# Patient Record
Sex: FEMALE | Race: WHITE | NOT HISPANIC OR LATINO
[De-identification: names, ages, dates, MRNs, and addresses within clinical notes are randomized per-mention and may not be internally consistent; named-entity substitution may affect disease eponyms.]

---

## 2014-05-21 RX ORDER — LITHIUM CARBONATE 300 MG/1
1 TABLET, EXTENDED RELEASE ORAL
Qty: 0 | Refills: 0 | COMMUNITY
Start: 2014-05-21

## 2017-01-18 ENCOUNTER — APPOINTMENT (OUTPATIENT)
Dept: PULMONOLOGY | Facility: CLINIC | Age: 67
End: 2017-01-18

## 2017-01-18 VITALS
HEART RATE: 118 BPM | OXYGEN SATURATION: 97 % | SYSTOLIC BLOOD PRESSURE: 110 MMHG | WEIGHT: 98 LBS | HEIGHT: 56 IN | RESPIRATION RATE: 17 BRPM | BODY MASS INDEX: 22.04 KG/M2 | DIASTOLIC BLOOD PRESSURE: 70 MMHG

## 2017-01-18 DIAGNOSIS — B00.2 HERPESVIRAL GINGIVOSTOMATITIS AND PHARYNGOTONSILLITIS: ICD-10-CM

## 2017-01-19 ENCOUNTER — RX RENEWAL (OUTPATIENT)
Age: 67
End: 2017-01-19

## 2017-01-19 ENCOUNTER — MEDICATION RENEWAL (OUTPATIENT)
Age: 67
End: 2017-01-19

## 2017-01-19 RX ORDER — ALBUTEROL SULFATE 90 UG/1
108 (90 BASE) AEROSOL, METERED RESPIRATORY (INHALATION) EVERY 6 HOURS
Qty: 1 | Refills: 3 | Status: DISCONTINUED | COMMUNITY
Start: 2017-01-19 | End: 2017-01-19

## 2017-01-26 ENCOUNTER — APPOINTMENT (OUTPATIENT)
Dept: CARDIOLOGY | Facility: CLINIC | Age: 67
End: 2017-01-26

## 2017-01-26 ENCOUNTER — NON-APPOINTMENT (OUTPATIENT)
Age: 67
End: 2017-01-26

## 2017-01-26 VITALS
HEIGHT: 56 IN | HEART RATE: 116 BPM | WEIGHT: 293 LBS | BODY MASS INDEX: 65.91 KG/M2 | OXYGEN SATURATION: 96 % | DIASTOLIC BLOOD PRESSURE: 84 MMHG | SYSTOLIC BLOOD PRESSURE: 147 MMHG

## 2017-02-05 ENCOUNTER — MOBILE ON CALL (OUTPATIENT)
Age: 67
End: 2017-02-05

## 2017-02-07 ENCOUNTER — INPATIENT (INPATIENT)
Facility: HOSPITAL | Age: 67
LOS: 2 days | Discharge: HOME | End: 2017-02-10
Attending: INTERNAL MEDICINE | Admitting: INTERNAL MEDICINE
Payer: MEDICARE

## 2017-02-07 VITALS
RESPIRATION RATE: 26 BRPM | DIASTOLIC BLOOD PRESSURE: 100 MMHG | TEMPERATURE: 98 F | HEART RATE: 112 BPM | SYSTOLIC BLOOD PRESSURE: 129 MMHG | OXYGEN SATURATION: 96 %

## 2017-02-07 DIAGNOSIS — N81.6 RECTOCELE: Chronic | ICD-10-CM

## 2017-02-07 DIAGNOSIS — R06.02 SHORTNESS OF BREATH: ICD-10-CM

## 2017-02-07 DIAGNOSIS — J98.6 DISORDERS OF DIAPHRAGM: Chronic | ICD-10-CM

## 2017-02-07 LAB
BUN SERPL-MCNC: 12 MG/DL — SIGNIFICANT CHANGE UP (ref 7–23)
CALCIUM SERPL-MCNC: 10.7 MG/DL — HIGH (ref 8.4–10.5)
CHLORIDE SERPL-SCNC: 101 MMOL/L — SIGNIFICANT CHANGE UP (ref 98–107)
CO2 SERPL-SCNC: 28 MMOL/L — SIGNIFICANT CHANGE UP (ref 22–31)
CREAT SERPL-MCNC: 0.73 MG/DL — SIGNIFICANT CHANGE UP (ref 0.5–1.3)
GLUCOSE SERPL-MCNC: 87 MG/DL — SIGNIFICANT CHANGE UP (ref 70–99)
HBA1C BLD-MCNC: 4.8 % — SIGNIFICANT CHANGE UP (ref 4–5.6)
HCT VFR BLD CALC: 50.7 % — HIGH (ref 34.5–45)
HGB BLD-MCNC: 16.8 G/DL — HIGH (ref 11.5–15.5)
MCHC RBC-ENTMCNC: 31.3 PG — SIGNIFICANT CHANGE UP (ref 27–34)
MCHC RBC-ENTMCNC: 33.1 % — SIGNIFICANT CHANGE UP (ref 32–36)
MCV RBC AUTO: 94.6 FL — SIGNIFICANT CHANGE UP (ref 80–100)
PLATELET # BLD AUTO: 373 K/UL — SIGNIFICANT CHANGE UP (ref 150–400)
PMV BLD: 10.1 FL — SIGNIFICANT CHANGE UP (ref 7–13)
POTASSIUM SERPL-MCNC: 4.8 MMOL/L — SIGNIFICANT CHANGE UP (ref 3.5–5.3)
POTASSIUM SERPL-SCNC: 4.8 MMOL/L — SIGNIFICANT CHANGE UP (ref 3.5–5.3)
RBC # BLD: 5.36 M/UL — HIGH (ref 3.8–5.2)
RBC # FLD: 12.8 % — SIGNIFICANT CHANGE UP (ref 10.3–14.5)
SODIUM SERPL-SCNC: 146 MMOL/L — HIGH (ref 135–145)
WBC # BLD: 8.06 K/UL — SIGNIFICANT CHANGE UP (ref 3.8–10.5)
WBC # FLD AUTO: 8.06 K/UL — SIGNIFICANT CHANGE UP (ref 3.8–10.5)

## 2017-02-07 PROCEDURE — 93010 ELECTROCARDIOGRAM REPORT: CPT

## 2017-02-07 PROCEDURE — 99223 1ST HOSP IP/OBS HIGH 75: CPT

## 2017-02-07 PROCEDURE — 93306 TTE W/DOPPLER COMPLETE: CPT | Mod: 26

## 2017-02-07 RX ORDER — LEVOTHYROXINE SODIUM 125 MCG
125 TABLET ORAL DAILY
Qty: 0 | Refills: 0 | Status: DISCONTINUED | OUTPATIENT
Start: 2017-02-07 | End: 2017-02-10

## 2017-02-07 RX ORDER — SODIUM CHLORIDE 9 MG/ML
3 INJECTION INTRAMUSCULAR; INTRAVENOUS; SUBCUTANEOUS EVERY 8 HOURS
Qty: 0 | Refills: 0 | Status: DISCONTINUED | OUTPATIENT
Start: 2017-02-07 | End: 2017-02-10

## 2017-02-07 RX ORDER — CLONAZEPAM 1 MG
0.5 TABLET ORAL
Qty: 0 | Refills: 0 | Status: DISCONTINUED | OUTPATIENT
Start: 2017-02-07 | End: 2017-02-10

## 2017-02-07 RX ORDER — BUDESONIDE AND FORMOTEROL FUMARATE DIHYDRATE 160; 4.5 UG/1; UG/1
2 AEROSOL RESPIRATORY (INHALATION)
Qty: 0 | Refills: 0 | Status: DISCONTINUED | OUTPATIENT
Start: 2017-02-07 | End: 2017-02-10

## 2017-02-07 RX ORDER — CLONAZEPAM 1 MG
1 TABLET ORAL AT BEDTIME
Qty: 0 | Refills: 0 | Status: DISCONTINUED | OUTPATIENT
Start: 2017-02-07 | End: 2017-02-10

## 2017-02-07 RX ORDER — ZOLPIDEM TARTRATE 10 MG/1
5 TABLET ORAL AT BEDTIME
Qty: 0 | Refills: 0 | Status: DISCONTINUED | OUTPATIENT
Start: 2017-02-07 | End: 2017-02-10

## 2017-02-07 RX ORDER — LITHIUM CARBONATE 300 MG/1
150 TABLET, EXTENDED RELEASE ORAL THREE TIMES A DAY
Qty: 0 | Refills: 0 | Status: DISCONTINUED | OUTPATIENT
Start: 2017-02-07 | End: 2017-02-10

## 2017-02-07 RX ORDER — ALBUTEROL 90 UG/1
2 AEROSOL, METERED ORAL EVERY 6 HOURS
Qty: 0 | Refills: 0 | Status: DISCONTINUED | OUTPATIENT
Start: 2017-02-07 | End: 2017-02-10

## 2017-02-07 RX ORDER — IPRATROPIUM/ALBUTEROL SULFATE 18-103MCG
3 AEROSOL WITH ADAPTER (GRAM) INHALATION EVERY 6 HOURS
Qty: 0 | Refills: 0 | Status: DISCONTINUED | OUTPATIENT
Start: 2017-02-07 | End: 2017-02-10

## 2017-02-07 RX ORDER — VENLAFAXINE HCL 75 MG
150 CAPSULE, EXT RELEASE 24 HR ORAL DAILY
Qty: 0 | Refills: 0 | Status: DISCONTINUED | OUTPATIENT
Start: 2017-02-07 | End: 2017-02-10

## 2017-02-07 RX ORDER — LORATADINE 10 MG/1
10 TABLET ORAL DAILY
Qty: 0 | Refills: 0 | Status: DISCONTINUED | OUTPATIENT
Start: 2017-02-07 | End: 2017-02-10

## 2017-02-07 RX ADMIN — ZOLPIDEM TARTRATE 5 MILLIGRAM(S): 10 TABLET ORAL at 21:51

## 2017-02-07 RX ADMIN — Medication 3 MILLILITER(S): at 22:31

## 2017-02-07 RX ADMIN — Medication 3 MILLILITER(S): at 15:04

## 2017-02-07 RX ADMIN — BUDESONIDE AND FORMOTEROL FUMARATE DIHYDRATE 2 PUFF(S): 160; 4.5 AEROSOL RESPIRATORY (INHALATION) at 21:52

## 2017-02-07 RX ADMIN — LORATADINE 10 MILLIGRAM(S): 10 TABLET ORAL at 21:51

## 2017-02-07 RX ADMIN — Medication 1 MILLIGRAM(S): at 21:51

## 2017-02-07 RX ADMIN — LITHIUM CARBONATE 150 MILLIGRAM(S): 300 TABLET, EXTENDED RELEASE ORAL at 21:52

## 2017-02-07 RX ADMIN — SODIUM CHLORIDE 3 MILLILITER(S): 9 INJECTION INTRAMUSCULAR; INTRAVENOUS; SUBCUTANEOUS at 21:40

## 2017-02-07 RX ADMIN — LITHIUM CARBONATE 150 MILLIGRAM(S): 300 TABLET, EXTENDED RELEASE ORAL at 16:18

## 2017-02-07 RX ADMIN — SODIUM CHLORIDE 3 MILLILITER(S): 9 INJECTION INTRAMUSCULAR; INTRAVENOUS; SUBCUTANEOUS at 15:55

## 2017-02-07 NOTE — H&P CARDIOLOGY - HISTORY OF PRESENT ILLNESS
66 y.o. female presents today for elective cardiac catheterization. The patient c/o SOB with exertion (walking 1/2 block) getting progressively worse within last 2 months,  resolve with rest. Admits to chest pain with exertion and rest. Admits to occasional dizziness. Denies b/l lower extremities edema. The patient was evaluated by her cardiologist, was recommended to have cardiac cath.

## 2017-02-07 NOTE — H&P CARDIOLOGY - REVIEW OF SYSTEMS
NO  palpitations, diaphoresis,  syncope, increased lower extremity edema, fever chills, malaise, myalgias, anorexia, weight changes ( loss or gain), night sweats, generalized fatigue abdominal pain, N/V/C/D BRBPR, melena, urinary symptoms, cough, and wheezing.

## 2017-02-08 ENCOUNTER — TRANSCRIPTION ENCOUNTER (OUTPATIENT)
Age: 67
End: 2017-02-08

## 2017-02-08 LAB
ALBUMIN SERPL ELPH-MCNC: 3.7 G/DL — SIGNIFICANT CHANGE UP (ref 3.3–5)
ALP SERPL-CCNC: 69 U/L — SIGNIFICANT CHANGE UP (ref 40–120)
ALT FLD-CCNC: 11 U/L — SIGNIFICANT CHANGE UP (ref 4–33)
AST SERPL-CCNC: 18 U/L — SIGNIFICANT CHANGE UP (ref 4–32)
BASOPHILS # BLD AUTO: 0.03 K/UL — SIGNIFICANT CHANGE UP (ref 0–0.2)
BASOPHILS NFR BLD AUTO: 0.5 % — SIGNIFICANT CHANGE UP (ref 0–2)
BILIRUB SERPL-MCNC: 0.5 MG/DL — SIGNIFICANT CHANGE UP (ref 0.2–1.2)
BUN SERPL-MCNC: 10 MG/DL — SIGNIFICANT CHANGE UP (ref 7–23)
CALCIUM SERPL-MCNC: 9.4 MG/DL — SIGNIFICANT CHANGE UP (ref 8.4–10.5)
CHLORIDE SERPL-SCNC: 104 MMOL/L — SIGNIFICANT CHANGE UP (ref 98–107)
CO2 SERPL-SCNC: 26 MMOL/L — SIGNIFICANT CHANGE UP (ref 22–31)
CREAT SERPL-MCNC: 0.53 MG/DL — SIGNIFICANT CHANGE UP (ref 0.5–1.3)
EOSINOPHIL # BLD AUTO: 0.26 K/UL — SIGNIFICANT CHANGE UP (ref 0–0.5)
EOSINOPHIL NFR BLD AUTO: 4.6 % — SIGNIFICANT CHANGE UP (ref 0–6)
GLUCOSE SERPL-MCNC: 94 MG/DL — SIGNIFICANT CHANGE UP (ref 70–99)
HCT VFR BLD CALC: 42.5 % — SIGNIFICANT CHANGE UP (ref 34.5–45)
HGB BLD-MCNC: 14.1 G/DL — SIGNIFICANT CHANGE UP (ref 11.5–15.5)
IMM GRANULOCYTES NFR BLD AUTO: 0.4 % — SIGNIFICANT CHANGE UP (ref 0–1.5)
LYMPHOCYTES # BLD AUTO: 1.01 K/UL — SIGNIFICANT CHANGE UP (ref 1–3.3)
LYMPHOCYTES # BLD AUTO: 17.9 % — SIGNIFICANT CHANGE UP (ref 13–44)
MAGNESIUM SERPL-MCNC: 2 MG/DL — SIGNIFICANT CHANGE UP (ref 1.6–2.6)
MCHC RBC-ENTMCNC: 31.1 PG — SIGNIFICANT CHANGE UP (ref 27–34)
MCHC RBC-ENTMCNC: 33.2 % — SIGNIFICANT CHANGE UP (ref 32–36)
MCV RBC AUTO: 93.8 FL — SIGNIFICANT CHANGE UP (ref 80–100)
MONOCYTES # BLD AUTO: 0.64 K/UL — SIGNIFICANT CHANGE UP (ref 0–0.9)
MONOCYTES NFR BLD AUTO: 11.4 % — SIGNIFICANT CHANGE UP (ref 2–14)
NEUTROPHILS # BLD AUTO: 3.67 K/UL — SIGNIFICANT CHANGE UP (ref 1.8–7.4)
NEUTROPHILS NFR BLD AUTO: 65.2 % — SIGNIFICANT CHANGE UP (ref 43–77)
PHOSPHATE SERPL-MCNC: 3.3 MG/DL — SIGNIFICANT CHANGE UP (ref 2.5–4.5)
PLATELET # BLD AUTO: 290 K/UL — SIGNIFICANT CHANGE UP (ref 150–400)
PMV BLD: 9.9 FL — SIGNIFICANT CHANGE UP (ref 7–13)
POTASSIUM SERPL-MCNC: 4 MMOL/L — SIGNIFICANT CHANGE UP (ref 3.5–5.3)
POTASSIUM SERPL-SCNC: 4 MMOL/L — SIGNIFICANT CHANGE UP (ref 3.5–5.3)
PROT SERPL-MCNC: 5.9 G/DL — LOW (ref 6–8.3)
RBC # BLD: 4.53 M/UL — SIGNIFICANT CHANGE UP (ref 3.8–5.2)
RBC # FLD: 12.7 % — SIGNIFICANT CHANGE UP (ref 10.3–14.5)
SODIUM SERPL-SCNC: 142 MMOL/L — SIGNIFICANT CHANGE UP (ref 135–145)
WBC # BLD: 5.63 K/UL — SIGNIFICANT CHANGE UP (ref 3.8–10.5)
WBC # FLD AUTO: 5.63 K/UL — SIGNIFICANT CHANGE UP (ref 3.8–10.5)

## 2017-02-08 PROCEDURE — 99233 SBSQ HOSP IP/OBS HIGH 50: CPT

## 2017-02-08 PROCEDURE — 93010 ELECTROCARDIOGRAM REPORT: CPT

## 2017-02-08 RX ORDER — CLOPIDOGREL BISULFATE 75 MG/1
75 TABLET, FILM COATED ORAL DAILY
Qty: 0 | Refills: 0 | Status: DISCONTINUED | OUTPATIENT
Start: 2017-02-09 | End: 2017-02-10

## 2017-02-08 RX ORDER — ONDANSETRON 8 MG/1
2 TABLET, FILM COATED ORAL ONCE
Qty: 0 | Refills: 0 | Status: COMPLETED | OUTPATIENT
Start: 2017-02-08 | End: 2017-02-08

## 2017-02-08 RX ORDER — ACETAMINOPHEN 500 MG
650 TABLET ORAL ONCE
Qty: 0 | Refills: 0 | Status: COMPLETED | OUTPATIENT
Start: 2017-02-08 | End: 2017-02-08

## 2017-02-08 RX ORDER — FENTANYL CITRATE 50 UG/ML
25 INJECTION INTRAVENOUS ONCE
Qty: 0 | Refills: 0 | Status: DISCONTINUED | OUTPATIENT
Start: 2017-02-08 | End: 2017-02-08

## 2017-02-08 RX ORDER — ASPIRIN/CALCIUM CARB/MAGNESIUM 324 MG
81 TABLET ORAL DAILY
Qty: 0 | Refills: 0 | Status: DISCONTINUED | OUTPATIENT
Start: 2017-02-09 | End: 2017-02-10

## 2017-02-08 RX ORDER — SODIUM CHLORIDE 9 MG/ML
250 INJECTION INTRAMUSCULAR; INTRAVENOUS; SUBCUTANEOUS ONCE
Qty: 0 | Refills: 0 | Status: COMPLETED | OUTPATIENT
Start: 2017-02-08 | End: 2017-02-08

## 2017-02-08 RX ORDER — ATORVASTATIN CALCIUM 80 MG/1
80 TABLET, FILM COATED ORAL AT BEDTIME
Qty: 0 | Refills: 0 | Status: DISCONTINUED | OUTPATIENT
Start: 2017-02-08 | End: 2017-02-10

## 2017-02-08 RX ADMIN — LITHIUM CARBONATE 150 MILLIGRAM(S): 300 TABLET, EXTENDED RELEASE ORAL at 22:45

## 2017-02-08 RX ADMIN — Medication 650 MILLIGRAM(S): at 16:33

## 2017-02-08 RX ADMIN — LITHIUM CARBONATE 150 MILLIGRAM(S): 300 TABLET, EXTENDED RELEASE ORAL at 06:29

## 2017-02-08 RX ADMIN — SODIUM CHLORIDE 3 MILLILITER(S): 9 INJECTION INTRAMUSCULAR; INTRAVENOUS; SUBCUTANEOUS at 06:24

## 2017-02-08 RX ADMIN — ONDANSETRON 2 MILLIGRAM(S): 8 TABLET, FILM COATED ORAL at 15:38

## 2017-02-08 RX ADMIN — LORATADINE 10 MILLIGRAM(S): 10 TABLET ORAL at 22:46

## 2017-02-08 RX ADMIN — SODIUM CHLORIDE 1000 MILLILITER(S): 9 INJECTION INTRAMUSCULAR; INTRAVENOUS; SUBCUTANEOUS at 21:42

## 2017-02-08 RX ADMIN — ATORVASTATIN CALCIUM 80 MILLIGRAM(S): 80 TABLET, FILM COATED ORAL at 22:45

## 2017-02-08 RX ADMIN — BUDESONIDE AND FORMOTEROL FUMARATE DIHYDRATE 2 PUFF(S): 160; 4.5 AEROSOL RESPIRATORY (INHALATION) at 09:21

## 2017-02-08 RX ADMIN — Medication 0.5 MILLIGRAM(S): at 06:29

## 2017-02-08 RX ADMIN — Medication 125 MICROGRAM(S): at 06:29

## 2017-02-08 RX ADMIN — FENTANYL CITRATE 25 MICROGRAM(S): 50 INJECTION INTRAVENOUS at 14:31

## 2017-02-08 RX ADMIN — ZOLPIDEM TARTRATE 5 MILLIGRAM(S): 10 TABLET ORAL at 22:46

## 2017-02-08 RX ADMIN — SODIUM CHLORIDE 3 MILLILITER(S): 9 INJECTION INTRAMUSCULAR; INTRAVENOUS; SUBCUTANEOUS at 21:33

## 2017-02-08 RX ADMIN — FENTANYL CITRATE 25 MICROGRAM(S): 50 INJECTION INTRAVENOUS at 14:14

## 2017-02-08 RX ADMIN — Medication 1 MILLIGRAM(S): at 22:45

## 2017-02-09 LAB
BUN SERPL-MCNC: 9 MG/DL — SIGNIFICANT CHANGE UP (ref 7–23)
CALCIUM SERPL-MCNC: 9.5 MG/DL — SIGNIFICANT CHANGE UP (ref 8.4–10.5)
CHLORIDE SERPL-SCNC: 108 MMOL/L — HIGH (ref 98–107)
CO2 SERPL-SCNC: 26 MMOL/L — SIGNIFICANT CHANGE UP (ref 22–31)
CREAT SERPL-MCNC: 0.56 MG/DL — SIGNIFICANT CHANGE UP (ref 0.5–1.3)
GLUCOSE SERPL-MCNC: 94 MG/DL — SIGNIFICANT CHANGE UP (ref 70–99)
MAGNESIUM SERPL-MCNC: 2.1 MG/DL — SIGNIFICANT CHANGE UP (ref 1.6–2.6)
POTASSIUM SERPL-MCNC: 4.1 MMOL/L — SIGNIFICANT CHANGE UP (ref 3.5–5.3)
POTASSIUM SERPL-SCNC: 4.1 MMOL/L — SIGNIFICANT CHANGE UP (ref 3.5–5.3)
SODIUM SERPL-SCNC: 148 MMOL/L — HIGH (ref 135–145)

## 2017-02-09 PROCEDURE — 99232 SBSQ HOSP IP/OBS MODERATE 35: CPT

## 2017-02-09 RX ORDER — BUDESONIDE AND FORMOTEROL FUMARATE DIHYDRATE 160; 4.5 UG/1; UG/1
2 AEROSOL RESPIRATORY (INHALATION)
Qty: 0 | Refills: 0 | COMMUNITY
Start: 2017-02-09

## 2017-02-09 RX ORDER — SODIUM CHLORIDE 9 MG/ML
1000 INJECTION INTRAMUSCULAR; INTRAVENOUS; SUBCUTANEOUS
Qty: 0 | Refills: 0 | Status: DISCONTINUED | OUTPATIENT
Start: 2017-02-09 | End: 2017-02-10

## 2017-02-09 RX ORDER — CLOPIDOGREL BISULFATE 75 MG/1
1 TABLET, FILM COATED ORAL
Qty: 0 | Refills: 0 | COMMUNITY
Start: 2017-02-09

## 2017-02-09 RX ORDER — ASPIRIN/CALCIUM CARB/MAGNESIUM 324 MG
1 TABLET ORAL
Qty: 0 | Refills: 0 | COMMUNITY
Start: 2017-02-09

## 2017-02-09 RX ORDER — POLYETHYLENE GLYCOL 3350 17 G/17G
17 POWDER, FOR SOLUTION ORAL ONCE
Qty: 0 | Refills: 0 | Status: COMPLETED | OUTPATIENT
Start: 2017-02-09 | End: 2017-02-09

## 2017-02-09 RX ORDER — LEVOTHYROXINE SODIUM 125 MCG
1 TABLET ORAL
Qty: 0 | Refills: 0 | COMMUNITY
Start: 2017-02-09

## 2017-02-09 RX ORDER — VENLAFAXINE HCL 75 MG
1 CAPSULE, EXT RELEASE 24 HR ORAL
Qty: 0 | Refills: 0 | COMMUNITY
Start: 2017-02-09

## 2017-02-09 RX ORDER — ATORVASTATIN CALCIUM 80 MG/1
1 TABLET, FILM COATED ORAL
Qty: 0 | Refills: 0 | COMMUNITY
Start: 2017-02-09

## 2017-02-09 RX ORDER — ALBUTEROL 90 UG/1
2 AEROSOL, METERED ORAL
Qty: 0 | Refills: 0 | COMMUNITY

## 2017-02-09 RX ORDER — DOCUSATE SODIUM 100 MG
100 CAPSULE ORAL THREE TIMES A DAY
Qty: 0 | Refills: 0 | Status: DISCONTINUED | OUTPATIENT
Start: 2017-02-09 | End: 2017-02-10

## 2017-02-09 RX ORDER — CLONAZEPAM 1 MG
1 TABLET ORAL
Qty: 0 | Refills: 0 | COMMUNITY
Start: 2017-02-09

## 2017-02-09 RX ADMIN — ZOLPIDEM TARTRATE 5 MILLIGRAM(S): 10 TABLET ORAL at 21:42

## 2017-02-09 RX ADMIN — LITHIUM CARBONATE 150 MILLIGRAM(S): 300 TABLET, EXTENDED RELEASE ORAL at 13:13

## 2017-02-09 RX ADMIN — LITHIUM CARBONATE 150 MILLIGRAM(S): 300 TABLET, EXTENDED RELEASE ORAL at 21:43

## 2017-02-09 RX ADMIN — SODIUM CHLORIDE 3 MILLILITER(S): 9 INJECTION INTRAMUSCULAR; INTRAVENOUS; SUBCUTANEOUS at 21:46

## 2017-02-09 RX ADMIN — LORATADINE 10 MILLIGRAM(S): 10 TABLET ORAL at 12:37

## 2017-02-09 RX ADMIN — POLYETHYLENE GLYCOL 3350 17 GRAM(S): 17 POWDER, FOR SOLUTION ORAL at 17:13

## 2017-02-09 RX ADMIN — CLOPIDOGREL BISULFATE 75 MILLIGRAM(S): 75 TABLET, FILM COATED ORAL at 12:37

## 2017-02-09 RX ADMIN — BUDESONIDE AND FORMOTEROL FUMARATE DIHYDRATE 2 PUFF(S): 160; 4.5 AEROSOL RESPIRATORY (INHALATION) at 08:53

## 2017-02-09 RX ADMIN — Medication 3 MILLILITER(S): at 16:14

## 2017-02-09 RX ADMIN — Medication 3 MILLILITER(S): at 21:21

## 2017-02-09 RX ADMIN — ATORVASTATIN CALCIUM 80 MILLIGRAM(S): 80 TABLET, FILM COATED ORAL at 21:42

## 2017-02-09 RX ADMIN — Medication 100 MILLIGRAM(S): at 21:45

## 2017-02-09 RX ADMIN — BUDESONIDE AND FORMOTEROL FUMARATE DIHYDRATE 2 PUFF(S): 160; 4.5 AEROSOL RESPIRATORY (INHALATION) at 21:42

## 2017-02-09 RX ADMIN — LITHIUM CARBONATE 150 MILLIGRAM(S): 300 TABLET, EXTENDED RELEASE ORAL at 06:35

## 2017-02-09 RX ADMIN — Medication 81 MILLIGRAM(S): at 12:37

## 2017-02-09 RX ADMIN — Medication 3 MILLILITER(S): at 10:51

## 2017-02-09 RX ADMIN — Medication 125 MICROGRAM(S): at 06:35

## 2017-02-09 RX ADMIN — SODIUM CHLORIDE 3 MILLILITER(S): 9 INJECTION INTRAMUSCULAR; INTRAVENOUS; SUBCUTANEOUS at 06:16

## 2017-02-09 RX ADMIN — Medication 150 MILLIGRAM(S): at 12:37

## 2017-02-09 RX ADMIN — SODIUM CHLORIDE 3 MILLILITER(S): 9 INJECTION INTRAMUSCULAR; INTRAVENOUS; SUBCUTANEOUS at 13:13

## 2017-02-09 RX ADMIN — Medication 0.5 MILLIGRAM(S): at 06:35

## 2017-02-09 RX ADMIN — Medication 1 MILLIGRAM(S): at 21:43

## 2017-02-09 RX ADMIN — SODIUM CHLORIDE 75 MILLILITER(S): 9 INJECTION INTRAMUSCULAR; INTRAVENOUS; SUBCUTANEOUS at 13:51

## 2017-02-09 NOTE — DISCHARGE NOTE ADULT - PLAN OF CARE
stent occluded arteries You got two stents to your coronary artery. You were started on Aspirin, Plavix, and Lipitor.  It is important that you take your medications as prescribed, and follow up with your cardiologist within two weeks.   No driving x 24 hours. No strenuous activity for three weeks. Monitor site of procedure and notify your doctor for any  bleeding / swelling/redness/ discharge/pain. You may shower but no baths or swimming x one week. No heavy lifting x 1 week.

## 2017-02-09 NOTE — DISCHARGE NOTE ADULT - CARE PROVIDER_API CALL
Williams Vigil (MD; PhD), Cardiology; Internal Medicine; Vascular Medicine  [id number] [address] [place] [id number]  Phone: [phone]  Fax: [phone]    MD Antonio, [facility]  Your PCP  Phone: (   )    -  Fax: (   )    -

## 2017-02-09 NOTE — DISCHARGE NOTE ADULT - PATIENT PORTAL LINK FT
“You can access the FollowHealth Patient Portal, offered by [facility], by registering with the following website: [email or web address]”

## 2017-02-09 NOTE — DISCHARGE NOTE ADULT - OTHER SIGNIFICANT FINDINGS
(Admit Diagnosis) Shortness of breath  (PMH) CAD (coronary artery disease)  (PMH) Stented coronary artery  (PMH) Uncomplicated asthma, unspecified asthma severity  (PMH) Depression, unspecified depression type  (PMH) Hypothyroidism, unspecified type  (PMH) Psoriasis  (PMH) Diverticulitis  (PMH) ALIS (obstructive sleep apnea)  (PMH) Insomnia  (PMH) Gastroesophageal reflux disease, esophagitis presence not specified  (PMH) Paralyzed hemidiaphragm  (PMH) Chronic nonintractable headache, unspecified headache type  (Principal DC/DX) CAD (coronary artery disease)  (PSH) Rectocele  (PSH) Paralyzed hemidiaphragm

## 2017-02-09 NOTE — DISCHARGE NOTE ADULT - HOSPITAL COURSE
66 y.o. female presents today for elective cardiac catheterization. The patient c/o SOB with exertion (walking 1/2 block) getting progressively worse within last 2 months,  resolve with rest. Admits to chest pain with exertion and rest. Admits to occasional dizziness. Denies b/l lower extremities edema. The patient was evaluated by her cardiologist, was recommended to have cardiac cath.    Hospital course:  Pt went for cardiac cath which revealed  LVEDP 4, p/mLAD 90, patent prior stent. An echo revealed  Mild to moderate segmental left ventricular systolicdysfunction. The  septum and apex are hypokinetic. Pt then went back to get a PCI to proximal and mid LAD. 66 y.o. female presents today for elective cardiac catheterization. The patient c/o SOB with exertion (walking 1/2 block) getting progressively worse within last 2 months,  resolve with rest. Admits to chest pain with exertion and rest. Admits to occasional dizziness. Denies b/l lower extremities edema. The patient was evaluated by her cardiologist, was recommended to have cardiac cath.    Hospital course:  Pt went for cardiac cath which revealed  LVEDP 4, p/mLAD 90, patent prior stent. An echo revealed  Mild to moderate segmental left ventricular systolicdysfunction. The  septum and apex are hypokinetic. Pt then went back to get a PCI to proximal and mid LAD.     2/7 CATH: EF 40, LVEDP 4, p/mLAD 90, patent prior stent, dLCx 80-90; PCW 9, CO 3.11, CI 2.37, PA 20/9; RFA/V sheath removed  2/7 ECHO: Mitral Valve: Normal mitral valve. Aortic Root: Normal aortic root. Aortic Valve: Aortic valve not well visualized. Left Atrium: Normal left atrium. Left Ventricle: Endocardial visualization enhanced with intravenous injection of echo contrast (Definity). Mild to  moderate segmental left ventricular systolic dysfunction. The  septum and apex are hypokinetic. No LV thrombus visualized. Right Heart: Normal right atrium. Normal right ventricular size and function. Normal tricuspid valve. Mild tricuspid regurgitation. Normal pulmonic valve. Pericardium/PleuraNormal pericardium with no pericardial effusion.  Hemodynamic: Estimated right ventricular systolic pressure equals 35 mm Hg, assuming right atrial pressure equals 10 mm Hg, consistent with borderline pulmonary hypertension.    2/8/17 Cardiac cath - pLAD 70% stent x 1, mLAD 90%=> stent x 1. RFA pulled. The patient c/o L shoulder and L subscapular pain started in cath lab and continued in recovery, 8/10, ECG#1 post cath new TWI in V2, V3. As per Dr. Jarvis no need for any intervention at present. Fentanyl 25 mcg IV x 1 given with resolution in symptoms, however patient developed nausea, improved with Zofran. L shoulder pain returned, post RFA sheath pull, ECG # 2 post cath - no previously seen TWI. Tylenol  650 mg x 1 given. Continue monitoring. 66 y.o. female presents today for elective cardiac catheterization. The patient c/o SOB with exertion (walking 1/2 block) getting progressively worse within last 2 months,  resolve with rest. Admits to chest pain with exertion and rest. Admits to occasional dizziness. Denies b/l lower extremities edema. The patient was evaluated by her cardiologist, was recommended to have cardiac cath.    Hospital course:  Pt went for cardiac cath which revealed  LVEDP 4, p/mLAD 90, patent prior stent. An echo revealed  Mild to moderate segmental left ventricular systolicdysfunction. The  septum and apex are hypokinetic. Pt then went back to get a PCI to proximal and mid LAD.     2/7 CATH: EF 40, LVEDP 4, p/mLAD 90, patent prior stent, dLCx 80-90; PCW 9, CO 3.11, CI 2.37, PA 20/9; RFA/V sheath removed  2/7 ECHO: Mitral Valve: Normal mitral valve. Aortic Root: Normal aortic root. Aortic Valve: Aortic valve not well visualized. Left Atrium: Normal left atrium. Left Ventricle: Endocardial visualization enhanced with intravenous injection of echo contrast (Definity). Mild to  moderate segmental left ventricular systolic dysfunction. The  septum and apex are hypokinetic. No LV thrombus visualized. Right Heart: Normal right atrium. Normal right ventricular size and function. Normal tricuspid valve. Mild tricuspid regurgitation. Normal pulmonic valve. Pericardium/PleuraNormal pericardium with no pericardial effusion.  Hemodynamic: Estimated right ventricular systolic pressure equals 35 mm Hg, assuming right atrial pressure equals 10 mm Hg, consistent with borderline pulmonary hypertension.    2/8/17 Cardiac cath - pLAD 70% stent x 1, mLAD 90%=> stent x 1. RFA pulled. The patient c/o L shoulder and L subscapular pain started in cath lab and continued in recovery, 8/10, ECG#1 post cath new TWI in V2, V3. As per Dr. Jarvis no need for any intervention at present. Fentanyl 25 mcg IV x 1 given with resolution in symptoms, however patient developed nausea, improved with Zofran. L shoulder pain returned, post RFA sheath pull, ECG # 2 post cath - no previously seen TWI. Tylenol  650 mg x 1 given. Continue monitoring.     Discussed with MD - pt stable for discharge home, pt with no complaints.

## 2017-02-09 NOTE — DISCHARGE NOTE ADULT - CARE PLAN
Principal Discharge DX:	CAD (coronary artery disease)  Goal:	stent occluded arteries  Instructions for follow-up, activity and diet:	You got two stents to your coronary artery. You were started on Aspirin, Plavix, and Lipitor.  It is important that you take your medications as prescribed, and follow up with your cardiologist within two weeks.   No driving x 24 hours. No strenuous activity for three weeks. Monitor site of procedure and notify your doctor for any  bleeding / swelling/redness/ discharge/pain. You may shower but no baths or swimming x one week. No heavy lifting x 1 week. Principal Discharge DX:	Coronary artery disease involving native coronary artery of native heart with unstable angina pectoris  Goal:	stent occluded arteries  Instructions for follow-up, activity and diet:	You got two stents to your coronary artery. You were started on Aspirin, Plavix, and Lipitor.  It is important that you take your medications as prescribed, and follow up with your cardiologist within two weeks.   No driving x 24 hours. No strenuous activity for three weeks. Monitor site of procedure and notify your doctor for any  bleeding / swelling/redness/ discharge/pain. You may shower but no baths or swimming x one week. No heavy lifting x 1 week.

## 2017-02-09 NOTE — DISCHARGE NOTE ADULT - PROVIDER TOKENS
TOKEN:'[id number]',FREE:[LAST:[MD Antonio],FIRST:[Fili],PHONE:[(   )    -],FAX:[(   )    -],ADDRESS:[Your PCP]]

## 2017-02-09 NOTE — DISCHARGE NOTE ADULT - PRINCIPAL DIAGNOSIS
CAD (coronary artery disease) Coronary artery disease involving native coronary artery of native heart with unstable angina pectoris

## 2017-02-09 NOTE — DISCHARGE NOTE ADULT - MEDICATION SUMMARY - MEDICATIONS TO TAKE
I will START or STAY ON the medications listed below when I get home from the hospital:    aspirin 81 mg oral delayed release tablet  -- 1 tab(s) by mouth once a day  -- Indication: For CAD (coronary artery disease)    KlonoPIN 0.5 mg oral tablet  -- 1 tab(s) by mouth once a day in AM  -- Indication: For Depression    KlonoPIN 0.5 mg oral tablet  -- 2 tab(s) by mouth once a day (at bedtime)  -- Indication: For Depression    venlafaxine 150 mg oral capsule, extended release  -- 1 cap(s) by mouth once a day  -- Indication: For Depression    levocetirizine 5 mg oral tablet  -- 1 tab(s) by mouth once a day (in the evening)  -- Indication: For antihistamine    Xyzal 5 mg oral tablet  -- 1 tab(s) by mouth once a day (in the evening)  -- Indication: For antihistamine    atorvastatin 80 mg oral tablet  -- 1 tab(s) by mouth once a day (at bedtime)  -- Indication: For Hyperlipidemia    clopidogrel 75 mg oral tablet  -- 1 tab(s) by mouth once a day  -- Indication: For CAD (coronary artery disease)    lithium 150 mg oral capsule  -- 1 cap(s) by mouth 3 times a day  -- Indication: For Depression    Ambien 5 mg oral tablet  -- 1 tab(s) by mouth once a day (at bedtime)  -- Indication: For Insomnia    albuterol CFC free 90 mcg/inh inhalation aerosol  -- 2 puff(s) inhaled every 6 hours, As needed, Shortness of Breath and/or Wheezing  -- Indication: For asthma    Advair Diskus 500 mcg-50 mcg inhalation powder  -- 1 puff(s) inhaled 2 times a day  -- Indication: For asthma    Proventil HFA 90 mcg/inh inhalation aerosol  -- 2 puff(s) inhaled 4 times a day, As Needed  -- Indication: For asthma    levothyroxine 125 mcg (0.125 mg) oral tablet  -- 1 tab(s) by mouth once a day  -- Indication: For Hypothyroidism

## 2017-02-09 NOTE — DISCHARGE NOTE ADULT - ADDITIONAL INSTRUCTIONS
No heavy lifting over 5 lbs for 1 week. No strenuous activity for 3 weeks or until cleared by your doctor.  No driving for 24 hours. You may shower, no bath or swimming for 1 week  Monitor right groin-  for pain, redness swelling, discharge, numbness, discoloration spreading in the extremity occurs  call your doctor / go to the nearest ER No heavy lifting over 5 lbs for 1 week. No strenuous activity for 3 weeks or until cleared by your doctor.  No driving for 24 hours. You may shower, no bath or swimming for 1 week  Monitor right groin-  for pain, redness swelling, discharge, numbness, discoloration spreading in the extremity occurs  call your doctor / go to the nearest ER  follow up with your PMD in one week - call for appointment  follow up with your Cardiologist in one week - call for appointment

## 2017-02-09 NOTE — DISCHARGE NOTE ADULT - CARE PROVIDERS DIRECT ADDRESSES
,[email or web address]

## 2017-02-10 VITALS — OXYGEN SATURATION: 97 %

## 2017-02-10 LAB
BUN SERPL-MCNC: 8 MG/DL — SIGNIFICANT CHANGE UP (ref 7–23)
CALCIUM SERPL-MCNC: 9.3 MG/DL — SIGNIFICANT CHANGE UP (ref 8.4–10.5)
CHLORIDE SERPL-SCNC: 104 MMOL/L — SIGNIFICANT CHANGE UP (ref 98–107)
CO2 SERPL-SCNC: 25 MMOL/L — SIGNIFICANT CHANGE UP (ref 22–31)
CREAT SERPL-MCNC: 0.46 MG/DL — LOW (ref 0.5–1.3)
GLUCOSE SERPL-MCNC: 84 MG/DL — SIGNIFICANT CHANGE UP (ref 70–99)
HCT VFR BLD CALC: 39.2 % — SIGNIFICANT CHANGE UP (ref 34.5–45)
HGB BLD-MCNC: 12.6 G/DL — SIGNIFICANT CHANGE UP (ref 11.5–15.5)
MAGNESIUM SERPL-MCNC: 2.1 MG/DL — SIGNIFICANT CHANGE UP (ref 1.6–2.6)
MCHC RBC-ENTMCNC: 30.7 PG — SIGNIFICANT CHANGE UP (ref 27–34)
MCHC RBC-ENTMCNC: 32.1 % — SIGNIFICANT CHANGE UP (ref 32–36)
MCV RBC AUTO: 95.4 FL — SIGNIFICANT CHANGE UP (ref 80–100)
PLATELET # BLD AUTO: 255 K/UL — SIGNIFICANT CHANGE UP (ref 150–400)
PMV BLD: 9.6 FL — SIGNIFICANT CHANGE UP (ref 7–13)
POTASSIUM SERPL-MCNC: SIGNIFICANT CHANGE UP MMOL/L (ref 3.5–5.3)
POTASSIUM SERPL-SCNC: SIGNIFICANT CHANGE UP MMOL/L (ref 3.5–5.3)
RBC # BLD: 4.11 M/UL — SIGNIFICANT CHANGE UP (ref 3.8–5.2)
RBC # FLD: 13 % — SIGNIFICANT CHANGE UP (ref 10.3–14.5)
SODIUM SERPL-SCNC: 143 MMOL/L — SIGNIFICANT CHANGE UP (ref 135–145)
WBC # BLD: 6.14 K/UL — SIGNIFICANT CHANGE UP (ref 3.8–10.5)
WBC # FLD AUTO: 6.14 K/UL — SIGNIFICANT CHANGE UP (ref 3.8–10.5)

## 2017-02-10 PROCEDURE — 99239 HOSP IP/OBS DSCHRG MGMT >30: CPT

## 2017-02-10 RX ORDER — ATORVASTATIN CALCIUM 80 MG/1
1 TABLET, FILM COATED ORAL
Qty: 30 | Refills: 0 | OUTPATIENT
Start: 2017-02-10 | End: 2017-03-12

## 2017-02-10 RX ORDER — ASPIRIN/CALCIUM CARB/MAGNESIUM 324 MG
1 TABLET ORAL
Qty: 30 | Refills: 0 | OUTPATIENT
Start: 2017-02-10 | End: 2017-03-12

## 2017-02-10 RX ORDER — VENLAFAXINE HCL 75 MG
1 CAPSULE, EXT RELEASE 24 HR ORAL
Qty: 0 | Refills: 0 | COMMUNITY

## 2017-02-10 RX ORDER — CLOPIDOGREL BISULFATE 75 MG/1
1 TABLET, FILM COATED ORAL
Qty: 30 | Refills: 0 | OUTPATIENT
Start: 2017-02-10 | End: 2017-03-12

## 2017-02-10 RX ADMIN — Medication 0.5 MILLIGRAM(S): at 06:10

## 2017-02-10 RX ADMIN — Medication 125 MICROGRAM(S): at 05:26

## 2017-02-10 RX ADMIN — Medication 150 MILLIGRAM(S): at 11:03

## 2017-02-10 RX ADMIN — LORATADINE 10 MILLIGRAM(S): 10 TABLET ORAL at 11:02

## 2017-02-10 RX ADMIN — LITHIUM CARBONATE 150 MILLIGRAM(S): 300 TABLET, EXTENDED RELEASE ORAL at 11:02

## 2017-02-10 RX ADMIN — CLOPIDOGREL BISULFATE 75 MILLIGRAM(S): 75 TABLET, FILM COATED ORAL at 11:02

## 2017-02-10 RX ADMIN — Medication 100 MILLIGRAM(S): at 11:02

## 2017-02-10 RX ADMIN — SODIUM CHLORIDE 3 MILLILITER(S): 9 INJECTION INTRAMUSCULAR; INTRAVENOUS; SUBCUTANEOUS at 05:28

## 2017-02-10 RX ADMIN — Medication 100 MILLIGRAM(S): at 05:26

## 2017-02-10 RX ADMIN — Medication 3 MILLILITER(S): at 09:29

## 2017-02-10 RX ADMIN — LITHIUM CARBONATE 150 MILLIGRAM(S): 300 TABLET, EXTENDED RELEASE ORAL at 05:26

## 2017-02-10 RX ADMIN — Medication 81 MILLIGRAM(S): at 11:02

## 2017-02-10 RX ADMIN — BUDESONIDE AND FORMOTEROL FUMARATE DIHYDRATE 2 PUFF(S): 160; 4.5 AEROSOL RESPIRATORY (INHALATION) at 08:43

## 2017-02-10 RX ADMIN — SODIUM CHLORIDE 3 MILLILITER(S): 9 INJECTION INTRAMUSCULAR; INTRAVENOUS; SUBCUTANEOUS at 11:03

## 2017-02-17 PROBLEM — E03.9 HYPOTHYROIDISM, UNSPECIFIED: Chronic | Status: ACTIVE | Noted: 2017-02-07

## 2017-02-17 PROBLEM — G47.00 INSOMNIA, UNSPECIFIED: Chronic | Status: ACTIVE | Noted: 2017-02-07

## 2017-02-17 PROBLEM — K57.92 DIVERTICULITIS OF INTESTINE, PART UNSPECIFIED, WITHOUT PERFORATION OR ABSCESS WITHOUT BLEEDING: Chronic | Status: ACTIVE | Noted: 2017-02-07

## 2017-02-17 PROBLEM — R51 HEADACHE: Chronic | Status: ACTIVE | Noted: 2017-02-07

## 2017-02-17 PROBLEM — I25.10 ATHEROSCLEROTIC HEART DISEASE OF NATIVE CORONARY ARTERY WITHOUT ANGINA PECTORIS: Chronic | Status: ACTIVE | Noted: 2017-02-07

## 2017-02-17 PROBLEM — F32.9 MAJOR DEPRESSIVE DISORDER, SINGLE EPISODE, UNSPECIFIED: Chronic | Status: ACTIVE | Noted: 2017-02-07

## 2017-02-17 PROBLEM — K21.9 GASTRO-ESOPHAGEAL REFLUX DISEASE WITHOUT ESOPHAGITIS: Chronic | Status: ACTIVE | Noted: 2017-02-07

## 2017-02-18 ENCOUNTER — INPATIENT (INPATIENT)
Facility: HOSPITAL | Age: 67
LOS: 4 days | Discharge: HOME | End: 2017-02-23
Attending: INTERNAL MEDICINE | Admitting: INTERNAL MEDICINE
Payer: MEDICARE

## 2017-02-18 VITALS
OXYGEN SATURATION: 100 % | TEMPERATURE: 98 F | RESPIRATION RATE: 16 BRPM | SYSTOLIC BLOOD PRESSURE: 99 MMHG | HEART RATE: 109 BPM | DIASTOLIC BLOOD PRESSURE: 54 MMHG

## 2017-02-18 DIAGNOSIS — J45.909 UNSPECIFIED ASTHMA, UNCOMPLICATED: ICD-10-CM

## 2017-02-18 DIAGNOSIS — I21.01 ST ELEVATION (STEMI) MYOCARDIAL INFARCTION INVOLVING LEFT MAIN CORONARY ARTERY: ICD-10-CM

## 2017-02-18 DIAGNOSIS — F32.9 MAJOR DEPRESSIVE DISORDER, SINGLE EPISODE, UNSPECIFIED: ICD-10-CM

## 2017-02-18 DIAGNOSIS — Z41.8 ENCOUNTER FOR OTHER PROCEDURES FOR PURPOSES OTHER THAN REMEDYING HEALTH STATE: ICD-10-CM

## 2017-02-18 DIAGNOSIS — F51.01 PRIMARY INSOMNIA: ICD-10-CM

## 2017-02-18 DIAGNOSIS — E03.9 HYPOTHYROIDISM, UNSPECIFIED: ICD-10-CM

## 2017-02-18 DIAGNOSIS — I21.02 ST ELEVATION (STEMI) MYOCARDIAL INFARCTION INVOLVING LEFT ANTERIOR DESCENDING CORONARY ARTERY: ICD-10-CM

## 2017-02-18 DIAGNOSIS — N81.6 RECTOCELE: Chronic | ICD-10-CM

## 2017-02-18 DIAGNOSIS — J98.6 DISORDERS OF DIAPHRAGM: Chronic | ICD-10-CM

## 2017-02-18 LAB
ALBUMIN SERPL ELPH-MCNC: 4.3 G/DL — SIGNIFICANT CHANGE UP (ref 3.3–5)
ALP SERPL-CCNC: 75 U/L — SIGNIFICANT CHANGE UP (ref 40–120)
ALT FLD-CCNC: 14 U/L — SIGNIFICANT CHANGE UP (ref 4–33)
APTT BLD: 191.2 SEC — CRITICAL HIGH (ref 27.5–37.4)
APTT BLD: 37.7 SEC — HIGH (ref 27.5–37.4)
AST SERPL-CCNC: 43 U/L — HIGH (ref 4–32)
BASE EXCESS BLDV CALC-SCNC: 1.7 MMOL/L — SIGNIFICANT CHANGE UP
BASOPHILS # BLD AUTO: 0.03 K/UL — SIGNIFICANT CHANGE UP (ref 0–0.2)
BASOPHILS NFR BLD AUTO: 0.2 % — SIGNIFICANT CHANGE UP (ref 0–2)
BILIRUB SERPL-MCNC: 0.6 MG/DL — SIGNIFICANT CHANGE UP (ref 0.2–1.2)
BLD GP AB SCN SERPL QL: NEGATIVE — SIGNIFICANT CHANGE UP
BLOOD GAS VENOUS - CREATININE: 0.71 MG/DL — SIGNIFICANT CHANGE UP (ref 0.5–1.3)
BUN SERPL-MCNC: 14 MG/DL — SIGNIFICANT CHANGE UP (ref 7–23)
BUN SERPL-MCNC: 15 MG/DL — SIGNIFICANT CHANGE UP (ref 7–23)
CALCIUM SERPL-MCNC: 9.4 MG/DL — SIGNIFICANT CHANGE UP (ref 8.4–10.5)
CALCIUM SERPL-MCNC: 9.9 MG/DL — SIGNIFICANT CHANGE UP (ref 8.4–10.5)
CHLORIDE BLDV-SCNC: 103 MMOL/L — SIGNIFICANT CHANGE UP (ref 96–108)
CHLORIDE SERPL-SCNC: 96 MMOL/L — LOW (ref 98–107)
CHLORIDE SERPL-SCNC: 99 MMOL/L — SIGNIFICANT CHANGE UP (ref 98–107)
CK MB BLD-MCNC: 6.36 NG/ML — HIGH (ref 1–4.7)
CK SERPL-CCNC: 121 U/L — SIGNIFICANT CHANGE UP (ref 25–170)
CO2 SERPL-SCNC: 21 MMOL/L — LOW (ref 22–31)
CO2 SERPL-SCNC: 22 MMOL/L — SIGNIFICANT CHANGE UP (ref 22–31)
CREAT SERPL-MCNC: 0.61 MG/DL — SIGNIFICANT CHANGE UP (ref 0.5–1.3)
CREAT SERPL-MCNC: 0.78 MG/DL — SIGNIFICANT CHANGE UP (ref 0.5–1.3)
EOSINOPHIL # BLD AUTO: 0.08 K/UL — SIGNIFICANT CHANGE UP (ref 0–0.5)
EOSINOPHIL NFR BLD AUTO: 0.7 % — SIGNIFICANT CHANGE UP (ref 0–6)
GAS PNL BLDV: 134 MMOL/L — LOW (ref 136–146)
GLUCOSE BLDV-MCNC: 145 — HIGH (ref 70–99)
GLUCOSE SERPL-MCNC: 133 MG/DL — HIGH (ref 70–99)
GLUCOSE SERPL-MCNC: 144 MG/DL — HIGH (ref 70–99)
HCO3 BLDV-SCNC: 23 MMOL/L — SIGNIFICANT CHANGE UP (ref 20–27)
HCT VFR BLD CALC: 46 % — HIGH (ref 34.5–45)
HCT VFR BLDV CALC: 49 % — HIGH (ref 34.5–45)
HGB BLD-MCNC: 15.6 G/DL — HIGH (ref 11.5–15.5)
HGB BLDV-MCNC: 16 G/DL — HIGH (ref 11.5–15.5)
IMM GRANULOCYTES NFR BLD AUTO: 0.2 % — SIGNIFICANT CHANGE UP (ref 0–1.5)
INR BLD: 0.99 — SIGNIFICANT CHANGE UP (ref 0.87–1.18)
LACTATE BLDV-MCNC: 3.3 MMOL/L — HIGH (ref 0.5–2)
LYMPHOCYTES # BLD AUTO: 17.1 % — SIGNIFICANT CHANGE UP (ref 13–44)
LYMPHOCYTES # BLD AUTO: 2.07 K/UL — SIGNIFICANT CHANGE UP (ref 1–3.3)
MAGNESIUM SERPL-MCNC: 2.1 MG/DL — SIGNIFICANT CHANGE UP (ref 1.6–2.6)
MCHC RBC-ENTMCNC: 31.1 PG — SIGNIFICANT CHANGE UP (ref 27–34)
MCHC RBC-ENTMCNC: 33.9 % — SIGNIFICANT CHANGE UP (ref 32–36)
MCV RBC AUTO: 91.8 FL — SIGNIFICANT CHANGE UP (ref 80–100)
MONOCYTES # BLD AUTO: 0.58 K/UL — SIGNIFICANT CHANGE UP (ref 0–0.9)
MONOCYTES NFR BLD AUTO: 4.8 % — SIGNIFICANT CHANGE UP (ref 2–14)
NEUTROPHILS # BLD AUTO: 9.31 K/UL — HIGH (ref 1.8–7.4)
NEUTROPHILS NFR BLD AUTO: 77 % — SIGNIFICANT CHANGE UP (ref 43–77)
PCO2 BLDV: 58 MMHG — HIGH (ref 41–51)
PH BLDV: 7.3 PH — LOW (ref 7.32–7.43)
PHOSPHATE SERPL-MCNC: 3.1 MG/DL — SIGNIFICANT CHANGE UP (ref 2.5–4.5)
PLATELET # BLD AUTO: 447 K/UL — HIGH (ref 150–400)
PMV BLD: 9.5 FL — SIGNIFICANT CHANGE UP (ref 7–13)
PO2 BLDV: 30 MMHG — LOW (ref 35–40)
POTASSIUM BLDV-SCNC: 3.2 MMOL/L — LOW (ref 3.4–4.5)
POTASSIUM SERPL-MCNC: 3.9 MMOL/L — SIGNIFICANT CHANGE UP (ref 3.5–5.3)
POTASSIUM SERPL-MCNC: 4.8 MMOL/L — SIGNIFICANT CHANGE UP (ref 3.5–5.3)
POTASSIUM SERPL-SCNC: 3.9 MMOL/L — SIGNIFICANT CHANGE UP (ref 3.5–5.3)
POTASSIUM SERPL-SCNC: 4.8 MMOL/L — SIGNIFICANT CHANGE UP (ref 3.5–5.3)
PROT SERPL-MCNC: 7.2 G/DL — SIGNIFICANT CHANGE UP (ref 6–8.3)
PROTHROM AB SERPL-ACNC: 11.3 SEC — SIGNIFICANT CHANGE UP (ref 10–13.1)
RBC # BLD: 5.01 M/UL — SIGNIFICANT CHANGE UP (ref 3.8–5.2)
RBC # FLD: 12.4 % — SIGNIFICANT CHANGE UP (ref 10.3–14.5)
RH IG SCN BLD-IMP: POSITIVE — SIGNIFICANT CHANGE UP
SAO2 % BLDV: 49.8 % — LOW (ref 60–85)
SODIUM SERPL-SCNC: 137 MMOL/L — SIGNIFICANT CHANGE UP (ref 135–145)
SODIUM SERPL-SCNC: 140 MMOL/L — SIGNIFICANT CHANGE UP (ref 135–145)
TROPONIN T SERPL-MCNC: < 0.06 NG/ML — SIGNIFICANT CHANGE UP (ref 0–0.06)
WBC # BLD: 12.1 K/UL — HIGH (ref 3.8–10.5)
WBC # FLD AUTO: 12.1 K/UL — HIGH (ref 3.8–10.5)

## 2017-02-18 PROCEDURE — 92941 PRQ TRLML REVSC TOT OCCL AMI: CPT | Mod: LD

## 2017-02-18 PROCEDURE — 93454 CORONARY ARTERY ANGIO S&I: CPT | Mod: 26,59

## 2017-02-18 PROCEDURE — 33967 INSERT I-AORT PERCUT DEVICE: CPT

## 2017-02-18 PROCEDURE — 71010: CPT | Mod: 26

## 2017-02-18 RX ORDER — LEVOTHYROXINE SODIUM 125 MCG
125 TABLET ORAL DAILY
Qty: 0 | Refills: 0 | Status: DISCONTINUED | OUTPATIENT
Start: 2017-02-18 | End: 2017-02-23

## 2017-02-18 RX ORDER — HEPARIN SODIUM 5000 [USP'U]/ML
5000 INJECTION INTRAVENOUS; SUBCUTANEOUS ONCE
Qty: 0 | Refills: 0 | Status: COMPLETED | OUTPATIENT
Start: 2017-02-18 | End: 2017-02-18

## 2017-02-18 RX ORDER — CLOPIDOGREL BISULFATE 75 MG/1
300 TABLET, FILM COATED ORAL ONCE
Qty: 0 | Refills: 0 | Status: COMPLETED | OUTPATIENT
Start: 2017-02-18 | End: 2017-02-18

## 2017-02-18 RX ORDER — CLOPIDOGREL BISULFATE 75 MG/1
75 TABLET, FILM COATED ORAL DAILY
Qty: 0 | Refills: 0 | Status: DISCONTINUED | OUTPATIENT
Start: 2017-02-19 | End: 2017-02-23

## 2017-02-18 RX ORDER — METOPROLOL TARTRATE 50 MG
12.5 TABLET ORAL
Qty: 0 | Refills: 0 | Status: DISCONTINUED | OUTPATIENT
Start: 2017-02-18 | End: 2017-02-19

## 2017-02-18 RX ORDER — LEVOCETIRIZINE DIHYDROCHLORIDE 0.5 MG/ML
1 SOLUTION ORAL
Qty: 0 | Refills: 0 | COMMUNITY

## 2017-02-18 RX ORDER — ASPIRIN/CALCIUM CARB/MAGNESIUM 324 MG
81 TABLET ORAL DAILY
Qty: 0 | Refills: 0 | Status: DISCONTINUED | OUTPATIENT
Start: 2017-02-19 | End: 2017-02-23

## 2017-02-18 RX ORDER — ZOLPIDEM TARTRATE 10 MG/1
5 TABLET ORAL ONCE
Qty: 0 | Refills: 0 | Status: DISCONTINUED | OUTPATIENT
Start: 2017-02-18 | End: 2017-02-21

## 2017-02-18 RX ORDER — ONDANSETRON 8 MG/1
4 TABLET, FILM COATED ORAL ONCE
Qty: 0 | Refills: 0 | Status: COMPLETED | OUTPATIENT
Start: 2017-02-18 | End: 2017-02-18

## 2017-02-18 RX ORDER — BUDESONIDE AND FORMOTEROL FUMARATE DIHYDRATE 160; 4.5 UG/1; UG/1
2 AEROSOL RESPIRATORY (INHALATION)
Qty: 0 | Refills: 0 | Status: DISCONTINUED | OUTPATIENT
Start: 2017-02-18 | End: 2017-02-23

## 2017-02-18 RX ORDER — CLONAZEPAM 1 MG
0.5 TABLET ORAL DAILY
Qty: 0 | Refills: 0 | Status: DISCONTINUED | OUTPATIENT
Start: 2017-02-18 | End: 2017-02-23

## 2017-02-18 RX ORDER — CLONAZEPAM 1 MG
1 TABLET ORAL AT BEDTIME
Qty: 0 | Refills: 0 | Status: DISCONTINUED | OUTPATIENT
Start: 2017-02-18 | End: 2017-02-23

## 2017-02-18 RX ORDER — ATORVASTATIN CALCIUM 80 MG/1
80 TABLET, FILM COATED ORAL AT BEDTIME
Qty: 0 | Refills: 0 | Status: DISCONTINUED | OUTPATIENT
Start: 2017-02-18 | End: 2017-02-23

## 2017-02-18 RX ORDER — ALBUTEROL 90 UG/1
2 AEROSOL, METERED ORAL EVERY 6 HOURS
Qty: 0 | Refills: 0 | Status: DISCONTINUED | OUTPATIENT
Start: 2017-02-18 | End: 2017-02-23

## 2017-02-18 RX ORDER — SODIUM CHLORIDE 9 MG/ML
250 INJECTION INTRAMUSCULAR; INTRAVENOUS; SUBCUTANEOUS ONCE
Qty: 0 | Refills: 0 | Status: COMPLETED | OUTPATIENT
Start: 2017-02-18 | End: 2017-02-18

## 2017-02-18 RX ORDER — ASPIRIN/CALCIUM CARB/MAGNESIUM 324 MG
325 TABLET ORAL ONCE
Qty: 0 | Refills: 0 | Status: COMPLETED | OUTPATIENT
Start: 2017-02-18 | End: 2017-02-18

## 2017-02-18 RX ORDER — VENLAFAXINE HCL 75 MG
150 CAPSULE, EXT RELEASE 24 HR ORAL DAILY
Qty: 0 | Refills: 0 | Status: DISCONTINUED | OUTPATIENT
Start: 2017-02-18 | End: 2017-02-23

## 2017-02-18 RX ORDER — HEPARIN SODIUM 5000 [USP'U]/ML
500 INJECTION INTRAVENOUS; SUBCUTANEOUS
Qty: 25000 | Refills: 0 | Status: DISCONTINUED | OUTPATIENT
Start: 2017-02-18 | End: 2017-02-20

## 2017-02-18 RX ORDER — LITHIUM CARBONATE 300 MG/1
150 TABLET, EXTENDED RELEASE ORAL THREE TIMES A DAY
Qty: 0 | Refills: 0 | Status: DISCONTINUED | OUTPATIENT
Start: 2017-02-18 | End: 2017-02-23

## 2017-02-18 RX ORDER — POTASSIUM CHLORIDE 20 MEQ
20 PACKET (EA) ORAL ONCE
Qty: 0 | Refills: 0 | Status: COMPLETED | OUTPATIENT
Start: 2017-02-18 | End: 2017-02-18

## 2017-02-18 RX ORDER — LORATADINE 10 MG/1
10 TABLET ORAL DAILY
Qty: 0 | Refills: 0 | Status: DISCONTINUED | OUTPATIENT
Start: 2017-02-18 | End: 2017-02-23

## 2017-02-18 RX ADMIN — Medication 325 MILLIGRAM(S): at 13:44

## 2017-02-18 RX ADMIN — SODIUM CHLORIDE 250 MILLILITER(S): 9 INJECTION INTRAMUSCULAR; INTRAVENOUS; SUBCUTANEOUS at 16:30

## 2017-02-18 RX ADMIN — Medication 125 MICROGRAM(S): at 18:46

## 2017-02-18 RX ADMIN — HEPARIN SODIUM 5000 UNIT(S): 5000 INJECTION INTRAVENOUS; SUBCUTANEOUS at 13:44

## 2017-02-18 RX ADMIN — ONDANSETRON 4 MILLIGRAM(S): 8 TABLET, FILM COATED ORAL at 20:35

## 2017-02-18 RX ADMIN — Medication 12.5 MILLIGRAM(S): at 18:50

## 2017-02-18 RX ADMIN — CLOPIDOGREL BISULFATE 300 MILLIGRAM(S): 75 TABLET, FILM COATED ORAL at 13:44

## 2017-02-18 RX ADMIN — Medication 0.5 MILLIGRAM(S): at 18:45

## 2017-02-18 RX ADMIN — Medication 20 MILLIEQUIVALENT(S): at 22:26

## 2017-02-18 RX ADMIN — ATORVASTATIN CALCIUM 80 MILLIGRAM(S): 80 TABLET, FILM COATED ORAL at 22:26

## 2017-02-18 RX ADMIN — Medication 1 MILLIGRAM(S): at 22:27

## 2017-02-18 RX ADMIN — LITHIUM CARBONATE 150 MILLIGRAM(S): 300 TABLET, EXTENDED RELEASE ORAL at 22:26

## 2017-02-18 NOTE — ED ADULT TRIAGE NOTE - CHIEF COMPLAINT QUOTE
pt with a c/o chest pain, N/V and weakness, Pt had two sent placed Last week Tuesday .  Pmhx CAD HTN

## 2017-02-18 NOTE — ED PROVIDER NOTE - MEDICAL DECISION MAKING DETAILS
67 yo F with STEMI - cath lab activated - aspirin, plavix load and heparin bolus given - pt to be taken to cath lab for angiography

## 2017-02-18 NOTE — ED PROVIDER NOTE - OBJECTIVE STATEMENT
67 yo F with history of CAD s/p stents most recently placed 2/8/17 presenting with chest tightness and nausea and lateral wall STEMI V2-V4 with depressions in III. STEMI was activated. Discussed with Fellow and interventionalist. Pt complains of watery diarrhea and chest tightness since yesterday. Denies fevers, chills, cough, rhinorrhea, otorrhea, otalgia, nausea, vomiting, constipation, diarrhea, chest pain, shortness of breath or changes in urinary habits.

## 2017-02-18 NOTE — ED ADULT NURSE NOTE - OBJECTIVE STATEMENT
67 y/o female presents to ED with c/o nausea and vomiting for 3 days.  Pt states that she has had nausea and vomiting x 3 days , unable to tolerate PO.  Today pt developed chest heaviness, pt s/p cardiac cath on 2/8/17 with stent placement x 2.  Pt awake alert, in NAD c/o feeling cold and weak.  Dr. Calix and Dr. Morris at bedside, pt with EKG changes c/w STEMI.  Pt placed on Zoll monitor, IV established x 2, meds administered as per order.  Cardiology fellow at bedside, pt transferred to cath lab accompanied by this RN and Cardiology fellow Dr. Dos Santos at 1350

## 2017-02-18 NOTE — H&P ADULT. - ASSESSMENT
66yoF PMH CAD s/p stents to prox and mid LAD on 02/08/17, severe depression, insomnia, GERD, hypothyroidism admitted for proximal LAD stent thrombosis now on IABP support

## 2017-02-18 NOTE — H&P ADULT. - PROBLEM SELECTOR PLAN 6
- VTE ppx - pt already on heparin gtt  - Diet - DASH/TLC diet, once nausea is under control  - Dispo - pending

## 2017-02-18 NOTE — H&P ADULT. - LAB RESULTS AND INTERPRETATION
Leukocytosis can be reactive from MI, Hgb elevation likely hemoconcentration in the setting of dehydration, BMP is normal, VBG Leukocytosis can be reactive from MI, Hgb elevation likely hemoconcentration in the setting of dehydration, BMP is normal, VBG lactate elevated to 3.3

## 2017-02-18 NOTE — H&P ADULT. - HISTORY OF PRESENT ILLNESS
66yoF PMH CAD s/p stent to prox and mid LAD on 02/08/17, depression, GERD, hypothyroidism presenting with chest pain x 3 days in the setting of nausea and vomiting and diarrhea.  Pt went home last week after her stents and continued to take all of her medications.  About 5 days ago pt had one bout of loose stool and then proceeded to have severe nausea and vomiting.  She had very little PO intake and did not take any of her medications including her antiplatelet agents.  She started to develop some CP about three days ago, which she described as a substernal pressure.  She was brought to [facility] at her daughters insistence and in the ED was noted to have subtle EKG changes including ST elevations in leads v3-v4 along with depressions in III and aVF.  Pt was Plavix loaded and bolused heparin and taken to the cath lab.  She was found to have a thrombus in the proximal LAD stent and it was removed.  An intra-aortic balloon pump was placed and pt was sent to CCU.      Here she endorses resolution of her CP, denies SOB, and endorses severe nausea that responds to zofran.  She says her mood has been stbale the last few weeks and that she has been taking all of her medications, she notes that she was stressed during the time of her MI early February.

## 2017-02-18 NOTE — ED PROVIDER NOTE - ATTENDING CONTRIBUTION TO CARE
I performed a face to face evaluation of this patient and performed a history and physical examination on the patient.  I agree with the resident's history, physical examination, and plan of the patient. patient with STEMI.  cath team called after I got the EKG. 2 IVs started. ACS protocol started. admit for cath.

## 2017-02-18 NOTE — ED PROVIDER NOTE - PMH
CAD (coronary artery disease)    Chronic nonintractable headache, unspecified headache type    Depression, unspecified depression type    Diverticulitis  sigmoid, treated surgically  Gastroesophageal reflux disease, esophagitis presence not specified    Hypothyroidism, unspecified type    Insomnia    ALIS (obstructive sleep apnea)  as per MD's note, but patient didn't have study yet  Paralyzed hemidiaphragm    Psoriasis    Stented coronary artery  in 2010 and 2011  Uncomplicated asthma, unspecified asthma severity

## 2017-02-18 NOTE — H&P ADULT. - PROBLEM SELECTOR PLAN 1
- s/p thrombectomy in prox LAD  - intra-aortic balloon pump in place  - heparin gtt for anticoagulation  - c/w aspirin and plavix  - atorvastatin 80mg, start Metoprolol 12.5mg BID tonight, consider Lisinopril tomorrow  - pt very dry on exam at this time, will bolus 250cc and recheck labs, follow AM lactate as well with an AM ABG

## 2017-02-18 NOTE — H&P ADULT. - PROBLEM SELECTOR PLAN 5
- c/w home advair 2 puff qD, albuterol prn  - pt takes cetirizine at home, will offer loratadine 10mg qD here  - pt not wheezing on exam at this time

## 2017-02-19 ENCOUNTER — TRANSCRIPTION ENCOUNTER (OUTPATIENT)
Age: 67
End: 2017-02-19

## 2017-02-19 LAB
ALBUMIN SERPL ELPH-MCNC: 3.9 G/DL — SIGNIFICANT CHANGE UP (ref 3.3–5)
ALP SERPL-CCNC: 67 U/L — SIGNIFICANT CHANGE UP (ref 40–120)
ALT FLD-CCNC: 61 U/L — HIGH (ref 4–33)
APTT BLD: 46.8 SEC — HIGH (ref 27.5–37.4)
APTT BLD: 54.5 SEC — HIGH (ref 27.5–37.4)
APTT BLD: 56.6 SEC — HIGH (ref 27.5–37.4)
AST SERPL-CCNC: 364 U/L — HIGH (ref 4–32)
BASE EXCESS BLDA CALC-SCNC: -1 MMOL/L — SIGNIFICANT CHANGE UP
BILIRUB SERPL-MCNC: 0.6 MG/DL — SIGNIFICANT CHANGE UP (ref 0.2–1.2)
BUN SERPL-MCNC: 17 MG/DL — SIGNIFICANT CHANGE UP (ref 7–23)
BUN SERPL-MCNC: 19 MG/DL — SIGNIFICANT CHANGE UP (ref 7–23)
CALCIUM SERPL-MCNC: 9.5 MG/DL — SIGNIFICANT CHANGE UP (ref 8.4–10.5)
CALCIUM SERPL-MCNC: 9.8 MG/DL — SIGNIFICANT CHANGE UP (ref 8.4–10.5)
CHLORIDE SERPL-SCNC: 103 MMOL/L — SIGNIFICANT CHANGE UP (ref 98–107)
CHLORIDE SERPL-SCNC: 104 MMOL/L — SIGNIFICANT CHANGE UP (ref 98–107)
CHOLEST SERPL-MCNC: 114 MG/DL — LOW (ref 120–199)
CK MB BLD-MCNC: 13.3 — HIGH (ref 0–2.5)
CK MB BLD-MCNC: 172.6 NG/ML — HIGH (ref 1–4.7)
CK MB BLD-MCNC: 313.2 NG/ML — HIGH (ref 1–4.7)
CK MB BLD-MCNC: 64.91 NG/ML — HIGH (ref 1–4.7)
CK MB BLD-MCNC: 9.7 — HIGH (ref 0–2.5)
CK SERPL-CCNC: 1775 U/L — HIGH (ref 25–170)
CK SERPL-CCNC: 2357 U/L — HIGH (ref 25–170)
CK SERPL-CCNC: 956 U/L — HIGH (ref 25–170)
CO2 SERPL-SCNC: 24 MMOL/L — SIGNIFICANT CHANGE UP (ref 22–31)
CO2 SERPL-SCNC: 24 MMOL/L — SIGNIFICANT CHANGE UP (ref 22–31)
CREAT SERPL-MCNC: 0.73 MG/DL — SIGNIFICANT CHANGE UP (ref 0.5–1.3)
CREAT SERPL-MCNC: 0.82 MG/DL — SIGNIFICANT CHANGE UP (ref 0.5–1.3)
GLUCOSE BLDA-MCNC: 86 MG/DL — SIGNIFICANT CHANGE UP (ref 70–99)
GLUCOSE SERPL-MCNC: 108 MG/DL — HIGH (ref 70–99)
GLUCOSE SERPL-MCNC: 117 MG/DL — HIGH (ref 70–99)
HBA1C BLD-MCNC: 5.4 % — SIGNIFICANT CHANGE UP (ref 4–5.6)
HCO3 BLDA-SCNC: 24 MMOL/L — SIGNIFICANT CHANGE UP (ref 22–26)
HCT VFR BLD CALC: 40.2 % — SIGNIFICANT CHANGE UP (ref 34.5–45)
HCT VFR BLD CALC: 40.2 % — SIGNIFICANT CHANGE UP (ref 34.5–45)
HCT VFR BLDA CALC: 37.3 % — SIGNIFICANT CHANGE UP (ref 34.5–46.5)
HDLC SERPL-MCNC: 70 MG/DL — HIGH (ref 45–65)
HGB BLD-MCNC: 13.1 G/DL — SIGNIFICANT CHANGE UP (ref 11.5–15.5)
HGB BLD-MCNC: 13.2 G/DL — SIGNIFICANT CHANGE UP (ref 11.5–15.5)
HGB BLDA-MCNC: 12.1 G/DL — SIGNIFICANT CHANGE UP (ref 11.5–15.5)
LIPID PNL WITH DIRECT LDL SERPL: 33 MG/DL — SIGNIFICANT CHANGE UP
LITHIUM SERPL-MCNC: 0.66 MMOL/L — SIGNIFICANT CHANGE UP (ref 0.6–1.2)
MAGNESIUM SERPL-MCNC: 2.1 MG/DL — SIGNIFICANT CHANGE UP (ref 1.6–2.6)
MAGNESIUM SERPL-MCNC: 2.2 MG/DL — SIGNIFICANT CHANGE UP (ref 1.6–2.6)
MCHC RBC-ENTMCNC: 30.8 PG — SIGNIFICANT CHANGE UP (ref 27–34)
MCHC RBC-ENTMCNC: 31.2 PG — SIGNIFICANT CHANGE UP (ref 27–34)
MCHC RBC-ENTMCNC: 32.6 % — SIGNIFICANT CHANGE UP (ref 32–36)
MCHC RBC-ENTMCNC: 32.8 % — SIGNIFICANT CHANGE UP (ref 32–36)
MCV RBC AUTO: 93.7 FL — SIGNIFICANT CHANGE UP (ref 80–100)
MCV RBC AUTO: 95.7 FL — SIGNIFICANT CHANGE UP (ref 80–100)
PCO2 BLDA: 40 MMHG — SIGNIFICANT CHANGE UP (ref 32–48)
PH BLDA: 7.39 PH — SIGNIFICANT CHANGE UP (ref 7.35–7.45)
PHOSPHATE SERPL-MCNC: 2.6 MG/DL — SIGNIFICANT CHANGE UP (ref 2.5–4.5)
PHOSPHATE SERPL-MCNC: 2.8 MG/DL — SIGNIFICANT CHANGE UP (ref 2.5–4.5)
PLATELET # BLD AUTO: 294 K/UL — SIGNIFICANT CHANGE UP (ref 150–400)
PLATELET # BLD AUTO: 361 K/UL — SIGNIFICANT CHANGE UP (ref 150–400)
PMV BLD: 9.6 FL — SIGNIFICANT CHANGE UP (ref 7–13)
PMV BLD: 9.8 FL — SIGNIFICANT CHANGE UP (ref 7–13)
PO2 BLDA: 143 MMHG — HIGH (ref 83–108)
POTASSIUM BLDA-SCNC: 3.6 MMOL/L — SIGNIFICANT CHANGE UP (ref 3.4–4.5)
POTASSIUM SERPL-MCNC: 5 MMOL/L — SIGNIFICANT CHANGE UP (ref 3.5–5.3)
POTASSIUM SERPL-MCNC: 5.1 MMOL/L — SIGNIFICANT CHANGE UP (ref 3.5–5.3)
POTASSIUM SERPL-SCNC: 5 MMOL/L — SIGNIFICANT CHANGE UP (ref 3.5–5.3)
POTASSIUM SERPL-SCNC: 5.1 MMOL/L — SIGNIFICANT CHANGE UP (ref 3.5–5.3)
PROT SERPL-MCNC: 6.2 G/DL — SIGNIFICANT CHANGE UP (ref 6–8.3)
RBC # BLD: 4.2 M/UL — SIGNIFICANT CHANGE UP (ref 3.8–5.2)
RBC # BLD: 4.29 M/UL — SIGNIFICANT CHANGE UP (ref 3.8–5.2)
RBC # FLD: 12.7 % — SIGNIFICANT CHANGE UP (ref 10.3–14.5)
RBC # FLD: 12.8 % — SIGNIFICANT CHANGE UP (ref 10.3–14.5)
SAO2 % BLDA: 99.3 % — HIGH (ref 95–99)
SODIUM BLDA-SCNC: 135 MMOL/L — LOW (ref 136–146)
SODIUM SERPL-SCNC: 140 MMOL/L — SIGNIFICANT CHANGE UP (ref 135–145)
SODIUM SERPL-SCNC: 141 MMOL/L — SIGNIFICANT CHANGE UP (ref 135–145)
T3 SERPL-MCNC: 110 NG/DL — SIGNIFICANT CHANGE UP (ref 80–200)
T4 AB SER-ACNC: 11.71 UG/DL — SIGNIFICANT CHANGE UP (ref 5.1–13)
TRIGL SERPL-MCNC: 85 MG/DL — SIGNIFICANT CHANGE UP (ref 10–149)
TROPONIN T SERPL-MCNC: 18.8 NG/ML — HIGH (ref 0–0.06)
TROPONIN T SERPL-MCNC: 4.31 NG/ML — HIGH (ref 0–0.06)
TROPONIN T SERPL-MCNC: 7.73 NG/ML — HIGH (ref 0–0.06)
TSH SERPL-MCNC: 0.02 UIU/ML — LOW (ref 0.27–4.2)
WBC # BLD: 10.69 K/UL — HIGH (ref 3.8–10.5)
WBC # BLD: 10.69 K/UL — HIGH (ref 3.8–10.5)
WBC # FLD AUTO: 10.69 K/UL — HIGH (ref 3.8–10.5)
WBC # FLD AUTO: 10.69 K/UL — HIGH (ref 3.8–10.5)

## 2017-02-19 PROCEDURE — 93010 ELECTROCARDIOGRAM REPORT: CPT | Mod: 77

## 2017-02-19 PROCEDURE — 71010: CPT | Mod: 26,77

## 2017-02-19 PROCEDURE — 71010: CPT | Mod: 26

## 2017-02-19 PROCEDURE — 93010 ELECTROCARDIOGRAM REPORT: CPT

## 2017-02-19 RX ORDER — DOCUSATE SODIUM 100 MG
100 CAPSULE ORAL
Qty: 0 | Refills: 0 | Status: DISCONTINUED | OUTPATIENT
Start: 2017-02-19 | End: 2017-02-23

## 2017-02-19 RX ORDER — PANTOPRAZOLE SODIUM 20 MG/1
40 TABLET, DELAYED RELEASE ORAL
Qty: 0 | Refills: 0 | Status: DISCONTINUED | OUTPATIENT
Start: 2017-02-19 | End: 2017-02-23

## 2017-02-19 RX ORDER — LISINOPRIL 2.5 MG/1
2.5 TABLET ORAL DAILY
Qty: 0 | Refills: 0 | Status: DISCONTINUED | OUTPATIENT
Start: 2017-02-19 | End: 2017-02-22

## 2017-02-19 RX ORDER — SENNA PLUS 8.6 MG/1
2 TABLET ORAL AT BEDTIME
Qty: 0 | Refills: 0 | Status: DISCONTINUED | OUTPATIENT
Start: 2017-02-19 | End: 2017-02-23

## 2017-02-19 RX ORDER — MORPHINE SULFATE 50 MG/1
2 CAPSULE, EXTENDED RELEASE ORAL ONCE
Qty: 0 | Refills: 0 | Status: DISCONTINUED | OUTPATIENT
Start: 2017-02-19 | End: 2017-02-19

## 2017-02-19 RX ORDER — METOPROLOL TARTRATE 50 MG
25 TABLET ORAL
Qty: 0 | Refills: 0 | Status: DISCONTINUED | OUTPATIENT
Start: 2017-02-19 | End: 2017-02-21

## 2017-02-19 RX ORDER — LISINOPRIL 2.5 MG/1
2.5 TABLET ORAL DAILY
Qty: 0 | Refills: 0 | Status: DISCONTINUED | OUTPATIENT
Start: 2017-02-19 | End: 2017-02-19

## 2017-02-19 RX ORDER — PANTOPRAZOLE SODIUM 20 MG/1
40 TABLET, DELAYED RELEASE ORAL ONCE
Qty: 0 | Refills: 0 | Status: DISCONTINUED | OUTPATIENT
Start: 2017-02-19 | End: 2017-02-19

## 2017-02-19 RX ADMIN — HEPARIN SODIUM 4.5 UNIT(S)/HR: 5000 INJECTION INTRAVENOUS; SUBCUTANEOUS at 07:44

## 2017-02-19 RX ADMIN — Medication 125 MICROGRAM(S): at 06:27

## 2017-02-19 RX ADMIN — MORPHINE SULFATE 2 MILLIGRAM(S): 50 CAPSULE, EXTENDED RELEASE ORAL at 17:00

## 2017-02-19 RX ADMIN — LORATADINE 10 MILLIGRAM(S): 10 TABLET ORAL at 13:43

## 2017-02-19 RX ADMIN — CLOPIDOGREL BISULFATE 75 MILLIGRAM(S): 75 TABLET, FILM COATED ORAL at 13:43

## 2017-02-19 RX ADMIN — MORPHINE SULFATE 2 MILLIGRAM(S): 50 CAPSULE, EXTENDED RELEASE ORAL at 17:31

## 2017-02-19 RX ADMIN — Medication 81 MILLIGRAM(S): at 13:43

## 2017-02-19 RX ADMIN — BUDESONIDE AND FORMOTEROL FUMARATE DIHYDRATE 2 PUFF(S): 160; 4.5 AEROSOL RESPIRATORY (INHALATION) at 10:41

## 2017-02-19 RX ADMIN — MORPHINE SULFATE 2 MILLIGRAM(S): 50 CAPSULE, EXTENDED RELEASE ORAL at 23:32

## 2017-02-19 RX ADMIN — MORPHINE SULFATE 2 MILLIGRAM(S): 50 CAPSULE, EXTENDED RELEASE ORAL at 23:47

## 2017-02-19 RX ADMIN — ATORVASTATIN CALCIUM 80 MILLIGRAM(S): 80 TABLET, FILM COATED ORAL at 22:56

## 2017-02-19 RX ADMIN — Medication 1 MILLIGRAM(S): at 22:56

## 2017-02-19 RX ADMIN — Medication 150 MILLIGRAM(S): at 13:43

## 2017-02-19 RX ADMIN — Medication 12.5 MILLIGRAM(S): at 06:27

## 2017-02-19 RX ADMIN — Medication 100 MILLIGRAM(S): at 18:22

## 2017-02-19 RX ADMIN — MORPHINE SULFATE 2 MILLIGRAM(S): 50 CAPSULE, EXTENDED RELEASE ORAL at 17:47

## 2017-02-19 RX ADMIN — LITHIUM CARBONATE 150 MILLIGRAM(S): 300 TABLET, EXTENDED RELEASE ORAL at 06:27

## 2017-02-19 RX ADMIN — PANTOPRAZOLE SODIUM 40 MILLIGRAM(S): 20 TABLET, DELAYED RELEASE ORAL at 06:27

## 2017-02-19 RX ADMIN — LITHIUM CARBONATE 150 MILLIGRAM(S): 300 TABLET, EXTENDED RELEASE ORAL at 22:56

## 2017-02-19 RX ADMIN — BUDESONIDE AND FORMOTEROL FUMARATE DIHYDRATE 2 PUFF(S): 160; 4.5 AEROSOL RESPIRATORY (INHALATION) at 21:55

## 2017-02-19 RX ADMIN — Medication 0.5 MILLIGRAM(S): at 13:43

## 2017-02-19 RX ADMIN — Medication 25 MILLIGRAM(S): at 18:22

## 2017-02-19 RX ADMIN — MORPHINE SULFATE 2 MILLIGRAM(S): 50 CAPSULE, EXTENDED RELEASE ORAL at 16:45

## 2017-02-19 RX ADMIN — SENNA PLUS 2 TABLET(S): 8.6 TABLET ORAL at 22:56

## 2017-02-19 NOTE — DISCHARGE NOTE ADULT - MEDICATION SUMMARY - MEDICATIONS TO STOP TAKING
I will STOP taking the medications listed below when I get home from the hospital:    Ambien 5 mg oral tablet  -- 1 tab(s) by mouth once a day (at bedtime)

## 2017-02-19 NOTE — DISCHARGE NOTE ADULT - HOSPITAL COURSE
66 year old female with PMH of CAD s/p stent x2 prox and mid LAD on 2/8/17, depression, insomnia, hypothyroidism presents with chest pain after having 3 days of nausea and vomiting and decreased ability to take PO. She was not able to keep down any medications including aspirin and plavix. She was taken to the cath lab and found to have thrombosis of the proximal LAD stent. Occlusion was removed and an intra-aortic balloon pump was placed for afterload assist. She had a few runs of VT in the cath lab and was given 75mg of lidocaine.     After cath, she was placed on a heparin drip. She did not have any episodes of VT. The day after admission she remained hemodynamically stable and was found to have improved blood pressures with the aortic balloon on stand-by. 66 year old female with PMH of CAD s/p stent x2 prox and mid LAD on 2/8/17, depression, insomnia, hypothyroidism presents with chest pain after having 3 days of nausea and vomiting and decreased ability to take PO. She was not able to keep down any medications including aspirin and plavix. She was taken to the cath lab and found to have thrombosis of the proximal LAD stent. Occlusion was removed and an intra-aortic balloon pump was placed for afterload assist. She had a few runs of VT in the cath lab and was given 75mg of lidocaine.     After cath, she was placed on a heparin drip. She did not have any episodes of VT. The day after admission she remained hemodynamically stable and was found to have improved blood pressures with the aortic balloon on stand-by.     On 2/19 at 440 PM she reported chest pain. Improved with 2mg morphine x2, initial EKG was unchanged, CBC, BMP stable with CE that continued to trend down. Repeat CE showed.... 65 y/o female with a PMHx of CAD S/P stent x 2 to pLAD and mLAD on 2/8/17, ischemic cardiomyopathy with mild to moderate LV dysfunction, HTN, HLD, GERD, hypothyroidism, depression, insomnia, bipolar disorder presents to ED with chest pain in the setting of N/V/D and non-compliance with antiplatelet agents, found to have NSTEMI secondary to in-stent thrombosis of pLAD stent S/P removal and S/P thrombectomy. Occlusion was removed and an intra-aortic balloon pump was placed for afterload assist. She had a few runs of VT in the cath lab and was given 75mg of Lidocaine. After cath, she was placed on a Heparin gtt. Pt did not have any further episodes of VT. The day after admission she remained hemodynamically stable and was found to have improved blood pressures with the aortic balloon on stand-by. IABP was removed. Pt was stable and transferred to tele floors. Echocardiogram was done, which revealed, "Severe segmental left ventricular systolic dysfunction. LVEF 25-30%. Mild diastolic dysfunction (Stage I). Normal right ventricular size and function. Compared with echocardiogram of 2/7/2017, LV function has deteriorated significantly." EP was consulted and recommended lie vest evaluation. Although pt qualifies medically for life vest, pt does not psychologically qualify and after much discussion between the team and family, life vest was deferred. Pt can continue medical management and re-evaluate need for ICD in 3 months after optimal medical therapy. No ACE given hypotension. Psych consulted during admission and her medications were adjusted. Pt can follow up as outpatient at [facility]. PT recommends home with outpatient PT (script given). Pt stable for discharge home as per Dr. Mckeon.

## 2017-02-19 NOTE — DISCHARGE NOTE ADULT - PATIENT PORTAL LINK FT
“You can access the FollowHealth Patient Portal, offered by [facility], by registering with the following website: [email or web address]”

## 2017-02-19 NOTE — DISCHARGE NOTE ADULT - CARE PLAN
Principal Discharge DX:	STEMI (ST elevation myocardial infarction)  Goal:	Prevent progression of disease and future heart attacks. Ensure compliance with medications. Take your Aspirin and Plavix as prescribed and do not miss any days.  Instructions for follow-up, activity and diet:	Follow up with cardiologist within one week of discharge. Call for appointment. Return to ED for any concerning symptoms. Continue medications as prescribed. Low salt, low fat, low cholesterol diet.  Secondary Diagnosis:	VT (ventricular tachycardia)  Goal:	Prevent life-threatening arrhythmias and take your medications as prescribed.  Instructions for follow-up, activity and diet:	Follow up with electrophysiologist within one week of discharge. Call for appointment. Return to ED for any concerning symptoms. Continue medications as prescribed. Low salt, low fat, low cholesterol diet.  Secondary Diagnosis:	CHF (congestive heart failure)  Goal:	Prevent fluid overload. Maintain euvolemia. Ensure compliance with medications.  Instructions for follow-up, activity and diet:	Follow up with cardiologist within one week of discharge. Call for appointment. Return to ED for any concerning symptoms. Continue medications as prescribed. Low salt diet with fluid restriction.  Secondary Diagnosis:	Bipolar disorder  Goal:	Continue current medications as prescribed and recommended by psychiatry.  Instructions for follow-up, activity and diet:	Follow up at [facility] at [phone]. Call for an appointment. Principal Discharge DX:	STEMI (ST elevation myocardial infarction)  Goal:	Prevent progression of disease and future heart attacks. Ensure compliance with medications. Take your Aspirin and Plavix as prescribed and do not miss any days.  Instructions for follow-up, activity and diet:	Follow up with cardiologist within one week of discharge. Call for appointment. Return to ED for any concerning symptoms. Continue medications as prescribed. Low salt, low fat, low cholesterol diet.  Secondary Diagnosis:	VT (ventricular tachycardia)  Goal:	Prevent life-threatening arrhythmias and take your medications as prescribed.  Instructions for follow-up, activity and diet:	Follow up with electrophysiologist within one week of discharge. Call for appointment. Return to ED for any concerning symptoms. Continue medications as prescribed. Low salt, low fat, low cholesterol diet.  Secondary Diagnosis:	CHF (congestive heart failure)  Goal:	Prevent fluid overload. Maintain euvolemia. Ensure compliance with medications.  Instructions for follow-up, activity and diet:	Follow up with cardiologist within one week of discharge. Call for appointment. Return to ED for any concerning symptoms. Continue medications as prescribed. Low salt diet with fluid restriction.  Secondary Diagnosis:	Bipolar disorder  Goal:	Continue current medications as prescribed and recommended by psychiatry.  Instructions for follow-up, activity and diet:	Follow up at [facility] at [phone]. Call for an appointment.

## 2017-02-19 NOTE — DISCHARGE NOTE ADULT - NS AS ACTIVITY OBS
Walking-Outdoors allowed/No Heavy lifting/straining/Do not make important decisions/Do not drive or operate machinery/Showering allowed/Walking-Indoors allowed

## 2017-02-19 NOTE — DISCHARGE NOTE ADULT - ADDITIONAL INSTRUCTIONS
Follow up with cardiologist and electrophysiologist within one week of discharge. Call for appointment. Return to ED for any concerning symptoms. Continue medications as prescribed. Low salt diet with fluid restriction.

## 2017-02-19 NOTE — DISCHARGE NOTE ADULT - PLAN OF CARE
Follow up with cardiologist within one week of discharge. Call for appointment. Return to ED for any concerning symptoms. Continue medications as prescribed. Low salt, low fat, low cholesterol diet. Prevent progression of disease and future heart attacks. Ensure compliance with medications. Take your Aspirin and Plavix as prescribed and do not miss any days. Prevent life-threatening arrhythmias and take your medications as prescribed. Follow up with electrophysiologist within one week of discharge. Call for appointment. Return to ED for any concerning symptoms. Continue medications as prescribed. Low salt, low fat, low cholesterol diet. Prevent fluid overload. Maintain euvolemia. Ensure compliance with medications. Follow up with cardiologist within one week of discharge. Call for appointment. Return to ED for any concerning symptoms. Continue medications as prescribed. Low salt diet with fluid restriction. Continue current medications as prescribed and recommended by psychiatry. Follow up at [facility] at [phone]. Call for an appointment.

## 2017-02-19 NOTE — DISCHARGE NOTE ADULT - CARE PROVIDERS DIRECT ADDRESSES
[email or web address]

## 2017-02-19 NOTE — DISCHARGE NOTE ADULT - CARE PROVIDER_API CALL
Williams Vigil (MD; PhD), Cardiology; Internal Medicine; Vascular Medicine  [address] [address] [place] [address]  Phone: [phone]  Fax: [phone]

## 2017-02-19 NOTE — DISCHARGE NOTE ADULT - MEDICATION SUMMARY - MEDICATIONS TO TAKE
I will START or STAY ON the medications listed below when I get home from the hospital:    Physical therapy  -- Dx: CHF  -- Indication: For Physical therapy    aspirin 81 mg oral delayed release tablet  -- 1 tab(s) by mouth once a day  -- Indication: For CAD (coronary artery disease)    KlonoPIN 0.5 mg oral tablet  -- 1 tab(s) by mouth once a day in AM  -- Indication: For Bipolar disorder    KlonoPIN 0.5 mg oral tablet  -- 2 tab(s) by mouth once a day (at bedtime)  -- Indication: For Bipolar disorder    venlafaxine 150 mg oral capsule, extended release  -- 1 cap(s) by mouth once a day  -- Indication: For Depression     levocetirizine 5 mg oral tablet  -- 1 tab(s) by mouth once a day (in the evening)  -- Indication: For Antihistamine    atorvastatin 80 mg oral tablet  -- 1 tab(s) by mouth once a day (at bedtime)  -- Indication: For HLD (hyperlipidemia)    clopidogrel 75 mg oral tablet  -- 1 tab(s) by mouth once a day  -- Indication: For CAD (coronary artery disease)    QUEtiapine 25 mg oral tablet  -- 1 tab(s) by mouth once a day (at bedtime)  -- Indication: For Bipolar disorder    lithium 150 mg oral capsule  -- 1 cap(s) by mouth 3 times a day  -- Indication: For Bipolar disorder    Metoprolol Tartrate 25 mg oral tablet  -- 0.5 tab(s) by mouth 2 times a day  -- Indication: For HTN (hypertension)    Advair Diskus 500 mcg-50 mcg inhalation powder  -- 1 puff(s) inhaled 2 times a day  -- Indication: For Asthma    albuterol CFC free 90 mcg/inh inhalation aerosol  -- 2 puff(s) inhaled every 6 hours, As needed, Shortness of Breath and/or Wheezing  -- Indication: For Asthma    Proventil HFA 90 mcg/inh inhalation aerosol  -- 2 puff(s) inhaled 4 times a day, As Needed  -- Indication: For Asthma    senna oral tablet  -- 2 tab(s) by mouth once a day (at bedtime)  -- Indication: For Constipation (as needed)    docusate sodium 100 mg oral capsule  -- 1 cap(s) by mouth 2 times a day  -- Indication: For Constipation (as needed)    pantoprazole 40 mg oral delayed release tablet  -- 1 tab(s) by mouth once a day (before a meal)  -- Indication: For GERD    levothyroxine 125 mcg (0.125 mg) oral tablet  -- 1 tab(s) by mouth once a day  -- Indication: For Hypothyroidism

## 2017-02-20 LAB
APTT BLD: 65.4 SEC — HIGH (ref 27.5–37.4)
BASE EXCESS BLDA CALC-SCNC: 0.4 MMOL/L — SIGNIFICANT CHANGE UP
BUN SERPL-MCNC: 18 MG/DL — SIGNIFICANT CHANGE UP (ref 7–23)
CA-I BLDA-SCNC: 1.24 MMOL/L — SIGNIFICANT CHANGE UP (ref 1.15–1.29)
CALCIUM SERPL-MCNC: 9.4 MG/DL — SIGNIFICANT CHANGE UP (ref 8.4–10.5)
CHLORIDE SERPL-SCNC: 105 MMOL/L — SIGNIFICANT CHANGE UP (ref 98–107)
CK MB BLD-MCNC: 30.65 NG/ML — HIGH (ref 1–4.7)
CK MB BLD-MCNC: 5.8 — HIGH (ref 0–2.5)
CK SERPL-CCNC: 532 U/L — HIGH (ref 25–170)
CO2 SERPL-SCNC: 27 MMOL/L — SIGNIFICANT CHANGE UP (ref 22–31)
CREAT SERPL-MCNC: 0.64 MG/DL — SIGNIFICANT CHANGE UP (ref 0.5–1.3)
GLUCOSE BLDA-MCNC: 107 MG/DL — HIGH (ref 70–99)
GLUCOSE SERPL-MCNC: 110 MG/DL — HIGH (ref 70–99)
HCO3 BLDA-SCNC: 25 MMOL/L — SIGNIFICANT CHANGE UP (ref 22–26)
HCT VFR BLD CALC: 38.5 % — SIGNIFICANT CHANGE UP (ref 34.5–45)
HCT VFR BLDA CALC: 35.1 % — SIGNIFICANT CHANGE UP (ref 34.5–46.5)
HGB BLD-MCNC: 12.7 G/DL — SIGNIFICANT CHANGE UP (ref 11.5–15.5)
HGB BLDA-MCNC: 11.4 G/DL — LOW (ref 11.5–15.5)
LACTATE BLDA-SCNC: 1.7 MMOL/L — SIGNIFICANT CHANGE UP (ref 0.5–2)
MAGNESIUM SERPL-MCNC: 2.1 MG/DL — SIGNIFICANT CHANGE UP (ref 1.6–2.6)
MCHC RBC-ENTMCNC: 31.6 PG — SIGNIFICANT CHANGE UP (ref 27–34)
MCHC RBC-ENTMCNC: 33 % — SIGNIFICANT CHANGE UP (ref 32–36)
MCV RBC AUTO: 95.8 FL — SIGNIFICANT CHANGE UP (ref 80–100)
PCO2 BLDA: 46 MMHG — SIGNIFICANT CHANGE UP (ref 32–48)
PH BLDA: 7.36 PH — SIGNIFICANT CHANGE UP (ref 7.35–7.45)
PHOSPHATE SERPL-MCNC: 3.1 MG/DL — SIGNIFICANT CHANGE UP (ref 2.5–4.5)
PLATELET # BLD AUTO: 234 K/UL — SIGNIFICANT CHANGE UP (ref 150–400)
PMV BLD: 9.6 FL — SIGNIFICANT CHANGE UP (ref 7–13)
PO2 BLDA: 82 MMHG — LOW (ref 83–108)
POTASSIUM BLDA-SCNC: 4 MMOL/L — SIGNIFICANT CHANGE UP (ref 3.4–4.5)
POTASSIUM SERPL-MCNC: 4.4 MMOL/L — SIGNIFICANT CHANGE UP (ref 3.5–5.3)
POTASSIUM SERPL-SCNC: 4.4 MMOL/L — SIGNIFICANT CHANGE UP (ref 3.5–5.3)
RBC # BLD: 4.02 M/UL — SIGNIFICANT CHANGE UP (ref 3.8–5.2)
RBC # FLD: 12.8 % — SIGNIFICANT CHANGE UP (ref 10.3–14.5)
SAO2 % BLDA: 96.4 % — SIGNIFICANT CHANGE UP (ref 95–99)
SODIUM BLDA-SCNC: 136 MMOL/L — SIGNIFICANT CHANGE UP (ref 136–146)
SODIUM SERPL-SCNC: 141 MMOL/L — SIGNIFICANT CHANGE UP (ref 135–145)
TROPONIN T SERPL-MCNC: 4.76 NG/ML — HIGH (ref 0–0.06)
WBC # BLD: 11.13 K/UL — HIGH (ref 3.8–10.5)
WBC # FLD AUTO: 11.13 K/UL — HIGH (ref 3.8–10.5)

## 2017-02-20 PROCEDURE — 93010 ELECTROCARDIOGRAM REPORT: CPT

## 2017-02-20 PROCEDURE — 71010: CPT | Mod: 26

## 2017-02-20 PROCEDURE — 93010 ELECTROCARDIOGRAM REPORT: CPT | Mod: 77

## 2017-02-20 RX ORDER — MORPHINE SULFATE 50 MG/1
1 CAPSULE, EXTENDED RELEASE ORAL ONCE
Qty: 0 | Refills: 0 | Status: DISCONTINUED | OUTPATIENT
Start: 2017-02-20 | End: 2017-02-20

## 2017-02-20 RX ORDER — FENTANYL CITRATE 50 UG/ML
25 INJECTION INTRAVENOUS ONCE
Qty: 0 | Refills: 0 | Status: DISCONTINUED | OUTPATIENT
Start: 2017-02-20 | End: 2017-02-20

## 2017-02-20 RX ADMIN — PANTOPRAZOLE SODIUM 40 MILLIGRAM(S): 20 TABLET, DELAYED RELEASE ORAL at 06:26

## 2017-02-20 RX ADMIN — SENNA PLUS 2 TABLET(S): 8.6 TABLET ORAL at 22:26

## 2017-02-20 RX ADMIN — Medication 100 MILLIGRAM(S): at 17:36

## 2017-02-20 RX ADMIN — Medication 125 MICROGRAM(S): at 06:26

## 2017-02-20 RX ADMIN — MORPHINE SULFATE 1 MILLIGRAM(S): 50 CAPSULE, EXTENDED RELEASE ORAL at 20:34

## 2017-02-20 RX ADMIN — ALBUTEROL 2 PUFF(S): 90 AEROSOL, METERED ORAL at 13:20

## 2017-02-20 RX ADMIN — LORATADINE 10 MILLIGRAM(S): 10 TABLET ORAL at 12:43

## 2017-02-20 RX ADMIN — Medication 81 MILLIGRAM(S): at 12:44

## 2017-02-20 RX ADMIN — ATORVASTATIN CALCIUM 80 MILLIGRAM(S): 80 TABLET, FILM COATED ORAL at 22:26

## 2017-02-20 RX ADMIN — LITHIUM CARBONATE 150 MILLIGRAM(S): 300 TABLET, EXTENDED RELEASE ORAL at 16:38

## 2017-02-20 RX ADMIN — Medication 150 MILLIGRAM(S): at 12:43

## 2017-02-20 RX ADMIN — LITHIUM CARBONATE 150 MILLIGRAM(S): 300 TABLET, EXTENDED RELEASE ORAL at 06:26

## 2017-02-20 RX ADMIN — Medication 0.5 MILLIGRAM(S): at 12:43

## 2017-02-20 RX ADMIN — MORPHINE SULFATE 1 MILLIGRAM(S): 50 CAPSULE, EXTENDED RELEASE ORAL at 20:19

## 2017-02-20 RX ADMIN — Medication 100 MILLIGRAM(S): at 06:26

## 2017-02-20 RX ADMIN — LISINOPRIL 2.5 MILLIGRAM(S): 2.5 TABLET ORAL at 06:25

## 2017-02-20 RX ADMIN — Medication 25 MILLIGRAM(S): at 17:36

## 2017-02-20 RX ADMIN — LITHIUM CARBONATE 150 MILLIGRAM(S): 300 TABLET, EXTENDED RELEASE ORAL at 22:26

## 2017-02-20 RX ADMIN — CLOPIDOGREL BISULFATE 75 MILLIGRAM(S): 75 TABLET, FILM COATED ORAL at 12:44

## 2017-02-20 RX ADMIN — BUDESONIDE AND FORMOTEROL FUMARATE DIHYDRATE 2 PUFF(S): 160; 4.5 AEROSOL RESPIRATORY (INHALATION) at 21:02

## 2017-02-20 RX ADMIN — Medication 25 MILLIGRAM(S): at 06:25

## 2017-02-20 RX ADMIN — Medication 1 MILLIGRAM(S): at 22:26

## 2017-02-20 RX ADMIN — BUDESONIDE AND FORMOTEROL FUMARATE DIHYDRATE 2 PUFF(S): 160; 4.5 AEROSOL RESPIRATORY (INHALATION) at 11:07

## 2017-02-20 NOTE — PROVIDER CONTACT NOTE (OTHER) - ACTION/TREATMENT ORDERED:
EKG performed; CORRINE Gonzalezin reviewing patient's medications and history and will decide any further action, ie. cardiac enzymes, pain medication

## 2017-02-20 NOTE — PROVIDER CONTACT NOTE (OTHER) - ASSESSMENT
Patient c/o 6/10 chest pain to left apex. Pain reproducible with palpation.  Said pain started approximately 15 minutes ago.  -120. Sinus rhythm in 80s-90s.  2L Nasal cannula with oxygen saturation %.

## 2017-02-21 ENCOUNTER — APPOINTMENT (OUTPATIENT)
Dept: CARDIOLOGY | Facility: CLINIC | Age: 67
End: 2017-02-21

## 2017-02-21 LAB
ALBUMIN SERPL ELPH-MCNC: 2.9 G/DL — LOW (ref 3.3–5)
ALP SERPL-CCNC: 56 U/L — SIGNIFICANT CHANGE UP (ref 40–120)
ALT FLD-CCNC: 25 U/L — SIGNIFICANT CHANGE UP (ref 4–33)
AST SERPL-CCNC: 57 U/L — HIGH (ref 4–32)
BASE EXCESS BLDV CALC-SCNC: -1.1 MMOL/L — SIGNIFICANT CHANGE UP
BILIRUB SERPL-MCNC: 0.4 MG/DL — SIGNIFICANT CHANGE UP (ref 0.2–1.2)
BUN SERPL-MCNC: 17 MG/DL — SIGNIFICANT CHANGE UP (ref 7–23)
CALCIUM SERPL-MCNC: 8.3 MG/DL — LOW (ref 8.4–10.5)
CHLORIDE SERPL-SCNC: 108 MMOL/L — HIGH (ref 98–107)
CK MB BLD-MCNC: 5.97 NG/ML — HIGH (ref 1–4.7)
CK SERPL-CCNC: 140 U/L — SIGNIFICANT CHANGE UP (ref 25–170)
CO2 SERPL-SCNC: 28 MMOL/L — SIGNIFICANT CHANGE UP (ref 22–31)
CREAT SERPL-MCNC: 0.62 MG/DL — SIGNIFICANT CHANGE UP (ref 0.5–1.3)
GLUCOSE SERPL-MCNC: 87 MG/DL — SIGNIFICANT CHANGE UP (ref 70–99)
HCO3 BLDV-SCNC: 23 MMOL/L — SIGNIFICANT CHANGE UP (ref 20–27)
HCT VFR BLD CALC: 33.1 % — LOW (ref 34.5–45)
HCT VFR BLD CALC: 38.4 % — SIGNIFICANT CHANGE UP (ref 34.5–45)
HGB BLD-MCNC: 10.9 G/DL — LOW (ref 11.5–15.5)
HGB BLD-MCNC: 12.1 G/DL — SIGNIFICANT CHANGE UP (ref 11.5–15.5)
MAGNESIUM SERPL-MCNC: 1.8 MG/DL — SIGNIFICANT CHANGE UP (ref 1.6–2.6)
MCHC RBC-ENTMCNC: 30 PG — SIGNIFICANT CHANGE UP (ref 27–34)
MCHC RBC-ENTMCNC: 31.5 % — LOW (ref 32–36)
MCHC RBC-ENTMCNC: 31.6 PG — SIGNIFICANT CHANGE UP (ref 27–34)
MCHC RBC-ENTMCNC: 32.9 % — SIGNIFICANT CHANGE UP (ref 32–36)
MCV RBC AUTO: 95.3 FL — SIGNIFICANT CHANGE UP (ref 80–100)
MCV RBC AUTO: 95.9 FL — SIGNIFICANT CHANGE UP (ref 80–100)
PCO2 BLDV: 45 MMHG — SIGNIFICANT CHANGE UP (ref 41–51)
PH BLDV: 7.34 PH — SIGNIFICANT CHANGE UP (ref 7.32–7.43)
PHOSPHATE SERPL-MCNC: 2.6 MG/DL — SIGNIFICANT CHANGE UP (ref 2.5–4.5)
PLATELET # BLD AUTO: 179 K/UL — SIGNIFICANT CHANGE UP (ref 150–400)
PLATELET # BLD AUTO: 223 K/UL — SIGNIFICANT CHANGE UP (ref 150–400)
PMV BLD: 10 FL — SIGNIFICANT CHANGE UP (ref 7–13)
PMV BLD: 9.7 FL — SIGNIFICANT CHANGE UP (ref 7–13)
PO2 BLDV: 37 MMHG — SIGNIFICANT CHANGE UP (ref 35–40)
POTASSIUM SERPL-MCNC: 3.7 MMOL/L — SIGNIFICANT CHANGE UP (ref 3.5–5.3)
POTASSIUM SERPL-SCNC: 3.7 MMOL/L — SIGNIFICANT CHANGE UP (ref 3.5–5.3)
PROT SERPL-MCNC: 4.8 G/DL — LOW (ref 6–8.3)
RBC # BLD: 3.45 M/UL — LOW (ref 3.8–5.2)
RBC # BLD: 4.03 M/UL — SIGNIFICANT CHANGE UP (ref 3.8–5.2)
RBC # FLD: 12.7 % — SIGNIFICANT CHANGE UP (ref 10.3–14.5)
RBC # FLD: 12.8 % — SIGNIFICANT CHANGE UP (ref 10.3–14.5)
SAO2 % BLDV: 66.4 % — SIGNIFICANT CHANGE UP (ref 60–85)
SODIUM SERPL-SCNC: 144 MMOL/L — SIGNIFICANT CHANGE UP (ref 135–145)
TROPONIN T SERPL-MCNC: 4.1 NG/ML — HIGH (ref 0–0.06)
WBC # BLD: 11.17 K/UL — HIGH (ref 3.8–10.5)
WBC # BLD: 13.63 K/UL — HIGH (ref 3.8–10.5)
WBC # FLD AUTO: 11.17 K/UL — HIGH (ref 3.8–10.5)
WBC # FLD AUTO: 13.63 K/UL — HIGH (ref 3.8–10.5)

## 2017-02-21 PROCEDURE — 93010 ELECTROCARDIOGRAM REPORT: CPT

## 2017-02-21 PROCEDURE — 93306 TTE W/DOPPLER COMPLETE: CPT | Mod: 26

## 2017-02-21 PROCEDURE — 99233 SBSQ HOSP IP/OBS HIGH 50: CPT

## 2017-02-21 PROCEDURE — 99223 1ST HOSP IP/OBS HIGH 75: CPT

## 2017-02-21 RX ORDER — QUETIAPINE FUMARATE 200 MG/1
25 TABLET, FILM COATED ORAL DAILY
Qty: 0 | Refills: 0 | Status: DISCONTINUED | OUTPATIENT
Start: 2017-02-21 | End: 2017-02-23

## 2017-02-21 RX ORDER — HEPARIN SODIUM 5000 [USP'U]/ML
5000 INJECTION INTRAVENOUS; SUBCUTANEOUS EVERY 12 HOURS
Qty: 0 | Refills: 0 | Status: DISCONTINUED | OUTPATIENT
Start: 2017-02-21 | End: 2017-02-23

## 2017-02-21 RX ORDER — MAGNESIUM SULFATE 500 MG/ML
2 VIAL (ML) INJECTION ONCE
Qty: 0 | Refills: 0 | Status: COMPLETED | OUTPATIENT
Start: 2017-02-21 | End: 2017-02-21

## 2017-02-21 RX ORDER — MORPHINE SULFATE 50 MG/1
1 CAPSULE, EXTENDED RELEASE ORAL ONCE
Qty: 0 | Refills: 0 | Status: DISCONTINUED | OUTPATIENT
Start: 2017-02-21 | End: 2017-02-21

## 2017-02-21 RX ORDER — HYDROMORPHONE HYDROCHLORIDE 2 MG/ML
0.2 INJECTION INTRAMUSCULAR; INTRAVENOUS; SUBCUTANEOUS ONCE
Qty: 0 | Refills: 0 | Status: DISCONTINUED | OUTPATIENT
Start: 2017-02-21 | End: 2017-02-21

## 2017-02-21 RX ORDER — METOPROLOL TARTRATE 50 MG
25 TABLET ORAL
Qty: 0 | Refills: 0 | Status: DISCONTINUED | OUTPATIENT
Start: 2017-02-21 | End: 2017-02-22

## 2017-02-21 RX ORDER — POTASSIUM CHLORIDE 20 MEQ
40 PACKET (EA) ORAL ONCE
Qty: 0 | Refills: 0 | Status: COMPLETED | OUTPATIENT
Start: 2017-02-21 | End: 2017-02-21

## 2017-02-21 RX ORDER — QUETIAPINE FUMARATE 200 MG/1
25 TABLET, FILM COATED ORAL AT BEDTIME
Qty: 0 | Refills: 0 | Status: DISCONTINUED | OUTPATIENT
Start: 2017-02-21 | End: 2017-02-23

## 2017-02-21 RX ADMIN — MORPHINE SULFATE 1 MILLIGRAM(S): 50 CAPSULE, EXTENDED RELEASE ORAL at 00:45

## 2017-02-21 RX ADMIN — Medication 50 GRAM(S): at 04:45

## 2017-02-21 RX ADMIN — Medication 150 MILLIGRAM(S): at 12:37

## 2017-02-21 RX ADMIN — HEPARIN SODIUM 5000 UNIT(S): 5000 INJECTION INTRAVENOUS; SUBCUTANEOUS at 17:52

## 2017-02-21 RX ADMIN — CLOPIDOGREL BISULFATE 75 MILLIGRAM(S): 75 TABLET, FILM COATED ORAL at 12:37

## 2017-02-21 RX ADMIN — Medication 40 MILLIEQUIVALENT(S): at 06:40

## 2017-02-21 RX ADMIN — LITHIUM CARBONATE 150 MILLIGRAM(S): 300 TABLET, EXTENDED RELEASE ORAL at 14:17

## 2017-02-21 RX ADMIN — QUETIAPINE FUMARATE 25 MILLIGRAM(S): 200 TABLET, FILM COATED ORAL at 22:35

## 2017-02-21 RX ADMIN — BUDESONIDE AND FORMOTEROL FUMARATE DIHYDRATE 2 PUFF(S): 160; 4.5 AEROSOL RESPIRATORY (INHALATION) at 20:04

## 2017-02-21 RX ADMIN — SENNA PLUS 2 TABLET(S): 8.6 TABLET ORAL at 22:35

## 2017-02-21 RX ADMIN — MORPHINE SULFATE 1 MILLIGRAM(S): 50 CAPSULE, EXTENDED RELEASE ORAL at 01:00

## 2017-02-21 RX ADMIN — Medication 25 MILLIGRAM(S): at 05:47

## 2017-02-21 RX ADMIN — Medication 81 MILLIGRAM(S): at 12:37

## 2017-02-21 RX ADMIN — Medication 100 MILLIGRAM(S): at 05:47

## 2017-02-21 RX ADMIN — Medication 1 MILLIGRAM(S): at 22:35

## 2017-02-21 RX ADMIN — Medication 125 MICROGRAM(S): at 05:47

## 2017-02-21 RX ADMIN — Medication 100 MILLIGRAM(S): at 17:53

## 2017-02-21 RX ADMIN — PANTOPRAZOLE SODIUM 40 MILLIGRAM(S): 20 TABLET, DELAYED RELEASE ORAL at 06:30

## 2017-02-21 RX ADMIN — LORATADINE 10 MILLIGRAM(S): 10 TABLET ORAL at 12:37

## 2017-02-21 RX ADMIN — ALBUTEROL 2 PUFF(S): 90 AEROSOL, METERED ORAL at 08:10

## 2017-02-21 RX ADMIN — LITHIUM CARBONATE 150 MILLIGRAM(S): 300 TABLET, EXTENDED RELEASE ORAL at 22:35

## 2017-02-21 RX ADMIN — Medication 25 MILLIGRAM(S): at 18:14

## 2017-02-21 RX ADMIN — Medication 0.5 MILLIGRAM(S): at 12:37

## 2017-02-21 RX ADMIN — ATORVASTATIN CALCIUM 80 MILLIGRAM(S): 80 TABLET, FILM COATED ORAL at 22:35

## 2017-02-21 RX ADMIN — BUDESONIDE AND FORMOTEROL FUMARATE DIHYDRATE 2 PUFF(S): 160; 4.5 AEROSOL RESPIRATORY (INHALATION) at 08:10

## 2017-02-21 RX ADMIN — LITHIUM CARBONATE 150 MILLIGRAM(S): 300 TABLET, EXTENDED RELEASE ORAL at 05:47

## 2017-02-22 LAB
ALBUMIN SERPL ELPH-MCNC: 3.4 G/DL — SIGNIFICANT CHANGE UP (ref 3.3–5)
ALP SERPL-CCNC: 65 U/L — SIGNIFICANT CHANGE UP (ref 40–120)
ALT FLD-CCNC: 25 U/L — SIGNIFICANT CHANGE UP (ref 4–33)
AST SERPL-CCNC: 38 U/L — HIGH (ref 4–32)
BILIRUB SERPL-MCNC: 0.5 MG/DL — SIGNIFICANT CHANGE UP (ref 0.2–1.2)
BUN SERPL-MCNC: 17 MG/DL — SIGNIFICANT CHANGE UP (ref 7–23)
CALCIUM SERPL-MCNC: 9.6 MG/DL — SIGNIFICANT CHANGE UP (ref 8.4–10.5)
CHLORIDE SERPL-SCNC: 104 MMOL/L — SIGNIFICANT CHANGE UP (ref 98–107)
CK SERPL-CCNC: 96 U/L — SIGNIFICANT CHANGE UP (ref 25–170)
CO2 SERPL-SCNC: 29 MMOL/L — SIGNIFICANT CHANGE UP (ref 22–31)
CREAT SERPL-MCNC: 0.51 MG/DL — SIGNIFICANT CHANGE UP (ref 0.5–1.3)
GLUCOSE SERPL-MCNC: 88 MG/DL — SIGNIFICANT CHANGE UP (ref 70–99)
HCT VFR BLD CALC: 36.9 % — SIGNIFICANT CHANGE UP (ref 34.5–45)
HGB BLD-MCNC: 11.9 G/DL — SIGNIFICANT CHANGE UP (ref 11.5–15.5)
MAGNESIUM SERPL-MCNC: 2.1 MG/DL — SIGNIFICANT CHANGE UP (ref 1.6–2.6)
MCHC RBC-ENTMCNC: 30.6 PG — SIGNIFICANT CHANGE UP (ref 27–34)
MCHC RBC-ENTMCNC: 32.2 % — SIGNIFICANT CHANGE UP (ref 32–36)
MCV RBC AUTO: 94.9 FL — SIGNIFICANT CHANGE UP (ref 80–100)
PHOSPHATE SERPL-MCNC: 2.8 MG/DL — SIGNIFICANT CHANGE UP (ref 2.5–4.5)
PLATELET # BLD AUTO: 205 K/UL — SIGNIFICANT CHANGE UP (ref 150–400)
PMV BLD: 10 FL — SIGNIFICANT CHANGE UP (ref 7–13)
POTASSIUM SERPL-MCNC: 4.6 MMOL/L — SIGNIFICANT CHANGE UP (ref 3.5–5.3)
POTASSIUM SERPL-SCNC: 4.6 MMOL/L — SIGNIFICANT CHANGE UP (ref 3.5–5.3)
PROT SERPL-MCNC: 5.7 G/DL — LOW (ref 6–8.3)
RBC # BLD: 3.89 M/UL — SIGNIFICANT CHANGE UP (ref 3.8–5.2)
RBC # FLD: 13 % — SIGNIFICANT CHANGE UP (ref 10.3–14.5)
SODIUM SERPL-SCNC: 142 MMOL/L — SIGNIFICANT CHANGE UP (ref 135–145)
TROPONIN T SERPL-MCNC: 4.85 NG/ML — HIGH (ref 0–0.06)
WBC # BLD: 9.76 K/UL — SIGNIFICANT CHANGE UP (ref 3.8–10.5)
WBC # FLD AUTO: 9.76 K/UL — SIGNIFICANT CHANGE UP (ref 3.8–10.5)

## 2017-02-22 PROCEDURE — 99223 1ST HOSP IP/OBS HIGH 75: CPT

## 2017-02-22 PROCEDURE — 99233 SBSQ HOSP IP/OBS HIGH 50: CPT

## 2017-02-22 RX ORDER — SODIUM CHLORIDE 9 MG/ML
100 INJECTION INTRAMUSCULAR; INTRAVENOUS; SUBCUTANEOUS ONCE
Qty: 0 | Refills: 0 | Status: COMPLETED | OUTPATIENT
Start: 2017-02-22 | End: 2017-02-22

## 2017-02-22 RX ORDER — METOPROLOL TARTRATE 50 MG
12.5 TABLET ORAL
Qty: 0 | Refills: 0 | Status: DISCONTINUED | OUTPATIENT
Start: 2017-02-22 | End: 2017-02-23

## 2017-02-22 RX ADMIN — BUDESONIDE AND FORMOTEROL FUMARATE DIHYDRATE 2 PUFF(S): 160; 4.5 AEROSOL RESPIRATORY (INHALATION) at 09:56

## 2017-02-22 RX ADMIN — QUETIAPINE FUMARATE 25 MILLIGRAM(S): 200 TABLET, FILM COATED ORAL at 21:54

## 2017-02-22 RX ADMIN — SODIUM CHLORIDE 400 MILLILITER(S): 9 INJECTION INTRAMUSCULAR; INTRAVENOUS; SUBCUTANEOUS at 06:10

## 2017-02-22 RX ADMIN — LITHIUM CARBONATE 150 MILLIGRAM(S): 300 TABLET, EXTENDED RELEASE ORAL at 13:49

## 2017-02-22 RX ADMIN — LITHIUM CARBONATE 150 MILLIGRAM(S): 300 TABLET, EXTENDED RELEASE ORAL at 21:53

## 2017-02-22 RX ADMIN — Medication 81 MILLIGRAM(S): at 11:29

## 2017-02-22 RX ADMIN — ATORVASTATIN CALCIUM 80 MILLIGRAM(S): 80 TABLET, FILM COATED ORAL at 21:53

## 2017-02-22 RX ADMIN — LITHIUM CARBONATE 150 MILLIGRAM(S): 300 TABLET, EXTENDED RELEASE ORAL at 07:25

## 2017-02-22 RX ADMIN — Medication 100 MILLIGRAM(S): at 07:26

## 2017-02-22 RX ADMIN — CLOPIDOGREL BISULFATE 75 MILLIGRAM(S): 75 TABLET, FILM COATED ORAL at 11:29

## 2017-02-22 RX ADMIN — Medication 100 MILLIGRAM(S): at 17:36

## 2017-02-22 RX ADMIN — BUDESONIDE AND FORMOTEROL FUMARATE DIHYDRATE 2 PUFF(S): 160; 4.5 AEROSOL RESPIRATORY (INHALATION) at 21:54

## 2017-02-22 RX ADMIN — Medication 125 MICROGRAM(S): at 07:26

## 2017-02-22 RX ADMIN — Medication 150 MILLIGRAM(S): at 11:28

## 2017-02-22 RX ADMIN — SODIUM CHLORIDE 400 MILLILITER(S): 9 INJECTION INTRAMUSCULAR; INTRAVENOUS; SUBCUTANEOUS at 07:20

## 2017-02-22 RX ADMIN — PANTOPRAZOLE SODIUM 40 MILLIGRAM(S): 20 TABLET, DELAYED RELEASE ORAL at 07:25

## 2017-02-22 RX ADMIN — LORATADINE 10 MILLIGRAM(S): 10 TABLET ORAL at 11:29

## 2017-02-22 RX ADMIN — Medication 1 MILLIGRAM(S): at 21:53

## 2017-02-22 RX ADMIN — HEPARIN SODIUM 5000 UNIT(S): 5000 INJECTION INTRAVENOUS; SUBCUTANEOUS at 17:37

## 2017-02-22 RX ADMIN — HEPARIN SODIUM 5000 UNIT(S): 5000 INJECTION INTRAVENOUS; SUBCUTANEOUS at 07:25

## 2017-02-22 RX ADMIN — SENNA PLUS 2 TABLET(S): 8.6 TABLET ORAL at 21:53

## 2017-02-22 RX ADMIN — Medication 12.5 MILLIGRAM(S): at 17:36

## 2017-02-22 RX ADMIN — Medication 0.5 MILLIGRAM(S): at 11:28

## 2017-02-22 NOTE — PHYSICAL THERAPY INITIAL EVALUATION ADULT - PERTINENT HX OF CURRENT PROBLEM, REHAB EVAL
admitted w/ chest pain after bouts of nausea, vomiting & loose stool, Pt. w/ hx. of MI in early Feb, + thrombectomy in Cath Lab

## 2017-02-23 VITALS — WEIGHT: 97.44 LBS

## 2017-02-23 LAB
BUN SERPL-MCNC: 14 MG/DL — SIGNIFICANT CHANGE UP (ref 7–23)
CALCIUM SERPL-MCNC: 9.7 MG/DL — SIGNIFICANT CHANGE UP (ref 8.4–10.5)
CHLORIDE SERPL-SCNC: 106 MMOL/L — SIGNIFICANT CHANGE UP (ref 98–107)
CO2 SERPL-SCNC: 25 MMOL/L — SIGNIFICANT CHANGE UP (ref 22–31)
CREAT SERPL-MCNC: 0.53 MG/DL — SIGNIFICANT CHANGE UP (ref 0.5–1.3)
GLUCOSE SERPL-MCNC: 87 MG/DL — SIGNIFICANT CHANGE UP (ref 70–99)
HCT VFR BLD CALC: 39 % — SIGNIFICANT CHANGE UP (ref 34.5–45)
HGB BLD-MCNC: 12.6 G/DL — SIGNIFICANT CHANGE UP (ref 11.5–15.5)
MAGNESIUM SERPL-MCNC: 1.9 MG/DL — SIGNIFICANT CHANGE UP (ref 1.6–2.6)
MCHC RBC-ENTMCNC: 30.4 PG — SIGNIFICANT CHANGE UP (ref 27–34)
MCHC RBC-ENTMCNC: 32.3 % — SIGNIFICANT CHANGE UP (ref 32–36)
MCV RBC AUTO: 94 FL — SIGNIFICANT CHANGE UP (ref 80–100)
PLATELET # BLD AUTO: 221 K/UL — SIGNIFICANT CHANGE UP (ref 150–400)
PMV BLD: 9.9 FL — SIGNIFICANT CHANGE UP (ref 7–13)
POTASSIUM SERPL-MCNC: 4.1 MMOL/L — SIGNIFICANT CHANGE UP (ref 3.5–5.3)
POTASSIUM SERPL-SCNC: 4.1 MMOL/L — SIGNIFICANT CHANGE UP (ref 3.5–5.3)
RBC # BLD: 4.15 M/UL — SIGNIFICANT CHANGE UP (ref 3.8–5.2)
RBC # FLD: 13 % — SIGNIFICANT CHANGE UP (ref 10.3–14.5)
SODIUM SERPL-SCNC: 144 MMOL/L — SIGNIFICANT CHANGE UP (ref 135–145)
WBC # BLD: 8.96 K/UL — SIGNIFICANT CHANGE UP (ref 3.8–10.5)
WBC # FLD AUTO: 8.96 K/UL — SIGNIFICANT CHANGE UP (ref 3.8–10.5)

## 2017-02-23 PROCEDURE — 99232 SBSQ HOSP IP/OBS MODERATE 35: CPT

## 2017-02-23 RX ORDER — PANTOPRAZOLE SODIUM 20 MG/1
1 TABLET, DELAYED RELEASE ORAL
Qty: 30 | Refills: 0 | OUTPATIENT
Start: 2017-02-23 | End: 2017-03-25

## 2017-02-23 RX ORDER — MAGNESIUM SULFATE 500 MG/ML
1 VIAL (ML) INJECTION ONCE
Qty: 0 | Refills: 0 | Status: COMPLETED | OUTPATIENT
Start: 2017-02-23 | End: 2017-02-23

## 2017-02-23 RX ORDER — QUETIAPINE FUMARATE 200 MG/1
1 TABLET, FILM COATED ORAL
Qty: 30 | Refills: 0 | OUTPATIENT
Start: 2017-02-23 | End: 2017-03-25

## 2017-02-23 RX ORDER — METOPROLOL TARTRATE 50 MG
0.5 TABLET ORAL
Qty: 30 | Refills: 0 | OUTPATIENT
Start: 2017-02-23 | End: 2017-03-25

## 2017-02-23 RX ORDER — ACETAMINOPHEN 500 MG
650 TABLET ORAL EVERY 6 HOURS
Qty: 0 | Refills: 0 | Status: DISCONTINUED | OUTPATIENT
Start: 2017-02-23 | End: 2017-02-23

## 2017-02-23 RX ORDER — DOCUSATE SODIUM 100 MG
1 CAPSULE ORAL
Qty: 0 | Refills: 0 | COMMUNITY
Start: 2017-02-23

## 2017-02-23 RX ORDER — SENNA PLUS 8.6 MG/1
2 TABLET ORAL
Qty: 0 | Refills: 0 | COMMUNITY
Start: 2017-02-23

## 2017-02-23 RX ORDER — ZOLPIDEM TARTRATE 10 MG/1
1 TABLET ORAL
Qty: 0 | Refills: 0 | COMMUNITY

## 2017-02-23 RX ADMIN — CLOPIDOGREL BISULFATE 75 MILLIGRAM(S): 75 TABLET, FILM COATED ORAL at 12:21

## 2017-02-23 RX ADMIN — Medication 100 MILLIGRAM(S): at 06:08

## 2017-02-23 RX ADMIN — LITHIUM CARBONATE 150 MILLIGRAM(S): 300 TABLET, EXTENDED RELEASE ORAL at 06:08

## 2017-02-23 RX ADMIN — Medication 12.5 MILLIGRAM(S): at 06:08

## 2017-02-23 RX ADMIN — Medication 650 MILLIGRAM(S): at 08:31

## 2017-02-23 RX ADMIN — Medication 150 MILLIGRAM(S): at 12:21

## 2017-02-23 RX ADMIN — PANTOPRAZOLE SODIUM 40 MILLIGRAM(S): 20 TABLET, DELAYED RELEASE ORAL at 06:08

## 2017-02-23 RX ADMIN — Medication 100 GRAM(S): at 08:37

## 2017-02-23 RX ADMIN — BUDESONIDE AND FORMOTEROL FUMARATE DIHYDRATE 2 PUFF(S): 160; 4.5 AEROSOL RESPIRATORY (INHALATION) at 08:37

## 2017-02-23 RX ADMIN — Medication 0.5 MILLIGRAM(S): at 12:21

## 2017-02-23 RX ADMIN — Medication 650 MILLIGRAM(S): at 09:10

## 2017-02-23 RX ADMIN — LORATADINE 10 MILLIGRAM(S): 10 TABLET ORAL at 12:21

## 2017-02-23 RX ADMIN — Medication 81 MILLIGRAM(S): at 12:21

## 2017-02-23 RX ADMIN — HEPARIN SODIUM 5000 UNIT(S): 5000 INJECTION INTRAVENOUS; SUBCUTANEOUS at 06:08

## 2017-02-23 RX ADMIN — Medication 125 MICROGRAM(S): at 06:08

## 2017-03-05 ENCOUNTER — INPATIENT (INPATIENT)
Facility: HOSPITAL | Age: 67
LOS: 2 days | Discharge: HOME | End: 2017-03-08
Attending: INTERNAL MEDICINE | Admitting: INTERNAL MEDICINE
Payer: MEDICARE

## 2017-03-05 VITALS
HEART RATE: 88 BPM | OXYGEN SATURATION: 98 % | RESPIRATION RATE: 18 BRPM | TEMPERATURE: 97 F | DIASTOLIC BLOOD PRESSURE: 86 MMHG | SYSTOLIC BLOOD PRESSURE: 120 MMHG

## 2017-03-05 DIAGNOSIS — I25.10 ATHEROSCLEROTIC HEART DISEASE OF NATIVE CORONARY ARTERY WITHOUT ANGINA PECTORIS: ICD-10-CM

## 2017-03-05 DIAGNOSIS — I20.0 UNSTABLE ANGINA: ICD-10-CM

## 2017-03-05 DIAGNOSIS — I50.9 HEART FAILURE, UNSPECIFIED: ICD-10-CM

## 2017-03-05 DIAGNOSIS — J98.6 DISORDERS OF DIAPHRAGM: Chronic | ICD-10-CM

## 2017-03-05 DIAGNOSIS — J45.909 UNSPECIFIED ASTHMA, UNCOMPLICATED: ICD-10-CM

## 2017-03-05 DIAGNOSIS — F32.9 MAJOR DEPRESSIVE DISORDER, SINGLE EPISODE, UNSPECIFIED: ICD-10-CM

## 2017-03-05 DIAGNOSIS — N81.6 RECTOCELE: Chronic | ICD-10-CM

## 2017-03-05 DIAGNOSIS — K21.9 GASTRO-ESOPHAGEAL REFLUX DISEASE WITHOUT ESOPHAGITIS: ICD-10-CM

## 2017-03-05 LAB
ALBUMIN SERPL ELPH-MCNC: 4.2 G/DL — SIGNIFICANT CHANGE UP (ref 3.3–5)
ALBUMIN SERPL ELPH-MCNC: 4.6 G/DL — SIGNIFICANT CHANGE UP (ref 3.3–5)
ALP SERPL-CCNC: 73 U/L — SIGNIFICANT CHANGE UP (ref 40–120)
ALP SERPL-CCNC: 75 U/L — SIGNIFICANT CHANGE UP (ref 40–120)
ALT FLD-CCNC: 15 U/L — SIGNIFICANT CHANGE UP (ref 4–33)
ALT FLD-CCNC: 25 U/L — SIGNIFICANT CHANGE UP (ref 4–33)
APTT BLD: 30.5 SEC — SIGNIFICANT CHANGE UP (ref 27.5–37.4)
APTT BLD: > 200 SEC — CRITICAL HIGH (ref 27.5–37.4)
AST SERPL-CCNC: 21 U/L — SIGNIFICANT CHANGE UP (ref 4–32)
AST SERPL-CCNC: 48 U/L — HIGH (ref 4–32)
BASE EXCESS BLDA CALC-SCNC: 2.7 MMOL/L — SIGNIFICANT CHANGE UP
BASE EXCESS BLDV CALC-SCNC: 3 MMOL/L — SIGNIFICANT CHANGE UP
BASE EXCESS BLDV CALC-SCNC: 6.2 MMOL/L — SIGNIFICANT CHANGE UP
BASOPHILS # BLD AUTO: 0.03 K/UL — SIGNIFICANT CHANGE UP (ref 0–0.2)
BASOPHILS NFR BLD AUTO: 0.3 % — SIGNIFICANT CHANGE UP (ref 0–2)
BILIRUB SERPL-MCNC: 0.4 MG/DL — SIGNIFICANT CHANGE UP (ref 0.2–1.2)
BILIRUB SERPL-MCNC: 0.5 MG/DL — SIGNIFICANT CHANGE UP (ref 0.2–1.2)
BLOOD GAS VENOUS - CREATININE: 0.66 MG/DL — SIGNIFICANT CHANGE UP (ref 0.5–1.3)
BUN SERPL-MCNC: 16 MG/DL — SIGNIFICANT CHANGE UP (ref 7–23)
BUN SERPL-MCNC: 18 MG/DL — SIGNIFICANT CHANGE UP (ref 7–23)
CALCIUM SERPL-MCNC: 10.2 MG/DL — SIGNIFICANT CHANGE UP (ref 8.4–10.5)
CALCIUM SERPL-MCNC: 9.6 MG/DL — SIGNIFICANT CHANGE UP (ref 8.4–10.5)
CHLORIDE BLDV-SCNC: 106 MMOL/L — SIGNIFICANT CHANGE UP (ref 96–108)
CHLORIDE SERPL-SCNC: 102 MMOL/L — SIGNIFICANT CHANGE UP (ref 98–107)
CHLORIDE SERPL-SCNC: 104 MMOL/L — SIGNIFICANT CHANGE UP (ref 98–107)
CK MB BLD-MCNC: 2.17 NG/ML — SIGNIFICANT CHANGE UP (ref 1–4.7)
CK SERPL-CCNC: 45 U/L — SIGNIFICANT CHANGE UP (ref 25–170)
CK SERPL-CCNC: 85 U/L — SIGNIFICANT CHANGE UP (ref 25–170)
CO2 SERPL-SCNC: 24 MMOL/L — SIGNIFICANT CHANGE UP (ref 22–31)
CO2 SERPL-SCNC: 28 MMOL/L — SIGNIFICANT CHANGE UP (ref 22–31)
CREAT SERPL-MCNC: 0.65 MG/DL — SIGNIFICANT CHANGE UP (ref 0.5–1.3)
CREAT SERPL-MCNC: 0.71 MG/DL — SIGNIFICANT CHANGE UP (ref 0.5–1.3)
EOSINOPHIL # BLD AUTO: 0.48 K/UL — SIGNIFICANT CHANGE UP (ref 0–0.5)
EOSINOPHIL NFR BLD AUTO: 4.4 % — SIGNIFICANT CHANGE UP (ref 0–6)
GAS PNL BLDV: 138 MMOL/L — SIGNIFICANT CHANGE UP (ref 136–146)
GAS PNL BLDV: 138 MMOL/L — SIGNIFICANT CHANGE UP (ref 136–146)
GLUCOSE BLDV-MCNC: 104 — HIGH (ref 70–99)
GLUCOSE BLDV-MCNC: 84 — SIGNIFICANT CHANGE UP (ref 70–99)
GLUCOSE SERPL-MCNC: 113 MG/DL — HIGH (ref 70–99)
GLUCOSE SERPL-MCNC: 84 MG/DL — SIGNIFICANT CHANGE UP (ref 70–99)
HCO3 BLDA-SCNC: 27 MMOL/L — HIGH (ref 22–26)
HCO3 BLDV-SCNC: 25 MMOL/L — SIGNIFICANT CHANGE UP (ref 20–27)
HCO3 BLDV-SCNC: 28 MMOL/L — HIGH (ref 20–27)
HCT VFR BLD CALC: 46.4 % — HIGH (ref 34.5–45)
HCT VFR BLDV CALC: 42.8 % — SIGNIFICANT CHANGE UP (ref 34.5–45)
HCT VFR BLDV CALC: 44.3 % — SIGNIFICANT CHANGE UP (ref 34.5–45)
HGB BLD-MCNC: 15.6 G/DL — HIGH (ref 11.5–15.5)
HGB BLDV-MCNC: 14 G/DL — SIGNIFICANT CHANGE UP (ref 11.5–15.5)
HGB BLDV-MCNC: 14.4 G/DL — SIGNIFICANT CHANGE UP (ref 11.5–15.5)
IMM GRANULOCYTES NFR BLD AUTO: 0.3 % — SIGNIFICANT CHANGE UP (ref 0–1.5)
INR BLD: 0.98 — SIGNIFICANT CHANGE UP (ref 0.87–1.18)
INR BLD: 1.02 — SIGNIFICANT CHANGE UP (ref 0.87–1.18)
LACTATE BLDV-MCNC: 2.2 MMOL/L — HIGH (ref 0.5–2)
LACTATE SERPL-SCNC: 1.9 MMOL/L — SIGNIFICANT CHANGE UP (ref 0.5–2)
LYMPHOCYTES # BLD AUTO: 1.57 K/UL — SIGNIFICANT CHANGE UP (ref 1–3.3)
LYMPHOCYTES # BLD AUTO: 14.4 % — SIGNIFICANT CHANGE UP (ref 13–44)
MAGNESIUM SERPL-MCNC: 2 MG/DL — SIGNIFICANT CHANGE UP (ref 1.6–2.6)
MANUAL SMEAR VERIFICATION: SIGNIFICANT CHANGE UP
MCHC RBC-ENTMCNC: 31.5 PG — SIGNIFICANT CHANGE UP (ref 27–34)
MCHC RBC-ENTMCNC: 33.6 % — SIGNIFICANT CHANGE UP (ref 32–36)
MCV RBC AUTO: 93.5 FL — SIGNIFICANT CHANGE UP (ref 80–100)
MONOCYTES # BLD AUTO: 0.48 K/UL — SIGNIFICANT CHANGE UP (ref 0–0.9)
MONOCYTES NFR BLD AUTO: 4.4 % — SIGNIFICANT CHANGE UP (ref 2–14)
MORPHOLOGY BLD-IMP: NORMAL — SIGNIFICANT CHANGE UP
NEUTROPHILS # BLD AUTO: 8.28 K/UL — HIGH (ref 1.8–7.4)
NEUTROPHILS NFR BLD AUTO: 76.2 % — SIGNIFICANT CHANGE UP (ref 43–77)
NT-PROBNP SERPL-SCNC: 2220 PG/ML — SIGNIFICANT CHANGE UP
PCO2 BLDA: 40 MMHG — SIGNIFICANT CHANGE UP (ref 32–48)
PCO2 BLDV: 54 MMHG — HIGH (ref 41–51)
PCO2 BLDV: 56 MMHG — HIGH (ref 41–51)
PH BLDA: 7.44 PH — SIGNIFICANT CHANGE UP (ref 7.35–7.45)
PH BLDV: 7.34 PH — SIGNIFICANT CHANGE UP (ref 7.32–7.43)
PH BLDV: 7.37 PH — SIGNIFICANT CHANGE UP (ref 7.32–7.43)
PHOSPHATE SERPL-MCNC: 3.8 MG/DL — SIGNIFICANT CHANGE UP (ref 2.5–4.5)
PLATELET # BLD AUTO: 510 K/UL — HIGH (ref 150–400)
PMV BLD: 10.2 FL — SIGNIFICANT CHANGE UP (ref 7–13)
PO2 BLDA: 120 MMHG — HIGH (ref 83–108)
PO2 BLDV: 27 MMHG — LOW (ref 35–40)
PO2 BLDV: < 24 MMHG — LOW (ref 35–40)
POTASSIUM BLDV-SCNC: 3.7 MMOL/L — SIGNIFICANT CHANGE UP (ref 3.4–4.5)
POTASSIUM BLDV-SCNC: 5.5 MMOL/L — HIGH (ref 3.4–4.5)
POTASSIUM SERPL-MCNC: 4.1 MMOL/L — SIGNIFICANT CHANGE UP (ref 3.5–5.3)
POTASSIUM SERPL-MCNC: SIGNIFICANT CHANGE UP MMOL/L (ref 3.5–5.3)
POTASSIUM SERPL-SCNC: 4.1 MMOL/L — SIGNIFICANT CHANGE UP (ref 3.5–5.3)
POTASSIUM SERPL-SCNC: SIGNIFICANT CHANGE UP MMOL/L (ref 3.5–5.3)
PROT SERPL-MCNC: 6.6 G/DL — SIGNIFICANT CHANGE UP (ref 6–8.3)
PROT SERPL-MCNC: 7.4 G/DL — SIGNIFICANT CHANGE UP (ref 6–8.3)
PROTHROM AB SERPL-ACNC: 11.2 SEC — SIGNIFICANT CHANGE UP (ref 10–13.1)
PROTHROM AB SERPL-ACNC: 11.6 SEC — SIGNIFICANT CHANGE UP (ref 10–13.1)
RBC # BLD: 4.96 M/UL — SIGNIFICANT CHANGE UP (ref 3.8–5.2)
RBC # FLD: 13.5 % — SIGNIFICANT CHANGE UP (ref 10.3–14.5)
SAO2 % BLDA: 98.7 % — SIGNIFICANT CHANGE UP (ref 95–99)
SAO2 % BLDV: 37.1 % — LOW (ref 60–85)
SAO2 % BLDV: 44.2 % — LOW (ref 60–85)
SODIUM SERPL-SCNC: 141 MMOL/L — SIGNIFICANT CHANGE UP (ref 135–145)
SODIUM SERPL-SCNC: 145 MMOL/L — SIGNIFICANT CHANGE UP (ref 135–145)
TROPONIN T SERPL-MCNC: < 0.06 NG/ML — SIGNIFICANT CHANGE UP (ref 0–0.06)
TSH SERPL-MCNC: 0.02 UIU/ML — LOW (ref 0.27–4.2)
WBC # BLD: 10.87 K/UL — HIGH (ref 3.8–10.5)
WBC # FLD AUTO: 10.87 K/UL — HIGH (ref 3.8–10.5)

## 2017-03-05 PROCEDURE — 71010: CPT | Mod: 26

## 2017-03-05 RX ORDER — QUETIAPINE FUMARATE 200 MG/1
25 TABLET, FILM COATED ORAL AT BEDTIME
Qty: 0 | Refills: 0 | Status: DISCONTINUED | OUTPATIENT
Start: 2017-03-05 | End: 2017-03-08

## 2017-03-05 RX ORDER — IPRATROPIUM/ALBUTEROL SULFATE 18-103MCG
3 AEROSOL WITH ADAPTER (GRAM) INHALATION EVERY 6 HOURS
Qty: 0 | Refills: 0 | Status: DISCONTINUED | OUTPATIENT
Start: 2017-03-05 | End: 2017-03-08

## 2017-03-05 RX ORDER — LITHIUM CARBONATE 300 MG/1
150 TABLET, EXTENDED RELEASE ORAL THREE TIMES A DAY
Qty: 0 | Refills: 0 | Status: DISCONTINUED | OUTPATIENT
Start: 2017-03-05 | End: 2017-03-06

## 2017-03-05 RX ORDER — METOPROLOL TARTRATE 50 MG
12.5 TABLET ORAL
Qty: 0 | Refills: 0 | Status: DISCONTINUED | OUTPATIENT
Start: 2017-03-05 | End: 2017-03-05

## 2017-03-05 RX ORDER — ASPIRIN/CALCIUM CARB/MAGNESIUM 324 MG
81 TABLET ORAL DAILY
Qty: 0 | Refills: 0 | Status: DISCONTINUED | OUTPATIENT
Start: 2017-03-06 | End: 2017-03-08

## 2017-03-05 RX ORDER — DOCUSATE SODIUM 100 MG
100 CAPSULE ORAL
Qty: 0 | Refills: 0 | Status: DISCONTINUED | OUTPATIENT
Start: 2017-03-05 | End: 2017-03-08

## 2017-03-05 RX ORDER — CLONAZEPAM 1 MG
0.5 TABLET ORAL DAILY
Qty: 0 | Refills: 0 | Status: DISCONTINUED | OUTPATIENT
Start: 2017-03-05 | End: 2017-03-08

## 2017-03-05 RX ORDER — PANTOPRAZOLE SODIUM 20 MG/1
40 TABLET, DELAYED RELEASE ORAL
Qty: 0 | Refills: 0 | Status: DISCONTINUED | OUTPATIENT
Start: 2017-03-05 | End: 2017-03-08

## 2017-03-05 RX ORDER — FUROSEMIDE 40 MG
20 TABLET ORAL ONCE
Qty: 0 | Refills: 0 | Status: COMPLETED | OUTPATIENT
Start: 2017-03-05 | End: 2017-03-05

## 2017-03-05 RX ORDER — LEVOTHYROXINE SODIUM 125 MCG
125 TABLET ORAL DAILY
Qty: 0 | Refills: 0 | Status: DISCONTINUED | OUTPATIENT
Start: 2017-03-05 | End: 2017-03-06

## 2017-03-05 RX ORDER — LORATADINE 10 MG/1
10 TABLET ORAL DAILY
Qty: 0 | Refills: 0 | Status: DISCONTINUED | OUTPATIENT
Start: 2017-03-05 | End: 2017-03-08

## 2017-03-05 RX ORDER — CLOPIDOGREL BISULFATE 75 MG/1
300 TABLET, FILM COATED ORAL ONCE
Qty: 0 | Refills: 0 | Status: COMPLETED | OUTPATIENT
Start: 2017-03-05 | End: 2017-03-05

## 2017-03-05 RX ORDER — ASPIRIN/CALCIUM CARB/MAGNESIUM 324 MG
325 TABLET ORAL ONCE
Qty: 0 | Refills: 0 | Status: COMPLETED | OUTPATIENT
Start: 2017-03-05 | End: 2017-03-05

## 2017-03-05 RX ORDER — NITROGLYCERIN 6.5 MG
5 CAPSULE, EXTENDED RELEASE ORAL
Qty: 50 | Refills: 0 | Status: DISCONTINUED | OUTPATIENT
Start: 2017-03-05 | End: 2017-03-05

## 2017-03-05 RX ORDER — NITROGLYCERIN 6.5 MG
0.4 CAPSULE, EXTENDED RELEASE ORAL ONCE
Qty: 0 | Refills: 0 | Status: COMPLETED | OUTPATIENT
Start: 2017-03-05 | End: 2017-03-05

## 2017-03-05 RX ORDER — HEPARIN SODIUM 5000 [USP'U]/ML
1000 INJECTION INTRAVENOUS; SUBCUTANEOUS
Qty: 25000 | Refills: 0 | Status: DISCONTINUED | OUTPATIENT
Start: 2017-03-05 | End: 2017-03-06

## 2017-03-05 RX ORDER — VENLAFAXINE HCL 75 MG
150 CAPSULE, EXT RELEASE 24 HR ORAL DAILY
Qty: 0 | Refills: 0 | Status: DISCONTINUED | OUTPATIENT
Start: 2017-03-05 | End: 2017-03-08

## 2017-03-05 RX ORDER — CLONAZEPAM 1 MG
1 TABLET ORAL AT BEDTIME
Qty: 0 | Refills: 0 | Status: DISCONTINUED | OUTPATIENT
Start: 2017-03-05 | End: 2017-03-08

## 2017-03-05 RX ORDER — CLOPIDOGREL BISULFATE 75 MG/1
75 TABLET, FILM COATED ORAL DAILY
Qty: 0 | Refills: 0 | Status: DISCONTINUED | OUTPATIENT
Start: 2017-03-06 | End: 2017-03-08

## 2017-03-05 RX ORDER — SENNA PLUS 8.6 MG/1
2 TABLET ORAL AT BEDTIME
Qty: 0 | Refills: 0 | Status: DISCONTINUED | OUTPATIENT
Start: 2017-03-05 | End: 2017-03-08

## 2017-03-05 RX ORDER — ATORVASTATIN CALCIUM 80 MG/1
80 TABLET, FILM COATED ORAL AT BEDTIME
Qty: 0 | Refills: 0 | Status: DISCONTINUED | OUTPATIENT
Start: 2017-03-05 | End: 2017-03-08

## 2017-03-05 RX ADMIN — Medication 1 MILLIGRAM(S): at 21:57

## 2017-03-05 RX ADMIN — QUETIAPINE FUMARATE 25 MILLIGRAM(S): 200 TABLET, FILM COATED ORAL at 21:56

## 2017-03-05 RX ADMIN — Medication 3 MILLILITER(S): at 18:27

## 2017-03-05 RX ADMIN — HEPARIN SODIUM 10 UNIT(S)/HR: 5000 INJECTION INTRAVENOUS; SUBCUTANEOUS at 17:18

## 2017-03-05 RX ADMIN — Medication 0.4 MILLIGRAM(S): at 15:37

## 2017-03-05 RX ADMIN — Medication 20 MILLIGRAM(S): at 18:21

## 2017-03-05 RX ADMIN — Medication 3 MILLILITER(S): at 22:13

## 2017-03-05 RX ADMIN — ATORVASTATIN CALCIUM 80 MILLIGRAM(S): 80 TABLET, FILM COATED ORAL at 21:56

## 2017-03-05 RX ADMIN — CLOPIDOGREL BISULFATE 300 MILLIGRAM(S): 75 TABLET, FILM COATED ORAL at 15:37

## 2017-03-05 RX ADMIN — Medication 325 MILLIGRAM(S): at 15:37

## 2017-03-05 RX ADMIN — LITHIUM CARBONATE 150 MILLIGRAM(S): 300 TABLET, EXTENDED RELEASE ORAL at 21:57

## 2017-03-05 NOTE — H&P ADULT. - ASSESSMENT
This is a 66yoF w/ pMHx CAD (s/p stents in 2010, 2011, 2 stents in pLAD and mLAD on 02/8/2017), depression, GERD, hypothyroidism, diverticulitis, paralyzed hemidiaphragm, asthma p/w with 3 days of chest pain a/w SOB, dizziness, nausea and vomiting.  Had previous admission 2 weeks prior for chest pain found to have stent thrombosis in pLAD with stent removal and intra-aortic balloon pump.

## 2017-03-05 NOTE — ED ADULT NURSE NOTE - OBJECTIVE STATEMENT
Alert and oriented x 4. Pt received to spot 23 complaining of chest pain since 1 week. Pt states pain became worse today, 6/10 and radiating to right. Pt has hx of MI and 5 stents. Pt denies chest pain,  nausea, vomiting or dizziness. Pt has shortness of breath when ambulating. pt given Oxygen via NC at 2lpm. Labs drawn. VSS. Pt is sinus rhythm on cardiac monitor. Pt ambulates. EKG done. Family at bedside. Will continue to monitor. RN Facilitator.

## 2017-03-05 NOTE — ED PROVIDER NOTE - PROGRESS NOTE DETAILS
delmy, pgy2: d/w Cardiology- pt to be started on heparin drip, SL NTG, ASA 325mg and Plavix 300mg. Admit to CCU. d/w patient.

## 2017-03-05 NOTE — ED PROVIDER NOTE - CONSTITUTIONAL, MLM
normal... Anxious, well nourished, awake, alert, oriented to person, place, time/situation and in no apparent distress.

## 2017-03-05 NOTE — ED PROVIDER NOTE - MEDICAL DECISION MAKING DETAILS
74 yo F with recurrent CP after 72 yo F with recurrent CP after NSTEMI followed by in-stent thrombosis. check cbc, cmp, cardiac enzymes, ekg, cardiology cx, cxr.

## 2017-03-05 NOTE — ED PROVIDER NOTE - ATTENDING CONTRIBUTION TO CARE
agree with resident as stated  "66yoF PMHx depression, GERD, hypothyroidism, CAD s/p NSTEMI with stents to prox and mid LAD on 02/08/17 and stent thrombosis 2/2 missed medications 2/18/17 s/p LAD stent thrombosis and clot removal with placement of 3 stents and balloon angio." presents today with 3 days of CP and today vomiting which are symptoms that preceded her coming in for stent thrombosis.  Patient initially did not want to come in but when family heard her symptoms made sure she came in.      PE: well appearing, VSS, CTAB/L; s1 s2 no m/r/g abd soft/NT/ND ext: no edema    EKG unchanged from prior visit but given symptoms of prior visit cath consult called immediately  evaluated pt want extra loading of tylenol and plavix (pt had thrown up) and heparin bolus and ggt and admission to CCU

## 2017-03-05 NOTE — ED PROVIDER NOTE - OBJECTIVE STATEMENT
66yoF PMHx depression, GERD, hypothyroidism, CAD s/p NSTEMI with stents to prox and mid LAD on 02/08/17 and stent thrombosis 2/2 missed medications 2/18/17 s/p LAD stent thrombosis and clot removal with placement of 3 stents and balloon angio., p/w left sided CP, vomiting and SOB x3 days. CP is left sided and non-radiating. Feels similar to prior CP. Pt was initially feeling better after dc home from [facility]. Came to ED as sx were not getting better. Has been complaint with medications. Did take ASA 325mg and Plavix this AM.

## 2017-03-05 NOTE — ED ADULT TRIAGE NOTE - CHIEF COMPLAINT QUOTE
c/o left sided chest pain x 4 days with c/o SOB with vomiting x 1 week.  denies fevers.   h/o MI and 5 stents placed 3 weeks ago.

## 2017-03-05 NOTE — H&P ADULT. - PROBLEM SELECTOR PLAN 1
-Transfer to CCU  -c/w home ASA, plavix   -start heparin gtt titrate for ptt 58-99  - c/w lopressor 12.5mg BID

## 2017-03-06 ENCOUNTER — TRANSCRIPTION ENCOUNTER (OUTPATIENT)
Age: 67
End: 2017-03-06

## 2017-03-06 LAB
ALBUMIN SERPL ELPH-MCNC: 4.4 G/DL — SIGNIFICANT CHANGE UP (ref 3.3–5)
ALP SERPL-CCNC: 65 U/L — SIGNIFICANT CHANGE UP (ref 40–120)
ALT FLD-CCNC: 24 U/L — SIGNIFICANT CHANGE UP (ref 4–33)
APTT BLD: 186.2 SEC — CRITICAL HIGH (ref 27.5–37.4)
AST SERPL-CCNC: 71 U/L — HIGH (ref 4–32)
BILIRUB SERPL-MCNC: 1 MG/DL — SIGNIFICANT CHANGE UP (ref 0.2–1.2)
BUN SERPL-MCNC: 18 MG/DL — SIGNIFICANT CHANGE UP (ref 7–23)
CALCIUM SERPL-MCNC: 9 MG/DL — SIGNIFICANT CHANGE UP (ref 8.4–10.5)
CHLORIDE SERPL-SCNC: 101 MMOL/L — SIGNIFICANT CHANGE UP (ref 98–107)
CK MB BLD-MCNC: 1.77 NG/ML — SIGNIFICANT CHANGE UP (ref 1–4.7)
CK MB BLD-MCNC: 1.79 NG/ML — SIGNIFICANT CHANGE UP (ref 1–4.7)
CK SERPL-CCNC: 129 U/L — SIGNIFICANT CHANGE UP (ref 25–170)
CO2 SERPL-SCNC: 27 MMOL/L — SIGNIFICANT CHANGE UP (ref 22–31)
CREAT SERPL-MCNC: 0.75 MG/DL — SIGNIFICANT CHANGE UP (ref 0.5–1.3)
GLUCOSE SERPL-MCNC: 140 MG/DL — HIGH (ref 70–99)
HCT VFR BLD CALC: 43.6 % — SIGNIFICANT CHANGE UP (ref 34.5–45)
HGB BLD-MCNC: 14.1 G/DL — SIGNIFICANT CHANGE UP (ref 11.5–15.5)
LITHIUM SERPL-MCNC: 0.81 MMOL/L — SIGNIFICANT CHANGE UP (ref 0.6–1.2)
MAGNESIUM SERPL-MCNC: 2.2 MG/DL — SIGNIFICANT CHANGE UP (ref 1.6–2.6)
MCHC RBC-ENTMCNC: 30.7 PG — SIGNIFICANT CHANGE UP (ref 27–34)
MCHC RBC-ENTMCNC: 32.3 % — SIGNIFICANT CHANGE UP (ref 32–36)
MCV RBC AUTO: 95 FL — SIGNIFICANT CHANGE UP (ref 80–100)
NT-PROBNP SERPL-SCNC: 1342 PG/ML — SIGNIFICANT CHANGE UP
PHOSPHATE SERPL-MCNC: 4.3 MG/DL — SIGNIFICANT CHANGE UP (ref 2.5–4.5)
PLATELET # BLD AUTO: 413 K/UL — HIGH (ref 150–400)
PMV BLD: 9.4 FL — SIGNIFICANT CHANGE UP (ref 7–13)
POTASSIUM SERPL-MCNC: SIGNIFICANT CHANGE UP MMOL/L (ref 3.5–5.3)
POTASSIUM SERPL-SCNC: SIGNIFICANT CHANGE UP MMOL/L (ref 3.5–5.3)
PROT SERPL-MCNC: 7.4 G/DL — SIGNIFICANT CHANGE UP (ref 6–8.3)
RBC # BLD: 4.59 M/UL — SIGNIFICANT CHANGE UP (ref 3.8–5.2)
RBC # FLD: 13.5 % — SIGNIFICANT CHANGE UP (ref 10.3–14.5)
SODIUM SERPL-SCNC: 139 MMOL/L — SIGNIFICANT CHANGE UP (ref 135–145)
T4 FREE SERPL-MCNC: 2.21 NG/DL — HIGH (ref 0.9–1.8)
TROPONIN T SERPL-MCNC: < 0.06 NG/ML — SIGNIFICANT CHANGE UP (ref 0–0.06)
TROPONIN T SERPL-MCNC: < 0.06 NG/ML — SIGNIFICANT CHANGE UP (ref 0–0.06)
WBC # BLD: 8.55 K/UL — SIGNIFICANT CHANGE UP (ref 3.8–10.5)
WBC # FLD AUTO: 8.55 K/UL — SIGNIFICANT CHANGE UP (ref 3.8–10.5)

## 2017-03-06 PROCEDURE — 93010 ELECTROCARDIOGRAM REPORT: CPT

## 2017-03-06 PROCEDURE — 99233 SBSQ HOSP IP/OBS HIGH 50: CPT

## 2017-03-06 RX ORDER — LITHIUM CARBONATE 300 MG/1
150 TABLET, EXTENDED RELEASE ORAL ONCE
Qty: 0 | Refills: 0 | Status: COMPLETED | OUTPATIENT
Start: 2017-03-06 | End: 2017-03-06

## 2017-03-06 RX ORDER — LITHIUM CARBONATE 300 MG/1
150 TABLET, EXTENDED RELEASE ORAL ONCE
Qty: 0 | Refills: 0 | Status: DISCONTINUED | OUTPATIENT
Start: 2017-03-06 | End: 2017-03-06

## 2017-03-06 RX ORDER — METOPROLOL TARTRATE 50 MG
25 TABLET ORAL
Qty: 0 | Refills: 0 | Status: DISCONTINUED | OUTPATIENT
Start: 2017-03-06 | End: 2017-03-06

## 2017-03-06 RX ORDER — LITHIUM CARBONATE 300 MG/1
300 TABLET, EXTENDED RELEASE ORAL
Qty: 0 | Refills: 0 | Status: DISCONTINUED | OUTPATIENT
Start: 2017-03-07 | End: 2017-03-07

## 2017-03-06 RX ORDER — LISINOPRIL 2.5 MG/1
2.5 TABLET ORAL DAILY
Qty: 0 | Refills: 0 | Status: DISCONTINUED | OUTPATIENT
Start: 2017-03-06 | End: 2017-03-07

## 2017-03-06 RX ORDER — METOPROLOL TARTRATE 50 MG
12.5 TABLET ORAL
Qty: 0 | Refills: 0 | Status: DISCONTINUED | OUTPATIENT
Start: 2017-03-06 | End: 2017-03-06

## 2017-03-06 RX ORDER — LEVOTHYROXINE SODIUM 125 MCG
112 TABLET ORAL DAILY
Qty: 0 | Refills: 0 | Status: DISCONTINUED | OUTPATIENT
Start: 2017-03-07 | End: 2017-03-08

## 2017-03-06 RX ORDER — BUDESONIDE AND FORMOTEROL FUMARATE DIHYDRATE 160; 4.5 UG/1; UG/1
2 AEROSOL RESPIRATORY (INHALATION)
Qty: 0 | Refills: 0 | Status: DISCONTINUED | OUTPATIENT
Start: 2017-03-06 | End: 2017-03-08

## 2017-03-06 RX ORDER — HEPARIN SODIUM 5000 [USP'U]/ML
5000 INJECTION INTRAVENOUS; SUBCUTANEOUS EVERY 12 HOURS
Qty: 0 | Refills: 0 | Status: DISCONTINUED | OUTPATIENT
Start: 2017-03-06 | End: 2017-03-08

## 2017-03-06 RX ORDER — METOPROLOL TARTRATE 50 MG
12.5 TABLET ORAL
Qty: 0 | Refills: 0 | Status: DISCONTINUED | OUTPATIENT
Start: 2017-03-06 | End: 2017-03-08

## 2017-03-06 RX ORDER — LITHIUM CARBONATE 300 MG/1
150 TABLET, EXTENDED RELEASE ORAL
Qty: 0 | Refills: 0 | Status: DISCONTINUED | OUTPATIENT
Start: 2017-03-07 | End: 2017-03-07

## 2017-03-06 RX ORDER — ONDANSETRON 8 MG/1
4 TABLET, FILM COATED ORAL EVERY 8 HOURS
Qty: 0 | Refills: 0 | Status: DISCONTINUED | OUTPATIENT
Start: 2017-03-06 | End: 2017-03-08

## 2017-03-06 RX ADMIN — Medication 3 MILLILITER(S): at 04:15

## 2017-03-06 RX ADMIN — Medication 12.5 MILLIGRAM(S): at 22:06

## 2017-03-06 RX ADMIN — Medication 1 MILLIGRAM(S): at 22:06

## 2017-03-06 RX ADMIN — CLOPIDOGREL BISULFATE 75 MILLIGRAM(S): 75 TABLET, FILM COATED ORAL at 13:24

## 2017-03-06 RX ADMIN — LORATADINE 10 MILLIGRAM(S): 10 TABLET ORAL at 13:24

## 2017-03-06 RX ADMIN — PANTOPRAZOLE SODIUM 40 MILLIGRAM(S): 20 TABLET, DELAYED RELEASE ORAL at 06:34

## 2017-03-06 RX ADMIN — LISINOPRIL 2.5 MILLIGRAM(S): 2.5 TABLET ORAL at 18:15

## 2017-03-06 RX ADMIN — Medication 100 MILLIGRAM(S): at 18:15

## 2017-03-06 RX ADMIN — Medication 0.5 MILLIGRAM(S): at 13:24

## 2017-03-06 RX ADMIN — Medication 150 MILLIGRAM(S): at 13:24

## 2017-03-06 RX ADMIN — Medication 12.5 MILLIGRAM(S): at 12:30

## 2017-03-06 RX ADMIN — Medication 81 MILLIGRAM(S): at 13:24

## 2017-03-06 RX ADMIN — QUETIAPINE FUMARATE 25 MILLIGRAM(S): 200 TABLET, FILM COATED ORAL at 22:06

## 2017-03-06 RX ADMIN — Medication 125 MICROGRAM(S): at 06:35

## 2017-03-06 RX ADMIN — LITHIUM CARBONATE 150 MILLIGRAM(S): 300 TABLET, EXTENDED RELEASE ORAL at 22:06

## 2017-03-06 RX ADMIN — Medication 3 MILLILITER(S): at 21:21

## 2017-03-06 RX ADMIN — Medication 3 MILLILITER(S): at 16:21

## 2017-03-06 RX ADMIN — ATORVASTATIN CALCIUM 80 MILLIGRAM(S): 80 TABLET, FILM COATED ORAL at 22:06

## 2017-03-06 RX ADMIN — LITHIUM CARBONATE 150 MILLIGRAM(S): 300 TABLET, EXTENDED RELEASE ORAL at 06:35

## 2017-03-06 RX ADMIN — BUDESONIDE AND FORMOTEROL FUMARATE DIHYDRATE 2 PUFF(S): 160; 4.5 AEROSOL RESPIRATORY (INHALATION) at 21:31

## 2017-03-06 RX ADMIN — HEPARIN SODIUM 5000 UNIT(S): 5000 INJECTION INTRAVENOUS; SUBCUTANEOUS at 18:15

## 2017-03-06 RX ADMIN — Medication 3 MILLILITER(S): at 10:47

## 2017-03-06 RX ADMIN — LITHIUM CARBONATE 150 MILLIGRAM(S): 300 TABLET, EXTENDED RELEASE ORAL at 14:39

## 2017-03-07 LAB
ALBUMIN SERPL ELPH-MCNC: 3.7 G/DL — SIGNIFICANT CHANGE UP (ref 3.3–5)
ALP SERPL-CCNC: 64 U/L — SIGNIFICANT CHANGE UP (ref 40–120)
ALT FLD-CCNC: 13 U/L — SIGNIFICANT CHANGE UP (ref 4–33)
AST SERPL-CCNC: 19 U/L — SIGNIFICANT CHANGE UP (ref 4–32)
BILIRUB SERPL-MCNC: 0.5 MG/DL — SIGNIFICANT CHANGE UP (ref 0.2–1.2)
BUN SERPL-MCNC: 28 MG/DL — HIGH (ref 7–23)
CALCIUM SERPL-MCNC: 9.3 MG/DL — SIGNIFICANT CHANGE UP (ref 8.4–10.5)
CHLORIDE SERPL-SCNC: 101 MMOL/L — SIGNIFICANT CHANGE UP (ref 98–107)
CO2 SERPL-SCNC: 27 MMOL/L — SIGNIFICANT CHANGE UP (ref 22–31)
CREAT SERPL-MCNC: 1.36 MG/DL — HIGH (ref 0.5–1.3)
GLUCOSE SERPL-MCNC: 89 MG/DL — SIGNIFICANT CHANGE UP (ref 70–99)
HCT VFR BLD CALC: 39.5 % — SIGNIFICANT CHANGE UP (ref 34.5–45)
HGB BLD-MCNC: 12.8 G/DL — SIGNIFICANT CHANGE UP (ref 11.5–15.5)
MAGNESIUM SERPL-MCNC: 2 MG/DL — SIGNIFICANT CHANGE UP (ref 1.6–2.6)
MCHC RBC-ENTMCNC: 31 PG — SIGNIFICANT CHANGE UP (ref 27–34)
MCHC RBC-ENTMCNC: 32.4 % — SIGNIFICANT CHANGE UP (ref 32–36)
MCV RBC AUTO: 95.6 FL — SIGNIFICANT CHANGE UP (ref 80–100)
PHOSPHATE SERPL-MCNC: 4.2 MG/DL — SIGNIFICANT CHANGE UP (ref 2.5–4.5)
PLATELET # BLD AUTO: 378 K/UL — SIGNIFICANT CHANGE UP (ref 150–400)
PMV BLD: 9.8 FL — SIGNIFICANT CHANGE UP (ref 7–13)
POTASSIUM SERPL-MCNC: 3.9 MMOL/L — SIGNIFICANT CHANGE UP (ref 3.5–5.3)
POTASSIUM SERPL-SCNC: 3.9 MMOL/L — SIGNIFICANT CHANGE UP (ref 3.5–5.3)
PROT SERPL-MCNC: 5.7 G/DL — LOW (ref 6–8.3)
RBC # BLD: 4.13 M/UL — SIGNIFICANT CHANGE UP (ref 3.8–5.2)
RBC # FLD: 13.5 % — SIGNIFICANT CHANGE UP (ref 10.3–14.5)
SODIUM SERPL-SCNC: 143 MMOL/L — SIGNIFICANT CHANGE UP (ref 135–145)
WBC # BLD: 11.11 K/UL — HIGH (ref 3.8–10.5)
WBC # FLD AUTO: 11.11 K/UL — HIGH (ref 3.8–10.5)

## 2017-03-07 PROCEDURE — 93010 ELECTROCARDIOGRAM REPORT: CPT

## 2017-03-07 RX ORDER — LITHIUM CARBONATE 300 MG/1
300 TABLET, EXTENDED RELEASE ORAL
Qty: 0 | Refills: 0 | Status: DISCONTINUED | OUTPATIENT
Start: 2017-03-07 | End: 2017-03-08

## 2017-03-07 RX ORDER — LITHIUM CARBONATE 300 MG/1
300 TABLET, EXTENDED RELEASE ORAL DAILY
Qty: 0 | Refills: 0 | Status: DISCONTINUED | OUTPATIENT
Start: 2017-03-07 | End: 2017-03-07

## 2017-03-07 RX ORDER — LITHIUM CARBONATE 300 MG/1
150 TABLET, EXTENDED RELEASE ORAL
Qty: 0 | Refills: 0 | Status: DISCONTINUED | OUTPATIENT
Start: 2017-03-07 | End: 2017-03-08

## 2017-03-07 RX ORDER — SODIUM CHLORIDE 9 MG/ML
250 INJECTION INTRAMUSCULAR; INTRAVENOUS; SUBCUTANEOUS ONCE
Qty: 0 | Refills: 0 | Status: COMPLETED | OUTPATIENT
Start: 2017-03-07 | End: 2017-03-07

## 2017-03-07 RX ADMIN — CLOPIDOGREL BISULFATE 75 MILLIGRAM(S): 75 TABLET, FILM COATED ORAL at 12:56

## 2017-03-07 RX ADMIN — PANTOPRAZOLE SODIUM 40 MILLIGRAM(S): 20 TABLET, DELAYED RELEASE ORAL at 05:21

## 2017-03-07 RX ADMIN — Medication 3 MILLILITER(S): at 21:49

## 2017-03-07 RX ADMIN — ATORVASTATIN CALCIUM 80 MILLIGRAM(S): 80 TABLET, FILM COATED ORAL at 21:01

## 2017-03-07 RX ADMIN — BUDESONIDE AND FORMOTEROL FUMARATE DIHYDRATE 2 PUFF(S): 160; 4.5 AEROSOL RESPIRATORY (INHALATION) at 10:02

## 2017-03-07 RX ADMIN — Medication 3 MILLILITER(S): at 04:44

## 2017-03-07 RX ADMIN — Medication 100 MILLIGRAM(S): at 18:04

## 2017-03-07 RX ADMIN — BUDESONIDE AND FORMOTEROL FUMARATE DIHYDRATE 2 PUFF(S): 160; 4.5 AEROSOL RESPIRATORY (INHALATION) at 21:00

## 2017-03-07 RX ADMIN — SODIUM CHLORIDE 500 MILLILITER(S): 9 INJECTION INTRAMUSCULAR; INTRAVENOUS; SUBCUTANEOUS at 20:00

## 2017-03-07 RX ADMIN — Medication 12.5 MILLIGRAM(S): at 05:20

## 2017-03-07 RX ADMIN — Medication 112 MICROGRAM(S): at 05:20

## 2017-03-07 RX ADMIN — Medication 150 MILLIGRAM(S): at 12:56

## 2017-03-07 RX ADMIN — Medication 100 MILLIGRAM(S): at 05:19

## 2017-03-07 RX ADMIN — HEPARIN SODIUM 5000 UNIT(S): 5000 INJECTION INTRAVENOUS; SUBCUTANEOUS at 05:19

## 2017-03-07 RX ADMIN — Medication 3 MILLILITER(S): at 09:47

## 2017-03-07 RX ADMIN — Medication 1 MILLIGRAM(S): at 21:01

## 2017-03-07 RX ADMIN — Medication 3 MILLILITER(S): at 16:27

## 2017-03-07 RX ADMIN — QUETIAPINE FUMARATE 25 MILLIGRAM(S): 200 TABLET, FILM COATED ORAL at 21:00

## 2017-03-07 RX ADMIN — LORATADINE 10 MILLIGRAM(S): 10 TABLET ORAL at 12:56

## 2017-03-07 RX ADMIN — Medication 81 MILLIGRAM(S): at 12:56

## 2017-03-07 RX ADMIN — Medication 0.5 MILLIGRAM(S): at 12:59

## 2017-03-07 RX ADMIN — SENNA PLUS 2 TABLET(S): 8.6 TABLET ORAL at 21:01

## 2017-03-07 RX ADMIN — LITHIUM CARBONATE 300 MILLIGRAM(S): 300 TABLET, EXTENDED RELEASE ORAL at 18:04

## 2017-03-07 NOTE — DISCHARGE NOTE ADULT - MEDICATION SUMMARY - MEDICATIONS TO CHANGE
I will SWITCH the dose or number of times a day I take the medications listed below when I get home from the hospital:    levothyroxine 125 mcg (0.125 mg) oral tablet  -- 1 tab(s) by mouth once a day

## 2017-03-07 NOTE — DISCHARGE NOTE ADULT - MEDICATION SUMMARY - MEDICATIONS TO TAKE
I will START or STAY ON the medications listed below when I get home from the hospital:    aspirin 81 mg oral delayed release tablet  -- 1 tab(s) by mouth once a day  -- Indication: For CAD (coronary artery disease)    KlonoPIN 0.5 mg oral tablet  -- 1 tab(s) by mouth once a day in AM  -- Indication: For Depression, unspecified depression type    KlonoPIN 0.5 mg oral tablet  -- 2 tab(s) by mouth once a day (at bedtime)  -- Indication: For Depression, unspecified depression type    venlafaxine 150 mg oral capsule, extended release  -- 1 cap(s) by mouth once a day  -- Indication: For Depression, unspecified depression type    levocetirizine 5 mg oral tablet  -- 1 tab(s) by mouth once a day (in the evening)  -- Indication: For Asthma    atorvastatin 80 mg oral tablet  -- 1 tab(s) by mouth once a day (at bedtime)  -- Indication: For Hyperlipidemia    clopidogrel 75 mg oral tablet  -- 1 tab(s) by mouth once a day  -- Indication: For CAD (coronary artery disease)    lithium 150 mg oral capsule  -- 1 cap(s) by mouth in the morning and 2 cap(s) at night  -- Indication: For bipolar    QUEtiapine 25 mg oral tablet  -- 1 tab(s) by mouth once a day (at bedtime)  -- Indication: For bipolar    Metoprolol Tartrate 25 mg oral tablet  -- 0.5 tab(s) by mouth 2 times a day  -- Indication: For Hypertension    Proventil HFA 90 mcg/inh inhalation aerosol  -- 2 puff(s) inhaled 4 times a day, As Needed  -- Indication: For Asthma    albuterol CFC free 90 mcg/inh inhalation aerosol  -- 2 puff(s) inhaled every 6 hours, As needed, Shortness of Breath and/or Wheezing  -- Indication: For Asthma    Advair Diskus 500 mcg-50 mcg inhalation powder  -- 1 puff(s) inhaled 2 times a day  -- Indication: For Asthma    senna oral tablet  -- 2 tab(s) by mouth once a day (at bedtime)  -- Indication: For Constipation    docusate sodium 100 mg oral capsule  -- 1 cap(s) by mouth 2 times a day  -- Indication: For Constipation    pantoprazole 40 mg oral delayed release tablet  -- 1 tab(s) by mouth once a day (before a meal)  -- Indication: For Gastroesophageal reflux disease, esophagitis presence not specified    levothyroxine 112 mcg (0.112 mg) oral tablet  -- 1 tab(s) by mouth once a day  -- Indication: For Hypothyroidism

## 2017-03-07 NOTE — DISCHARGE NOTE ADULT - PATIENT PORTAL LINK FT
“You can access the Follow[facility] Patient Portal, offered by [facility], by registering with the following website: [email or web address]”

## 2017-03-07 NOTE — DISCHARGE NOTE ADULT - CARE PROVIDERS DIRECT ADDRESSES
[email or web address]

## 2017-03-07 NOTE — DISCHARGE NOTE ADULT - PLAN OF CARE
Resolution of symptoms Continue current medications as directed.  Please follow up with Dr. Delarosa (Cardiology) and your PCP in 1-2 weeks.  Call for an appointment.

## 2017-03-07 NOTE — DISCHARGE NOTE ADULT - INSTRUCTIONS
Low salt, Low fat, Low cholesterol Diet Take medication as ordered, follow up with MD as advised, eat heart healthy low fat, low salt diet, activity as tolerate

## 2017-03-07 NOTE — DISCHARGE NOTE ADULT - CARE PROVIDER_API CALL
Clarisse Delarosa (MD), Cardiovascular Disease  [address] [place] [address]  Phone: [phone]  Fax: [phone]

## 2017-03-07 NOTE — DISCHARGE NOTE ADULT - CARE PLAN
Principal Discharge DX:	CAD (coronary artery disease)  Goal:	Resolution of symptoms  Instructions for follow-up, activity and diet:	Continue current medications as directed.  Please follow up with Dr. Delarosa (Cardiology) and your PCP in 1-2 weeks.  Call for an appointment.

## 2017-03-08 VITALS
OXYGEN SATURATION: 97 % | RESPIRATION RATE: 16 BRPM | HEART RATE: 84 BPM | DIASTOLIC BLOOD PRESSURE: 62 MMHG | SYSTOLIC BLOOD PRESSURE: 98 MMHG

## 2017-03-08 LAB
BASOPHILS # BLD AUTO: 0.02 K/UL — SIGNIFICANT CHANGE UP (ref 0–0.2)
BASOPHILS NFR BLD AUTO: 0.3 % — SIGNIFICANT CHANGE UP (ref 0–2)
BUN SERPL-MCNC: 19 MG/DL — SIGNIFICANT CHANGE UP (ref 7–23)
CALCIUM SERPL-MCNC: 9.3 MG/DL — SIGNIFICANT CHANGE UP (ref 8.4–10.5)
CHLORIDE SERPL-SCNC: 106 MMOL/L — SIGNIFICANT CHANGE UP (ref 98–107)
CO2 SERPL-SCNC: 25 MMOL/L — SIGNIFICANT CHANGE UP (ref 22–31)
CREAT SERPL-MCNC: 0.54 MG/DL — SIGNIFICANT CHANGE UP (ref 0.5–1.3)
EOSINOPHIL # BLD AUTO: 0.51 K/UL — HIGH (ref 0–0.5)
EOSINOPHIL NFR BLD AUTO: 7.3 % — HIGH (ref 0–6)
GLUCOSE SERPL-MCNC: 101 MG/DL — HIGH (ref 70–99)
HCT VFR BLD CALC: 37.9 % — SIGNIFICANT CHANGE UP (ref 34.5–45)
HGB BLD-MCNC: 12.3 G/DL — SIGNIFICANT CHANGE UP (ref 11.5–15.5)
IMM GRANULOCYTES NFR BLD AUTO: 0.3 % — SIGNIFICANT CHANGE UP (ref 0–1.5)
LYMPHOCYTES # BLD AUTO: 1.81 K/UL — SIGNIFICANT CHANGE UP (ref 1–3.3)
LYMPHOCYTES # BLD AUTO: 25.9 % — SIGNIFICANT CHANGE UP (ref 13–44)
MAGNESIUM SERPL-MCNC: 2 MG/DL — SIGNIFICANT CHANGE UP (ref 1.6–2.6)
MCHC RBC-ENTMCNC: 30.8 PG — SIGNIFICANT CHANGE UP (ref 27–34)
MCHC RBC-ENTMCNC: 32.5 % — SIGNIFICANT CHANGE UP (ref 32–36)
MCV RBC AUTO: 94.8 FL — SIGNIFICANT CHANGE UP (ref 80–100)
MONOCYTES # BLD AUTO: 0.51 K/UL — SIGNIFICANT CHANGE UP (ref 0–0.9)
MONOCYTES NFR BLD AUTO: 7.3 % — SIGNIFICANT CHANGE UP (ref 2–14)
NEUTROPHILS # BLD AUTO: 4.11 K/UL — SIGNIFICANT CHANGE UP (ref 1.8–7.4)
NEUTROPHILS NFR BLD AUTO: 58.9 % — SIGNIFICANT CHANGE UP (ref 43–77)
PLATELET # BLD AUTO: 318 K/UL — SIGNIFICANT CHANGE UP (ref 150–400)
PMV BLD: 9.5 FL — SIGNIFICANT CHANGE UP (ref 7–13)
POTASSIUM SERPL-MCNC: 3.8 MMOL/L — SIGNIFICANT CHANGE UP (ref 3.5–5.3)
POTASSIUM SERPL-SCNC: 3.8 MMOL/L — SIGNIFICANT CHANGE UP (ref 3.5–5.3)
RBC # BLD: 4 M/UL — SIGNIFICANT CHANGE UP (ref 3.8–5.2)
RBC # FLD: 13.6 % — SIGNIFICANT CHANGE UP (ref 10.3–14.5)
SODIUM SERPL-SCNC: 142 MMOL/L — SIGNIFICANT CHANGE UP (ref 135–145)
WBC # BLD: 6.98 K/UL — SIGNIFICANT CHANGE UP (ref 3.8–10.5)
WBC # FLD AUTO: 6.98 K/UL — SIGNIFICANT CHANGE UP (ref 3.8–10.5)

## 2017-03-08 PROCEDURE — 99239 HOSP IP/OBS DSCHRG MGMT >30: CPT

## 2017-03-08 RX ORDER — LEVOTHYROXINE SODIUM 125 MCG
1 TABLET ORAL
Qty: 30 | Refills: 0 | OUTPATIENT
Start: 2017-03-08 | End: 2017-04-07

## 2017-03-08 RX ORDER — CLOPIDOGREL BISULFATE 75 MG/1
1 TABLET, FILM COATED ORAL
Qty: 90 | Refills: 0
Start: 2017-03-08 | End: 2017-06-06

## 2017-03-08 RX ADMIN — Medication 3 MILLILITER(S): at 04:00

## 2017-03-08 RX ADMIN — LORATADINE 10 MILLIGRAM(S): 10 TABLET ORAL at 12:42

## 2017-03-08 RX ADMIN — Medication 81 MILLIGRAM(S): at 12:42

## 2017-03-08 RX ADMIN — Medication 150 MILLIGRAM(S): at 12:42

## 2017-03-08 RX ADMIN — PANTOPRAZOLE SODIUM 40 MILLIGRAM(S): 20 TABLET, DELAYED RELEASE ORAL at 06:00

## 2017-03-08 RX ADMIN — Medication 112 MICROGRAM(S): at 06:00

## 2017-03-08 RX ADMIN — Medication 100 MILLIGRAM(S): at 06:00

## 2017-03-08 RX ADMIN — BUDESONIDE AND FORMOTEROL FUMARATE DIHYDRATE 2 PUFF(S): 160; 4.5 AEROSOL RESPIRATORY (INHALATION) at 08:20

## 2017-03-08 RX ADMIN — Medication 0.5 MILLIGRAM(S): at 12:42

## 2017-03-08 RX ADMIN — CLOPIDOGREL BISULFATE 75 MILLIGRAM(S): 75 TABLET, FILM COATED ORAL at 12:42

## 2017-03-08 RX ADMIN — Medication 12.5 MILLIGRAM(S): at 06:00

## 2017-03-08 RX ADMIN — LITHIUM CARBONATE 150 MILLIGRAM(S): 300 TABLET, EXTENDED RELEASE ORAL at 08:19

## 2017-03-08 NOTE — PHYSICAL THERAPY INITIAL EVALUATION ADULT - PERTINENT HX OF CURRENT PROBLEM, REHAB EVAL
Patient was recently admitted 2/08 for chest pain a/w nausea/vomiting/diarrhea found to have stent thrombosis in pLAD s/p stent removal, thrombolysis requiring intra-aortic balloon pump and managed by CCU.

## 2017-03-19 ENCOUNTER — INPATIENT (INPATIENT)
Facility: HOSPITAL | Age: 67
LOS: 1 days | Discharge: HOME | End: 2017-03-21
Attending: HOSPITALIST | Admitting: HOSPITALIST
Payer: MEDICARE

## 2017-03-19 VITALS
SYSTOLIC BLOOD PRESSURE: 112 MMHG | TEMPERATURE: 98 F | HEART RATE: 79 BPM | DIASTOLIC BLOOD PRESSURE: 77 MMHG | OXYGEN SATURATION: 96 % | RESPIRATION RATE: 16 BRPM

## 2017-03-19 DIAGNOSIS — R07.9 CHEST PAIN, UNSPECIFIED: ICD-10-CM

## 2017-03-19 DIAGNOSIS — J98.6 DISORDERS OF DIAPHRAGM: Chronic | ICD-10-CM

## 2017-03-19 DIAGNOSIS — N81.6 RECTOCELE: Chronic | ICD-10-CM

## 2017-03-19 LAB
ALBUMIN SERPL ELPH-MCNC: 4.6 G/DL — SIGNIFICANT CHANGE UP (ref 3.3–5)
ALP SERPL-CCNC: 93 U/L — SIGNIFICANT CHANGE UP (ref 40–120)
ALT FLD-CCNC: 19 U/L — SIGNIFICANT CHANGE UP (ref 4–33)
APTT BLD: 34.2 SEC — SIGNIFICANT CHANGE UP (ref 27.5–37.4)
AST SERPL-CCNC: 25 U/L — SIGNIFICANT CHANGE UP (ref 4–32)
B PERT DNA SPEC QL NAA+PROBE: SIGNIFICANT CHANGE UP
BASE EXCESS BLDV CALC-SCNC: 3.7 MMOL/L — SIGNIFICANT CHANGE UP
BASOPHILS # BLD AUTO: 0.03 K/UL — SIGNIFICANT CHANGE UP (ref 0–0.2)
BASOPHILS NFR BLD AUTO: 0.4 % — SIGNIFICANT CHANGE UP (ref 0–2)
BILIRUB SERPL-MCNC: 0.4 MG/DL — SIGNIFICANT CHANGE UP (ref 0.2–1.2)
BLOOD GAS VENOUS - CREATININE: 0.66 MG/DL — SIGNIFICANT CHANGE UP (ref 0.5–1.3)
BUN SERPL-MCNC: 16 MG/DL — SIGNIFICANT CHANGE UP (ref 7–23)
C PNEUM DNA SPEC QL NAA+PROBE: NOT DETECTED — SIGNIFICANT CHANGE UP
CALCIUM SERPL-MCNC: 10 MG/DL — SIGNIFICANT CHANGE UP (ref 8.4–10.5)
CHLORIDE BLDV-SCNC: 106 MMOL/L — SIGNIFICANT CHANGE UP (ref 96–108)
CHLORIDE SERPL-SCNC: 106 MMOL/L — SIGNIFICANT CHANGE UP (ref 98–107)
CK MB BLD-MCNC: 2.02 NG/ML — SIGNIFICANT CHANGE UP (ref 1–4.7)
CK MB BLD-MCNC: SIGNIFICANT CHANGE UP (ref 0–2.5)
CK SERPL-CCNC: 49 U/L — SIGNIFICANT CHANGE UP (ref 25–170)
CO2 SERPL-SCNC: 24 MMOL/L — SIGNIFICANT CHANGE UP (ref 22–31)
CREAT SERPL-MCNC: 0.72 MG/DL — SIGNIFICANT CHANGE UP (ref 0.5–1.3)
EOSINOPHIL # BLD AUTO: 0.78 K/UL — HIGH (ref 0–0.5)
EOSINOPHIL NFR BLD AUTO: 10 % — HIGH (ref 0–6)
FLUAV H1 2009 PAND RNA SPEC QL NAA+PROBE: NOT DETECTED — SIGNIFICANT CHANGE UP
FLUAV H1 RNA SPEC QL NAA+PROBE: NOT DETECTED — SIGNIFICANT CHANGE UP
FLUAV H3 RNA SPEC QL NAA+PROBE: NOT DETECTED — SIGNIFICANT CHANGE UP
FLUAV SUBTYP SPEC NAA+PROBE: SIGNIFICANT CHANGE UP
FLUBV RNA SPEC QL NAA+PROBE: NOT DETECTED — SIGNIFICANT CHANGE UP
GAS PNL BLDV: 142 MMOL/L — SIGNIFICANT CHANGE UP (ref 136–146)
GLUCOSE BLDV-MCNC: 99 — SIGNIFICANT CHANGE UP (ref 70–99)
GLUCOSE SERPL-MCNC: 100 MG/DL — HIGH (ref 70–99)
HADV DNA SPEC QL NAA+PROBE: NOT DETECTED — SIGNIFICANT CHANGE UP
HCO3 BLDV-SCNC: 24 MMOL/L — SIGNIFICANT CHANGE UP (ref 20–27)
HCOV 229E RNA SPEC QL NAA+PROBE: NOT DETECTED — SIGNIFICANT CHANGE UP
HCOV HKU1 RNA SPEC QL NAA+PROBE: NOT DETECTED — SIGNIFICANT CHANGE UP
HCOV NL63 RNA SPEC QL NAA+PROBE: NOT DETECTED — SIGNIFICANT CHANGE UP
HCOV OC43 RNA SPEC QL NAA+PROBE: NOT DETECTED — SIGNIFICANT CHANGE UP
HCT VFR BLD CALC: 46.4 % — HIGH (ref 34.5–45)
HCT VFR BLDV CALC: 46.1 % — HIGH (ref 34.5–45)
HGB BLD-MCNC: 15.1 G/DL — SIGNIFICANT CHANGE UP (ref 11.5–15.5)
HGB BLDV-MCNC: 15.1 G/DL — SIGNIFICANT CHANGE UP (ref 11.5–15.5)
HMPV RNA SPEC QL NAA+PROBE: NOT DETECTED — SIGNIFICANT CHANGE UP
HPIV1 RNA SPEC QL NAA+PROBE: NOT DETECTED — SIGNIFICANT CHANGE UP
HPIV2 RNA SPEC QL NAA+PROBE: NOT DETECTED — SIGNIFICANT CHANGE UP
HPIV3 RNA SPEC QL NAA+PROBE: NOT DETECTED — SIGNIFICANT CHANGE UP
HPIV4 RNA SPEC QL NAA+PROBE: NOT DETECTED — SIGNIFICANT CHANGE UP
IMM GRANULOCYTES NFR BLD AUTO: 0.3 % — SIGNIFICANT CHANGE UP (ref 0–1.5)
INR BLD: 0.9 — SIGNIFICANT CHANGE UP (ref 0.88–1.17)
LACTATE BLDV-MCNC: 1.7 MMOL/L — SIGNIFICANT CHANGE UP (ref 0.5–2)
LITHIUM SERPL-MCNC: 1.15 MMOL/L — SIGNIFICANT CHANGE UP (ref 0.6–1.2)
LYMPHOCYTES # BLD AUTO: 2.38 K/UL — SIGNIFICANT CHANGE UP (ref 1–3.3)
LYMPHOCYTES # BLD AUTO: 30.4 % — SIGNIFICANT CHANGE UP (ref 13–44)
M PNEUMO DNA SPEC QL NAA+PROBE: NOT DETECTED — SIGNIFICANT CHANGE UP
MCHC RBC-ENTMCNC: 31 PG — SIGNIFICANT CHANGE UP (ref 27–34)
MCHC RBC-ENTMCNC: 32.5 % — SIGNIFICANT CHANGE UP (ref 32–36)
MCV RBC AUTO: 95.3 FL — SIGNIFICANT CHANGE UP (ref 80–100)
MONOCYTES # BLD AUTO: 0.6 K/UL — SIGNIFICANT CHANGE UP (ref 0–0.9)
MONOCYTES NFR BLD AUTO: 7.7 % — SIGNIFICANT CHANGE UP (ref 2–14)
NEUTROPHILS # BLD AUTO: 4.02 K/UL — SIGNIFICANT CHANGE UP (ref 1.8–7.4)
NEUTROPHILS NFR BLD AUTO: 51.2 % — SIGNIFICANT CHANGE UP (ref 43–77)
NT-PROBNP SERPL-SCNC: 2438 PG/ML — SIGNIFICANT CHANGE UP
PCO2 BLDV: 69 MMHG — HIGH (ref 41–51)
PH BLDV: 7.27 PH — LOW (ref 7.32–7.43)
PLATELET # BLD AUTO: 331 K/UL — SIGNIFICANT CHANGE UP (ref 150–400)
PMV BLD: 9.8 FL — SIGNIFICANT CHANGE UP (ref 7–13)
PO2 BLDV: < 24 MMHG — LOW (ref 35–40)
POTASSIUM BLDV-SCNC: 4.9 MMOL/L — HIGH (ref 3.4–4.5)
POTASSIUM SERPL-MCNC: 4.6 MMOL/L — SIGNIFICANT CHANGE UP (ref 3.5–5.3)
POTASSIUM SERPL-SCNC: 4.6 MMOL/L — SIGNIFICANT CHANGE UP (ref 3.5–5.3)
PROT SERPL-MCNC: 7.2 G/DL — SIGNIFICANT CHANGE UP (ref 6–8.3)
PROTHROM AB SERPL-ACNC: 10.1 SEC — SIGNIFICANT CHANGE UP (ref 9.8–13.1)
RBC # BLD: 4.87 M/UL — SIGNIFICANT CHANGE UP (ref 3.8–5.2)
RBC # FLD: 14 % — SIGNIFICANT CHANGE UP (ref 10.3–14.5)
RSV RNA SPEC QL NAA+PROBE: NOT DETECTED — SIGNIFICANT CHANGE UP
RV+EV RNA SPEC QL NAA+PROBE: NOT DETECTED — SIGNIFICANT CHANGE UP
SAO2 % BLDV: 21.8 % — LOW (ref 60–85)
SODIUM SERPL-SCNC: 147 MMOL/L — HIGH (ref 135–145)
TROPONIN T SERPL-MCNC: < 0.06 NG/ML — SIGNIFICANT CHANGE UP (ref 0–0.06)
WBC # BLD: 7.83 K/UL — SIGNIFICANT CHANGE UP (ref 3.8–10.5)
WBC # FLD AUTO: 7.83 K/UL — SIGNIFICANT CHANGE UP (ref 3.8–10.5)

## 2017-03-19 PROCEDURE — 71010: CPT | Mod: 26

## 2017-03-19 RX ORDER — QUETIAPINE FUMARATE 200 MG/1
25 TABLET, FILM COATED ORAL AT BEDTIME
Qty: 0 | Refills: 0 | Status: DISCONTINUED | OUTPATIENT
Start: 2017-03-19 | End: 2017-03-21

## 2017-03-19 RX ORDER — SENNA PLUS 8.6 MG/1
2 TABLET ORAL AT BEDTIME
Qty: 0 | Refills: 0 | Status: DISCONTINUED | OUTPATIENT
Start: 2017-03-19 | End: 2017-03-21

## 2017-03-19 RX ORDER — VENLAFAXINE HCL 75 MG
150 CAPSULE, EXT RELEASE 24 HR ORAL DAILY
Qty: 0 | Refills: 0 | Status: DISCONTINUED | OUTPATIENT
Start: 2017-03-19 | End: 2017-03-21

## 2017-03-19 RX ORDER — IPRATROPIUM/ALBUTEROL SULFATE 18-103MCG
3 AEROSOL WITH ADAPTER (GRAM) INHALATION ONCE
Qty: 0 | Refills: 0 | Status: COMPLETED | OUTPATIENT
Start: 2017-03-19 | End: 2017-03-19

## 2017-03-19 RX ORDER — PANTOPRAZOLE SODIUM 20 MG/1
40 TABLET, DELAYED RELEASE ORAL
Qty: 0 | Refills: 0 | Status: DISCONTINUED | OUTPATIENT
Start: 2017-03-19 | End: 2017-03-21

## 2017-03-19 RX ORDER — ALBUTEROL 90 UG/1
1 AEROSOL, METERED ORAL EVERY 4 HOURS
Qty: 0 | Refills: 0 | Status: DISCONTINUED | OUTPATIENT
Start: 2017-03-19 | End: 2017-03-19

## 2017-03-19 RX ORDER — CLONAZEPAM 1 MG
0.5 TABLET ORAL DAILY
Qty: 0 | Refills: 0 | Status: DISCONTINUED | OUTPATIENT
Start: 2017-03-19 | End: 2017-03-21

## 2017-03-19 RX ORDER — ASPIRIN/CALCIUM CARB/MAGNESIUM 324 MG
81 TABLET ORAL DAILY
Qty: 0 | Refills: 0 | Status: DISCONTINUED | OUTPATIENT
Start: 2017-03-19 | End: 2017-03-21

## 2017-03-19 RX ORDER — METOPROLOL TARTRATE 50 MG
12.5 TABLET ORAL
Qty: 0 | Refills: 0 | Status: DISCONTINUED | OUTPATIENT
Start: 2017-03-19 | End: 2017-03-20

## 2017-03-19 RX ORDER — BUDESONIDE AND FORMOTEROL FUMARATE DIHYDRATE 160; 4.5 UG/1; UG/1
2 AEROSOL RESPIRATORY (INHALATION)
Qty: 0 | Refills: 0 | Status: DISCONTINUED | OUTPATIENT
Start: 2017-03-19 | End: 2017-03-21

## 2017-03-19 RX ORDER — LEVOTHYROXINE SODIUM 125 MCG
112 TABLET ORAL DAILY
Qty: 0 | Refills: 0 | Status: DISCONTINUED | OUTPATIENT
Start: 2017-03-19 | End: 2017-03-20

## 2017-03-19 RX ORDER — IPRATROPIUM/ALBUTEROL SULFATE 18-103MCG
3 AEROSOL WITH ADAPTER (GRAM) INHALATION EVERY 6 HOURS
Qty: 0 | Refills: 0 | Status: DISCONTINUED | OUTPATIENT
Start: 2017-03-19 | End: 2017-03-21

## 2017-03-19 RX ORDER — LITHIUM CARBONATE 300 MG/1
300 TABLET, EXTENDED RELEASE ORAL AT BEDTIME
Qty: 0 | Refills: 0 | Status: DISCONTINUED | OUTPATIENT
Start: 2017-03-19 | End: 2017-03-21

## 2017-03-19 RX ORDER — CLOPIDOGREL BISULFATE 75 MG/1
75 TABLET, FILM COATED ORAL DAILY
Qty: 0 | Refills: 0 | Status: DISCONTINUED | OUTPATIENT
Start: 2017-03-19 | End: 2017-03-21

## 2017-03-19 RX ORDER — LITHIUM CARBONATE 300 MG/1
150 TABLET, EXTENDED RELEASE ORAL DAILY
Qty: 0 | Refills: 0 | Status: DISCONTINUED | OUTPATIENT
Start: 2017-03-19 | End: 2017-03-21

## 2017-03-19 RX ORDER — CLONAZEPAM 1 MG
1 TABLET ORAL AT BEDTIME
Qty: 0 | Refills: 0 | Status: DISCONTINUED | OUTPATIENT
Start: 2017-03-19 | End: 2017-03-21

## 2017-03-19 RX ORDER — DOCUSATE SODIUM 100 MG
100 CAPSULE ORAL
Qty: 0 | Refills: 0 | Status: DISCONTINUED | OUTPATIENT
Start: 2017-03-19 | End: 2017-03-21

## 2017-03-19 RX ORDER — ATORVASTATIN CALCIUM 80 MG/1
80 TABLET, FILM COATED ORAL AT BEDTIME
Qty: 0 | Refills: 0 | Status: DISCONTINUED | OUTPATIENT
Start: 2017-03-19 | End: 2017-03-21

## 2017-03-19 RX ADMIN — ATORVASTATIN CALCIUM 80 MILLIGRAM(S): 80 TABLET, FILM COATED ORAL at 23:13

## 2017-03-19 RX ADMIN — SENNA PLUS 2 TABLET(S): 8.6 TABLET ORAL at 23:13

## 2017-03-19 RX ADMIN — Medication 3 MILLILITER(S): at 18:35

## 2017-03-19 RX ADMIN — QUETIAPINE FUMARATE 25 MILLIGRAM(S): 200 TABLET, FILM COATED ORAL at 23:13

## 2017-03-19 RX ADMIN — Medication 40 MILLIGRAM(S): at 19:45

## 2017-03-19 RX ADMIN — Medication 3 MILLILITER(S): at 17:58

## 2017-03-19 NOTE — ED ADULT NURSE REASSESSMENT NOTE - NS ED NURSE REASSESS COMMENT FT1
Cpap removed around 7:30pm, patient tolerated well, able to eat and drink without respiratory distress.

## 2017-03-19 NOTE — ED PROVIDER NOTE - MEDICAL DECISION MAKING DETAILS
66yoF w/PMH CAD (s/p stents in 2010, 2011, 2 stents in pLAD and mLAD on 02/8/2017), depression, bipolar disorder, GERD, hypothyroidism, diverticulitis, paralyzed hemidiaphragm, asthma, presents with chest pain and SOB. Patient with decreased air entry and tachypnea on initial exam; placed on bipap and given duonebs with clinical improvement. Now off bipap. First set of cardiac enzymes negative, CXR clear, EKG normal; will admit to tele for ACS rule out and treatment of asthma exacerbation

## 2017-03-19 NOTE — H&P ADULT. - ASSESSMENT
Pt 66yo female with hx as above admitted to TELE with SOB and wheezing temporarily required BIPAP, but now improved. At present Pt on NC 2L 100%  with RR of 16 /79 HR 85 afebrile. Pt denies any active complains ECG NSR with Lateral TW changes Pt had prior TWI in I and AVL, but on today's ECG also noted with TW flatening in V5V6 CE x 1 NEG CXR is clear, pro BNP 2300 bedside SONO revealed FERNANDO predominant exam with no effusion, moderate segmental hypokinesis of LVF with obvious septal dysfunction, RVF preserved and no enlarged, IVF 1.3cm, point LE doppler with no DVT  Pt afebrile with no WBC and negative RVP

## 2017-03-19 NOTE — ED ADULT NURSE NOTE - OBJECTIVE STATEMENT
Patient c/o SOB for a week getting worse. Denies chest pain, only tightness. hx of cardiac stents in Feb and 2 weeks later blockage in one of the stents.

## 2017-03-19 NOTE — ED PROVIDER NOTE - ENMT NEGATIVE STATEMENT, MLM
Ears: no ear pain and no hearing problems. .Mouth/Throat: no throat pain. Neck:  no pain, no stiffness

## 2017-03-19 NOTE — H&P ADULT. - PROBLEM SELECTOR PLAN 3
Pt with known segmental LVD, at present Pt is euvolemic with no active respiratory complains, will monitor I&O Pt with prior Stents with in stent restenosis, noted new flattening of TW in V5V6 with Pt's unreliable compliance with medications. Will Keep on TELE for now, trend CE, will resume BB and stating. Will talk to PMD if cardiology evaluation is needed in am

## 2017-03-19 NOTE — ED ADULT TRIAGE NOTE - CHIEF COMPLAINT QUOTE
pt brought to ED by daughter stating pt is more confused than baseline associated w/ B/L upper extremity shaking, daughter states pt not A&Ox3 @ baseline, however increased confusion since 10am, took meds as directed today, pt denies pain, urinary symptoms @ this time, no stroke eval as per MD Copeland

## 2017-03-19 NOTE — ED PROVIDER NOTE - PROGRESS NOTE DETAILS
Patient seen and examined at bedside. No wheezing on lung exam, improved air entry bilaterally. Will take patient off bipap and administer steroids.

## 2017-03-19 NOTE — ED PROVIDER NOTE - OBJECTIVE STATEMENT
66yoF w/PMH CAD (s/p stents in 2010, 2011, 2 stents in pLAD and mLAD on 02/8/2017), depression, bipolar disorder, GERD, hypothyroidism, diverticulitis, paralyzed hemidiaphragm, asthma, presents with shortness of breath and chest pain since 3 AM. Patient accompanied by her daughter, who states that patient has not been taking her medications for the last week. Patient states that she was in her usual state of health until 3 AM - she woke up to use the bathroom, became short of breath, had to sit down. Also developed chest pain described as a substernal pressure. Reports chills and night sweats last night. Denies cough, fever, abdominal pain, nausea, vomiting, diarrhea, constipation, dysuria.

## 2017-03-19 NOTE — ED PROVIDER NOTE - ATTENDING CONTRIBUTION TO CARE
Dr Carpenter  67yo F hx of ALIS, CAD w cardiac stents most recent in 2-8-17, depression, Bipolar do, GERD, asthma, paralyzed hemidiaphragm pw w sob since last night. +SOB +JEAN had chest pain last night none now. Daughter states noted sob all day. Usually has some confusion worse today. Hx of dementia.    no fever/chills. no nausea/vomit/diarrhea/abdominal pain. Daughter concerned pt is non adherent to meds.   Vital signs noted. pt sitting up mild resp distress. talks in short sentences. +accessory muscle use. dec air entry brayan left. +wheeze mostly on right. soft nt abdomen. no pedal edema. no calf tenderness. normal pulses bilateral feet.  plan bipap, cxr, ekg, labs, admission

## 2017-03-19 NOTE — H&P ADULT. - PROBLEM SELECTOR PLAN 2
Pt with prior Stents with in stent restenosis, noted new flattening of TW in V5V6 with Pt's unreliable compliance with medications. Will Keep on TELE for now, trend CE, will resume BB and stating. Will talk to PMD if cardiology evaluation is needed in am Pt with known segmental LVD, at present Pt is euvolemic with no active respiratory complains, will monitor I&O

## 2017-03-19 NOTE — H&P ADULT. - HISTORY OF PRESENT ILLNESS
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 Pt also was seen in [facility] in beginning of march 2017 for ACS, Pt was observed in CCU with no events and was sent home. 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 Pt also was seen in [facility] in beginning of march 2017 for ACS, Pt was observed in CCU with no events and was sent home. Now Pt presenting with SOB and wheezing. Pt reports sudden onset of SOB around 1200 today while at rest sitting. pt reports associated wheezing with anterior chest tightness, no no nausea or vomiting, no dizziness no palpitations and no diaphoresis. Pt used albuterol, but with minimal effect and  due to increase SOB as well as increased WOB, pt was brought to [facility] ER by family. While in ER according to ER MD pt was noted tachepnic with increased WOB, as well as wheezing and was placed on BIPAP and received dose of nebulizer after above treatments symptoms resolved and Pt was placed on NC. however accrding to triage note Pt was in no distress with RR of 16 and 98% on RA with BP of 112/77 and HR of 79 afebrile.  According to Pt, she also experienced episode of dyspnea early this morning around 0400 while on a way to bathroom she had brief episode of SOB with some wheezing that resolved with rest. 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 Pt also was seen in [facility] in beginning of march 2017 for ACS, Pt was observed in CCU with no events and was sent home. Now Pt presenting with SOB and wheezing. Pt reports sudden onset of SOB around 1200 today while at rest sitting. pt reports associated wheezing with anterior chest tightness, no no nausea or vomiting, no dizziness no palpitations and no diaphoresis. Pt used albuterol, but with minimal effect and  due to increase SOB as well as increased WOB, pt was brought to [facility] ER by family. While in ER according to ER MD pt was noted tachepnic with increased WOB, as well as wheezing and was placed on BIPAP and received dose of nebulizer after above treatments symptoms resolved and Pt was placed on NC. however accrding to triage note Pt was in no distress with RR of 16 and 98% on RA with BP of 112/77 and HR of 79 afebrile.  According to Pt, she also experienced episode of dyspnea early this morning around 0400 while on a way to bathroom she had brief episode of SOB with some wheezing that resolved with rest, with no associated N/V no palpitations and no diaphoresis. Pt reports that today's symptoms were different from symptomatolgy of prior cardiac events.   Pt lives alone, has HHA for only 4h per days, family concerned that sometime she forget to take her medications including asa and Plavix

## 2017-03-20 DIAGNOSIS — I25.10 ATHEROSCLEROTIC HEART DISEASE OF NATIVE CORONARY ARTERY WITHOUT ANGINA PECTORIS: ICD-10-CM

## 2017-03-20 DIAGNOSIS — E03.9 HYPOTHYROIDISM, UNSPECIFIED: ICD-10-CM

## 2017-03-20 DIAGNOSIS — I50.21 ACUTE SYSTOLIC (CONGESTIVE) HEART FAILURE: ICD-10-CM

## 2017-03-20 DIAGNOSIS — F32.9 MAJOR DEPRESSIVE DISORDER, SINGLE EPISODE, UNSPECIFIED: ICD-10-CM

## 2017-03-20 DIAGNOSIS — J45.901 UNSPECIFIED ASTHMA WITH (ACUTE) EXACERBATION: ICD-10-CM

## 2017-03-20 LAB
ALBUMIN SERPL ELPH-MCNC: 3.9 G/DL — SIGNIFICANT CHANGE UP (ref 3.3–5)
ALP SERPL-CCNC: 76 U/L — SIGNIFICANT CHANGE UP (ref 40–120)
ALT FLD-CCNC: 17 U/L — SIGNIFICANT CHANGE UP (ref 4–33)
AST SERPL-CCNC: 20 U/L — SIGNIFICANT CHANGE UP (ref 4–32)
BASOPHILS # BLD AUTO: 0.01 K/UL — SIGNIFICANT CHANGE UP (ref 0–0.2)
BASOPHILS NFR BLD AUTO: 0.2 % — SIGNIFICANT CHANGE UP (ref 0–2)
BILIRUB SERPL-MCNC: 0.4 MG/DL — SIGNIFICANT CHANGE UP (ref 0.2–1.2)
BUN SERPL-MCNC: 14 MG/DL — SIGNIFICANT CHANGE UP (ref 7–23)
CALCIUM SERPL-MCNC: 9.9 MG/DL — SIGNIFICANT CHANGE UP (ref 8.4–10.5)
CHLORIDE SERPL-SCNC: 106 MMOL/L — SIGNIFICANT CHANGE UP (ref 98–107)
CK SERPL-CCNC: 36 U/L — SIGNIFICANT CHANGE UP (ref 25–170)
CK SERPL-CCNC: 37 U/L — SIGNIFICANT CHANGE UP (ref 25–170)
CO2 SERPL-SCNC: 21 MMOL/L — LOW (ref 22–31)
CREAT SERPL-MCNC: 0.62 MG/DL — SIGNIFICANT CHANGE UP (ref 0.5–1.3)
EOSINOPHIL # BLD AUTO: 0.01 K/UL — SIGNIFICANT CHANGE UP (ref 0–0.5)
EOSINOPHIL NFR BLD AUTO: 0.2 % — SIGNIFICANT CHANGE UP (ref 0–6)
GLUCOSE SERPL-MCNC: 124 MG/DL — HIGH (ref 70–99)
HCT VFR BLD CALC: 41.4 % — SIGNIFICANT CHANGE UP (ref 34.5–45)
HGB BLD-MCNC: 13.3 G/DL — SIGNIFICANT CHANGE UP (ref 11.5–15.5)
IMM GRANULOCYTES NFR BLD AUTO: 0.2 % — SIGNIFICANT CHANGE UP (ref 0–1.5)
LYMPHOCYTES # BLD AUTO: 0.8 K/UL — LOW (ref 1–3.3)
LYMPHOCYTES # BLD AUTO: 15 % — SIGNIFICANT CHANGE UP (ref 13–44)
MAGNESIUM SERPL-MCNC: 2.3 MG/DL — SIGNIFICANT CHANGE UP (ref 1.6–2.6)
MCHC RBC-ENTMCNC: 30.5 PG — SIGNIFICANT CHANGE UP (ref 27–34)
MCHC RBC-ENTMCNC: 32.1 % — SIGNIFICANT CHANGE UP (ref 32–36)
MCV RBC AUTO: 95 FL — SIGNIFICANT CHANGE UP (ref 80–100)
MONOCYTES # BLD AUTO: 0.15 K/UL — SIGNIFICANT CHANGE UP (ref 0–0.9)
MONOCYTES NFR BLD AUTO: 2.8 % — SIGNIFICANT CHANGE UP (ref 2–14)
NEUTROPHILS # BLD AUTO: 4.34 K/UL — SIGNIFICANT CHANGE UP (ref 1.8–7.4)
NEUTROPHILS NFR BLD AUTO: 81.6 % — HIGH (ref 43–77)
PHOSPHATE SERPL-MCNC: 5.6 MG/DL — HIGH (ref 2.5–4.5)
PLATELET # BLD AUTO: 316 K/UL — SIGNIFICANT CHANGE UP (ref 150–400)
PMV BLD: 9.9 FL — SIGNIFICANT CHANGE UP (ref 7–13)
POTASSIUM SERPL-MCNC: 4.3 MMOL/L — SIGNIFICANT CHANGE UP (ref 3.5–5.3)
POTASSIUM SERPL-SCNC: 4.3 MMOL/L — SIGNIFICANT CHANGE UP (ref 3.5–5.3)
PROT SERPL-MCNC: 6.1 G/DL — SIGNIFICANT CHANGE UP (ref 6–8.3)
RBC # BLD: 4.36 M/UL — SIGNIFICANT CHANGE UP (ref 3.8–5.2)
RBC # FLD: 13.9 % — SIGNIFICANT CHANGE UP (ref 10.3–14.5)
SODIUM SERPL-SCNC: 144 MMOL/L — SIGNIFICANT CHANGE UP (ref 135–145)
TROPONIN T SERPL-MCNC: < 0.06 NG/ML — SIGNIFICANT CHANGE UP (ref 0–0.06)
TROPONIN T SERPL-MCNC: < 0.06 NG/ML — SIGNIFICANT CHANGE UP (ref 0–0.06)
TSH SERPL-MCNC: 0.01 UIU/ML — LOW (ref 0.27–4.2)
WBC # BLD: 5.32 K/UL — SIGNIFICANT CHANGE UP (ref 3.8–10.5)
WBC # FLD AUTO: 5.32 K/UL — SIGNIFICANT CHANGE UP (ref 3.8–10.5)

## 2017-03-20 PROCEDURE — 99223 1ST HOSP IP/OBS HIGH 75: CPT

## 2017-03-20 PROCEDURE — 99223 1ST HOSP IP/OBS HIGH 75: CPT | Mod: GC

## 2017-03-20 RX ORDER — METOPROLOL TARTRATE 50 MG
25 TABLET ORAL DAILY
Qty: 0 | Refills: 0 | Status: DISCONTINUED | OUTPATIENT
Start: 2017-03-20 | End: 2017-03-21

## 2017-03-20 RX ORDER — LEVOTHYROXINE SODIUM 125 MCG
75 TABLET ORAL DAILY
Qty: 0 | Refills: 0 | Status: DISCONTINUED | OUTPATIENT
Start: 2017-03-20 | End: 2017-03-21

## 2017-03-20 RX ORDER — LISINOPRIL 2.5 MG/1
5 TABLET ORAL DAILY
Qty: 0 | Refills: 0 | Status: DISCONTINUED | OUTPATIENT
Start: 2017-03-20 | End: 2017-03-21

## 2017-03-20 RX ADMIN — BUDESONIDE AND FORMOTEROL FUMARATE DIHYDRATE 2 PUFF(S): 160; 4.5 AEROSOL RESPIRATORY (INHALATION) at 22:00

## 2017-03-20 RX ADMIN — Medication 12.5 MILLIGRAM(S): at 17:12

## 2017-03-20 RX ADMIN — LITHIUM CARBONATE 300 MILLIGRAM(S): 300 TABLET, EXTENDED RELEASE ORAL at 21:18

## 2017-03-20 RX ADMIN — QUETIAPINE FUMARATE 25 MILLIGRAM(S): 200 TABLET, FILM COATED ORAL at 21:19

## 2017-03-20 RX ADMIN — Medication 100 MILLIGRAM(S): at 05:59

## 2017-03-20 RX ADMIN — Medication 1 MILLIGRAM(S): at 21:18

## 2017-03-20 RX ADMIN — Medication 3 MILLILITER(S): at 04:05

## 2017-03-20 RX ADMIN — Medication 100 MILLIGRAM(S): at 17:12

## 2017-03-20 RX ADMIN — CLOPIDOGREL BISULFATE 75 MILLIGRAM(S): 75 TABLET, FILM COATED ORAL at 12:47

## 2017-03-20 RX ADMIN — PANTOPRAZOLE SODIUM 40 MILLIGRAM(S): 20 TABLET, DELAYED RELEASE ORAL at 06:00

## 2017-03-20 RX ADMIN — Medication 150 MILLIGRAM(S): at 12:48

## 2017-03-20 RX ADMIN — Medication 12.5 MILLIGRAM(S): at 06:00

## 2017-03-20 RX ADMIN — BUDESONIDE AND FORMOTEROL FUMARATE DIHYDRATE 2 PUFF(S): 160; 4.5 AEROSOL RESPIRATORY (INHALATION) at 14:11

## 2017-03-20 RX ADMIN — Medication 3 MILLILITER(S): at 11:27

## 2017-03-20 RX ADMIN — SENNA PLUS 2 TABLET(S): 8.6 TABLET ORAL at 21:18

## 2017-03-20 RX ADMIN — ATORVASTATIN CALCIUM 80 MILLIGRAM(S): 80 TABLET, FILM COATED ORAL at 21:17

## 2017-03-20 RX ADMIN — LITHIUM CARBONATE 150 MILLIGRAM(S): 300 TABLET, EXTENDED RELEASE ORAL at 12:48

## 2017-03-20 RX ADMIN — Medication 81 MILLIGRAM(S): at 12:47

## 2017-03-20 RX ADMIN — Medication 3 MILLILITER(S): at 17:07

## 2017-03-20 RX ADMIN — Medication 0.5 MILLIGRAM(S): at 12:48

## 2017-03-20 RX ADMIN — Medication 3 MILLILITER(S): at 23:44

## 2017-03-20 RX ADMIN — Medication 112 MICROGRAM(S): at 06:00

## 2017-03-21 ENCOUNTER — TRANSCRIPTION ENCOUNTER (OUTPATIENT)
Age: 67
End: 2017-03-21

## 2017-03-21 VITALS
DIASTOLIC BLOOD PRESSURE: 54 MMHG | SYSTOLIC BLOOD PRESSURE: 94 MMHG | TEMPERATURE: 98 F | OXYGEN SATURATION: 95 % | RESPIRATION RATE: 16 BRPM | HEART RATE: 86 BPM

## 2017-03-21 DIAGNOSIS — I50.22 CHRONIC SYSTOLIC (CONGESTIVE) HEART FAILURE: ICD-10-CM

## 2017-03-21 LAB
24R-OH-CALCIDIOL SERPL-MCNC: 17.7 NG/ML — LOW (ref 30–100)
BUN SERPL-MCNC: 15 MG/DL — SIGNIFICANT CHANGE UP (ref 7–23)
CALCIUM SERPL-MCNC: 9.3 MG/DL — SIGNIFICANT CHANGE UP (ref 8.4–10.5)
CHLORIDE SERPL-SCNC: 106 MMOL/L — SIGNIFICANT CHANGE UP (ref 98–107)
CO2 SERPL-SCNC: 25 MMOL/L — SIGNIFICANT CHANGE UP (ref 22–31)
CREAT SERPL-MCNC: 0.6 MG/DL — SIGNIFICANT CHANGE UP (ref 0.5–1.3)
GLUCOSE SERPL-MCNC: 91 MG/DL — SIGNIFICANT CHANGE UP (ref 70–99)
HCT VFR BLD CALC: 39.5 % — SIGNIFICANT CHANGE UP (ref 34.5–45)
HGB BLD-MCNC: 12.7 G/DL — SIGNIFICANT CHANGE UP (ref 11.5–15.5)
MCHC RBC-ENTMCNC: 30.4 PG — SIGNIFICANT CHANGE UP (ref 27–34)
MCHC RBC-ENTMCNC: 32.2 % — SIGNIFICANT CHANGE UP (ref 32–36)
MCV RBC AUTO: 94.5 FL — SIGNIFICANT CHANGE UP (ref 80–100)
PLATELET # BLD AUTO: 285 K/UL — SIGNIFICANT CHANGE UP (ref 150–400)
PMV BLD: 9.8 FL — SIGNIFICANT CHANGE UP (ref 7–13)
POTASSIUM SERPL-MCNC: 3.9 MMOL/L — SIGNIFICANT CHANGE UP (ref 3.5–5.3)
POTASSIUM SERPL-SCNC: 3.9 MMOL/L — SIGNIFICANT CHANGE UP (ref 3.5–5.3)
RBC # BLD: 4.18 M/UL — SIGNIFICANT CHANGE UP (ref 3.8–5.2)
RBC # FLD: 14 % — SIGNIFICANT CHANGE UP (ref 10.3–14.5)
SODIUM SERPL-SCNC: 147 MMOL/L — HIGH (ref 135–145)
T4 FREE SERPL-MCNC: 1.89 NG/DL — HIGH (ref 0.9–1.8)
TSH SERPL-MCNC: 0.01 UIU/ML — LOW (ref 0.27–4.2)
WBC # BLD: 6.57 K/UL — SIGNIFICANT CHANGE UP (ref 3.8–10.5)
WBC # FLD AUTO: 6.57 K/UL — SIGNIFICANT CHANGE UP (ref 3.8–10.5)

## 2017-03-21 PROCEDURE — 99239 HOSP IP/OBS DSCHRG MGMT >30: CPT

## 2017-03-21 RX ORDER — CLONAZEPAM 1 MG
1 TABLET ORAL
Qty: 0 | Refills: 0 | COMMUNITY

## 2017-03-21 RX ORDER — LISINOPRIL 2.5 MG/1
1 TABLET ORAL
Qty: 30 | Refills: 1 | OUTPATIENT
Start: 2017-03-21 | End: 2017-05-19

## 2017-03-21 RX ORDER — LEVOTHYROXINE SODIUM 125 MCG
1 TABLET ORAL
Qty: 30 | Refills: 1 | OUTPATIENT
Start: 2017-03-21 | End: 2017-05-19

## 2017-03-21 RX ORDER — CLONAZEPAM 1 MG
1 TABLET ORAL
Qty: 0 | Refills: 0 | COMMUNITY
Start: 2017-03-21

## 2017-03-21 RX ORDER — LISINOPRIL 2.5 MG/1
1 TABLET ORAL
Qty: 0 | Refills: 0 | COMMUNITY
Start: 2017-03-21

## 2017-03-21 RX ORDER — CLONAZEPAM 1 MG
2 TABLET ORAL
Qty: 0 | Refills: 0 | DISCHARGE
Start: 2017-03-21

## 2017-03-21 RX ORDER — LEVOTHYROXINE SODIUM 125 MCG
1 TABLET ORAL
Qty: 0 | Refills: 0 | COMMUNITY
Start: 2017-03-21

## 2017-03-21 RX ORDER — CLONAZEPAM 1 MG
2 TABLET ORAL
Qty: 0 | Refills: 0 | COMMUNITY

## 2017-03-21 RX ORDER — CLONAZEPAM 1 MG
2 TABLET ORAL
Qty: 0 | Refills: 0 | COMMUNITY
Start: 2017-03-21

## 2017-03-21 RX ORDER — QUETIAPINE FUMARATE 200 MG/1
75 TABLET, FILM COATED ORAL
Qty: 0 | Refills: 0 | COMMUNITY
Start: 2017-03-21

## 2017-03-21 RX ORDER — CLONAZEPAM 1 MG
1 TABLET ORAL
Qty: 0 | Refills: 0 | DISCHARGE
Start: 2017-03-21

## 2017-03-21 RX ORDER — QUETIAPINE FUMARATE 200 MG/1
1 TABLET, FILM COATED ORAL
Qty: 0 | Refills: 0 | COMMUNITY
Start: 2017-03-21

## 2017-03-21 RX ORDER — METOPROLOL TARTRATE 50 MG
1 TABLET ORAL
Qty: 0 | Refills: 0 | COMMUNITY
Start: 2017-03-21

## 2017-03-21 RX ORDER — LITHIUM CARBONATE 300 MG/1
1 TABLET, EXTENDED RELEASE ORAL
Qty: 0 | Refills: 0 | COMMUNITY
Start: 2017-03-21

## 2017-03-21 RX ORDER — METOPROLOL TARTRATE 50 MG
1 TABLET ORAL
Qty: 30 | Refills: 1 | OUTPATIENT
Start: 2017-03-21 | End: 2017-05-19

## 2017-03-21 RX ORDER — QUETIAPINE FUMARATE 200 MG/1
1 TABLET, FILM COATED ORAL
Qty: 0 | Refills: 0 | DISCHARGE
Start: 2017-03-21

## 2017-03-21 RX ORDER — LEVOCETIRIZINE DIHYDROCHLORIDE 0.5 MG/ML
1 SOLUTION ORAL
Qty: 0 | Refills: 0 | COMMUNITY

## 2017-03-21 RX ADMIN — Medication 100 MILLIGRAM(S): at 05:46

## 2017-03-21 RX ADMIN — Medication 75 MICROGRAM(S): at 05:45

## 2017-03-21 RX ADMIN — CLOPIDOGREL BISULFATE 75 MILLIGRAM(S): 75 TABLET, FILM COATED ORAL at 11:57

## 2017-03-21 RX ADMIN — Medication 0.5 MILLIGRAM(S): at 11:57

## 2017-03-21 RX ADMIN — PANTOPRAZOLE SODIUM 40 MILLIGRAM(S): 20 TABLET, DELAYED RELEASE ORAL at 05:46

## 2017-03-21 RX ADMIN — LITHIUM CARBONATE 150 MILLIGRAM(S): 300 TABLET, EXTENDED RELEASE ORAL at 11:57

## 2017-03-21 RX ADMIN — Medication 81 MILLIGRAM(S): at 11:57

## 2017-03-21 RX ADMIN — Medication 3 MILLILITER(S): at 05:25

## 2017-03-21 RX ADMIN — LISINOPRIL 5 MILLIGRAM(S): 2.5 TABLET ORAL at 05:45

## 2017-03-21 RX ADMIN — Medication 150 MILLIGRAM(S): at 11:57

## 2017-03-21 RX ADMIN — Medication 25 MILLIGRAM(S): at 05:45

## 2017-03-21 RX ADMIN — BUDESONIDE AND FORMOTEROL FUMARATE DIHYDRATE 2 PUFF(S): 160; 4.5 AEROSOL RESPIRATORY (INHALATION) at 11:56

## 2017-03-21 NOTE — DISCHARGE NOTE ADULT - PATIENT PORTAL LINK FT
“You can access the Follow[facility] Patient Portal, offered by [facility], by registering with the following website: [email or web address]”

## 2017-03-21 NOTE — DISCHARGE NOTE ADULT - HOSPITAL COURSE
68 y/o female with hx of CAD STENTS recent stents to [facility] with PLAD instent restenosis  s/p stent removal, thrombolysis requiring intra-aortic balloon pump, depression, GERD, hypothyroidism, diverticulitis, paralyzed hemidiaphragm, asthma admitted with SOB and wheezing was on BIPAP in ER now on NC.    + asthma exacerbation on CPAP prn, nebs    CE x 2 Negative   ECG SINUS with LATERAL TWI  BNP= 2300 RVP NEGATIVE   CXR: clear lungs   3/20 MED: asthma exacerbation req brief BIPAP, nebs, c/w asa, plavix, lipitor, lopressor, for CAD, endo eval for low TSH, c/w current meds  3/20  [facility] Dr Agnes Jarvis - stable no ischemic symptoms changed to toprol 25 mg QD , lisinopril 5 2  3/20 Endo c/s-P ( TSH 0.01) pt on lithium, pt on synthroid w/ low TSH, will check Full thyroid panel, may need to adjust synthroid dose. 68 y/o female with hx of CAD STENTS recent stents to [facility] with PLAD instent restenosis  s/p stent removal, thrombolysis requiring intra-aortic balloon pump, depression, GERD, hypothyroidism, diverticulitis, paralyzed hemidiaphragm, asthma admitted with SOB and wheezing was on BIPAP in ER now on NC.    + asthma exacerbation on CPAP prn, nebs    CE x 2 Negative   ECG: Sinus with LATERAL TWI  BNP= 2300 RVP NEGATIVE   CXR: clear lungs   3/20 Med: asthma exacerbation req brief BIPAP, nebs, c/w asa, plavix, lipitor, lopressor, for CAD, endo eval for low TSH, c/w current meds  3/20  Cardio house Dr. Agnes Jarvis - stable no ischemic symptoms changed to toprol 25 mg QD , lisinopril 5   3/20 Endo c/s-P ( TSH 0.01) pt on lithium, pt on synthroid w/ low TSH, will check Full thyroid panel, may need to adjust synthroid dose.  3/21/17 Pt is medically stable for discharge home today as per Dr. Aguila. 66 y/o female with hx of CAD STENTS recent stents to [facility] with PLAD instent restenosis  s/p stent removal, thrombolysis requiring intra-aortic balloon pump, depression, GERD, hypothyroidism, diverticulitis, paralyzed hemidiaphragm, asthma admitted with SOB and wheezing was on BIPAP in ER now on NC.    + asthma exacerbation on CPAP prn, nebs    CE x 2 Negative   ECG: Sinus with LATERAL TWI  BNP= 2300 RVP NEGATIVE   CXR: clear lungs   3/20 Med: asthma exacerbation req brief BIPAP, nebs, c/w asa, plavix, lipitor, lopressor, for CAD, endo eval for low TSH, c/w current meds  3/20  Cardio house Dr. Agnes Jarvis - stable no ischemic symptoms changed to toprol 25 mg QD , lisinopril 5   3/20 Endo c/s - ( TSH 0.01) pt on lithium, pt on synthroid w/ low TSH, will check Full thyroid panel, may need to adjust synthroid dose.  3/21/17 Pt is medically stable for discharge home today as per Dr. Aguila.

## 2017-03-21 NOTE — DISCHARGE NOTE ADULT - PROVIDER TOKENS
FREE:[LAST:[Follow-up:],PHONE:[(   )    -],FAX:[(   )    -],ADDRESS:[Follow-up with your Private Medical Doctor within 1 week. Please call and make an appointment.]] FREE:[LAST:[Follow-up:],PHONE:[(   )    -],FAX:[(   )    -],ADDRESS:[Follow-up with your Private Medical Doctor within 1 week. Please call and make an appointment.]],TOKEN:'[id number]'

## 2017-03-21 NOTE — DISCHARGE NOTE ADULT - PLAN OF CARE
Prevent bronchospasm. Continue nebulizers as taken at home.   Follow-up with your Private Medical Doctor within 1 week. Monitor thyroid function. Continue Synthroid.   Follow-up with your Private Medical Doctor within 1 week. Prevent progression of disease. Continue ASA, Plavix, current medications. Continue Synthroid at current dose.   Follow-up with Endocrinologist within 1 week. Continue Synthroid at current dose.   Follow-up with Endocrinologist Dr. Carrera within 1 week. Continue ASA, Plavix, Toprol XL, Lisinopril, current medications. Continue Synthroid at current dose. Reduced dose.   Follow-up with Endocrinologist Dr. Carrera within 1 week.

## 2017-03-21 NOTE — DISCHARGE NOTE ADULT - MEDICATION SUMMARY - MEDICATIONS TO CHANGE
I will SWITCH the dose or number of times a day I take the medications listed below when I get home from the hospital:    Metoprolol Tartrate 25 mg oral tablet  -- 0.5 tab(s) by mouth 2 times a day I will SWITCH the dose or number of times a day I take the medications listed below when I get home from the hospital:    Metoprolol Tartrate 25 mg oral tablet  -- 0.5 tab(s) by mouth 2 times a day    levothyroxine 112 mcg (0.112 mg) oral tablet  -- 1 tab(s) by mouth once a day

## 2017-03-21 NOTE — DISCHARGE NOTE ADULT - CARE PROVIDER_API CALL
Follow-up:,   Follow-up with your Private Medical Doctor within 1 week. Please call and make an appointment.  Phone: (   )    -  Fax: (   )    - Follow-up:,   Follow-up with your Private Medical Doctor within 1 week. Please call and make an appointment.  Phone: (   )    -  Fax: (   )    -    Jamal Carrera), EndocrinologyMetabDiabetes; Internal Medicine  [address] [place] [address]  Phone: [phone]  Fax: [phone]

## 2017-03-21 NOTE — DISCHARGE NOTE ADULT - OTHER SIGNIFICANT FINDINGS
(Admit Diagnosis) Chest pain  (PMH) CAD (coronary artery disease)  (PMH) Stented coronary artery  (PMH) Uncomplicated asthma, unspecified asthma severity  (PMH) Depression, unspecified depression type  (PMH) Hypothyroidism, unspecified type  (PMH) Psoriasis  (PMH) Diverticulitis  (PMH) ALIS (obstructive sleep apnea)  (PMH) Insomnia  (PMH) Gastroesophageal reflux disease, esophagitis presence not specified  (PMH) Paralyzed hemidiaphragm  (PMH) Chronic nonintractable headache, unspecified headache type  (PSH) Rectocele  (PSH) Paralyzed hemidiaphragm

## 2017-03-21 NOTE — DISCHARGE NOTE ADULT - MEDICATION SUMMARY - MEDICATIONS TO TAKE
I will START or STAY ON the medications listed below when I get home from the hospital:    aspirin 81 mg oral delayed release tablet  -- 1 tab(s) by mouth once a day  -- Indication: For CAD (coronary artery disease)    lisinopril 5 mg oral tablet  -- 1 tab(s) by mouth once a day  -- Indication: For HTN    clonazePAM 0.5 mg oral tablet  -- 1 tab(s) by mouth once a day  -- Indication: For CNS agent     clonazePAM 1 mg oral tablet  -- 1 tab(s) by mouth once a day (at bedtime)  -- Indication: For CNS agent     venlafaxine 150 mg oral capsule, extended release  -- 1 cap(s) by mouth once a day  -- Indication: For Antidepressant     atorvastatin 80 mg oral tablet  -- 1 tab(s) by mouth once a day (at bedtime)  -- Indication: For Hyperlipidemia     clopidogrel 75 mg oral tablet  -- 1 tab(s) by mouth once a day  -- Indication: For CAD (coronary artery disease)    lithium 150 mg oral capsule  -- 1 cap(s) by mouth once a day  -- Indication: For Antipsychotic     lithium 300 mg oral capsule  -- 1 cap(s) by mouth once a day (at bedtime)  -- Indication: For Antipyschotic     QUEtiapine 25 mg oral tablet  -- 1 tab(s) by mouth once a day (at bedtime)  -- Indication: For Antipyschotic     metoprolol succinate 25 mg oral tablet, extended release  -- 1 tab(s) by mouth once a day  -- Indication: For HTN    Proventil HFA 90 mcg/inh inhalation aerosol  -- 2 puff(s) inhaled 4 times a day, As Needed  -- Indication: For Bronchodilator     albuterol CFC free 90 mcg/inh inhalation aerosol  -- 2 puff(s) inhaled every 6 hours, As needed, Shortness of Breath and/or Wheezing  -- Indication: For Bronchodilator     Advair Diskus 500 mcg-50 mcg inhalation powder  -- 1 puff(s) inhaled 2 times a day  -- Indication: For Bronchodilator     senna oral tablet  -- 2 tab(s) by mouth once a day (at bedtime)  -- Indication: For Laxative     docusate sodium 100 mg oral capsule  -- 1 cap(s) by mouth 2 times a day  -- Indication: For Laxative     pantoprazole 40 mg oral delayed release tablet  -- 1 tab(s) by mouth once a day (before a meal)  -- Indication: For GERD    levothyroxine 75 mcg (0.075 mg) oral tablet  -- 1 tab(s) by mouth once a day  -- Indication: For Hypothyroidism I will START or STAY ON the medications listed below when I get home from the hospital:    aspirin 81 mg oral delayed release tablet  -- 1 tab(s) by mouth once a day  -- Indication: For CAD (coronary artery disease)    lisinopril 5 mg oral tablet  -- 1 tab(s) by mouth once a day  -- Indication: For CArdiomyopathy     clonazePAM 0.5 mg oral tablet  -- 1 tab(s) by mouth once a day  -- Indication: For CNS agent     clonazePAM 1 mg oral tablet  -- 1 tab(s) by mouth once a day (at bedtime)  -- Indication: For CNS agent     venlafaxine 150 mg oral capsule, extended release  -- 1 cap(s) by mouth once a day  -- Indication: For Antidepressant     atorvastatin 80 mg oral tablet  -- 1 tab(s) by mouth once a day (at bedtime)  -- Indication: For Hyperlipidemia     clopidogrel 75 mg oral tablet  -- 1 tab(s) by mouth once a day  -- Indication: For CAD (coronary artery disease)    lithium 150 mg oral capsule  -- 1 cap(s) by mouth once a day  -- Indication: For Antipsychotic     lithium 300 mg oral capsule  -- 1 cap(s) by mouth once a day (at bedtime)  -- Indication: For Antipyschotic     QUEtiapine 25 mg oral tablet  -- 1 tab(s) by mouth once a day (at bedtime)  -- Indication: For Antipyschotic     metoprolol succinate 25 mg oral tablet, extended release  -- 1 tab(s) by mouth once a day  -- Indication: For CAD (coronary artery disease)    Proventil HFA 90 mcg/inh inhalation aerosol  -- 2 puff(s) inhaled 4 times a day, As Needed  -- Indication: For Bronchodilator     albuterol CFC free 90 mcg/inh inhalation aerosol  -- 2 puff(s) inhaled every 6 hours, As needed, Shortness of Breath and/or Wheezing  -- Indication: For Bronchodilator     Advair Diskus 500 mcg-50 mcg inhalation powder  -- 1 puff(s) inhaled 2 times a day  -- Indication: For Bronchodilator     senna oral tablet  -- 2 tab(s) by mouth once a day (at bedtime)  -- Indication: For Laxative     docusate sodium 100 mg oral capsule  -- 1 cap(s) by mouth 2 times a day  -- Indication: For Laxative     pantoprazole 40 mg oral delayed release tablet  -- 1 tab(s) by mouth once a day (before a meal)  -- Indication: For GERD    levothyroxine 75 mcg (0.075 mg) oral tablet  -- 1 tab(s) by mouth once a day  -- Indication: For Hypothyroidism I will START or STAY ON the medications listed below when I get home from the hospital:    aspirin 81 mg oral delayed release tablet  -- 1 tab(s) by mouth once a day  -- Indication: For CAD (coronary artery disease)    lisinopril 5 mg oral tablet  -- 1 tab(s) by mouth once a day  -- Indication: For CArdiomyopathy    clonazePAM 0.5 mg oral tablet  -- 1 tab(s) by mouth once a day  -- Indication: For CNS agent     clonazePAM 1 mg oral tablet  -- 1 tab(s) by mouth once a day (at bedtime)  -- Indication: For CNS agent     venlafaxine 150 mg oral capsule, extended release  -- 1 cap(s) by mouth once a day  -- Indication: For Antidepressant     atorvastatin 80 mg oral tablet  -- 1 tab(s) by mouth once a day (at bedtime)  -- Indication: For Hyperlipidemia     clopidogrel 75 mg oral tablet  -- 1 tab(s) by mouth once a day  -- Indication: For CAD (coronary artery disease)    lithium 150 mg oral capsule  -- 1 cap(s) by mouth once a day  -- Indication: For Antipsychotic     lithium 300 mg oral capsule  -- 1 cap(s) by mouth once a day (at bedtime)  -- Indication: For Antipyschotic     QUEtiapine 25 mg oral tablet  -- 1 tab(s) by mouth once a day (at bedtime)  -- Indication: For Antipyschotic     metoprolol succinate 25 mg oral tablet, extended release  -- 1 tab(s) by mouth once a day  -- Indication: For CAD (coronary artery disease)    Proventil HFA 90 mcg/inh inhalation aerosol  -- 2 puff(s) inhaled 4 times a day, As Needed  -- Indication: For Bronchodilator     albuterol CFC free 90 mcg/inh inhalation aerosol  -- 2 puff(s) inhaled every 6 hours, As needed, Shortness of Breath and/or Wheezing  -- Indication: For Bronchodilator     Advair Diskus 500 mcg-50 mcg inhalation powder  -- 1 puff(s) inhaled 2 times a day  -- Indication: For Bronchodilator     senna oral tablet  -- 2 tab(s) by mouth once a day (at bedtime)  -- Indication: For Laxative     docusate sodium 100 mg oral capsule  -- 1 cap(s) by mouth 2 times a day  -- Indication: For Laxative     pantoprazole 40 mg oral delayed release tablet  -- 1 tab(s) by mouth once a day (before a meal)  -- Indication: For GERD    levothyroxine 75 mcg (0.075 mg) oral tablet  -- 1 tab(s) by mouth once a day  -- Indication: For Hypothyroidism

## 2017-03-21 NOTE — DISCHARGE NOTE ADULT - CARE PLAN
Principal Discharge DX:	Asthma exacerbation  Goal:	Prevent bronchospasm.  Instructions for follow-up, activity and diet:	Continue nebulizers as taken at home.   Follow-up with your Private Medical Doctor within 1 week.  Secondary Diagnosis:	Hypothyroidism, unspecified type  Goal:	Monitor thyroid function.  Instructions for follow-up, activity and diet:	Continue Synthroid.   Follow-up with your Private Medical Doctor within 1 week.  Secondary Diagnosis:	CAD (coronary artery disease)  Goal:	Prevent progression of disease.  Instructions for follow-up, activity and diet:	Continue ASA, Plavix, current medications. Principal Discharge DX:	Asthma exacerbation  Goal:	Prevent bronchospasm.  Instructions for follow-up, activity and diet:	Continue nebulizers as taken at home.   Follow-up with your Private Medical Doctor within 1 week.  Secondary Diagnosis:	Hypothyroidism, unspecified type  Goal:	Monitor thyroid function.  Instructions for follow-up, activity and diet:	Continue Synthroid at current dose.   Follow-up with Endocrinologist within 1 week.  Secondary Diagnosis:	CAD (coronary artery disease)  Goal:	Prevent progression of disease.  Instructions for follow-up, activity and diet:	Continue ASA, Plavix, current medications. Principal Discharge DX:	Asthma exacerbation  Goal:	Prevent bronchospasm.  Instructions for follow-up, activity and diet:	Continue nebulizers as taken at home.   Follow-up with your Private Medical Doctor within 1 week.  Secondary Diagnosis:	Hypothyroidism, unspecified type  Goal:	Monitor thyroid function.  Instructions for follow-up, activity and diet:	Continue Synthroid at current dose.   Follow-up with Endocrinologist Dr. Carrera within 1 week.  Secondary Diagnosis:	CAD (coronary artery disease)  Goal:	Prevent progression of disease.  Instructions for follow-up, activity and diet:	Continue ASA, Plavix, current medications. Principal Discharge DX:	Asthma exacerbation  Goal:	Prevent bronchospasm.  Instructions for follow-up, activity and diet:	Continue nebulizers as taken at home.   Follow-up with your Private Medical Doctor within 1 week.  Secondary Diagnosis:	Hypothyroidism, unspecified type  Goal:	Monitor thyroid function.  Instructions for follow-up, activity and diet:	Continue Synthroid at current dose. Reduced dose.   Follow-up with Endocrinologist Dr. Carrera within 1 week.  Secondary Diagnosis:	CAD (coronary artery disease)  Goal:	Prevent progression of disease.  Instructions for follow-up, activity and diet:	Continue ASA, Plavix, Toprol XL, Lisinopril, current medications.

## 2017-03-27 ENCOUNTER — OUTPATIENT (OUTPATIENT)
Dept: OUTPATIENT SERVICES | Facility: HOSPITAL | Age: 67
LOS: 1 days | Discharge: HOME | End: 2017-03-27
Payer: MEDICARE

## 2017-03-27 DIAGNOSIS — N81.6 RECTOCELE: Chronic | ICD-10-CM

## 2017-03-27 DIAGNOSIS — J98.6 DISORDERS OF DIAPHRAGM: Chronic | ICD-10-CM

## 2017-03-27 PROCEDURE — 90792 PSYCH DIAG EVAL W/MED SRVCS: CPT

## 2017-03-28 DIAGNOSIS — F33.41 MAJOR DEPRESSIVE DISORDER, RECURRENT, IN PARTIAL REMISSION: ICD-10-CM

## 2017-03-28 PROCEDURE — 99213 OFFICE O/P EST LOW 20 MIN: CPT

## 2017-03-29 PROCEDURE — 99213 OFFICE O/P EST LOW 20 MIN: CPT

## 2017-04-04 PROCEDURE — 99213 OFFICE O/P EST LOW 20 MIN: CPT

## 2017-04-06 ENCOUNTER — INPATIENT (INPATIENT)
Facility: HOSPITAL | Age: 67
LOS: 3 days | Discharge: HOME | End: 2017-04-10
Attending: HOSPITALIST | Admitting: HOSPITALIST
Payer: MEDICARE

## 2017-04-06 ENCOUNTER — OUTPATIENT (OUTPATIENT)
Dept: OUTPATIENT SERVICES | Facility: HOSPITAL | Age: 67
LOS: 1 days | Discharge: HOME | End: 2017-04-06
Payer: MEDICARE

## 2017-04-06 VITALS
DIASTOLIC BLOOD PRESSURE: 65 MMHG | OXYGEN SATURATION: 100 % | RESPIRATION RATE: 20 BRPM | TEMPERATURE: 98 F | HEART RATE: 81 BPM | SYSTOLIC BLOOD PRESSURE: 92 MMHG

## 2017-04-06 DIAGNOSIS — N81.6 RECTOCELE: Chronic | ICD-10-CM

## 2017-04-06 DIAGNOSIS — J98.6 DISORDERS OF DIAPHRAGM: Chronic | ICD-10-CM

## 2017-04-06 LAB
ALBUMIN SERPL ELPH-MCNC: 4.6 G/DL — SIGNIFICANT CHANGE UP (ref 3.3–5)
ALP SERPL-CCNC: 110 U/L — SIGNIFICANT CHANGE UP (ref 40–120)
ALT FLD-CCNC: 17 U/L — SIGNIFICANT CHANGE UP (ref 4–33)
APAP SERPL-MCNC: < 15 UG/ML — LOW (ref 15–25)
AST SERPL-CCNC: 30 U/L — SIGNIFICANT CHANGE UP (ref 4–32)
BARBITURATES MEASUREMENT: NEGATIVE — SIGNIFICANT CHANGE UP
BASE EXCESS BLDV CALC-SCNC: 3.7 MMOL/L — SIGNIFICANT CHANGE UP
BASOPHILS # BLD AUTO: 0.03 K/UL — SIGNIFICANT CHANGE UP (ref 0–0.2)
BASOPHILS NFR BLD AUTO: 0.3 % — SIGNIFICANT CHANGE UP (ref 0–2)
BENZODIAZ SERPL-MCNC: NEGATIVE — SIGNIFICANT CHANGE UP
BILIRUB SERPL-MCNC: 0.4 MG/DL — SIGNIFICANT CHANGE UP (ref 0.2–1.2)
BLOOD GAS VENOUS - CREATININE: 0.78 MG/DL — SIGNIFICANT CHANGE UP (ref 0.5–1.3)
BUN SERPL-MCNC: 29 MG/DL — HIGH (ref 7–23)
CALCIUM SERPL-MCNC: 10.4 MG/DL — SIGNIFICANT CHANGE UP (ref 8.4–10.5)
CHLORIDE BLDV-SCNC: 109 MMOL/L — HIGH (ref 96–108)
CHLORIDE SERPL-SCNC: 101 MMOL/L — SIGNIFICANT CHANGE UP (ref 98–107)
CO2 SERPL-SCNC: 19 MMOL/L — LOW (ref 22–31)
CREAT SERPL-MCNC: 0.95 MG/DL — SIGNIFICANT CHANGE UP (ref 0.5–1.3)
EOSINOPHIL # BLD AUTO: 0.63 K/UL — HIGH (ref 0–0.5)
EOSINOPHIL NFR BLD AUTO: 5.8 % — SIGNIFICANT CHANGE UP (ref 0–6)
ETHANOL BLD-MCNC: < 10 MG/DL — SIGNIFICANT CHANGE UP
GAS PNL BLDV: 130 MMOL/L — LOW (ref 136–146)
GLUCOSE BLDV-MCNC: 119 — HIGH (ref 70–99)
GLUCOSE SERPL-MCNC: 80 MG/DL — SIGNIFICANT CHANGE UP (ref 70–99)
HCO3 BLDV-SCNC: 25 MMOL/L — SIGNIFICANT CHANGE UP (ref 20–27)
HCT VFR BLD CALC: 47 % — HIGH (ref 34.5–45)
HCT VFR BLDV CALC: 45.7 % — HIGH (ref 34.5–45)
HGB BLD-MCNC: 15.1 G/DL — SIGNIFICANT CHANGE UP (ref 11.5–15.5)
HGB BLDV-MCNC: 14.9 G/DL — SIGNIFICANT CHANGE UP (ref 11.5–15.5)
IMM GRANULOCYTES NFR BLD AUTO: 0.4 % — SIGNIFICANT CHANGE UP (ref 0–1.5)
LACTATE BLDV-MCNC: 1.9 MMOL/L — SIGNIFICANT CHANGE UP (ref 0.5–2)
LITHIUM SERPL-MCNC: 1.56 MMOL/L — CRITICAL HIGH (ref 0.6–1.2)
LYMPHOCYTES # BLD AUTO: 2.33 K/UL — SIGNIFICANT CHANGE UP (ref 1–3.3)
LYMPHOCYTES # BLD AUTO: 21.5 % — SIGNIFICANT CHANGE UP (ref 13–44)
MCHC RBC-ENTMCNC: 30.8 PG — SIGNIFICANT CHANGE UP (ref 27–34)
MCHC RBC-ENTMCNC: 32.1 % — SIGNIFICANT CHANGE UP (ref 32–36)
MCV RBC AUTO: 95.9 FL — SIGNIFICANT CHANGE UP (ref 80–100)
MONOCYTES # BLD AUTO: 0.54 K/UL — SIGNIFICANT CHANGE UP (ref 0–0.9)
MONOCYTES NFR BLD AUTO: 5 % — SIGNIFICANT CHANGE UP (ref 2–14)
NEUTROPHILS # BLD AUTO: 7.25 K/UL — SIGNIFICANT CHANGE UP (ref 1.8–7.4)
NEUTROPHILS NFR BLD AUTO: 67 % — SIGNIFICANT CHANGE UP (ref 43–77)
PCO2 BLDV: 54 MMHG — HIGH (ref 41–51)
PH BLDV: 7.35 PH — SIGNIFICANT CHANGE UP (ref 7.32–7.43)
PLATELET # BLD AUTO: 351 K/UL — SIGNIFICANT CHANGE UP (ref 150–400)
PMV BLD: 10.1 FL — SIGNIFICANT CHANGE UP (ref 7–13)
PO2 BLDV: < 24 MMHG — LOW (ref 35–40)
POTASSIUM BLDV-SCNC: 4.3 MMOL/L — SIGNIFICANT CHANGE UP (ref 3.4–4.5)
POTASSIUM SERPL-MCNC: 5.7 MMOL/L — HIGH (ref 3.5–5.3)
POTASSIUM SERPL-SCNC: 5.7 MMOL/L — HIGH (ref 3.5–5.3)
PROT SERPL-MCNC: 7 G/DL — SIGNIFICANT CHANGE UP (ref 6–8.3)
RBC # BLD: 4.9 M/UL — SIGNIFICANT CHANGE UP (ref 3.8–5.2)
RBC # FLD: 13.6 % — SIGNIFICANT CHANGE UP (ref 10.3–14.5)
SALICYLATES SERPL-MCNC: < 5 MG/DL — LOW (ref 15–30)
SAO2 % BLDV: 17.3 % — LOW (ref 60–85)
SODIUM SERPL-SCNC: 139 MMOL/L — SIGNIFICANT CHANGE UP (ref 135–145)
TSH SERPL-MCNC: 0.03 UIU/ML — LOW (ref 0.27–4.2)
WBC # BLD: 10.82 K/UL — HIGH (ref 3.8–10.5)
WBC # FLD AUTO: 10.82 K/UL — HIGH (ref 3.8–10.5)

## 2017-04-06 PROCEDURE — 71020: CPT | Mod: 26

## 2017-04-06 PROCEDURE — 70450 CT HEAD/BRAIN W/O DYE: CPT | Mod: 26

## 2017-04-06 RX ORDER — SODIUM CHLORIDE 9 MG/ML
1000 INJECTION INTRAMUSCULAR; INTRAVENOUS; SUBCUTANEOUS ONCE
Qty: 0 | Refills: 0 | Status: COMPLETED | OUTPATIENT
Start: 2017-04-06 | End: 2017-04-06

## 2017-04-06 RX ADMIN — SODIUM CHLORIDE 1000 MILLILITER(S): 9 INJECTION INTRAMUSCULAR; INTRAVENOUS; SUBCUTANEOUS at 20:17

## 2017-04-06 NOTE — ED PROVIDER NOTE - OBJECTIVE STATEMENT
68yo F PMHx depression, CAD, hypothyroidism p/w CC AMS. Pt. poor historian - accompanied by son who states that for the past 3 days pt. has been "unable to finish a sentence" he reports that she starts talking and then can't get the rest of the words out, he also states that at baseline she is AAOx3 but lately has been more confused and doesn't even know what year it is. Pts. only complaint at this time is headache but is unable to provide further details. Son states pt. has had chronic headache for years.

## 2017-04-06 NOTE — ED ADULT NURSE NOTE - CHIEF COMPLAINT QUOTE
From Geriatric clinic Screen out noted. also slurred speech since Sat. sent for increase change in mental status not oriented to time/place, unable to express herself clearly x few days.

## 2017-04-06 NOTE — ED PROVIDER NOTE - MEDICAL DECISION MAKING DETAILS
66yo F PMHx depression, CAD, hypothyroidism p/w CC AMS - will send for cbc, cmp, vbg, tsh, urine/serum tox, ekg, CT head, UA, CXR - will give fluids and reassess.

## 2017-04-06 NOTE — ED ADULT NURSE NOTE - OBJECTIVE STATEMENT
66 y/o female presents to ED with c/o worsening mental status.  Per pt's children pt has had worsening speech and increased confusion over the past week.  Pt received awake, follows simple commands speech garbled, using nonsensical words. Face symmetric, moving extremities.  Pt offers no complaints, denies pain, family denies any n/v/d, no fever/chills, no known fall.

## 2017-04-06 NOTE — ED PROVIDER NOTE - PROGRESS NOTE DETAILS
ct does not elucidate cause of ams-urine sent for eval, afebrile, confused, lithium level elevated, fluids given, perhaps toxic encephalopathy, admit for further w/u. Message left on voicemail of dr. cogan at 11:30p and 12A with no call back, will admit to hospitalist.

## 2017-04-06 NOTE — ED ADULT TRIAGE NOTE - CHIEF COMPLAINT QUOTE
From Geriatric clinic Screen out noted. sent for increase change in mental status not oriented to time/place, unable to express herself clearly x few days. From Geriatric clinic Screen out noted. also slurred speech since Sat. sent for increase change in mental status not oriented to time/place, unable to express herself clearly x few days.

## 2017-04-06 NOTE — ED PROVIDER NOTE - ATTENDING CONTRIBUTION TO CARE
ROSITA Attending Note - Dr. Stoll    66yo F PMHx depression, CAD, hypothyroidism p/w CC AMS. Pt. poor historian.  Confabulates a history of falling.    PE: pt is awake but disoriented, perrl, ent normal, membranes are moist, neck supple. no lymphadenopathy or thyroid enlargement, No JVD.  Chest clear to P&A, Heart- reg rhythm without murmur, rubs or gallops, radial pulses equal bilaterally.  Abd is soft, non-tender, Bowel sounds are active. no mass or organomegaly. : No CVA tenderness. Neuro:  Pt awake but disoriented. Perrl    Distal neurosensory is intact. Motor function is 5/5 strength bilaterally.  No focal deficits. Extremities:  No edema.  Skin: warm and dry.

## 2017-04-07 DIAGNOSIS — I25.10 ATHEROSCLEROTIC HEART DISEASE OF NATIVE CORONARY ARTERY WITHOUT ANGINA PECTORIS: ICD-10-CM

## 2017-04-07 DIAGNOSIS — I50.20 UNSPECIFIED SYSTOLIC (CONGESTIVE) HEART FAILURE: ICD-10-CM

## 2017-04-07 DIAGNOSIS — F32.9 MAJOR DEPRESSIVE DISORDER, SINGLE EPISODE, UNSPECIFIED: ICD-10-CM

## 2017-04-07 DIAGNOSIS — G92 TOXIC ENCEPHALOPATHY: ICD-10-CM

## 2017-04-07 DIAGNOSIS — R94.6 ABNORMAL RESULTS OF THYROID FUNCTION STUDIES: ICD-10-CM

## 2017-04-07 DIAGNOSIS — R41.82 ALTERED MENTAL STATUS, UNSPECIFIED: ICD-10-CM

## 2017-04-07 DIAGNOSIS — F33.41 MAJOR DEPRESSIVE DISORDER, RECURRENT, IN PARTIAL REMISSION: ICD-10-CM

## 2017-04-07 DIAGNOSIS — J45.909 UNSPECIFIED ASTHMA, UNCOMPLICATED: ICD-10-CM

## 2017-04-07 LAB
AMPHET UR-MCNC: NEGATIVE — SIGNIFICANT CHANGE UP
APPEARANCE UR: CLEAR — SIGNIFICANT CHANGE UP
BARBITURATES UR SCN-MCNC: NEGATIVE — SIGNIFICANT CHANGE UP
BASOPHILS # BLD AUTO: 0.03 K/UL — SIGNIFICANT CHANGE UP (ref 0–0.2)
BASOPHILS NFR BLD AUTO: 0.4 % — SIGNIFICANT CHANGE UP (ref 0–2)
BENZODIAZ UR-MCNC: NEGATIVE — SIGNIFICANT CHANGE UP
BILIRUB UR-MCNC: NEGATIVE — SIGNIFICANT CHANGE UP
BLOOD UR QL VISUAL: NEGATIVE — SIGNIFICANT CHANGE UP
BUN SERPL-MCNC: 19 MG/DL — SIGNIFICANT CHANGE UP (ref 7–23)
CALCIUM SERPL-MCNC: 9.5 MG/DL — SIGNIFICANT CHANGE UP (ref 8.4–10.5)
CANNABINOIDS UR-MCNC: NEGATIVE — SIGNIFICANT CHANGE UP
CHLORIDE SERPL-SCNC: 103 MMOL/L — SIGNIFICANT CHANGE UP (ref 98–107)
CO2 SERPL-SCNC: 22 MMOL/L — SIGNIFICANT CHANGE UP (ref 22–31)
COCAINE METAB.OTHER UR-MCNC: NEGATIVE — SIGNIFICANT CHANGE UP
COLOR SPEC: SIGNIFICANT CHANGE UP
CREAT SERPL-MCNC: 0.65 MG/DL — SIGNIFICANT CHANGE UP (ref 0.5–1.3)
EOSINOPHIL # BLD AUTO: 0.5 K/UL — SIGNIFICANT CHANGE UP (ref 0–0.5)
EOSINOPHIL NFR BLD AUTO: 6 % — SIGNIFICANT CHANGE UP (ref 0–6)
GLUCOSE SERPL-MCNC: 80 MG/DL — SIGNIFICANT CHANGE UP (ref 70–99)
GLUCOSE UR-MCNC: NEGATIVE — SIGNIFICANT CHANGE UP
HCT VFR BLD CALC: 39.6 % — SIGNIFICANT CHANGE UP (ref 34.5–45)
HGB BLD-MCNC: 12.7 G/DL — SIGNIFICANT CHANGE UP (ref 11.5–15.5)
IMM GRANULOCYTES NFR BLD AUTO: 0.2 % — SIGNIFICANT CHANGE UP (ref 0–1.5)
KETONES UR-MCNC: SIGNIFICANT CHANGE UP
LEUKOCYTE ESTERASE UR-ACNC: NEGATIVE — SIGNIFICANT CHANGE UP
LITHIUM SERPL-MCNC: 1.12 MMOL/L — SIGNIFICANT CHANGE UP (ref 0.6–1.2)
LYMPHOCYTES # BLD AUTO: 1.44 K/UL — SIGNIFICANT CHANGE UP (ref 1–3.3)
LYMPHOCYTES # BLD AUTO: 17.4 % — SIGNIFICANT CHANGE UP (ref 13–44)
MAGNESIUM SERPL-MCNC: 1.9 MG/DL — SIGNIFICANT CHANGE UP (ref 1.6–2.6)
MCHC RBC-ENTMCNC: 30 PG — SIGNIFICANT CHANGE UP (ref 27–34)
MCHC RBC-ENTMCNC: 32.1 % — SIGNIFICANT CHANGE UP (ref 32–36)
MCV RBC AUTO: 93.6 FL — SIGNIFICANT CHANGE UP (ref 80–100)
METHADONE UR-MCNC: NEGATIVE — SIGNIFICANT CHANGE UP
MONOCYTES # BLD AUTO: 0.41 K/UL — SIGNIFICANT CHANGE UP (ref 0–0.9)
MONOCYTES NFR BLD AUTO: 5 % — SIGNIFICANT CHANGE UP (ref 2–14)
MUCOUS THREADS # UR AUTO: SIGNIFICANT CHANGE UP
NEUTROPHILS # BLD AUTO: 5.88 K/UL — SIGNIFICANT CHANGE UP (ref 1.8–7.4)
NEUTROPHILS NFR BLD AUTO: 71 % — SIGNIFICANT CHANGE UP (ref 43–77)
NITRITE UR-MCNC: NEGATIVE — SIGNIFICANT CHANGE UP
OPIATES UR-MCNC: NEGATIVE — SIGNIFICANT CHANGE UP
OXYCODONE UR-MCNC: NEGATIVE — SIGNIFICANT CHANGE UP
PCP UR-MCNC: NEGATIVE — SIGNIFICANT CHANGE UP
PH UR: 6.5 — SIGNIFICANT CHANGE UP (ref 4.6–8)
PHOSPHATE SERPL-MCNC: 2.5 MG/DL — SIGNIFICANT CHANGE UP (ref 2.5–4.5)
PLATELET # BLD AUTO: 320 K/UL — SIGNIFICANT CHANGE UP (ref 150–400)
PMV BLD: 9.6 FL — SIGNIFICANT CHANGE UP (ref 7–13)
POTASSIUM SERPL-MCNC: 4.2 MMOL/L — SIGNIFICANT CHANGE UP (ref 3.5–5.3)
POTASSIUM SERPL-SCNC: 4.2 MMOL/L — SIGNIFICANT CHANGE UP (ref 3.5–5.3)
PROT UR-MCNC: NEGATIVE — SIGNIFICANT CHANGE UP
RBC # BLD: 4.23 M/UL — SIGNIFICANT CHANGE UP (ref 3.8–5.2)
RBC # FLD: 13.4 % — SIGNIFICANT CHANGE UP (ref 10.3–14.5)
RBC CASTS # UR COMP ASSIST: SIGNIFICANT CHANGE UP (ref 0–?)
SODIUM SERPL-SCNC: 141 MMOL/L — SIGNIFICANT CHANGE UP (ref 135–145)
SP GR SPEC: 1.01 — SIGNIFICANT CHANGE UP (ref 1–1.03)
SQUAMOUS # UR AUTO: SIGNIFICANT CHANGE UP
UROBILINOGEN FLD QL: NORMAL E.U. — SIGNIFICANT CHANGE UP (ref 0.1–0.2)
WBC # BLD: 8.28 K/UL — SIGNIFICANT CHANGE UP (ref 3.8–10.5)
WBC # FLD AUTO: 8.28 K/UL — SIGNIFICANT CHANGE UP (ref 3.8–10.5)
WBC UR QL: SIGNIFICANT CHANGE UP (ref 0–?)

## 2017-04-07 PROCEDURE — 99223 1ST HOSP IP/OBS HIGH 75: CPT | Mod: GC

## 2017-04-07 PROCEDURE — 99223 1ST HOSP IP/OBS HIGH 75: CPT

## 2017-04-07 RX ORDER — METOPROLOL TARTRATE 50 MG
25 TABLET ORAL DAILY
Qty: 0 | Refills: 0 | Status: DISCONTINUED | OUTPATIENT
Start: 2017-04-07 | End: 2017-04-10

## 2017-04-07 RX ORDER — LEVOTHYROXINE SODIUM 125 MCG
25 TABLET ORAL DAILY
Qty: 0 | Refills: 0 | Status: DISCONTINUED | OUTPATIENT
Start: 2017-04-07 | End: 2017-04-10

## 2017-04-07 RX ORDER — SODIUM CHLORIDE 9 MG/ML
1000 INJECTION INTRAMUSCULAR; INTRAVENOUS; SUBCUTANEOUS
Qty: 0 | Refills: 0 | Status: DISCONTINUED | OUTPATIENT
Start: 2017-04-07 | End: 2017-04-10

## 2017-04-07 RX ORDER — CLOPIDOGREL BISULFATE 75 MG/1
75 TABLET, FILM COATED ORAL DAILY
Qty: 0 | Refills: 0 | Status: DISCONTINUED | OUTPATIENT
Start: 2017-04-07 | End: 2017-04-10

## 2017-04-07 RX ORDER — ASPIRIN/CALCIUM CARB/MAGNESIUM 324 MG
81 TABLET ORAL DAILY
Qty: 0 | Refills: 0 | Status: DISCONTINUED | OUTPATIENT
Start: 2017-04-07 | End: 2017-04-10

## 2017-04-07 RX ORDER — DOCUSATE SODIUM 100 MG
100 CAPSULE ORAL
Qty: 0 | Refills: 0 | Status: DISCONTINUED | OUTPATIENT
Start: 2017-04-07 | End: 2017-04-10

## 2017-04-07 RX ORDER — CLONAZEPAM 1 MG
0.5 TABLET ORAL
Qty: 0 | Refills: 0 | Status: DISCONTINUED | OUTPATIENT
Start: 2017-04-07 | End: 2017-04-10

## 2017-04-07 RX ORDER — CLONAZEPAM 1 MG
1 TABLET ORAL
Qty: 0 | Refills: 0 | Status: DISCONTINUED | OUTPATIENT
Start: 2017-04-07 | End: 2017-04-10

## 2017-04-07 RX ORDER — CLONAZEPAM 1 MG
0.5 TABLET ORAL DAILY
Qty: 0 | Refills: 0 | Status: DISCONTINUED | OUTPATIENT
Start: 2017-04-07 | End: 2017-04-07

## 2017-04-07 RX ORDER — PANTOPRAZOLE SODIUM 20 MG/1
40 TABLET, DELAYED RELEASE ORAL
Qty: 0 | Refills: 0 | Status: DISCONTINUED | OUTPATIENT
Start: 2017-04-07 | End: 2017-04-10

## 2017-04-07 RX ORDER — ENOXAPARIN SODIUM 100 MG/ML
40 INJECTION SUBCUTANEOUS EVERY 24 HOURS
Qty: 0 | Refills: 0 | Status: DISCONTINUED | OUTPATIENT
Start: 2017-04-07 | End: 2017-04-10

## 2017-04-07 RX ORDER — ALBUTEROL 90 UG/1
2 AEROSOL, METERED ORAL EVERY 6 HOURS
Qty: 0 | Refills: 0 | Status: DISCONTINUED | OUTPATIENT
Start: 2017-04-07 | End: 2017-04-10

## 2017-04-07 RX ORDER — SENNA PLUS 8.6 MG/1
2 TABLET ORAL AT BEDTIME
Qty: 0 | Refills: 0 | Status: DISCONTINUED | OUTPATIENT
Start: 2017-04-07 | End: 2017-04-10

## 2017-04-07 RX ORDER — ATORVASTATIN CALCIUM 80 MG/1
40 TABLET, FILM COATED ORAL AT BEDTIME
Qty: 0 | Refills: 0 | Status: DISCONTINUED | OUTPATIENT
Start: 2017-04-07 | End: 2017-04-10

## 2017-04-07 RX ORDER — BUDESONIDE AND FORMOTEROL FUMARATE DIHYDRATE 160; 4.5 UG/1; UG/1
2 AEROSOL RESPIRATORY (INHALATION)
Qty: 0 | Refills: 0 | Status: DISCONTINUED | OUTPATIENT
Start: 2017-04-07 | End: 2017-04-10

## 2017-04-07 RX ORDER — CLONAZEPAM 1 MG
1 TABLET ORAL
Qty: 0 | Refills: 0 | Status: DISCONTINUED | OUTPATIENT
Start: 2017-04-07 | End: 2017-04-07

## 2017-04-07 RX ORDER — ATORVASTATIN CALCIUM 80 MG/1
80 TABLET, FILM COATED ORAL AT BEDTIME
Qty: 0 | Refills: 0 | Status: DISCONTINUED | OUTPATIENT
Start: 2017-04-07 | End: 2017-04-07

## 2017-04-07 RX ADMIN — SENNA PLUS 2 TABLET(S): 8.6 TABLET ORAL at 23:01

## 2017-04-07 RX ADMIN — BUDESONIDE AND FORMOTEROL FUMARATE DIHYDRATE 2 PUFF(S): 160; 4.5 AEROSOL RESPIRATORY (INHALATION) at 23:02

## 2017-04-07 RX ADMIN — ATORVASTATIN CALCIUM 40 MILLIGRAM(S): 80 TABLET, FILM COATED ORAL at 23:01

## 2017-04-07 RX ADMIN — Medication 100 MILLIGRAM(S): at 05:50

## 2017-04-07 RX ADMIN — Medication 25 MILLIGRAM(S): at 05:50

## 2017-04-07 RX ADMIN — SODIUM CHLORIDE 75 MILLILITER(S): 9 INJECTION INTRAMUSCULAR; INTRAVENOUS; SUBCUTANEOUS at 05:50

## 2017-04-07 RX ADMIN — CLOPIDOGREL BISULFATE 75 MILLIGRAM(S): 75 TABLET, FILM COATED ORAL at 12:13

## 2017-04-07 RX ADMIN — Medication 100 MILLIGRAM(S): at 17:23

## 2017-04-07 RX ADMIN — Medication 81 MILLIGRAM(S): at 12:13

## 2017-04-07 RX ADMIN — Medication 0.5 MILLIGRAM(S): at 12:13

## 2017-04-07 RX ADMIN — Medication 1 MILLIGRAM(S): at 23:01

## 2017-04-07 RX ADMIN — PANTOPRAZOLE SODIUM 40 MILLIGRAM(S): 20 TABLET, DELAYED RELEASE ORAL at 12:13

## 2017-04-07 RX ADMIN — ENOXAPARIN SODIUM 40 MILLIGRAM(S): 100 INJECTION SUBCUTANEOUS at 05:49

## 2017-04-07 RX ADMIN — ALBUTEROL 2 PUFF(S): 90 AEROSOL, METERED ORAL at 20:48

## 2017-04-07 NOTE — H&P ADULT. - HISTORY OF PRESENT ILLNESS
67F hx MDD w/ psychotic features, CAD s/p PCI (2/2017), HTN, hypothyroidism, who was brought to ED for AMS. Hx obtained from chart review as pt is poor historian.    Family reporting that pt has been more confused for the past week.  She has not been fully oriented, has had difficulty recognizing her children & has not been able to verbalize her thoughts clearly. Pt has HHA who mentioned that pt has to be reminded to do certain tasks such as washing her face & flushing the toilet which is a change from her baseline. Pt was taken to her geriatric clinic on 4/6where she was advised to go ER for urgent CT head. Pt has hx of depression w/ discrete psychotic episodes.. She had 2 prior inpatient [facility] admission (2014 & 2017) for worsening depression & paranoia. Outpatient records also document personality d/o NOS & medical noncompliance.    ED course: T 97.9, HR 81, BP 92/65, RR 20, O2 100% RA. BP improved to 145/90 after 1L NS. Labs significant for WBC 10.82, Hgb 15.1, TSH 0.03, lithium level 1.9. UA negative. Urine & serum tox negative.  CT head negative for acute pathology & showed stable mild cerebral volume loss & chronic microvascular changes. 67F hx MDD w/ psychotic features, CAD s/p PCI (2010, 2011, 2017), HFrEF (25-30%), HTN, hypothyroidism, who was brought to ED for AMS. Hx obtained from chart review as pt is poor historian & is aphasic. Pt believes that she is in hospital for asthma.    Family reporting that pt has been more confused for the past week.  She has not been fully oriented, has had difficulty recognizing her children & has not been able to verbalize her thoughts clearly. Pt has HHA who mentioned that pt has to be reminded to do certain tasks such as washing her face & flushing the toilet which is a change from her baseline. Her baseline is reportedly Pt was taken to her geriatric clinic on 4/6where she was advised to go ER for urgent CT head. Pt has hx of depression w/ discrete psychotic episodes. She had 2 prior inpatient [facility] admission (2014 & 2017) for worsening depression & paranoia. Outpatient records also document personality d/o NOS & medical noncompliance.    Of note, pt has had recent hospitalization for NSTEMI & asthma exacerbation.  She had elective cath on 2/8/2017 then returned the next week for NSTEM 2/2 LAD stent thrombosis during which she required IABP for support.  Hospital course during that time also c/b runs of VT requiring lidocaine gtt. Her last hospitalization was 3/2017 for asthma exacerbation during which she was briefly on BiPAP.     ED course: T 97.9, HR 81, BP 92/65, RR 20, O2 100% RA. BP improved to 145/90 after 1L NS. Labs significant for WBC 10.82, Hgb 15.1, TSH 0.03, lithium level 1.9. UA negative. Urine & serum tox negative.  CT head negative for acute pathology & showed stable mild cerebral volume loss & chronic microvascular changes. 67F hx MDD w/ psychotic features, CAD s/p PCI (2010, 2011, 2017), HFrEF (25-30%), HTN, hypothyroidism, who was brought to ED for AMS. Hx obtained from chart review as pt is poor historian & is aphasic. Pt believes that she is in hospital for asthma.    Family reporting that pt has been more confused for the past week.  She has not been fully oriented, has had difficulty recognizing her children & has not been able to verbalize her thoughts clearly. Pt has HHA who mentioned that pt has to be reminded to do certain tasks such as washing her face & flushing the toilet which is a change from her baseline. Her baseline is reportedly AOx3. Pt was taken to her geriatric clinic on 4/6 where she was advised to go ER for urgent CT head. Pt has hx of depression w/ discrete psychotic episodes. She had 2 prior inpatient [facility] admission (2014 & 2017) for worsening depression & paranoia. Outpatient records also document personality d/o NOS & medical noncompliance.    Of note, pt has had multiple recent hospitalizations.  She had elective cath on 2/8/2017 then returned the next week for NSTEM 2/2 LAD stent thrombosis during which she required IABP for support.  Hospital course during that time also c/b runs of VT requiring lidocaine gtt. She had another admission for CP & r/o ACS after that. Her last hospitalization was 3/2017 for asthma exacerbation during which she was briefly on BiPAP.     ED course: T 97.9, HR 81, BP 92/65, RR 20, O2 100% RA. BP improved to 145/90 after 1L NS. Labs significant for WBC 10.82, Hgb 15.1, TSH 0.03, lithium level 1.9. UA negative. Urine & serum tox negative.  CT head negative for acute pathology & showed stable mild cerebral volume loss & chronic microvascular changes. 67F hx MDD w/ psychotic features, CAD s/p PCI (2010, 2011, 2017), HFrEF (25-30%), hypothyroidism, who was brought to ED for AMS. Hx obtained from chart review as pt is poor historian & is aphasic. Pt believes that she is in hospital for asthma.    Family reporting that pt has been more confused for the past week.  She has not been fully oriented, has had difficulty recognizing her children & has not been able to verbalize her thoughts clearly. Pt has HHA who mentioned that pt has to be reminded to do certain tasks such as washing her face & flushing the toilet which is a change from her baseline. Her baseline is reportedly AOx3. Pt was taken to her geriatric clinic on 4/6 where she was advised to go ER for urgent CT head. Pt has hx of depression w/ discrete psychotic episodes. She had 2 prior [facility] (2014 & 2017) for worsening depression & paranoia. Outpatient records also document personality d/o NOS & medical noncompliance.    Of note, pt has had multiple recent hospitalizations.  She had elective cath on 2/8/2017 then returned the next week for NSTEM 2/2 LAD stent thrombosis during which she required IABP for support.  Hospital course during that time also c/b runs of VT requiring lidocaine gtt. She had another admission for CP & r/o ACS after that. Her last hospitalization was 3/2017 for asthma exacerbation during which she was briefly on BiPAP.     ED course: T 97.9, HR 81, BP 92/65, RR 20, O2 100% RA. BP improved to 145/90 after 1L NS. Labs significant for WBC 10.82, Hgb 15.1, TSH 0.03, lithium level 1.9. UA negative. Urine & serum tox negative.  CT head negative for acute pathology & showed stable mild cerebral volume loss & chronic microvascular changes. 67F hx MDD w/ psychotic features, CAD s/p PCI (2010, 2011, 2017), HFrEF (25-30%), hypothyroidism, who was brought to ED for AMS. Hx obtained from chart review as pt is poor historian & is aphasic. Pt believes that she is in hospital for asthma.    Family reporting that pt has been more confused for the past week.  She has not been fully oriented, has had difficulty recognizing her children & has not been able to verbalize her thoughts clearly. Pt has HHA who mentioned that pt has to be reminded to do certain tasks such as washing her face & flushing the toilet which is a change from her baseline. Her baseline is reportedly AOx3. Pt was taken to her geriatric clinic on 4/6 where she was advised to go ER for urgent CT head. Pt has hx of depression w/ discrete psychotic episodes. She had 2 prior [facility] (2014 & 2017) for worsening depression & paranoia. Outpatient records also document personality d/o NOS & medical noncompliance.  She is on multiple psychiatric meds including lithium, effexor, seroquel & klonopin.     Of note, pt has had multiple recent hospitalizations.  She had elective cath on 2/8/2017 then returned the next week for NSTEM 2/2 LAD stent thrombosis during which she required IABP for support.  Hospital course during that time also c/b runs of VT requiring lidocaine gtt. She had another admission for CP & r/o ACS after that. Her last hospitalization was 3/2017 for asthma exacerbation during which she was briefly on BiPAP.     ED course: T 97.9, HR 81, BP 92/65, RR 20, O2 100% RA. BP improved to 145/90 after 1L NS. Labs significant for WBC 10.82, Hgb 15.1, TSH 0.03, lithium level 1.9. UA negative. Urine & serum tox negative.  CT head negative for acute pathology & showed stable mild cerebral volume loss & chronic microvascular changes. 67F hx MDD w/ psychotic features, CAD s/p PCI (2010, 2011, 2017), HFrEF (25-30%), hypothyroidism, who was brought to ED for AMS. Hx obtained from chart review as pt is poor historian & is aphasic. Pt believes that she is in hospital for asthma.    Family reporting that pt has been more confused for the past week.  She has not been fully oriented, has had difficulty recognizing her children & has not been able to verbalize her thoughts clearly. Pt has HHA who mentioned that pt has to be reminded to do certain tasks such as washing her face & flushing the toilet which is a change from her baseline. Her baseline is reportedly AOx3. Pt was taken to her geriatric clinic on 4/6 where she was advised to go ER for urgent CT head. Pt has hx of depression w/ discrete psychotic episodes. She had 2 prior [facility] (2014 & 2017) for worsening depression & paranoia. Outpatient records also document personality d/o NOS & medical noncompliance.  She is on multiple psychiatric meds including lithium, effexor, seroquel & klonopin.     Of note, pt has had multiple recent hospitalizations.  She had elective cath on 2/8/2017 then returned the next week for NSTEM 2/2 LAD stent thrombosis during which she required IABP for support.  Hospital course during that time also c/b runs of VT requiring lidocaine gtt. She had another admission for CP & r/o ACS after that. Her last hospitalization was 3/2017 for asthma exacerbation during which she was briefly on BiPAP.     ED course: T 97.9, HR 81, BP 92/65, RR 20, O2 100% RA. BP improved to 145/90 after 1L NS. Labs significant for WBC 10.82, Hgb 15.1, TSH 0.03, lithium level 1.56. UA negative. Urine & serum tox negative.  CT head negative for acute pathology & showed stable mild cerebral volume loss & chronic microvascular changes.

## 2017-04-07 NOTE — H&P ADULT. - EYES
detailed exam EOMI/conjunctiva clear pupil R/pupil L/EOMI/conjunctiva clear pupil R/pupil L/conjunctiva clear/PERRL/EOMI

## 2017-04-07 NOTE — H&P ADULT. - ATTENDING COMMENTS
66 y/o female  Very POOR Historian from home, HX of Depression, CAD, PCI, Stent, Asthma, HTN, Psychosis, Hypothyroid, EF of 25-30%, admitted for confusion x one week  TSH 0.03, Lithium Level 1.56, No Fever, + Tremor, Chest X ray prelim Clear,     Endo Consult for Low TSH on Synthroid, Psych consult, DVT Prophylaxis: SQ Lovenox, Fall/seizure/aspiration precaution, Hold Lithium  and other Psych meds except KLONOPIN, IVF NS @ 75 cc/hr x 6 hours, Hold Synthroid, continue ASA/Plavix, on Metoprolol, Protonix, F/U CBC, CMP, Lithium Level     Case D/W Pt, HS    Pt was seen & Examined by me, Dr. RACHANA Simons on 4/7/2017.

## 2017-04-07 NOTE — H&P ADULT. - PROBLEM SELECTOR PLAN 5
Not in acute asthma exacerbation  -Advair equivalent resumed  -Albuterol resumed Not in acute asthma exacerbation.  -Advair equivalent resumed  -Albuterol resumed Clinically euvolemic  -B-blocker resumed but holding ACEi for borderline hyperkalemic  -Resume ACEi when appropriate

## 2017-04-07 NOTE — H&P ADULT. - PROBLEM SELECTOR PLAN 1
Suspect toxic encephalopathy from elevated lithium levels, polypharmacy. Low TSH might be contributing factor. CT head negative for structural pathology & negative toxicology panel. Mildly elevated leukocytosis but no other SIRS criteria met.  -Hold lithium, repeat level  -Psychiatry consult Suspect toxic encephalopathy from elevated lithium levels & polypharmacy. Low TSH might be contributing factor. CT head negative for structural pathology & negative toxicology panel. Mildly elevated leukocytosis but no other SIRS criteria met.  -Holding all psychiatric meds including lithium, seroquel, effexor  -Will not hold benzo due to risk of withdrawal seizure  -Repeat lithium level   -Psychiatry consult  -Consider toxicology consult given polypharmacy  -Monitoring off abx

## 2017-04-07 NOTE — H&P ADULT. - PROBLEM SELECTOR PLAN 2
Low TSH but no overt hyperthyroidism symptoms.  Likely subclinical hyperthyroidism.  -T3, T4  -Endocrine consult Low TSH but no overt hyperthyroidism symptoms.  Low TSH noted on previous hospitalizations & synthroid dose was decreased from 112 to 75.  May take several weeks for TSH abnormality to correct.  -Holding synthroid  -T3, T4  -Endocrine consult Low TSH noted on previous hospitalizations & synthroid dose was decreased from 112 to 75.  May take several weeks for TSH abnormality to correct.  -Holding synthroid  -T3, T4  -Endocrine consult

## 2017-04-07 NOTE — H&P ADULT. - PROBLEM SELECTOR PLAN 4
Hx CAD s/p multiple PCI w/ recent NSTEMI from stent thrombosis. No EKG changes to suggest active ischemia.   -ASA, plavix, toprol, lipitor resumed Hx CAD s/p multiple PCI w/ recent NSTEMI from stent thrombosis. No EKG changes to suggest active ischemia.   -ASA, plavix, toprol, lipitor resumed  -Holding lisinopril for borderline hyperkalemia

## 2017-04-07 NOTE — PATIENT PROFILE ADULT. - VISION (WITH CORRECTIVE LENSES IF THE PATIENT USUALLY WEARS THEM):
"Wears glasses to read fine print"/Partially impaired: cannot see medication labels or newsprint, but can see obstacles in path, and the surrounding layout; can count fingers at arm's length

## 2017-04-07 NOTE — H&P ADULT. - ASSESSMENT
67F hx MDD w/ psychotic features, CAD s/p PCI (2/2017), HTN, hypothyroidism, who was brought to ED for AMS found to have low TSH & elevated lithium level 67F hx MDD w/ psychotic features, CAD s/p multiple PCI, including recent NSTEMI & stent thrombosis, asthma, hypothyroidism, who was brought to ED for AMS found to have low TSH & elevated lithium level 67F hx MDD w/ psychotic features, CAD s/p multiple PCI, including recent NSTEMI & stent thrombosis, HFrEF (25%), asthma, hypothyroidism, who was brought to ED for AMS found to have low TSH & elevated lithium level

## 2017-04-07 NOTE — PATIENT PROFILE ADULT. - ABILITY TO HEAR (WITH HEARING AID OR HEARING APPLIANCE IF NORMALLY USED):
"Deaf in left ear"/Mildly to Moderately Impaired: difficulty hearing in some environments or speaker may need to increase volume or speak distinctly

## 2017-04-07 NOTE — H&P ADULT. - PROBLEM SELECTOR PLAN 3
Hx MDD w/ psychotic features on multiple psychiatric meds. Hx MDD w/ psychotic features on multiple psychiatric meds.  -Holding all psychiatric meds except clonazepam given toxic encephalopathy & concern for polypharmacy

## 2017-04-08 LAB
ALBUMIN SERPL ELPH-MCNC: 3.8 G/DL — SIGNIFICANT CHANGE UP (ref 3.3–5)
ALP SERPL-CCNC: 89 U/L — SIGNIFICANT CHANGE UP (ref 40–120)
ALT FLD-CCNC: 10 U/L — SIGNIFICANT CHANGE UP (ref 4–33)
AST SERPL-CCNC: 18 U/L — SIGNIFICANT CHANGE UP (ref 4–32)
BACTERIA UR CULT: SIGNIFICANT CHANGE UP
BILIRUB SERPL-MCNC: 0.6 MG/DL — SIGNIFICANT CHANGE UP (ref 0.2–1.2)
BUN SERPL-MCNC: 13 MG/DL — SIGNIFICANT CHANGE UP (ref 7–23)
CALCIUM SERPL-MCNC: 10 MG/DL — SIGNIFICANT CHANGE UP (ref 8.4–10.5)
CHLORIDE SERPL-SCNC: 106 MMOL/L — SIGNIFICANT CHANGE UP (ref 98–107)
CK SERPL-CCNC: 22 U/L — LOW (ref 25–170)
CO2 SERPL-SCNC: 25 MMOL/L — SIGNIFICANT CHANGE UP (ref 22–31)
CREAT SERPL-MCNC: 0.7 MG/DL — SIGNIFICANT CHANGE UP (ref 0.5–1.3)
GLUCOSE SERPL-MCNC: 95 MG/DL — SIGNIFICANT CHANGE UP (ref 70–99)
HCT VFR BLD CALC: 41 % — SIGNIFICANT CHANGE UP (ref 34.5–45)
HGB BLD-MCNC: 13.3 G/DL — SIGNIFICANT CHANGE UP (ref 11.5–15.5)
MAGNESIUM SERPL-MCNC: 1.9 MG/DL — SIGNIFICANT CHANGE UP (ref 1.6–2.6)
MCHC RBC-ENTMCNC: 30.6 PG — SIGNIFICANT CHANGE UP (ref 27–34)
MCHC RBC-ENTMCNC: 32.4 % — SIGNIFICANT CHANGE UP (ref 32–36)
MCV RBC AUTO: 94.5 FL — SIGNIFICANT CHANGE UP (ref 80–100)
PHOSPHATE SERPL-MCNC: 2.9 MG/DL — SIGNIFICANT CHANGE UP (ref 2.5–4.5)
PLATELET # BLD AUTO: 321 K/UL — SIGNIFICANT CHANGE UP (ref 150–400)
PMV BLD: 9.9 FL — SIGNIFICANT CHANGE UP (ref 7–13)
POTASSIUM SERPL-MCNC: 4.2 MMOL/L — SIGNIFICANT CHANGE UP (ref 3.5–5.3)
POTASSIUM SERPL-SCNC: 4.2 MMOL/L — SIGNIFICANT CHANGE UP (ref 3.5–5.3)
PROT SERPL-MCNC: 5.8 G/DL — LOW (ref 6–8.3)
RBC # BLD: 4.34 M/UL — SIGNIFICANT CHANGE UP (ref 3.8–5.2)
RBC # FLD: 13.4 % — SIGNIFICANT CHANGE UP (ref 10.3–14.5)
SODIUM SERPL-SCNC: 145 MMOL/L — SIGNIFICANT CHANGE UP (ref 135–145)
SPECIMEN SOURCE: SIGNIFICANT CHANGE UP
WBC # BLD: 6.71 K/UL — SIGNIFICANT CHANGE UP (ref 3.8–10.5)
WBC # FLD AUTO: 6.71 K/UL — SIGNIFICANT CHANGE UP (ref 3.8–10.5)

## 2017-04-08 PROCEDURE — 99233 SBSQ HOSP IP/OBS HIGH 50: CPT

## 2017-04-08 RX ORDER — LISINOPRIL 2.5 MG/1
5 TABLET ORAL DAILY
Qty: 0 | Refills: 0 | Status: DISCONTINUED | OUTPATIENT
Start: 2017-04-08 | End: 2017-04-10

## 2017-04-08 RX ADMIN — ENOXAPARIN SODIUM 40 MILLIGRAM(S): 100 INJECTION SUBCUTANEOUS at 06:57

## 2017-04-08 RX ADMIN — Medication 100 MILLIGRAM(S): at 18:13

## 2017-04-08 RX ADMIN — Medication 1 MILLIGRAM(S): at 23:54

## 2017-04-08 RX ADMIN — SENNA PLUS 2 TABLET(S): 8.6 TABLET ORAL at 23:54

## 2017-04-08 RX ADMIN — PANTOPRAZOLE SODIUM 40 MILLIGRAM(S): 20 TABLET, DELAYED RELEASE ORAL at 06:57

## 2017-04-08 RX ADMIN — Medication 100 MILLIGRAM(S): at 06:57

## 2017-04-08 RX ADMIN — CLOPIDOGREL BISULFATE 75 MILLIGRAM(S): 75 TABLET, FILM COATED ORAL at 12:38

## 2017-04-08 RX ADMIN — BUDESONIDE AND FORMOTEROL FUMARATE DIHYDRATE 2 PUFF(S): 160; 4.5 AEROSOL RESPIRATORY (INHALATION) at 08:21

## 2017-04-08 RX ADMIN — Medication 0.5 MILLIGRAM(S): at 08:21

## 2017-04-08 RX ADMIN — Medication 25 MICROGRAM(S): at 06:57

## 2017-04-08 RX ADMIN — BUDESONIDE AND FORMOTEROL FUMARATE DIHYDRATE 2 PUFF(S): 160; 4.5 AEROSOL RESPIRATORY (INHALATION) at 23:54

## 2017-04-08 RX ADMIN — Medication 25 MILLIGRAM(S): at 06:57

## 2017-04-08 RX ADMIN — ATORVASTATIN CALCIUM 40 MILLIGRAM(S): 80 TABLET, FILM COATED ORAL at 23:54

## 2017-04-08 RX ADMIN — Medication 81 MILLIGRAM(S): at 12:38

## 2017-04-09 LAB
BUN SERPL-MCNC: 11 MG/DL — SIGNIFICANT CHANGE UP (ref 7–23)
CALCIUM SERPL-MCNC: 9.8 MG/DL — SIGNIFICANT CHANGE UP (ref 8.4–10.5)
CHLORIDE SERPL-SCNC: 105 MMOL/L — SIGNIFICANT CHANGE UP (ref 98–107)
CK SERPL-CCNC: 30 U/L — SIGNIFICANT CHANGE UP (ref 25–170)
CO2 SERPL-SCNC: 23 MMOL/L — SIGNIFICANT CHANGE UP (ref 22–31)
CREAT SERPL-MCNC: 0.74 MG/DL — SIGNIFICANT CHANGE UP (ref 0.5–1.3)
GLUCOSE SERPL-MCNC: 106 MG/DL — HIGH (ref 70–99)
HAV IGM SER-ACNC: NONREACTIVE — SIGNIFICANT CHANGE UP
HBV CORE IGM SER-ACNC: NONREACTIVE — SIGNIFICANT CHANGE UP
HBV SURFACE AG SER-ACNC: NONREACTIVE — SIGNIFICANT CHANGE UP
HCV AB S/CO SERPL IA: 0.19 S/CO — SIGNIFICANT CHANGE UP
HCV AB SERPL-IMP: SIGNIFICANT CHANGE UP
POTASSIUM SERPL-MCNC: 4 MMOL/L — SIGNIFICANT CHANGE UP (ref 3.5–5.3)
POTASSIUM SERPL-SCNC: 4 MMOL/L — SIGNIFICANT CHANGE UP (ref 3.5–5.3)
SODIUM SERPL-SCNC: 145 MMOL/L — SIGNIFICANT CHANGE UP (ref 135–145)

## 2017-04-09 PROCEDURE — 99233 SBSQ HOSP IP/OBS HIGH 50: CPT

## 2017-04-09 RX ORDER — ACETAMINOPHEN 500 MG
650 TABLET ORAL ONCE
Qty: 0 | Refills: 0 | Status: COMPLETED | OUTPATIENT
Start: 2017-04-09 | End: 2017-04-09

## 2017-04-09 RX ADMIN — Medication 25 MILLIGRAM(S): at 05:47

## 2017-04-09 RX ADMIN — BUDESONIDE AND FORMOTEROL FUMARATE DIHYDRATE 2 PUFF(S): 160; 4.5 AEROSOL RESPIRATORY (INHALATION) at 21:46

## 2017-04-09 RX ADMIN — SENNA PLUS 2 TABLET(S): 8.6 TABLET ORAL at 21:46

## 2017-04-09 RX ADMIN — CLOPIDOGREL BISULFATE 75 MILLIGRAM(S): 75 TABLET, FILM COATED ORAL at 11:02

## 2017-04-09 RX ADMIN — ENOXAPARIN SODIUM 40 MILLIGRAM(S): 100 INJECTION SUBCUTANEOUS at 05:47

## 2017-04-09 RX ADMIN — ATORVASTATIN CALCIUM 40 MILLIGRAM(S): 80 TABLET, FILM COATED ORAL at 21:46

## 2017-04-09 RX ADMIN — Medication 0.5 MILLIGRAM(S): at 11:02

## 2017-04-09 RX ADMIN — BUDESONIDE AND FORMOTEROL FUMARATE DIHYDRATE 2 PUFF(S): 160; 4.5 AEROSOL RESPIRATORY (INHALATION) at 11:03

## 2017-04-09 RX ADMIN — Medication 650 MILLIGRAM(S): at 17:00

## 2017-04-09 RX ADMIN — Medication 100 MILLIGRAM(S): at 16:12

## 2017-04-09 RX ADMIN — PANTOPRAZOLE SODIUM 40 MILLIGRAM(S): 20 TABLET, DELAYED RELEASE ORAL at 05:47

## 2017-04-09 RX ADMIN — LISINOPRIL 5 MILLIGRAM(S): 2.5 TABLET ORAL at 05:47

## 2017-04-09 RX ADMIN — Medication 650 MILLIGRAM(S): at 16:12

## 2017-04-09 RX ADMIN — Medication 25 MICROGRAM(S): at 05:47

## 2017-04-09 RX ADMIN — Medication 100 MILLIGRAM(S): at 05:47

## 2017-04-09 RX ADMIN — Medication 1 MILLIGRAM(S): at 21:46

## 2017-04-09 RX ADMIN — Medication 81 MILLIGRAM(S): at 11:02

## 2017-04-10 ENCOUNTER — TRANSCRIPTION ENCOUNTER (OUTPATIENT)
Age: 67
End: 2017-04-10

## 2017-04-10 VITALS
TEMPERATURE: 98 F | SYSTOLIC BLOOD PRESSURE: 102 MMHG | RESPIRATION RATE: 18 BRPM | OXYGEN SATURATION: 99 % | HEART RATE: 71 BPM | DIASTOLIC BLOOD PRESSURE: 70 MMHG

## 2017-04-10 LAB
BUN SERPL-MCNC: 11 MG/DL — SIGNIFICANT CHANGE UP (ref 7–23)
CALCIUM SERPL-MCNC: 9.7 MG/DL — SIGNIFICANT CHANGE UP (ref 8.4–10.5)
CHLORIDE SERPL-SCNC: 106 MMOL/L — SIGNIFICANT CHANGE UP (ref 98–107)
CK SERPL-CCNC: 42 U/L — SIGNIFICANT CHANGE UP (ref 25–170)
CO2 SERPL-SCNC: 25 MMOL/L — SIGNIFICANT CHANGE UP (ref 22–31)
CREAT SERPL-MCNC: 0.65 MG/DL — SIGNIFICANT CHANGE UP (ref 0.5–1.3)
GLUCOSE SERPL-MCNC: 99 MG/DL — SIGNIFICANT CHANGE UP (ref 70–99)
POTASSIUM SERPL-MCNC: 4.7 MMOL/L — SIGNIFICANT CHANGE UP (ref 3.5–5.3)
POTASSIUM SERPL-SCNC: 4.7 MMOL/L — SIGNIFICANT CHANGE UP (ref 3.5–5.3)
SODIUM SERPL-SCNC: 146 MMOL/L — HIGH (ref 135–145)
T4 FREE SERPL-MCNC: 1.14 NG/DL — SIGNIFICANT CHANGE UP (ref 0.9–1.8)

## 2017-04-10 PROCEDURE — 99239 HOSP IP/OBS DSCHRG MGMT >30: CPT

## 2017-04-10 PROCEDURE — 99232 SBSQ HOSP IP/OBS MODERATE 35: CPT

## 2017-04-10 RX ORDER — LEVOTHYROXINE SODIUM 125 MCG
1 TABLET ORAL
Qty: 30 | Refills: 0 | OUTPATIENT
Start: 2017-04-10

## 2017-04-10 RX ORDER — ATORVASTATIN CALCIUM 80 MG/1
1 TABLET, FILM COATED ORAL
Qty: 0 | Refills: 0 | COMMUNITY
Start: 2017-04-10

## 2017-04-10 RX ORDER — ASPIRIN/CALCIUM CARB/MAGNESIUM 324 MG
1 TABLET ORAL
Qty: 0 | Refills: 0 | DISCHARGE
Start: 2017-04-10

## 2017-04-10 RX ADMIN — BUDESONIDE AND FORMOTEROL FUMARATE DIHYDRATE 2 PUFF(S): 160; 4.5 AEROSOL RESPIRATORY (INHALATION) at 15:23

## 2017-04-10 RX ADMIN — Medication 0.5 MILLIGRAM(S): at 09:23

## 2017-04-10 RX ADMIN — ENOXAPARIN SODIUM 40 MILLIGRAM(S): 100 INJECTION SUBCUTANEOUS at 06:27

## 2017-04-10 RX ADMIN — PANTOPRAZOLE SODIUM 40 MILLIGRAM(S): 20 TABLET, DELAYED RELEASE ORAL at 06:27

## 2017-04-10 RX ADMIN — CLOPIDOGREL BISULFATE 75 MILLIGRAM(S): 75 TABLET, FILM COATED ORAL at 15:22

## 2017-04-10 RX ADMIN — Medication 25 MILLIGRAM(S): at 06:27

## 2017-04-10 RX ADMIN — Medication 100 MILLIGRAM(S): at 06:27

## 2017-04-10 RX ADMIN — Medication 81 MILLIGRAM(S): at 15:22

## 2017-04-10 RX ADMIN — Medication 25 MICROGRAM(S): at 06:27

## 2017-04-10 NOTE — DISCHARGE NOTE ADULT - MEDICATION SUMMARY - MEDICATIONS TO TAKE
I will START or STAY ON the medications listed below when I get home from the hospital:    aspirin 81 mg oral delayed release tablet  -- 1 tab(s) by mouth once a day  -- Indication: For CAD (coronary artery disease)    lisinopril 5 mg oral tablet  -- 1 tab(s) by mouth once a day  -- Indication: For Systolic heart failure    clonazePAM 0.5 mg oral tablet  -- 1 tab(s) by mouth once a day  -- Indication: For MDD (major depressive disorder) with anxiety    clonazePAM 1 mg oral tablet  -- 1 tab(s) by mouth once a day (at bedtime)  -- Indication: For MDD (major depressive disorder) with anxiety    atorvastatin 40 mg oral tablet  -- 1 tab(s) by mouth once a day (at bedtime)  -- Indication: For hyperlipidemia    clopidogrel 75 mg oral tablet  -- 1 tab(s) by mouth once a day  -- Indication: For CAD (coronary artery disease)    QUEtiapine  -- 75 milligram(s) by mouth once a day (at bedtime)  -- Indication: For MDD (major depressive disorder)    metoprolol succinate 25 mg oral tablet, extended release  -- 1 tab(s) by mouth once a day  -- Indication: For CAD (coronary artery disease)    Advair Diskus 500 mcg-50 mcg inhalation powder  -- 1 puff(s) inhaled 2 times a day  -- Indication: For Asthma    Proventil HFA 90 mcg/inh inhalation aerosol  -- 2 puff(s) inhaled 4 times a day, As Needed  -- Indication: For Asthma    senna oral tablet  -- 2 tab(s) by mouth once a day (at bedtime)  -- Indication: For Constipation    docusate sodium 100 mg oral capsule  -- 1 cap(s) by mouth 2 times a day  -- Indication: For Constipation    pantoprazole 40 mg oral delayed release tablet  -- 1 tab(s) by mouth once a day (before a meal)  -- Indication: For Acid reflux    levothyroxine 25 mcg (0.025 mg) oral tablet  -- 1 tab(s) by mouth once a day  -- Indication: For Thyroid function study abnormality

## 2017-04-10 NOTE — DISCHARGE NOTE ADULT - MEDICATION SUMMARY - MEDICATIONS TO STOP TAKING
I will STOP taking the medications listed below when I get home from the hospital:    venlafaxine 150 mg oral capsule, extended release  -- 1 cap(s) by mouth once a day    lithium 150 mg oral capsule  -- 1 cap(s) by mouth once a day    lithium 300 mg oral capsule  -- 1 cap(s) by mouth once a day (at bedtime)

## 2017-04-10 NOTE — DISCHARGE NOTE ADULT - HOSPITAL COURSE
67F PMH MDD with psychotic features, CAD with multiple PCI, recent NSTEMI and stent thrombosis, HFrEF (25%), asthma, hypothyroid, brought to the ED with lethargy. On arrival was found to have lithium toxicity and low TSH. CT head was negative for acute pathology .   Lithium toxicity-- evaluated by psychiatry. Lithium held. Effexor and Seroquel also held due to concern for seratonin syndrome. Continued on Klonopin.  Mental status improved.  Behaviorally controlled during admission. Conferred with patient's private psychiatrist, Dr. Garduno, restarted Seroquel on discharge. Follow up appointment arranged within 2 days of d/c home.  Low TSH-- Endocrine consulted. Synthroid dose decreased to 25 mcg. Outpt f/u with endocrine for repeat labs planned.   CAD with stents and HFrEF-- continued on home dosed of lisinopril, metoprolol, atorvastatin, ASA and plavix.   Discharged home in stable condition with home care resumed. 67F PMH MDD with psychotic features, CAD with multiple PCI, recent NSTEMI and stent thrombosis, HFrEF (25%), asthma, hypothyroid, brought to the ED with lethargy. On arrival was found to have lithium toxicity and low TSH. CT head was negative for acute pathology .   Lithium toxicity-- evaluated by psychiatry. Lithium held. Effexor and Seroquel also held due to concern for seratonin syndrome. Continued on Klonopin.  Mental status improved.  Behaviorally controlled during admission. Conferred with patient's private psychiatrist, Dr. Garduno, restarted Seroquel on discharge. Follow up appointment arranged within 2 days of d/c home.  Low TSH-- Endocrine consulted. Synthroid dose decreased to 25 mcg. Outpt f/u with endocrine for repeat labs planned.   CAD with stents and HFrEF-- continued on home dosed of lisinopril, metoprolol, atorvastatin, ASA and plavix.   Discharged home in stable condition with home care resumed.  Medically optimized for discharge to home.

## 2017-04-10 NOTE — DISCHARGE NOTE ADULT - CARE PLAN
Principal Discharge DX:	Lithium toxicity  Goal:	Continued follow up with psychiatry for medication management  Instructions for follow-up, activity and diet:	You were admitted with a high lithium level that was affecting your mental status. Lithium and Effexor were stopped. You have an appointment with Dr. Garduno, your psychiatrist on Thursday, April 13 at 11am. Continue to take Seroquel at bedtime and Klonopin twice a day as directed. Report to the emergency room immediately if you have suicidal thoughts or plans  Secondary Diagnosis:	MDD (major depressive disorder)  Goal:	Control of depression  Instructions for follow-up, activity and diet:	Continue to take Seroquel and Klonopin. Follow up with your psychiatrist as noted above  Secondary Diagnosis:	Hypothyroidism, unspecified type  Goal:	Normal thyroid studies  Instructions for follow-up, activity and diet:	Your Synthroid dose was reduced based on your blood levels. Please continue to take this lower dose and follow up with endocrinologist, Dr. Carrera,  for a check up within 2-4 weeks. You will need a repeat thyroid blood test in 4-6 weeks

## 2017-04-10 NOTE — DISCHARGE NOTE ADULT - MEDICATION SUMMARY - MEDICATIONS TO CHANGE
I will SWITCH the dose or number of times a day I take the medications listed below when I get home from the hospital:    levothyroxine 75 mcg (0.075 mg) oral tablet  -- 1 tab(s) by mouth once a day

## 2017-04-10 NOTE — DISCHARGE NOTE ADULT - CARE PROVIDER_API CALL
Jamal Carrera), EndocrinologyMetabDiabetes; Internal Medicine  [address] [place] [address]  Phone: [phone]  Fax: [phone]    Leesa Garduno), Psychiatry  [address] [place] [address]  Phone: [phone]  Fax: [phone]

## 2017-04-10 NOTE — DISCHARGE NOTE ADULT - PLAN OF CARE
Continued follow up with psychiatry for medication management You were admitted with a high lithium level that was affecting your mental status. Lithium and Effexor were stopped. You have an appointment with Dr. Garduno, your psychiatrist on Thursday, April 13 at 11am. Continue to take Seroquel at bedtime and Klonopin twice a day as directed. Report to the emergency room immediately if you have suicidal thoughts or plans Control of depression Continue to take Seroquel and Klonopin. Follow up with your psychiatrist as noted above Normal thyroid studies Your Synthroid dose was reduced based on your blood levels. Please continue to take this lower dose and follow up with endocrinologist, Dr. Carrera,  for a check up within 2-4 weeks. You will need a repeat thyroid blood test in 4-6 weeks

## 2017-04-10 NOTE — DISCHARGE NOTE ADULT - PATIENT PORTAL LINK FT
“You can access the [facility] Patient Portal, offered by [facility], by registering with the following website: [email or web address]”

## 2017-04-13 ENCOUNTER — OUTPATIENT (OUTPATIENT)
Dept: OUTPATIENT SERVICES | Facility: HOSPITAL | Age: 67
LOS: 1 days | Discharge: HOME | End: 2017-04-13
Payer: MEDICARE

## 2017-04-13 DIAGNOSIS — N81.6 RECTOCELE: Chronic | ICD-10-CM

## 2017-04-13 DIAGNOSIS — J98.6 DISORDERS OF DIAPHRAGM: Chronic | ICD-10-CM

## 2017-04-18 PROCEDURE — 99214 OFFICE O/P EST MOD 30 MIN: CPT

## 2017-04-18 PROCEDURE — 90834 PSYTX W PT 45 MINUTES: CPT

## 2017-04-25 DIAGNOSIS — F33.41 MAJOR DEPRESSIVE DISORDER, RECURRENT, IN PARTIAL REMISSION: ICD-10-CM

## 2017-04-27 PROCEDURE — 90834 PSYTX W PT 45 MINUTES: CPT

## 2017-05-06 NOTE — PROVIDER CONTACT NOTE (OTHER) - RECOMMENDATIONS
EKG
Pain medication
administer another bolus of 100ml sodium chloride
reschedule morning meds
reschedule morning meds
(4) no impairment

## 2017-05-18 ENCOUNTER — APPOINTMENT (OUTPATIENT)
Dept: PULMONOLOGY | Facility: CLINIC | Age: 67
End: 2017-05-18

## 2017-05-18 VITALS
OXYGEN SATURATION: 97 % | SYSTOLIC BLOOD PRESSURE: 110 MMHG | WEIGHT: 95 LBS | HEART RATE: 86 BPM | HEIGHT: 56 IN | RESPIRATION RATE: 17 BRPM | DIASTOLIC BLOOD PRESSURE: 70 MMHG | BODY MASS INDEX: 21.37 KG/M2

## 2017-05-18 RX ORDER — MONTELUKAST SODIUM 10 MG/1
10 TABLET, FILM COATED ORAL
Qty: 3 | Refills: 1 | Status: DISCONTINUED | COMMUNITY
Start: 2017-01-18 | End: 2017-05-18

## 2017-05-18 RX ORDER — FLUTICASONE PROPIONATE AND SALMETEROL 50; 250 UG/1; UG/1
250-50 POWDER RESPIRATORY (INHALATION)
Qty: 3 | Refills: 1 | Status: DISCONTINUED | COMMUNITY
Start: 2017-01-18 | End: 2017-05-18

## 2017-05-26 PROCEDURE — 90834 PSYTX W PT 45 MINUTES: CPT

## 2017-05-26 PROCEDURE — 99214 OFFICE O/P EST MOD 30 MIN: CPT

## 2017-06-12 ENCOUNTER — APPOINTMENT (OUTPATIENT)
Dept: RADIOLOGY | Facility: IMAGING CENTER | Age: 67
End: 2017-06-12

## 2017-06-12 ENCOUNTER — OUTPATIENT (OUTPATIENT)
Dept: OUTPATIENT SERVICES | Facility: HOSPITAL | Age: 67
LOS: 1 days | End: 2017-06-12
Payer: MEDICARE

## 2017-06-12 DIAGNOSIS — N81.6 RECTOCELE: Chronic | ICD-10-CM

## 2017-06-12 DIAGNOSIS — J98.6 DISORDERS OF DIAPHRAGM: Chronic | ICD-10-CM

## 2017-06-12 DIAGNOSIS — Z00.8 ENCOUNTER FOR OTHER GENERAL EXAMINATION: ICD-10-CM

## 2017-06-12 PROCEDURE — 99214 OFFICE O/P EST MOD 30 MIN: CPT

## 2017-06-12 PROCEDURE — 90834 PSYTX W PT 45 MINUTES: CPT

## 2017-06-12 PROCEDURE — 77080 DXA BONE DENSITY AXIAL: CPT

## 2017-06-26 PROCEDURE — 99214 OFFICE O/P EST MOD 30 MIN: CPT

## 2017-06-29 ENCOUNTER — MEDICATION RENEWAL (OUTPATIENT)
Age: 67
End: 2017-06-29

## 2017-07-06 PROCEDURE — 90832 PSYTX W PT 30 MINUTES: CPT

## 2017-07-24 PROCEDURE — 99214 OFFICE O/P EST MOD 30 MIN: CPT

## 2017-07-24 PROCEDURE — 90834 PSYTX W PT 45 MINUTES: CPT

## 2017-08-08 ENCOUNTER — INPATIENT (INPATIENT)
Facility: HOSPITAL | Age: 67
LOS: 1 days | Discharge: HOME | End: 2017-08-10
Attending: HOSPITALIST | Admitting: HOSPITALIST
Payer: MEDICARE

## 2017-08-08 VITALS
TEMPERATURE: 98 F | RESPIRATION RATE: 18 BRPM | HEART RATE: 106 BPM | OXYGEN SATURATION: 99 % | SYSTOLIC BLOOD PRESSURE: 113 MMHG | DIASTOLIC BLOOD PRESSURE: 69 MMHG

## 2017-08-08 DIAGNOSIS — J98.6 DISORDERS OF DIAPHRAGM: Chronic | ICD-10-CM

## 2017-08-08 DIAGNOSIS — J45.909 UNSPECIFIED ASTHMA, UNCOMPLICATED: ICD-10-CM

## 2017-08-08 DIAGNOSIS — R74.0 NONSPECIFIC ELEVATION OF LEVELS OF TRANSAMINASE AND LACTIC ACID DEHYDROGENASE [LDH]: ICD-10-CM

## 2017-08-08 DIAGNOSIS — N81.6 RECTOCELE: Chronic | ICD-10-CM

## 2017-08-08 DIAGNOSIS — E03.9 HYPOTHYROIDISM, UNSPECIFIED: ICD-10-CM

## 2017-08-08 DIAGNOSIS — K52.9 NONINFECTIVE GASTROENTERITIS AND COLITIS, UNSPECIFIED: ICD-10-CM

## 2017-08-08 DIAGNOSIS — F32.9 MAJOR DEPRESSIVE DISORDER, SINGLE EPISODE, UNSPECIFIED: ICD-10-CM

## 2017-08-08 DIAGNOSIS — Z29.9 ENCOUNTER FOR PROPHYLACTIC MEASURES, UNSPECIFIED: ICD-10-CM

## 2017-08-08 DIAGNOSIS — R63.8 OTHER SYMPTOMS AND SIGNS CONCERNING FOOD AND FLUID INTAKE: ICD-10-CM

## 2017-08-08 DIAGNOSIS — R10.32 LEFT LOWER QUADRANT PAIN: ICD-10-CM

## 2017-08-08 DIAGNOSIS — I25.10 ATHEROSCLEROTIC HEART DISEASE OF NATIVE CORONARY ARTERY WITHOUT ANGINA PECTORIS: ICD-10-CM

## 2017-08-08 LAB
ALBUMIN SERPL ELPH-MCNC: 4.2 G/DL — SIGNIFICANT CHANGE UP (ref 3.3–5)
ALP SERPL-CCNC: 267 U/L — HIGH (ref 40–120)
ALT FLD-CCNC: 188 U/L — HIGH (ref 4–33)
APAP SERPL-MCNC: < 15 UG/ML — LOW (ref 15–25)
APPEARANCE UR: CLEAR — SIGNIFICANT CHANGE UP
APTT BLD: 33.5 SEC — SIGNIFICANT CHANGE UP (ref 27.5–37.4)
AST SERPL-CCNC: 150 U/L — HIGH (ref 4–32)
BASOPHILS # BLD AUTO: 0.03 K/UL — SIGNIFICANT CHANGE UP (ref 0–0.2)
BASOPHILS NFR BLD AUTO: 0.3 % — SIGNIFICANT CHANGE UP (ref 0–2)
BILIRUB SERPL-MCNC: 0.6 MG/DL — SIGNIFICANT CHANGE UP (ref 0.2–1.2)
BILIRUB UR-MCNC: NEGATIVE — SIGNIFICANT CHANGE UP
BLOOD UR QL VISUAL: NEGATIVE — SIGNIFICANT CHANGE UP
BUN SERPL-MCNC: 17 MG/DL — SIGNIFICANT CHANGE UP (ref 7–23)
CALCIUM SERPL-MCNC: 9.5 MG/DL — SIGNIFICANT CHANGE UP (ref 8.4–10.5)
CHLORIDE SERPL-SCNC: 104 MMOL/L — SIGNIFICANT CHANGE UP (ref 98–107)
CK MB BLD-MCNC: 1.74 NG/ML — SIGNIFICANT CHANGE UP (ref 1–4.7)
CK MB BLD-MCNC: 2.03 NG/ML — SIGNIFICANT CHANGE UP (ref 1–4.7)
CK MB BLD-MCNC: SIGNIFICANT CHANGE UP (ref 0–2.5)
CK MB BLD-MCNC: SIGNIFICANT CHANGE UP (ref 0–2.5)
CK SERPL-CCNC: 126 U/L — SIGNIFICANT CHANGE UP (ref 25–170)
CK SERPL-CCNC: 58 U/L — SIGNIFICANT CHANGE UP (ref 25–170)
CO2 SERPL-SCNC: 24 MMOL/L — SIGNIFICANT CHANGE UP (ref 22–31)
COLOR SPEC: SIGNIFICANT CHANGE UP
CREAT SERPL-MCNC: 0.86 MG/DL — SIGNIFICANT CHANGE UP (ref 0.5–1.3)
EOSINOPHIL # BLD AUTO: 0.25 K/UL — SIGNIFICANT CHANGE UP (ref 0–0.5)
EOSINOPHIL NFR BLD AUTO: 2.2 % — SIGNIFICANT CHANGE UP (ref 0–6)
GLUCOSE SERPL-MCNC: 96 MG/DL — SIGNIFICANT CHANGE UP (ref 70–99)
GLUCOSE UR-MCNC: NEGATIVE — SIGNIFICANT CHANGE UP
HCT VFR BLD CALC: 43.6 % — SIGNIFICANT CHANGE UP (ref 34.5–45)
HGB BLD-MCNC: 14.1 G/DL — SIGNIFICANT CHANGE UP (ref 11.5–15.5)
IMM GRANULOCYTES # BLD AUTO: 0.04 # — SIGNIFICANT CHANGE UP
IMM GRANULOCYTES NFR BLD AUTO: 0.4 % — SIGNIFICANT CHANGE UP (ref 0–1.5)
INR BLD: 0.91 — SIGNIFICANT CHANGE UP (ref 0.88–1.17)
KETONES UR-MCNC: NEGATIVE — SIGNIFICANT CHANGE UP
LEUKOCYTE ESTERASE UR-ACNC: NEGATIVE — SIGNIFICANT CHANGE UP
LYMPHOCYTES # BLD AUTO: 1.67 K/UL — SIGNIFICANT CHANGE UP (ref 1–3.3)
LYMPHOCYTES # BLD AUTO: 14.7 % — SIGNIFICANT CHANGE UP (ref 13–44)
MCHC RBC-ENTMCNC: 29.3 PG — SIGNIFICANT CHANGE UP (ref 27–34)
MCHC RBC-ENTMCNC: 32.3 % — SIGNIFICANT CHANGE UP (ref 32–36)
MCV RBC AUTO: 90.5 FL — SIGNIFICANT CHANGE UP (ref 80–100)
MONOCYTES # BLD AUTO: 0.7 K/UL — SIGNIFICANT CHANGE UP (ref 0–0.9)
MONOCYTES NFR BLD AUTO: 6.1 % — SIGNIFICANT CHANGE UP (ref 2–14)
MUCOUS THREADS # UR AUTO: SIGNIFICANT CHANGE UP
NEUTROPHILS # BLD AUTO: 8.7 K/UL — HIGH (ref 1.8–7.4)
NEUTROPHILS NFR BLD AUTO: 76.3 % — SIGNIFICANT CHANGE UP (ref 43–77)
NITRITE UR-MCNC: NEGATIVE — SIGNIFICANT CHANGE UP
NRBC # FLD: 0 — SIGNIFICANT CHANGE UP
PH UR: 6 — SIGNIFICANT CHANGE UP (ref 4.6–8)
PLATELET # BLD AUTO: 323 K/UL — SIGNIFICANT CHANGE UP (ref 150–400)
PMV BLD: 9.8 FL — SIGNIFICANT CHANGE UP (ref 7–13)
POTASSIUM SERPL-MCNC: SIGNIFICANT CHANGE UP MMOL/L (ref 3.5–5.3)
POTASSIUM SERPL-SCNC: SIGNIFICANT CHANGE UP MMOL/L (ref 3.5–5.3)
PROT SERPL-MCNC: 8.1 G/DL — SIGNIFICANT CHANGE UP (ref 6–8.3)
PROT UR-MCNC: NEGATIVE — SIGNIFICANT CHANGE UP
PROTHROM AB SERPL-ACNC: 10.2 SEC — SIGNIFICANT CHANGE UP (ref 9.8–13.1)
RBC # BLD: 4.82 M/UL — SIGNIFICANT CHANGE UP (ref 3.8–5.2)
RBC # FLD: 15.6 % — HIGH (ref 10.3–14.5)
RBC CASTS # UR COMP ASSIST: SIGNIFICANT CHANGE UP (ref 0–?)
SODIUM SERPL-SCNC: 140 MMOL/L — SIGNIFICANT CHANGE UP (ref 135–145)
SP GR SPEC: 1.01 — SIGNIFICANT CHANGE UP (ref 1–1.03)
SQUAMOUS # UR AUTO: SIGNIFICANT CHANGE UP
TROPONIN T SERPL-MCNC: < 0.06 NG/ML — SIGNIFICANT CHANGE UP (ref 0–0.06)
TROPONIN T SERPL-MCNC: < 0.06 NG/ML — SIGNIFICANT CHANGE UP (ref 0–0.06)
UROBILINOGEN FLD QL: NORMAL E.U. — SIGNIFICANT CHANGE UP (ref 0.1–0.2)
WBC # BLD: 11.39 K/UL — HIGH (ref 3.8–10.5)
WBC # FLD AUTO: 11.39 K/UL — HIGH (ref 3.8–10.5)
WBC UR QL: SIGNIFICANT CHANGE UP (ref 0–?)

## 2017-08-08 PROCEDURE — 71020: CPT | Mod: 26

## 2017-08-08 PROCEDURE — 74177 CT ABD & PELVIS W/CONTRAST: CPT | Mod: 26

## 2017-08-08 PROCEDURE — 99223 1ST HOSP IP/OBS HIGH 75: CPT

## 2017-08-08 RX ORDER — FLUTICASONE PROPIONATE AND SALMETEROL 50; 250 UG/1; UG/1
1 POWDER ORAL; RESPIRATORY (INHALATION)
Qty: 0 | Refills: 0 | COMMUNITY

## 2017-08-08 RX ORDER — METRONIDAZOLE 500 MG
1 TABLET ORAL
Qty: 30 | Refills: 0 | OUTPATIENT
Start: 2017-08-08 | End: 2017-08-18

## 2017-08-08 RX ORDER — ALBUTEROL 90 UG/1
2 AEROSOL, METERED ORAL EVERY 6 HOURS
Qty: 0 | Refills: 0 | Status: DISCONTINUED | OUTPATIENT
Start: 2017-08-08 | End: 2017-08-10

## 2017-08-08 RX ORDER — CLOPIDOGREL BISULFATE 75 MG/1
75 TABLET, FILM COATED ORAL DAILY
Qty: 0 | Refills: 0 | Status: DISCONTINUED | OUTPATIENT
Start: 2017-08-08 | End: 2017-08-10

## 2017-08-08 RX ORDER — PANTOPRAZOLE SODIUM 20 MG/1
40 TABLET, DELAYED RELEASE ORAL
Qty: 0 | Refills: 0 | Status: DISCONTINUED | OUTPATIENT
Start: 2017-08-08 | End: 2017-08-10

## 2017-08-08 RX ORDER — CIPROFLOXACIN LACTATE 400MG/40ML
400 VIAL (ML) INTRAVENOUS EVERY 12 HOURS
Qty: 0 | Refills: 0 | Status: DISCONTINUED | OUTPATIENT
Start: 2017-08-08 | End: 2017-08-08

## 2017-08-08 RX ORDER — LISINOPRIL 2.5 MG/1
2.5 TABLET ORAL DAILY
Qty: 0 | Refills: 0 | Status: DISCONTINUED | OUTPATIENT
Start: 2017-08-08 | End: 2017-08-09

## 2017-08-08 RX ORDER — CIPROFLOXACIN LACTATE 400MG/40ML
500 VIAL (ML) INTRAVENOUS ONCE
Qty: 0 | Refills: 0 | Status: COMPLETED | OUTPATIENT
Start: 2017-08-08 | End: 2017-08-08

## 2017-08-08 RX ORDER — CYCLOBENZAPRINE HYDROCHLORIDE 10 MG/1
5 TABLET, FILM COATED ORAL ONCE
Qty: 0 | Refills: 0 | Status: DISCONTINUED | OUTPATIENT
Start: 2017-08-08 | End: 2017-08-08

## 2017-08-08 RX ORDER — CLONAZEPAM 1 MG
0.5 TABLET ORAL
Qty: 0 | Refills: 0 | Status: DISCONTINUED | OUTPATIENT
Start: 2017-08-08 | End: 2017-08-09

## 2017-08-08 RX ORDER — SPIRONOLACTONE 25 MG/1
12.5 TABLET, FILM COATED ORAL AT BEDTIME
Qty: 0 | Refills: 0 | Status: DISCONTINUED | OUTPATIENT
Start: 2017-08-08 | End: 2017-08-10

## 2017-08-08 RX ORDER — LEVOTHYROXINE SODIUM 125 MCG
88 TABLET ORAL DAILY
Qty: 0 | Refills: 0 | Status: DISCONTINUED | OUTPATIENT
Start: 2017-08-08 | End: 2017-08-10

## 2017-08-08 RX ORDER — QUETIAPINE FUMARATE 200 MG/1
100 TABLET, FILM COATED ORAL AT BEDTIME
Qty: 0 | Refills: 0 | Status: DISCONTINUED | OUTPATIENT
Start: 2017-08-08 | End: 2017-08-10

## 2017-08-08 RX ORDER — ASPIRIN/CALCIUM CARB/MAGNESIUM 324 MG
81 TABLET ORAL DAILY
Qty: 0 | Refills: 0 | Status: DISCONTINUED | OUTPATIENT
Start: 2017-08-08 | End: 2017-08-10

## 2017-08-08 RX ORDER — METRONIDAZOLE 500 MG
500 TABLET ORAL ONCE
Qty: 0 | Refills: 0 | Status: COMPLETED | OUTPATIENT
Start: 2017-08-08 | End: 2017-08-08

## 2017-08-08 RX ORDER — METRONIDAZOLE 500 MG
500 TABLET ORAL EVERY 8 HOURS
Qty: 0 | Refills: 0 | Status: DISCONTINUED | OUTPATIENT
Start: 2017-08-08 | End: 2017-08-10

## 2017-08-08 RX ORDER — SODIUM CHLORIDE 9 MG/ML
1000 INJECTION INTRAMUSCULAR; INTRAVENOUS; SUBCUTANEOUS ONCE
Qty: 0 | Refills: 0 | Status: COMPLETED | OUTPATIENT
Start: 2017-08-08 | End: 2017-08-08

## 2017-08-08 RX ORDER — CIPROFLOXACIN LACTATE 400MG/40ML
1 VIAL (ML) INTRAVENOUS
Qty: 20 | Refills: 0 | OUTPATIENT
Start: 2017-08-08 | End: 2017-08-18

## 2017-08-08 RX ORDER — QUETIAPINE FUMARATE 200 MG/1
25 TABLET, FILM COATED ORAL DAILY
Qty: 0 | Refills: 0 | Status: DISCONTINUED | OUTPATIENT
Start: 2017-08-08 | End: 2017-08-10

## 2017-08-08 RX ORDER — HYOSCYAMINE SULFATE 0.13 MG
0.12 TABLET ORAL ONCE
Qty: 0 | Refills: 0 | Status: COMPLETED | OUTPATIENT
Start: 2017-08-08 | End: 2017-08-09

## 2017-08-08 RX ORDER — CIPROFLOXACIN LACTATE 400MG/40ML
400 VIAL (ML) INTRAVENOUS EVERY 12 HOURS
Qty: 0 | Refills: 0 | Status: DISCONTINUED | OUTPATIENT
Start: 2017-08-09 | End: 2017-08-10

## 2017-08-08 RX ADMIN — Medication 0.5 MILLIGRAM(S): at 22:09

## 2017-08-08 RX ADMIN — SODIUM CHLORIDE 1000 MILLILITER(S): 9 INJECTION INTRAMUSCULAR; INTRAVENOUS; SUBCUTANEOUS at 19:00

## 2017-08-08 RX ADMIN — Medication 500 MILLIGRAM(S): at 16:20

## 2017-08-08 RX ADMIN — ALBUTEROL 2 PUFF(S): 90 AEROSOL, METERED ORAL at 22:09

## 2017-08-08 RX ADMIN — Medication 100 MILLIGRAM(S): at 22:08

## 2017-08-08 RX ADMIN — QUETIAPINE FUMARATE 100 MILLIGRAM(S): 200 TABLET, FILM COATED ORAL at 22:09

## 2017-08-08 NOTE — H&P ADULT - PROBLEM SELECTOR PLAN 2
Patient with transaminitis of unclear etiology. Will avoid hepatotoxic medications at this time, order hepatitis panel and RUQ US

## 2017-08-08 NOTE — H&P ADULT - NSHPPHYSICALEXAM_GEN_ALL_CORE
Vital Signs Last 24 Hrs  T(C): 36.9 (08 Aug 2017 15:42), Max: 36.9 (08 Aug 2017 12:05)  T(F): 98.4 (08 Aug 2017 15:42), Max: 98.4 (08 Aug 2017 12:05)  HR: 68 (08 Aug 2017 15:42) (68 - 106)  BP: 106/60 (08 Aug 2017 15:42) (102/80 - 113/69)  BP(mean): --  RR: 18 (08 Aug 2017 15:42) (18 - 18)  SpO2: 97% (08 Aug 2017 15:42) (97% - 99%)    General: NAD, non-toxic appearing, speaking in full sentences   HEENT: EOMI, PERRLA, no conjunctival pallor, MMM, no JVD, no thyromegaly, neck supple, trachea midline  CV: S1S2 RRR +systolic murmur  Lungs: CTA BL  Abdomen: soft ND +BS, +TTP LUQ/Epigastrium  Extremities: No CCE +WWP   Skin/MSK: No rashes, preserved ROM on active & passive movement  Neuro: AAOx3, no focal deficits (5/5 strength all extremities), no sensory deficits

## 2017-08-08 NOTE — ED ADULT TRIAGE NOTE - CHIEF COMPLAINT QUOTE
pt c/o LLQ pain and chest tightness x 2 days.  pt says "last time this happened she ended up with 8 stents".

## 2017-08-08 NOTE — PATIENT PROFILE ADULT. - ABILITY TO HEAR (WITH HEARING AID OR HEARING APPLIANCE IF NORMALLY USED):
Mildly to Moderately Impaired: difficulty hearing in some environments or speaker may need to increase volume or speak distinctly/"Deaf in left ear"

## 2017-08-08 NOTE — ED PROVIDER NOTE - PROGRESS NOTE DETAILS
Pt's informed that she needs a good GI f/u due to her complicated hx of diverticulitis requiring sigmoidectomy - pt agrees that she will follow up with one of the GI docs from the referral. ED Attending Dr. Dickerson: transaminitis noted, no RUQ pain on exam; pt to be signed out to Dr. Slade with plan to re-evaluate and likely admit for transaminitis of unknown etiology

## 2017-08-08 NOTE — PATIENT PROFILE ADULT. - VISION (WITH CORRECTIVE LENSES IF THE PATIENT USUALLY WEARS THEM):
Partially impaired: cannot see medication labels or newsprint, but can see obstacles in path, and the surrounding layout; can count fingers at arm's length/"Wears glasses to read fine print"

## 2017-08-08 NOTE — H&P ADULT - NSHPREVIEWOFSYSTEMS_GEN_ALL_CORE
REVIEW OF SYSTEMS:    CONSTITUTIONAL: No weakness, fevers or chills  EYES/ENT: No visual changes;  No vertigo or throat pain   NECK: No pain or stiffness  RESPIRATORY: No cough, wheezing, hemoptysis; (+) dyspnea   CARDIOVASCULAR: No chest pain or palpitations  GASTROINTESTINAL: (+) LUQ/Epigastric/LLQ pain; No nausea, vomiting, or hematemesis; No diarrhea or constipation. No melena or hematochezia.  GENITOURINARY: No dysuria, frequency or hematuria  NEUROLOGICAL: No numbness or weakness  MSK: chronic cramping of legs and fingers   SKIN: No itching, burning, rashes, or lesions   All other review of systems is negative unless indicated above.

## 2017-08-08 NOTE — ED PROVIDER NOTE - OBJECTIVE STATEMENT
67F hx of diverticulitis s/p sigmoidectomy 6yr ago, CAD s/p 8 stents on Plavix and HTN presents with LLQ abdominal pain. Pain started 3d ago and getting worse since it started. Endorsed to nausea but no vomiting, fever or chills. Also denies diarrhea or constipation. Increased urination with urgency but no burning sensation.

## 2017-08-08 NOTE — H&P ADULT - NSHPLABSRESULTS_GEN_ALL_CORE
Complete Blood Count + Automated Diff (08.08.17 @ 12:48)    Nucleated RBC #: 0    WBC Count: 11.39 K/uL    RBC Count: 4.82 M/uL    Hemoglobin: 14.1 g/dL    Hematocrit: 43.6 %    Mean Cell Volume: 90.5 fL    Mean Cell Hemoglobin: 29.3 pg    Mean Cell Hemoglobin Conc: 32.3 %    Red Cell Distrib Width: 15.6 %    Platelet Count - Automated: 323 K/uL    MPV: 9.8 fl    Auto Neutrophil #: 8.70 K/uL    Auto Lymphocyte #: 1.67 K/uL    Auto Monocyte #: 0.70 K/uL    Auto Eosinophil #: 0.25 K/uL    Auto Basophil #: 0.03 K/uL    Auto Immature Granulocyte #: 0.04: (Includes meta, myelo and promyelocytes) #    Auto Neutrophil %: 76.3 %    Auto Lymphocyte %: 14.7 %    Auto Monocyte %: 6.1 %    Auto Eosinophil %: 2.2 %    Auto Basophil %: 0.3 %    Auto Immature Granulocyte %: 0.4: (Includes meta, myelo and promyelocytes) %    08-08    140  |  104  |  17  ----------------------------<  96  x    |  24  |  0.86    Ca    9.5      08 Aug 2017 12:48    TPro  8.1  /  Alb  4.2  /  TBili  0.6  /  DBili  x   /  AST  150<H>  /  ALT  188<H>  /  AlkPhos  267<H>  08-08    < from: CT Abdomen and Pelvis w/ Oral Cont and w/ IV Cont (08.08.17 @ 14:30) >  EXAM:  CT ABDOMEN AND PELVIS OC IC    PROCEDURE DATE:  Aug  8 2017   INTERPRETATION:  CLINICAL INFORMATION: Left lower quadrant pain. Evaluate   for colitis.    COMPARISON: No prior similar studies are available for comparison.    PROCEDURE:   CT of the Abdomen and Pelvis was performed with intravenous contrast.   Intravenous contrast: 90 ml Omnipaque 350. 10 ml discarded.  Oral contrast: positive contrast was administered.  Sagittal and coronal reformats were performed.    FINDINGS:    LOWER CHEST: Minimal left basilar atelectasis.    LIVER: Centimeter hypodensity posterior right hepatic lobe, too small to   characterize.  BILE DUCTS: Normal caliber.  GALLBLADDER: Within normal limits.  SPLEEN: Within normal limits.  PANCREAS: Within normal limits.  ADRENALS: Within normal limits.  KIDNEYS/URETERS: Symmetrically enhance without hydronephrosis.   Subcentimeter left renal hypodense lesion, too small to characterize.   Bilateral extrarenal pelves.    BLADDER: Within normal limits.  REPRODUCTIVE ORGANS: Status post hysterectomy. No adnexal masses.    BOWEL: There is no bowel obstruction. The appendix is unremarkable. There   is increased stool throughout the colon. There is colonic diverticulosis.   There is focal wall thickening of the distal descending colon with   pericolic inflammatory change. No extraluminal gas or discrete pericolic   abscess is seen. Status post rectosigmoid anastomosis is seen..  PERITONEUM: No ascites.  VESSELS:  Atherosclerotic calcifications.  RETROPERITONEUM: No lymphadenopathy.    ABDOMINAL WALL: Within normal limits.  BONES:  Multilevel degenerative changes. Levoscoliosis of the   thoracolumbar spine.    IMPRESSION:   Focal colitis involving the distal descending colon which may be on the   basis of acute diverticulitis. No extraluminal gas or pericolic abscess   seen. Follow-up after appropriate interval of therapy is recommended to   measure resolution as colonic neoplasm can present with a similar   appearance.    RENE BURNS M.D., RADIOLOGY RESIDENT  This document has been electronically signed.  CHUCK MONTALVO M.D., ATTENDING RADIOLOGIST  This document has been electronically signed. Aug  8 2017  3:36PM   < end of copied text >    CXR: Linear subsegmental atelectasis or scar in peripheral left lower lung.   Clear remaining visualized lungs. No pleural effusions or pneumothorax.

## 2017-08-08 NOTE — ED ADULT NURSE NOTE - OBJECTIVE STATEMENT
savanna rn: pt received to room #19 with c/o of LLQ pain radiating to under the left ribs. aox4, skin warm, dry intact. pt had a recent resection as treatment for diverticulitis. c/o nausea, no vomiting. denies sob, diarrhea and urinary symptoms. on HM, Sinus tach. IV placed, labs drawn and sent. seen by MD. report given to primary RN.

## 2017-08-08 NOTE — H&P ADULT - HISTORY OF PRESENT ILLNESS
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 Pain is LUQ/LLQ/Epigastric and radiates to left chest and left shoulder. The pain is affecting her quality of breathing so much that she feels like she is wheezing. No recent travel or sick contacts. No nausea, diarrhea, or constipation.    In the ED patient was given Cipro 500 mg IV x 1 (16:00), and Flagyl 500 mg IV x 1 (16:00).    Patient seen at bedside. She continues to echo abdominal pain, she also is requesting muscular relaxants for her cramps that she feels in her legs.

## 2017-08-08 NOTE — H&P ADULT - PROBLEM SELECTOR PLAN 1
Patient with radiographic evidence of focal colitis in the setting of elevated wbc and abdominal pain.  - Empirically treat with Cipro/Flagyl  - Monitor CBC daily  - Monitor vitals q4h  - Follow up BCx and UCx

## 2017-08-08 NOTE — ED PROVIDER NOTE - ATTENDING CONTRIBUTION TO CARE
ED Attending Dr. Dickerson: 68 yo female with PMH diverticulitis s/p sigmoidectomy in past, CAD s/p 8 stents 6 months ago on Plavix, HTN, in ED with 3 days of LLQ abdominal pain worsening and with some nausea but no vomiting.  Pt notes pain radiating toward chest as well but no CP/SOB ED Attending Dr. Dickerson: 68 yo female with PMH diverticulitis s/p sigmoidectomy in past, CAD s/p 8 stents 6 months ago on Plavix, HTN, in ED with 3 days of LLQ abdominal pain worsening and with some nausea but no vomiting.  Pt notes pain radiating toward chest as well but no CP/SOB.  On exam pt overall well appearing, heart RRR, lungs CTAB, abd LLQ TTP, extremities without swelling, strength 5/5 in all extremities and skin without rash.

## 2017-08-08 NOTE — ED PROVIDER NOTE - MEDICAL DECISION MAKING DETAILS
LLQ abd pain - r/o MI, diverticulitis, 2 sets, Basics, Pain meds, Reassess. LLQ abd pain - r/o MI, diverticulitis, 2 sets CE, Basics, Pain meds, Reassess.

## 2017-08-08 NOTE — H&P ADULT - ASSESSMENT
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 Patient to be admitted for antibiotic therapy for colitis findings on CT abdomen as well as evaluation of transaminitis

## 2017-08-09 LAB
ALBUMIN SERPL ELPH-MCNC: 3.6 G/DL — SIGNIFICANT CHANGE UP (ref 3.3–5)
ALP SERPL-CCNC: 222 U/L — HIGH (ref 40–120)
ALT FLD-CCNC: 127 U/L — HIGH (ref 4–33)
AST SERPL-CCNC: 72 U/L — HIGH (ref 4–32)
BASOPHILS # BLD AUTO: 0.03 K/UL — SIGNIFICANT CHANGE UP (ref 0–0.2)
BASOPHILS NFR BLD AUTO: 0.4 % — SIGNIFICANT CHANGE UP (ref 0–2)
BILIRUB SERPL-MCNC: 0.7 MG/DL — SIGNIFICANT CHANGE UP (ref 0.2–1.2)
BUN SERPL-MCNC: 12 MG/DL — SIGNIFICANT CHANGE UP (ref 7–23)
CALCIUM SERPL-MCNC: 9.4 MG/DL — SIGNIFICANT CHANGE UP (ref 8.4–10.5)
CHLORIDE SERPL-SCNC: 105 MMOL/L — SIGNIFICANT CHANGE UP (ref 98–107)
CHOLEST SERPL-MCNC: 155 MG/DL — SIGNIFICANT CHANGE UP (ref 120–199)
CO2 SERPL-SCNC: 26 MMOL/L — SIGNIFICANT CHANGE UP (ref 22–31)
CREAT SERPL-MCNC: 0.76 MG/DL — SIGNIFICANT CHANGE UP (ref 0.5–1.3)
EOSINOPHIL # BLD AUTO: 0.2 K/UL — SIGNIFICANT CHANGE UP (ref 0–0.5)
EOSINOPHIL NFR BLD AUTO: 2.6 % — SIGNIFICANT CHANGE UP (ref 0–6)
GLUCOSE SERPL-MCNC: 87 MG/DL — SIGNIFICANT CHANGE UP (ref 70–99)
HCT VFR BLD CALC: 37.8 % — SIGNIFICANT CHANGE UP (ref 34.5–45)
HDLC SERPL-MCNC: 89 MG/DL — HIGH (ref 45–65)
HGB BLD-MCNC: 12.3 G/DL — SIGNIFICANT CHANGE UP (ref 11.5–15.5)
IMM GRANULOCYTES # BLD AUTO: 0.02 # — SIGNIFICANT CHANGE UP
IMM GRANULOCYTES NFR BLD AUTO: 0.3 % — SIGNIFICANT CHANGE UP (ref 0–1.5)
LIDOCAIN IGE QN: 31.7 U/L — SIGNIFICANT CHANGE UP (ref 7–60)
LIPID PNL WITH DIRECT LDL SERPL: 63 MG/DL — SIGNIFICANT CHANGE UP
LYMPHOCYTES # BLD AUTO: 1.68 K/UL — SIGNIFICANT CHANGE UP (ref 1–3.3)
LYMPHOCYTES # BLD AUTO: 21.8 % — SIGNIFICANT CHANGE UP (ref 13–44)
MAGNESIUM SERPL-MCNC: 1.9 MG/DL — SIGNIFICANT CHANGE UP (ref 1.6–2.6)
MCHC RBC-ENTMCNC: 29.7 PG — SIGNIFICANT CHANGE UP (ref 27–34)
MCHC RBC-ENTMCNC: 32.5 % — SIGNIFICANT CHANGE UP (ref 32–36)
MCV RBC AUTO: 91.3 FL — SIGNIFICANT CHANGE UP (ref 80–100)
MONOCYTES # BLD AUTO: 0.68 K/UL — SIGNIFICANT CHANGE UP (ref 0–0.9)
MONOCYTES NFR BLD AUTO: 8.8 % — SIGNIFICANT CHANGE UP (ref 2–14)
NEUTROPHILS # BLD AUTO: 5.08 K/UL — SIGNIFICANT CHANGE UP (ref 1.8–7.4)
NEUTROPHILS NFR BLD AUTO: 66.1 % — SIGNIFICANT CHANGE UP (ref 43–77)
NRBC # FLD: 0 — SIGNIFICANT CHANGE UP
PHOSPHATE SERPL-MCNC: 3.1 MG/DL — SIGNIFICANT CHANGE UP (ref 2.5–4.5)
PLATELET # BLD AUTO: 269 K/UL — SIGNIFICANT CHANGE UP (ref 150–400)
PMV BLD: 9.7 FL — SIGNIFICANT CHANGE UP (ref 7–13)
POTASSIUM SERPL-MCNC: 3.8 MMOL/L — SIGNIFICANT CHANGE UP (ref 3.5–5.3)
POTASSIUM SERPL-SCNC: 3.8 MMOL/L — SIGNIFICANT CHANGE UP (ref 3.5–5.3)
PROT SERPL-MCNC: 6.2 G/DL — SIGNIFICANT CHANGE UP (ref 6–8.3)
RBC # BLD: 4.14 M/UL — SIGNIFICANT CHANGE UP (ref 3.8–5.2)
RBC # FLD: 15.9 % — HIGH (ref 10.3–14.5)
SODIUM SERPL-SCNC: 143 MMOL/L — SIGNIFICANT CHANGE UP (ref 135–145)
SPECIMEN SOURCE: SIGNIFICANT CHANGE UP
TRIGL SERPL-MCNC: 52 MG/DL — SIGNIFICANT CHANGE UP (ref 10–149)
WBC # BLD: 7.69 K/UL — SIGNIFICANT CHANGE UP (ref 3.8–10.5)
WBC # FLD AUTO: 7.69 K/UL — SIGNIFICANT CHANGE UP (ref 3.8–10.5)

## 2017-08-09 PROCEDURE — 76705 ECHO EXAM OF ABDOMEN: CPT | Mod: 26

## 2017-08-09 PROCEDURE — 99233 SBSQ HOSP IP/OBS HIGH 50: CPT

## 2017-08-09 RX ORDER — CLONAZEPAM 1 MG
0.5 TABLET ORAL
Qty: 0 | Refills: 0 | Status: DISCONTINUED | OUTPATIENT
Start: 2017-08-09 | End: 2017-08-10

## 2017-08-09 RX ORDER — CLONAZEPAM 1 MG
1 TABLET ORAL AT BEDTIME
Qty: 0 | Refills: 0 | Status: DISCONTINUED | OUTPATIENT
Start: 2017-08-09 | End: 2017-08-10

## 2017-08-09 RX ORDER — HYOSCYAMINE SULFATE 0.13 MG
0.12 TABLET ORAL ONCE
Qty: 0 | Refills: 0 | Status: COMPLETED | OUTPATIENT
Start: 2017-08-09 | End: 2017-08-09

## 2017-08-09 RX ADMIN — Medication 100 MILLIGRAM(S): at 22:17

## 2017-08-09 RX ADMIN — Medication 200 MILLIGRAM(S): at 19:32

## 2017-08-09 RX ADMIN — Medication 0.5 MILLIGRAM(S): at 09:50

## 2017-08-09 RX ADMIN — Medication 81 MILLIGRAM(S): at 12:43

## 2017-08-09 RX ADMIN — Medication 200 MILLIGRAM(S): at 07:49

## 2017-08-09 RX ADMIN — Medication 100 MILLIGRAM(S): at 06:54

## 2017-08-09 RX ADMIN — ALBUTEROL 2 PUFF(S): 90 AEROSOL, METERED ORAL at 14:19

## 2017-08-09 RX ADMIN — Medication 88 MICROGRAM(S): at 06:53

## 2017-08-09 RX ADMIN — CLOPIDOGREL BISULFATE 75 MILLIGRAM(S): 75 TABLET, FILM COATED ORAL at 12:43

## 2017-08-09 RX ADMIN — Medication 0.12 MILLIGRAM(S): at 23:00

## 2017-08-09 RX ADMIN — ALBUTEROL 2 PUFF(S): 90 AEROSOL, METERED ORAL at 06:54

## 2017-08-09 RX ADMIN — QUETIAPINE FUMARATE 100 MILLIGRAM(S): 200 TABLET, FILM COATED ORAL at 22:17

## 2017-08-09 RX ADMIN — QUETIAPINE FUMARATE 25 MILLIGRAM(S): 200 TABLET, FILM COATED ORAL at 12:44

## 2017-08-09 RX ADMIN — ALBUTEROL 2 PUFF(S): 90 AEROSOL, METERED ORAL at 22:17

## 2017-08-09 RX ADMIN — Medication 1 MILLIGRAM(S): at 22:15

## 2017-08-09 RX ADMIN — Medication 0.12 MILLIGRAM(S): at 12:44

## 2017-08-09 RX ADMIN — PANTOPRAZOLE SODIUM 40 MILLIGRAM(S): 20 TABLET, DELAYED RELEASE ORAL at 07:50

## 2017-08-09 RX ADMIN — Medication 100 MILLIGRAM(S): at 14:20

## 2017-08-09 NOTE — PROGRESS NOTE ADULT - PROBLEM SELECTOR PLAN 2
Patient with transaminitis of unclear etiology. Will avoid hepatotoxic medications at this time. US liver is unremarkable. LFTs trending down today. Will monitor.

## 2017-08-09 NOTE — PROGRESS NOTE ADULT - PROBLEM SELECTOR PLAN 1
Patient with radiographic evidence of focal colitis in the setting of elevated wbc and abdominal pain.  - C/w Cipro/Flagyl. Symptomatically improving. Will need 10 day course.   - Monitor CBC daily

## 2017-08-09 NOTE — PROGRESS NOTE ADULT - ASSESSMENT
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 Patient to be admitted for antibiotic therapy for colitis findings on CT abdomen as well as evaluation of transaminitis

## 2017-08-09 NOTE — PROGRESS NOTE ADULT - SUBJECTIVE AND OBJECTIVE BOX
Patient is a 67y old  Female who presents with a chief complaint of llq pain radiating to chest (08 Aug 2017 20:38)      SUBJECTIVE / OVERNIGHT EVENTS: Abdominal cramps are better today. No vomiting. Still be nausea with PO intake. Denies any diarrhea melena or BRBPR.     MEDICATIONS  (STANDING):  metroNIDAZOLE  IVPB 500 milliGRAM(s) IV Intermittent every 8 hours  clonazePAM Tablet 0.5 milliGRAM(s) Oral <User Schedule>  clopidogrel Tablet 75 milliGRAM(s) Oral daily  aspirin enteric coated 81 milliGRAM(s) Oral daily  levothyroxine 88 MICROGram(s) Oral daily  pantoprazole    Tablet 40 milliGRAM(s) Oral before breakfast  lisinopril 2.5 milliGRAM(s) Oral daily  QUEtiapine 100 milliGRAM(s) Oral at bedtime  spironolactone 12.5 milliGRAM(s) Oral at bedtime  QUEtiapine 25 milliGRAM(s) Oral daily  ciprofloxacin   IVPB 400 milliGRAM(s) IV Intermittent every 12 hours    MEDICATIONS  (PRN):  ALBUTerol    90 MICROgram(s) HFA Inhaler 2 Puff(s) Inhalation every 6 hours PRN Shortness of Breath and/or Wheezing        CAPILLARY BLOOD GLUCOSE        I&O's Summary      PHYSICAL EXAM:  GENERAL: NAD, well-developed  HEAD:  Atraumatic, Normocephalic  EYES: EOMI, PERRLA, conjunctiva and sclera clear  NECK: Supple, No JVD  CHEST/LUNG: Clear to auscultation bilaterally; No wheeze  HEART: Regular rate and rhythm; No murmurs, rubs, or gallops  ABDOMEN: LLQ tenderness to palpation  EXTREMITIES:  2+ Peripheral Pulses, No clubbing, cyanosis, or edema  PSYCH: AAOx3  NEUROLOGY: non-focal  SKIN: No rashes or lesions    LABS:                        12.3   7.69  )-----------( 269      ( 09 Aug 2017 06:00 )             37.8     08-    143  |  105  |  12  ----------------------------<  87  3.8   |  26  |  0.76    Ca    9.4      09 Aug 2017 06:00  Phos  3.1     08-09  Mg     1.9     08-    TPro  6.2  /  Alb  3.6  /  TBili  0.7  /  DBili  x   /  AST  72<H>  /  ALT  127<H>  /  AlkPhos  222<H>      PT/INR - ( 08 Aug 2017 12:48 )   PT: 10.2 SEC;   INR: 0.91          PTT - ( 08 Aug 2017 12:48 )  PTT:33.5 SEC  CARDIAC MARKERS ( 08 Aug 2017 16:30 )  x     / < 0.06 ng/mL / 58 u/L / 1.74 ng/mL / x      CARDIAC MARKERS ( 08 Aug 2017 12:48 )  x     / < 0.06 ng/mL / 126 u/L / 2.03 ng/mL / x          Urinalysis Basic - ( 08 Aug 2017 14:17 )    Color: PLYEL / Appearance: CLEAR / S.010 / pH: 6.0  Gluc: NEGATIVE / Ketone: NEGATIVE  / Bili: NEGATIVE / Urobili: NORMAL E.U.   Blood: NEGATIVE / Protein: NEGATIVE / Nitrite: NEGATIVE   Leuk Esterase: NEGATIVE / RBC: 0-2 / WBC 0-2   Sq Epi: OCC / Non Sq Epi: x / Bacteria: x        RADIOLOGY & ADDITIONAL TESTS:    Imaging Personally Reviewed:    Consultant(s) Notes Reviewed:      Care Discussed with Consultants/Other Providers:    Assessment and Plan:

## 2017-08-10 ENCOUNTER — TRANSCRIPTION ENCOUNTER (OUTPATIENT)
Age: 67
End: 2017-08-10

## 2017-08-10 VITALS — WEIGHT: 103.4 LBS

## 2017-08-10 DIAGNOSIS — I95.9 HYPOTENSION, UNSPECIFIED: ICD-10-CM

## 2017-08-10 LAB
ALBUMIN SERPL ELPH-MCNC: 3.4 G/DL — SIGNIFICANT CHANGE UP (ref 3.3–5)
ALP SERPL-CCNC: 197 U/L — HIGH (ref 40–120)
ALT FLD-CCNC: 91 U/L — HIGH (ref 4–33)
AST SERPL-CCNC: 44 U/L — HIGH (ref 4–32)
BACTERIA UR CULT: SIGNIFICANT CHANGE UP
BILIRUB DIRECT SERPL-MCNC: 0.1 MG/DL — SIGNIFICANT CHANGE UP (ref 0.1–0.2)
BILIRUB SERPL-MCNC: 0.3 MG/DL — SIGNIFICANT CHANGE UP (ref 0.2–1.2)
BUN SERPL-MCNC: 14 MG/DL — SIGNIFICANT CHANGE UP (ref 7–23)
CALCIUM SERPL-MCNC: 9.2 MG/DL — SIGNIFICANT CHANGE UP (ref 8.4–10.5)
CHLORIDE SERPL-SCNC: 109 MMOL/L — HIGH (ref 98–107)
CO2 SERPL-SCNC: 23 MMOL/L — SIGNIFICANT CHANGE UP (ref 22–31)
CREAT SERPL-MCNC: 0.7 MG/DL — SIGNIFICANT CHANGE UP (ref 0.5–1.3)
GLUCOSE SERPL-MCNC: 88 MG/DL — SIGNIFICANT CHANGE UP (ref 70–99)
POTASSIUM SERPL-MCNC: 4 MMOL/L — SIGNIFICANT CHANGE UP (ref 3.5–5.3)
POTASSIUM SERPL-SCNC: 4 MMOL/L — SIGNIFICANT CHANGE UP (ref 3.5–5.3)
PROT SERPL-MCNC: 6.1 G/DL — SIGNIFICANT CHANGE UP (ref 6–8.3)
SODIUM SERPL-SCNC: 144 MMOL/L — SIGNIFICANT CHANGE UP (ref 135–145)

## 2017-08-10 PROCEDURE — 99239 HOSP IP/OBS DSCHRG MGMT >30: CPT

## 2017-08-10 RX ORDER — METRONIDAZOLE 500 MG
1 TABLET ORAL
Qty: 24 | Refills: 0 | OUTPATIENT
Start: 2017-08-10 | End: 2017-08-18

## 2017-08-10 RX ORDER — CIPROFLOXACIN LACTATE 400MG/40ML
500 VIAL (ML) INTRAVENOUS EVERY 12 HOURS
Qty: 0 | Refills: 0 | Status: DISCONTINUED | OUTPATIENT
Start: 2017-08-10 | End: 2017-08-10

## 2017-08-10 RX ORDER — CIPROFLOXACIN LACTATE 400MG/40ML
1 VIAL (ML) INTRAVENOUS
Qty: 16 | Refills: 0 | OUTPATIENT
Start: 2017-08-10 | End: 2017-08-18

## 2017-08-10 RX ORDER — METRONIDAZOLE 500 MG
500 TABLET ORAL EVERY 8 HOURS
Qty: 0 | Refills: 0 | Status: DISCONTINUED | OUTPATIENT
Start: 2017-08-10 | End: 2017-08-10

## 2017-08-10 RX ORDER — LEVOCETIRIZINE DIHYDROCHLORIDE 0.5 MG/ML
1 SOLUTION ORAL
Qty: 0 | Refills: 0 | COMMUNITY

## 2017-08-10 RX ORDER — MONTELUKAST 4 MG/1
1 TABLET, CHEWABLE ORAL
Qty: 0 | Refills: 0 | COMMUNITY

## 2017-08-10 RX ORDER — LISINOPRIL 2.5 MG/1
1 TABLET ORAL
Qty: 30 | Refills: 1 | COMMUNITY

## 2017-08-10 RX ADMIN — Medication 0.5 MILLIGRAM(S): at 07:36

## 2017-08-10 RX ADMIN — ALBUTEROL 2 PUFF(S): 90 AEROSOL, METERED ORAL at 12:33

## 2017-08-10 RX ADMIN — PANTOPRAZOLE SODIUM 40 MILLIGRAM(S): 20 TABLET, DELAYED RELEASE ORAL at 06:33

## 2017-08-10 RX ADMIN — Medication 200 MILLIGRAM(S): at 06:32

## 2017-08-10 RX ADMIN — Medication 100 MILLIGRAM(S): at 06:32

## 2017-08-10 RX ADMIN — ALBUTEROL 2 PUFF(S): 90 AEROSOL, METERED ORAL at 06:37

## 2017-08-10 RX ADMIN — Medication 88 MICROGRAM(S): at 06:33

## 2017-08-10 RX ADMIN — CLOPIDOGREL BISULFATE 75 MILLIGRAM(S): 75 TABLET, FILM COATED ORAL at 12:32

## 2017-08-10 RX ADMIN — Medication 500 MILLIGRAM(S): at 14:11

## 2017-08-10 RX ADMIN — Medication 10 MILLIGRAM(S): at 12:32

## 2017-08-10 RX ADMIN — Medication 81 MILLIGRAM(S): at 12:32

## 2017-08-10 NOTE — PROGRESS NOTE ADULT - ASSESSMENT
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 Patient to be admitted for antibiotic therapy for colitis findings on CT abdomen as well as evaluation of transaminitis

## 2017-08-10 NOTE — DISCHARGE NOTE ADULT - HOSPITAL COURSE
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    Hospital  Course     Colitis.   Patient with radiographic evidence of focal colitis in the setting of elevated wbc and abdominal pain.  - C/w Cipro/Flagyl. Symptomatically improving to complete a 10 day course.   - Trended  CBC     Transaminitis.  Patient with transaminitis of unclear etiology.  Avoid hepatotoxic medications at this time.  US liver is unremarkable.  LFT's down trending      CAD (coronary artery disease).   c/w Aspirin, lipitor, Plavix, Metoprolol, Lisinopril, Aldactone.      Hypothyroidism, unspecified type.   c/w Levoythyroxine 88 mcg.      Uncomplicated asthma, unspecified asthma severity.   DuoNebs q4h PRN for dyspnea.     Depression, unspecified depression type.   c/w Klonapin, seroquel     Dispo: home

## 2017-08-10 NOTE — DISCHARGE NOTE ADULT - ADDITIONAL INSTRUCTIONS
Your blood pressure has been low so we have stopped your blood pressure medications. Please follow up with your Cardiologist and or your PCP as to when or if they will resume back your meds after their assessment.

## 2017-08-10 NOTE — DISCHARGE NOTE ADULT - PLAN OF CARE
Resolving. C/w taking antibiotics until completion Follow up with your PCP in 1-2 weeks. Call for an appointment Follow up with your Cardiologist in 1-2 week. Call for an appointment Improving

## 2017-08-10 NOTE — PROGRESS NOTE ADULT - PROBLEM SELECTOR PLAN 2
Patient with transaminitis of unclear etiology. US normal. Recently with low BP - shocked liver? ALso on lipitor. LFts are downtrending. I advised patient to follow up with pcp for repeat LFts within 1 week of discharge. I explained risk vs benefit on lipitor. Given severe cad with 8 stents - I recommend she continue with lipitor as benefits likely outweight risks. Patient states understanding and will discuss further with her cardiologist.

## 2017-08-10 NOTE — PROGRESS NOTE ADULT - PROBLEM SELECTOR PLAN 9
Low normal BP  -reports occasional dizziness and her BP at times have been as low as 80s  -will stop 2.5,g of lisinopril  not orthostatic

## 2017-08-10 NOTE — PROGRESS NOTE ADULT - PROBLEM SELECTOR PLAN 1
Patient with radiographic evidence of focal colitis in the setting of elevated wbc and abdominal pain.  - C/w Cipro/Flagyl. Symptomatically improving. Will need 10 day course.   - Monitor CBC daily  -After completion of antibiotics -patient will need colonoscopy

## 2017-08-10 NOTE — DISCHARGE NOTE ADULT - CARE PLAN
Principal Discharge DX:	Colitis  Secondary Diagnosis:	Transaminitis  Secondary Diagnosis:	CAD (coronary artery disease)  Secondary Diagnosis:	Depression, unspecified depression type  Secondary Diagnosis:	Hypothyroidism, unspecified type Principal Discharge DX:	Colitis  Goal:	Resolving. C/w taking antibiotics until completion  Instructions for follow-up, activity and diet:	Follow up with your PCP in 1-2 weeks. Call for an appointment  Secondary Diagnosis:	Transaminitis  Goal:	Improving  Instructions for follow-up, activity and diet:	Follow up with your PCP in 1-2 weeks. Call for an appointment  Secondary Diagnosis:	CAD (coronary artery disease)  Instructions for follow-up, activity and diet:	Follow up with your Cardiologist in 1-2 week. Call for an appointment

## 2017-08-10 NOTE — DISCHARGE NOTE ADULT - MEDICATION SUMMARY - MEDICATIONS TO STOP TAKING
I will STOP taking the medications listed below when I get home from the hospital:    metoprolol succinate 25 mg oral tablet, extended release  -- 1 tab(s) by mouth once a day    lisinopril 2.5 mg oral tablet  -- 1 tab(s) by mouth once a day

## 2017-08-10 NOTE — DISCHARGE NOTE ADULT - SECONDARY DIAGNOSIS.
Transaminitis CAD (coronary artery disease) Depression, unspecified depression type Hypothyroidism, unspecified type

## 2017-08-10 NOTE — DISCHARGE NOTE ADULT - PATIENT PORTAL LINK FT
“You can access the Follow[facility] Patient Portal, offered by [facility], by registering with the following website: [email or web address]”

## 2017-08-10 NOTE — DISCHARGE NOTE ADULT - CARE PROVIDER_API CALL
Cherise Barrow), Gastroenterology; Internal Medicine  [address] [place] [id number]  Phone: [phone]  Fax: [phone]

## 2017-08-10 NOTE — PROGRESS NOTE ADULT - SUBJECTIVE AND OBJECTIVE BOX
Patient is a 67y old  Female who presents with a chief complaint of llq pain radiating to chest (10 Aug 2017 10:37)      SUBJECTIVE / OVERNIGHT EVENTS: Feels improved. Still with spasm with PO intake but states that she is able to eat and denies any nausea, vomiting or diarrhea.     MEDICATIONS  (STANDING):  clopidogrel Tablet 75 milliGRAM(s) Oral daily  aspirin enteric coated 81 milliGRAM(s) Oral daily  levothyroxine 88 MICROGram(s) Oral daily  pantoprazole    Tablet 40 milliGRAM(s) Oral before breakfast  QUEtiapine 100 milliGRAM(s) Oral at bedtime  spironolactone 12.5 milliGRAM(s) Oral at bedtime  QUEtiapine 25 milliGRAM(s) Oral daily  clonazePAM Tablet 0.5 milliGRAM(s) Oral <User Schedule>  clonazePAM Tablet 1 milliGRAM(s) Oral at bedtime  ciprofloxacin     Tablet 500 milliGRAM(s) Oral every 12 hours  metroNIDAZOLE    Tablet 500 milliGRAM(s) Oral every 8 hours    MEDICATIONS  (PRN):  ALBUTerol    90 MICROgram(s) HFA Inhaler 2 Puff(s) Inhalation every 6 hours PRN Shortness of Breath and/or Wheezing  dicyclomine 10 milliGRAM(s) Oral three times a day before meals PRN for spasm        CAPILLARY BLOOD GLUCOSE        I&O's Summary      PHYSICAL EXAM:  GENERAL: NAD, well-developed  HEAD:  Atraumatic, Normocephalic  EYES: EOMI, PERRLA, conjunctiva and sclera clear  NECK: Supple, No JVD  CHEST/LUNG: Clear to auscultation bilaterally; No wheeze  HEART: Regular rate and rhythm; No murmurs, rubs, or gallops  ABDOMEN: Soft, Nontender, Nondistended; Bowel sounds present  EXTREMITIES:  2+ Peripheral Pulses, No clubbing, cyanosis, or edema  PSYCH: AAOx3  NEUROLOGY: non-focal  SKIN: No rashes or lesions    LABS:                        12.3   7.69  )-----------( 269      ( 09 Aug 2017 06:00 )             37.8     08-10    144  |  109<H>  |  14  ----------------------------<  88  4.0   |  23  |  0.70    Ca    9.2      10 Aug 2017 06:25  Phos  3.1     08-09  Mg     1.9         TPro  6.1  /  Alb  3.4  /  TBili  0.3  /  DBili  0.1  /  AST  44<H>  /  ALT  91<H>  /  AlkPhos  197<H>  08-10      CARDIAC MARKERS ( 08 Aug 2017 16:30 )  x     / < 0.06 ng/mL / 58 u/L / 1.74 ng/mL / x          Urinalysis Basic - ( 08 Aug 2017 14:17 )    Color: PLYEL / Appearance: CLEAR / S.010 / pH: 6.0  Gluc: NEGATIVE / Ketone: NEGATIVE  / Bili: NEGATIVE / Urobili: NORMAL E.U.   Blood: NEGATIVE / Protein: NEGATIVE / Nitrite: NEGATIVE   Leuk Esterase: NEGATIVE / RBC: 0-2 / WBC 0-2   Sq Epi: OCC / Non Sq Epi: x / Bacteria: x        RADIOLOGY & ADDITIONAL TESTS:    Imaging Personally Reviewed:    Consultant(s) Notes Reviewed:      Care Discussed with Consultants/Other Providers:    Assessment and Plan:

## 2017-08-10 NOTE — DISCHARGE NOTE ADULT - MEDICATION SUMMARY - MEDICATIONS TO TAKE
I will START or STAY ON the medications listed below when I get home from the hospital:    spironolactone 25 mg oral tablet  -- 0.5 tab(s) by mouth once a day (at bedtime)  -- Indication: For Diuretic    metroNIDAZOLE 500 mg oral tablet  -- 1 tab(s) by mouth every 8 hours  -- Indication: For Colitis    aspirin 81 mg oral delayed release tablet  -- 1 tab(s) by mouth once a day  -- Indication: For Prophylactic measure    clonazePAM 0.5 mg oral tablet  -- 1 tab(s) by mouth in the morning   -- Indication: For Depression, unspecified depression type    clonazePAM 0.5 mg oral tablet  -- 1 tab(s) by mouth once a day  -- Indication: For Depression, unspecified depression type    clopidogrel 75 mg oral tablet  -- 1 tab(s) by mouth once a day  -- Indication: For CAD (coronary artery disease)    QUEtiapine  -- 25 milligrams in the morning  100 milligram(s) by mouth at bedtime  -- Indication: For Depression, unspecified depression type    Proventil HFA 90 mcg/inh inhalation aerosol  -- 2 puff(s) inhaled 4 times a day, As Needed  -- Indication: For asthma    dicyclomine 10 mg oral capsule  -- 1 cap(s) by mouth 3 times a day (before meals), As needed, for spasm  -- Indication: For for abd spasm    pantoprazole 40 mg oral delayed release tablet  -- 1 tab(s) by mouth once a day (before a meal)  -- Indication: For Prophylactic measure    ciprofloxacin 500 mg oral tablet  -- 1 tab(s) by mouth every 12 hours  -- Indication: For Colitis    levothyroxine 88 mcg (0.088 mg) oral tablet  -- 1 tab(s) by mouth once a day  -- Indication: For Hypothyroidism

## 2017-08-23 PROCEDURE — 99214 OFFICE O/P EST MOD 30 MIN: CPT

## 2017-08-23 PROCEDURE — 90834 PSYTX W PT 45 MINUTES: CPT

## 2017-09-14 ENCOUNTER — MEDICATION RENEWAL (OUTPATIENT)
Age: 67
End: 2017-09-14

## 2017-09-18 ENCOUNTER — MEDICATION RENEWAL (OUTPATIENT)
Age: 67
End: 2017-09-18

## 2017-09-18 RX ORDER — ALBUTEROL SULFATE 108 UG/1
108 (90 BASE) AEROSOL, METERED RESPIRATORY (INHALATION)
Qty: 1 | Refills: 5 | Status: DISCONTINUED | COMMUNITY
Start: 2017-02-05 | End: 2017-09-18

## 2017-09-19 ENCOUNTER — APPOINTMENT (OUTPATIENT)
Dept: PULMONOLOGY | Facility: CLINIC | Age: 67
End: 2017-09-19

## 2017-09-20 PROCEDURE — 99214 OFFICE O/P EST MOD 30 MIN: CPT

## 2017-09-20 PROCEDURE — 90837 PSYTX W PT 60 MINUTES: CPT

## 2017-09-21 ENCOUNTER — RX RENEWAL (OUTPATIENT)
Age: 67
End: 2017-09-21

## 2017-10-04 PROCEDURE — 90834 PSYTX W PT 45 MINUTES: CPT

## 2017-10-23 ENCOUNTER — MEDICATION RENEWAL (OUTPATIENT)
Age: 67
End: 2017-10-23

## 2017-10-27 ENCOUNTER — MEDICATION RENEWAL (OUTPATIENT)
Age: 67
End: 2017-10-27

## 2017-10-27 RX ORDER — ALBUTEROL SULFATE 90 UG/1
108 (90 BASE) AEROSOL, METERED RESPIRATORY (INHALATION)
Qty: 1 | Refills: 3 | Status: DISCONTINUED | COMMUNITY
Start: 2017-09-21 | End: 2017-10-27

## 2017-10-31 PROCEDURE — 90837 PSYTX W PT 60 MINUTES: CPT

## 2017-10-31 PROCEDURE — 99214 OFFICE O/P EST MOD 30 MIN: CPT

## 2017-11-08 ENCOUNTER — APPOINTMENT (OUTPATIENT)
Dept: PULMONOLOGY | Facility: CLINIC | Age: 67
End: 2017-11-08
Payer: MEDICARE

## 2017-11-08 VITALS
HEART RATE: 89 BPM | WEIGHT: 100 LBS | DIASTOLIC BLOOD PRESSURE: 70 MMHG | SYSTOLIC BLOOD PRESSURE: 110 MMHG | OXYGEN SATURATION: 95 % | HEIGHT: 68 IN | BODY MASS INDEX: 15.16 KG/M2 | RESPIRATION RATE: 15 BRPM

## 2017-11-08 PROCEDURE — 99214 OFFICE O/P EST MOD 30 MIN: CPT | Mod: 25

## 2017-11-08 PROCEDURE — 94620 PULMONARY STRESS TESTING SIMPLE: CPT

## 2017-11-08 PROCEDURE — 71020: CPT

## 2017-11-08 RX ORDER — SULFAMETHOXAZOLE AND TRIMETHOPRIM 800; 160 MG/1; MG/1
800-160 TABLET ORAL TWICE DAILY
Qty: 1 | Refills: 0 | Status: DISCONTINUED | COMMUNITY
Start: 2017-05-18 | End: 2017-11-08

## 2017-11-28 PROCEDURE — 99214 OFFICE O/P EST MOD 30 MIN: CPT

## 2017-12-19 ENCOUNTER — EMERGENCY (EMERGENCY)
Facility: HOSPITAL | Age: 67
LOS: 1 days | Discharge: HOME | End: 2017-12-19
Attending: EMERGENCY MEDICINE | Admitting: EMERGENCY MEDICINE
Payer: MEDICARE

## 2017-12-19 VITALS
HEART RATE: 98 BPM | DIASTOLIC BLOOD PRESSURE: 90 MMHG | OXYGEN SATURATION: 99 % | RESPIRATION RATE: 18 BRPM | SYSTOLIC BLOOD PRESSURE: 118 MMHG | TEMPERATURE: 98 F

## 2017-12-19 DIAGNOSIS — J98.6 DISORDERS OF DIAPHRAGM: Chronic | ICD-10-CM

## 2017-12-19 DIAGNOSIS — N81.6 RECTOCELE: Chronic | ICD-10-CM

## 2017-12-19 LAB
ALBUMIN SERPL ELPH-MCNC: 4.4 G/DL — SIGNIFICANT CHANGE UP (ref 3.3–5)
ALP SERPL-CCNC: 67 U/L — SIGNIFICANT CHANGE UP (ref 40–120)
ALT FLD-CCNC: 15 U/L — SIGNIFICANT CHANGE UP (ref 4–33)
AST SERPL-CCNC: 31 U/L — SIGNIFICANT CHANGE UP (ref 4–32)
BASOPHILS # BLD AUTO: 0.04 K/UL — SIGNIFICANT CHANGE UP (ref 0–0.2)
BASOPHILS NFR BLD AUTO: 0.5 % — SIGNIFICANT CHANGE UP (ref 0–2)
BILIRUB SERPL-MCNC: 0.3 MG/DL — SIGNIFICANT CHANGE UP (ref 0.2–1.2)
BUN SERPL-MCNC: 16 MG/DL — SIGNIFICANT CHANGE UP (ref 7–23)
CALCIUM SERPL-MCNC: 8.7 MG/DL — SIGNIFICANT CHANGE UP (ref 8.4–10.5)
CHLORIDE SERPL-SCNC: 98 MMOL/L — SIGNIFICANT CHANGE UP (ref 98–107)
CO2 SERPL-SCNC: 29 MMOL/L — SIGNIFICANT CHANGE UP (ref 22–31)
CREAT SERPL-MCNC: 0.82 MG/DL — SIGNIFICANT CHANGE UP (ref 0.5–1.3)
EOSINOPHIL # BLD AUTO: 0.19 K/UL — SIGNIFICANT CHANGE UP (ref 0–0.5)
EOSINOPHIL NFR BLD AUTO: 2.4 % — SIGNIFICANT CHANGE UP (ref 0–6)
GLUCOSE SERPL-MCNC: 85 MG/DL — SIGNIFICANT CHANGE UP (ref 70–99)
HCT VFR BLD CALC: 43.6 % — SIGNIFICANT CHANGE UP (ref 34.5–45)
HGB BLD-MCNC: 14.5 G/DL — SIGNIFICANT CHANGE UP (ref 11.5–15.5)
IMM GRANULOCYTES # BLD AUTO: 0.02 # — SIGNIFICANT CHANGE UP
IMM GRANULOCYTES NFR BLD AUTO: 0.3 % — SIGNIFICANT CHANGE UP (ref 0–1.5)
LIDOCAIN IGE QN: 34.1 U/L — SIGNIFICANT CHANGE UP (ref 7–60)
LYMPHOCYTES # BLD AUTO: 2.15 K/UL — SIGNIFICANT CHANGE UP (ref 1–3.3)
LYMPHOCYTES # BLD AUTO: 27.1 % — SIGNIFICANT CHANGE UP (ref 13–44)
MCHC RBC-ENTMCNC: 29.5 PG — SIGNIFICANT CHANGE UP (ref 27–34)
MCHC RBC-ENTMCNC: 33.3 % — SIGNIFICANT CHANGE UP (ref 32–36)
MCV RBC AUTO: 88.8 FL — SIGNIFICANT CHANGE UP (ref 80–100)
MONOCYTES # BLD AUTO: 0.62 K/UL — SIGNIFICANT CHANGE UP (ref 0–0.9)
MONOCYTES NFR BLD AUTO: 7.8 % — SIGNIFICANT CHANGE UP (ref 2–14)
NEUTROPHILS # BLD AUTO: 4.92 K/UL — SIGNIFICANT CHANGE UP (ref 1.8–7.4)
NEUTROPHILS NFR BLD AUTO: 61.9 % — SIGNIFICANT CHANGE UP (ref 43–77)
NRBC # FLD: 0 — SIGNIFICANT CHANGE UP
PLATELET # BLD AUTO: 336 K/UL — SIGNIFICANT CHANGE UP (ref 150–400)
PMV BLD: 9 FL — SIGNIFICANT CHANGE UP (ref 7–13)
POTASSIUM SERPL-MCNC: 3.4 MMOL/L — LOW (ref 3.5–5.3)
POTASSIUM SERPL-SCNC: 3.4 MMOL/L — LOW (ref 3.5–5.3)
PROT SERPL-MCNC: 6.8 G/DL — SIGNIFICANT CHANGE UP (ref 6–8.3)
RBC # BLD: 4.91 M/UL — SIGNIFICANT CHANGE UP (ref 3.8–5.2)
RBC # FLD: 15.8 % — HIGH (ref 10.3–14.5)
SODIUM SERPL-SCNC: 141 MMOL/L — SIGNIFICANT CHANGE UP (ref 135–145)
WBC # BLD: 7.94 K/UL — SIGNIFICANT CHANGE UP (ref 3.8–10.5)
WBC # FLD AUTO: 7.94 K/UL — SIGNIFICANT CHANGE UP (ref 3.8–10.5)

## 2017-12-19 PROCEDURE — 99285 EMERGENCY DEPT VISIT HI MDM: CPT | Mod: 25,GC

## 2017-12-19 PROCEDURE — 74177 CT ABD & PELVIS W/CONTRAST: CPT | Mod: 26

## 2017-12-19 PROCEDURE — 93010 ELECTROCARDIOGRAM REPORT: CPT | Mod: 59

## 2017-12-19 RX ORDER — ONDANSETRON 8 MG/1
1 TABLET, FILM COATED ORAL
Qty: 8 | Refills: 0 | OUTPATIENT
Start: 2017-12-19

## 2017-12-19 RX ORDER — SODIUM CHLORIDE 9 MG/ML
1000 INJECTION INTRAMUSCULAR; INTRAVENOUS; SUBCUTANEOUS ONCE
Qty: 0 | Refills: 0 | Status: COMPLETED | OUTPATIENT
Start: 2017-12-19 | End: 2017-12-19

## 2017-12-19 RX ORDER — ONDANSETRON 8 MG/1
4 TABLET, FILM COATED ORAL ONCE
Qty: 0 | Refills: 0 | Status: COMPLETED | OUTPATIENT
Start: 2017-12-19 | End: 2017-12-19

## 2017-12-19 RX ADMIN — SODIUM CHLORIDE 1000 MILLILITER(S): 9 INJECTION INTRAMUSCULAR; INTRAVENOUS; SUBCUTANEOUS at 11:57

## 2017-12-19 RX ADMIN — ONDANSETRON 4 MILLIGRAM(S): 8 TABLET, FILM COATED ORAL at 11:57

## 2017-12-19 NOTE — ED ADULT TRIAGE NOTE - CHIEF COMPLAINT QUOTE
Patient has c/o abdominal pain and was sent to ER by PMD to r/o diverticulitis. Pt has been unable to tolerate PO for the last 24 hours.

## 2017-12-19 NOTE — ED PROVIDER NOTE - OBJECTIVE STATEMENT
66 y/o female with PMHx of CAD and Diverticulitis presents to ED for abd pain and vomiting. Pt states she was followed by her PMD who placed her on Cipro for suspected diverticulitis which she finished. Pt states still having some lower abd pain and vomiting, unable to tolerate PO. Pt went to see her Gastroenterologist today, Dr. Soto ([phone]) who sent pt to ED for CT scan. Pt denies any fever, chills, SOB, or diarrhea. 66 y/o female with PMHx of CAD and Diverticulitis presents to ED for abd pain and vomiting. Pt states she was followed by her PMD who placed her on Cipro for suspected diverticulitis which she finished. Pt states still having some lower abd pain and vomiting, unable to tolerate PO. Pt went to see her Gastroenterologist today, Dr. Soto ([phone]) who sent pt to ED for CT scan. Pt denies any fever, chills, SOB, diarrhea, or dysuria.

## 2017-12-19 NOTE — ED PROVIDER NOTE - MEDICAL DECISION MAKING DETAILS
68 y/o female with LLQ abd pain and history of diverticulitis and bowel resection. Concern for recurrent diverticulitis. Labs, IVF, Antiemetics, CT.

## 2017-12-21 PROCEDURE — 90832 PSYTX W PT 30 MINUTES: CPT

## 2017-12-21 PROCEDURE — 99214 OFFICE O/P EST MOD 30 MIN: CPT

## 2018-01-11 PROCEDURE — 99214 OFFICE O/P EST MOD 30 MIN: CPT

## 2018-01-11 PROCEDURE — 90834 PSYTX W PT 45 MINUTES: CPT

## 2018-01-12 ENCOUNTER — APPOINTMENT (OUTPATIENT)
Dept: PULMONOLOGY | Facility: CLINIC | Age: 68
End: 2018-01-12

## 2018-01-15 ENCOUNTER — APPOINTMENT (OUTPATIENT)
Dept: PULMONOLOGY | Facility: CLINIC | Age: 68
End: 2018-01-15
Payer: MEDICARE

## 2018-01-15 VITALS
OXYGEN SATURATION: 97 % | HEIGHT: 56 IN | DIASTOLIC BLOOD PRESSURE: 65 MMHG | WEIGHT: 100 LBS | RESPIRATION RATE: 17 BRPM | BODY MASS INDEX: 22.5 KG/M2 | SYSTOLIC BLOOD PRESSURE: 120 MMHG | HEART RATE: 100 BPM

## 2018-01-15 PROCEDURE — 99214 OFFICE O/P EST MOD 30 MIN: CPT | Mod: 25

## 2018-01-15 PROCEDURE — 94010 BREATHING CAPACITY TEST: CPT

## 2018-01-22 ENCOUNTER — MEDICATION RENEWAL (OUTPATIENT)
Age: 68
End: 2018-01-22

## 2018-01-23 ENCOUNTER — MEDICATION RENEWAL (OUTPATIENT)
Age: 68
End: 2018-01-23

## 2018-01-23 RX ORDER — TIOTROPIUM BROMIDE 18 UG/1
18 CAPSULE ORAL; RESPIRATORY (INHALATION) DAILY
Qty: 1 | Refills: 3 | Status: DISCONTINUED | COMMUNITY
Start: 2018-01-22 | End: 2018-01-23

## 2018-02-05 PROCEDURE — 99214 OFFICE O/P EST MOD 30 MIN: CPT

## 2018-02-26 PROCEDURE — 90832 PSYTX W PT 30 MINUTES: CPT

## 2018-02-26 PROCEDURE — 99214 OFFICE O/P EST MOD 30 MIN: CPT

## 2018-03-15 PROCEDURE — 99214 OFFICE O/P EST MOD 30 MIN: CPT

## 2018-04-02 ENCOUNTER — APPOINTMENT (OUTPATIENT)
Dept: PULMONOLOGY | Facility: CLINIC | Age: 68
End: 2018-04-02
Payer: MEDICARE

## 2018-04-02 VITALS
SYSTOLIC BLOOD PRESSURE: 120 MMHG | DIASTOLIC BLOOD PRESSURE: 55 MMHG | HEART RATE: 103 BPM | OXYGEN SATURATION: 95 % | WEIGHT: 100 LBS | BODY MASS INDEX: 22.5 KG/M2 | RESPIRATION RATE: 17 BRPM | HEIGHT: 56 IN

## 2018-04-02 PROCEDURE — 71046 X-RAY EXAM CHEST 2 VIEWS: CPT

## 2018-04-02 PROCEDURE — 99214 OFFICE O/P EST MOD 30 MIN: CPT | Mod: 25

## 2018-04-02 PROCEDURE — 99070 SPECIAL SUPPLIES PHYS/QHP: CPT

## 2018-04-11 PROCEDURE — 90832 PSYTX W PT 30 MINUTES: CPT

## 2018-04-12 DIAGNOSIS — J45.909 UNSPECIFIED ASTHMA, UNCOMPLICATED: ICD-10-CM

## 2018-04-12 DIAGNOSIS — M79.2 NEURALGIA AND NEURITIS, UNSPECIFIED: ICD-10-CM

## 2018-04-12 DIAGNOSIS — I25.10 ATHEROSCLEROTIC HEART DISEASE OF NATIVE CORONARY ARTERY WITHOUT ANGINA PECTORIS: ICD-10-CM

## 2018-04-12 DIAGNOSIS — E03.9 HYPOTHYROIDISM, UNSPECIFIED: ICD-10-CM

## 2018-04-13 NOTE — DISCHARGE NOTE ADULT - TAKING YOUR MEDICATIONS CORRECTLY MAY HELP TO PREVENT FUTURE HEART EVENTS.
SCRIBE #1 NOTE: I, Jeanne Donnelly, Laura Mishra MD  04/13/18 [id number]     Statement Selected

## 2018-04-16 PROCEDURE — 99214 OFFICE O/P EST MOD 30 MIN: CPT

## 2018-04-26 PROCEDURE — 99214 OFFICE O/P EST MOD 30 MIN: CPT

## 2018-04-26 PROCEDURE — 90832 PSYTX W PT 30 MINUTES: CPT

## 2018-05-09 PROCEDURE — 90832 PSYTX W PT 30 MINUTES: CPT

## 2018-05-09 PROCEDURE — 99214 OFFICE O/P EST MOD 30 MIN: CPT

## 2018-05-15 ENCOUNTER — OTHER (OUTPATIENT)
Age: 68
End: 2018-05-15

## 2018-05-15 ENCOUNTER — APPOINTMENT (OUTPATIENT)
Dept: PULMONOLOGY | Facility: CLINIC | Age: 68
End: 2018-05-15
Payer: MEDICARE

## 2018-05-15 ENCOUNTER — NON-APPOINTMENT (OUTPATIENT)
Age: 68
End: 2018-05-15

## 2018-05-15 VITALS
WEIGHT: 96 LBS | HEIGHT: 55 IN | DIASTOLIC BLOOD PRESSURE: 70 MMHG | BODY MASS INDEX: 22.21 KG/M2 | RESPIRATION RATE: 17 BRPM | OXYGEN SATURATION: 96 % | HEART RATE: 107 BPM | SYSTOLIC BLOOD PRESSURE: 108 MMHG

## 2018-05-15 DIAGNOSIS — R93.8 ABNORMAL FINDINGS ON DIAGNOSTIC IMAGING OF OTHER SPECIFIED BODY STRUCTURES: ICD-10-CM

## 2018-05-15 PROCEDURE — 99214 OFFICE O/P EST MOD 30 MIN: CPT | Mod: 25

## 2018-05-15 PROCEDURE — 94618 PULMONARY STRESS TESTING: CPT

## 2018-05-15 PROCEDURE — 94010 BREATHING CAPACITY TEST: CPT | Mod: 59

## 2018-05-15 RX ORDER — FLUTICASONE PROPIONATE AND SALMETEROL 50; 250 UG/1; UG/1
250-50 POWDER RESPIRATORY (INHALATION)
Qty: 3 | Refills: 0 | Status: DISCONTINUED | COMMUNITY
Start: 2017-11-08 | End: 2018-05-15

## 2018-05-15 RX ORDER — SULFAMETHOXAZOLE AND TRIMETHOPRIM 800; 160 MG/1; MG/1
800-160 TABLET ORAL TWICE DAILY
Qty: 1 | Refills: 0 | Status: DISCONTINUED | COMMUNITY
Start: 2018-04-04 | End: 2018-05-15

## 2018-05-15 RX ORDER — TIOTROPIUM BROMIDE INHALATION SPRAY 3.12 UG/1
2.5 SPRAY, METERED RESPIRATORY (INHALATION) DAILY
Qty: 3 | Refills: 3 | Status: DISCONTINUED | COMMUNITY
Start: 2017-01-18 | End: 2018-05-15

## 2018-05-15 RX ORDER — TIOTROPIUM BROMIDE INHALATION SPRAY 3.12 UG/1
2.5 SPRAY, METERED RESPIRATORY (INHALATION) DAILY
Qty: 3 | Refills: 1 | Status: DISCONTINUED | COMMUNITY
Start: 2017-11-08 | End: 2018-05-15

## 2018-05-17 ENCOUNTER — RX RENEWAL (OUTPATIENT)
Age: 68
End: 2018-05-17

## 2018-05-23 ENCOUNTER — LABORATORY RESULT (OUTPATIENT)
Age: 68
End: 2018-05-23

## 2018-05-23 LAB
24R-OH-CALCIDIOL SERPL-MCNC: 98.7 PG/ML
25(OH)D3 SERPL-MCNC: 40.6 NG/ML
BASOPHILS # BLD AUTO: 0.02 K/UL
BASOPHILS NFR BLD AUTO: 0.3 %
EOSINOPHIL # BLD AUTO: 0.38 K/UL
EOSINOPHIL NFR BLD AUTO: 4.8 %
HCT VFR BLD CALC: 45.7 %
HGB BLD-MCNC: 15 G/DL
IGE SER-MCNC: 41 IU/ML
IMM GRANULOCYTES NFR BLD AUTO: 0.3 %
LYMPHOCYTES # BLD AUTO: 2.58 K/UL
LYMPHOCYTES NFR BLD AUTO: 32.7 %
MAN DIFF?: NORMAL
MCHC RBC-ENTMCNC: 30.7 PG
MCHC RBC-ENTMCNC: 32.8 GM/DL
MCV RBC AUTO: 93.6 FL
MONOCYTES # BLD AUTO: 0.48 K/UL
MONOCYTES NFR BLD AUTO: 6.1 %
NEUTROPHILS # BLD AUTO: 4.42 K/UL
NEUTROPHILS NFR BLD AUTO: 55.8 %
PLATELET # BLD AUTO: 301 K/UL
RBC # BLD: 4.88 M/UL
RBC # FLD: 14.1 %
WBC # FLD AUTO: 7.9 K/UL

## 2018-05-23 PROCEDURE — 90832 PSYTX W PT 30 MINUTES: CPT

## 2018-05-24 LAB
IGG SUBSET TOTAL IGG: 497 MG/DL
IGG1 SER-MCNC: 253 MG/DL
IGG2 SER-MCNC: 158 MG/DL
IGG3 SER-MCNC: 19 MG/DL
IGG4 SER-MCNC: 32 MG/DL

## 2018-05-25 LAB
DEPRECATED KAPPA LC FREE/LAMBDA SER: 0.56 RATIO
IGA SER QL IEP: 164 MG/DL
IGG SER QL IEP: 519 MG/DL
IGM SER QL IEP: 24 MG/DL
KAPPA LC CSF-MCNC: 1.21 MG/DL
KAPPA LC SERPL-MCNC: 0.68 MG/DL

## 2018-05-30 LAB
DEPRECATED S PNEUM 1 IGG SER-MCNC: 3.4 MCG/ML
DEPRECATED S PNEUM12 AB SER-ACNC: <0.1 MCG/ML
DEPRECATED S PNEUM14 AB SER-ACNC: 1 MCG/ML
DEPRECATED S PNEUM17 IGG SER IA-MCNC: 0.9 MCG/ML
DEPRECATED S PNEUM18 IGG SER IA-MCNC: 0.1 MCG/ML
DEPRECATED S PNEUM19 IGG SER-MCNC: 0.4 MCG/ML
DEPRECATED S PNEUM19 IGG SER-MCNC: 1.9 MCG/ML
DEPRECATED S PNEUM2 IGG SER-MCNC: 0.2 MCG/ML
DEPRECATED S PNEUM20 IGG SER-MCNC: 0.2 MCG/ML
DEPRECATED S PNEUM22 IGG SER-MCNC: 6.4 MCG/ML
DEPRECATED S PNEUM23 AB SER-ACNC: 5.3 MCG/ML
DEPRECATED S PNEUM3 AB SER-ACNC: 0.3 MCG/ML
DEPRECATED S PNEUM34 IGG SER-MCNC: 1.1 MCG/ML
DEPRECATED S PNEUM4 AB SER-ACNC: 0.1 MCG/ML
DEPRECATED S PNEUM5 IGG SER-MCNC: 8.7 MCG/ML
DEPRECATED S PNEUM6 IGG SER-MCNC: 0.3 MCG/ML
DEPRECATED S PNEUM7 IGG SER-ACNC: 2 MCG/ML
DEPRECATED S PNEUM8 AB SER-ACNC: 0.4 MCG/ML
DEPRECATED S PNEUM9 AB SER-ACNC: 0.3 MCG/ML
DEPRECATED S PNEUM9 IGG SER-MCNC: 0.8 MCG/ML
STREPTOCOCCUS PNEUMONIAE SEROTYPE 11A: 1.5 MCG/ML
STREPTOCOCCUS PNEUMONIAE SEROTYPE 15B: 1.1 MCG/ML
STREPTOCOCCUS PNEUMONIAE SEROTYPE 33F: 0.3 MCG/ML

## 2018-05-31 LAB
A ALTERNATA IGE QN: <0.1 KUA/L
A FUMIGATUS IGE QN: <0.1 KUA/L
C ALBICANS IGE QN: <0.1 KUA/L
C HERBARUM IGE QN: <0.1 KUA/L
CAT DANDER IGE QN: 0.34 KUA/L
CLAM IGE QN: <0.1 KUA/L
CODFISH IGE QN: <0.1 KUA/L
COMMON RAGWEED IGE QN: <0.1 KUA/L
CORN IGE QN: <0.1 KUA/L
COW MILK IGE QN: 0.12 KUA/L
D FARINAE IGE QN: 2.31 KUA/L
D PTERONYSS IGE QN: 1.91 KUA/L
DEPRECATED A ALTERNATA IGE RAST QL: 0
DEPRECATED A FUMIGATUS IGE RAST QL: 0
DEPRECATED C ALBICANS IGE RAST QL: 0
DEPRECATED C HERBARUM IGE RAST QL: 0
DEPRECATED CAT DANDER IGE RAST QL: NORMAL
DEPRECATED CLAM IGE RAST QL: 0
DEPRECATED CODFISH IGE RAST QL: 0
DEPRECATED COMMON RAGWEED IGE RAST QL: 0
DEPRECATED CORN IGE RAST QL: 0
DEPRECATED COW MILK IGE RAST QL: NORMAL
DEPRECATED D FARINAE IGE RAST QL: ABNORMAL
DEPRECATED D PTERONYSS IGE RAST QL: ABNORMAL
DEPRECATED DOG DANDER IGE RAST QL: ABNORMAL
DEPRECATED EGG WHITE IGE RAST QL: 0
DEPRECATED M RACEMOSUS IGE RAST QL: 0
DEPRECATED PEANUT IGE RAST QL: 0
DEPRECATED ROACH IGE RAST QL: 0
DEPRECATED SCALLOP IGE RAST QL: NORMAL
DEPRECATED SESAME SEED IGE RAST QL: 0
DEPRECATED SHRIMP IGE RAST QL: 0
DEPRECATED SOYBEAN IGE RAST QL: NORMAL
DEPRECATED TIMOTHY IGE RAST QL: NORMAL
DEPRECATED WALNUT IGE RAST QL: NORMAL
DEPRECATED WHEAT IGE RAST QL: NORMAL
DEPRECATED WHITE OAK IGE RAST QL: 0
DOG DANDER IGE QN: 3 KUA/L
EGG WHITE IGE QN: <0.1 KUA/L
M RACEMOSUS IGE QN: <0.1 KUA/L
PEANUT IGE QN: <0.1 KUA/L
ROACH IGE QN: <0.1 KUA/L
SCALLOP IGE QN: <0.1 KUA/L
SCALLOP IGE QN: NORMAL
SESAME SEED IGE QN: <0.1 KUA/L
SOYBEAN IGE QN: NORMAL
TIMOTHY IGE QN: NORMAL
WALNUT IGE QN: NORMAL
WHEAT IGE QN: NORMAL
WHITE OAK IGE QN: <0.1 KUA/L

## 2018-06-05 PROCEDURE — 99214 OFFICE O/P EST MOD 30 MIN: CPT

## 2018-06-06 ENCOUNTER — LABORATORY RESULT (OUTPATIENT)
Age: 68
End: 2018-06-06

## 2018-06-06 PROCEDURE — 90832 PSYTX W PT 30 MINUTES: CPT

## 2018-06-20 PROCEDURE — 90832 PSYTX W PT 30 MINUTES: CPT

## 2018-07-18 PROCEDURE — 99214 OFFICE O/P EST MOD 30 MIN: CPT

## 2018-07-18 PROCEDURE — 90832 PSYTX W PT 30 MINUTES: CPT

## 2018-08-06 PROCEDURE — 99214 OFFICE O/P EST MOD 30 MIN: CPT

## 2018-08-15 ENCOUNTER — APPOINTMENT (OUTPATIENT)
Dept: PULMONOLOGY | Facility: CLINIC | Age: 68
End: 2018-08-15
Payer: MEDICARE

## 2018-08-15 ENCOUNTER — NON-APPOINTMENT (OUTPATIENT)
Age: 68
End: 2018-08-15

## 2018-08-15 VITALS
BODY MASS INDEX: 22.91 KG/M2 | DIASTOLIC BLOOD PRESSURE: 60 MMHG | HEART RATE: 104 BPM | SYSTOLIC BLOOD PRESSURE: 120 MMHG | HEIGHT: 55 IN | WEIGHT: 99 LBS | OXYGEN SATURATION: 94 %

## 2018-08-15 PROCEDURE — 94727 GAS DIL/WSHOT DETER LNG VOL: CPT

## 2018-08-15 PROCEDURE — 99214 OFFICE O/P EST MOD 30 MIN: CPT | Mod: 25

## 2018-08-15 PROCEDURE — 94060 EVALUATION OF WHEEZING: CPT

## 2018-08-15 PROCEDURE — 95012 NITRIC OXIDE EXP GAS DETER: CPT

## 2018-08-15 PROCEDURE — 94729 DIFFUSING CAPACITY: CPT

## 2018-08-16 ENCOUNTER — MEDICATION RENEWAL (OUTPATIENT)
Age: 68
End: 2018-08-16

## 2018-09-01 NOTE — ED ADULT NURSE NOTE - CADM POA URETHRAL CATHETER
Patient had kindey stone removed yesterday, procedure went well per patient. Woke up this morning with bilateral calf and arm pain. Also c/o neck discomfort and difficulty taking a deep breath.    No

## 2018-09-03 ENCOUNTER — EMERGENCY (EMERGENCY)
Facility: HOSPITAL | Age: 68
LOS: 1 days | Discharge: HOME | End: 2018-09-03
Attending: EMERGENCY MEDICINE | Admitting: EMERGENCY MEDICINE
Payer: MEDICARE

## 2018-09-03 VITALS
RESPIRATION RATE: 16 BRPM | TEMPERATURE: 98 F | OXYGEN SATURATION: 98 % | DIASTOLIC BLOOD PRESSURE: 82 MMHG | SYSTOLIC BLOOD PRESSURE: 138 MMHG | HEART RATE: 88 BPM

## 2018-09-03 VITALS
OXYGEN SATURATION: 95 % | TEMPERATURE: 98 F | RESPIRATION RATE: 17 BRPM | DIASTOLIC BLOOD PRESSURE: 95 MMHG | HEART RATE: 104 BPM | SYSTOLIC BLOOD PRESSURE: 136 MMHG

## 2018-09-03 DIAGNOSIS — N81.6 RECTOCELE: Chronic | ICD-10-CM

## 2018-09-03 DIAGNOSIS — J98.6 DISORDERS OF DIAPHRAGM: Chronic | ICD-10-CM

## 2018-09-03 LAB
ALBUMIN SERPL ELPH-MCNC: 4.2 G/DL — SIGNIFICANT CHANGE UP (ref 3.3–5)
ALP SERPL-CCNC: 61 U/L — SIGNIFICANT CHANGE UP (ref 40–120)
ALT FLD-CCNC: 16 U/L — SIGNIFICANT CHANGE UP (ref 4–33)
APTT BLD: 28.4 SEC — SIGNIFICANT CHANGE UP (ref 27.5–37.4)
AST SERPL-CCNC: 25 U/L — SIGNIFICANT CHANGE UP (ref 4–32)
BASOPHILS # BLD AUTO: 0.04 K/UL — SIGNIFICANT CHANGE UP (ref 0–0.2)
BASOPHILS NFR BLD AUTO: 0.5 % — SIGNIFICANT CHANGE UP (ref 0–2)
BILIRUB SERPL-MCNC: 0.4 MG/DL — SIGNIFICANT CHANGE UP (ref 0.2–1.2)
BUN SERPL-MCNC: 11 MG/DL — SIGNIFICANT CHANGE UP (ref 7–23)
CALCIUM SERPL-MCNC: 9.4 MG/DL — SIGNIFICANT CHANGE UP (ref 8.4–10.5)
CHLORIDE SERPL-SCNC: 106 MMOL/L — SIGNIFICANT CHANGE UP (ref 98–107)
CO2 SERPL-SCNC: 19 MMOL/L — LOW (ref 22–31)
CREAT SERPL-MCNC: 0.58 MG/DL — SIGNIFICANT CHANGE UP (ref 0.5–1.3)
EOSINOPHIL # BLD AUTO: 0.28 K/UL — SIGNIFICANT CHANGE UP (ref 0–0.5)
EOSINOPHIL NFR BLD AUTO: 3.7 % — SIGNIFICANT CHANGE UP (ref 0–6)
GLUCOSE SERPL-MCNC: 83 MG/DL — SIGNIFICANT CHANGE UP (ref 70–99)
HCT VFR BLD CALC: 43.8 % — SIGNIFICANT CHANGE UP (ref 34.5–45)
HGB BLD-MCNC: 14.7 G/DL — SIGNIFICANT CHANGE UP (ref 11.5–15.5)
IMM GRANULOCYTES # BLD AUTO: 0.02 # — SIGNIFICANT CHANGE UP
IMM GRANULOCYTES NFR BLD AUTO: 0.3 % — SIGNIFICANT CHANGE UP (ref 0–1.5)
INR BLD: 0.93 — SIGNIFICANT CHANGE UP (ref 0.88–1.17)
LYMPHOCYTES # BLD AUTO: 2.09 K/UL — SIGNIFICANT CHANGE UP (ref 1–3.3)
LYMPHOCYTES # BLD AUTO: 27.6 % — SIGNIFICANT CHANGE UP (ref 13–44)
MCHC RBC-ENTMCNC: 31.1 PG — SIGNIFICANT CHANGE UP (ref 27–34)
MCHC RBC-ENTMCNC: 33.6 % — SIGNIFICANT CHANGE UP (ref 32–36)
MCV RBC AUTO: 92.8 FL — SIGNIFICANT CHANGE UP (ref 80–100)
MONOCYTES # BLD AUTO: 0.6 K/UL — SIGNIFICANT CHANGE UP (ref 0–0.9)
MONOCYTES NFR BLD AUTO: 7.9 % — SIGNIFICANT CHANGE UP (ref 2–14)
NEUTROPHILS # BLD AUTO: 4.53 K/UL — SIGNIFICANT CHANGE UP (ref 1.8–7.4)
NEUTROPHILS NFR BLD AUTO: 60 % — SIGNIFICANT CHANGE UP (ref 43–77)
NRBC # FLD: 0 — SIGNIFICANT CHANGE UP
PLATELET # BLD AUTO: 321 K/UL — SIGNIFICANT CHANGE UP (ref 150–400)
PMV BLD: 9.1 FL — SIGNIFICANT CHANGE UP (ref 7–13)
POTASSIUM SERPL-MCNC: 4.6 MMOL/L — SIGNIFICANT CHANGE UP (ref 3.5–5.3)
POTASSIUM SERPL-SCNC: 4.6 MMOL/L — SIGNIFICANT CHANGE UP (ref 3.5–5.3)
PROT SERPL-MCNC: 6.5 G/DL — SIGNIFICANT CHANGE UP (ref 6–8.3)
PROTHROM AB SERPL-ACNC: 10.3 SEC — SIGNIFICANT CHANGE UP (ref 9.8–13.1)
RBC # BLD: 4.72 M/UL — SIGNIFICANT CHANGE UP (ref 3.8–5.2)
RBC # FLD: 14.2 % — SIGNIFICANT CHANGE UP (ref 10.3–14.5)
SODIUM SERPL-SCNC: 142 MMOL/L — SIGNIFICANT CHANGE UP (ref 135–145)
WBC # BLD: 7.56 K/UL — SIGNIFICANT CHANGE UP (ref 3.8–10.5)
WBC # FLD AUTO: 7.56 K/UL — SIGNIFICANT CHANGE UP (ref 3.8–10.5)

## 2018-09-03 PROCEDURE — 73502 X-RAY EXAM HIP UNI 2-3 VIEWS: CPT | Mod: 26,LT

## 2018-09-03 PROCEDURE — 99284 EMERGENCY DEPT VISIT MOD MDM: CPT | Mod: GC

## 2018-09-03 PROCEDURE — 70486 CT MAXILLOFACIAL W/O DYE: CPT | Mod: 26

## 2018-09-03 PROCEDURE — 73552 X-RAY EXAM OF FEMUR 2/>: CPT | Mod: 26,LT

## 2018-09-03 PROCEDURE — 70450 CT HEAD/BRAIN W/O DYE: CPT | Mod: 26

## 2018-09-03 PROCEDURE — 72125 CT NECK SPINE W/O DYE: CPT | Mod: 26

## 2018-09-03 RX ORDER — LIDOCAINE 4 G/100G
1 CREAM TOPICAL ONCE
Qty: 0 | Refills: 0 | Status: COMPLETED | OUTPATIENT
Start: 2018-09-03 | End: 2018-09-03

## 2018-09-03 RX ORDER — ONDANSETRON 8 MG/1
8 TABLET, FILM COATED ORAL ONCE
Qty: 0 | Refills: 0 | Status: DISCONTINUED | OUTPATIENT
Start: 2018-09-03 | End: 2018-09-03

## 2018-09-03 RX ORDER — KETOROLAC TROMETHAMINE 30 MG/ML
15 SYRINGE (ML) INJECTION ONCE
Qty: 0 | Refills: 0 | Status: DISCONTINUED | OUTPATIENT
Start: 2018-09-03 | End: 2018-09-03

## 2018-09-03 RX ORDER — ONDANSETRON 8 MG/1
4 TABLET, FILM COATED ORAL ONCE
Qty: 0 | Refills: 0 | Status: COMPLETED | OUTPATIENT
Start: 2018-09-03 | End: 2018-09-03

## 2018-09-03 RX ADMIN — ONDANSETRON 4 MILLIGRAM(S): 8 TABLET, FILM COATED ORAL at 15:02

## 2018-09-03 RX ADMIN — Medication 15 MILLIGRAM(S): at 15:19

## 2018-09-03 RX ADMIN — LIDOCAINE 1 PATCH: 4 CREAM TOPICAL at 15:19

## 2018-09-03 NOTE — ED ADULT TRIAGE NOTE - CHIEF COMPLAINT QUOTE
pt s/p mechanical fall down 2 steps yesterday hitting the Left side of her face, c/o dizziness, nausea, and blurred vision. takes Plavix 75 and asa 81.

## 2018-09-03 NOTE — ED ADULT NURSE NOTE - OBJECTIVE STATEMENT
Pt rec'd to ED R#7 Aox4, in NAD, c/o L cheek- temporal pain and L hip pain s/p mechanical fall yesterday down 2 steps. Pt on plavix and ASA for CAD and cardiac stents. Pt denies LOC at time of fall. Able to stand up after fall, pt has been ambulating today, however endorses dizziness, nausea, weakness, blurred vision L eye since fall. Pt denies vomiting, focal weakness, paresthesias, CP/ SOB/ other acute complaints. Respirations appear even and unlabored. VSS.

## 2018-09-03 NOTE — ED PROVIDER NOTE - NS ED ROS FT
GENERAL: No fever or chills, EYES: blurry vision, HEENT: +left  face pain and numbness, no trouble swallowing or speaking, CARDIAC: no chest pain, PULMONARY: no cough or SOB, GI: no abdominal pain, +nausea, no vomiting, no diarrhea or constipation, : No changes in urination, SKIN: no rashes, NEURO: + headache,  MSK: No joint pain ~Marco A Brumfield M.D. Resident

## 2018-09-03 NOTE — ED PROVIDER NOTE - PROGRESS NOTE DETAILS
Ct has been contacted again on the need to move up her scan. Was informed that she will be next after sicu Patient has been reassessed and reports improvement and ready to be discharged

## 2018-09-03 NOTE — ED PROVIDER NOTE - ATTENDING CONTRIBUTION TO CARE
Attending note:   After face to face evaluation of this patient, I concur with above noted hx, pe, and care plan for this patient.   67 y/o F on plavix with slip and fall with trauma to left face and left hip.  Evaluation in progress.   Neuro intact; +pain on palpation of right hip

## 2018-09-03 NOTE — ED PROVIDER NOTE - OBJECTIVE STATEMENT
68yF with pmh of CAD, stent placement on plavix presents after a fall going down the stairs in her home yesterday. Patient states that she missed the last two steps and fell on her left side hitting her head and face without any LOC and ambulatory after laying for <5mins. Patient did not seek care after incident. Patient endorses nausea, headache, blurry vision, left facial pain and numbness, along with left hip pain 68yF with pmh of CAD, stent placement on plavix presents after a fall going down the stairs in her home yesterday. Patient states that she missed the last two steps and fell on her left side hitting her head and face without any LOC and ambulatory after laying for <5mins. Patient did not seek care after incident. Patient endorses nausea, headache, blurry vision, left facial pain and numbness, along with left hip pain and left pain.

## 2018-09-03 NOTE — ED PROVIDER NOTE - MEDICAL DECISION MAKING DETAILS
68yF with pmh of CAD, stent placement on plavix presents after a fall with head trauma present with nausea, facial pain, hip and leg pain  Plan: CT head, cspine, maxface, Xray of left hip,pelvis and femur, cbc, bmp, zofran  R/o: Bleed vs fractures vs contusion

## 2018-09-03 NOTE — ED ADULT NURSE NOTE - NSIMPLEMENTINTERV_GEN_ALL_ED
Implemented All Fall with Harm Risk Interventions:  [place] to call system. Call bell, personal items and telephone within reach. Instruct patient to call for assistance. Room bathroom lighting operational. Non-slip footwear when patient is off stretcher. Physically safe environment: no spills, clutter or unnecessary equipment. Stretcher in lowest position, wheels locked, appropriate side rails in place. Provide visual cue, wrist band, yellow gown, etc. Monitor gait and stability. Monitor for mental status changes and reorient to person, place, and time. Review medications for side effects contributing to fall risk. Reinforce activity limits and safety measures with patient and family. Provide visual clues: red socks.

## 2018-09-03 NOTE — ED PROVIDER NOTE - PHYSICAL EXAMINATION
Gen: AAOx3, non-toxic  Head: NCAT  HEENT:  EOMI, normal conjunctiva oral mucosa moist, Tenderness to palpation of left cheek, decrease sensation on left cheek compared to right  Lung: CTAB, no respiratory distress, no wheezes/rhonchi/rales B/L, speaking in full sentences  CV: RRR, no murmurs, rubs or gallops  Abd: soft, NTND, no guarding,  MSK: Limited ROM of left hip secondary to pain, pain upon flexion of left hip, TTP of left lateral hip and thigh, no visible deformities  Neuro: CN intact  Skin: Warm, well perfused, no rash  Psych: normal affect.   ~Marco A Brumfield M.D. Resident

## 2018-09-06 PROCEDURE — 90832 PSYTX W PT 30 MINUTES: CPT

## 2018-09-11 PROCEDURE — 99214 OFFICE O/P EST MOD 30 MIN: CPT

## 2018-09-18 ENCOUNTER — APPOINTMENT (OUTPATIENT)
Dept: PULMONOLOGY | Facility: CLINIC | Age: 68
End: 2018-09-18
Payer: MEDICARE

## 2018-09-18 VITALS
RESPIRATION RATE: 16 BRPM | OXYGEN SATURATION: 95 % | DIASTOLIC BLOOD PRESSURE: 70 MMHG | HEART RATE: 89 BPM | SYSTOLIC BLOOD PRESSURE: 100 MMHG

## 2018-09-18 VITALS
DIASTOLIC BLOOD PRESSURE: 70 MMHG | HEIGHT: 55 IN | RESPIRATION RATE: 17 BRPM | WEIGHT: 99 LBS | OXYGEN SATURATION: 94 % | BODY MASS INDEX: 22.91 KG/M2 | HEART RATE: 104 BPM | SYSTOLIC BLOOD PRESSURE: 110 MMHG

## 2018-09-18 VITALS
SYSTOLIC BLOOD PRESSURE: 116 MMHG | HEIGHT: 55 IN | WEIGHT: 99 LBS | OXYGEN SATURATION: 98 % | HEART RATE: 95 BPM | BODY MASS INDEX: 22.91 KG/M2 | DIASTOLIC BLOOD PRESSURE: 72 MMHG | RESPIRATION RATE: 17 BRPM

## 2018-09-18 VITALS
RESPIRATION RATE: 16 BRPM | OXYGEN SATURATION: 96 % | HEART RATE: 95 BPM | DIASTOLIC BLOOD PRESSURE: 76 MMHG | SYSTOLIC BLOOD PRESSURE: 118 MMHG

## 2018-09-18 PROCEDURE — J3590G: CUSTOM

## 2018-09-18 PROCEDURE — 96372 THER/PROPH/DIAG INJ SC/IM: CPT

## 2018-09-18 RX ORDER — BENRALIZUMAB 30 MG/ML
30 INJECTION, SOLUTION SUBCUTANEOUS
Qty: 0 | Refills: 0 | Status: COMPLETED | OUTPATIENT
Start: 2018-09-18

## 2018-09-18 RX ADMIN — BENRALIZUMAB 0 MG/ML: 30 INJECTION, SOLUTION SUBCUTANEOUS at 00:00

## 2018-10-08 PROCEDURE — 90834 PSYTX W PT 45 MINUTES: CPT

## 2018-10-17 ENCOUNTER — MEDICATION RENEWAL (OUTPATIENT)
Age: 68
End: 2018-10-17

## 2018-10-17 ENCOUNTER — APPOINTMENT (OUTPATIENT)
Dept: PULMONOLOGY | Facility: CLINIC | Age: 68
End: 2018-10-17
Payer: MEDICARE

## 2018-10-17 VITALS
HEIGHT: 55 IN | BODY MASS INDEX: 23.14 KG/M2 | OXYGEN SATURATION: 96 % | WEIGHT: 100 LBS | HEART RATE: 92 BPM | DIASTOLIC BLOOD PRESSURE: 78 MMHG | SYSTOLIC BLOOD PRESSURE: 118 MMHG

## 2018-10-17 PROCEDURE — 96372 THER/PROPH/DIAG INJ SC/IM: CPT

## 2018-10-17 PROCEDURE — J3590G: CUSTOM

## 2018-10-17 RX ORDER — BENRALIZUMAB 30 MG/ML
30 INJECTION, SOLUTION SUBCUTANEOUS
Qty: 0 | Refills: 0 | Status: COMPLETED | OUTPATIENT
Start: 2018-10-17

## 2018-10-17 RX ADMIN — BENRALIZUMAB 0 MG/ML: 30 INJECTION, SOLUTION SUBCUTANEOUS at 00:00

## 2018-10-22 PROCEDURE — 90834 PSYTX W PT 45 MINUTES: CPT

## 2018-10-24 PROCEDURE — 99214 OFFICE O/P EST MOD 30 MIN: CPT

## 2018-10-31 ENCOUNTER — APPOINTMENT (OUTPATIENT)
Dept: PULMONOLOGY | Facility: CLINIC | Age: 68
End: 2018-10-31
Payer: MEDICARE

## 2018-10-31 VITALS
HEIGHT: 55 IN | HEART RATE: 100 BPM | BODY MASS INDEX: 23.14 KG/M2 | SYSTOLIC BLOOD PRESSURE: 120 MMHG | DIASTOLIC BLOOD PRESSURE: 70 MMHG | WEIGHT: 100 LBS | RESPIRATION RATE: 17 BRPM | OXYGEN SATURATION: 97 %

## 2018-10-31 PROCEDURE — 99214 OFFICE O/P EST MOD 30 MIN: CPT

## 2018-10-31 NOTE — ASSESSMENT
[FreeTextEntry1] : Ms. Primrose is a 68 year old female with history of COPD / asthma / allergy / paralyzed hemidiaphragm / cardiac disease / eosinophilic asthma.  She seems to be in the midst of an acute bronchitis/pharyngitis.\par \par Her shortness of breath is multifactorial due to:\par -cardiac/ mild congestive heart failure\par -severe persistent asthma\par -allergic rhinitis/post nasal drip syndrome\par -paralyzed hemidiaphragm s/p plication\par -poor breathing mechanics and anxiety \par \par problem 1: severe persistent asthma ([place]air)\par -add prednisone 20mg for 7 days followed by 10mg for 7 days\par -Information sheet given to the patient to be reviewed, this medication is never to be used without consulting the prescribing physician. Proper dietary restraint is necessary specifically salt containing foods, if any reaction may occur should be reported. \par \par -continue albuterol by the nebulizer up to QID \par -continue to use Advair 500 at 1 inhalation BID\par -continue Spiriva Respimat 2 inhalations QD\par -continue Singulair 10 mg before bed\par -continue to use Proventil PRN\par -continue Acapella device to be used multiple times daily \par -Asthma is believed to be caused by inherited (genetic) and environmental factor, but its exact cause is unknown. Asthma may be triggered by allergens, lung infections, or irritants in the air. Asthma triggers are different for each person \par -Inhaler technique reviewed as well as oral hygiene techniques reviewed with patient. Avoidance of cold air, extremes of temperature, rescue inhaler should be used before exercise. Order of medication reviewed with patient. Recommended use of a cool mist humidifier in the bedroom\par \par problem 2: eosinophilic asthma\par -patient receiving Fasenra and has had 2 injections thus far\par -The safety and efficacy of Nucala was established in three double-blind, randomized, placebo-controlled trials in patients with severe asthma. Compared to a placebo, patients with severe asthma receiving Nucala had fewer exacerbation requiring hospitalization and/or emergency department visits, and a longer time to first exacerbation. In addition, patients with severe asthma receiving Nucala or Fasenra experienced greater reductions in their daily maintenance oral corticosteroid dose, while maintaining asthma control compared with patients receiving placebo. Treatment with Nucala did not result in a significant improvement in lung function, as measured by the volume of air exhaled by patients in one second. The most common side effects include: headache, injection site reactions, back pain, weakness, and fatigue; hypersensitivity reactions can occur within hours or days including swelling of the face, mouth, and tongue, fainting, dizziness, hives, breathing problems, and rash; herpes zoster infections have occurred. The drug is a monoclonal antibody that inhibits interleukin-5 which helps regular eosinophils, a type of white blood cell that contributes to asthma. The over-production of eosinophils can cause inflammation in the lungs, increasing the frequency of asthma attacks. Patients must also take other medications, including high dose inhaled corticosteroids and at least one additional asthma drug\par \par problem 3: allergic rhinitis/post nasal drip syndrome \par -continue Qnasl 1 sniff each nostril BID \par -continue Olopatadine 0.6% 1 sniff each nostril BID \par -continue Xyzal 5 mg before bed \par Blood work: eosinophil level, IgE level, and immunocap testing \par -Environmental measures for allergies were encouraged including mattress and pillow cover, air purifier, and environmental controls.\par \par problem 4: GERD\par -continue to use good diet and good timing of meals\par -Rule of 2s: avoid eating too much, eating too late, eating too spicy, eating two hours before bed\par -Things to avoid including overeating, spicy foods, tight clothing, eating within three hours of bed, this list is not all inclusive. \par -For treatment of reflux, possible options discussed including diet control, H2 blockers, PPIs, as well as coating motility agents discussed as treatment options. Timing of meals and proximity of last meal to sleep were discussed. If symptoms persist, a formal gastrointestinal evaluation is needed. \par \par problem 5: ALIS\par -she has refused any treatment or diagnosing\par -she is being recommended to use Oxy-Aid by [facility]\par -Discussed the risks/associations with coronary artery disease, atrial fibrillation, arrhythmia, memory loss, issues with concentration, stroke risk, hypertension, nocturia, chronic reflux/Kimbrough’s esophagus some but not all inclusive. Treatment options discussed including CPAP/BiPAP machine, oral appliance, ProVent therapy, Oxy-Aid by [facility], new technologies, or positional sleep.\par \par problem 6: cardiac component \par -she is being recommended to have a consultation with Dr. Reese ([facility]) ([facility])\par \par problem 7: health maintenance \par -s/p 2018 yearly flu shot \par -recommended strep pneumonia vaccines: Prevnar-13 vaccine, followed by Pneumo vaccine 23 one year following\par -recommended early intervention for URIs\par -recommended regular osteoporosis evaluations\par -recommended early dermatological evaluations\par -recommended after the age of 50 to the age of 70, colonoscopy every 5 years \par  \par \par F/U in 2 months\par She is encouraged to call with any changes,concerns, or questions.

## 2018-10-31 NOTE — ADDENDUM
[FreeTextEntry1] : Documented by Venice Sutherland acting as a scribe for Dr. Ketan Morfin on 10/31/2018.\par \par All medical record entries made by the Scribe were at my, Dr. Ketan Morfin's, direction and personally dictated by me on 10//31/2018. I have reviewed the chart and agree that the record accurately reflects my personal performance of the history, physical exam, assessment and plan. I have also personally directed, reviewed, and agree with the discharge instructions.

## 2018-10-31 NOTE — REVIEW OF SYSTEMS
[Negative] : Sleep Disorder [Sore Throat] : sore throat [Dry Mouth] : dry mouth [As Noted in HPI] : as noted in HPI [Sputum] : sputum  [Dyspnea] : dyspnea

## 2018-10-31 NOTE — REASON FOR VISIT
[Follow-Up] : a follow-up visit [FreeTextEntry1] : sick visit-allergic rhinitis, eosinophilic/moderate persistent asthma, ALIS GERD, and shortness of breath

## 2018-10-31 NOTE — HISTORY OF PRESENT ILLNESS
[FreeTextEntry1] : Ms. Primrose is a 68 year old female presenting to the office for a follow up visit for allergic rhinitis, eosinophilic/moderate persistent asthma, ALIS GERD, and shortness of breath. Her chief complaint is difficulty breathing.\par -She notes she started feeling sick about 5 days ago with sputum and shortness of breath\par -She reports she started having sweats and self-initiated prednisone about 4 days ago\par -she states her chest has been tight with pressure\par -She notes her throat is sore and her mouth is dry \par -SHe reports she has some constipation\par -She states her sinuses feel congested\par -She reports she has been adhering to her regular medications\par -SHe notes she had an epidural for her back issues\par -She denies fevers, chills, sweats, diarrhea, dysphagia, nausea, vomiting

## 2018-11-19 ENCOUNTER — APPOINTMENT (OUTPATIENT)
Dept: PULMONOLOGY | Facility: CLINIC | Age: 68
End: 2018-11-19
Payer: MEDICARE

## 2018-11-19 VITALS
RESPIRATION RATE: 17 BRPM | WEIGHT: 100 LBS | DIASTOLIC BLOOD PRESSURE: 80 MMHG | SYSTOLIC BLOOD PRESSURE: 120 MMHG | OXYGEN SATURATION: 95 % | HEIGHT: 55 IN | HEART RATE: 104 BPM | BODY MASS INDEX: 23.14 KG/M2

## 2018-11-19 PROCEDURE — J3590G: CUSTOM

## 2018-11-19 PROCEDURE — 96372 THER/PROPH/DIAG INJ SC/IM: CPT

## 2018-11-19 RX ORDER — BENRALIZUMAB 30 MG/ML
30 INJECTION, SOLUTION SUBCUTANEOUS
Qty: 0 | Refills: 0 | Status: COMPLETED | OUTPATIENT
Start: 2018-11-19

## 2018-11-19 RX ADMIN — BENRALIZUMAB 0 MG/ML: 30 INJECTION, SOLUTION SUBCUTANEOUS at 00:00

## 2018-11-26 PROCEDURE — 99214 OFFICE O/P EST MOD 30 MIN: CPT

## 2018-11-26 PROCEDURE — 90834 PSYTX W PT 45 MINUTES: CPT

## 2018-11-27 PROCEDURE — 90853 GROUP PSYCHOTHERAPY: CPT

## 2018-11-29 ENCOUNTER — RESULT REVIEW (OUTPATIENT)
Age: 68
End: 2018-11-29

## 2018-11-29 PROCEDURE — 90853 GROUP PSYCHOTHERAPY: CPT

## 2018-12-03 ENCOUNTER — RX RENEWAL (OUTPATIENT)
Age: 68
End: 2018-12-03

## 2018-12-06 PROCEDURE — 90853 GROUP PSYCHOTHERAPY: CPT

## 2018-12-11 PROCEDURE — 90853 GROUP PSYCHOTHERAPY: CPT

## 2018-12-19 PROCEDURE — 90834 PSYTX W PT 45 MINUTES: CPT

## 2018-12-19 PROCEDURE — 99214 OFFICE O/P EST MOD 30 MIN: CPT

## 2018-12-31 ENCOUNTER — NON-APPOINTMENT (OUTPATIENT)
Age: 68
End: 2018-12-31

## 2018-12-31 ENCOUNTER — APPOINTMENT (OUTPATIENT)
Dept: PULMONOLOGY | Facility: CLINIC | Age: 68
End: 2018-12-31
Payer: MEDICARE

## 2018-12-31 VITALS
HEART RATE: 99 BPM | BODY MASS INDEX: 23.14 KG/M2 | RESPIRATION RATE: 17 BRPM | SYSTOLIC BLOOD PRESSURE: 110 MMHG | HEIGHT: 55 IN | OXYGEN SATURATION: 95 % | WEIGHT: 100 LBS | DIASTOLIC BLOOD PRESSURE: 84 MMHG

## 2018-12-31 PROCEDURE — 94010 BREATHING CAPACITY TEST: CPT

## 2018-12-31 PROCEDURE — 99214 OFFICE O/P EST MOD 30 MIN: CPT | Mod: 25

## 2018-12-31 PROCEDURE — 95012 NITRIC OXIDE EXP GAS DETER: CPT

## 2018-12-31 RX ORDER — AZITHROMYCIN 500 MG/1
500 TABLET, FILM COATED ORAL DAILY
Qty: 7 | Refills: 0 | Status: DISCONTINUED | COMMUNITY
Start: 2018-10-31 | End: 2018-12-31

## 2018-12-31 RX ORDER — DOXYCYCLINE 100 MG/1
100 CAPSULE ORAL
Qty: 20 | Refills: 0 | Status: DISCONTINUED | COMMUNITY
Start: 2018-11-06 | End: 2018-12-31

## 2018-12-31 NOTE — HISTORY OF PRESENT ILLNESS
[FreeTextEntry1] : Ms. Primrose is a 68 year old female presenting to the office for a follow up visit for allergic rhinitis, eosinophilic/moderate persistent asthma, ALIS GERD, and shortness of breath. Her chief complaint is head pain.\par -she states she has JEAN that may be better with shots \par -she states she occasionally coughs/wheezes\par -she states she has chills, chest pressure, nausea, dizziness, dysphagia with dry foods, and headaches\par -she states her nausea and dizziness may be related to neck pain\par -she notes her sinuses are not getting better\par -she states she has eye pressure\par -she states she has pain in her arms, legs, head\par -she notes she takes her allergy medication at night\par - she denies any headaches, nausea, vomiting, fever, chills, sweats, chest pain, chest pressure, diarrhea, constipation, leg swelling, leg pain, itchy eyes, itchy ears, heartburn, reflux, or sour taste in the mouth.

## 2018-12-31 NOTE — PHYSICAL EXAM

## 2018-12-31 NOTE — PROCEDURE
[FreeTextEntry1] : PFT-spi reveals mild restrictive moderate obstructive dysfunction with FEV1 of   0.72  , which is  46 % of predicted, normal flow volume loop \par \par FENO was 45 ; a normal value being less than 25\par Fractional exhaled nitric oxide (FENO) is regarded as a simple, noninvasive method for assessing eosinophilic airway inflammation. Produced by a variety of cells within the lung, nitric oxide (NO) concentrations are generally low in healthy individuals. However, high concentrations of NO appear to be involved in nonspecific host defense mechanisms and chronic inflammatory diseases such as asthma. The American Thoracic Society (ATS) therefore has recommended using FENO to aid in the diagnosis and monitoring of eosinophilic airway inflammation and asthma, and for identifying steroid responsive individuals whose chronic respiratory symptoms may be caused by airway inflammation.\par

## 2018-12-31 NOTE — ASSESSMENT
[FreeTextEntry1] : Ms. Primrose is a 68 year old female with history of COPD / asthma / allergy / paralyzed hemidiaphragm / cardiac disease / eosinophilic asthma.  Her number one issue is sinuses\par \par Her shortness of breath is multifactorial due to:\par -cardiac/ mild congestive heart failure\par -severe persistent asthma\par -allergic rhinitis/post nasal drip syndrome\par -paralyzed hemidiaphragm s/p plication\par -poor breathing mechanics and anxiety \par \par problem 1: \par -continue albuterol by the nebulizer up to QID \par -continue to use Advair 500 at 1 inhalation BID\par -continue Spiriva Respimat 2 inhalations QD\par -continue Singulair 10 mg before bed\par -continue to use Proventil PRN\par -continue Acapella device to be used multiple times daily \par -Asthma is believed to be caused by inherited (genetic) and environmental factor, but its exact cause is unknown. Asthma may be triggered by allergens, lung infections, or irritants in the air. Asthma triggers are different for each person \par -Inhaler technique reviewed as well as oral hygiene techniques reviewed with patient. Avoidance of cold air, extremes of temperature, rescue inhaler should be used before exercise. Order of medication reviewed with patient. Recommended use of a cool mist humidifier in the bedroom\par \par problem 2: eosinophilic asthma\par -patient receiving Fasenra and has had 3 injections thus far\par -The safety and efficacy of Nucala was established in three double-blind, randomized, placebo-controlled trials in patients with severe asthma. Compared to a placebo, patients with severe asthma receiving Nucala had fewer exacerbation requiring hospitalization and/or emergency department visits, and a longer time to first exacerbation. In addition, patients with severe asthma receiving Nucala or Fasenra experienced greater reductions in their daily maintenance oral corticosteroid dose, while maintaining asthma control compared with patients receiving placebo. Treatment with Nucala did not result in a significant improvement in lung function, as measured by the volume of air exhaled by patients in one second. The most common side effects include: headache, injection site reactions, back pain, weakness, and fatigue; hypersensitivity reactions can occur within hours or days including swelling of the face, mouth, and tongue, fainting, dizziness, hives, breathing problems, and rash; herpes zoster infections have occurred. The drug is a monoclonal antibody that inhibits interleukin-5 which helps regular eosinophils, a type of white blood cell that contributes to asthma. The over-production of eosinophils can cause inflammation in the lungs, increasing the frequency of asthma attacks. Patients must also take other medications, including high dose inhaled corticosteroids and at least one additional asthma drug\par \par problem 3: allergic rhinitis/post nasal drip syndrome \par -recommended Navage sinus rinse\par -continue Qnasl 1 sniff each nostril BID \par -continue Olopatadine 0.6% 1 sniff each nostril BID \par -continue Xyzal 5 mg before bed \par s/p Blood work: eosinophil level, IgE level, and immunocap testing \par -Environmental measures for allergies were encouraged including mattress and pillow cover, air purifier, and environmental controls.\par \par problem 4: GERD\par -continue to use good diet and good timing of meals\par -Rule of 2s: avoid eating too much, eating too late, eating too spicy, eating two hours before bed\par -Things to avoid including overeating, spicy foods, tight clothing, eating within three hours of bed, this list is not all inclusive. \par -For treatment of reflux, possible options discussed including diet control, H2 blockers, PPIs, as well as coating motility agents discussed as treatment options. Timing of meals and proximity of last meal to sleep were discussed. If symptoms persist, a formal gastrointestinal evaluation is needed. \par \par problem 5: ALIS\par -she has refused any treatment or diagnosing\par -she is being recommended to use Oxy-Aid by [employer]\par -Discussed the risks/associations with coronary artery disease, atrial fibrillation, arrhythmia, memory loss, issues with concentration, stroke risk, hypertension, nocturia, chronic reflux/Kimbrough’s esophagus some but not all inclusive. Treatment options discussed including CPAP/BiPAP machine, oral appliance, ProVent therapy, Oxy-Aid by [employer], new technologies, or positional sleep.\par \par problem 6: cardiac component \par -she is being recommended to have a consultation with Dr. Reese ([facility]) ([facility])\par \par problem 7: health maintenance \par -s/p 2018 yearly flu shot \par -recommended strep pneumonia vaccines: Prevnar-13 vaccine, followed by Pneumo vaccine 23 one year following\par -recommended early intervention for URIs\par -recommended regular osteoporosis evaluations\par -recommended early dermatological evaluations\par -recommended after the age of 50 to the age of 70, colonoscopy every 5 years \par  \par \par F/U in 2 months\par She is encouraged to call with any changes,concerns, or questions.

## 2018-12-31 NOTE — ADDENDUM
[FreeTextEntry1] : Documented by Lena Hammer acting as a scribe for Dr. Ketan Morfin on 12/31/2018.\par \par All medical record entries made by the Scribe were at my, Dr. Ketan Morfin's, direction and personally dictated by me on 12/31/2018. I have reviewed the chart and agree that the record accurately reflects my personal performance of the history, physical exam, assessment and plan. I have also personally directed, reviewed, and agree with the discharge instructions.

## 2019-01-03 PROCEDURE — 90853 GROUP PSYCHOTHERAPY: CPT

## 2019-01-12 ENCOUNTER — RX RENEWAL (OUTPATIENT)
Age: 69
End: 2019-01-12

## 2019-01-14 ENCOUNTER — APPOINTMENT (OUTPATIENT)
Dept: PULMONOLOGY | Facility: CLINIC | Age: 69
End: 2019-01-14
Payer: MEDICARE

## 2019-01-14 VITALS
SYSTOLIC BLOOD PRESSURE: 112 MMHG | OXYGEN SATURATION: 91 % | HEART RATE: 99 BPM | RESPIRATION RATE: 17 BRPM | HEIGHT: 55 IN | BODY MASS INDEX: 23.61 KG/M2 | WEIGHT: 102 LBS | DIASTOLIC BLOOD PRESSURE: 83 MMHG

## 2019-01-14 PROCEDURE — 96372 THER/PROPH/DIAG INJ SC/IM: CPT

## 2019-01-14 PROCEDURE — J3590G: CUSTOM

## 2019-01-14 RX ORDER — BENRALIZUMAB 30 MG/ML
30 INJECTION, SOLUTION SUBCUTANEOUS
Qty: 0 | Refills: 0 | Status: COMPLETED | OUTPATIENT
Start: 2019-01-14

## 2019-01-14 RX ADMIN — BENRALIZUMAB 30 MG/ML: 30 INJECTION, SOLUTION SUBCUTANEOUS at 00:00

## 2019-01-16 PROCEDURE — 99214 OFFICE O/P EST MOD 30 MIN: CPT

## 2019-01-16 PROCEDURE — 90832 PSYTX W PT 30 MINUTES: CPT

## 2019-01-28 PROCEDURE — 90834 PSYTX W PT 45 MINUTES: CPT

## 2019-01-28 PROCEDURE — 99214 OFFICE O/P EST MOD 30 MIN: CPT

## 2019-02-26 PROCEDURE — 99214 OFFICE O/P EST MOD 30 MIN: CPT

## 2019-02-26 PROCEDURE — 90853 GROUP PSYCHOTHERAPY: CPT

## 2019-03-11 ENCOUNTER — APPOINTMENT (OUTPATIENT)
Dept: PULMONOLOGY | Facility: CLINIC | Age: 69
End: 2019-03-11
Payer: MEDICARE

## 2019-03-11 VITALS
BODY MASS INDEX: 23.61 KG/M2 | HEIGHT: 55 IN | OXYGEN SATURATION: 93 % | WEIGHT: 102 LBS | HEART RATE: 98 BPM | SYSTOLIC BLOOD PRESSURE: 116 MMHG | DIASTOLIC BLOOD PRESSURE: 70 MMHG

## 2019-03-11 PROCEDURE — 96372 THER/PROPH/DIAG INJ SC/IM: CPT | Mod: PD

## 2019-03-11 RX ORDER — BENRALIZUMAB 30 MG/ML
30 INJECTION, SOLUTION SUBCUTANEOUS
Qty: 0 | Refills: 0 | Status: COMPLETED | OUTPATIENT
Start: 2019-03-11

## 2019-03-11 RX ADMIN — BENRALIZUMAB 30 MG/ML: 30 INJECTION, SOLUTION SUBCUTANEOUS at 00:00

## 2019-03-12 ENCOUNTER — INPATIENT (INPATIENT)
Facility: HOSPITAL | Age: 69
LOS: 3 days | Discharge: HOME | End: 2019-03-16
Attending: INTERNAL MEDICINE | Admitting: INTERNAL MEDICINE
Payer: MEDICARE

## 2019-03-12 VITALS
SYSTOLIC BLOOD PRESSURE: 135 MMHG | OXYGEN SATURATION: 93 % | HEART RATE: 105 BPM | TEMPERATURE: 97 F | RESPIRATION RATE: 22 BRPM | DIASTOLIC BLOOD PRESSURE: 106 MMHG

## 2019-03-12 DIAGNOSIS — N81.6 RECTOCELE: Chronic | ICD-10-CM

## 2019-03-12 DIAGNOSIS — J45.901 UNSPECIFIED ASTHMA WITH (ACUTE) EXACERBATION: ICD-10-CM

## 2019-03-12 DIAGNOSIS — J98.6 DISORDERS OF DIAPHRAGM: Chronic | ICD-10-CM

## 2019-03-12 DIAGNOSIS — R06.02 SHORTNESS OF BREATH: ICD-10-CM

## 2019-03-12 LAB
ALBUMIN SERPL ELPH-MCNC: 4.5 G/DL — SIGNIFICANT CHANGE UP (ref 3.3–5)
ALP SERPL-CCNC: 57 U/L — SIGNIFICANT CHANGE UP (ref 40–120)
ALT FLD-CCNC: 13 U/L — SIGNIFICANT CHANGE UP (ref 4–33)
ANION GAP SERPL CALC-SCNC: 11 MMO/L — SIGNIFICANT CHANGE UP (ref 7–14)
APTT BLD: 31.5 SEC — SIGNIFICANT CHANGE UP (ref 27.5–36.3)
AST SERPL-CCNC: 22 U/L — SIGNIFICANT CHANGE UP (ref 4–32)
BASE EXCESS BLDV CALC-SCNC: 3.6 MMOL/L — SIGNIFICANT CHANGE UP
BASOPHILS # BLD AUTO: 0 K/UL — SIGNIFICANT CHANGE UP (ref 0–0.2)
BASOPHILS NFR BLD AUTO: 0 % — SIGNIFICANT CHANGE UP (ref 0–2)
BILIRUB SERPL-MCNC: 0.3 MG/DL — SIGNIFICANT CHANGE UP (ref 0.2–1.2)
BLOOD GAS VENOUS - CREATININE: 0.61 MG/DL — SIGNIFICANT CHANGE UP (ref 0.5–1.3)
BUN SERPL-MCNC: 15 MG/DL — SIGNIFICANT CHANGE UP (ref 7–23)
CALCIUM SERPL-MCNC: 9.9 MG/DL — SIGNIFICANT CHANGE UP (ref 8.4–10.5)
CHLORIDE BLDV-SCNC: 108 MMOL/L — SIGNIFICANT CHANGE UP (ref 96–108)
CHLORIDE SERPL-SCNC: 104 MMOL/L — SIGNIFICANT CHANGE UP (ref 98–107)
CK MB BLD-MCNC: 1.9 NG/ML — SIGNIFICANT CHANGE UP (ref 1–4.7)
CK SERPL-CCNC: 59 U/L — SIGNIFICANT CHANGE UP (ref 25–170)
CO2 SERPL-SCNC: 27 MMOL/L — SIGNIFICANT CHANGE UP (ref 22–31)
CREAT SERPL-MCNC: 0.78 MG/DL — SIGNIFICANT CHANGE UP (ref 0.5–1.3)
EOSINOPHIL # BLD AUTO: 0 K/UL — SIGNIFICANT CHANGE UP (ref 0–0.5)
EOSINOPHIL NFR BLD AUTO: 0 % — SIGNIFICANT CHANGE UP (ref 0–6)
GAS PNL BLDV: 135 MMOL/L — LOW (ref 136–146)
GLUCOSE BLDV-MCNC: 112 — HIGH (ref 70–99)
GLUCOSE SERPL-MCNC: 112 MG/DL — HIGH (ref 70–99)
HCO3 BLDV-SCNC: 26 MMOL/L — SIGNIFICANT CHANGE UP (ref 20–27)
HCT VFR BLD CALC: 45.2 % — HIGH (ref 34.5–45)
HCT VFR BLDV CALC: 47.2 % — HIGH (ref 34.5–45)
HGB BLD-MCNC: 15 G/DL — SIGNIFICANT CHANGE UP (ref 11.5–15.5)
HGB BLDV-MCNC: 15.4 G/DL — SIGNIFICANT CHANGE UP (ref 11.5–15.5)
IMM GRANULOCYTES NFR BLD AUTO: 0.4 % — SIGNIFICANT CHANGE UP (ref 0–1.5)
INR BLD: 0.94 — SIGNIFICANT CHANGE UP (ref 0.88–1.17)
LACTATE BLDV-MCNC: 2.4 MMOL/L — HIGH (ref 0.5–2)
LYMPHOCYTES # BLD AUTO: 2.22 K/UL — SIGNIFICANT CHANGE UP (ref 1–3.3)
LYMPHOCYTES # BLD AUTO: 41.3 % — SIGNIFICANT CHANGE UP (ref 13–44)
MCHC RBC-ENTMCNC: 30.5 PG — SIGNIFICANT CHANGE UP (ref 27–34)
MCHC RBC-ENTMCNC: 33.2 % — SIGNIFICANT CHANGE UP (ref 32–36)
MCV RBC AUTO: 91.9 FL — SIGNIFICANT CHANGE UP (ref 80–100)
MONOCYTES # BLD AUTO: 0.44 K/UL — SIGNIFICANT CHANGE UP (ref 0–0.9)
MONOCYTES NFR BLD AUTO: 8.2 % — SIGNIFICANT CHANGE UP (ref 2–14)
NEUTROPHILS # BLD AUTO: 2.69 K/UL — SIGNIFICANT CHANGE UP (ref 1.8–7.4)
NEUTROPHILS NFR BLD AUTO: 50.1 % — SIGNIFICANT CHANGE UP (ref 43–77)
NRBC # FLD: 0 K/UL — LOW (ref 25–125)
NT-PROBNP SERPL-SCNC: 601.6 PG/ML — SIGNIFICANT CHANGE UP
PCO2 BLDV: 50 MMHG — SIGNIFICANT CHANGE UP (ref 41–51)
PH BLDV: 7.38 PH — SIGNIFICANT CHANGE UP (ref 7.32–7.43)
PLATELET # BLD AUTO: 308 K/UL — SIGNIFICANT CHANGE UP (ref 150–400)
PMV BLD: 8.8 FL — SIGNIFICANT CHANGE UP (ref 7–13)
PO2 BLDV: 29 MMHG — LOW (ref 35–40)
POTASSIUM BLDV-SCNC: 3.7 MMOL/L — SIGNIFICANT CHANGE UP (ref 3.4–4.5)
POTASSIUM SERPL-MCNC: 4.1 MMOL/L — SIGNIFICANT CHANGE UP (ref 3.5–5.3)
POTASSIUM SERPL-SCNC: 4.1 MMOL/L — SIGNIFICANT CHANGE UP (ref 3.5–5.3)
PROT SERPL-MCNC: 6.6 G/DL — SIGNIFICANT CHANGE UP (ref 6–8.3)
PROTHROM AB SERPL-ACNC: 10.4 SEC — SIGNIFICANT CHANGE UP (ref 9.8–13.1)
RBC # BLD: 4.92 M/UL — SIGNIFICANT CHANGE UP (ref 3.8–5.2)
RBC # FLD: 13.9 % — SIGNIFICANT CHANGE UP (ref 10.3–14.5)
SAO2 % BLDV: 50.6 % — LOW (ref 60–85)
SODIUM SERPL-SCNC: 142 MMOL/L — SIGNIFICANT CHANGE UP (ref 135–145)
TROPONIN T, HIGH SENSITIVITY: 10 NG/L — SIGNIFICANT CHANGE UP (ref ?–14)
TROPONIN T, HIGH SENSITIVITY: 10 NG/L — SIGNIFICANT CHANGE UP (ref ?–14)
WBC # BLD: 5.37 K/UL — SIGNIFICANT CHANGE UP (ref 3.8–10.5)
WBC # FLD AUTO: 5.37 K/UL — SIGNIFICANT CHANGE UP (ref 3.8–10.5)

## 2019-03-12 PROCEDURE — 71045 X-RAY EXAM CHEST 1 VIEW: CPT | Mod: 26

## 2019-03-12 RX ORDER — TIOTROPIUM BROMIDE 18 UG/1
1 CAPSULE ORAL; RESPIRATORY (INHALATION) DAILY
Qty: 0 | Refills: 0 | Status: DISCONTINUED | OUTPATIENT
Start: 2019-03-12 | End: 2019-03-12

## 2019-03-12 RX ORDER — BUDESONIDE AND FORMOTEROL FUMARATE DIHYDRATE 160; 4.5 UG/1; UG/1
2 AEROSOL RESPIRATORY (INHALATION)
Qty: 0 | Refills: 0 | Status: DISCONTINUED | OUTPATIENT
Start: 2019-03-12 | End: 2019-03-16

## 2019-03-12 RX ORDER — NITROGLYCERIN 6.5 MG
0.4 CAPSULE, EXTENDED RELEASE ORAL ONCE
Qty: 0 | Refills: 0 | Status: COMPLETED | OUTPATIENT
Start: 2019-03-12 | End: 2019-03-12

## 2019-03-12 RX ORDER — ASPIRIN/CALCIUM CARB/MAGNESIUM 324 MG
243 TABLET ORAL ONCE
Qty: 0 | Refills: 0 | Status: COMPLETED | OUTPATIENT
Start: 2019-03-12 | End: 2019-03-12

## 2019-03-12 RX ORDER — IPRATROPIUM/ALBUTEROL SULFATE 18-103MCG
3 AEROSOL WITH ADAPTER (GRAM) INHALATION EVERY 6 HOURS
Qty: 0 | Refills: 0 | Status: DISCONTINUED | OUTPATIENT
Start: 2019-03-12 | End: 2019-03-16

## 2019-03-12 RX ORDER — HEPARIN SODIUM 5000 [USP'U]/ML
5000 INJECTION INTRAVENOUS; SUBCUTANEOUS EVERY 12 HOURS
Qty: 0 | Refills: 0 | Status: DISCONTINUED | OUTPATIENT
Start: 2019-03-12 | End: 2019-03-16

## 2019-03-12 RX ORDER — FLUTICASONE PROPIONATE AND SALMETEROL 50; 250 UG/1; UG/1
1 POWDER ORAL; RESPIRATORY (INHALATION)
Qty: 0 | Refills: 0 | COMMUNITY

## 2019-03-12 RX ORDER — IPRATROPIUM/ALBUTEROL SULFATE 18-103MCG
3 AEROSOL WITH ADAPTER (GRAM) INHALATION
Qty: 0 | Refills: 0 | Status: DISCONTINUED | OUTPATIENT
Start: 2019-03-12 | End: 2019-03-16

## 2019-03-12 RX ORDER — SPIRONOLACTONE 25 MG/1
0.5 TABLET, FILM COATED ORAL
Qty: 0 | Refills: 0 | COMMUNITY

## 2019-03-12 RX ORDER — SPIRONOLACTONE 25 MG/1
12.5 TABLET, FILM COATED ORAL
Qty: 0 | Refills: 0 | Status: DISCONTINUED | OUTPATIENT
Start: 2019-03-12 | End: 2019-03-16

## 2019-03-12 RX ORDER — CLONAZEPAM 1 MG
1 TABLET ORAL AT BEDTIME
Qty: 0 | Refills: 0 | Status: DISCONTINUED | OUTPATIENT
Start: 2019-03-12 | End: 2019-03-16

## 2019-03-12 RX ORDER — QUETIAPINE FUMARATE 200 MG/1
100 TABLET, FILM COATED ORAL AT BEDTIME
Qty: 0 | Refills: 0 | Status: DISCONTINUED | OUTPATIENT
Start: 2019-03-12 | End: 2019-03-16

## 2019-03-12 RX ORDER — PANTOPRAZOLE SODIUM 20 MG/1
40 TABLET, DELAYED RELEASE ORAL
Qty: 0 | Refills: 0 | Status: DISCONTINUED | OUTPATIENT
Start: 2019-03-12 | End: 2019-03-16

## 2019-03-12 RX ORDER — IPRATROPIUM/ALBUTEROL SULFATE 18-103MCG
3 AEROSOL WITH ADAPTER (GRAM) INHALATION ONCE
Qty: 0 | Refills: 0 | Status: COMPLETED | OUTPATIENT
Start: 2019-03-12 | End: 2019-03-12

## 2019-03-12 RX ORDER — LEVOTHYROXINE SODIUM 125 MCG
75 TABLET ORAL DAILY
Qty: 0 | Refills: 0 | Status: DISCONTINUED | OUTPATIENT
Start: 2019-03-12 | End: 2019-03-16

## 2019-03-12 RX ORDER — CLOPIDOGREL BISULFATE 75 MG/1
75 TABLET, FILM COATED ORAL DAILY
Qty: 0 | Refills: 0 | Status: DISCONTINUED | OUTPATIENT
Start: 2019-03-12 | End: 2019-03-16

## 2019-03-12 RX ORDER — SODIUM CHLORIDE 9 MG/ML
3 INJECTION INTRAMUSCULAR; INTRAVENOUS; SUBCUTANEOUS EVERY 8 HOURS
Qty: 0 | Refills: 0 | Status: DISCONTINUED | OUTPATIENT
Start: 2019-03-12 | End: 2019-03-16

## 2019-03-12 RX ORDER — TRAZODONE HCL 50 MG
25 TABLET ORAL AT BEDTIME
Qty: 0 | Refills: 0 | Status: DISCONTINUED | OUTPATIENT
Start: 2019-03-12 | End: 2019-03-16

## 2019-03-12 RX ORDER — ASPIRIN/CALCIUM CARB/MAGNESIUM 324 MG
81 TABLET ORAL DAILY
Qty: 0 | Refills: 0 | Status: DISCONTINUED | OUTPATIENT
Start: 2019-03-12 | End: 2019-03-16

## 2019-03-12 RX ORDER — LEVOTHYROXINE SODIUM 125 MCG
1 TABLET ORAL
Qty: 30 | Refills: 0 | COMMUNITY

## 2019-03-12 RX ADMIN — Medication 25 MILLIGRAM(S): at 22:52

## 2019-03-12 RX ADMIN — QUETIAPINE FUMARATE 100 MILLIGRAM(S): 200 TABLET, FILM COATED ORAL at 22:52

## 2019-03-12 RX ADMIN — Medication 1 MILLIGRAM(S): at 22:51

## 2019-03-12 RX ADMIN — Medication 3 MILLILITER(S): at 23:25

## 2019-03-12 RX ADMIN — Medication 0.4 MILLIGRAM(S): at 19:20

## 2019-03-12 RX ADMIN — Medication 125 MILLIGRAM(S): at 22:11

## 2019-03-12 RX ADMIN — Medication 243 MILLIGRAM(S): at 17:42

## 2019-03-12 RX ADMIN — Medication 3 MILLILITER(S): at 23:10

## 2019-03-12 RX ADMIN — SODIUM CHLORIDE 3 MILLILITER(S): 9 INJECTION INTRAMUSCULAR; INTRAVENOUS; SUBCUTANEOUS at 22:52

## 2019-03-12 NOTE — H&P ADULT - ATTENDING COMMENTS
Seen and examined in ED room 2 . Acute exacerbation of Bronchial asthma with acute respiratory failure with Hypoxia . BPAP ,Steroid ,Neb Rx . Pulmonary Dr Holt consulted. Chest Pain with CAD s/p Stents so R/O ACS . Cardiologist Dr Suzanne Joshi to follow. TTE pending. Agree with above A/P .

## 2019-03-12 NOTE — ED ADULT NURSE NOTE - OBJECTIVE STATEMENT
Pt presents to room 2, A&Ox3, ambulatory at baseline without assistance, pmhx of asthma, cad s/p mi with stent x 8, here for evaluation of chest tightness, headache, nausea, with vision changes x 1 week, and sob, dyspnea on exertion, and three pillow orthopnea x 2-3 days. denies any fevers or cough. spo2 on room air 93% with increased work of breathing noted. pt placed on O2 via nc @ 2lpm, spo2 98%. IV established in right forearm with a 20g, labs drawn and sent, call bell in reach, side rails up, bed in locked position, md evaluation in progress, will continue to monitor.

## 2019-03-12 NOTE — H&P ADULT - RS GEN PE MLT RESP DETAILS PC
no chest wall tenderness/diminished breath sounds, R/no intercostal retractions/diminished breath sounds, L

## 2019-03-12 NOTE — H&P ADULT - NSICDXPROBLEM_GEN_ALL_CORE_FT
PROBLEM DIAGNOSES  Problem: Shortness of breath  Assessment and Plan: PROBLEM DIAGNOSES  Problem: Asthma with acute exacerbation  Assessment and Plan: admit to tele  cbc, bmp, a1c, tsh, flp   s/p IV methylprednisone + duonebs  on BIPAP   vbg with lactate in AM  continuous pulse ox monitoring   CXR: clear lungs, unlikely infectious cause   MD note       R/O PROBLEM DIAGNOSES  Problem: Chest pain  Assessment and Plan: R/o ACS  Trop 10   repeat CE  serial ekgs, trend CE's   low salt low cholesterol diet       Problem: Heart failure  Assessment and Plan: last echo 2017- EF: 25-30%   BNP: 601.6   TTE ordered  fluid restrction: 1200mL   continue spironolactone  can consider starting ACEi, Cr normal PROBLEM DIAGNOSES  Problem: Asthma with acute exacerbation  Assessment and Plan: admit to tele  cbc, bmp, a1c, tsh, flp   s/p IV methylprednisone + duonebs  on BIPAP   vbg with lactate in AM  continuous pulse ox monitoring   CXR: clear lungs, unlikely infectious cause   MD note       R/O PROBLEM DIAGNOSES  Problem: Chest pain  Assessment and Plan: R/o ACS  Trop 10   repeat CE  serial ekgs, trend CE's   low salt low cholesterol diet       Problem: Heart failure  Assessment and Plan: last echo 2017- EF: 25-30%   BNP: 601.6   TTE ordered  fluid restrction: 1200mL   continue spironolactone  can consider starting ACEi, Cr normal      Problem: Need for prophylactic measure  Assessment and Plan: DVT ppx: Heparin 5000 SQ

## 2019-03-12 NOTE — H&P ADULT - NEGATIVE NEUROLOGICAL SYMPTOMS
no syncope/no loss of consciousness/no generalized seizures/no tremors/no paresthesias/no loss of sensation

## 2019-03-12 NOTE — H&P ADULT - NSHPLABSRESULTS_GEN_ALL_CORE
15.0   5.37  )-----------( 308      ( 12 Mar 2019 17:15 )             45.2   03-12    142  |  104  |  15  ----------------------------<  112<H>  4.1   |  27  |  0.78    Ca    9.9      12 Mar 2019 17:15    TPro  6.6  /  Alb  4.5  /  TBili  0.3  /  DBili  x   /  AST  22  /  ALT  13  /  AlkPhos  57  03-12    Trop 10      Vital Signs Last 24 Hrs  T(C): 36.2 (12 Mar 2019 19:13), Max: 36.3 (12 Mar 2019 16:35)  T(F): 97.1 (12 Mar 2019 19:13), Max: 97.3 (12 Mar 2019 16:35)  HR: 99 (12 Mar 2019 20:20) (89 - 105)  BP: 120/90 (12 Mar 2019 19:13) (120/90 - 135/106)  BP(mean): --  RR: 18 (12 Mar 2019 19:13) (18 - 22)  SpO2: 95% (12 Mar 2019 20:20) (93% - 96%)    BNP: 601.6   Lactate: 2.4   CXR: Clear lungs follow up official report   EKG: Normal sinus rhythm @97bpm

## 2019-03-12 NOTE — H&P ADULT - NSICDXPASTMEDICALHX_GEN_ALL_CORE_FT
PAST MEDICAL HISTORY:  CAD (coronary artery disease)     Chronic nonintractable headache, unspecified headache type     Depression, unspecified depression type     Diverticulitis sigmoid, treated surgically    Gastroesophageal reflux disease, esophagitis presence not specified     HF (heart failure)     Hypothyroidism, unspecified type     Insomnia     ALIS (obstructive sleep apnea) as per MD's note, but patient didn't have study yet    Paralyzed hemidiaphragm     Psoriasis     Stented coronary artery in 2010 and 2011    Uncomplicated asthma, unspecified asthma severity

## 2019-03-12 NOTE — H&P ADULT - HISTORY OF PRESENT ILLNESS
68 yr old female with PMHx of CAD s/p 8 stents on ASA + Plavix, Asthma s/p 1 intubation, Depression, Hypothyroidism, HFrEF (23-30%) presents to ED with complaint of left sided 8/10 chest pressure and worsening dyspnea x 1 week. Patient reports dyspnea has not improved despite using multiple inhalers and nebulizer treatments q 2hours. Patient c/o dyspnea on exertion, orthopnea (uses 3 pillows) and intermittent productive cough with green sputum. Reports left sided chest pressure as 8/10 radiating to left jaw and neck. Has hx of chronic headache however reports sx have worsened with chest pain and sob. Denies diaphoresis, fever, chills, nasal congestion, sinus pressure, abdominal pain, nausea, vomiting, hematochezia, melena, dysuria, urinary frequency, calf pain, leg swelling, paresthesias.     In ED patient was tachypneic using accessory muscles sp02 93% started on Bipap and received nitro x 1 for chest pressure. Trop: 10. VS: T: 97.1, HR:89, 120/90 Sp02: 96% on Bipap.

## 2019-03-12 NOTE — H&P ADULT - NSICDXFAMILYHX_GEN_ALL_CORE_FT
FAMILY HISTORY:  Sibling  Still living? Unknown  Family history of asthma, Age at diagnosis: Age Unknown

## 2019-03-12 NOTE — ED ADULT TRIAGE NOTE - CHIEF COMPLAINT QUOTE
Pt with co increased sob x one week not relieved by inhaler sleeping with 3 pillows at night. Pt reports chest tightness with dizziness. Pt looks tachypneic in triage. pt also reports green productive cough and headache.

## 2019-03-12 NOTE — ED PROVIDER NOTE - CLINICAL SUMMARY MEDICAL DECISION MAKING FREE TEXT BOX
68 yr old female with hx of cad x 8 stent asa and plavix, asthma, presents to ed c/o  sob worsening x 1 wk. pt states fatigue, can't walk, tight left sided chest pressure radiate to left shoulder and neck. + nausea, no vomiting, no diaphoresis, intermitted green sputum.  no fever, no palpitations, using inhaler without relieve. no leg swelling.     dyspnea r/o acs with hx of cad stents vs chf exacerbation vs pleural effusion vs pna.  labs, cxr, bipap, asa, ekg, admit

## 2019-03-12 NOTE — H&P ADULT - NSHPSOCIALHISTORY_GEN_ALL_CORE
Patient lives at home by herself, has 5 kids who live nearby and visit her. Denies hx of smoking tobacco, denies alcohol use.

## 2019-03-12 NOTE — ED ADULT NURSE NOTE - NSIMPLEMENTINTERV_GEN_ALL_ED
Implemented All Fall Risk Interventions:  [place] to call system. Call bell, personal items and telephone within reach. Instruct patient to call for assistance. Room bathroom lighting operational. Non-slip footwear when patient is off stretcher. Physically safe environment: no spills, clutter or unnecessary equipment. Stretcher in lowest position, wheels locked, appropriate side rails in place. Provide visual cue, wrist band, yellow gown, etc. Monitor gait and stability. Monitor for mental status changes and reorient to person, place, and time. Review medications for side effects contributing to fall risk. Reinforce activity limits and safety measures with patient and family.

## 2019-03-12 NOTE — ED PROVIDER NOTE - OBJECTIVE STATEMENT
68 yr old female with hx of cad x 8 stent asa and plavix, asthma, presents to ed c/o  sob worsening x 1 wk. pt states fatigue, can't walk, tight left sided chest pressure radiate to left shoulder and neck. + nausea, no vomiting, no diaphoresis, intermitted green sputum.  no fever, no palpitations, using inhaler without relieve. no leg swelling.

## 2019-03-12 NOTE — ED PROVIDER NOTE - CARE PLAN
Principal Discharge DX:	Shortness of breath  Secondary Diagnosis:	JEAN (dyspnea on exertion)  Secondary Diagnosis:	CAD (coronary artery disease)  Secondary Diagnosis:	Asthma

## 2019-03-12 NOTE — H&P ADULT - NEGATIVE ENMT SYMPTOMS
no tinnitus/no sinus symptoms/no nasal discharge/no vertigo/no hearing difficulty/no ear pain/no nasal congestion

## 2019-03-12 NOTE — H&P ADULT - ASSESSMENT
68 yr old female with PMHx of CAD s/p 8 stents on ASA + Plavix, Asthma s/p 1 intubation, Depression, Hypothyroidism, HFrEF (23-30%) admitted for left sided 8/10 chest pressure and worsening dyspnea x 1 week r/o ACS     Problem 1: Shortness of breath   admit to tele  cbc, bmp, a1c, tsh, flp   s/p IV Methylprednisolone on Duoneb  on BIPAP   vbg with lactate in AM  continuous pulse ox monitoring   CXR: clear lungs, unlikely infectious cause   MD note     Problem 2: Chest pain   R/O ACS   Trop 10   repeat CE  serial ekgs, trend CE's   low salt low cholesterol diet     Problem 3: Heart Failure   last echo 2017- EF: 25-30%   BNP: 601.6   TTE ordered  fluid restrction: 1200mL   continue spironolactone  can consider starting ACEi, Cr normal      VTE ppx: Heparin SQ 68 yr old female with PMHx of CAD s/p 8 stents on ASA + Plavix, Asthma s/p 1 intubation, Depression, Hypothyroidism, HFrEF (23-30%) admitted for left sided 8/10 chest pressure and worsening dyspnea x 1 week r/o ACS     Problem 1: Shortness of breath   admit to tele  cbc, bmp, a1c, tsh, flp   s/p IV Methylprednisolone on Duoneb  on BIPAP   vbg with lactate in AM  continuous pulse ox monitoring   CXR: clear lungs, unlikely infectious cause   MD note     Problem 2: Chest pain   R/O ACS   Trop 10   repeat CE  serial ekgs, trend CE's   low salt low cholesterol diet   cards- Dr. Joshi     Problem 3: Heart Failure   last echo 2017- EF: 25-30%   BNP: 601.6   TTE ordered  fluid restrction: 1200mL   continue spironolactone  can consider starting ACEi, Cr normal      VTE ppx: Heparin SQ 68 yr old female with PMHx of CAD s/p 8 stents on ASA + Plavix, Asthma s/p 1 intubation, Depression, Hypothyroidism, HFrEF (23-30%) admitted for left sided 8/10 chest pressure and worsening dyspnea x 1 week r/o ACS     Problem 1: Acute Asthma Exacerbation   admit to tele  cbc, bmp, a1c, tsh, flp   s/p IV Methylprednisolone on Duoneb  on BIPAP   vbg with lactate in AM  continuous pulse ox monitoring   CXR: clear lungs, unlikely infectious cause   MD note     Problem 2: Chest pain   R/O ACS   Trop 10   repeat CE  serial ekgs, trend CE's   low salt low cholesterol diet   cards- Dr. Joshi     Problem 3: Heart Failure   last echo 2017- EF: 25-30%   BNP: 601.6   TTE ordered  fluid restrction: 1200mL   continue spironolactone  can consider starting ACEi, Cr normal      VTE ppx: Heparin SQ 68 yr old female with PMHx of CAD s/p 8 stents on ASA + Plavix, Asthma s/p 1 intubation, Depression, Hypothyroidism, HFrEF (23-30%) admitted for left sided 8/10 chest pressure and worsening dyspnea x 1 week r/o ACS

## 2019-03-12 NOTE — H&P ADULT - NEGATIVE GASTROINTESTINAL SYMPTOMS
no change in bowel habits/no nausea/no abdominal pain/no melena/no hematochezia/no vomiting/no diarrhea

## 2019-03-13 LAB
ANION GAP SERPL CALC-SCNC: 13 MMO/L — SIGNIFICANT CHANGE UP (ref 7–14)
BASE EXCESS BLDV CALC-SCNC: 2 MMOL/L — SIGNIFICANT CHANGE UP
BUN SERPL-MCNC: 14 MG/DL — SIGNIFICANT CHANGE UP (ref 7–23)
CALCIUM SERPL-MCNC: 9.4 MG/DL — SIGNIFICANT CHANGE UP (ref 8.4–10.5)
CHLORIDE SERPL-SCNC: 103 MMOL/L — SIGNIFICANT CHANGE UP (ref 98–107)
CO2 SERPL-SCNC: 23 MMOL/L — SIGNIFICANT CHANGE UP (ref 22–31)
CREAT SERPL-MCNC: 0.66 MG/DL — SIGNIFICANT CHANGE UP (ref 0.5–1.3)
GAS PNL BLDV: 136 MMOL/L — SIGNIFICANT CHANGE UP (ref 136–146)
GLUCOSE BLDV-MCNC: 130 — HIGH (ref 70–99)
GLUCOSE SERPL-MCNC: 127 MG/DL — HIGH (ref 70–99)
HBA1C BLD-MCNC: 5.1 % — SIGNIFICANT CHANGE UP (ref 4–5.6)
HCG SERPL-ACNC: < 5 MIU/ML — SIGNIFICANT CHANGE UP
HCO3 BLDV-SCNC: 25 MMOL/L — SIGNIFICANT CHANGE UP (ref 20–27)
HCT VFR BLD CALC: 45.1 % — HIGH (ref 34.5–45)
HCT VFR BLDV CALC: 51.2 % — HIGH (ref 34.5–45)
HGB BLD-MCNC: 14.8 G/DL — SIGNIFICANT CHANGE UP (ref 11.5–15.5)
HGB BLDV-MCNC: 16.7 G/DL — HIGH (ref 11.5–15.5)
LACTATE BLDV-MCNC: 2 MMOL/L — SIGNIFICANT CHANGE UP (ref 0.5–2)
MAGNESIUM SERPL-MCNC: 1.9 MG/DL — SIGNIFICANT CHANGE UP (ref 1.6–2.6)
MCHC RBC-ENTMCNC: 31.1 PG — SIGNIFICANT CHANGE UP (ref 27–34)
MCHC RBC-ENTMCNC: 32.8 % — SIGNIFICANT CHANGE UP (ref 32–36)
MCV RBC AUTO: 94.7 FL — SIGNIFICANT CHANGE UP (ref 80–100)
NRBC # FLD: 0 K/UL — LOW (ref 25–125)
PCO2 BLDV: 49 MMHG — SIGNIFICANT CHANGE UP (ref 41–51)
PH BLDV: 7.36 PH — SIGNIFICANT CHANGE UP (ref 7.32–7.43)
PHOSPHATE SERPL-MCNC: 2.4 MG/DL — LOW (ref 2.5–4.5)
PLATELET # BLD AUTO: 272 K/UL — SIGNIFICANT CHANGE UP (ref 150–400)
PMV BLD: 9.1 FL — SIGNIFICANT CHANGE UP (ref 7–13)
PO2 BLDV: 48 MMHG — HIGH (ref 35–40)
POTASSIUM BLDV-SCNC: 4.1 MMOL/L — SIGNIFICANT CHANGE UP (ref 3.4–4.5)
POTASSIUM SERPL-MCNC: 4.4 MMOL/L — SIGNIFICANT CHANGE UP (ref 3.5–5.3)
POTASSIUM SERPL-SCNC: 4.4 MMOL/L — SIGNIFICANT CHANGE UP (ref 3.5–5.3)
RBC # BLD: 4.76 M/UL — SIGNIFICANT CHANGE UP (ref 3.8–5.2)
RBC # FLD: 13.7 % — SIGNIFICANT CHANGE UP (ref 10.3–14.5)
SAO2 % BLDV: 80.7 % — SIGNIFICANT CHANGE UP (ref 60–85)
SODIUM SERPL-SCNC: 139 MMOL/L — SIGNIFICANT CHANGE UP (ref 135–145)
TSH SERPL-MCNC: 1.33 UIU/ML — SIGNIFICANT CHANGE UP (ref 0.27–4.2)
WBC # BLD: 3.65 K/UL — LOW (ref 3.8–10.5)
WBC # FLD AUTO: 3.65 K/UL — LOW (ref 3.8–10.5)

## 2019-03-13 PROCEDURE — 93306 TTE W/DOPPLER COMPLETE: CPT | Mod: 26

## 2019-03-13 RX ORDER — ACETAMINOPHEN 500 MG
650 TABLET ORAL EVERY 6 HOURS
Qty: 0 | Refills: 0 | Status: DISCONTINUED | OUTPATIENT
Start: 2019-03-13 | End: 2019-03-16

## 2019-03-13 RX ADMIN — Medication 3 MILLILITER(S): at 09:45

## 2019-03-13 RX ADMIN — SPIRONOLACTONE 12.5 MILLIGRAM(S): 25 TABLET, FILM COATED ORAL at 05:35

## 2019-03-13 RX ADMIN — Medication 3 MILLILITER(S): at 15:41

## 2019-03-13 RX ADMIN — Medication 40 MILLIGRAM(S): at 15:32

## 2019-03-13 RX ADMIN — Medication 81 MILLIGRAM(S): at 12:03

## 2019-03-13 RX ADMIN — Medication 25 MILLIGRAM(S): at 21:44

## 2019-03-13 RX ADMIN — Medication 40 MILLIGRAM(S): at 21:43

## 2019-03-13 RX ADMIN — Medication 1 MILLIGRAM(S): at 21:42

## 2019-03-13 RX ADMIN — Medication 3 MILLILITER(S): at 03:23

## 2019-03-13 RX ADMIN — Medication 3 MILLILITER(S): at 21:03

## 2019-03-13 RX ADMIN — BUDESONIDE AND FORMOTEROL FUMARATE DIHYDRATE 2 PUFF(S): 160; 4.5 AEROSOL RESPIRATORY (INHALATION) at 12:04

## 2019-03-13 RX ADMIN — SODIUM CHLORIDE 3 MILLILITER(S): 9 INJECTION INTRAMUSCULAR; INTRAVENOUS; SUBCUTANEOUS at 05:35

## 2019-03-13 RX ADMIN — CLOPIDOGREL BISULFATE 75 MILLIGRAM(S): 75 TABLET, FILM COATED ORAL at 12:03

## 2019-03-13 RX ADMIN — QUETIAPINE FUMARATE 100 MILLIGRAM(S): 200 TABLET, FILM COATED ORAL at 21:45

## 2019-03-13 RX ADMIN — Medication 650 MILLIGRAM(S): at 12:57

## 2019-03-13 RX ADMIN — SODIUM CHLORIDE 3 MILLILITER(S): 9 INJECTION INTRAMUSCULAR; INTRAVENOUS; SUBCUTANEOUS at 14:16

## 2019-03-13 RX ADMIN — Medication 75 MICROGRAM(S): at 05:35

## 2019-03-13 RX ADMIN — Medication 650 MILLIGRAM(S): at 12:02

## 2019-03-13 RX ADMIN — BUDESONIDE AND FORMOTEROL FUMARATE DIHYDRATE 2 PUFF(S): 160; 4.5 AEROSOL RESPIRATORY (INHALATION) at 21:43

## 2019-03-13 RX ADMIN — SODIUM CHLORIDE 3 MILLILITER(S): 9 INJECTION INTRAMUSCULAR; INTRAVENOUS; SUBCUTANEOUS at 21:36

## 2019-03-13 NOTE — CONSULT NOTE ADULT - ASSESSMENT
SOB  Asthma exacerbation   nebs  consider pulm eval     cad history of stent  cont dapt  statin    chronic systolic chf  off bb due to bronchospasm   off ace, arb due to low BP  diuresis   echo

## 2019-03-13 NOTE — CONSULT NOTE ADULT - SUBJECTIVE AND OBJECTIVE BOX
CHIEF COMPLAINT:Patient is a 68y old  Female who presents with a chief complaint of chest pressure and sob (12 Mar 2019 20:35)      HISTORY OF PRESENT ILLNESS:    68 female with history as below namely cad s/p stents, systolic chf  asthma history of intubation   admitted with sob, wheezing   on bipap   no cp     PAST MEDICAL & SURGICAL HISTORY:  HF (heart failure)  Chronic nonintractable headache, unspecified headache type  Paralyzed hemidiaphragm  Gastroesophageal reflux disease, esophagitis presence not specified  Insomnia  ALIS (obstructive sleep apnea): as per MD&#x27;s note, but patient didn&#x27;t have study yet  Diverticulitis: sigmoid, treated surgically  Psoriasis  Hypothyroidism, unspecified type  Depression, unspecified depression type  Uncomplicated asthma, unspecified asthma severity  Stented coronary artery: in 2010 and 2011  CAD (coronary artery disease)  Paralyzed hemidiaphragm  Rectocele: sigmoid rectocele          MEDICATIONS:  aspirin enteric coated 81 milliGRAM(s) Oral daily  clopidogrel Tablet 75 milliGRAM(s) Oral daily  heparin  Injectable 5000 Unit(s) SubCutaneous every 12 hours  spironolactone 12.5 milliGRAM(s) Oral <User Schedule>      ALBUTerol/ipratropium for Nebulization 3 milliLiter(s) Nebulizer every 6 hours  ALBUTerol/ipratropium for Nebulization 3 milliLiter(s) Nebulizer every 2 hours PRN  buDESOnide 160 MICROgram(s)/formoterol 4.5 MICROgram(s) Inhaler 2 Puff(s) Inhalation two times a day    clonazePAM Tablet 1 milliGRAM(s) Oral at bedtime  QUEtiapine 100 milliGRAM(s) Oral at bedtime  traZODone 25 milliGRAM(s) Oral at bedtime    pantoprazole    Tablet 40 milliGRAM(s) Oral before breakfast    levothyroxine 75 MICROGram(s) Oral daily    sodium chloride 0.9% lock flush 3 milliLiter(s) IV Push every 8 hours      FAMILY HISTORY:  Family history of asthma (Sibling)      Non-contributory    SOCIAL HISTORY:    No tobacco, drugs or etoh    Allergies    penicillin (Anaphylaxis)    Intolerances    	    REVIEW OF SYSTEMS:  as above  The rest of the 14 points ROS reviewed and except above they are unremarkable.        PHYSICAL EXAM:  T(C): 36.6 (03-13-19 @ 05:06), Max: 36.8 (03-13-19 @ 01:06)  HR: 88 (03-13-19 @ 09:44) (84 - 105)  BP: 106/74 (03-13-19 @ 05:06) (95/60 - 135/106)  RR: 18 (03-13-19 @ 05:06) (18 - 22)  SpO2: 97% (03-13-19 @ 09:44) (93% - 98%)  Wt(kg): --  I&O's Summary    JVP: elevated   Neck: supple  Lung: clear   CV: S1 S2 , Murmur:  Abd: soft  Ext: No edema  neuro: Awake / alert  Psych: flat affect  Skin: normal      LABS/DATA:    TELEMETRY: 	    ECG:  	   	  CARDIAC MARKERS:      CKMB: 1.90 ng/mL (03-12 @ 21:05)    < from: Transthoracic Echocardiogram (02.21.17 @ 14:55) >  CONCLUSIONS:  1. Severe segmental left ventricular systolic dysfunction.  LVEF 25-30.  2. Mild diastolic dysfunction (Stage I).  3. Normal right ventricular size and function.  *** Compared with echocardiogram of 2/7/2017, LV function  has deteriorated significantly.    < end of copied text >                                14.8   3.65  )-----------( 272      ( 13 Mar 2019 06:53 )             45.1     03-13    139  |  103  |  14  ----------------------------<  127<H>  4.4   |  23  |  0.66    Ca    9.4      13 Mar 2019 06:53  Phos  2.4     03-13  Mg     1.9     03-13    TPro  6.6  /  Alb  4.5  /  TBili  0.3  /  DBili  x   /  AST  22  /  ALT  13  /  AlkPhos  57  03-12    proBNP: Serum Pro-Brain Natriuretic Peptide: 601.6 pg/mL (03-12 @ 17:15)    Lipid Profile:   HgA1c: Hemoglobin A1C, Whole Blood: 5.1 % (03-13 @ 06:53)    TSH: Thyroid Stimulating Hormone, Serum: 1.33 uIU/mL (03-13 @ 06:53)

## 2019-03-13 NOTE — CONSULT NOTE ADULT - ASSESSMENT
8 yr old female with PMHx of CAD s/p 8 stents on ASA + Plavix, Asthma s/p 1 intubation, Depression, Hypothyroidism, HFrEF (23-30%) presents to ED with complaint of left sided 8/10 chest pressure and worsening dyspnea x 1 week. Patient reports dyspnea has not improved despite using multiple inhalers and nebulizer treatments q 2hours. Patient c/o dyspnea on exertion, orthopnea (uses 3 pillows) and intermittent productive cough with green sputum. Reports left sided chest pressure as 8/10 radiating to left jaw and neck. Has hx of chronic headache however reports sx have worsened with chest pain and sob. Denies diaphoresis, fever, chills, nasal congestion, sinus pressure, abdominal pain, nausea, vomiting, hematochezia, melena, dysuria, urinary frequency, calf pain, leg swelling, paresthesias.     In ED patient was tachypneic using accessory muscles sp02 93% started on Bipap and received nitro x 1 for chest pressure. Trop: 10. VS: T: 97.1, HR:89, 120/90 Sp02: 96% on Bipap. (12 Mar 2019 20:35)   currently she is off bipap and has been breathing ok     Asthma exacerbation: She seems to have pretty severe asthma: She is on duoeb q 6 ours, spiriva, advair and FASENRA: EVERY TWO MONTHS: She does get off and on steroidds for sob: She is still wheezing: she was on bipap earlier but currently off: Would cont current rx: Cont steroids as well as bipap in the night and prn for increased work of breathing: Her chest x-ray is normal : She had left HD surgery before ? reason: FORNOW XCONT CURETN RX:    2: chf    SHE SEEMS OT BE COMPENSATED AT THIS TIME: PER CAARDS: NO BLOCEKRS:     3: dvt PROPHYALXIS

## 2019-03-13 NOTE — PROGRESS NOTE ADULT - SUBJECTIVE AND OBJECTIVE BOX
INTERVAL HPI/OVERNIGHT EVENTS: I feel better . Off BIPAP .   Vital Signs Last 24 Hrs  T(C): 36.7 (13 Mar 2019 15:54), Max: 36.8 (13 Mar 2019 01:06)  T(F): 98 (13 Mar 2019 15:54), Max: 98.2 (13 Mar 2019 01:06)  HR: 90 (13 Mar 2019 15:54) (84 - 102)  BP: 110/75 (13 Mar 2019 15:54) (95/60 - 120/90)  BP(mean): 65 (12 Mar 2019 23:30) (65 - 65)  RR: 16 (13 Mar 2019 15:54) (16 - 22)  SpO2: 98% (13 Mar 2019 15:54) (95% - 98%)  I&O's Summary    MEDICATIONS  (STANDING):  ALBUTerol/ipratropium for Nebulization 3 milliLiter(s) Nebulizer every 6 hours  aspirin enteric coated 81 milliGRAM(s) Oral daily  buDESOnide 160 MICROgram(s)/formoterol 4.5 MICROgram(s) Inhaler 2 Puff(s) Inhalation two times a day  clonazePAM Tablet 1 milliGRAM(s) Oral at bedtime  clopidogrel Tablet 75 milliGRAM(s) Oral daily  heparin  Injectable 5000 Unit(s) SubCutaneous every 12 hours  levothyroxine 75 MICROGram(s) Oral daily  methylPREDNISolone sodium succinate Injectable 40 milliGRAM(s) IV Push three times a day  pantoprazole    Tablet 40 milliGRAM(s) Oral before breakfast  QUEtiapine 100 milliGRAM(s) Oral at bedtime  sodium chloride 0.9% lock flush 3 milliLiter(s) IV Push every 8 hours  spironolactone 12.5 milliGRAM(s) Oral <User Schedule>  traZODone 25 milliGRAM(s) Oral at bedtime    MEDICATIONS  (PRN):  acetaminophen   Tablet .. 650 milliGRAM(s) Oral every 6 hours PRN Mild Pain (1 - 3), Moderate Pain (4 - 6), Severe Pain (7 - 10)  ALBUTerol/ipratropium for Nebulization 3 milliLiter(s) Nebulizer every 2 hours PRN Shortness of Breath and/or Wheezing    LABS:                        14.8   3.65  )-----------( 272      ( 13 Mar 2019 06:53 )             45.1     03-13    139  |  103  |  14  ----------------------------<  127<H>  4.4   |  23  |  0.66    Ca    9.4      13 Mar 2019 06:53  Phos  2.4     03-13  Mg     1.9     03-13    TPro  6.6  /  Alb  4.5  /  TBili  0.3  /  DBili  x   /  AST  22  /  ALT  13  /  AlkPhos  57  03-12    PT/INR - ( 12 Mar 2019 17:15 )   PT: 10.4 SEC;   INR: 0.94          PTT - ( 12 Mar 2019 17:15 )  PTT:31.5 SEC    CAPILLARY BLOOD GLUCOSE              REVIEW OF SYSTEMS:  CONSTITUTIONAL: No fever, weight loss, or fatigue  EYES: No eye pain, visual disturbances, or discharge  ENMT:  No difficulty hearing, tinnitus, vertigo; No sinus or throat pain  NECK: No pain or stiffness  BREASTS: No pain, masses, or nipple discharge  RESPIRATORY: No cough, wheezing, chills or hemoptysis;  shortness of breath  CARDIOVASCULAR: No chest pain, palpitations, dizziness, or leg swelling  GASTROINTESTINAL: No abdominal or epigastric pain. No nausea, vomiting, or hematemesis; No diarrhea or constipation. No melena or hematochezia.  GENITOURINARY: No dysuria, frequency, hematuria, or incontinence  NEUROLOGICAL: No headaches, memory loss, loss of strength, numbness, or tremors      Consultant(s) Notes Reviewed:  [x ] YES  [ ] NO    PHYSICAL EXAM:  GENERAL: NAD, well-groomed, well-developed, not in any distress ,  HEAD:  Atraumatic, Normocephalic  EYES: EOMI, PERRLA, conjunctiva and sclera clear  NECK: Supple, No JVD, Normal thyroid  NERVOUS SYSTEM:  Alert & Oriented X3, No focal deficit   CHEST/LUNG: Good air entry bilateral with improved air entry   HEART: Regular rate and rhythm; No murmurs, rubs, or gallops  ABDOMEN: Soft, Nontender, Nondistended; Bowel sounds present  EXTREMITIES:  2+ Peripheral Pulses, No clubbing, cyanosis, or edema    Care Discussed with Consultants/Other Providers [ x] YES  [ ] NO

## 2019-03-13 NOTE — PROGRESS NOTE ADULT - ASSESSMENT
68 yr old female with PMHx of CAD s/p 8 stents on ASA + Plavix, Asthma s/p 1 intubation, Depression, Hypothyroidism, HFrEF (23-30%) admitted for left sided 8/10 chest pressure and worsening dyspnea x 1 week r/o ACS        Problem Selector:  PROBLEM DIAGNOSES  Problem: Asthma with acute exacerbation  Assessment and Plan: Improving .   Steroid ,neb Rx and Oxygen .  Pulmonary consulted.     PROBLEM DIAGNOSES  Problem: Chest pain  Assessment and Plan: Cardiology consult noted.   R/o ACS  Trop 10   repeat CE  serial ekgs, trend CE's   low salt low cholesterol diet       Problem: Chronic Systolic Heart failure  Assessment and Plan: Stable . last echo 2017- EF: 25-30%   BNP: 601.6   TTE ordered  fluid restrction: 1200mL   continue spironolactone  can consider starting ACEi, Cr normal      Problem: Acute respiratory failure with hypoxia   Assessment and Plan: Off BIPAP and NC Oxygen now.

## 2019-03-13 NOTE — CONSULT NOTE ADULT - NSICDXPROBLEM_GEN_ALL_CORE_FT
PROBLEM DIAGNOSES  Problem: Asthma with acute exacerbation  Assessment and Plan: admit to tele  cbc, bmp, a1c, tsh, flp   s/p IV methylprednisone + duonebs  on BIPAP   vbg with lactate in AM  continuous pulse ox monitoring   CXR: clear lungs, unlikely infectious cause   MD note       R/O PROBLEM DIAGNOSES  Problem: Chest pain  Assessment and Plan: R/o ACS  Trop 10   repeat CE  serial ekgs, trend CE's   low salt low cholesterol diet       Problem: Heart failure  Assessment and Plan: last echo 2017- EF: 25-30%   BNP: 601.6   TTE ordered  fluid restrction: 1200mL   continue spironolactone  can consider starting ACEi, Cr normal      Problem: Need for prophylactic measure  Assessment and Plan: DVT ppx: Heparin 5000 SQ

## 2019-03-13 NOTE — CONSULT NOTE ADULT - SUBJECTIVE AND OBJECTIVE BOX
Patient is a 68y old  Female who presents with a chief complaint of chest pressure and sob (13 Mar 2019 13:49)      HPI:  68 yr old female with PMHx of CAD s/p 8 stents on ASA + Plavix, Asthma s/p 1 intubation, Depression, Hypothyroidism, HFrEF (23-30%) presents to ED with complaint of left sided 8/10 chest pressure and worsening dyspnea x 1 week. Patient reports dyspnea has not improved despite using multiple inhalers and nebulizer treatments q 2hours. Patient c/o dyspnea on exertion, orthopnea (uses 3 pillows) and intermittent productive cough with green sputum. Reports left sided chest pressure as 8/10 radiating to left jaw and neck. Has hx of chronic headache however reports sx have worsened with chest pain and sob. Denies diaphoresis, fever, chills, nasal congestion, sinus pressure, abdominal pain, nausea, vomiting, hematochezia, melena, dysuria, urinary frequency, calf pain, leg swelling, paresthesias.     In ED patient was tachypneic using accessory muscles sp02 93% started on Bipap and received nitro x 1 for chest pressure. Trop: 10. VS: T: 97.1, HR:89, 120/90 Sp02: 96% on Bipap. (12 Mar 2019 20:35)   currently she is off bipap and has been breathing ok     ?FOLLOWING PRESENT  [ x] Hx of PE/DVT, [x] Hx COPD, [y Hx of Asthma, [y ] Hx of Hospitalization, [ x]  Hx of BiPAP/CPAP use, [x ] Hx of ALIS    Allergies    penicillin (Anaphylaxis)    Intolerances        PAST MEDICAL & SURGICAL HISTORY:  HF (heart failure)  Chronic nonintractable headache, unspecified headache type  Paralyzed hemidiaphragm  Gastroesophageal reflux disease, esophagitis presence not specified  Insomnia  ALIS (obstructive sleep apnea): as per MD&#x27;s note, but patient didn&#x27;t have study yet  Diverticulitis: sigmoid, treated surgically  Psoriasis  Hypothyroidism, unspecified type  Depression, unspecified depression type  Uncomplicated asthma, unspecified asthma severity  Stented coronary artery: in 2010 and 2011  CAD (coronary artery disease)  Paralyzed hemidiaphragm  Rectocele: sigmoid rectocele      FAMILY HISTORY:  Family history of asthma (Sibling)      Social History: [ oneor twice  ] TOBACCO                  [  xETOH                                 [  x] IVDA/DRUGS    REVIEW OF SYSTEMS      General:	x    Skin/Breast:x  	  Ophthalmologic:x  	  ENMT:	x    Respiratory and Thorax:  	 sob, wheezing  Cardiovascular:	x    Gastrointestinal:	x    Genitourinary:	x  x  Musculoskeletal:	x    Neurological:	x    Psychiatric:	x    Hematology/Lymphatics:	x    Endocrine:	x    Allergic/Immunologic:	x    MEDICATIONS  (STANDING):  ALBUTerol/ipratropium for Nebulization 3 milliLiter(s) Nebulizer every 6 hours  aspirin enteric coated 81 milliGRAM(s) Oral daily  buDESOnide 160 MICROgram(s)/formoterol 4.5 MICROgram(s) Inhaler 2 Puff(s) Inhalation two times a day  clonazePAM Tablet 1 milliGRAM(s) Oral at bedtime  clopidogrel Tablet 75 milliGRAM(s) Oral daily  heparin  Injectable 5000 Unit(s) SubCutaneous every 12 hours  levothyroxine 75 MICROGram(s) Oral daily  methylPREDNISolone sodium succinate Injectable 40 milliGRAM(s) IV Push three times a day  pantoprazole    Tablet 40 milliGRAM(s) Oral before breakfast  QUEtiapine 100 milliGRAM(s) Oral at bedtime  sodium chloride 0.9% lock flush 3 milliLiter(s) IV Push every 8 hours  spironolactone 12.5 milliGRAM(s) Oral <User Schedule>  traZODone 25 milliGRAM(s) Oral at bedtime    MEDICATIONS  (PRN):  acetaminophen   Tablet .. 650 milliGRAM(s) Oral every 6 hours PRN Mild Pain (1 - 3), Moderate Pain (4 - 6), Severe Pain (7 - 10)  ALBUTerol/ipratropium for Nebulization 3 milliLiter(s) Nebulizer every 2 hours PRN Shortness of Breath and/or Wheezing       Vital Signs Last 24 Hrs  T(C): 36.7 (13 Mar 2019 15:54), Max: 36.8 (13 Mar 2019 01:06)  T(F): 98 (13 Mar 2019 15:54), Max: 98.2 (13 Mar 2019 01:06)  HR: 90 (13 Mar 2019 15:54) (84 - 104)  BP: 110/75 (13 Mar 2019 15:54) (95/60 - 120/90)  BP(mean): 65 (12 Mar 2019 23:30) (65 - 65)  RR: 16 (13 Mar 2019 15:54) (16 - 22)  SpO2: 98% (13 Mar 2019 15:54) (95% - 98%)        I&O's Summary      Physical Exam:   GENERAL: NAD, well-groomed, well-developed  HEENT: JAMAAL/   Atraumatic, Normocephalic  ENMT: No tonsillar erythema, exudates, or enlargement; Moist mucous membranes, Good dentition, No lesions  NECK: Supple, No JVD, Normal thyroid  CHEST/LUNG: wheezing+  CVS: Regular rate and rhythm; No murmurs, rubs, or gallops  GI: : Soft, Nontender, Nondistended; Bowel sounds present  NERVOUS SYSTEM:  Alert & Oriented X3, Good concentration; Motor Strength 5/5 B/L upper and lower extremities; DTRs 2+ intact and symmetric  EXTREMITIES:  2+ Peripheral Pulses, No clubbing, cyanosis, or edema  LYMPH: No lymphadenopathy noted  SKIN: No rashes or lesions  ENDOCRINOLOGY: No Thyromegaly  PSYCH: Appropriate    Labs:  2.0<49<4>>48<<7.365>>2.0<<3><<4><<5<<489>>, 3.6<50<4>>29<<7.385>>3.6<<3><<4><<5<<299>>  CARDIAC MARKERS ( 12 Mar 2019 21:05 )  x     / x     / 59 u/L / 1.90 ng/mL / x                                14.8   3.65  )-----------( 272      ( 13 Mar 2019 06:53 )             45.1                         15.0   5.37  )-----------( 308      ( 12 Mar 2019 17:15 )             45.2     03-13    139  |  103  |  14  ----------------------------<  127<H>  4.4   |  23  |  0.66  03-12    142  |  104  |  15  ----------------------------<  112<H>  4.1   |  27  |  0.78    Ca    9.4      13 Mar 2019 06:53  Ca    9.9      12 Mar 2019 17:15  Phos  2.4     03-13  Mg     1.9     03-13    TPro  6.6  /  Alb  4.5  /  TBili  0.3  /  DBili  x   /  AST  22  /  ALT  13  /  AlkPhos  57  03-12    CAPILLARY BLOOD GLUCOSE        LIVER FUNCTIONS - ( 12 Mar 2019 17:15 )  Alb: 4.5 g/dL / Pro: 6.6 g/dL / ALK PHOS: 57 u/L / ALT: 13 u/L / AST: 22 u/L / GGT: x           PT/INR - ( 12 Mar 2019 17:15 )   PT: 10.4 SEC;   INR: 0.94          PTT - ( 12 Mar 2019 17:15 )  PTT:31.5 SEC    D DImer  Serum Pro-Brain Natriuretic Peptide: 601.6 pg/mL (03-12 @ 17:15)    < from: Xray Chest 1 View- PORTABLE-Urgent (03.12.19 @ 17:55) >    EXAM:  XR CHEST PORTABLE URGENT 1V        PROCEDURE DATE:  Mar 12 2019         INTERPRETATION:  HISTORY: Dyspnea on exertion, chest pain, orthopnea.    TECHNIQUE: A single AP view of the chest was obtained.    COMPARISON: Chest x-ray 8/8/2017.    FINDINGS:    Heart size is not accurately evaluated on this single AP projection.  There is no focal consolidation.  There is no pneumothorax or pleural effusion.    IMPRESSION: Clear lungs.              CESARIO TREVINO D.O., RADIOLOGY RESIDENT  This document has been electronically signed.  SLIME HOPPER M.D., ATTENDING RADIOLOGIST  This document has been electronically signed. Mar 13 2019  9:29AM              < end of copied text >    Studies  Chest X-RAY  CT SCAN Chest   CT Abdomen  Venous Dopplers: LE:   Others

## 2019-03-14 LAB
ANION GAP SERPL CALC-SCNC: 15 MMO/L — HIGH (ref 7–14)
BASE EXCESS BLDV CALC-SCNC: 0.4 MMOL/L — SIGNIFICANT CHANGE UP
BLOOD GAS VENOUS - CREATININE: 0.48 MG/DL — LOW (ref 0.5–1.3)
BUN SERPL-MCNC: 23 MG/DL — SIGNIFICANT CHANGE UP (ref 7–23)
CALCIUM SERPL-MCNC: 9.2 MG/DL — SIGNIFICANT CHANGE UP (ref 8.4–10.5)
CHLORIDE BLDV-SCNC: 107 MMOL/L — SIGNIFICANT CHANGE UP (ref 96–108)
CHLORIDE SERPL-SCNC: 102 MMOL/L — SIGNIFICANT CHANGE UP (ref 98–107)
CO2 SERPL-SCNC: 23 MMOL/L — SIGNIFICANT CHANGE UP (ref 22–31)
CREAT SERPL-MCNC: 0.61 MG/DL — SIGNIFICANT CHANGE UP (ref 0.5–1.3)
GAS PNL BLDV: 133 MMOL/L — LOW (ref 136–146)
GLUCOSE BLDV-MCNC: 133 — HIGH (ref 70–99)
GLUCOSE SERPL-MCNC: 127 MG/DL — HIGH (ref 70–99)
HCO3 BLDV-SCNC: 24 MMOL/L — SIGNIFICANT CHANGE UP (ref 20–27)
HCT VFR BLD CALC: 41.5 % — SIGNIFICANT CHANGE UP (ref 34.5–45)
HCT VFR BLDV CALC: 40.9 % — SIGNIFICANT CHANGE UP (ref 34.5–45)
HGB BLD-MCNC: 13.3 G/DL — SIGNIFICANT CHANGE UP (ref 11.5–15.5)
HGB BLDV-MCNC: 13.3 G/DL — SIGNIFICANT CHANGE UP (ref 11.5–15.5)
LACTATE BLDV-MCNC: 4 MMOL/L — CRITICAL HIGH (ref 0.5–2)
MAGNESIUM SERPL-MCNC: 2.1 MG/DL — SIGNIFICANT CHANGE UP (ref 1.6–2.6)
MCHC RBC-ENTMCNC: 31.1 PG — SIGNIFICANT CHANGE UP (ref 27–34)
MCHC RBC-ENTMCNC: 32 % — SIGNIFICANT CHANGE UP (ref 32–36)
MCV RBC AUTO: 97 FL — SIGNIFICANT CHANGE UP (ref 80–100)
NRBC # FLD: 0 K/UL — LOW (ref 25–125)
PCO2 BLDV: 43 MMHG — SIGNIFICANT CHANGE UP (ref 41–51)
PH BLDV: 7.38 PH — SIGNIFICANT CHANGE UP (ref 7.32–7.43)
PHOSPHATE SERPL-MCNC: 2.2 MG/DL — LOW (ref 2.5–4.5)
PLATELET # BLD AUTO: 276 K/UL — SIGNIFICANT CHANGE UP (ref 150–400)
PMV BLD: 9 FL — SIGNIFICANT CHANGE UP (ref 7–13)
PO2 BLDV: 48 MMHG — HIGH (ref 35–40)
POTASSIUM BLDV-SCNC: 4.1 MMOL/L — SIGNIFICANT CHANGE UP (ref 3.4–4.5)
POTASSIUM SERPL-MCNC: 4.5 MMOL/L — SIGNIFICANT CHANGE UP (ref 3.5–5.3)
POTASSIUM SERPL-SCNC: 4.5 MMOL/L — SIGNIFICANT CHANGE UP (ref 3.5–5.3)
RBC # BLD: 4.28 M/UL — SIGNIFICANT CHANGE UP (ref 3.8–5.2)
RBC # FLD: 14.3 % — SIGNIFICANT CHANGE UP (ref 10.3–14.5)
SAO2 % BLDV: 83.2 % — SIGNIFICANT CHANGE UP (ref 60–85)
SODIUM SERPL-SCNC: 140 MMOL/L — SIGNIFICANT CHANGE UP (ref 135–145)
WBC # BLD: 10.3 K/UL — SIGNIFICANT CHANGE UP (ref 3.8–10.5)
WBC # FLD AUTO: 10.3 K/UL — SIGNIFICANT CHANGE UP (ref 3.8–10.5)

## 2019-03-14 RX ADMIN — Medication 3 MILLILITER(S): at 09:17

## 2019-03-14 RX ADMIN — Medication 75 MICROGRAM(S): at 05:22

## 2019-03-14 RX ADMIN — Medication 1 MILLIGRAM(S): at 22:22

## 2019-03-14 RX ADMIN — QUETIAPINE FUMARATE 100 MILLIGRAM(S): 200 TABLET, FILM COATED ORAL at 22:22

## 2019-03-14 RX ADMIN — SODIUM CHLORIDE 3 MILLILITER(S): 9 INJECTION INTRAMUSCULAR; INTRAVENOUS; SUBCUTANEOUS at 22:24

## 2019-03-14 RX ADMIN — Medication 3 MILLILITER(S): at 03:58

## 2019-03-14 RX ADMIN — PANTOPRAZOLE SODIUM 40 MILLIGRAM(S): 20 TABLET, DELAYED RELEASE ORAL at 05:22

## 2019-03-14 RX ADMIN — HEPARIN SODIUM 5000 UNIT(S): 5000 INJECTION INTRAVENOUS; SUBCUTANEOUS at 05:22

## 2019-03-14 RX ADMIN — Medication 3 MILLILITER(S): at 15:29

## 2019-03-14 RX ADMIN — Medication 20 MILLIGRAM(S): at 22:22

## 2019-03-14 RX ADMIN — Medication 40 MILLIGRAM(S): at 13:11

## 2019-03-14 RX ADMIN — BUDESONIDE AND FORMOTEROL FUMARATE DIHYDRATE 2 PUFF(S): 160; 4.5 AEROSOL RESPIRATORY (INHALATION) at 22:21

## 2019-03-14 RX ADMIN — BUDESONIDE AND FORMOTEROL FUMARATE DIHYDRATE 2 PUFF(S): 160; 4.5 AEROSOL RESPIRATORY (INHALATION) at 11:36

## 2019-03-14 RX ADMIN — HEPARIN SODIUM 5000 UNIT(S): 5000 INJECTION INTRAVENOUS; SUBCUTANEOUS at 17:17

## 2019-03-14 RX ADMIN — Medication 40 MILLIGRAM(S): at 05:21

## 2019-03-14 RX ADMIN — CLOPIDOGREL BISULFATE 75 MILLIGRAM(S): 75 TABLET, FILM COATED ORAL at 11:36

## 2019-03-14 RX ADMIN — Medication 3 MILLILITER(S): at 20:55

## 2019-03-14 RX ADMIN — Medication 25 MILLIGRAM(S): at 22:22

## 2019-03-14 RX ADMIN — SODIUM CHLORIDE 3 MILLILITER(S): 9 INJECTION INTRAMUSCULAR; INTRAVENOUS; SUBCUTANEOUS at 05:19

## 2019-03-14 RX ADMIN — SODIUM CHLORIDE 3 MILLILITER(S): 9 INJECTION INTRAMUSCULAR; INTRAVENOUS; SUBCUTANEOUS at 13:11

## 2019-03-14 RX ADMIN — Medication 81 MILLIGRAM(S): at 11:36

## 2019-03-14 NOTE — PROGRESS NOTE ADULT - ASSESSMENT
68 yr old female with PMHx of CAD s/p 8 stents on ASA + Plavix, Asthma s/p 1 intubation, Depression, Hypothyroidism, HFrEF (23-30%) admitted for left sided 8/10 chest pressure and worsening dyspnea x 1 week r/o ACS        Problem Selector:  PROBLEM DIAGNOSES  Problem: Asthma with acute exacerbation  Assessment and Plan: Improving .   Steroid ,neb Rx and Oxygen .  Pulmonary help appreciated.    PROBLEM DIAGNOSES  Problem: Chest pain  Assessment and Plan: Cardiology consult noted.   R/o ACS  Trop 10   repeat CE  serial ekgs, trend CE's   low salt low cholesterol diet   TTE noted and planned for cardiac cath.     Problem: Chronic Systolic Heart failure  Assessment and Plan: Stable . last echo 2017- EF: 25-30%   BNP: 601.6   TTE noted.   fluid restrction: 1200mL   continue spironolactone  can consider starting ACEi, Cr normal      Problem: Acute respiratory failure with hypoxia   Assessment and Plan: Off BIPAP and NC Oxygen now.

## 2019-03-14 NOTE — PROGRESS NOTE ADULT - SUBJECTIVE AND OBJECTIVE BOX
Patient is a 68y old  Female who presents with a chief complaint of chest pressure and sob (14 Mar 2019 08:17)      Any change in ROS: Feeling much better: no wheezing today     MEDICATIONS  (STANDING):  ALBUTerol/ipratropium for Nebulization 3 milliLiter(s) Nebulizer every 6 hours  aspirin enteric coated 81 milliGRAM(s) Oral daily  buDESOnide 160 MICROgram(s)/formoterol 4.5 MICROgram(s) Inhaler 2 Puff(s) Inhalation two times a day  clonazePAM Tablet 1 milliGRAM(s) Oral at bedtime  clopidogrel Tablet 75 milliGRAM(s) Oral daily  heparin  Injectable 5000 Unit(s) SubCutaneous every 12 hours  levothyroxine 75 MICROGram(s) Oral daily  methylPREDNISolone sodium succinate Injectable 40 milliGRAM(s) IV Push three times a day  pantoprazole    Tablet 40 milliGRAM(s) Oral before breakfast  QUEtiapine 100 milliGRAM(s) Oral at bedtime  sodium chloride 0.9% lock flush 3 milliLiter(s) IV Push every 8 hours  spironolactone 12.5 milliGRAM(s) Oral <User Schedule>  traZODone 25 milliGRAM(s) Oral at bedtime    MEDICATIONS  (PRN):  acetaminophen   Tablet .. 650 milliGRAM(s) Oral every 6 hours PRN Mild Pain (1 - 3), Moderate Pain (4 - 6), Severe Pain (7 - 10)  ALBUTerol/ipratropium for Nebulization 3 milliLiter(s) Nebulizer every 2 hours PRN Shortness of Breath and/or Wheezing    Vital Signs Last 24 Hrs  T(C): 36.7 (14 Mar 2019 05:20), Max: 36.9 (13 Mar 2019 21:34)  T(F): 98.1 (14 Mar 2019 05:20), Max: 98.4 (13 Mar 2019 21:34)  HR: 98 (14 Mar 2019 11:53) (87 - 104)  BP: 116/72 (14 Mar 2019 11:37) (102/68 - 116/72)  BP(mean): --  RR: 16 (14 Mar 2019 11:37) (16 - 18)  SpO2: 96% (14 Mar 2019 11:53) (94% - 98%)    I&O's Summary        Physical Exam:   GENERAL: NAD, well-groomed, well-developed  HEENT: JAMAAL/   Atraumatic, Normocephalic  ENMT: No tonsillar erythema, exudates, or enlargement; Moist mucous membranes, Good dentition, No lesions  NECK: Supple, No JVD, Normal thyroid  CHEST/LUNG:minimal wheezing+  CVS: Regular rate and rhythm; No murmurs, rubs, or gallops  GI: : Soft, Nontender, Nondistended; Bowel sounds present  NERVOUS SYSTEM:  Alert & Oriented X3, Good concentration; Motor Strength 5/5 B/L upper and lower extremities; DTRs 2+ intact and symmetric  EXTREMITIES:  2+ Peripheral Pulses, No clubbing, cyanosis, or edema  LYMPH: No lymphadenopathy noted  SKIN: No rashes or lesions  ENDOCRINOLOGY: No Thyromegaly  PSYCH: Appropriate    Labs:  24, 25, 26  CARDIAC MARKERS ( 12 Mar 2019 21:05 )  x     / x     / 59 u/L / 1.90 ng/mL / x                                13.3   10.30 )-----------( 276      ( 14 Mar 2019 07:10 )             41.5                         14.8   3.65  )-----------( 272      ( 13 Mar 2019 06:53 )             45.1                         15.0   5.37  )-----------( 308      ( 12 Mar 2019 17:15 )             45.2     03-14    140  |  102  |  23  ----------------------------<  127<H>  4.5   |  23  |  0.61  03-13    139  |  103  |  14  ----------------------------<  127<H>  4.4   |  23  |  0.66  03-12    142  |  104  |  15  ----------------------------<  112<H>  4.1   |  27  |  0.78    Ca    9.2      14 Mar 2019 07:10  Ca    9.4      13 Mar 2019 06:53  Ca    9.9      12 Mar 2019 17:15  Phos  2.2     03-14  Phos  2.4     03-13  Mg     2.1     03-14  Mg     1.9     03-13    TPro  6.6  /  Alb  4.5  /  TBili  0.3  /  DBili  x   /  AST  22  /  ALT  13  /  AlkPhos  57  03-12    CAPILLARY BLOOD GLUCOSE          LIVER FUNCTIONS - ( 12 Mar 2019 17:15 )  Alb: 4.5 g/dL / Pro: 6.6 g/dL / ALK PHOS: 57 u/L / ALT: 13 u/L / AST: 22 u/L / GGT: x           PT/INR - ( 12 Mar 2019 17:15 )   PT: 10.4 SEC;   INR: 0.94          PTT - ( 12 Mar 2019 17:15 )  PTT:31.5 SEC    Serum Pro-Brain Natriuretic Peptide: 601.6 pg/mL (03-12 @ 17:15)        RECENT CULTURES:        RESPIRATORY CULTURES:    < from: Xray Chest 1 View- PORTABLE-Urgent (03.12.19 @ 17:55) >    EXAM:  XR CHEST PORTABLE URGENT 1V        PROCEDURE DATE:  Mar 12 2019         INTERPRETATION:  HISTORY: Dyspnea on exertion, chest pain, orthopnea.    TECHNIQUE: A single AP view of the chest was obtained.    COMPARISON: Chest x-ray 8/8/2017.    FINDINGS:    Heart size is not accurately evaluated on this single AP projection.  There is no focal consolidation.  There is no pneumothorax or pleural effusion.    IMPRESSION: Clear lungs.              CESARIO TREVINO D.O., RADIOLOGY RESIDENT  This document has been electronically signed.  SLIME HOPPER M.D., ATTENDING RADIOLOGIST  This document has been electronically signed. Mar 13 2019  9:29AM    < end of copied text >        Studies  Chest X-RAY  CT SCAN Chest   Venous Dopplers: LE:   CT Abdomen  Others

## 2019-03-14 NOTE — PROGRESS NOTE ADULT - ASSESSMENT
SOB  Asthma exacerbation   nebs  much improved     cad history of stent  cont dapt  statin  but has chest tightness   will plan for cath tomorrow     chronic systolic chf  off bb due to bronchospasm   off ace, arb due to low BP  diuresis   echo shows mod segmental LV systolic dysfunction

## 2019-03-14 NOTE — PROGRESS NOTE ADULT - ASSESSMENT
8 yr old female with PMHx of CAD s/p 8 stents on ASA + Plavix, Asthma s/p 1 intubation, Depression, Hypothyroidism, HFrEF (23-30%) presents to ED with complaint of left sided 8/10 chest pressure and worsening dyspnea x 1 week. Patient reports dyspnea has not improved despite using multiple inhalers and nebulizer treatments q 2hours. Patient c/o dyspnea on exertion, orthopnea (uses 3 pillows) and intermittent productive cough with green sputum. Reports left sided chest pressure as 8/10 radiating to left jaw and neck. Has hx of chronic headache however reports sx have worsened with chest pain and sob. Denies diaphoresis, fever, chills, nasal congestion, sinus pressure, abdominal pain, nausea, vomiting, hematochezia, melena, dysuria, urinary frequency, calf pain, leg swelling, paresthesias.     In ED patient was tachypneic using accessory muscles sp02 93% started on Bipap and received nitro x 1 for chest pressure. Trop: 10. VS: T: 97.1, HR:89, 120/90 Sp02: 96% on Bipap. (12 Mar 2019 20:35)   currently she is off bipap and has been breathing ok     Asthma exacerbation: She seems to have pretty severe asthma: She is on duoeb q 6 ours, spiriva, advair and FASENRA: EVERY TWO MONTHS: She does get off and on steroidds for sob: She is still wheezing: she was on bipap earlier but currently off: Would cont current rx: Cont steroids as well as bipap in the night and prn for increased work of breathing: Her chest x-ray is normal : She had left HD surgery before ? reason: FORNOW XCONT CURETN RX:    3/14: better: decrease steorids to 20 q 8 hour s    2: chf    SHE SEEMS OT BE COMPENSATED AT THIS TIME: PER CARDS: NO BLOCEKRS:   3/14l:? for cath now?     3: dvt PROPHYALXIS 8 yr old female with PMHx of CAD s/p 8 stents on ASA + Plavix, Asthma s/p 1 intubation, Depression, Hypothyroidism, HFrEF (23-30%) presents to ED with complaint of left sided 8/10 chest pressure and worsening dyspnea x 1 week. Patient reports dyspnea has not improved despite using multiple inhalers and nebulizer treatments q 2hours. Patient c/o dyspnea on exertion, orthopnea (uses 3 pillows) and intermittent productive cough with green sputum. Reports left sided chest pressure as 8/10 radiating to left jaw and neck. Has hx of chronic headache however reports sx have worsened with chest pain and sob. Denies diaphoresis, fever, chills, nasal congestion, sinus pressure, abdominal pain, nausea, vomiting, hematochezia, melena, dysuria, urinary frequency, calf pain, leg swelling, paresthesias.     In ED patient was tachypneic using accessory muscles sp02 93% started on Bipap and received nitro x 1 for chest pressure. Trop: 10. VS: T: 97.1, HR:89, 120/90 Sp02: 96% on Bipap. (12 Mar 2019 20:35)   currently she is off bipap and has been breathing ok     Asthma exacerbation: She seems to have pretty severe asthma: She is on duoeb q 6 ours, spiriva, advair and FASENRA: EVERY TWO MONTHS: She does get off and on steroidds for sob: She is still wheezing: she was on bipap earlier but currently off: Would cont current rx: Cont steroids as well as bipap in the night and prn for increased work of breathing: Her chest x-ray is normal : She had left HD surgery before ? reason: FORNOW XCONT CURETN RX:    3/14: better: decrease steorids to 20 q 8 hour s: DC bipap: she has been breathing well at this time:     2: chf    SHE SEEMS OT BE COMPENSATED AT THIS TIME: PER CARDS: NO BLOCEKRS:   3/14l:? for cath now?     3: dvt PROPHYALXIS

## 2019-03-14 NOTE — PROGRESS NOTE ADULT - SUBJECTIVE AND OBJECTIVE BOX
INTERVAL HPI/OVERNIGHT EVENTS: I will get cath . better now.   Vital Signs Last 24 Hrs  T(C): 36.7 (14 Mar 2019 05:20), Max: 36.9 (13 Mar 2019 21:34)  T(F): 98.1 (14 Mar 2019 05:20), Max: 98.4 (13 Mar 2019 21:34)  HR: 98 (14 Mar 2019 11:53) (87 - 112)  BP: 116/72 (14 Mar 2019 11:37) (102/68 - 116/72)  BP(mean): --  RR: 16 (14 Mar 2019 11:37) (16 - 18)  SpO2: 96% (14 Mar 2019 11:53) (94% - 98%)  I&O's Summary    MEDICATIONS  (STANDING):  ALBUTerol/ipratropium for Nebulization 3 milliLiter(s) Nebulizer every 6 hours  aspirin enteric coated 81 milliGRAM(s) Oral daily  buDESOnide 160 MICROgram(s)/formoterol 4.5 MICROgram(s) Inhaler 2 Puff(s) Inhalation two times a day  clonazePAM Tablet 1 milliGRAM(s) Oral at bedtime  clopidogrel Tablet 75 milliGRAM(s) Oral daily  heparin  Injectable 5000 Unit(s) SubCutaneous every 12 hours  levothyroxine 75 MICROGram(s) Oral daily  methylPREDNISolone sodium succinate Injectable 20 milliGRAM(s) IV Push every 8 hours  pantoprazole    Tablet 40 milliGRAM(s) Oral before breakfast  QUEtiapine 100 milliGRAM(s) Oral at bedtime  sodium chloride 0.9% lock flush 3 milliLiter(s) IV Push every 8 hours  spironolactone 12.5 milliGRAM(s) Oral <User Schedule>  traZODone 25 milliGRAM(s) Oral at bedtime    MEDICATIONS  (PRN):  acetaminophen   Tablet .. 650 milliGRAM(s) Oral every 6 hours PRN Mild Pain (1 - 3), Moderate Pain (4 - 6), Severe Pain (7 - 10)  ALBUTerol/ipratropium for Nebulization 3 milliLiter(s) Nebulizer every 2 hours PRN Shortness of Breath and/or Wheezing    LABS:                        13.3   10.30 )-----------( 276      ( 14 Mar 2019 07:10 )             41.5     03-14    140  |  102  |  23  ----------------------------<  127<H>  4.5   |  23  |  0.61    Ca    9.2      14 Mar 2019 07:10  Phos  2.2     03-14  Mg     2.1     03-14    TPro  6.6  /  Alb  4.5  /  TBili  0.3  /  DBili  x   /  AST  22  /  ALT  13  /  AlkPhos  57  03-12    PT/INR - ( 12 Mar 2019 17:15 )   PT: 10.4 SEC;   INR: 0.94          PTT - ( 12 Mar 2019 17:15 )  PTT:31.5 SEC    CAPILLARY BLOOD GLUCOSE              REVIEW OF SYSTEMS:  CONSTITUTIONAL: No fever, weight loss, or fatigue  EYES: No eye pain, visual disturbances, or discharge  ENMT:  No difficulty hearing, tinnitus, vertigo; No sinus or throat pain  NECK: No pain or stiffness  RESPIRATORY: No cough, wheezing, chills or hemoptysis; No shortness of breath  CARDIOVASCULAR: No chest pain, palpitations, dizziness, or leg swelling  GASTROINTESTINAL: No abdominal or epigastric pain. No nausea, vomiting, or hematemesis; No diarrhea or constipation. No melena or hematochezia.  GENITOURINARY: No dysuria, frequency, hematuria, or incontinence  NEUROLOGICAL: No headaches, memory loss, loss of strength, numbness, or tremors      Consultant(s) Notes Reviewed:  [x ] YES  [ ] NO    PHYSICAL EXAM:  GENERAL: NAD, well-groomed, well-developed, not in any distress ,  HEAD:  Atraumatic, Normocephalic  EYES: EOMI, PERRLA, conjunctiva and sclera clear  ENMT: No tonsillar erythema, exudates, or enlargement; Moist mucous membranes, Good dentition, No lesions  NECK: Supple, No JVD, Normal thyroid  NERVOUS SYSTEM:  Alert & Oriented X3, No focal deficit   CHEST/LUNG: Good air entry bilateral with few   wheezing  HEART: Regular rate and rhythm; No murmurs, rubs, or gallops  ABDOMEN: Soft, Nontender, Nondistended; Bowel sounds present  EXTREMITIES:  2+ Peripheral Pulses, No clubbing, cyanosis, or edema    Care Discussed with Consultants/Other Providers [ x] YES  [ ] NO

## 2019-03-14 NOTE — PROGRESS NOTE ADULT - SUBJECTIVE AND OBJECTIVE BOX
Subjective: Patient seen and examined. No new events except as noted.     SUBJECTIVE/ROS:  still with chest tightness       MEDICATIONS:  MEDICATIONS  (STANDING):  ALBUTerol/ipratropium for Nebulization 3 milliLiter(s) Nebulizer every 6 hours  aspirin enteric coated 81 milliGRAM(s) Oral daily  buDESOnide 160 MICROgram(s)/formoterol 4.5 MICROgram(s) Inhaler 2 Puff(s) Inhalation two times a day  clonazePAM Tablet 1 milliGRAM(s) Oral at bedtime  clopidogrel Tablet 75 milliGRAM(s) Oral daily  heparin  Injectable 5000 Unit(s) SubCutaneous every 12 hours  levothyroxine 75 MICROGram(s) Oral daily  methylPREDNISolone sodium succinate Injectable 40 milliGRAM(s) IV Push three times a day  pantoprazole    Tablet 40 milliGRAM(s) Oral before breakfast  QUEtiapine 100 milliGRAM(s) Oral at bedtime  sodium chloride 0.9% lock flush 3 milliLiter(s) IV Push every 8 hours  spironolactone 12.5 milliGRAM(s) Oral <User Schedule>  traZODone 25 milliGRAM(s) Oral at bedtime      PHYSICAL EXAM:  T(C): 36.7 (03-14-19 @ 05:20), Max: 36.9 (03-13-19 @ 21:34)  HR: 103 (03-14-19 @ 07:35) (87 - 104)  BP: 102/68 (03-14-19 @ 05:20) (102/68 - 112/63)  RR: 18 (03-14-19 @ 05:20) (16 - 18)  SpO2: 94% (03-14-19 @ 07:35) (94% - 98%)  Wt(kg): --  I&O's Summary          JVP: Normal  Neck: supple  Lung: clear   CV: S1 S2 , Murmur:  Abd: soft  Ext: No edema  neuro: Awake / alert  Psych: flat affect  Skin: normal``    LABS/DATA:    CARDIAC MARKERS:  CARDIAC MARKERS ( 12 Mar 2019 21:05 )  x     / x     / 59 u/L / 1.90 ng/mL / x                                    14.8   3.65  )-----------( 272      ( 13 Mar 2019 06:53 )             45.1     03-13    139  |  103  |  14  ----------------------------<  127<H>  4.4   |  23  |  0.66    Ca    9.4      13 Mar 2019 06:53  Phos  2.4     03-13  Mg     1.9     03-13    TPro  6.6  /  Alb  4.5  /  TBili  0.3  /  DBili  x   /  AST  22  /  ALT  13  /  AlkPhos  57  03-12    proBNP:   Lipid Profile:   HgA1c:   TSH:     TELE:  EKG:

## 2019-03-15 LAB
ANION GAP SERPL CALC-SCNC: 11 MMO/L — SIGNIFICANT CHANGE UP (ref 7–14)
BASE EXCESS BLDV CALC-SCNC: 2.4 MMOL/L — SIGNIFICANT CHANGE UP
BLOOD GAS VENOUS - CREATININE: 0.61 MG/DL — SIGNIFICANT CHANGE UP (ref 0.5–1.3)
BUN SERPL-MCNC: 22 MG/DL — SIGNIFICANT CHANGE UP (ref 7–23)
CALCIUM SERPL-MCNC: 9.3 MG/DL — SIGNIFICANT CHANGE UP (ref 8.4–10.5)
CHLORIDE BLDV-SCNC: 109 MMOL/L — HIGH (ref 96–108)
CHLORIDE SERPL-SCNC: 104 MMOL/L — SIGNIFICANT CHANGE UP (ref 98–107)
CO2 SERPL-SCNC: 24 MMOL/L — SIGNIFICANT CHANGE UP (ref 22–31)
CREAT SERPL-MCNC: 0.64 MG/DL — SIGNIFICANT CHANGE UP (ref 0.5–1.3)
GAS PNL BLDV: 133 MMOL/L — LOW (ref 136–146)
GLUCOSE BLDV-MCNC: 122 — HIGH (ref 70–99)
GLUCOSE SERPL-MCNC: 123 MG/DL — HIGH (ref 70–99)
HCO3 BLDV-SCNC: 26 MMOL/L — SIGNIFICANT CHANGE UP (ref 20–27)
HCT VFR BLD CALC: 39.2 % — SIGNIFICANT CHANGE UP (ref 34.5–45)
HCT VFR BLDV CALC: 40.6 % — SIGNIFICANT CHANGE UP (ref 34.5–45)
HGB BLD-MCNC: 12.8 G/DL — SIGNIFICANT CHANGE UP (ref 11.5–15.5)
HGB BLDV-MCNC: 13.2 G/DL — SIGNIFICANT CHANGE UP (ref 11.5–15.5)
LACTATE BLDV-MCNC: 2.9 MMOL/L — HIGH (ref 0.5–2)
MAGNESIUM SERPL-MCNC: 2.1 MG/DL — SIGNIFICANT CHANGE UP (ref 1.6–2.6)
MCHC RBC-ENTMCNC: 30.6 PG — SIGNIFICANT CHANGE UP (ref 27–34)
MCHC RBC-ENTMCNC: 32.7 % — SIGNIFICANT CHANGE UP (ref 32–36)
MCV RBC AUTO: 93.8 FL — SIGNIFICANT CHANGE UP (ref 80–100)
NRBC # FLD: 0 K/UL — LOW (ref 25–125)
PCO2 BLDV: 41 MMHG — SIGNIFICANT CHANGE UP (ref 41–51)
PH BLDV: 7.43 PH — SIGNIFICANT CHANGE UP (ref 7.32–7.43)
PHOSPHATE SERPL-MCNC: 1.6 MG/DL — LOW (ref 2.5–4.5)
PLATELET # BLD AUTO: 281 K/UL — SIGNIFICANT CHANGE UP (ref 150–400)
PMV BLD: 8.9 FL — SIGNIFICANT CHANGE UP (ref 7–13)
PO2 BLDV: 61 MMHG — HIGH (ref 35–40)
POTASSIUM BLDV-SCNC: 3.7 MMOL/L — SIGNIFICANT CHANGE UP (ref 3.4–4.5)
POTASSIUM SERPL-MCNC: 4.1 MMOL/L — SIGNIFICANT CHANGE UP (ref 3.5–5.3)
POTASSIUM SERPL-SCNC: 4.1 MMOL/L — SIGNIFICANT CHANGE UP (ref 3.5–5.3)
RBC # BLD: 4.18 M/UL — SIGNIFICANT CHANGE UP (ref 3.8–5.2)
RBC # FLD: 14.8 % — HIGH (ref 10.3–14.5)
SAO2 % BLDV: 92.6 % — HIGH (ref 60–85)
SODIUM SERPL-SCNC: 139 MMOL/L — SIGNIFICANT CHANGE UP (ref 135–145)
WBC # BLD: 14.11 K/UL — HIGH (ref 3.8–10.5)
WBC # FLD AUTO: 14.11 K/UL — HIGH (ref 3.8–10.5)

## 2019-03-15 PROCEDURE — 93458 L HRT ARTERY/VENTRICLE ANGIO: CPT | Mod: 26

## 2019-03-15 PROCEDURE — 99233 SBSQ HOSP IP/OBS HIGH 50: CPT | Mod: GC

## 2019-03-15 PROCEDURE — 99152 MOD SED SAME PHYS/QHP 5/>YRS: CPT

## 2019-03-15 RX ADMIN — Medication 3 MILLILITER(S): at 10:08

## 2019-03-15 RX ADMIN — CLOPIDOGREL BISULFATE 75 MILLIGRAM(S): 75 TABLET, FILM COATED ORAL at 12:08

## 2019-03-15 RX ADMIN — SODIUM CHLORIDE 3 MILLILITER(S): 9 INJECTION INTRAMUSCULAR; INTRAVENOUS; SUBCUTANEOUS at 21:16

## 2019-03-15 RX ADMIN — Medication 3 MILLILITER(S): at 03:57

## 2019-03-15 RX ADMIN — QUETIAPINE FUMARATE 100 MILLIGRAM(S): 200 TABLET, FILM COATED ORAL at 21:20

## 2019-03-15 RX ADMIN — Medication 20 MILLIGRAM(S): at 06:16

## 2019-03-15 RX ADMIN — Medication 20 MILLIGRAM(S): at 21:20

## 2019-03-15 RX ADMIN — SODIUM CHLORIDE 3 MILLILITER(S): 9 INJECTION INTRAMUSCULAR; INTRAVENOUS; SUBCUTANEOUS at 06:52

## 2019-03-15 RX ADMIN — Medication 75 MICROGRAM(S): at 06:17

## 2019-03-15 RX ADMIN — BUDESONIDE AND FORMOTEROL FUMARATE DIHYDRATE 2 PUFF(S): 160; 4.5 AEROSOL RESPIRATORY (INHALATION) at 23:49

## 2019-03-15 RX ADMIN — Medication 1 MILLIGRAM(S): at 21:20

## 2019-03-15 RX ADMIN — Medication 25 MILLIGRAM(S): at 21:20

## 2019-03-15 RX ADMIN — Medication 81 MILLIGRAM(S): at 12:08

## 2019-03-15 RX ADMIN — SPIRONOLACTONE 12.5 MILLIGRAM(S): 25 TABLET, FILM COATED ORAL at 06:17

## 2019-03-15 RX ADMIN — PANTOPRAZOLE SODIUM 40 MILLIGRAM(S): 20 TABLET, DELAYED RELEASE ORAL at 06:17

## 2019-03-15 RX ADMIN — Medication 3 MILLILITER(S): at 21:58

## 2019-03-15 NOTE — PROGRESS NOTE ADULT - ASSESSMENT
67 yo woman with severe eosinophilic asthma, with significant CAD here with dyspneas appears to be secondary to asthma exacerbation at this time.   - 69 yo woman with severe eosinophilic asthma, left diaphram paralysis s/p surgery, with significant CAD here with dyspneas appears to be secondary to asthma exacerbation at this time. BAsed on last Pftsand history likely asthma COPD overlap.  Given chest pain appreacite cardiology plan for cath    - taper solumedrol to 40mg daily, continue with standing Duo nebs.   -Cw symbicort,   - Keep O2 >90%  - when safe from cardiology stand point ambulate on RA and O2 to see if pt requires home O2.     Leno Herman MD PGY6  Pulmonary and Critical Care Fellow  p# [phone]

## 2019-03-15 NOTE — PROGRESS NOTE ADULT - SUBJECTIVE AND OBJECTIVE BOX
INTERVAL HPI/OVERNIGHT EVENTS: I feel much better and will like to go home tonight after cath.   Vital Signs Last 24 Hrs  T(C): 36.8 (15 Mar 2019 16:44), Max: 36.8 (15 Mar 2019 06:13)  T(F): 98.2 (15 Mar 2019 16:44), Max: 98.2 (15 Mar 2019 06:13)  HR: 98 (15 Mar 2019 16:44) (94 - 102)  BP: 121/80 (15 Mar 2019 16:44) (93/56 - 121/80)  BP(mean): --  RR: 18 (15 Mar 2019 16:44) (16 - 18)  SpO2: 100% (15 Mar 2019 16:44) (96% - 100%)  I&O's Summary    MEDICATIONS  (STANDING):  ALBUTerol/ipratropium for Nebulization 3 milliLiter(s) Nebulizer every 6 hours  aspirin enteric coated 81 milliGRAM(s) Oral daily  buDESOnide 160 MICROgram(s)/formoterol 4.5 MICROgram(s) Inhaler 2 Puff(s) Inhalation two times a day  clonazePAM Tablet 1 milliGRAM(s) Oral at bedtime  clopidogrel Tablet 75 milliGRAM(s) Oral daily  heparin  Injectable 5000 Unit(s) SubCutaneous every 12 hours  levothyroxine 75 MICROGram(s) Oral daily  methylPREDNISolone sodium succinate Injectable 20 milliGRAM(s) IV Push every 8 hours  pantoprazole    Tablet 40 milliGRAM(s) Oral before breakfast  QUEtiapine 100 milliGRAM(s) Oral at bedtime  sodium chloride 0.9% lock flush 3 milliLiter(s) IV Push every 8 hours  spironolactone 12.5 milliGRAM(s) Oral <User Schedule>  traZODone 25 milliGRAM(s) Oral at bedtime    MEDICATIONS  (PRN):  acetaminophen   Tablet .. 650 milliGRAM(s) Oral every 6 hours PRN Mild Pain (1 - 3), Moderate Pain (4 - 6), Severe Pain (7 - 10)  ALBUTerol/ipratropium for Nebulization 3 milliLiter(s) Nebulizer every 2 hours PRN Shortness of Breath and/or Wheezing    LABS:                        13.3   10.30 )-----------( 276      ( 14 Mar 2019 07:10 )             41.5     03-14    140  |  102  |  23  ----------------------------<  127<H>  4.5   |  23  |  0.61    Ca    9.2      14 Mar 2019 07:10  Phos  2.2     03-14  Mg     2.1     03-14          CAPILLARY BLOOD GLUCOSE              REVIEW OF SYSTEMS:  CONSTITUTIONAL: No fever, weight loss, or fatigue  EYES: No eye pain, visual disturbances, or discharge  ENMT:  No difficulty hearing, tinnitus, vertigo; No sinus or throat pain  NECK: No pain or stiffness  RESPIRATORY: No cough, wheezing, chills or hemoptysis; No shortness of breath  CARDIOVASCULAR: No chest pain, palpitations, dizziness, or leg swelling  GASTROINTESTINAL: No abdominal or epigastric pain. No nausea, vomiting, or hematemesis; No diarrhea or constipation. No melena or hematochezia.  GENITOURINARY: No dysuria, frequency, hematuria, or incontinence  NEUROLOGICAL: No headaches, memory loss, loss of strength, numbness, or tremors      Consultant(s) Notes Reviewed:  [x ] YES  [ ] NO    PHYSICAL EXAM:  GENERAL: NAD, well-groomed, well-developed, not in any distress ,  HEAD:  Atraumatic, Normocephalic  EYES: EOMI, PERRLA, conjunctiva and sclera clear  ENMT: No tonsillar erythema, exudates, or enlargement; Moist mucous membranes, Good dentition, No lesions  NECK: Supple, No JVD, Normal thyroid  NERVOUS SYSTEM:  Alert & Oriented X3, No focal deficit   CHEST/LUNG: Good air entry bilateral with  wheezing,  HEART: Regular rate and rhythm; No murmurs, rubs, or gallops  ABDOMEN: Soft, Nontender, Nondistended; Bowel sounds present  EXTREMITIES:  2+ Peripheral Pulses, No clubbing, cyanosis, or edema    Care Discussed with Consultants/Other Providers [ x] YES  [ ] NO

## 2019-03-15 NOTE — PROGRESS NOTE ADULT - SUBJECTIVE AND OBJECTIVE BOX
CHIEF COMPLAINT:    Interval Events:    REVIEW OF SYSTEMS:  Constitutional: [ ] negative [ ] fevers [ ] chills [ ] weight loss [ ] weight gain  HEENT: [ ] negative [ ] dry eyes [ ] eye irritation [ ] postnasal drip [ ] nasal congestion  CV: [ ] negative  [ ] chest pain [ ] orthopnea [ ] palpitations [ ] murmur  Resp: [ ] negative [ ] cough [ ] shortness of breath [ ] dyspnea [ ] wheezing [ ] sputum [ ] hemoptysis  GI: [ ] negative [ ] nausea [ ] vomiting [ ] diarrhea [ ] constipation [ ] abd pain [ ] dysphagia   : [ ] negative [ ] dysuria [ ] nocturia [ ] hematuria [ ] increased urinary frequency  Musculoskeletal: [ ] negative [ ] back pain [ ] myalgias [ ] arthralgias [ ] fracture  Skin: [ ] negative [ ] rash [ ] itch  Neurological: [ ] negative [ ] headache [ ] dizziness [ ] syncope [ ] weakness [ ] numbness  Psychiatric: [ ] negative [ ] anxiety [ ] depression  Endocrine: [ ] negative [ ] diabetes [ ] thyroid problem  Hematologic/Lymphatic: [ ] negative [ ] anemia [ ] bleeding problem  Allergic/Immunologic: [ ] negative [ ] itchy eyes [ ] nasal discharge [ ] hives [ ] angioedema  [ ] All other systems negative  [ ] Unable to assess ROS because ________    OBJECTIVE:  ICU Vital Signs Last 24 Hrs  T(C): 36.8 (15 Mar 2019 06:13), Max: 36.8 (15 Mar 2019 06:13)  T(F): 98.2 (15 Mar 2019 06:13), Max: 98.2 (15 Mar 2019 06:13)  HR: 95 (15 Mar 2019 06:13) (94 - 112)  BP: 93/56 (15 Mar 2019 06:13) (93/56 - 116/72)  BP(mean): --  ABP: --  ABP(mean): --  RR: 16 (15 Mar 2019 06:13) (16 - 18)  SpO2: 96% (15 Mar 2019 06:13) (96% - 100%)        CAPILLARY BLOOD GLUCOSE          PHYSICAL EXAM:  General:   HEENT:   Lymph Nodes:  Neck:   Respiratory:   Cardiovascular:   Abdomen:   Extremities:   Skin:   Neurological:  Psychiatry:    HOSPITAL MEDICATIONS:  Standing Meds:  ALBUTerol/ipratropium for Nebulization 3 milliLiter(s) Nebulizer every 6 hours  aspirin enteric coated 81 milliGRAM(s) Oral daily  buDESOnide 160 MICROgram(s)/formoterol 4.5 MICROgram(s) Inhaler 2 Puff(s) Inhalation two times a day  clonazePAM Tablet 1 milliGRAM(s) Oral at bedtime  clopidogrel Tablet 75 milliGRAM(s) Oral daily  heparin  Injectable 5000 Unit(s) SubCutaneous every 12 hours  levothyroxine 75 MICROGram(s) Oral daily  methylPREDNISolone sodium succinate Injectable 20 milliGRAM(s) IV Push every 8 hours  pantoprazole    Tablet 40 milliGRAM(s) Oral before breakfast  QUEtiapine 100 milliGRAM(s) Oral at bedtime  sodium chloride 0.9% lock flush 3 milliLiter(s) IV Push every 8 hours  spironolactone 12.5 milliGRAM(s) Oral <User Schedule>  traZODone 25 milliGRAM(s) Oral at bedtime      PRN Meds:  acetaminophen   Tablet .. 650 milliGRAM(s) Oral every 6 hours PRN  ALBUTerol/ipratropium for Nebulization 3 milliLiter(s) Nebulizer every 2 hours PRN      LABS:                        13.3   10.30 )-----------( 276      ( 14 Mar 2019 07:10 )             41.5     Hgb Trend: 13.3<--, 14.8<--, 15.0<--  03-14    140  |  102  |  23  ----------------------------<  127<H>  4.5   |  23  |  0.61    Ca    9.2      14 Mar 2019 07:10  Phos  2.2     03-14  Mg     2.1     03-14      Creatinine Trend: 0.61<--, 0.66<--, 0.78<--        Venous Blood Gas:  03-14 @ 07:10  7.38/43/48/24/83.2  VBG Lactate: 4.0      MICROBIOLOGY:     RADIOLOGY:  [ ] Reviewed and interpreted by me    PULMONARY FUNCTION TESTS:    EKG: CHIEF COMPLAINT: SOB and chest pain    Interval Events: improved, off bipap. Continues to have chest pressure but resolved SOB.     REVIEW OF SYSTEMS:  [x] All other systems negative  [ ] Unable to assess ROS because ________    OBJECTIVE:  ICU Vital Signs Last 24 Hrs  T(C): 36.8 (15 Mar 2019 06:13), Max: 36.8 (15 Mar 2019 06:13)  T(F): 98.2 (15 Mar 2019 06:13), Max: 98.2 (15 Mar 2019 06:13)  HR: 95 (15 Mar 2019 06:13) (94 - 112)  BP: 93/56 (15 Mar 2019 06:13) (93/56 - 116/72)  BP(mean): --  ABP: --  ABP(mean): --  RR: 16 (15 Mar 2019 06:13) (16 - 18)  SpO2: 96% (15 Mar 2019 06:13) (96% - 100%)        CAPILLARY BLOOD GLUCOSE          PHYSICAL EXAM:  GENERAL: NAD, well-developed  HEENT:  Atraumatic, Normocephalic  EYES: EOMI, PERRLA, conjunctiva and sclera clear  NECK: Supple, No JVD  CHEST/LUNG: Clear to auscultation bilaterally; No wheezes, rales, or rhonchi  HEART: Regular rate and rhythm; No murmurs, rubs, or gallops  ABDOMEN: Soft, Nontender, Nondistended; Bowel sounds present  EXTREMITIES:  2+ Peripheral Pulses, No clubbing, cyanosis, or edema  PSYCH: AAOx3  NEUROLOGY: non-focal exam  SKIN: No rashes or lesions    HOSPITAL MEDICATIONS:  Standing Meds:  ALBUTerol/ipratropium for Nebulization 3 milliLiter(s) Nebulizer every 6 hours  aspirin enteric coated 81 milliGRAM(s) Oral daily  buDESOnide 160 MICROgram(s)/formoterol 4.5 MICROgram(s) Inhaler 2 Puff(s) Inhalation two times a day  clonazePAM Tablet 1 milliGRAM(s) Oral at bedtime  clopidogrel Tablet 75 milliGRAM(s) Oral daily  heparin  Injectable 5000 Unit(s) SubCutaneous every 12 hours  levothyroxine 75 MICROGram(s) Oral daily  methylPREDNISolone sodium succinate Injectable 20 milliGRAM(s) IV Push every 8 hours  pantoprazole    Tablet 40 milliGRAM(s) Oral before breakfast  QUEtiapine 100 milliGRAM(s) Oral at bedtime  sodium chloride 0.9% lock flush 3 milliLiter(s) IV Push every 8 hours  spironolactone 12.5 milliGRAM(s) Oral <User Schedule>  traZODone 25 milliGRAM(s) Oral at bedtime      PRN Meds:  acetaminophen   Tablet .. 650 milliGRAM(s) Oral every 6 hours PRN  ALBUTerol/ipratropium for Nebulization 3 milliLiter(s) Nebulizer every 2 hours PRN      LABS:                        13.3   10.30 )-----------( 276      ( 14 Mar 2019 07:10 )             41.5     Hgb Trend: 13.3<--, 14.8<--, 15.0<--  03-14    140  |  102  |  23  ----------------------------<  127<H>  4.5   |  23  |  0.61    Ca    9.2      14 Mar 2019 07:10  Phos  2.2     03-14  Mg     2.1     03-14      Creatinine Trend: 0.61<--, 0.66<--, 0.78<--        Venous Blood Gas:  03-14 @ 07:10  7.38/43/48/24/83.2  VBG Lactate: 4.0      MICROBIOLOGY:     RADIOLOGY:  [ ] Reviewed and interpreted by me    PULMONARY FUNCTION TESTS:    EKG:

## 2019-03-15 NOTE — PROGRESS NOTE ADULT - ASSESSMENT
68 yr old female with PMHx of CAD s/p 8 stents on ASA + Plavix, Asthma s/p 1 intubation, Depression, Hypothyroidism, HFrEF (23-30%) admitted for left sided 8/10 chest pressure and worsening dyspnea x 1 week r/o ACS        Problem Selector:  PROBLEM DIAGNOSES  Problem: Asthma with acute exacerbation  Assessment and Plan: Improving .   Steroid ,neb Rx and Oxygen .  Pulmonary help appreciated.    PROBLEM DIAGNOSES  Problem: Chest pain  Assessment and Plan: Cardiology consult noted.   R/o ACS  Trop 10   repeat CE  serial ekgs, trend CE's   low salt low cholesterol diet   TTE noted and planned for cardiac cath today.      Problem: Chronic Systolic Heart failure  Assessment and Plan: Stable . last echo 2017- EF: 25-30%   BNP: 601.6   TTE noted.   fluid restrction: 1200mL   continue spironolactone  can consider starting ACEi, Cr normal      Problem: Acute respiratory failure with hypoxia   Assessment and Plan: Off BIPAP and NC Oxygen now. Pulmonary helping.

## 2019-03-15 NOTE — PROGRESS NOTE ADULT - ASSESSMENT
SOB  Asthma exacerbation   nebs  much improved     cad history of stent  cont dapt  statin  but has chest tightness   will plan for cath today     chronic systolic chf  off bb due to bronchospasm   off ace, arb due to low BP  diuresis   echo shows mod segmental LV systolic dysfunction

## 2019-03-15 NOTE — PROGRESS NOTE ADULT - ATTENDING COMMENTS
.
Agree with above.  Asthma vs. COPD with current exacerbation  NEBs / LABA+ICS / steroids 40mg daily for next several days and then re-assess.  Maintain spO2 =92% will need evaluation for oxygen

## 2019-03-15 NOTE — PROGRESS NOTE ADULT - SUBJECTIVE AND OBJECTIVE BOX
Subjective: Patient seen and examined. No new events except as noted.     SUBJECTIVE/ROS:  No chest pain, dyspnea, palpitation, or dizziness.       MEDICATIONS:  MEDICATIONS  (STANDING):  ALBUTerol/ipratropium for Nebulization 3 milliLiter(s) Nebulizer every 6 hours  aspirin enteric coated 81 milliGRAM(s) Oral daily  buDESOnide 160 MICROgram(s)/formoterol 4.5 MICROgram(s) Inhaler 2 Puff(s) Inhalation two times a day  clonazePAM Tablet 1 milliGRAM(s) Oral at bedtime  clopidogrel Tablet 75 milliGRAM(s) Oral daily  heparin  Injectable 5000 Unit(s) SubCutaneous every 12 hours  levothyroxine 75 MICROGram(s) Oral daily  methylPREDNISolone sodium succinate Injectable 20 milliGRAM(s) IV Push every 8 hours  pantoprazole    Tablet 40 milliGRAM(s) Oral before breakfast  QUEtiapine 100 milliGRAM(s) Oral at bedtime  sodium chloride 0.9% lock flush 3 milliLiter(s) IV Push every 8 hours  spironolactone 12.5 milliGRAM(s) Oral <User Schedule>  traZODone 25 milliGRAM(s) Oral at bedtime      PHYSICAL EXAM:  T(C): 36.8 (03-15-19 @ 06:13), Max: 36.8 (03-15-19 @ 06:13)  HR: 97 (03-15-19 @ 10:08) (94 - 104)  BP: 93/56 (03-15-19 @ 06:13) (93/56 - 116/72)  RR: 16 (03-15-19 @ 06:13) (16 - 18)  SpO2: 98% (03-15-19 @ 10:08) (96% - 100%)  Wt(kg): --  I&O's Summary          JVP: Normal  Neck: supple  Lung: clear   CV: S1 S2 , Murmur:  Abd: soft  Ext: No edema  neuro: Awake / alert  Psych: flat affect  Skin: normal``    LABS/DATA:    CARDIAC MARKERS:  CARDIAC MARKERS ( 12 Mar 2019 21:05 )  x     / x     / 59 u/L / 1.90 ng/mL / x                                    13.3   10.30 )-----------( 276      ( 14 Mar 2019 07:10 )             41.5     03-14    140  |  102  |  23  ----------------------------<  127<H>  4.5   |  23  |  0.61    Ca    9.2      14 Mar 2019 07:10  Phos  2.2     03-14  Mg     2.1     03-14      proBNP:   Lipid Profile:   HgA1c:   TSH:     TELE:  EKG:

## 2019-03-16 ENCOUNTER — TRANSCRIPTION ENCOUNTER (OUTPATIENT)
Age: 69
End: 2019-03-16

## 2019-03-16 VITALS
OXYGEN SATURATION: 94 % | RESPIRATION RATE: 18 BRPM | HEART RATE: 92 BPM | DIASTOLIC BLOOD PRESSURE: 70 MMHG | TEMPERATURE: 98 F | SYSTOLIC BLOOD PRESSURE: 110 MMHG

## 2019-03-16 LAB
ANION GAP SERPL CALC-SCNC: 12 MMO/L — SIGNIFICANT CHANGE UP (ref 7–14)
BASE EXCESS BLDV CALC-SCNC: 4.5 MMOL/L — SIGNIFICANT CHANGE UP
BLOOD GAS VENOUS - CREATININE: 0.59 MG/DL — SIGNIFICANT CHANGE UP (ref 0.5–1.3)
BUN SERPL-MCNC: 23 MG/DL — SIGNIFICANT CHANGE UP (ref 7–23)
CALCIUM SERPL-MCNC: 8.8 MG/DL — SIGNIFICANT CHANGE UP (ref 8.4–10.5)
CHLORIDE BLDV-SCNC: 109 MMOL/L — HIGH (ref 96–108)
CHLORIDE SERPL-SCNC: 104 MMOL/L — SIGNIFICANT CHANGE UP (ref 98–107)
CO2 SERPL-SCNC: 25 MMOL/L — SIGNIFICANT CHANGE UP (ref 22–31)
CREAT SERPL-MCNC: 0.64 MG/DL — SIGNIFICANT CHANGE UP (ref 0.5–1.3)
GAS PNL BLDV: 135 MMOL/L — LOW (ref 136–146)
GLUCOSE BLDV-MCNC: 117 — HIGH (ref 70–99)
GLUCOSE SERPL-MCNC: 114 MG/DL — HIGH (ref 70–99)
HCO3 BLDV-SCNC: 27 MMOL/L — SIGNIFICANT CHANGE UP (ref 20–27)
HCT VFR BLD CALC: 40.6 % — SIGNIFICANT CHANGE UP (ref 34.5–45)
HCT VFR BLDV CALC: 40.4 % — SIGNIFICANT CHANGE UP (ref 34.5–45)
HGB BLD-MCNC: 13.1 G/DL — SIGNIFICANT CHANGE UP (ref 11.5–15.5)
HGB BLDV-MCNC: 13.1 G/DL — SIGNIFICANT CHANGE UP (ref 11.5–15.5)
LACTATE BLDV-MCNC: 2.8 MMOL/L — HIGH (ref 0.5–2)
LACTATE SERPL-SCNC: 2.9 MMOL/L — HIGH (ref 0.5–2)
MAGNESIUM SERPL-MCNC: 2.3 MG/DL — SIGNIFICANT CHANGE UP (ref 1.6–2.6)
MCHC RBC-ENTMCNC: 31.2 PG — SIGNIFICANT CHANGE UP (ref 27–34)
MCHC RBC-ENTMCNC: 32.3 % — SIGNIFICANT CHANGE UP (ref 32–36)
MCV RBC AUTO: 96.7 FL — SIGNIFICANT CHANGE UP (ref 80–100)
NRBC # FLD: 0 K/UL — LOW (ref 25–125)
PCO2 BLDV: 51 MMHG — SIGNIFICANT CHANGE UP (ref 41–51)
PH BLDV: 7.38 PH — SIGNIFICANT CHANGE UP (ref 7.32–7.43)
PHOSPHATE SERPL-MCNC: 1.8 MG/DL — LOW (ref 2.5–4.5)
PLATELET # BLD AUTO: 255 K/UL — SIGNIFICANT CHANGE UP (ref 150–400)
PMV BLD: 9.3 FL — SIGNIFICANT CHANGE UP (ref 7–13)
PO2 BLDV: 51 MMHG — HIGH (ref 35–40)
POTASSIUM BLDV-SCNC: 4 MMOL/L — SIGNIFICANT CHANGE UP (ref 3.4–4.5)
POTASSIUM SERPL-MCNC: 4.4 MMOL/L — SIGNIFICANT CHANGE UP (ref 3.5–5.3)
POTASSIUM SERPL-SCNC: 4.4 MMOL/L — SIGNIFICANT CHANGE UP (ref 3.5–5.3)
RBC # BLD: 4.2 M/UL — SIGNIFICANT CHANGE UP (ref 3.8–5.2)
RBC # FLD: 14.7 % — HIGH (ref 10.3–14.5)
SAO2 % BLDV: 85.6 % — HIGH (ref 60–85)
SODIUM SERPL-SCNC: 141 MMOL/L — SIGNIFICANT CHANGE UP (ref 135–145)
WBC # BLD: 9.7 K/UL — SIGNIFICANT CHANGE UP (ref 3.8–10.5)
WBC # FLD AUTO: 9.7 K/UL — SIGNIFICANT CHANGE UP (ref 3.8–10.5)

## 2019-03-16 RX ORDER — POLYETHYLENE GLYCOL 3350 17 G/17G
17 POWDER, FOR SOLUTION ORAL
Qty: 0 | Refills: 0 | DISCHARGE
Start: 2019-03-16

## 2019-03-16 RX ORDER — SODIUM,POTASSIUM PHOSPHATES 278-250MG
1 POWDER IN PACKET (EA) ORAL THREE TIMES A DAY
Qty: 0 | Refills: 0 | Status: DISCONTINUED | OUTPATIENT
Start: 2019-03-16 | End: 2019-03-16

## 2019-03-16 RX ORDER — POLYETHYLENE GLYCOL 3350 17 G/17G
17 POWDER, FOR SOLUTION ORAL DAILY
Qty: 0 | Refills: 0 | Status: DISCONTINUED | OUTPATIENT
Start: 2019-03-16 | End: 2019-03-16

## 2019-03-16 RX ORDER — PANTOPRAZOLE SODIUM 20 MG/1
1 TABLET, DELAYED RELEASE ORAL
Qty: 30 | Refills: 0
Start: 2019-03-16 | End: 2019-04-14

## 2019-03-16 RX ORDER — SODIUM CHLORIDE 9 MG/ML
250 INJECTION INTRAMUSCULAR; INTRAVENOUS; SUBCUTANEOUS ONCE
Qty: 0 | Refills: 0 | Status: DISCONTINUED | OUTPATIENT
Start: 2019-03-16 | End: 2019-03-16

## 2019-03-16 RX ADMIN — Medication 20 MILLIGRAM(S): at 05:40

## 2019-03-16 RX ADMIN — Medication 20 MILLIGRAM(S): at 14:05

## 2019-03-16 RX ADMIN — SODIUM CHLORIDE 3 MILLILITER(S): 9 INJECTION INTRAMUSCULAR; INTRAVENOUS; SUBCUTANEOUS at 05:05

## 2019-03-16 RX ADMIN — Medication 3 MILLILITER(S): at 09:21

## 2019-03-16 RX ADMIN — SODIUM CHLORIDE 3 MILLILITER(S): 9 INJECTION INTRAMUSCULAR; INTRAVENOUS; SUBCUTANEOUS at 14:08

## 2019-03-16 RX ADMIN — PANTOPRAZOLE SODIUM 40 MILLIGRAM(S): 20 TABLET, DELAYED RELEASE ORAL at 05:40

## 2019-03-16 RX ADMIN — Medication 81 MILLIGRAM(S): at 14:05

## 2019-03-16 RX ADMIN — Medication 75 MICROGRAM(S): at 05:40

## 2019-03-16 RX ADMIN — Medication 3 MILLILITER(S): at 04:37

## 2019-03-16 RX ADMIN — Medication 1 PACKET(S): at 14:05

## 2019-03-16 RX ADMIN — POLYETHYLENE GLYCOL 3350 17 GRAM(S): 17 POWDER, FOR SOLUTION ORAL at 14:05

## 2019-03-16 RX ADMIN — CLOPIDOGREL BISULFATE 75 MILLIGRAM(S): 75 TABLET, FILM COATED ORAL at 14:04

## 2019-03-16 NOTE — PROGRESS NOTE ADULT - REASON FOR ADMISSION
chest pressure and sob

## 2019-03-16 NOTE — PROGRESS NOTE ADULT - ASSESSMENT
SOB  Asthma exacerbation   nebs  much improved     cad history of stent  cont dapt  statin  cath without any intervention   medical therapy     chronic systolic chf  off bb due to bronchospasm   off ace, arb due to low BP  diuresis   echo shows mod segmental LV systolic dysfunction

## 2019-03-16 NOTE — PROGRESS NOTE ADULT - SUBJECTIVE AND OBJECTIVE BOX
Subjective: Patient seen and examined. No new events except as noted.     SUBJECTIVE/ROS:  feels better       MEDICATIONS:  MEDICATIONS  (STANDING):  ALBUTerol/ipratropium for Nebulization 3 milliLiter(s) Nebulizer every 6 hours  aspirin enteric coated 81 milliGRAM(s) Oral daily  buDESOnide 160 MICROgram(s)/formoterol 4.5 MICROgram(s) Inhaler 2 Puff(s) Inhalation two times a day  clonazePAM Tablet 1 milliGRAM(s) Oral at bedtime  clopidogrel Tablet 75 milliGRAM(s) Oral daily  heparin  Injectable 5000 Unit(s) SubCutaneous every 12 hours  levothyroxine 75 MICROGram(s) Oral daily  methylPREDNISolone sodium succinate Injectable 20 milliGRAM(s) IV Push every 8 hours  pantoprazole    Tablet 40 milliGRAM(s) Oral before breakfast  polyethylene glycol 3350 17 Gram(s) Oral daily  potassium phosphate / sodium phosphate powder 1 Packet(s) Oral three times a day  QUEtiapine 100 milliGRAM(s) Oral at bedtime  sodium chloride 0.9% lock flush 3 milliLiter(s) IV Push every 8 hours  spironolactone 12.5 milliGRAM(s) Oral <User Schedule>  traZODone 25 milliGRAM(s) Oral at bedtime      PHYSICAL EXAM:  T(C): 36.8 (03-16-19 @ 05:37), Max: 37.3 (03-15-19 @ 20:38)  HR: 95 (03-16-19 @ 09:21) (87 - 105)  BP: 108/70 (03-16-19 @ 05:37) (101/65 - 121/80)  RR: 18 (03-16-19 @ 10:39) (18 - 18)  SpO2: 94% (03-16-19 @ 10:39) (94% - 100%)  Wt(kg): --  I&O's Summary      JVP: Normal  Neck: supple  Lung: clear   CV: S1 S2 , Murmur:  Abd: soft  Ext: No edema  neuro: Awake / alert  Psych: flat affect  Skin: normal      LABS/DATA:    CARDIAC MARKERS:                                13.1   9.70  )-----------( 255      ( 16 Mar 2019 05:00 )             40.6     03-16    141  |  104  |  23  ----------------------------<  114<H>  4.4   |  25  |  0.64    Ca    8.8      16 Mar 2019 05:00  Phos  1.8     03-16  Mg     2.3     03-16      proBNP:   Lipid Profile:   HgA1c:   TSH:     TELE:  EKG:

## 2019-03-16 NOTE — DISCHARGE NOTE PROVIDER - CARE PROVIDER_API CALL
Dr Morfin   puldeyanira  Phone: (   )    -  Fax: (   )    -  Follow Up Time: 1-3 days    Dr Bejarano,   cardiologist  Phone: (   )    -  Fax: (   )    -  Follow Up Time: 1 week

## 2019-03-16 NOTE — DISCHARGE NOTE NURSING/CASE MANAGEMENT/SOCIAL WORK - NSDCDPATPORTLINK_GEN_ALL_CORE
You can access the [facility] Portal, offered by [facility], by registering with the following website: [email or web address]

## 2019-03-16 NOTE — PROGRESS NOTE ADULT - ASSESSMENT
68 yr old female with PMHx of CAD s/p 8 stents on ASA + Plavix, Asthma s/p 1 intubation, Depression, Hypothyroidism, HFrEF (23-30%) admitted for left sided 8/10 chest pressure and worsening dyspnea x 1 week r/o ACS        Problem Selector:  PROBLEM DIAGNOSES  Problem: Asthma with acute exacerbation  Assessment and Plan: Improving .   Steroid ,neb Rx and Oxygen .  Pulmonary help appreciated.    PROBLEM DIAGNOSES  Problem: Chest pain  Assessment and Plan: Cardiology consult noted.   R/o ACS  Trop 10   repeat CE  serial ekgs, trend CE's   low salt low cholesterol diet   TTE noted and S/P  Cath.     Problem: Chronic Systolic Heart failure  Assessment and Plan: Stable . last echo 2017- EF: 25-30%   BNP: 601.6   TTE noted.   fluid restrction: 1200mL   continue spironolactone  can consider starting ACEi, Cr normal      Problem: Acute respiratory failure with hypoxia   Assessment and Plan: Off BIPAP and NC Oxygen now. Pulmonary helping. Will try to wean off oxygen as may need home oxygen .      Disposition : DC planning home today.

## 2019-03-16 NOTE — DISCHARGE NOTE PROVIDER - HOSPITAL COURSE
68 yr old female with PMHx of CAD s/p 8 stents on ASA + Plavix, Asthma s/p 1 intubation, Depression, Hypothyroidism, HFrEF (23-30%) presents to ED with complaint of left sided 8/10 chest pressure and worsening dyspnea x 1 week. Patient reports dyspnea has not improved despite using multiple inhalers and nebulizer treatments q 2hours. Patient c/o dyspnea on exertion, orthopnea (uses 3 pillows) and intermittent productive cough with green sputum. Reports left sided chest pressure as 8/10 radiating to left jaw and neck. Has hx of chronic headache however reports sx have worsened with chest pain and sob. Denies diaphoresis, fever, chills, nasal congestion, sinus pressure, abdominal pain, nausea, vomiting, hematochezia, melena, dysuria, urinary frequency, calf pain, leg swelling, paresthesias.         Hospital course:    EKG: NSR @ 9bpm     CXR: clear lungs    Trop: 10-->10     3/13 ECHO: EF 40-45% Normal mitral valve. Minimal mitral regurgitation. Normal trileaflet aortic valve. No aortic valve regurgitation seen. Moderate segmental left ventricular systolic dysfunction. The mid anterior septum, the apical inferior wall, the apical anterior wall, the apical septum, the    apical lateral wall, the mid anterior wall, the mid inferior wall, and the mid inferoseptum are hypokinetic. Mild diastolic dysfunction (Stage I). Normal right ventricular size and function. *** Compared with echocardiogram of 2/21/2017, there has been  interval improvement in left ventricular systolic    function.     LHC: LAD stent patent, LCx diffuse dz (small vessel), LVEDP 11, LRA accessed        Pt presented with CP, CE negative. pt ruled out for ACS. Cariology consulted recommedended TTE which noted segmental wall dysfunction pt underwent cath noted diffuse dz and patent stents. Recommended continue medical management. Pt also with SOB on admission pulmonary consulted concern for asthma exacerbation pt started on IV steroid with improvement now transitioned to PO. O2 sat with exertion 93% does not require home oxygen. Admission lab also noted for elevated Lactate of 4 downtrend to 2.9, Dr Schneider recommended IV bolus and repeat lab which pt deferred and would like to do oral hydration and followup outpatient. As per Dr Schneider pt cleared for discharge on 3/16

## 2019-03-16 NOTE — CHART NOTE - NSCHARTNOTEFT_GEN_A_CORE
Spoke with pulmonary, exertional O2 sat of 93. Recommended to do 5 days of oral prednisone 40mg and followup with outpt pulmonologist    Lactate of 2.9, Phos 1.8 Spoke with Dr Schneider recommended to given IV bolus 250cc and repeat lab which pt refused. Pt agreed with oral hydration and followup with PMD. As per Dr Schneider pt cleared for discharge

## 2019-03-16 NOTE — DISCHARGE NOTE PROVIDER - NSDCCPCAREPLAN_GEN_ALL_CORE_FT
PRINCIPAL DISCHARGE DIAGNOSIS  Diagnosis: CAD (coronary artery disease)  Assessment and Plan of Treatment:       SECONDARY DISCHARGE DIAGNOSES  Diagnosis: Chronic systolic heart failure  Assessment and Plan of Treatment:     Diagnosis: Asthma with acute exacerbation  Assessment and Plan of Treatment: PRINCIPAL DISCHARGE DIAGNOSIS  Diagnosis: CAD (coronary artery disease)  Assessment and Plan of Treatment: Continue aspirin and Plavix, do not stop unless instructed by your physician.  Continue low salt, fat, cholesterol and carbohydrate diet. Follow up with cardiologist and primary care physician's routine appointment.      SECONDARY DISCHARGE DIAGNOSES  Diagnosis: Chronic systolic heart failure  Assessment and Plan of Treatment: Low salt diet, fluid restriction to 1500 ml daily, monitor your fluid intake and weight daily, exercise as tolerated 30 minutes daily, and follow up with your physician within 1 to 2 weeks.    Diagnosis: Asthma with acute exacerbation  Assessment and Plan of Treatment: Continue current medications as prescribed. complete 5 days of steroid and pleaase follwo up with Dr Morfin in 3-4 days. Avoid exposures to environmental allergens such as carpets, pets and both first-hand and second-hand smoking.  During seasonal allergy period, take a shower as soon as you get home and change your clothes immediately.  Follow up your routine medical appointments. PRINCIPAL DISCHARGE DIAGNOSIS  Diagnosis: CAD (coronary artery disease)  Assessment and Plan of Treatment: Continue aspirin and Plavix, do not stop unless instructed by your physician.Your cath this admission with patent stents.  Continue low salt, fat, cholesterol and carbohydrate diet. Follow up with cardiologist and primary care physician's routine appointment.      SECONDARY DISCHARGE DIAGNOSES  Diagnosis: High serum lactate  Assessment and Plan of Treatment: your lactate level was 2.9, please take PO oral hydration and repeat level with your next visit with MD    Diagnosis: Chronic systolic heart failure  Assessment and Plan of Treatment: Low salt diet, fluid restriction to 1500 ml daily, monitor your fluid intake and weight daily, exercise as tolerated 30 minutes daily, and follow up with your physician within 1 to 2 weeks.    Diagnosis: Asthma with acute exacerbation  Assessment and Plan of Treatment: Continue current medications as prescribed. complete 5 days of steroid and pleaase follwo up with Dr Morfin in 3-4 days. Avoid exposures to environmental allergens such as carpets, pets and both first-hand and second-hand smoking.  During seasonal allergy period, take a shower as soon as you get home and change your clothes immediately.  Follow up your routine medical appointments.

## 2019-03-16 NOTE — CHART NOTE - NSCHARTNOTEFT_GEN_A_CORE
Pt is s/p LHC: LAD stent patent, LCx diffuse dz (small vessel), LVEDP 11, LRA accessed, was evaluated earlier, sleeping in bed, no complaints.  On ambulation to the bathroom she had some tachycardia to 130's, symptomatic and O2 sat dropped to 80's on RA while sleeping.      ICU Vital Signs Last 24 Hrs  T(C): 36.6 (16 Mar 2019 01:47), Max: 37.3 (15 Mar 2019 20:38)  T(F): 97.8 (16 Mar 2019 01:47), Max: 99.1 (15 Mar 2019 20:38)  HR: 105 (16 Mar 2019 01:47) (93 - 105)  BP: 110/64 (16 Mar 2019 01:47) (93/56 - 121/80)  BP(mean): --  ABP: --  ABP(mean): --  RR: 18 (16 Mar 2019 01:47) (16 - 18)  SpO2: 96% (16 Mar 2019 01:47) (95% - 100%)    EXAM:  LRA site tender to touch, no swelling or ecchymosis, good sensation and hand grasp    A/P:  O2 NC as needed to keep sat above 92%, monitor  Hr back to baseline in 90's at rest, no BB give asthma, encourage PO liquids for possible volume depletion given recent NPO status, continue to monitor

## 2019-03-16 NOTE — DISCHARGE NOTE NURSING/CASE MANAGEMENT/SOCIAL WORK - NSDCPEPT PROEDHF_GEN_ALL_CORE
Call primary care provider for follow up after discharge/Low salt diet/Report signs and symptoms to primary care provider/Activities as tolerated/Monitor weight daily

## 2019-03-16 NOTE — PROGRESS NOTE ADULT - SUBJECTIVE AND OBJECTIVE BOX
INTERVAL HPI/OVERNIGHT EVENTS: I feel fine. I may need oxygen as saturation drops .   Vital Signs Last 24 Hrs  T(C): 36.8 (16 Mar 2019 05:37), Max: 37.3 (15 Mar 2019 20:38)  T(F): 98.3 (16 Mar 2019 05:37), Max: 99.1 (15 Mar 2019 20:38)  HR: 95 (16 Mar 2019 09:21) (87 - 105)  BP: 108/70 (16 Mar 2019 05:37) (101/65 - 121/80)  BP(mean): --  RR: 18 (16 Mar 2019 10:39) (18 - 18)  SpO2: 94% (16 Mar 2019 10:39) (94% - 100%)  I&O's Summary    MEDICATIONS  (STANDING):  ALBUTerol/ipratropium for Nebulization 3 milliLiter(s) Nebulizer every 6 hours  aspirin enteric coated 81 milliGRAM(s) Oral daily  buDESOnide 160 MICROgram(s)/formoterol 4.5 MICROgram(s) Inhaler 2 Puff(s) Inhalation two times a day  clonazePAM Tablet 1 milliGRAM(s) Oral at bedtime  clopidogrel Tablet 75 milliGRAM(s) Oral daily  heparin  Injectable 5000 Unit(s) SubCutaneous every 12 hours  levothyroxine 75 MICROGram(s) Oral daily  methylPREDNISolone sodium succinate Injectable 20 milliGRAM(s) IV Push every 8 hours  pantoprazole    Tablet 40 milliGRAM(s) Oral before breakfast  polyethylene glycol 3350 17 Gram(s) Oral daily  potassium phosphate / sodium phosphate powder 1 Packet(s) Oral three times a day  QUEtiapine 100 milliGRAM(s) Oral at bedtime  sodium chloride 0.9% lock flush 3 milliLiter(s) IV Push every 8 hours  spironolactone 12.5 milliGRAM(s) Oral <User Schedule>  traZODone 25 milliGRAM(s) Oral at bedtime    MEDICATIONS  (PRN):  acetaminophen   Tablet .. 650 milliGRAM(s) Oral every 6 hours PRN Mild Pain (1 - 3), Moderate Pain (4 - 6), Severe Pain (7 - 10)  ALBUTerol/ipratropium for Nebulization 3 milliLiter(s) Nebulizer every 2 hours PRN Shortness of Breath and/or Wheezing    LABS:                        13.1   9.70  )-----------( 255      ( 16 Mar 2019 05:00 )             40.6     03-16    141  |  104  |  23  ----------------------------<  114<H>  4.4   |  25  |  0.64    Ca    8.8      16 Mar 2019 05:00  Phos  1.8     03-16  Mg     2.3     03-16          CAPILLARY BLOOD GLUCOSE              REVIEW OF SYSTEMS:  CONSTITUTIONAL: No fever, weight loss, or fatigue  EYES: No eye pain, visual disturbances, or discharge  ENMT:  No difficulty hearing, tinnitus, vertigo; No sinus or throat pain  NECK: No pain or stiffness  RESPIRATORY: No cough, wheezing, chills or hemoptysis; No shortness of breath  CARDIOVASCULAR: No chest pain, palpitations, dizziness, or leg swelling  GASTROINTESTINAL: No abdominal or epigastric pain. No nausea, vomiting, or hematemesis; No diarrhea or constipation. No melena or hematochezia.  GENITOURINARY: No dysuria, frequency, hematuria, or incontinence  NEUROLOGICAL: No headaches, memory loss, loss of strength, numbness, or tremors      Consultant(s) Notes Reviewed:  [x ] YES  [ ] NO    PHYSICAL EXAM:  GENERAL: NAD, well-groomed, well-developed ,not in any distress ,  HEAD:  Atraumatic, Normocephalic  EYES: EOMI, PERRLA, conjunctiva and sclera clear  ENMT: No tonsillar erythema, exudates, or enlargement; Moist mucous membranes, Good dentition, No lesions  NECK: Supple, No JVD, Normal thyroid  NERVOUS SYSTEM:  Alert & Oriented X3, No focal deficit   CHEST/LUNG: Good air entry bilateral with no  rales, rhonchi, wheezing, or rubs  HEART: Regular rate and rhythm; No murmurs, rubs, or gallops  ABDOMEN: Soft, Nontender, Nondistended; Bowel sounds present  EXTREMITIES:  2+ Peripheral Pulses, No clubbing, cyanosis, or edema  SKIN: No rashes or lesions    Care Discussed with Consultants/Other Providers [ x] YES  [ ] NO

## 2019-03-16 NOTE — DISCHARGE NOTE PROVIDER - PROVIDER TOKENS
FREE:[LAST:[Dr Morfin],PHONE:[(   )    -],FAX:[(   )    -],ADDRESS:[pulm],FOLLOWUP:[1-3 days]],FREE:[LAST:[Dr Bejarano],PHONE:[(   )    -],FAX:[(   )    -],ADDRESS:[cardiologist],FOLLOWUP:[1 week]]

## 2019-03-19 PROCEDURE — 99214 OFFICE O/P EST MOD 30 MIN: CPT

## 2019-03-20 ENCOUNTER — NON-APPOINTMENT (OUTPATIENT)
Age: 69
End: 2019-03-20

## 2019-03-20 ENCOUNTER — APPOINTMENT (OUTPATIENT)
Dept: PULMONOLOGY | Facility: CLINIC | Age: 69
End: 2019-03-20
Payer: MEDICARE

## 2019-03-20 VITALS
SYSTOLIC BLOOD PRESSURE: 110 MMHG | HEART RATE: 96 BPM | WEIGHT: 100 LBS | HEIGHT: 55 IN | DIASTOLIC BLOOD PRESSURE: 70 MMHG | RESPIRATION RATE: 17 BRPM | OXYGEN SATURATION: 96 % | BODY MASS INDEX: 23.14 KG/M2

## 2019-03-20 PROCEDURE — 99214 OFFICE O/P EST MOD 30 MIN: CPT | Mod: 25

## 2019-03-20 PROCEDURE — 94010 BREATHING CAPACITY TEST: CPT

## 2019-03-20 NOTE — ASSESSMENT
[FreeTextEntry1] : Ms. Primrose is a 68 year old female with history of COPD / asthma / allergy / paralyzed hemidiaphragm / cardiac disease / eosinophilic asthma. She presents to the office s/p hospitalization at [facility] for ?CHF/ asthma. \par \par Her shortness of breath is multifactorial due to:\par -cardiac/ mild congestive heart failure\par -severe persistent asthma\par -allergic rhinitis/post nasal drip syndrome\par -paralyzed hemidiaphragm s/p plication\par -poor breathing mechanics and anxiety \par \par problem 1: COPD/ asthma \par - Add a course of prednisone: 20 mg for 7 days and 10 mg for 7 days\par Information sheet given to the patient to be reviewed, this medication is never to be used without consulting the prescribing physician. Proper dietary restraint is necessary specifically salt containing foods, if any reaction may occur should be reported. \par \par -continue Xopenex 0.63 by the nebulizer up to QID \par -continue to use Advair 500 at 1 inhalation BID\par -continue Spiriva Respimat 2 inhalations QD\par -continue Singulair 10 mg before bed\par -continue to use Proventil PRN\par -continue Acapella device to be used multiple times daily \par -Asthma is believed to be caused by inherited (genetic) and environmental factor, but its exact cause is unknown. Asthma may be triggered by allergens, lung infections, or irritants in the air. Asthma triggers are different for each person \par -Inhaler technique reviewed as well as oral hygiene techniques reviewed with patient. Avoidance of cold air, extremes of temperature, rescue inhaler should be used before exercise. Order of medication reviewed with patient. Recommended use of a cool mist humidifier in the bedroom\par \par problem 2: eosinophilic asthma\par -patient receiving Fasenra and has had 5 injections thus far\par -The safety and efficacy of Nucala was established in three double-blind, randomized, placebo-controlled trials in patients with severe asthma. Compared to a placebo, patients with severe asthma receiving Nucala had fewer exacerbation requiring hospitalization and/or emergency department visits, and a longer time to first exacerbation. In addition, patients with severe asthma receiving Nucala or Fasenra experienced greater reductions in their daily maintenance oral corticosteroid dose, while maintaining asthma control compared with patients receiving placebo. Treatment with Nucala did not result in a significant improvement in lung function, as measured by the volume of air exhaled by patients in one second. The most common side effects include: headache, injection site reactions, back pain, weakness, and fatigue; hypersensitivity reactions can occur within hours or days including swelling of the face, mouth, and tongue, fainting, dizziness, hives, breathing problems, and rash; herpes zoster infections have occurred. The drug is a monoclonal antibody that inhibits interleukin-5 which helps regular eosinophils, a type of white blood cell that contributes to asthma. The over-production of eosinophils can cause inflammation in the lungs, increasing the frequency of asthma attacks. Patients must also take other medications, including high dose inhaled corticosteroids and at least one additional asthma drug\par \par problem 3: allergic rhinitis/post nasal drip syndrome \par -recommended Navage sinus rinse\par -continue Qnasl 1 sniff each nostril BID \par -continue Olopatadine 0.6% 1 sniff each nostril BID \par -continue Xyzal 5 mg before bed \par s/p Blood work: eosinophil level, IgE level, and immunocap testing \par -Environmental measures for allergies were encouraged including mattress and pillow cover, air purifier, and environmental controls.\par \par problem 4: GERD\par -continue to use good diet and good timing of meals\par -Rule of 2s: avoid eating too much, eating too late, eating too spicy, eating two hours before bed\par -Things to avoid including overeating, spicy foods, tight clothing, eating within three hours of bed, this list is not all inclusive. \par -For treatment of reflux, possible options discussed including diet control, H2 blockers, PPIs, as well as coating motility agents discussed as treatment options. Timing of meals and proximity of last meal to sleep were discussed. If symptoms persist, a formal gastrointestinal evaluation is needed. \par \par problem 5: ALIS\par -she has refused any treatment or diagnosing\par -she is being recommended to use Oxy-Aid by [employer]\par -Discussed the risks/associations with coronary artery disease, atrial fibrillation, arrhythmia, memory loss, issues with concentration, stroke risk, hypertension, nocturia, chronic reflux/Kimbrough’s esophagus some but not all inclusive. Treatment options discussed including CPAP/BiPAP machine, oral appliance, ProVent therapy, Oxy-Aid by [employer], new technologies, or positional sleep.\par \par problem 6: cardiac component \par -she is being recommended to have a consultation with Dr. Reese ([facility]) (AICD) (3/22 pending)\par \par Problem 7: Anxiety\par - Add Buspar 5 mg up to TID\par - Continue Klonopin 0.5 mg prn \par \par problem 8: health maintenance \par -s/p 2018 yearly flu shot \par -recommended strep pneumonia vaccines: Prevnar-13 vaccine, followed by Pneumo vaccine 23 one year following\par -recommended early intervention for URIs\par -recommended regular osteoporosis evaluations\par -recommended early dermatological evaluations\par -recommended after the age of 50 to the age of 70, colonoscopy every 5 years \par  \par \par F/U in 2 months\par She is encouraged to call with any changes,concerns, or questions.

## 2019-03-20 NOTE — HISTORY OF PRESENT ILLNESS
[FreeTextEntry1] : Ms. Primrose is a 68 year old female presenting to the office for a follow up visit for allergic rhinitis, eosinophilic/moderate persistent asthma, ALIS GERD, and shortness of breath. Her chief complaint is feeling jittery. \par - She comes in following recent hospitalization at [facility] for chronic systolic CHF\par - She was told by the cardiologist that she did not have breath sounds. \par - She was told by card that her symptoms were pulmonary in nature \par - She extreme chest tightness, extreme fatigue, headaches, weak/ heavy legs, and dizziness\par - She continues to have palpitations \par - She reports being so hyper that she is shaky\par - She finished 4 days of 20 mg of prednisone yesterday. \par

## 2019-03-20 NOTE — PROCEDURE
[FreeTextEntry1] : PFT- spi reveals moderate restrictive and severe obstructive dysfunction; FEV1 was 0.71L which is 46% of predicted; normal flow volume loop

## 2019-03-20 NOTE — PHYSICAL EXAM
[General Appearance - Well Developed] : well developed [Normal Appearance] : normal appearance [Well Groomed] : well groomed [General Appearance - Well Nourished] : well nourished [No Deformities] : no deformities [General Appearance - In No Acute Distress] : no acute distress [Normal Conjunctiva] : the conjunctiva exhibited no abnormalities [Eyelids - No Xanthelasma] : the eyelids demonstrated no xanthelasmas [Normal Oropharynx] : normal oropharynx [II] : II [Neck Appearance] : the appearance of the neck was normal [Neck Cervical Mass (___cm)] : no neck mass was observed [Jugular Venous Distention Increased] : there was no jugular-venous distention [Thyroid Diffuse Enlargement] : the thyroid was not enlarged [Thyroid Nodule] : there were no palpable thyroid nodules [Heart Rate And Rhythm] : heart rate and rhythm were normal [Heart Sounds] : normal S1 and S2 [Murmurs] : no murmurs present [Respiration, Rhythm And Depth] : normal respiratory rhythm and effort [Exaggerated Use Of Accessory Muscles For Inspiration] : no accessory muscle use [Abdomen Soft] : soft [Abdomen Tenderness] : non-tender [Abnormal Walk] : normal gait [Abdomen Mass (___ Cm)] : no abdominal mass palpated [Gait - Sufficient For Exercise Testing] : the gait was sufficient for exercise testing [Nail Clubbing] : no clubbing of the fingernails [Cyanosis, Localized] : no localized cyanosis [Petechial Hemorrhages (___cm)] : no petechial hemorrhages [Skin Color & Pigmentation] : normal skin color and pigmentation [No Venous Stasis] : no venous stasis [] : no rash [No Skin Ulcers] : no skin ulcer [Skin Lesions] : no skin lesions [No Xanthoma] : no  xanthoma was observed [Deep Tendon Reflexes (DTR)] : deep tendon reflexes were 2+ and symmetric [Sensation] : the sensory exam was normal to light touch and pinprick [No Focal Deficits] : no focal deficits [Impaired Insight] : insight and judgment were intact [Oriented To Time, Place, And Person] : oriented to person, place, and time [Affect] : the affect was normal [FreeTextEntry1] : I:E 1:3, inspiratory squeak and expiratory wheeze\par

## 2019-03-20 NOTE — ADDENDUM
[FreeTextEntry1] : Documented by John Lim acting as a scribe for Dr. Ketan Morfin on 3/20/2019\par \par All medical record entries made by the Scribe were at my, Dr. Ketan Morfin's, direction and personally dictated by me on 3/20/2019. I have reviewed the chart and agree that the record accurately reflects my personal performance of the history, physical exam, assessment and plan. I have also personally directed, reviewed, and agree with the discharge instructions. \par \par \par \par \par

## 2019-04-03 ENCOUNTER — APPOINTMENT (OUTPATIENT)
Dept: PULMONOLOGY | Facility: CLINIC | Age: 69
End: 2019-04-03

## 2019-04-11 PROCEDURE — 99214 OFFICE O/P EST MOD 30 MIN: CPT

## 2019-04-15 ENCOUNTER — APPOINTMENT (OUTPATIENT)
Dept: RADIOLOGY | Facility: IMAGING CENTER | Age: 69
End: 2019-04-15

## 2019-04-18 ENCOUNTER — APPOINTMENT (OUTPATIENT)
Dept: NEPHROLOGY | Facility: CLINIC | Age: 69
End: 2019-04-18
Payer: MEDICARE

## 2019-04-18 VITALS
OXYGEN SATURATION: 97 % | SYSTOLIC BLOOD PRESSURE: 121 MMHG | DIASTOLIC BLOOD PRESSURE: 88 MMHG | WEIGHT: 100.31 LBS | BODY MASS INDEX: 13.59 KG/M2 | HEIGHT: 72 IN | HEART RATE: 104 BPM

## 2019-04-18 VITALS — HEART RATE: 90 BPM

## 2019-04-18 DIAGNOSIS — E87.2 ACIDOSIS: ICD-10-CM

## 2019-04-18 PROCEDURE — 99205 OFFICE O/P NEW HI 60 MIN: CPT

## 2019-04-18 RX ORDER — BUSPIRONE HYDROCHLORIDE 5 MG/1
5 TABLET ORAL 3 TIMES DAILY
Qty: 90 | Refills: 3 | Status: DISCONTINUED | COMMUNITY
Start: 2019-03-20 | End: 2019-04-18

## 2019-04-18 RX ORDER — FUROSEMIDE 20 MG/1
20 TABLET ORAL
Qty: 30 | Refills: 0 | Status: DISCONTINUED | COMMUNITY
Start: 2018-01-05 | End: 2019-04-18

## 2019-04-18 NOTE — REVIEW OF SYSTEMS
[Shortness Of Breath] : shortness of breath [Abdominal Pain] : abdominal pain [Cough] : cough [Constipation] : constipation [Fever] : no fever [Feeling Poorly] : not feeling poorly [Chills] : no chills [Feeling Tired] : not feeling tired [Eye Pain] : no eye pain [Red Eyes] : eyes not red [Eyesight Problems] : no eyesight problems [Chest Pain] : no chest pain [Nosebleeds] : no nosebleeds [Diarrhea] : no diarrhea [Leg Claudication] : no intermittent leg claudication [Lower Ext Edema] : no lower extremity edema [Dysuria] : no dysuria [Dizziness] : no dizziness [Depression] : no depression [Fainting] : no fainting [Easy Bruising] : no tendency for easy bruising [Easy Bleeding] : no tendency for easy bleeding

## 2019-04-18 NOTE — PHYSICAL EXAM
[General Appearance - Alert] : alert [General Appearance - In No Acute Distress] : in no acute distress [General Appearance - Well Nourished] : well nourished [General Appearance - Well Developed] : well developed [Sclera] : the sclera and conjunctiva were normal [Examination Of The Oral Cavity] : the lips and gums were normal [Neck Appearance] : the appearance of the neck was normal [Oropharynx] : the oropharynx was normal [Neck Cervical Mass (___cm)] : no neck mass was observed [Jugular Venous Distention Increased] : there was no jugular-venous distention [Respiration, Rhythm And Depth] : normal respiratory rhythm and effort [] : no respiratory distress [Diffuse Wheezing] : diffuse wheezing [Exaggerated Use Of Accessory Muscles For Inspiration] : no accessory muscle use [Heart Sounds] : normal S1 and S2 [Heart Sounds Gallop] : no gallops [Murmurs] : no murmurs [Heart Sounds Pericardial Friction Rub] : no pericardial rub [Edema] : there was no peripheral edema [Bowel Sounds] : normal bowel sounds [Abdomen Soft] : soft [Cervical Lymph Nodes Enlarged Posterior Bilaterally] : posterior cervical [Cervical Lymph Nodes Enlarged Anterior Bilaterally] : anterior cervical [No CVA Tenderness] : no ~M costovertebral angle tenderness [No Spinal Tenderness] : no spinal tenderness [Supraclavicular Lymph Nodes Enlarged Bilaterally] : supraclavicular [Oriented To Time, Place, And Person] : oriented to person, place, and time [Abnormal Walk] : normal gait [Impaired Insight] : insight and judgment were intact [Mood] : the mood was normal [Affect] : the affect was normal [FreeTextEntry1] : non tender in the LLQ no rebound at all or gaurding

## 2019-04-18 NOTE — HISTORY OF PRESENT ILLNESS
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  She is a 69 years old woman with a long history of fairly severe asthma.  She was recently admitted with an episode where her lactate was found to be elevated at 4 but came down to the mid 2's.  She has a history of 8 stents and a reduced ejection fraction.  She feels ok today other than wheezing.  She also has some pain in her abdomen after she eats which she attributes to her diverticulitis which was recently treated.

## 2019-04-18 NOTE — ASSESSMENT
[FreeTextEntry1] : Patient seen and evaluated for lactic acidosis\par \par Elevated Serum Lactate -- The etiology is not quite clear.  She does not have an anion gap on her most recent blood work.  It would be helpful to have an ABG in this case.  I have reviewed her active medication list and does not appear to be on any medications that cause elevated lactate.  Other possibilities include chronic mesenteric ischemia, less likely to be associated with malignancy but will check LDH and CBC, and liver enzymes.  Will also check a repeat.  She is up to date with her cancer screenings.  She will have a CT abdomen with her gastroenterologist next week.

## 2019-05-08 PROCEDURE — 99214 OFFICE O/P EST MOD 30 MIN: CPT

## 2019-05-13 ENCOUNTER — NON-APPOINTMENT (OUTPATIENT)
Age: 69
End: 2019-05-13

## 2019-05-13 ENCOUNTER — APPOINTMENT (OUTPATIENT)
Dept: ALLERGY | Facility: CLINIC | Age: 69
End: 2019-05-13

## 2019-05-13 ENCOUNTER — APPOINTMENT (OUTPATIENT)
Dept: PULMONOLOGY | Facility: CLINIC | Age: 69
End: 2019-05-13
Payer: MEDICARE

## 2019-05-13 VITALS
RESPIRATION RATE: 15 BRPM | HEART RATE: 99 BPM | HEIGHT: 59 IN | OXYGEN SATURATION: 97 % | BODY MASS INDEX: 20.16 KG/M2 | SYSTOLIC BLOOD PRESSURE: 125 MMHG | WEIGHT: 100 LBS | DIASTOLIC BLOOD PRESSURE: 75 MMHG

## 2019-05-13 DIAGNOSIS — J45.909 UNSPECIFIED ASTHMA, UNCOMPLICATED: ICD-10-CM

## 2019-05-13 PROCEDURE — 96372 THER/PROPH/DIAG INJ SC/IM: CPT

## 2019-05-13 PROCEDURE — 99214 OFFICE O/P EST MOD 30 MIN: CPT | Mod: 25

## 2019-05-13 PROCEDURE — 94010 BREATHING CAPACITY TEST: CPT

## 2019-05-13 PROCEDURE — 95012 NITRIC OXIDE EXP GAS DETER: CPT

## 2019-05-13 RX ORDER — BENRALIZUMAB 30 MG/ML
30 INJECTION, SOLUTION SUBCUTANEOUS
Qty: 0 | Refills: 0 | Status: COMPLETED | OUTPATIENT
Start: 2019-05-13

## 2019-05-13 RX ADMIN — BENRALIZUMAB 30 MG/ML: 30 INJECTION, SOLUTION SUBCUTANEOUS at 00:00

## 2019-05-13 NOTE — PHYSICAL EXAM
[Normal Appearance] : normal appearance [General Appearance - Well Developed] : well developed [Well Groomed] : well groomed [General Appearance - Well Nourished] : well nourished [General Appearance - In No Acute Distress] : no acute distress [No Deformities] : no deformities [Normal Conjunctiva] : the conjunctiva exhibited no abnormalities [Eyelids - No Xanthelasma] : the eyelids demonstrated no xanthelasmas [II] : II [Normal Oropharynx] : normal oropharynx [Neck Appearance] : the appearance of the neck was normal [Neck Cervical Mass (___cm)] : no neck mass was observed [Thyroid Diffuse Enlargement] : the thyroid was not enlarged [Thyroid Nodule] : there were no palpable thyroid nodules [Jugular Venous Distention Increased] : there was no jugular-venous distention [Heart Sounds] : normal S1 and S2 [Heart Rate And Rhythm] : heart rate and rhythm were normal [Murmurs] : no murmurs present [Exaggerated Use Of Accessory Muscles For Inspiration] : no accessory muscle use [Respiration, Rhythm And Depth] : normal respiratory rhythm and effort [Abdomen Soft] : soft [Abdomen Tenderness] : non-tender [Abnormal Walk] : normal gait [Abdomen Mass (___ Cm)] : no abdominal mass palpated [Gait - Sufficient For Exercise Testing] : the gait was sufficient for exercise testing [Nail Clubbing] : no clubbing of the fingernails [Cyanosis, Localized] : no localized cyanosis [Petechial Hemorrhages (___cm)] : no petechial hemorrhages [Skin Color & Pigmentation] : normal skin color and pigmentation [No Venous Stasis] : no venous stasis [] : no rash [No Skin Ulcers] : no skin ulcer [Skin Lesions] : no skin lesions [Deep Tendon Reflexes (DTR)] : deep tendon reflexes were 2+ and symmetric [No Xanthoma] : no  xanthoma was observed [Sensation] : the sensory exam was normal to light touch and pinprick [No Focal Deficits] : no focal deficits [Oriented To Time, Place, And Person] : oriented to person, place, and time [Affect] : the affect was normal [Impaired Insight] : insight and judgment were intact [FreeTextEntry1] : I:E 1:3, mildly decreased breath sounds at the left base

## 2019-05-13 NOTE — PROCEDURE
[FreeTextEntry1] : PFT revealed moderate restrictive and moderate obstructive dysfunction,  with a FEV1 of 0.70 L, which is 46% of predicted, with a normal flow volume loop\par \par FENO was 22; a normal value being less than 25\par Fractional exhaled nitric oxide (FENO) is regarded as a simple, noninvasive method for assessing eosinophilic airway inflammation. Produced by a variety of cells within the lung, nitric oxide (NO) concentrations are generally low in healthy individuals. However, high concentrations of NO appear to be involved in nonspecific host defense mechanisms and chronic inflammatory diseases such as asthma. The American Thor[employer] Society (ATS) therefore has recommended using FENO to aid in the diagnosis and monitoring of eosinophilic airway inflammation and asthma, and for identifying steroid responsive individuals whose chronic respiratory symptoms may be caused by airway inflammation. \par  \par CT abdomen (4.30.19) reveals clear lung bases

## 2019-05-13 NOTE — HISTORY OF PRESENT ILLNESS
[FreeTextEntry1] : Ms. Primrose is a 69 year old female presenting to the office for a follow up visit for allergic rhinitis, eosinophilic/moderate persistent asthma, ALIS GERD, and shortness of breath. Her chief complaint is \par -she reports she recently saw Dr. Reese at [place] who believes she is doing well \par -she notes she currently has chest pressure and pain, with chest heaviness and tightness \par -she states she is frequently constipated\par -she reports she has dysphagia and is pending endoscopy with Dr. Garcia \par -she notes she is fatigued at this moment\par -she states her iron levels have been low, is considering treatment \par -she reports she is not sleeping well, only sleeping 4-5 hours a night\par -she notes s

## 2019-05-13 NOTE — REVIEW OF SYSTEMS
[Negative] : Sleep Disorder [Chest Tightness] : chest tightness [Chest Discomfort] : chest discomfort [Dysphagia] : dysphagia [As Noted in HPI] : as noted in HPI [Constipation] : constipation

## 2019-05-13 NOTE — REASON FOR VISIT
[Follow-Up] : a follow-up visit [FreeTextEntry1] : allergic rhinitis, eosinophilic/moderate persistent asthma, ALIS GERD, and shortness of breath

## 2019-05-13 NOTE — ADDENDUM
[FreeTextEntry1] : Documented by Neel Hearn acting as a scribe for Dr. Ketan Morfin on 05/13/2019 \par \par All medical record entries made by the Scribe were at my, Dr. Ketan Morfin's, direction and personally dictated by me on 05/13/2019  . I have reviewed the chart and agree that the record accurately reflects my personal performance of the history, physical exam, procedure, assessment and plan. I have also personally directed, reviewed, and agree with the discharge instructions. \par \par  \par \par \par

## 2019-05-13 NOTE — ASSESSMENT
[FreeTextEntry1] : Ms. Primrose is a 68 year old female with history of COPD / asthma / allergy / paralyzed hemidiaphragm / cardiac disease / CHF/ eosinophilic asthma. She presents to the office recent elevated lactic acid likely due to albuterol use \par \par Her shortness of breath is multifactorial due to:\par -cardiac/ mild congestive heart failure\par -severe persistent asthma\par -allergic rhinitis/post nasal drip syndrome\par -paralyzed hemidiaphragm s/p plication\par -poor breathing mechanics and anxiety \par \par problem 1: COPD/ asthma \par -continue Xopenex 0.63 by the nebulizer up to QID \par -continue to use Advair 500 at 1 inhalation BID\par -continue Spiriva Respimat 2 inhalations QD\par -continue Singulair 10 mg before bed\par -continue to use Proventil PRN\par -continue Acapella device to be used multiple times daily \par -Asthma is believed to be caused by inherited (genetic) and environmental factor, but its exact cause is unknown. Asthma may be triggered by allergens, lung infections, or irritants in the air. Asthma triggers are different for each person \par -Inhaler technique reviewed as well as oral hygiene techniques reviewed with patient. Avoidance of cold air, extremes of temperature, rescue inhaler should be used before exercise. Order of medication reviewed with patient. Recommended use of a cool mist humidifier in the bedroom\par \par problem 2: eosinophilic asthma\par -patient receiving Fasenra and has had 6 injections \par -The safety and efficacy of Nucala was established in three double-blind, randomized, placebo-controlled trials in patients with severe asthma. Compared to a placebo, patients with severe asthma receiving Nucala had fewer exacerbation requiring hospitalization and/or emergency department visits, and a longer time to first exacerbation. In addition, patients with severe asthma receiving Nucala or Fasenra experienced greater reductions in their daily maintenance oral corticosteroid dose, while maintaining asthma control compared with patients receiving placebo. Treatment with Nucala did not result in a significant improvement in lung function, as measured by the volume of air exhaled by patients in one second. The most common side effects include: headache, injection site reactions, back pain, weakness, and fatigue; hypersensitivity reactions can occur within hours or days including swelling of the face, mouth, and tongue, fainting, dizziness, hives, breathing problems, and rash; herpes zoster infections have occurred. The drug is a monoclonal antibody that inhibits interleukin-5 which helps regular eosinophils, a type of white blood cell that contributes to asthma. The over-production of eosinophils can cause inflammation in the lungs, increasing the frequency of asthma attacks. Patients must also take other medications, including high dose inhaled corticosteroids and at least one additional asthma drug\par \par problem 3: allergic rhinitis/post nasal drip syndrome \par -recommended Navage sinus rinse\par -continue Qnasl 1 sniff each nostril BID \par -continue Olopatadine 0.6% 1 sniff each nostril BID \par -continue Xyzal 5 mg before bed \par s/p Blood work: eosinophil level, IgE level, and immunocap testing \par -Environmental measures for allergies were encouraged including mattress and pillow cover, air purifier, and environmental controls.\par \par problem 4: GERD\par -continue to use good diet and good timing of meals\par -Rule of 2s: avoid eating too much, eating too late, eating too spicy, eating two hours before bed\par -Things to avoid including overeating, spicy foods, tight clothing, eating within three hours of bed, this list is not all inclusive. \par -For treatment of reflux, possible options discussed including diet control, H2 blockers, PPIs, as well as coating motility agents discussed as treatment options. Timing of meals and proximity of last meal to sleep were discussed. If symptoms persist, a formal gastrointestinal evaluation is needed. \par \par problem 5: ALIS\par -she has refused any treatment or diagnosing\par -she is being recommended to use Oxy-Aid by [facility]\par -Discussed the risks/associations with coronary artery disease, atrial fibrillation, arrhythmia, memory loss, issues with concentration, stroke risk, hypertension, nocturia, chronic reflux/Kimbrough’s esophagus some but not all inclusive. Treatment options discussed including CPAP/BiPAP machine, oral appliance, ProVent therapy, Oxy-Aid by [facility], new technologies, or positional sleep.\par \par problem 6: cardiac component \par -she is being recommended to have a consultation with Dr. Reese ([facility]) (AICD) (3/22 pending)\par \par Problem 7: Anxiety\par - Add Buspar 5 mg up to TID\par - Continue Klonopin 0.5 mg prn \par \par problem 8: health maintenance \par -recommended to have medical marijuana evaluation with Dr. Alma Matias\par -s/p 2018 yearly flu shot \par -recommended strep pneumonia vaccines: Prevnar-13 vaccine, followed by Pneumo vaccine 23 one year following\par -recommended early intervention for URIs\par -recommended regular osteoporosis evaluations\par -recommended early dermatological evaluations\par -recommended after the age of 50 to the age of 70, colonoscopy every 5 years \par  \par \par F/U in 2 months\par She is encouraged to call with any changes,concerns, or questions.

## 2019-05-16 ENCOUNTER — MEDICATION RENEWAL (OUTPATIENT)
Age: 69
End: 2019-05-16

## 2019-05-20 LAB
ALBUMIN SERPL ELPH-MCNC: 4.5 G/DL
ALP BLD-CCNC: 68 U/L
ALT SERPL-CCNC: 14 U/L
ANION GAP SERPL CALC-SCNC: 10 MMOL/L
ANION GAP SERPL CALC-SCNC: 18 MMOL/L
AST SERPL-CCNC: 20 U/L
BASOPHILS # BLD AUTO: 0.01 K/UL
BASOPHILS NFR BLD AUTO: 0.2 %
BILIRUB SERPL-MCNC: 0.3 MG/DL
BUN SERPL-MCNC: 13 MG/DL
BUN SERPL-MCNC: 13 MG/DL
CALCIUM SERPL-MCNC: 10 MG/DL
CALCIUM SERPL-MCNC: 10.2 MG/DL
CHLORIDE SERPL-SCNC: 103 MMOL/L
CHLORIDE SERPL-SCNC: 99 MMOL/L
CO2 SERPL-SCNC: 23 MMOL/L
CO2 SERPL-SCNC: 27 MMOL/L
CREAT SERPL-MCNC: 0.72 MG/DL
CREAT SERPL-MCNC: 0.75 MG/DL
EOSINOPHIL # BLD AUTO: 0 K/UL
EOSINOPHIL NFR BLD AUTO: 0 %
FERRITIN SERPL-MCNC: 31 NG/ML
GLUCOSE SERPL-MCNC: 82 MG/DL
GLUCOSE SERPL-MCNC: 85 MG/DL
HCT VFR BLD CALC: 45.1 %
HGB BLD-MCNC: 14.9 G/DL
IMM GRANULOCYTES NFR BLD AUTO: 0.3 %
IRON SATN MFR SERPL: 15 %
IRON SERPL-MCNC: 54 UG/DL
LACTATE BLDA-MCNC: 1.6 MMOL/L
LACTATE BLDA-MCNC: 1.6 MMOL/L
LDH SERPL-CCNC: 176 U/L
LYMPHOCYTES # BLD AUTO: 1.69 K/UL
LYMPHOCYTES NFR BLD AUTO: 28.4 %
MAN DIFF?: NORMAL
MCHC RBC-ENTMCNC: 31.2 PG
MCHC RBC-ENTMCNC: 33 GM/DL
MCV RBC AUTO: 94.4 FL
MONOCYTES # BLD AUTO: 0.66 K/UL
MONOCYTES NFR BLD AUTO: 11.1 %
NEUTROPHILS # BLD AUTO: 3.58 K/UL
NEUTROPHILS NFR BLD AUTO: 60 %
PLATELET # BLD AUTO: 346 K/UL
POTASSIUM SERPL-SCNC: 4 MMOL/L
POTASSIUM SERPL-SCNC: 4.2 MMOL/L
PROT SERPL-MCNC: 6.4 G/DL
RBC # BLD: 4.78 M/UL
RBC # FLD: 13.4 %
SODIUM SERPL-SCNC: 140 MMOL/L
SODIUM SERPL-SCNC: 140 MMOL/L
TIBC SERPL-MCNC: 370 UG/DL
UIBC SERPL-MCNC: 316 UG/DL
WBC # FLD AUTO: 5.96 K/UL

## 2019-05-31 ENCOUNTER — APPOINTMENT (OUTPATIENT)
Dept: PULMONOLOGY | Facility: CLINIC | Age: 69
End: 2019-05-31

## 2019-06-03 ENCOUNTER — APPOINTMENT (OUTPATIENT)
Dept: NEPHROLOGY | Facility: CLINIC | Age: 69
End: 2019-06-03

## 2019-06-12 ENCOUNTER — FORM ENCOUNTER (OUTPATIENT)
Age: 69
End: 2019-06-12

## 2019-06-13 ENCOUNTER — OUTPATIENT (OUTPATIENT)
Dept: OUTPATIENT SERVICES | Facility: HOSPITAL | Age: 69
LOS: 1 days | End: 2019-06-13
Payer: MEDICARE

## 2019-06-13 ENCOUNTER — APPOINTMENT (OUTPATIENT)
Dept: RADIOLOGY | Facility: IMAGING CENTER | Age: 69
End: 2019-06-13
Payer: MEDICARE

## 2019-06-13 DIAGNOSIS — J98.6 DISORDERS OF DIAPHRAGM: Chronic | ICD-10-CM

## 2019-06-13 DIAGNOSIS — N81.6 RECTOCELE: Chronic | ICD-10-CM

## 2019-06-13 DIAGNOSIS — Z00.00 ENCOUNTER FOR GENERAL ADULT MEDICAL EXAMINATION WITHOUT ABNORMAL FINDINGS: ICD-10-CM

## 2019-06-13 PROCEDURE — 77080 DXA BONE DENSITY AXIAL: CPT

## 2019-06-13 PROCEDURE — 77080 DXA BONE DENSITY AXIAL: CPT | Mod: 26

## 2019-06-18 ENCOUNTER — OUTPATIENT (OUTPATIENT)
Dept: OUTPATIENT SERVICES | Facility: HOSPITAL | Age: 69
LOS: 1 days | End: 2019-06-18
Payer: MEDICARE

## 2019-06-18 VITALS
TEMPERATURE: 98 F | HEIGHT: 55 IN | HEART RATE: 78 BPM | OXYGEN SATURATION: 96 % | RESPIRATION RATE: 20 BRPM | SYSTOLIC BLOOD PRESSURE: 120 MMHG | DIASTOLIC BLOOD PRESSURE: 70 MMHG | WEIGHT: 100.09 LBS

## 2019-06-18 DIAGNOSIS — Z98.890 OTHER SPECIFIED POSTPROCEDURAL STATES: Chronic | ICD-10-CM

## 2019-06-18 DIAGNOSIS — K21.9 GASTRO-ESOPHAGEAL REFLUX DISEASE WITHOUT ESOPHAGITIS: ICD-10-CM

## 2019-06-18 DIAGNOSIS — J45.909 UNSPECIFIED ASTHMA, UNCOMPLICATED: ICD-10-CM

## 2019-06-18 DIAGNOSIS — J98.6 DISORDERS OF DIAPHRAGM: Chronic | ICD-10-CM

## 2019-06-18 DIAGNOSIS — Z95.5 PRESENCE OF CORONARY ANGIOPLASTY IMPLANT AND GRAFT: ICD-10-CM

## 2019-06-18 DIAGNOSIS — K59.09 OTHER CONSTIPATION: ICD-10-CM

## 2019-06-18 DIAGNOSIS — Z86.010 PERSONAL HISTORY OF COLONIC POLYPS: ICD-10-CM

## 2019-06-18 DIAGNOSIS — R10.84 GENERALIZED ABDOMINAL PAIN: ICD-10-CM

## 2019-06-18 DIAGNOSIS — N81.6 RECTOCELE: Chronic | ICD-10-CM

## 2019-06-18 DIAGNOSIS — Z90.49 ACQUIRED ABSENCE OF OTHER SPECIFIED PARTS OF DIGESTIVE TRACT: Chronic | ICD-10-CM

## 2019-06-18 DIAGNOSIS — Z90.79 ACQUIRED ABSENCE OF OTHER GENITAL ORGAN(S): Chronic | ICD-10-CM

## 2019-06-18 PROCEDURE — G0463: CPT

## 2019-06-18 RX ORDER — BENRALIZUMAB 30 MG/ML
0 INJECTION, SOLUTION SUBCUTANEOUS
Qty: 0 | Refills: 0 | DISCHARGE

## 2019-06-18 NOTE — H&P PST ADULT - RS GEN PE MLT RESP DETAILS PC
airway patent/breath sounds equal/respirations non-labored/normal/clear to auscultation bilaterally/good air movement

## 2019-06-18 NOTE — H&P PST ADULT - NSICDXPASTSURGICALHX_GEN_ALL_CORE_FT
PAST SURGICAL HISTORY:  Paralyzed hemidiaphragm     S/P inguinal hernia repair left    S/P laparoscopic-assisted sigmoidectomy     S/P BELEN-BSO (total abdominal hysterectomy and bilateral salpingo-oophorectomy) PAST SURGICAL HISTORY:  S/P carpal tunnel release right    S/P inguinal hernia repair left    S/P laparoscopic-assisted sigmoidectomy     S/P BELEN-BSO (total abdominal hysterectomy and bilateral salpingo-oophorectomy)     Status post craniectomy with mesh - left side for migraines    Status post plication of diaphragm left

## 2019-06-18 NOTE — H&P PST ADULT - NSANTHOSAYNRD_GEN_A_CORE
No. ALIS screening performed.  STOP BANG Legend: 0-2 = LOW Risk; 3-4 = INTERMEDIATE Risk; 5-8 = HIGH Risk

## 2019-06-18 NOTE — H&P PST ADULT - HISTORY OF PRESENT ILLNESS
69 yr old female with history of CAD, Stented coronary artery - 8 stents, MI ( 2017) - in Plavix & aspirin, Left ear deafness, Severe asthma, Left hemidiaphragm s/p plication, moderate thoracic scoliosis , Osteoporosis , with recent c/o left sided abdominal pain & GERD with mild dysphagia, Coming in for EGD Anes, Colonoscopy Anes on 6/27/19.

## 2019-06-18 NOTE — H&P PST ADULT - NSICDXPROBLEM_GEN_ALL_CORE_FT
PROBLEM DIAGNOSES  Problem: Generalized abdominal pain  Assessment and Plan: EGD, Colonoscopy ANES     Problem: Stented coronary artery  Assessment and Plan: On Plavix & aspirin, last dose of Plavix 6/21/19 as per cardio, to stay on aspirin until DOS    Problem: Severe asthma  Assessment and Plan: Continue on Advair, Spiriva and to bring rescue inhalers DOS

## 2019-06-18 NOTE — H&P PST ADULT - NSICDXPASTMEDICALHX_GEN_ALL_CORE_FT
PAST MEDICAL HISTORY:  CAD (coronary artery disease)     Chronic nonintractable headache, unspecified headache type     Deafness left    Depression, unspecified depression type     Diverticulitis sigmoid, treated surgically    Gastroesophageal reflux disease, esophagitis presence not specified     Herniated cervical disc     HF (heart failure) resolved    History of coma 41 yrs ago, while pregnant due to asthma    Hypothyroidism, unspecified type     Insomnia     Lumbago without sciatica     Moderate scoliosis     Myocardial infarction 2016    Paralyzed hemidiaphragm     Severe asthma     Stented coronary artery x 8 stents PAST MEDICAL HISTORY:  Brachial neuritis     CAD (coronary artery disease)     Chronic nonintractable headache, unspecified headache type     Deafness left    Depression, unspecified depression type     Diverticulitis sigmoid, treated surgically    Gastroesophageal reflux disease, esophagitis presence not specified     HF (heart failure) resolved    History of coma 41 yrs ago, while pregnant due to asthma    Hypothyroidism, unspecified type     Insomnia     Moderate scoliosis thoracic    Myocardial infarction 2016    Paralyzed hemidiaphragm left - s/p plication    Sciatica     Severe asthma on advair/ spiriva/ fasenra every 2 mths    Stented coronary artery x 8 stents

## 2019-06-20 PROCEDURE — 99213 OFFICE O/P EST LOW 20 MIN: CPT

## 2019-06-27 ENCOUNTER — OUTPATIENT (OUTPATIENT)
Dept: OUTPATIENT SERVICES | Facility: HOSPITAL | Age: 69
LOS: 1 days | End: 2019-06-27
Payer: MEDICARE

## 2019-06-27 ENCOUNTER — RESULT REVIEW (OUTPATIENT)
Age: 69
End: 2019-06-27

## 2019-06-27 DIAGNOSIS — Z86.010 PERSONAL HISTORY OF COLONIC POLYPS: ICD-10-CM

## 2019-06-27 DIAGNOSIS — Z98.890 OTHER SPECIFIED POSTPROCEDURAL STATES: Chronic | ICD-10-CM

## 2019-06-27 DIAGNOSIS — Z90.79 ACQUIRED ABSENCE OF OTHER GENITAL ORGAN(S): Chronic | ICD-10-CM

## 2019-06-27 DIAGNOSIS — R10.84 GENERALIZED ABDOMINAL PAIN: ICD-10-CM

## 2019-06-27 DIAGNOSIS — Z90.49 ACQUIRED ABSENCE OF OTHER SPECIFIED PARTS OF DIGESTIVE TRACT: Chronic | ICD-10-CM

## 2019-06-27 DIAGNOSIS — K21.9 GASTRO-ESOPHAGEAL REFLUX DISEASE WITHOUT ESOPHAGITIS: ICD-10-CM

## 2019-06-27 DIAGNOSIS — K59.09 OTHER CONSTIPATION: ICD-10-CM

## 2019-06-27 PROCEDURE — 88312 SPECIAL STAINS GROUP 1: CPT | Mod: 26

## 2019-06-27 PROCEDURE — 88305 TISSUE EXAM BY PATHOLOGIST: CPT | Mod: 26

## 2019-06-28 PROCEDURE — 88305 TISSUE EXAM BY PATHOLOGIST: CPT

## 2019-06-28 PROCEDURE — 43239 EGD BIOPSY SINGLE/MULTIPLE: CPT

## 2019-06-28 PROCEDURE — 88312 SPECIAL STAINS GROUP 1: CPT

## 2019-06-28 PROCEDURE — 45380 COLONOSCOPY AND BIOPSY: CPT | Mod: PT

## 2019-07-02 LAB — SURGICAL PATHOLOGY STUDY: SIGNIFICANT CHANGE UP

## 2019-07-08 ENCOUNTER — APPOINTMENT (OUTPATIENT)
Dept: PULMONOLOGY | Facility: CLINIC | Age: 69
End: 2019-07-08
Payer: MEDICARE

## 2019-07-08 ENCOUNTER — NON-APPOINTMENT (OUTPATIENT)
Age: 69
End: 2019-07-08

## 2019-07-08 VITALS
BODY MASS INDEX: 22.91 KG/M2 | SYSTOLIC BLOOD PRESSURE: 100 MMHG | OXYGEN SATURATION: 98 % | DIASTOLIC BLOOD PRESSURE: 70 MMHG | HEIGHT: 55 IN | WEIGHT: 99 LBS | HEART RATE: 101 BPM | RESPIRATION RATE: 15 BRPM

## 2019-07-08 PROBLEM — I50.9 HEART FAILURE, UNSPECIFIED: Chronic | Status: ACTIVE | Noted: 2019-03-12

## 2019-07-08 PROBLEM — J98.6 DISORDERS OF DIAPHRAGM: Chronic | Status: ACTIVE | Noted: 2017-02-07

## 2019-07-08 PROBLEM — M54.12 RADICULOPATHY, CERVICAL REGION: Chronic | Status: ACTIVE | Noted: 2019-06-18

## 2019-07-08 PROBLEM — J45.909 UNSPECIFIED ASTHMA, UNCOMPLICATED: Chronic | Status: ACTIVE | Noted: 2019-06-18

## 2019-07-08 PROBLEM — H91.90 UNSPECIFIED HEARING LOSS, UNSPECIFIED EAR: Chronic | Status: ACTIVE | Noted: 2019-06-18

## 2019-07-08 PROBLEM — M54.30 SCIATICA, UNSPECIFIED SIDE: Chronic | Status: ACTIVE | Noted: 2019-06-18

## 2019-07-08 PROBLEM — Z87.898 PERSONAL HISTORY OF OTHER SPECIFIED CONDITIONS: Chronic | Status: ACTIVE | Noted: 2019-06-18

## 2019-07-08 PROBLEM — I21.9 ACUTE MYOCARDIAL INFARCTION, UNSPECIFIED: Chronic | Status: ACTIVE | Noted: 2019-06-18

## 2019-07-08 PROBLEM — M41.9 SCOLIOSIS, UNSPECIFIED: Chronic | Status: ACTIVE | Noted: 2019-06-18

## 2019-07-08 PROBLEM — Z95.5 PRESENCE OF CORONARY ANGIOPLASTY IMPLANT AND GRAFT: Chronic | Status: ACTIVE | Noted: 2017-02-07

## 2019-07-08 PROCEDURE — 95012 NITRIC OXIDE EXP GAS DETER: CPT

## 2019-07-08 PROCEDURE — 96372 THER/PROPH/DIAG INJ SC/IM: CPT

## 2019-07-08 PROCEDURE — 99214 OFFICE O/P EST MOD 30 MIN: CPT | Mod: 25

## 2019-07-08 PROCEDURE — 94010 BREATHING CAPACITY TEST: CPT

## 2019-07-08 RX ORDER — BENRALIZUMAB 30 MG/ML
30 INJECTION, SOLUTION SUBCUTANEOUS
Qty: 0 | Refills: 0 | Status: COMPLETED | OUTPATIENT
Start: 2019-07-08

## 2019-07-08 RX ADMIN — BENRALIZUMAB 30 MG/ML: 30 INJECTION, SOLUTION SUBCUTANEOUS at 00:00

## 2019-07-08 NOTE — PHYSICAL EXAM
[General Appearance - Well Developed] : well developed [Normal Appearance] : normal appearance [Well Groomed] : well groomed [General Appearance - Well Nourished] : well nourished [No Deformities] : no deformities [General Appearance - In No Acute Distress] : no acute distress [Normal Conjunctiva] : the conjunctiva exhibited no abnormalities [Eyelids - No Xanthelasma] : the eyelids demonstrated no xanthelasmas [Normal Oropharynx] : normal oropharynx [II] : II [Neck Appearance] : the appearance of the neck was normal [Neck Cervical Mass (___cm)] : no neck mass was observed [Jugular Venous Distention Increased] : there was no jugular-venous distention [Thyroid Diffuse Enlargement] : the thyroid was not enlarged [Thyroid Nodule] : there were no palpable thyroid nodules [Heart Rate And Rhythm] : heart rate and rhythm were normal [Heart Sounds] : normal S1 and S2 [Murmurs] : no murmurs present [Respiration, Rhythm And Depth] : normal respiratory rhythm and effort [Exaggerated Use Of Accessory Muscles For Inspiration] : no accessory muscle use [Abdomen Soft] : soft [Abdomen Tenderness] : non-tender [Abdomen Mass (___ Cm)] : no abdominal mass palpated [Abnormal Walk] : normal gait [Gait - Sufficient For Exercise Testing] : the gait was sufficient for exercise testing [Nail Clubbing] : no clubbing of the fingernails [Petechial Hemorrhages (___cm)] : no petechial hemorrhages [Cyanosis, Localized] : no localized cyanosis [Skin Color & Pigmentation] : normal skin color and pigmentation [No Venous Stasis] : no venous stasis [] : no rash [Skin Lesions] : no skin lesions [No Skin Ulcers] : no skin ulcer [No Xanthoma] : no  xanthoma was observed [Deep Tendon Reflexes (DTR)] : deep tendon reflexes were 2+ and symmetric [No Focal Deficits] : no focal deficits [Sensation] : the sensory exam was normal to light touch and pinprick [Oriented To Time, Place, And Person] : oriented to person, place, and time [Impaired Insight] : insight and judgment were intact [Affect] : the affect was normal [FreeTextEntry1] : I:E 1:3, mildly decreased breath sounds at the left base

## 2019-07-08 NOTE — ASSESSMENT
[FreeTextEntry1] : Ms. Primrose is a 69 year old female with history of COPD / asthma / allergy / paralyzed hemidiaphragm / cardiac disease / CHF/ eosinophilic asthma. She presents to the office recent elevated lactic acid likely due to albuterol use.  She has some ?SOB, ?anxiety related (due to her upcoming move)\par \par Her shortness of breath is multifactorial due to:\par -cardiac/ mild congestive heart failure\par -severe persistent asthma\par -allergic rhinitis/post nasal drip syndrome\par -paralyzed hemidiaphragm s/p plication\par -poor breathing mechanics and anxiety \par \par problem 1: COPD/ asthma \par -continue Xopenex 0.63 by the nebulizer up to QID \par -continue to use Advair 500 at 1 inhalation BID\par -continue Spiriva Respimat 2 inhalations QD; change to Tudorza at 1 inhalation BID\par -continue Singulair 10 mg before bed\par -continue to use Proventil PRN\par -continue Acapella device to be used multiple times daily \par -Asthma is believed to be caused by inherited (genetic) and environmental factor, but its exact cause is unknown. Asthma may be triggered by allergens, lung infections, or irritants in the air. Asthma triggers are different for each person \par -Inhaler technique reviewed as well as oral hygiene techniques reviewed with patient. Avoidance of cold air, extremes of temperature, rescue inhaler should be used before exercise. Order of medication reviewed with patient. Recommended use of a cool mist humidifier in the bedroom\par \par problem 2: eosinophilic asthma\par -patient receiving Fasenra and has had 8 injections \par -The safety and efficacy of Nucala was established in three double-blind, randomized, placebo-controlled trials in patients with severe asthma. Compared to a placebo, patients with severe asthma receiving Nucala had fewer exacerbation requiring hospitalization and/or emergency department visits, and a longer time to first exacerbation. In addition, patients with severe asthma receiving Nucala or Fasenra experienced greater reductions in their daily maintenance oral corticosteroid dose, while maintaining asthma control compared with patients receiving placebo. Treatment with Nucala did not result in a significant improvement in lung function, as measured by the volume of air exhaled by patients in one second. The most common side effects include: headache, injection site reactions, back pain, weakness, and fatigue; hypersensitivity reactions can occur within hours or days including swelling of the face, mouth, and tongue, fainting, dizziness, hives, breathing problems, and rash; herpes zoster infections have occurred. The drug is a monoclonal antibody that inhibits interleukin-5 which helps regular eosinophils, a type of white blood cell that contributes to asthma. The over-production of eosinophils can cause inflammation in the lungs, increasing the frequency of asthma attacks. Patients must also take other medications, including high dose inhaled corticosteroids and at least one additional asthma drug\par \par problem 3: allergic rhinitis/post nasal drip syndrome \par -recommended Navage sinus rinse\par -continue Qnasl 1 sniff each nostril BID \par -continue Olopatadine 0.6% 1 sniff each nostril BID \par -continue Xyzal 5 mg before bed \par s/p Blood work: eosinophil level, IgE level, and immunocap testing \par -Environmental measures for allergies were encouraged including mattress and pillow cover, air purifier, and environmental controls.\par \par problem 4: GERD\par -continue to use good diet and good timing of meals\par -Rule of 2s: avoid eating too much, eating too late, eating too spicy, eating two hours before bed\par -Things to avoid including overeating, spicy foods, tight clothing, eating within three hours of bed, this list is not all inclusive. \par -For treatment of reflux, possible options discussed including diet control, H2 blockers, PPIs, as well as coating motility agents discussed as treatment options. Timing of meals and proximity of last meal to sleep were discussed. If symptoms persist, a formal gastrointestinal evaluation is needed. \par \par problem 5: ALIS\par -she has refused any treatment or diagnosing\par -she is being recommended to use Oxy-Aid by [facility]\par -Discussed the risks/associations with coronary artery disease, atrial fibrillation, arrhythmia, memory loss, issues with concentration, stroke risk, hypertension, nocturia, chronic reflux/Kimbrough’s esophagus some but not all inclusive. Treatment options discussed including CPAP/BiPAP machine, oral appliance, ProVent therapy, Oxy-Aid by [facility], new technologies, or positional sleep.\par \par problem 6: cardiac component \par -she is being recommended to have a consultation with Dr. Reese ([facility]) (AICD) (3/22 pending)\par \par Problem 7: Anxiety\par - Continue Klonopin 0.5 mg prn \par \par problem 8: health maintenance \par -recommended to have medical marijuana evaluation with Dr. Alma Matias\par -s/p 2018 yearly flu shot \par -recommended strep pneumonia vaccines: Prevnar-13 vaccine, followed by Pneumo vaccine 23 one year following\par -recommended early intervention for URIs\par -recommended regular osteoporosis evaluations\par -recommended early dermatological evaluations\par -recommended after the age of 50 to the age of 70, colonoscopy every 5 years \par  \par \par F/U in 2 months\par She is encouraged to call with any changes,concerns, or questions.

## 2019-07-08 NOTE — ADDENDUM
[FreeTextEntry1] : Documented by Dillon Quick acting as a scribe for Dr. Ketan Morfin on 07/08/2019.\par \par All medical record entries made by the Scribe were at my, Dr. Ketan Morfin's, direction and personally dictated by me on 07/08/2019. I have reviewed the chart and agree that the record accurately reflects my personal performance of the history, physical exam, assessment and plan. I have also personally directed, reviewed, and agree with the discharge instructions. \par \par

## 2019-07-08 NOTE — REVIEW OF SYSTEMS
[Negative] : Sleep Disorder [Fatigue] : fatigue [As Noted in HPI] : as noted in HPI [Dyspnea] : dyspnea

## 2019-07-08 NOTE — HISTORY OF PRESENT ILLNESS
[FreeTextEntry1] : Ms. Primrose is a 69 year old female presenting to the office for a follow up visit for allergic rhinitis, eosinophilic/moderate persistent asthma, ALIS, GERD, and shortness of breath. Her chief complaint is dyspnea.\par -she hasn't yet noticed any difference since starting the Fasenra\par -she reports feeling worse, with symptoms including fatigue and inability to take a deep breath\par -she reports having some stress from awaiting a move, partially due to not yet knowing where she will be moving\par -she notes having some chest pressure\par -she notes having some controlled constipation.  She is s/p a colonoscopy that revealed everything has remained unchanged\par -she is not sleeping well, noting she wakes up randomly at night\par -she denies any chest pain, diarrhea, dysphagia, dizziness, leg swelling, leg pain, itchy eyes, itchy ears, heartburn, reflux, sour taste in the mouth

## 2019-07-09 ENCOUNTER — RX CHANGE (OUTPATIENT)
Age: 69
End: 2019-07-09

## 2019-07-22 PROCEDURE — 99213 OFFICE O/P EST LOW 20 MIN: CPT

## 2019-07-25 NOTE — H&P ADULT. - NEUROLOGICAL COMMENTS
Length of Stay:   LOS: 0 days      Chief complaint:  Chief Complaint   Patient presents with    Mental Health Problem         Subjective:  Improving mental status    Physical Examination:  /73   Pulse 115   Temp 98.9 °F (37.2 °C) (Temporal)   Resp 18   Ht 5' 5\" (1.651 m)   Wt 121 lb 4.8 oz (55 kg)   SpO2 98%   BMI 20.19 kg/m²   Constitutional - Appropriately groomed, good nutritional status  Psychiatric - AOX3, baseline mood  Eyes - EOMI, conjunctivae and lids intact  ENMT - lips, teeth, gums intact; hearing intact  Respiratory - CTAB, no accessory muscle use  Cardiovascular - Clear S1, S2 no gross m/r/g; pedal pulses intact  Abd - Soft, non-tender; No gross hernia  MSK - UE and LE joints intact, no gross clubbing  Skin - No rashes or wounds; no gross capillary refill defects  Neurologic - CN 2-12 grossly intact, sensation intact    Medications:  FLUoxetine, 20 mg, Oral, Daily    gabapentin, 300 mg, Oral, TID    aspirin, 325 mg, Oral, Daily    valsartan, 80 mg, Oral, Daily    lamoTRIgine, 150 mg, Oral, Daily    sodium chloride flush, 10 mL, Intravenous, 2 times per day    famotidine, 20 mg, Oral, BID      Problem list:  Principal Problem:    Alcohol use  Active Problems:    Positive urine drug screen    Delirium tremens (HCC)    Hypokalemia    Transaminitis    Suicidal ideation  Resolved Problems:    * No resolved hospital problems.  *        A/P:  []Etoh intoxication, risk of withdrawal  -Elevated LFTs - secondary to alcohol abuse, hep panel pending  Etoh level still remains high  Continue CIWA    []SI  -Denies active thoughts of SI at this time  Continue sitter, consider psych consult once sober if needed      []Hospital Issues  DVT prop - SCDs  Code - FULL  Disposition - Transfer to floor       upper extremities tremors

## 2019-08-21 ENCOUNTER — RX RENEWAL (OUTPATIENT)
Age: 69
End: 2019-08-21

## 2019-08-26 PROCEDURE — 99213 OFFICE O/P EST LOW 20 MIN: CPT

## 2019-09-18 NOTE — ED ADULT NURSE NOTE - CAS EDN DISCHARGE INTERVENTIONS
Admit to med/surg - 2/2 UTI with fever, tachy, mild leukocytosis. Nonseptic appearing, infection still likely more localized with some systemic response.  - continue Rocephin  - s/p IVF resuscitation, continue gentle IVF until better able to tolerate PO  - tylenol prn for fever  - follow culture data, urine/blood cx drawn  - monitor fever curve  - hemodynamically stable, no present evidence of shock
Arm band on/IV intact

## 2019-10-14 PROCEDURE — 99213 OFFICE O/P EST LOW 20 MIN: CPT

## 2019-10-28 PROCEDURE — 99214 OFFICE O/P EST MOD 30 MIN: CPT

## 2019-11-18 ENCOUNTER — APPOINTMENT (OUTPATIENT)
Dept: INTERNAL MEDICINE | Facility: CLINIC | Age: 69
End: 2019-11-18
Payer: MEDICARE

## 2019-11-18 VITALS
BODY MASS INDEX: 22.91 KG/M2 | TEMPERATURE: 98.3 F | HEART RATE: 99 BPM | RESPIRATION RATE: 17 BRPM | OXYGEN SATURATION: 97 % | WEIGHT: 99 LBS | SYSTOLIC BLOOD PRESSURE: 127 MMHG | HEIGHT: 55 IN | DIASTOLIC BLOOD PRESSURE: 80 MMHG

## 2019-11-18 DIAGNOSIS — R92.2 INCONCLUSIVE MAMMOGRAM: ICD-10-CM

## 2019-11-18 DIAGNOSIS — Z87.898 PERSONAL HISTORY OF OTHER SPECIFIED CONDITIONS: ICD-10-CM

## 2019-11-18 PROCEDURE — 99204 OFFICE O/P NEW MOD 45 MIN: CPT

## 2019-11-18 RX ORDER — MIRTAZAPINE 7.5 MG/1
7.5 TABLET, FILM COATED ORAL
Qty: 30 | Refills: 0 | Status: DISCONTINUED | COMMUNITY
Start: 2019-10-14 | End: 2019-11-18

## 2019-11-18 RX ORDER — DICYCLOMINE HYDROCHLORIDE 10 MG/1
10 CAPSULE ORAL
Qty: 8 | Refills: 0 | Status: DISCONTINUED | COMMUNITY
Start: 2017-12-19 | End: 2019-11-18

## 2019-11-18 RX ORDER — MIRTAZAPINE 15 MG/1
15 TABLET, FILM COATED ORAL
Qty: 30 | Refills: 0 | Status: DISCONTINUED | COMMUNITY
Start: 2017-11-28 | End: 2019-11-18

## 2019-11-18 RX ORDER — EPINEPHRINE 0.3 MG/.3ML
0.3 INJECTION INTRAMUSCULAR
Qty: 1 | Refills: 1 | Status: DISCONTINUED | COMMUNITY
Start: 2018-08-17 | End: 2019-11-18

## 2019-11-18 RX ORDER — LEVOCETIRIZINE DIHYDROCHLORIDE 5 MG/1
5 TABLET ORAL
Qty: 30 | Refills: 5 | Status: DISCONTINUED | COMMUNITY
Start: 2018-11-06 | End: 2019-11-18

## 2019-11-18 RX ORDER — PANTOPRAZOLE 40 MG/1
40 TABLET, DELAYED RELEASE ORAL
Qty: 90 | Refills: 0 | Status: DISCONTINUED | COMMUNITY
Start: 2017-07-07 | End: 2019-11-18

## 2019-11-18 RX ORDER — IPRATROPIUM BROMIDE AND ALBUTEROL SULFATE 2.5; .5 MG/3ML; MG/3ML
0.5-2.5 (3) SOLUTION RESPIRATORY (INHALATION) 4 TIMES DAILY
Qty: 120 | Refills: 3 | Status: DISCONTINUED | COMMUNITY
Start: 2018-08-15 | End: 2019-11-18

## 2019-11-18 RX ORDER — BECLOMETHASONE DIPROPIONATE 80 UG/1
80 AEROSOL, METERED NASAL
Qty: 3 | Refills: 1 | Status: DISCONTINUED | COMMUNITY
Start: 2018-05-15 | End: 2019-11-18

## 2019-11-18 RX ORDER — UMECLIDINIUM 62.5 UG/1
62.5 AEROSOL, POWDER ORAL
Qty: 1 | Refills: 5 | Status: DISCONTINUED | COMMUNITY
Start: 2018-01-23 | End: 2019-11-18

## 2019-11-18 RX ORDER — TIZANIDINE 2 MG/1
2 TABLET ORAL EVERY 6 HOURS
Qty: 120 | Refills: 0 | Status: DISCONTINUED | COMMUNITY
Start: 2018-04-06 | End: 2019-11-18

## 2019-11-18 RX ORDER — PREDNISONE 10 MG/1
10 TABLET ORAL
Qty: 1 | Refills: 0 | Status: DISCONTINUED | COMMUNITY
Start: 2018-04-02 | End: 2019-11-18

## 2019-11-18 RX ORDER — TIOTROPIUM BROMIDE INHALATION SPRAY 3.12 UG/1
2.5 SPRAY, METERED RESPIRATORY (INHALATION) DAILY
Qty: 3 | Refills: 1 | Status: DISCONTINUED | COMMUNITY
Start: 2018-01-15 | End: 2019-11-18

## 2019-11-18 RX ORDER — BECLOMETHASONE DIPROPIONATE 80 UG/1
80 AEROSOL, METERED NASAL
Qty: 3 | Refills: 1 | Status: DISCONTINUED | COMMUNITY
Start: 2017-01-18 | End: 2019-11-18

## 2019-11-18 RX ORDER — PREDNISONE 10 MG/1
10 TABLET ORAL
Qty: 30 | Refills: 1 | Status: DISCONTINUED | COMMUNITY
Start: 2018-10-17 | End: 2019-11-18

## 2019-11-18 RX ORDER — OLOPATADINE HYDROCHLORIDE 665 UG/1
0.6 SPRAY, METERED NASAL
Qty: 3 | Refills: 1 | Status: DISCONTINUED | COMMUNITY
Start: 2017-11-08 | End: 2019-11-18

## 2019-11-18 RX ORDER — ALBUTEROL SULFATE 90 UG/1
108 (90 BASE) AEROSOL, METERED RESPIRATORY (INHALATION) EVERY 6 HOURS
Qty: 3 | Refills: 1 | Status: DISCONTINUED | COMMUNITY
Start: 2018-01-15 | End: 2019-11-18

## 2019-11-18 RX ORDER — BECLOMETHASONE DIPROPIONATE 80 UG/1
80 AEROSOL, METERED NASAL
Qty: 3 | Refills: 1 | Status: DISCONTINUED | COMMUNITY
Start: 2017-11-08 | End: 2019-11-18

## 2019-11-18 RX ORDER — LEVOTHYROXINE SODIUM 75 UG/1
75 TABLET ORAL
Refills: 0 | Status: DISCONTINUED | COMMUNITY
End: 2019-11-18

## 2019-11-18 RX ORDER — ACLIDINIUM BROMIDE 400 UG/1
400 POWDER, METERED RESPIRATORY (INHALATION) TWICE DAILY
Qty: 3 | Refills: 1 | Status: DISCONTINUED | COMMUNITY
Start: 2019-07-08 | End: 2019-11-18

## 2019-11-18 RX ORDER — ONDANSETRON 4 MG/1
4 TABLET, ORALLY DISINTEGRATING ORAL
Qty: 8 | Refills: 0 | Status: DISCONTINUED | COMMUNITY
Start: 2017-12-19 | End: 2019-11-18

## 2019-11-18 RX ORDER — CYCLOBENZAPRINE HYDROCHLORIDE 5 MG/1
5 TABLET, FILM COATED ORAL
Qty: 15 | Refills: 0 | Status: DISCONTINUED | COMMUNITY
Start: 2018-01-03 | End: 2019-11-18

## 2019-11-18 RX ORDER — LEVALBUTEROL HYDROCHLORIDE 0.63 MG/3ML
0.63 SOLUTION RESPIRATORY (INHALATION)
Qty: 120 | Refills: 3 | Status: DISCONTINUED | COMMUNITY
Start: 2017-01-18 | End: 2019-11-18

## 2019-11-18 RX ORDER — LEVOCETIRIZINE DIHYDROCHLORIDE 5 MG/1
5 TABLET ORAL
Qty: 90 | Refills: 1 | Status: DISCONTINUED | COMMUNITY
Start: 2017-01-18 | End: 2019-11-18

## 2019-11-18 RX ORDER — ALBUTEROL SULFATE 90 UG/1
108 (90 BASE) AEROSOL, METERED RESPIRATORY (INHALATION)
Qty: 1 | Refills: 5 | Status: DISCONTINUED | COMMUNITY
Start: 2017-09-18 | End: 2019-11-18

## 2019-11-18 RX ORDER — OLOPATADINE HYDROCHLORIDE 665 UG/1
0.6 SPRAY, METERED NASAL
Qty: 3 | Refills: 1 | Status: DISCONTINUED | COMMUNITY
Start: 2017-01-18 | End: 2019-11-18

## 2019-11-18 RX ORDER — LEVOTHYROXINE SODIUM 0.09 MG/1
88 TABLET ORAL
Qty: 90 | Refills: 0 | Status: DISCONTINUED | COMMUNITY
Start: 2018-01-01 | End: 2019-11-18

## 2019-11-18 RX ORDER — PHENAZOPYRIDINE HYDROCHLORIDE 200 MG/1
200 TABLET ORAL
Qty: 8 | Refills: 0 | Status: DISCONTINUED | COMMUNITY
Start: 2017-12-21 | End: 2019-11-18

## 2019-11-18 RX ORDER — BENRALIZUMAB 30 MG/ML
30 INJECTION, SOLUTION SUBCUTANEOUS
Qty: 1 | Refills: 8 | Status: DISCONTINUED | OUTPATIENT
Start: 2018-08-17 | End: 2019-11-18

## 2019-11-18 RX ORDER — TIOTROPIUM BROMIDE INHALATION SPRAY 3.12 UG/1
2.5 SPRAY, METERED RESPIRATORY (INHALATION) DAILY
Qty: 3 | Refills: 1 | Status: DISCONTINUED | COMMUNITY
Start: 2018-05-17 | End: 2019-11-18

## 2019-11-18 RX ORDER — TIOTROPIUM BROMIDE INHALATION SPRAY 3.12 UG/1
2.5 SPRAY, METERED RESPIRATORY (INHALATION) DAILY
Qty: 3 | Refills: 3 | Status: DISCONTINUED | COMMUNITY
Start: 2017-05-18 | End: 2019-11-18

## 2019-11-18 RX ORDER — OLOPATADINE HYDROCHLORIDE 665 UG/1
0.6 SPRAY, METERED NASAL
Qty: 3 | Refills: 1 | Status: DISCONTINUED | COMMUNITY
Start: 2018-05-15 | End: 2019-11-18

## 2019-11-18 RX ORDER — MONTELUKAST SODIUM 10 MG/1
10 TABLET, FILM COATED ORAL
Qty: 1 | Refills: 1 | Status: DISCONTINUED | COMMUNITY
Start: 2017-05-18 | End: 2019-11-18

## 2019-11-18 RX ORDER — QUETIAPINE FUMARATE 25 MG/1
25 TABLET ORAL
Qty: 30 | Refills: 0 | Status: DISCONTINUED | COMMUNITY
Start: 2017-11-28 | End: 2019-11-18

## 2019-11-22 PROCEDURE — 99213 OFFICE O/P EST LOW 20 MIN: CPT

## 2019-11-25 PROCEDURE — 99213 OFFICE O/P EST LOW 20 MIN: CPT

## 2019-12-03 ENCOUNTER — APPOINTMENT (OUTPATIENT)
Dept: PULMONOLOGY | Facility: CLINIC | Age: 69
End: 2019-12-03
Payer: MEDICARE

## 2019-12-03 VITALS
RESPIRATION RATE: 17 BRPM | HEIGHT: 55 IN | HEART RATE: 112 BPM | DIASTOLIC BLOOD PRESSURE: 80 MMHG | OXYGEN SATURATION: 98 % | BODY MASS INDEX: 22.68 KG/M2 | WEIGHT: 98 LBS | SYSTOLIC BLOOD PRESSURE: 140 MMHG

## 2019-12-03 DIAGNOSIS — B37.0 CANDIDAL STOMATITIS: ICD-10-CM

## 2019-12-03 PROCEDURE — 99213 OFFICE O/P EST LOW 20 MIN: CPT

## 2019-12-03 PROCEDURE — 99214 OFFICE O/P EST MOD 30 MIN: CPT

## 2019-12-03 NOTE — PHYSICAL EXAM
[General Appearance - Well Developed] : well developed [Normal Appearance] : normal appearance [Well Groomed] : well groomed [General Appearance - Well Nourished] : well nourished [General Appearance - In No Acute Distress] : no acute distress [No Deformities] : no deformities [Normal Conjunctiva] : the conjunctiva exhibited no abnormalities [Eyelids - No Xanthelasma] : the eyelids demonstrated no xanthelasmas [II] : II [Normal Oropharynx] : normal oropharynx [Neck Appearance] : the appearance of the neck was normal [Jugular Venous Distention Increased] : there was no jugular-venous distention [Neck Cervical Mass (___cm)] : no neck mass was observed [Thyroid Diffuse Enlargement] : the thyroid was not enlarged [Thyroid Nodule] : there were no palpable thyroid nodules [Heart Sounds] : normal S1 and S2 [Heart Rate And Rhythm] : heart rate and rhythm were normal [Murmurs] : no murmurs present [Respiration, Rhythm And Depth] : normal respiratory rhythm and effort [Abdomen Soft] : soft [Exaggerated Use Of Accessory Muscles For Inspiration] : no accessory muscle use [Abdomen Tenderness] : non-tender [Abdomen Mass (___ Cm)] : no abdominal mass palpated [Abnormal Walk] : normal gait [Gait - Sufficient For Exercise Testing] : the gait was sufficient for exercise testing [Nail Clubbing] : no clubbing of the fingernails [Cyanosis, Localized] : no localized cyanosis [Petechial Hemorrhages (___cm)] : no petechial hemorrhages [Skin Color & Pigmentation] : normal skin color and pigmentation [] : no rash [No Venous Stasis] : no venous stasis [Skin Lesions] : no skin lesions [No Skin Ulcers] : no skin ulcer [No Xanthoma] : no  xanthoma was observed [Sensation] : the sensory exam was normal to light touch and pinprick [Deep Tendon Reflexes (DTR)] : deep tendon reflexes were 2+ and symmetric [No Focal Deficits] : no focal deficits [Oriented To Time, Place, And Person] : oriented to person, place, and time [Impaired Insight] : insight and judgment were intact [Affect] : the affect was normal [FreeTextEntry1] : I:E 1:3, mildly decreased breath sounds at the left base

## 2019-12-03 NOTE — ASSESSMENT
[FreeTextEntry1] : The plan for the patient is as follows: \par \par problem 1: Acute bronchitis/sinusitis and possible pharyngitis\par -add doxycycline 100 mg PO BID x 10 days\par \par problem 2: Oral + possible esophageal thrush\par -add nystatin swish and swallow BID x 7-10 days\par \par problem 3: COPD/ asthma exacerbation related to above\par -add prednisone 20 mg x 7 days, 10 mg x 7 days\par -restart duoneb via neb TID-QID\par -samples of Trelegy 1 inhalation QD provided\par  once trelegy samples are finished (1 mo); transition to wixela 250 1 puff BID rinse and gargle AND Incruse 1 puff QD\par -restart Singulair 10 mg before bed\par -continue to use Proventil q6 hours PRN SOB when cannot nebulize\par -continue Acapella device to be used multiple times daily \par \par problem 4: eosinophilic asthma\par -patient was on Fasenra prior, dc'd due to insurance with continued attempts to get covered\par -we will get update for patient re: Fasenra, will task Christin\par \par problem 5: allergic rhinitis/sinus congestion \par -add fluticasone nasal spray 1 spray am and pm\par \par problem 6: GERD hx with ? reflux- she is unsure\par -advised to monitor\par \par problem 7: hx of insomnia with poor sleep/worsening insomnia over the last 6 weeks\par -not interested in sleep apnea testing/possible therapy\par -pt is on klonopin, zyprexa, seroquil, vicodin, medical marijuana drops already, will need to discuss with her psychiatrist about options. She has planned appt today. Will reach out. \par \par problem 8: health maintenance \par -pt due for Prevnar 13, she wants to receive at her f/u visit in January\par  \par F/U as scheduled with Dr. Morfin in January 2020\par She is encouraged to call with any changes,concerns, or questions. \par Advised her to call next week if symptoms are not improving

## 2019-12-03 NOTE — HISTORY OF PRESENT ILLNESS
[FreeTextEntry1] : Ms. Primrose is a 69 year old female presenting to the office for a sick visit. She has PMHx of allergic rhinitis, eosinophilic/moderate persistent asthma, ALIS, GERD, and shortness of breath. \par \par She started to feel unwell approx 4 days ago with sore throat, throat fullness/discomfort which turned into laryngitis. She also reports frontal headache, sinus pressure, congestion and fullness in her maxillary sinuses. She has been hacking up thick green sputum on and off through the day. Her cough is mainly during the day- minimal at night. She has been dealing with chills. She has little appetite, is dealing with fatigue, SOB, JEAN, chest heaviness and tightness. \par \par She reports she is sleeping poorly over the last 6 weeks. She sees a psychiatrist and is on multiple meds for depression, anxiety and for sleep however the patient reports she is not getting anywhere with that. \par She has a planned visit today with Dr. Noe and plans on changing over to . She reports she has sciatica pain, pinched nerves in her neck and back which disrupts her sleep but her sleep is worse recently and she is unsure why, she reports her cardiologist was concerned about her poor sleep. She hopes to re-discuss with psych today. \par \par She otherwise denies fever, muscle cramps, body aches and pains that are new.\par She denies heartburn and sour taste but is unsure about reflux. \par \par She would like to know if she is eligible for a new nebulizer. \par Pt has only been using Incruse. She reports Advair was not covered and has not been using for some time. \par She reports she has been off Fasenra injections for some time due to insurance issues. She is unsure of the benefit she felt.

## 2019-12-03 NOTE — REASON FOR VISIT
[Asthma] : asthma [Acute] : an acute visit [Cough] : cough [Shortness of Breath] : shortness of Breath [FreeTextEntry2] : sinus symptoms

## 2019-12-03 NOTE — REVIEW OF SYSTEMS
[Fatigue] : fatigue [Chills] : chills [Poor Appetite] : poor appetite [Sinus Problems] : sinus problems [Nasal Congestion] : nasal congestion [Sore Throat] : sore throat [Dry Mouth] : dry mouth [Cough] : cough [Dyspnea] : dyspnea [Sputum] : sputum  [Chest Tightness] : chest tightness [Wheezing] : wheezing [As Noted in HPI] : as noted in HPI [Headache] : headache [Difficulty Initiating Sleep] : difficulty falling asleep [Difficulty Maintaining Sleep] : difficulty maintaining sleep [Nonrestorative Sleep] : nonrestorative sleep [Awakes With Dry Mouth] : awakes with dry mouth [Negative] : Pulmonary Hypertension [Heartburn] : no heartburn [Abdominal Pain] : no abdominal pain

## 2019-12-05 DIAGNOSIS — G47.00 INSOMNIA, UNSPECIFIED: ICD-10-CM

## 2019-12-12 ENCOUNTER — RX RENEWAL (OUTPATIENT)
Age: 69
End: 2019-12-12

## 2019-12-17 DIAGNOSIS — R11.0 NAUSEA: ICD-10-CM

## 2019-12-18 PROCEDURE — 99213 OFFICE O/P EST LOW 20 MIN: CPT

## 2020-01-06 ENCOUNTER — RX RENEWAL (OUTPATIENT)
Age: 70
End: 2020-01-06

## 2020-01-13 ENCOUNTER — APPOINTMENT (OUTPATIENT)
Dept: PULMONOLOGY | Facility: CLINIC | Age: 70
End: 2020-01-13
Payer: MEDICARE

## 2020-01-13 VITALS
DIASTOLIC BLOOD PRESSURE: 82 MMHG | RESPIRATION RATE: 16 BRPM | OXYGEN SATURATION: 94 % | HEART RATE: 94 BPM | BODY MASS INDEX: 22.68 KG/M2 | WEIGHT: 98 LBS | HEIGHT: 55 IN | SYSTOLIC BLOOD PRESSURE: 129 MMHG

## 2020-01-13 DIAGNOSIS — F51.04 PSYCHOPHYSIOLOGIC INSOMNIA: ICD-10-CM

## 2020-01-13 PROCEDURE — 99204 OFFICE O/P NEW MOD 45 MIN: CPT

## 2020-01-13 NOTE — ASSESSMENT
[FreeTextEntry1] : This is a 69-year-old female with multiple medical problems who comes in for evaluation of insomnia. Given the chronicity of her insomnia and her dependence on medications it is likely that she suffers from chronic insomnia. Her insomnia is driven by her significant anxiety. I have asked her to see her psychiatrist again in order to help better control her anxiety. We discussed that no have not medication will completely give her a full nights rest due to her anxiety. I have also explained the cognitive behavioral therapy with medication can be helpful in the short and long term. Therefore after thorough explanation the patient has agreed to try Belsomra 10 mg in addition to contacting Dr. Choi for cognitive behavioral therapy/insomnia. She will follow up with me in 2 months.

## 2020-01-13 NOTE — HISTORY OF PRESENT ILLNESS
[Snoring] : snoring [ESS: ___] : ESS score [unfilled] [Awakes Unrefreshed] : awakening unrefreshed [DMS] : DMS [DIS] : DIS [FreeTextEntry1] : This is a 69-year-old female with multiple medical problems who comes in for evaluation of insomnia. The patient states that she has had insomnia for years but recently it has gotten worse. The patient goes to bed around 11 AM and takes Seroquel and Lexapro. This helps her go to sleep, but she wakes up around 3 AM. She is unable to fall back to sleep. She states she is too nervous and gets up to start her day. She feels fatigued throughout the day but does not have excessive daytime sleepiness. She comes to see me because she has multiple cardiac stents and significant asthma for which her pulmonologist and cardiologist are concerned due to due to lack of sleep.\par \par The patient admits that she has never been a good sleeper. She has always needed to use some sort of medication in order to help her fall asleep and maintain sleep. She has tried melatonin but this did not help. She worked as a nurse in the night shift and use zolpidem to help her sleep during the day. She does not know what else to do with her sleep. She sees a psychiatrist who keeps changing her medications but does not help. She admits to significant anxiety throughout the day. [Witnessed Apneas] : no witnessed sleep apnea [Frequent Nocturnal Awakening] : no nocturnal awakening [Daytime Somnolence] : no daytime somnolence [Unintentional Sleep while Active] : no unintentional sleep while active [Awakes with Headache] : no headache upon awakening [Unintentional Sleep While Inactive] : no unintentional sleep while inactive [Awakening With Dry Mouth] : no dry mouth upon awakening [Recent  Weight Gain] : no recent weight gain [Unusual Sleep Behavior] : no unusual sleep behavior [Hypersomnolence] : no hypersomnolence [Cataplexy] : no cataplexy [Hypnagogic Hallucinations] : no hallucinations when falling asleep [Sleep Paralysis] : no sleep paralysis [Hypnopompic Hallucinations] : no hallucinations when awakening

## 2020-01-13 NOTE — CONSULT LETTER
[Dear  ___] : Dear  [unfilled], [Consult Letter:] : I had the pleasure of evaluating your patient, [unfilled]. [Consult Closing:] : Thank you very much for allowing me to participate in the care of this patient.  If you have any questions, please do not hesitate to contact me. [Sincerely,] : Sincerely, [Please see my note below.] : Please see my note below. [FreeTextEntry3] : West Rasmussen DO, MPH\par Pulmonary, Critical Care, and Sleep Medicine\par  of Medicine\par [employer] [facility]

## 2020-01-13 NOTE — PHYSICAL EXAM
[General Appearance - Well Developed] : well developed [Normal Appearance] : normal appearance [General Appearance - Well Nourished] : well nourished [Enlarged Base of the Tongue] : enlargement of the base of the tongue [III] : III [Neck Appearance] : the appearance of the neck was normal [Apical Impulse] : the apical impulse was normal [Neck Cervical Mass (___cm)] : no neck mass was observed [Heart Rate And Rhythm] : heart rate was normal and rhythm regular [Heart Sounds] : normal S1 and S2 [Auscultation Breath Sounds / Voice Sounds] : lungs were clear to auscultation bilaterally [] : no respiratory distress [Respiration, Rhythm And Depth] : normal respiratory rhythm and effort [Kyphosis] : kyphosis [Nail Clubbing] : no clubbing of the fingernails [Cyanosis, Localized] : no localized cyanosis [No Focal Deficits] : no focal deficits [Oriented To Time, Place, And Person] : oriented to person, place, and time [Impaired Insight] : insight and judgment were intact [Affect] : the affect was normal

## 2020-01-13 NOTE — REASON FOR VISIT
[Consultation] : a consultation visit [Sleep Apnea] : sleep apnea [FreeTextEntry1] : Rferred by Dr. Alvarez

## 2020-01-13 NOTE — REVIEW OF SYSTEMS
[EDS: ESS=____] : daytime somnolence: ESS=[unfilled] [Fatigue] : fatigue [Snoring] : snoring [Depression] : depression [Anxious] : anxious [Negative] : Musculoskeletal [Postnasal Drip] : no postnasal drip [Witnessed Apneas] : no witnessed apnea [A.M. Dry Mouth] : no a.m. dry mouth [Difficulty Maintaining Sleep] : no difficulty maintaining sleep [Difficulty Initiating Sleep] : no difficulty falling asleep [Lower Extremity Discomfort] : no lower extremity discomfort [Irresistible urge to move legs] : no irresistible urge to move legs because of lower extremity discomfort [Late day/ Evening symptoms] : no late day/evening symptoms [Sleep Disturbances due to LE symptoms] : ~T no sleep disturbances due to lower extremity symptoms [Unusual Sleep Behavior] : no unusual sleep behavior [Hypersomnolence] : not sleeping much more than usual [Cataplexy] :  no cataplexy [Hypnogogic Hallucinations] : no hypnogogic hallucinations [Hypnopompic Hallucinations] : no hypnopompic hallucinations [Sleep Paralysis] : no sleep paralysis

## 2020-01-21 ENCOUNTER — NON-APPOINTMENT (OUTPATIENT)
Age: 70
End: 2020-01-21

## 2020-01-21 ENCOUNTER — APPOINTMENT (OUTPATIENT)
Dept: PULMONOLOGY | Facility: CLINIC | Age: 70
End: 2020-01-21
Payer: MEDICARE

## 2020-01-21 VITALS
WEIGHT: 97 LBS | BODY MASS INDEX: 22.45 KG/M2 | OXYGEN SATURATION: 97 % | SYSTOLIC BLOOD PRESSURE: 120 MMHG | DIASTOLIC BLOOD PRESSURE: 80 MMHG | RESPIRATION RATE: 16 BRPM | HEART RATE: 96 BPM | HEIGHT: 55 IN

## 2020-01-21 DIAGNOSIS — J45.901 UNSPECIFIED ASTHMA WITH (ACUTE) EXACERBATION: ICD-10-CM

## 2020-01-21 PROCEDURE — 94010 BREATHING CAPACITY TEST: CPT

## 2020-01-21 PROCEDURE — 95012 NITRIC OXIDE EXP GAS DETER: CPT

## 2020-01-21 PROCEDURE — 94618 PULMONARY STRESS TESTING: CPT

## 2020-01-21 PROCEDURE — 99214 OFFICE O/P EST MOD 30 MIN: CPT | Mod: 25

## 2020-01-21 PROCEDURE — 96372 THER/PROPH/DIAG INJ SC/IM: CPT

## 2020-01-21 RX ORDER — BENRALIZUMAB 30 MG/ML
30 INJECTION, SOLUTION SUBCUTANEOUS
Qty: 0 | Refills: 0 | Status: COMPLETED | OUTPATIENT
Start: 2020-01-21

## 2020-01-21 RX ADMIN — BENRALIZUMAB 30 MG/ML: 30 INJECTION, SOLUTION SUBCUTANEOUS at 00:00

## 2020-01-21 NOTE — REASON FOR VISIT
[Cough] : cough [Asthma] : asthma [Shortness of Breath] : shortness of Breath [Follow-Up] : a follow-up visit [FreeTextEntry1] : pre-op for spinal stimulation, allergic rhinitis, eosinophilic/moderate persistent asthma, ALIS GERD, and shortness of breath [FreeTextEntry2] : sinus symptoms

## 2020-01-21 NOTE — ASSESSMENT
[FreeTextEntry1] : Ms. Primrose is a 69 year old female with history of COPD / asthma / allergy / paralyzed hemidiaphragm / cardiac disease / CHF/ eosinophilic asthma. She presents to the office pre-op for spinal stimulation. \par Her shortness of breath is multifactorial due to:\par -cardiac/ mild congestive heart failure\par -severe persistent asthma\par -allergic rhinitis/post nasal drip syndrome\par -paralyzed hemidiaphragm s/p plication\par -poor breathing mechanics and anxiety \par \par ***************************pre-op for spinal stimulator******************************\par \par -no absolute pulmonary contraindication for the surgery. Surgery can be proceeded. \par \par problem 1: COPD/ asthma \par -continue Xopenex 0.63 by the nebulizer up to QID \par -continue to use Advair 500 at 1 inhalation BID\par -continue Spiriva Respimat 2 inhalations QD; change to Tudorza at 1 inhalation BID\par -continue Singulair 10 mg before bed\par -continue to use Proventil PRN\par -continue Acapella device to be used multiple times daily \par -Asthma is believed to be caused by inherited (genetic) and environmental factor, but its exact cause is unknown. Asthma may be triggered by allergens, lung infections, or irritants in the air. Asthma triggers are different for each person \par -Inhaler technique reviewed as well as oral hygiene techniques reviewed with patient. Avoidance of cold air, extremes of temperature, rescue inhaler should be used before exercise. Order of medication reviewed with patient. Recommended use of a cool mist humidifier in the bedroom\par \par problem 2: eosinophilic asthma\par -patient receiving Fasenra and has had 8 injections \par -The safety and efficacy of Nucala was established in three double-blind, randomized, placebo-controlled trials in patients with severe asthma. Compared to a placebo, patients with severe asthma receiving Nucala had fewer exacerbation requiring hospitalization and/or emergency department visits, and a longer time to first exacerbation. In addition, patients with severe asthma receiving Nucala or Fasenra experienced greater reductions in their daily maintenance oral corticosteroid dose, while maintaining asthma control compared with patients receiving placebo. Treatment with Nucala did not result in a significant improvement in lung function, as measured by the volume of air exhaled by patients in one second. The most common side effects include: headache, injection site reactions, back pain, weakness, and fatigue; hypersensitivity reactions can occur within hours or days including swelling of the face, mouth, and tongue, fainting, dizziness, hives, breathing problems, and rash; herpes zoster infections have occurred. The drug is a monoclonal antibody that inhibits interleukin-5 which helps regular eosinophils, a type of white blood cell that contributes to asthma. The over-production of eosinophils can cause inflammation in the lungs, increasing the frequency of asthma attacks. Patients must also take other medications, including high dose inhaled corticosteroids and at least one additional asthma drug\par \par problem 3: allergic rhinitis/post nasal drip syndrome \par -recommended Navage sinus rinse\par -continue Qnasl 1 sniff each nostril BID \par -continue Olopatadine 0.6% 1 sniff each nostril BID \par -continue Xyzal 5 mg before bed \par s/p Blood work: eosinophil level, IgE level, and immunocap testing \par -Environmental measures for allergies were encouraged including mattress and pillow cover, air purifier, and environmental controls.\par \par Problem 3A: Acute sinusitis \par -add Bactrim DS 1pill daily (28 pills)  \par \par problem 4: GERD\par -continue to use good diet and good timing of meals\par -Rule of 2s: avoid eating too much, eating too late, eating too spicy, eating two hours before bed\par -Things to avoid including overeating, spicy foods, tight clothing, eating within three hours of bed, this list is not all inclusive. \par -For treatment of reflux, possible options discussed including diet control, H2 blockers, PPIs, as well as coating motility agents discussed as treatment options. Timing of meals and proximity of last meal to sleep were discussed. If symptoms persist, a formal gastrointestinal evaluation is needed. \par \par problem 5: ALIS\par -she has refused any treatment or diagnosing\par -she is being recommended to use Oxy-Aid by [facility]\par -Discussed the risks/associations with coronary artery disease, atrial fibrillation, arrhythmia, memory loss, issues with concentration, stroke risk, hypertension, nocturia, chronic reflux/Kimbrough’s esophagus some but not all inclusive. Treatment options discussed including CPAP/BiPAP machine, oral appliance, ProVent therapy, Oxy-Aid by [facility], new technologies, or positional sleep.\par \par problem 6: cardiac component \par -she is being recommended to have a consultation with Dr. Reese ([facility]) ([facility]) (3/22 pending)\par \par Problem 7: Anxiety\par - Continue Klonopin 0.5 mg prn \par \par problem 8: health maintenance \par -recommended to have medical marijuana evaluation with Dr. Alma Matias\par -s/p 2019 yearly flu shot \par -recommended strep pneumonia vaccines: Prevnar-13 vaccine, followed by Pneumo vaccine 23 one year following\par -recommended early intervention for URIs\par -recommended regular osteoporosis evaluations\par -recommended early dermatological evaluations\par -recommended after the age of 50 to the age of 70, colonoscopy every 5 years \par  \par \par F/U in 2 months\par She is encouraged to call with any changes,concerns, or questions.

## 2020-01-21 NOTE — PHYSICAL EXAM
[General Appearance - Well Developed] : well developed [Normal Appearance] : normal appearance [General Appearance - Well Nourished] : well nourished [Well Groomed] : well groomed [General Appearance - In No Acute Distress] : no acute distress [Normal Conjunctiva] : the conjunctiva exhibited no abnormalities [No Deformities] : no deformities [Normal Oropharynx] : normal oropharynx [Eyelids - No Xanthelasma] : the eyelids demonstrated no xanthelasmas [II] : II [Neck Appearance] : the appearance of the neck was normal [Neck Cervical Mass (___cm)] : no neck mass was observed [Thyroid Diffuse Enlargement] : the thyroid was not enlarged [Jugular Venous Distention Increased] : there was no jugular-venous distention [Heart Rate And Rhythm] : heart rate and rhythm were normal [Thyroid Nodule] : there were no palpable thyroid nodules [Heart Sounds] : normal S1 and S2 [Murmurs] : no murmurs present [Respiration, Rhythm And Depth] : normal respiratory rhythm and effort [Exaggerated Use Of Accessory Muscles For Inspiration] : no accessory muscle use [Abdomen Soft] : soft [Abdomen Tenderness] : non-tender [Abnormal Walk] : normal gait [Abdomen Mass (___ Cm)] : no abdominal mass palpated [Petechial Hemorrhages (___cm)] : no petechial hemorrhages [Nail Clubbing] : no clubbing of the fingernails [Cyanosis, Localized] : no localized cyanosis [Gait - Sufficient For Exercise Testing] : the gait was sufficient for exercise testing [Skin Color & Pigmentation] : normal skin color and pigmentation [] : no rash [No Venous Stasis] : no venous stasis [No Skin Ulcers] : no skin ulcer [Skin Lesions] : no skin lesions [No Xanthoma] : no  xanthoma was observed [No Focal Deficits] : no focal deficits [Sensation] : the sensory exam was normal to light touch and pinprick [Deep Tendon Reflexes (DTR)] : deep tendon reflexes were 2+ and symmetric [Oriented To Time, Place, And Person] : oriented to person, place, and time [Impaired Insight] : insight and judgment were intact [Affect] : the affect was normal [FreeTextEntry1] : I:E 1:3, mildly decreased breath sounds at the left base

## 2020-01-21 NOTE — ADDENDUM
[FreeTextEntry1] : Documented by Dillon Quick acting as a scribe for Dr. Ketan Morfin on 01/21/2020.\par \par All medical record entries made by the Scribe were at my, Dr. Ketan Morfin's, direction and personally dictated by me on 01/21/2020. I have reviewed the chart and agree that the record accurately reflects my personal performance of the history, physical exam, assessment and plan. I have also personally directed, reviewed, and agree with the discharge instructions.

## 2020-01-21 NOTE — PROCEDURE
[FreeTextEntry1] : 6 minute walk test reveals a low saturation of 76% with very slight dyspnea or fatigue; walked 130.4 meters \par \par PFT revealed mild restrictive flows, with a FEV1 of 0.87L, which is 60% of predicted, with a moderately obstructive flow volume loop \par \par FENO was 20; a normal value being less than 25\par Fractional exhaled nitric oxide (FENO) is regarded as a simple, noninvasive method for assessing eosinophilic airway inflammation. Produced by a variety of cells within the lung, nitric oxide (NO) concentrations are generally low in healthy individuals. However, high concentrations of NO appear to be involved in nonspecific host defense mechanisms and chronic inflammatory diseases such as asthma. The American Thoracic Society (ATS) therefore has recommended using FENO to aid in the diagnosis and monitoring of eosinophilic airway inflammation and asthma, and for identifying steroid responsive individuals whose chronic respiratory symptoms may be caused by airway inflammation.

## 2020-01-21 NOTE — HISTORY OF PRESENT ILLNESS
[FreeTextEntry1] : Ms. Primrose is a 69 year old female presenting to the office for a sick visit. She has PMHx of allergic rhinitis, eosinophilic/moderate persistent asthma, ALIS, GERD, and shortness of breath. Her chief complaint is headache, eye pressure,dizziness, feeling nauseous. \par -she notes that she has been very dizzy, having a headache, and feeling nauseous. \par -she states having chest pain on the left side.\par -she notes that her eyes have blurry vision and feel very pressured. \par -she notes  that the blurry vision started a couple of weeks ago. \par -she denies any chest pressure, diarrhea, constipation, dysphagia, dizziness, sour taste in the mouth, leg swelling, leg pain, itchy eyes, itchy ears, heartburn, reflux, myalgias or arthralgias.

## 2020-01-28 ENCOUNTER — APPOINTMENT (OUTPATIENT)
Dept: PULMONOLOGY | Facility: CLINIC | Age: 70
End: 2020-01-28

## 2020-01-31 ENCOUNTER — APPOINTMENT (OUTPATIENT)
Dept: INTERNAL MEDICINE | Facility: CLINIC | Age: 70
End: 2020-01-31

## 2020-01-31 ENCOUNTER — APPOINTMENT (OUTPATIENT)
Dept: PULMONOLOGY | Facility: CLINIC | Age: 70
End: 2020-01-31

## 2020-02-03 ENCOUNTER — APPOINTMENT (OUTPATIENT)
Dept: INTERNAL MEDICINE | Facility: CLINIC | Age: 70
End: 2020-02-03
Payer: MEDICARE

## 2020-02-03 VITALS
BODY MASS INDEX: 23.37 KG/M2 | OXYGEN SATURATION: 96 % | TEMPERATURE: 97.8 F | HEART RATE: 96 BPM | WEIGHT: 101 LBS | RESPIRATION RATE: 16 BRPM | SYSTOLIC BLOOD PRESSURE: 123 MMHG | HEIGHT: 55 IN | DIASTOLIC BLOOD PRESSURE: 87 MMHG

## 2020-02-03 DIAGNOSIS — M54.2 CERVICALGIA: ICD-10-CM

## 2020-02-03 DIAGNOSIS — Z87.09 PERSONAL HISTORY OF OTHER DISEASES OF THE RESPIRATORY SYSTEM: ICD-10-CM

## 2020-02-03 DIAGNOSIS — K57.32 DIVERTICULITIS OF LARGE INTESTINE W/OUT PERFORATION OR ABSCESS W/OUT BLEEDING: ICD-10-CM

## 2020-02-03 PROCEDURE — 99214 OFFICE O/P EST MOD 30 MIN: CPT

## 2020-02-03 RX ORDER — PANTOPRAZOLE 40 MG/1
40 TABLET, DELAYED RELEASE ORAL
Qty: 30 | Refills: 3 | Status: DISCONTINUED | COMMUNITY
Start: 2019-12-17 | End: 2020-02-03

## 2020-02-03 RX ORDER — FLUTICASONE PROPIONATE AND SALMETEROL 50; 500 UG/1; UG/1
500-50 POWDER RESPIRATORY (INHALATION)
Qty: 3 | Refills: 1 | Status: DISCONTINUED | COMMUNITY
Start: 2018-05-15 | End: 2020-02-03

## 2020-02-03 RX ORDER — ONDANSETRON 4 MG/1
4 TABLET ORAL
Qty: 9 | Refills: 0 | Status: DISCONTINUED | COMMUNITY
Start: 2017-12-01 | End: 2020-02-03

## 2020-02-03 RX ORDER — SUVOREXANT 10 MG/1
10 TABLET, FILM COATED ORAL
Qty: 30 | Refills: 0 | Status: DISCONTINUED | COMMUNITY
Start: 2020-01-13 | End: 2020-02-03

## 2020-02-03 RX ORDER — UMECLIDINIUM 62.5 UG/1
62.5 AEROSOL, POWDER ORAL DAILY
Qty: 3 | Refills: 3 | Status: DISCONTINUED | COMMUNITY
Start: 2019-07-09 | End: 2020-02-03

## 2020-02-03 RX ORDER — PREDNISONE 10 MG/1
10 TABLET ORAL
Qty: 45 | Refills: 0 | Status: DISCONTINUED | COMMUNITY
Start: 2018-11-06 | End: 2020-02-03

## 2020-02-03 RX ORDER — LEVOTHYROXINE SODIUM 0.07 MG/1
75 TABLET ORAL DAILY
Qty: 30 | Refills: 0 | Status: DISCONTINUED | COMMUNITY
Start: 2019-12-12 | End: 2020-02-03

## 2020-02-03 RX ORDER — QUETIAPINE FUMARATE 100 MG/1
100 TABLET ORAL
Qty: 30 | Refills: 0 | Status: DISCONTINUED | COMMUNITY
Start: 2017-11-28 | End: 2020-02-03

## 2020-02-03 RX ORDER — MONTELUKAST 10 MG/1
10 TABLET, FILM COATED ORAL
Qty: 30 | Refills: 5 | Status: DISCONTINUED | COMMUNITY
Start: 2019-12-03 | End: 2020-02-03

## 2020-02-03 RX ORDER — OMEPRAZOLE 40 MG/1
40 CAPSULE, DELAYED RELEASE ORAL
Qty: 90 | Refills: 0 | Status: DISCONTINUED | COMMUNITY
Start: 2019-08-12 | End: 2020-02-03

## 2020-02-03 RX ORDER — SULFAMETHOXAZOLE AND TRIMETHOPRIM 800; 160 MG/1; MG/1
800-160 TABLET ORAL TWICE DAILY
Qty: 28 | Refills: 0 | Status: DISCONTINUED | COMMUNITY
Start: 2020-01-21 | End: 2020-02-03

## 2020-02-03 RX ORDER — IPRATROPIUM BROMIDE AND ALBUTEROL SULFATE 2.5; .5 MG/3ML; MG/3ML
0.5-2.5 (3) SOLUTION RESPIRATORY (INHALATION) 4 TIMES DAILY
Qty: 4 | Refills: 2 | Status: DISCONTINUED | COMMUNITY
Start: 2019-07-08 | End: 2020-02-03

## 2020-02-03 RX ORDER — NYSTATIN 100000 [USP'U]/ML
100000 SUSPENSION ORAL
Qty: 360 | Refills: 0 | Status: DISCONTINUED | COMMUNITY
Start: 2019-12-03 | End: 2020-02-03

## 2020-02-03 RX ORDER — DOXYCYCLINE 100 MG/1
100 CAPSULE ORAL
Qty: 20 | Refills: 0 | Status: DISCONTINUED | COMMUNITY
Start: 2019-12-03 | End: 2020-02-03

## 2020-02-24 ENCOUNTER — APPOINTMENT (OUTPATIENT)
Dept: PULMONOLOGY | Facility: CLINIC | Age: 70
End: 2020-02-24
Payer: MEDICARE

## 2020-02-24 ENCOUNTER — APPOINTMENT (OUTPATIENT)
Dept: PULMONOLOGY | Facility: CLINIC | Age: 70
End: 2020-02-24

## 2020-02-24 VITALS
HEIGHT: 55 IN | BODY MASS INDEX: 22.68 KG/M2 | OXYGEN SATURATION: 97 % | WEIGHT: 98 LBS | DIASTOLIC BLOOD PRESSURE: 80 MMHG | SYSTOLIC BLOOD PRESSURE: 126 MMHG | HEART RATE: 105 BPM | RESPIRATION RATE: 16 BRPM

## 2020-02-24 PROCEDURE — 99214 OFFICE O/P EST MOD 30 MIN: CPT | Mod: 25

## 2020-02-24 PROCEDURE — 71046 X-RAY EXAM CHEST 2 VIEWS: CPT

## 2020-02-24 PROCEDURE — 96372 THER/PROPH/DIAG INJ SC/IM: CPT

## 2020-02-24 RX ORDER — BENRALIZUMAB 30 MG/ML
30 INJECTION, SOLUTION SUBCUTANEOUS
Qty: 0 | Refills: 0 | Status: COMPLETED | OUTPATIENT
Start: 2020-02-24

## 2020-02-24 RX ADMIN — BENRALIZUMAB 0 MG/ML: 30 INJECTION, SOLUTION SUBCUTANEOUS at 00:00

## 2020-02-24 NOTE — PHYSICAL EXAM
[No Acute Distress] : no acute distress [Normal Oropharynx] : normal oropharynx [Normal Appearance] : normal appearance [No Neck Mass] : no neck mass [Normal Rate/Rhythm] : normal rate/rhythm [Normal S1, S2] : normal s1, s2 [No Murmurs] : no murmurs [No Resp Distress] : no resp distress [No Abnormalities] : no abnormalities [Normal Gait] : normal gait [No Clubbing] : no clubbing [No Cyanosis] : no cyanosis [FROM] : FROM [No Edema] : no edema [Normal Color/ Pigmentation] : normal color/ pigmentation [Normal Affect] : normal affect [Oriented x3] : oriented x3 [TextBox_68] : Slight AYAZ expiratory wheeze auscultated.

## 2020-02-24 NOTE — ASSESSMENT
[FreeTextEntry1] : The plan for the patient is as follows:\par \par 1. Moderate, persistent asthma:\par - Continue Duoneb Q6H PRN.\par - Add Tudorza 1 puff BID - Sample x 2 given in office.\par - Continue Advair 500 1 puff BID - rinse and gargle post use.\par - Continue Singulair 10 mg PO QHS.\par \par 2. Eosinophilic Asthma:\par - Continue Fasenra Sub-Q; given today in LUE in office.\par \par 3. Recurrent Sinusitis:\par - Finish Bactrim - patient has 1 week left of RX.\par - Patient to establish care with ENT for further evaluation. \par \par 4. SOB:\par - CXR performed in office; mildly elevated left hemidiaphragm is noted, but as per MD that is patient's baseline. \par \par Pt. to follow-up with further questions or concerns.\par Pt. verbalizes understanding. \par

## 2020-02-24 NOTE — HISTORY OF PRESENT ILLNESS
[TextBox_4] : Ms. Primrose is a 69 year old female presenting to the office for a sick visit. \par She has PMHx of allergic rhinitis, eosinophilic/moderate persistent asthma, ALIS, GERD, and shortness of breath. \par \par Her chief complaint is productive cough x 2 weeks.\par \par She notes that her nasal mucus and sputum from her cough is greenish in color. She c/o sinus congestion, headaches, fatigue, decreased appetite, SOB @ rest and exertion, chills, nausea, and dizziness.  She states she has intermittent "rubbing/friction" at the LL, where she had a left sided diaphragm plication 20 years ago. She states she has chronic left lower back pain and leg pain which she is currently on Oxycodone 5 mg for. \par

## 2020-02-24 NOTE — REVIEW OF SYSTEMS
[Fatigue] : fatigue [Chills] : chills [Poor Appetite] : poor appetite [Nasal Congestion] : nasal congestion [Sinus Problems] : sinus problems [Cough] : cough [Chest Tightness] : chest tightness [Sputum] : sputum [Dyspnea] : dyspnea [Nausea] : nausea [Headache] : headache [Dizziness] : dizziness [Depression] : depression [Anxiety] : anxiety [Thyroid Problem] : thyroid problem [Negative] : Hematologic [Fever] : no fever [Sore Throat] : no sore throat [Postnasal Drip] : no postnasal drip [Wheezing] : no wheezing [GERD] : no gerd [Vomiting] : no vomiting [Diarrhea] : no diarrhea [TextBox_144] : Hypothyroidism 0.75 mcg Synthroid.

## 2020-03-12 ENCOUNTER — APPOINTMENT (OUTPATIENT)
Dept: PULMONOLOGY | Facility: CLINIC | Age: 70
End: 2020-03-12

## 2020-03-16 ENCOUNTER — APPOINTMENT (OUTPATIENT)
Dept: PULMONOLOGY | Facility: CLINIC | Age: 70
End: 2020-03-16

## 2020-03-25 ENCOUNTER — APPOINTMENT (OUTPATIENT)
Dept: PULMONOLOGY | Facility: CLINIC | Age: 70
End: 2020-03-25
Payer: MEDICARE

## 2020-03-25 VITALS
DIASTOLIC BLOOD PRESSURE: 80 MMHG | HEIGHT: 55 IN | BODY MASS INDEX: 22.21 KG/M2 | TEMPERATURE: 98.3 F | RESPIRATION RATE: 17 BRPM | OXYGEN SATURATION: 96 % | HEART RATE: 122 BPM | SYSTOLIC BLOOD PRESSURE: 102 MMHG | WEIGHT: 96 LBS

## 2020-03-25 DIAGNOSIS — Z72.820 SLEEP DEPRIVATION: ICD-10-CM

## 2020-03-25 PROCEDURE — 99214 OFFICE O/P EST MOD 30 MIN: CPT | Mod: 25

## 2020-03-25 NOTE — PHYSICAL EXAM

## 2020-03-25 NOTE — REASON FOR VISIT
[Follow-Up] : a follow-up visit [Asthma] : asthma [Cough] : cough [Shortness of Breath] : shortness of Breath [FreeTextEntry1] : pre-op for spinal stimulation, allergic rhinitis, eosinophilic/moderate persistent asthma, ALIS GERD, and shortness of breath [FreeTextEntry2] : sinus symptoms

## 2020-03-25 NOTE — HISTORY OF PRESENT ILLNESS
[FreeTextEntry1] : Ms. Primrose is a 69 year old female presenting to the office for a sick visit. She has PMHx of allergic rhinitis, eosinophilic/moderate persistent asthma, ALIS, GERD, and shortness of breath. Her chief complaint is\par -head congestion \par -swollen neck, tongue, \par -green mucus from nose \par -denies CP/pressure\par -dysphagia liquids, keeping up w fluids \par -put on Biaxin 200 MG BID steroids from [facility] \par -notes diarrhea\par -denies taking new medications, vitamins, or supplements. \par -states having a rash\par -notes sleep is not good\par \par denies any headaches, nausea, vomiting, fever, chills, sweats, chest pain, chest pressure, diarrhea, constipation, dysphagia, dizziness, leg swelling, leg pain, itchy eyes, itchy ears, heartburn, reflux, or sour taste in the mouth.

## 2020-03-25 NOTE — ADDENDUM
[FreeTextEntry1] : Documented by Mike Prajapati acting as a scribe for Dr. Ketan Mrofin on 03/25/2020.\par \par All medical record entries made by the Scribe were at my, Dr. Ketan Morfin's, direction and personally dictated by me on 03/25/2020. I have reviewed the chart and agree that the record accurately reflects my personal performance of the history, physical exam, assessment and plan. I have also personally directed, reviewed, and agree with the discharge instructions.

## 2020-03-25 NOTE — ASSESSMENT
[FreeTextEntry1] : Ms. Primrose is a 69 year old female with history of COPD / asthma / allergy / paralyzed hemidiaphragm / cardiac disease / CHF/ eosinophilic asthma. She presents to the office for acute upon chronic sinusitis \par Her shortness of breath is multifactorial due to:\par -cardiac/ mild congestive heart failure\par -severe persistent asthma\par -allergic rhinitis/post nasal drip syndrome\par -paralyzed hemidiaphragm s/p plication\par -poor breathing mechanics and anxiety \par \par problem 1: COPD/ asthma \par -continue Xopenex 0.63 by the nebulizer up to QID \par -continue to use Advair 500 at 1 inhalation BID\par -continue Spiriva Respimat 2 inhalations QD; change to Tudorza at 1 inhalation BID\par -continue Singulair 10 mg before bed\par -continue to use Proventil PRN\par -continue Acapella device to be used multiple times daily \par -Asthma is believed to be caused by inherited (genetic) and environmental factor, but its exact cause is unknown. Asthma may be triggered by allergens, lung infections, or irritants in the air. Asthma triggers are different for each person \par -Inhaler technique reviewed as well as oral hygiene techniques reviewed with patient. Avoidance of cold air, extremes of temperature, rescue inhaler should be used before exercise. Order of medication reviewed with patient. Recommended use of a cool mist humidifier in the bedroom\par \par problem 2: eosinophilic asthma\par -patient receiving Fasenra and has had 8 injections \par -The safety and efficacy of Nucala was established in three double-blind, randomized, placebo-controlled trials in patients with severe asthma. Compared to a placebo, patients with severe asthma receiving Nucala had fewer exacerbation requiring hospitalization and/or emergency department visits, and a longer time to first exacerbation. In addition, patients with severe asthma receiving Nucala or Fasenra experienced greater reductions in their daily maintenance oral corticosteroid dose, while maintaining asthma control compared with patients receiving placebo. Treatment with Nucala did not result in a significant improvement in lung function, as measured by the volume of air exhaled by patients in one second. The most common side effects include: headache, injection site reactions, back pain, weakness, and fatigue; hypersensitivity reactions can occur within hours or days including swelling of the face, mouth, and tongue, fainting, dizziness, hives, breathing problems, and rash; herpes zoster infections have occurred. The drug is a monoclonal antibody that inhibits interleukin-5 which helps regular eosinophils, a type of white blood cell that contributes to asthma. The over-production of eosinophils can cause inflammation in the lungs, increasing the frequency of asthma attacks. Patients must also take other medications, including high dose inhaled corticosteroids and at least one additional asthma drug\par \par problem 3: allergic rhinitis/post nasal drip syndrome \par -recommended Navage sinus rinse\par -continue Qnasl 1 sniff each nostril BID \par -continue Olopatadine 0.6% 1 sniff each nostril BID \par -add claritin-d 24hr AM\par -continue Xyzal 5 mg before bed \par s/p Blood work: eosinophil level, IgE level, and immunocap testing \par -Environmental measures for allergies were encouraged including mattress and pillow cover, air purifier, and environmental controls.\par \par Problem 3A: Acute sinusitis (chronic) ?allergy\par -add septrin 500 MG BID x 2 weeks 28 pills \par -get nasal navage\par You have a clinical scenario most c/w acute sinusitis the etiology of which is unknown (bacterial/viral/fungal). Hydration, steaming, intranasal saline is needed.\par \par problem 4: GERD\par -continue to use good diet and good timing of meals\par -Rule of 2s: avoid eating too much, eating too late, eating too spicy, eating two hours before bed\par -Things to avoid including overeating, spicy foods, tight clothing, eating within three hours of bed, this list is not all inclusive. \par -For treatment of reflux, possible options discussed including diet control, H2 blockers, PPIs, as well as coating motility agents discussed as treatment options. Timing of meals and proximity of last meal to sleep were discussed. If symptoms persist, a formal gastrointestinal evaluation is needed. \par \par problem 5: ALIS\par -she has refused any treatment or diagnosing\par -she is being recommended to use Oxy-Aid by [facility]\par -Discussed the risks/associations with coronary artery disease, atrial fibrillation, arrhythmia, memory loss, issues with concentration, stroke risk, hypertension, nocturia, chronic reflux/Kimbrough’s esophagus some but not all inclusive. Treatment options discussed including CPAP/BiPAP machine, oral appliance, ProVent therapy, Oxy-Aid by [facility], new technologies, or positional sleep.\par \par problem 6: cardiac component \par -she is being recommended to have a consultation with Dr. Reese ([facility]) ([facility]) (3/22 pending)\par \par Problem 7: Anxiety\par - Continue Klonopin 0.5 mg prn \par \par problem 8: health maintenance \par -recommended to have medical marijuana evaluation with Dr. Alma Matias\par -s/p 2019 yearly flu shot \par -recommended strep pneumonia vaccines: Prevnar-13 vaccine, followed by Pneumo vaccine 23 one year following\par -recommended early intervention for URIs\par -recommended regular osteoporosis evaluations\par -recommended early dermatological evaluations\par -recommended after the age of 50 to the age of 70, colonoscopy every 5 years \par  \par \par F/U in 2 months\par She is encouraged to call with any changes,concerns, or questions.

## 2020-04-21 ENCOUNTER — APPOINTMENT (OUTPATIENT)
Dept: PULMONOLOGY | Facility: CLINIC | Age: 70
End: 2020-04-21

## 2020-05-06 ENCOUNTER — APPOINTMENT (OUTPATIENT)
Dept: PULMONOLOGY | Facility: CLINIC | Age: 70
End: 2020-05-06

## 2020-05-13 ENCOUNTER — NON-APPOINTMENT (OUTPATIENT)
Age: 70
End: 2020-05-13

## 2020-05-13 ENCOUNTER — APPOINTMENT (OUTPATIENT)
Dept: PULMONOLOGY | Facility: CLINIC | Age: 70
End: 2020-05-13
Payer: MEDICARE

## 2020-05-13 ENCOUNTER — APPOINTMENT (OUTPATIENT)
Dept: CARDIOLOGY | Facility: CLINIC | Age: 70
End: 2020-05-13
Payer: MEDICARE

## 2020-05-13 VITALS
RESPIRATION RATE: 16 BRPM | DIASTOLIC BLOOD PRESSURE: 76 MMHG | HEART RATE: 96 BPM | HEIGHT: 55 IN | BODY MASS INDEX: 22.21 KG/M2 | WEIGHT: 96 LBS | SYSTOLIC BLOOD PRESSURE: 118 MMHG

## 2020-05-13 VITALS
DIASTOLIC BLOOD PRESSURE: 70 MMHG | TEMPERATURE: 97.9 F | HEIGHT: 55 IN | SYSTOLIC BLOOD PRESSURE: 110 MMHG | BODY MASS INDEX: 22.21 KG/M2 | WEIGHT: 96 LBS | OXYGEN SATURATION: 96 % | HEART RATE: 108 BPM | RESPIRATION RATE: 16 BRPM

## 2020-05-13 VITALS — BODY MASS INDEX: 22.21 KG/M2 | WEIGHT: 96 LBS | HEIGHT: 55 IN

## 2020-05-13 DIAGNOSIS — E03.9 HYPOTHYROIDISM, UNSPECIFIED: ICD-10-CM

## 2020-05-13 PROCEDURE — 99204 OFFICE O/P NEW MOD 45 MIN: CPT

## 2020-05-13 PROCEDURE — 93000 ELECTROCARDIOGRAM COMPLETE: CPT

## 2020-05-13 PROCEDURE — 96372 THER/PROPH/DIAG INJ SC/IM: CPT

## 2020-05-13 RX ORDER — LEVOTHYROXINE SODIUM 0.07 MG/1
75 TABLET ORAL DAILY
Refills: 0 | Status: ACTIVE | COMMUNITY
Start: 2019-11-05

## 2020-05-13 RX ORDER — PREDNISONE 10 MG/1
10 TABLET ORAL
Qty: 45 | Refills: 0 | Status: COMPLETED | COMMUNITY
Start: 2019-12-03 | End: 2020-05-13

## 2020-05-13 RX ORDER — ASPIRIN 81 MG
81 TABLET,CHEWABLE ORAL
Refills: 0 | Status: COMPLETED | COMMUNITY
End: 2020-05-13

## 2020-05-13 RX ORDER — HYDROCODONE BITARTRATE AND ACETAMINOPHEN 5; 325 MG/1; MG/1
5-325 TABLET ORAL
Qty: 30 | Refills: 0 | Status: COMPLETED | COMMUNITY
Start: 2019-08-08 | End: 2020-05-13

## 2020-05-13 RX ORDER — CEFUROXIME AXETIL 500 MG/1
500 TABLET ORAL
Qty: 28 | Refills: 0 | Status: COMPLETED | COMMUNITY
Start: 2020-03-25 | End: 2020-05-13

## 2020-05-13 RX ORDER — BENRALIZUMAB 30 MG/ML
30 INJECTION, SOLUTION SUBCUTANEOUS
Qty: 0 | Refills: 0 | Status: COMPLETED | OUTPATIENT
Start: 2020-05-13

## 2020-05-13 RX ORDER — ONDANSETRON 4 MG/1
4 TABLET, ORALLY DISINTEGRATING ORAL
Qty: 42 | Refills: 0 | Status: DISCONTINUED | COMMUNITY
Start: 2019-12-17 | End: 2020-05-13

## 2020-05-13 RX ORDER — BENRALIZUMAB 30 MG/ML
30 INJECTION, SOLUTION SUBCUTANEOUS
Qty: 1 | Refills: 8 | Status: DISCONTINUED | OUTPATIENT
Start: 2018-09-07 | End: 2020-05-13

## 2020-05-26 ENCOUNTER — INPATIENT (INPATIENT)
Facility: HOSPITAL | Age: 70
LOS: 1 days | Discharge: HOME | DRG: 392 | End: 2020-05-28
Attending: HOSPITALIST | Admitting: STUDENT IN AN ORGANIZED HEALTH CARE EDUCATION/TRAINING PROGRAM
Payer: MEDICARE

## 2020-05-26 VITALS
OXYGEN SATURATION: 96 % | HEART RATE: 104 BPM | DIASTOLIC BLOOD PRESSURE: 84 MMHG | SYSTOLIC BLOOD PRESSURE: 124 MMHG | RESPIRATION RATE: 18 BRPM | HEIGHT: 55 IN | TEMPERATURE: 98 F | WEIGHT: 98.11 LBS

## 2020-05-26 DIAGNOSIS — I25.10 ATHEROSCLEROTIC HEART DISEASE OF NATIVE CORONARY ARTERY WITHOUT ANGINA PECTORIS: ICD-10-CM

## 2020-05-26 DIAGNOSIS — Z98.890 OTHER SPECIFIED POSTPROCEDURAL STATES: Chronic | ICD-10-CM

## 2020-05-26 DIAGNOSIS — J45.909 UNSPECIFIED ASTHMA, UNCOMPLICATED: ICD-10-CM

## 2020-05-26 DIAGNOSIS — E03.9 HYPOTHYROIDISM, UNSPECIFIED: ICD-10-CM

## 2020-05-26 DIAGNOSIS — R10.32 LEFT LOWER QUADRANT PAIN: ICD-10-CM

## 2020-05-26 DIAGNOSIS — F32.9 MAJOR DEPRESSIVE DISORDER, SINGLE EPISODE, UNSPECIFIED: ICD-10-CM

## 2020-05-26 DIAGNOSIS — R13.0 APHAGIA: ICD-10-CM

## 2020-05-26 DIAGNOSIS — K21.9 GASTRO-ESOPHAGEAL REFLUX DISEASE WITHOUT ESOPHAGITIS: ICD-10-CM

## 2020-05-26 DIAGNOSIS — Z90.49 ACQUIRED ABSENCE OF OTHER SPECIFIED PARTS OF DIGESTIVE TRACT: Chronic | ICD-10-CM

## 2020-05-26 DIAGNOSIS — Z90.79 ACQUIRED ABSENCE OF OTHER GENITAL ORGAN(S): Chronic | ICD-10-CM

## 2020-05-26 DIAGNOSIS — Z02.9 ENCOUNTER FOR ADMINISTRATIVE EXAMINATIONS, UNSPECIFIED: ICD-10-CM

## 2020-05-26 DIAGNOSIS — N39.0 URINARY TRACT INFECTION, SITE NOT SPECIFIED: ICD-10-CM

## 2020-05-26 LAB
ALBUMIN SERPL ELPH-MCNC: 4.4 G/DL — SIGNIFICANT CHANGE UP (ref 3.3–5)
ALP SERPL-CCNC: 77 U/L — SIGNIFICANT CHANGE UP (ref 40–120)
ALT FLD-CCNC: 15 U/L — SIGNIFICANT CHANGE UP (ref 10–45)
ANION GAP SERPL CALC-SCNC: 11 MMOL/L — SIGNIFICANT CHANGE UP (ref 5–17)
ANION GAP SERPL CALC-SCNC: 11 MMOL/L — SIGNIFICANT CHANGE UP (ref 5–17)
APPEARANCE UR: CLEAR — SIGNIFICANT CHANGE UP
AST SERPL-CCNC: 60 U/L — HIGH (ref 10–40)
BASOPHILS # BLD AUTO: 0.01 K/UL — SIGNIFICANT CHANGE UP (ref 0–0.2)
BASOPHILS NFR BLD AUTO: 0.2 % — SIGNIFICANT CHANGE UP (ref 0–2)
BILIRUB SERPL-MCNC: 0.8 MG/DL — SIGNIFICANT CHANGE UP (ref 0.2–1.2)
BILIRUB UR-MCNC: NEGATIVE — SIGNIFICANT CHANGE UP
BUN SERPL-MCNC: 7 MG/DL — SIGNIFICANT CHANGE UP (ref 7–23)
BUN SERPL-MCNC: 8 MG/DL — SIGNIFICANT CHANGE UP (ref 7–23)
CALCIUM SERPL-MCNC: 10.1 MG/DL — SIGNIFICANT CHANGE UP (ref 8.4–10.5)
CALCIUM SERPL-MCNC: 9.3 MG/DL — SIGNIFICANT CHANGE UP (ref 8.4–10.5)
CHLORIDE SERPL-SCNC: 101 MMOL/L — SIGNIFICANT CHANGE UP (ref 96–108)
CHLORIDE SERPL-SCNC: 103 MMOL/L — SIGNIFICANT CHANGE UP (ref 96–108)
CO2 SERPL-SCNC: 24 MMOL/L — SIGNIFICANT CHANGE UP (ref 22–31)
CO2 SERPL-SCNC: 24 MMOL/L — SIGNIFICANT CHANGE UP (ref 22–31)
COLOR SPEC: SIGNIFICANT CHANGE UP
CREAT SERPL-MCNC: 0.68 MG/DL — SIGNIFICANT CHANGE UP (ref 0.5–1.3)
CREAT SERPL-MCNC: 0.73 MG/DL — SIGNIFICANT CHANGE UP (ref 0.5–1.3)
DIFF PNL FLD: NEGATIVE — SIGNIFICANT CHANGE UP
EOSINOPHIL # BLD AUTO: 0 K/UL — SIGNIFICANT CHANGE UP (ref 0–0.5)
EOSINOPHIL NFR BLD AUTO: 0 % — SIGNIFICANT CHANGE UP (ref 0–6)
GAS PNL BLDV: SIGNIFICANT CHANGE UP
GLUCOSE SERPL-MCNC: 82 MG/DL — SIGNIFICANT CHANGE UP (ref 70–99)
GLUCOSE SERPL-MCNC: 88 MG/DL — SIGNIFICANT CHANGE UP (ref 70–99)
GLUCOSE UR QL: NEGATIVE — SIGNIFICANT CHANGE UP
HCT VFR BLD CALC: 48.4 % — HIGH (ref 34.5–45)
HGB BLD-MCNC: 15.9 G/DL — HIGH (ref 11.5–15.5)
IMM GRANULOCYTES NFR BLD AUTO: 0.3 % — SIGNIFICANT CHANGE UP (ref 0–1.5)
KETONES UR-MCNC: SIGNIFICANT CHANGE UP
LEUKOCYTE ESTERASE UR-ACNC: ABNORMAL
LIDOCAIN IGE QN: 45 U/L — SIGNIFICANT CHANGE UP (ref 7–60)
LYMPHOCYTES # BLD AUTO: 1.94 K/UL — SIGNIFICANT CHANGE UP (ref 1–3.3)
LYMPHOCYTES # BLD AUTO: 33.4 % — SIGNIFICANT CHANGE UP (ref 13–44)
MCHC RBC-ENTMCNC: 29.6 PG — SIGNIFICANT CHANGE UP (ref 27–34)
MCHC RBC-ENTMCNC: 32.9 GM/DL — SIGNIFICANT CHANGE UP (ref 32–36)
MCV RBC AUTO: 90 FL — SIGNIFICANT CHANGE UP (ref 80–100)
MONOCYTES # BLD AUTO: 0.65 K/UL — SIGNIFICANT CHANGE UP (ref 0–0.9)
MONOCYTES NFR BLD AUTO: 11.2 % — SIGNIFICANT CHANGE UP (ref 2–14)
NEUTROPHILS # BLD AUTO: 3.19 K/UL — SIGNIFICANT CHANGE UP (ref 1.8–7.4)
NEUTROPHILS NFR BLD AUTO: 54.9 % — SIGNIFICANT CHANGE UP (ref 43–77)
NITRITE UR-MCNC: NEGATIVE — SIGNIFICANT CHANGE UP
NRBC # BLD: 0 /100 WBCS — SIGNIFICANT CHANGE UP (ref 0–0)
PH UR: 6 — SIGNIFICANT CHANGE UP (ref 5–8)
PLATELET # BLD AUTO: 312 K/UL — SIGNIFICANT CHANGE UP (ref 150–400)
POTASSIUM SERPL-MCNC: 3.8 MMOL/L — SIGNIFICANT CHANGE UP (ref 3.5–5.3)
POTASSIUM SERPL-MCNC: 7.1 MMOL/L — CRITICAL HIGH (ref 3.5–5.3)
POTASSIUM SERPL-SCNC: 3.8 MMOL/L — SIGNIFICANT CHANGE UP (ref 3.5–5.3)
POTASSIUM SERPL-SCNC: 7.1 MMOL/L — CRITICAL HIGH (ref 3.5–5.3)
PROT SERPL-MCNC: 7.1 G/DL — SIGNIFICANT CHANGE UP (ref 6–8.3)
PROT UR-MCNC: NEGATIVE — SIGNIFICANT CHANGE UP
RBC # BLD: 5.38 M/UL — HIGH (ref 3.8–5.2)
RBC # FLD: 13.5 % — SIGNIFICANT CHANGE UP (ref 10.3–14.5)
SARS-COV-2 RNA SPEC QL NAA+PROBE: SIGNIFICANT CHANGE UP
SODIUM SERPL-SCNC: 136 MMOL/L — SIGNIFICANT CHANGE UP (ref 135–145)
SODIUM SERPL-SCNC: 138 MMOL/L — SIGNIFICANT CHANGE UP (ref 135–145)
SP GR SPEC: 1.01 — SIGNIFICANT CHANGE UP (ref 1.01–1.02)
TROPONIN T, HIGH SENSITIVITY RESULT: 11 NG/L — SIGNIFICANT CHANGE UP (ref 0–51)
TROPONIN T, HIGH SENSITIVITY RESULT: 14 NG/L — SIGNIFICANT CHANGE UP (ref 0–51)
UROBILINOGEN FLD QL: NEGATIVE — SIGNIFICANT CHANGE UP
WBC # BLD: 5.81 K/UL — SIGNIFICANT CHANGE UP (ref 3.8–10.5)
WBC # FLD AUTO: 5.81 K/UL — SIGNIFICANT CHANGE UP (ref 3.8–10.5)

## 2020-05-26 PROCEDURE — 99223 1ST HOSP IP/OBS HIGH 75: CPT

## 2020-05-26 PROCEDURE — 71045 X-RAY EXAM CHEST 1 VIEW: CPT | Mod: 26

## 2020-05-26 PROCEDURE — 74177 CT ABD & PELVIS W/CONTRAST: CPT | Mod: 26

## 2020-05-26 PROCEDURE — 99285 EMERGENCY DEPT VISIT HI MDM: CPT | Mod: CS,GC

## 2020-05-26 RX ORDER — BUDESONIDE AND FORMOTEROL FUMARATE DIHYDRATE 160; 4.5 UG/1; UG/1
2 AEROSOL RESPIRATORY (INHALATION)
Refills: 0 | Status: DISCONTINUED | OUTPATIENT
Start: 2020-05-26 | End: 2020-05-28

## 2020-05-26 RX ORDER — MORPHINE SULFATE 50 MG/1
2 CAPSULE, EXTENDED RELEASE ORAL EVERY 6 HOURS
Refills: 0 | Status: DISCONTINUED | OUTPATIENT
Start: 2020-05-26 | End: 2020-05-27

## 2020-05-26 RX ORDER — OXYCODONE HYDROCHLORIDE 5 MG/1
5 TABLET ORAL EVERY 4 HOURS
Refills: 0 | Status: DISCONTINUED | OUTPATIENT
Start: 2020-05-26 | End: 2020-05-28

## 2020-05-26 RX ORDER — ASPIRIN/CALCIUM CARB/MAGNESIUM 324 MG
81 TABLET ORAL DAILY
Refills: 0 | Status: DISCONTINUED | OUTPATIENT
Start: 2020-05-26 | End: 2020-05-28

## 2020-05-26 RX ORDER — NITROFURANTOIN MACROCRYSTAL 50 MG
100 CAPSULE ORAL
Refills: 0 | Status: DISCONTINUED | OUTPATIENT
Start: 2020-05-26 | End: 2020-05-28

## 2020-05-26 RX ORDER — ALBUTEROL 90 UG/1
1 AEROSOL, METERED ORAL ONCE
Refills: 0 | Status: COMPLETED | OUTPATIENT
Start: 2020-05-26 | End: 2020-05-26

## 2020-05-26 RX ORDER — CLOPIDOGREL BISULFATE 75 MG/1
75 TABLET, FILM COATED ORAL DAILY
Refills: 0 | Status: DISCONTINUED | OUTPATIENT
Start: 2020-05-26 | End: 2020-05-28

## 2020-05-26 RX ORDER — ACETAMINOPHEN 500 MG
650 TABLET ORAL EVERY 4 HOURS
Refills: 0 | Status: DISCONTINUED | OUTPATIENT
Start: 2020-05-26 | End: 2020-05-28

## 2020-05-26 RX ORDER — SENNA PLUS 8.6 MG/1
2 TABLET ORAL AT BEDTIME
Refills: 0 | Status: DISCONTINUED | OUTPATIENT
Start: 2020-05-26 | End: 2020-05-28

## 2020-05-26 RX ORDER — FAMOTIDINE 10 MG/ML
20 INJECTION INTRAVENOUS ONCE
Refills: 0 | Status: COMPLETED | OUTPATIENT
Start: 2020-05-26 | End: 2020-05-26

## 2020-05-26 RX ORDER — TIOTROPIUM BROMIDE 18 UG/1
1 CAPSULE ORAL; RESPIRATORY (INHALATION) DAILY
Refills: 0 | Status: DISCONTINUED | OUTPATIENT
Start: 2020-05-26 | End: 2020-05-28

## 2020-05-26 RX ORDER — SODIUM CHLORIDE 9 MG/ML
1000 INJECTION INTRAMUSCULAR; INTRAVENOUS; SUBCUTANEOUS
Refills: 0 | Status: DISCONTINUED | OUTPATIENT
Start: 2020-05-26 | End: 2020-05-26

## 2020-05-26 RX ORDER — ALBUTEROL 90 UG/1
2 AEROSOL, METERED ORAL EVERY 6 HOURS
Refills: 0 | Status: DISCONTINUED | OUTPATIENT
Start: 2020-05-26 | End: 2020-05-28

## 2020-05-26 RX ORDER — ONDANSETRON 8 MG/1
4 TABLET, FILM COATED ORAL ONCE
Refills: 0 | Status: COMPLETED | OUTPATIENT
Start: 2020-05-26 | End: 2020-05-26

## 2020-05-26 RX ORDER — SODIUM CHLORIDE 9 MG/ML
1000 INJECTION INTRAMUSCULAR; INTRAVENOUS; SUBCUTANEOUS
Refills: 0 | Status: COMPLETED | OUTPATIENT
Start: 2020-05-26 | End: 2020-05-27

## 2020-05-26 RX ORDER — MORPHINE SULFATE 50 MG/1
2 CAPSULE, EXTENDED RELEASE ORAL ONCE
Refills: 0 | Status: DISCONTINUED | OUTPATIENT
Start: 2020-05-26 | End: 2020-05-26

## 2020-05-26 RX ORDER — LINACLOTIDE 145 UG/1
290 CAPSULE, GELATIN COATED ORAL DAILY
Refills: 0 | Status: DISCONTINUED | OUTPATIENT
Start: 2020-05-26 | End: 2020-05-28

## 2020-05-26 RX ORDER — TRAZODONE HCL 50 MG
0.5 TABLET ORAL
Qty: 0 | Refills: 0 | DISCHARGE

## 2020-05-26 RX ORDER — FAMOTIDINE 10 MG/ML
20 INJECTION INTRAVENOUS
Refills: 0 | Status: DISCONTINUED | OUTPATIENT
Start: 2020-05-26 | End: 2020-05-28

## 2020-05-26 RX ORDER — FAMOTIDINE 10 MG/ML
20 INJECTION INTRAVENOUS
Refills: 0 | Status: DISCONTINUED | OUTPATIENT
Start: 2020-05-26 | End: 2020-05-26

## 2020-05-26 RX ORDER — SPIRONOLACTONE 25 MG/1
12.5 TABLET, FILM COATED ORAL DAILY
Refills: 0 | Status: DISCONTINUED | OUTPATIENT
Start: 2020-05-26 | End: 2020-05-28

## 2020-05-26 RX ORDER — SERTRALINE 25 MG/1
12.5 TABLET, FILM COATED ORAL DAILY
Refills: 0 | Status: DISCONTINUED | OUTPATIENT
Start: 2020-05-26 | End: 2020-05-28

## 2020-05-26 RX ORDER — POLYETHYLENE GLYCOL 3350 17 G/17G
17 POWDER, FOR SOLUTION ORAL ONCE
Refills: 0 | Status: COMPLETED | OUTPATIENT
Start: 2020-05-26 | End: 2020-05-26

## 2020-05-26 RX ORDER — ONDANSETRON 8 MG/1
4 TABLET, FILM COATED ORAL EVERY 6 HOURS
Refills: 0 | Status: DISCONTINUED | OUTPATIENT
Start: 2020-05-26 | End: 2020-05-28

## 2020-05-26 RX ORDER — CLONAZEPAM 1 MG
0.5 TABLET ORAL AT BEDTIME
Refills: 0 | Status: DISCONTINUED | OUTPATIENT
Start: 2020-05-26 | End: 2020-05-28

## 2020-05-26 RX ORDER — LEVOTHYROXINE SODIUM 125 MCG
75 TABLET ORAL DAILY
Refills: 0 | Status: DISCONTINUED | OUTPATIENT
Start: 2020-05-26 | End: 2020-05-28

## 2020-05-26 RX ORDER — SODIUM CHLORIDE 9 MG/ML
1000 INJECTION INTRAMUSCULAR; INTRAVENOUS; SUBCUTANEOUS ONCE
Refills: 0 | Status: COMPLETED | OUTPATIENT
Start: 2020-05-26 | End: 2020-05-26

## 2020-05-26 RX ADMIN — POLYETHYLENE GLYCOL 3350 17 GRAM(S): 17 POWDER, FOR SOLUTION ORAL at 20:08

## 2020-05-26 RX ADMIN — ONDANSETRON 4 MILLIGRAM(S): 8 TABLET, FILM COATED ORAL at 14:47

## 2020-05-26 RX ADMIN — MORPHINE SULFATE 2 MILLIGRAM(S): 50 CAPSULE, EXTENDED RELEASE ORAL at 14:47

## 2020-05-26 RX ADMIN — Medication 1 ENEMA: at 20:08

## 2020-05-26 RX ADMIN — ONDANSETRON 4 MILLIGRAM(S): 8 TABLET, FILM COATED ORAL at 20:07

## 2020-05-26 RX ADMIN — ALBUTEROL 1 PUFF(S): 90 AEROSOL, METERED ORAL at 17:26

## 2020-05-26 RX ADMIN — Medication 30 MILLILITER(S): at 20:07

## 2020-05-26 RX ADMIN — SODIUM CHLORIDE 1000 MILLILITER(S): 9 INJECTION INTRAMUSCULAR; INTRAVENOUS; SUBCUTANEOUS at 15:22

## 2020-05-26 RX ADMIN — SODIUM CHLORIDE 1000 MILLILITER(S): 9 INJECTION INTRAMUSCULAR; INTRAVENOUS; SUBCUTANEOUS at 17:26

## 2020-05-26 RX ADMIN — ALBUTEROL 1 PUFF(S): 90 AEROSOL, METERED ORAL at 22:02

## 2020-05-26 RX ADMIN — FAMOTIDINE 20 MILLIGRAM(S): 10 INJECTION INTRAVENOUS at 20:16

## 2020-05-26 NOTE — H&P ADULT - NSHPOUTPATIENTPROVIDERS_GEN_ALL_CORE
Dr. Reese ( cardio) [phone],   Dr. Morfin ( pulm)  [phone]  Dr Daya Polanco PMD Dr. Carlson ( cardio) [phone],   Dr. Morfin ( pulm)  [phone]  Dr Jose Rothman ( GI )   Dr Daya Polanco PMD Dr. Carlson ( cardio) [phone],   Dr. Morfin ( pulm)  [phone]  Dr Garica ( GI )   Dr Daya Polanco PMD

## 2020-05-26 NOTE — ED PROVIDER NOTE - ATTENDING CONTRIBUTION TO CARE
1 wk llq pain w associated nausea / epigastric. feels bloated  llq / epigastric ttp  ctap / labs / sx

## 2020-05-26 NOTE — ED ADULT NURSE NOTE - NSIMPLEMENTINTERV_GEN_ALL_ED
Implemented All Universal Safety Interventions:  [place] to call system. Call bell, personal items and telephone within reach. Instruct patient to call for assistance. Room bathroom lighting operational. Non-slip footwear when patient is off stretcher. Physically safe environment: no spills, clutter or unnecessary equipment. Stretcher in lowest position, wheels locked, appropriate side rails in place.

## 2020-05-26 NOTE — ED PROVIDER NOTE - CLINICAL SUMMARY MEDICAL DECISION MAKING FREE TEXT BOX
multiple abd surg, p.w LLQ and last bm 3 days ago. w.o fever and no vomiting. nausea and abdominal tenderness no guarding or distension, concerning for sbo vs intra abdominal infection, will get labs, vbg and ct ap. symptom control. no sign of shortness of breath or chest pain. but will sent trop due to age and risk factors.

## 2020-05-26 NOTE — H&P ADULT - REASON FOR ADMISSION
abdominal pain and poor po intake x abdominal pain and poor po intake x  days abdominal pain and poor po intake x 7 days

## 2020-05-26 NOTE — H&P ADULT - HISTORY OF PRESENT ILLNESS
Patient is a 70 year old female with history of CAD, Stented coronary artery - 8 stents, MI ( 2017) - in Plavix & aspirin, Left ear deafness, Severe asthma, Left hemidiaphragm s/p plication, moderate thoracic scoliosis , Osteoporosis, diverticulitis s/p colon resection and hysterectyomypresents for left lower quadrant pain for the past Patient is a 70 year old female with history of CAD, Stented coronary artery - 8 stents, MI ( 2017) - in Plavix & aspirin, Left ear deafness, Severe asthma, Left hemidiaphragm s/p plication, moderate thoracic scoliosis , Osteoporosis, diverticulitis s/p colon resection and hysterectyomy presents for left lower quadrant pain for the past week. Patient reports dull 6/10 pain located in left lower quadrant non radiating ,  associated with nausea but no vomiting ,  no blood in stool , last bowel movement was about three days piror to admission, she also reports dysphagia GERD,  associated with  poor appetite and decreased po intake during the past week , not responding to zofran. Started on a PPI bid by her GI about one week prior to admission.  Patient also noted increased urinary frequency and dysuria.   Of note, patient reports discontinuing her Seroquel about one month prior to admission and recently started Zoloft.  She has no fever , but does report occasional chills.    last endoscopy about one year ago reportedly without major findings.     [facility] personally reviewed: Reference #: [id number]

## 2020-05-26 NOTE — ED PROVIDER NOTE - PMH
Brachial neuritis    CAD (coronary artery disease)    Chronic nonintractable headache, unspecified headache type    Deafness  left  Depression, unspecified depression type    Diverticulitis  sigmoid, treated surgically  Gastroesophageal reflux disease, esophagitis presence not specified    HF (heart failure)  resolved  History of coma  41 yrs ago, while pregnant due to asthma  Hypothyroidism, unspecified type    Insomnia    Moderate scoliosis  thoracic  Myocardial infarction  2016  Paralyzed hemidiaphragm  left - s/p plication  Sciatica    Severe asthma  on advair/ spiriva/ fasenra every 2 mths  Stented coronary artery  x 8 stents

## 2020-05-26 NOTE — H&P ADULT - NSHPREVIEWOFSYSTEMS_GEN_ALL_CORE
CONSTITUTIONAL: No weakness, fevers + chills  EYES/ENT: No visual changes; + dysphagia  NECK: No pain or stiffness  RESPIRATORY: No cough, wheezing, hemoptysis; No shortness of breath  CARDIOVASCULAR: No chest pain or palpitations; No lower extremity edema  EXTREMITIES: no le edema, cyanosis, clubbing  GASTROINTESTINAL: + abdominal  pain. + nausea, no vomiting, or hematemesis; No diarrhea + constipation. No melena or hematochezia.  BACK:  chronic  back pain  GENITOURINARY: + dysuria, + frequency  no  hematuria  NEUROLOGICAL: No numbness or weakness  MSK: no joint swelling or pain  SKIN: No itching, burning, rashes, or lesions   PSYCH: no agitation  All other review of systems is negative unless indicated above.

## 2020-05-26 NOTE — ED PROVIDER NOTE - PHYSICAL EXAMINATION
General: NAD, good hygiene, well developed  HENT: Atraumatic, EOMI, no conjunctivae injection, moist mucosa  Neck: normal ROM and trachea midline   Cardiovascular: tachycardiac, S1&2, no M or R, radial pulses equal and b/l  Respiratory: CTABL, no wheezes or crackles, no decreased breath sounds  Abdominal: soft and tender in the LLQ, non distended, neg for guarding, no CVA tenderness   Extremities: no edema of the legs/feet, DP/PT equal b/l  Skin: warm, well perfused  Neurologic: nonfocal, AAOx3  Psych: normal mood and affect

## 2020-05-26 NOTE — ED PROVIDER NOTE - NS ED ROS FT
GENERAL: No fever or chills, weight changes, nightsweats  EYES: no change in vision  HEENT: no dysplasia, odynophagia, ear pain, rhinorrhea, epistasis   CARDIAC: no chest pain, palpitation   PULMONARY: no productive cough or SOB  GI: HPI   : No changes in urination for pain/freq.   SKIN: no rashes, abnormal bruising or bleeding  NEURO: no headache, numbness/tingling, extremity weakness   MSK: No joint pain

## 2020-05-26 NOTE — H&P ADULT - NSHPLABSRESULTS_GEN_ALL_CORE
Labs personally reviewed:                          15.9   5.81  )-----------( 312      ( 26 May 2020 15:36 )             48.4         138  |  103  |  7   ----------------------------<  82  3.8   |  24  |  0.73    Ca    9.3      26 May 2020 19:10    TPro  7.1  /  Alb  4.4  /  TBili  0.8  /  DBili  x   /  AST  60<H>  /  ALT  15  /  AlkPhos  77          LIVER FUNCTIONS - ( 26 May 2020 15:55 )  Alb: 4.4 g/dL / Pro: 7.1 g/dL / ALK PHOS: 77 U/L / ALT: 15 U/L / AST: 60 U/L / GGT: x             Urinalysis Basic - ( 26 May 2020 16:58 )    Color: Light Yellow / Appearance: Clear / S.014 / pH: x  Gluc: x / Ketone: Trace  / Bili: Negative / Urobili: Negative   Blood: x / Protein: Negative / Nitrite: Negative   Leuk Esterase: Moderate / RBC: 1 /hpf / WBC 6 /HPF   Sq Epi: x / Non Sq Epi: 1 /hpf / Bacteria: Negative      CAPILLARY BLOOD GLUCOSE          Imaging:  CXR personally reviewed: no focal opacity  CT abdomen pelvis : No diverticulitis.  EKG personally reviewed: Labs personally reviewed:                          15.9   5.81  )-----------( 312      ( 26 May 2020 15:36 )             48.4         138  |  103  |  7   ----------------------------<  82  3.8   |  24  |  0.73    Ca    9.3      26 May 2020 19:10    TPro  7.1  /  Alb  4.4  /  TBili  0.8  /  DBili  x   /  AST  60<H>  /  ALT  15  /  AlkPhos  77          LIVER FUNCTIONS - ( 26 May 2020 15:55 )  Alb: 4.4 g/dL / Pro: 7.1 g/dL / ALK PHOS: 77 U/L / ALT: 15 U/L / AST: 60 U/L / GGT: x             Urinalysis Basic - ( 26 May 2020 16:58 )    Color: Light Yellow / Appearance: Clear / S.014 / pH: x  Gluc: x / Ketone: Trace  / Bili: Negative / Urobili: Negative   Blood: x / Protein: Negative / Nitrite: Negative   Leuk Esterase: Moderate / RBC: 1 /hpf / WBC 6 /HPF   Sq Epi: x / Non Sq Epi: 1 /hpf / Bacteria: Negative      CAPILLARY BLOOD GLUCOSE          Imaging:  CXR personally reviewed: no focal opacity  CT abdomen pelvis : No diverticulitis.  EKG personally reviewed:  NSR 98 bpm

## 2020-05-26 NOTE — H&P ADULT - NSHPPHYSICALEXAM_GEN_ALL_CORE
Vital Signs Last 24 Hrs  T(C): 36.9 (26 May 2020 22:03), Max: 36.9 (26 May 2020 17:36)  T(F): 98.5 (26 May 2020 22:03), Max: 98.5 (26 May 2020 17:36)  HR: 82 (26 May 2020 22:03) (80 - 104)  BP: 121/86 (26 May 2020 22:03) (121/85 - 135/84)  BP(mean): --  RR: 18 (26 May 2020 22:03) (18 - 18)  SpO2: 96% (26 May 2020 22:03) (96% - 99%) Vital Signs Last 24 Hrs  T(C): 36.9 (26 May 2020 22:03), Max: 36.9 (26 May 2020 17:36)  T(F): 98.5 (26 May 2020 22:03), Max: 98.5 (26 May 2020 17:36)  HR: 82 (26 May 2020 22:03) (80 - 104)  BP: 121/86 (26 May 2020 22:03) (121/85 - 135/84)  BP(mean): --  RR: 18 (26 May 2020 22:03) (18 - 18)  SpO2: 96% (26 May 2020 22:03) (96% - 99%)    GENERAL: No acute distress,  thin appearing , breathing regular non labored   HEAD:  Atraumatic, Normocephalic  ENT: EOMI, PERRLA, conjunctiva and sclera clear,  moist mucosa no pharyngeal erythema or exudates   NECK: supple , no JVD   CHEST/LUNG: Clear to auscultation bilaterally; No wheeze, equal breath sounds bilaterally   BACK: No spinal tenderness,  No CVA tenderness   HEART: Regular rate and rhythm; No murmurs, rubs, or gallops  ABDOMEN: Soft, Nontender, Nondistended; Bowel sounds present  EXTREMITIES:  No clubbing, cyanosis, or edema  MSK: No joint swelling or effusions, ROM intact   PSYCH: Normal behavior, flattened affect  NEUROLOGY: AAOx3, non-focal, cranial nerves intact  SKIN: Normal color, No rashes or lesions

## 2020-05-26 NOTE — ED ADULT NURSE NOTE - OBJECTIVE STATEMENT
70F h.o status asthmatica (last intubation >20 yrs ago), HTN, CAD (stents), sciatica (implanted stimulator), diverticulitis s/p colon resection and hysterectyomy, p.w dull nonradiating LLQ pain and last BM 3 days ago. nausea no vomiting or fever. denied any chest pain, shortness of breath, headaches, dysuria.

## 2020-05-26 NOTE — H&P ADULT - NSICDXPASTMEDICALHX_GEN_ALL_CORE_FT
PAST MEDICAL HISTORY:  Brachial neuritis     CAD (coronary artery disease)     Chronic nonintractable headache, unspecified headache type     Deafness left    Depression, unspecified depression type     Diverticulitis sigmoid, treated surgically    Gastroesophageal reflux disease, esophagitis presence not specified     HF (heart failure) resolved    History of coma 41 yrs ago, while pregnant due to asthma    Hypothyroidism, unspecified type     Insomnia     Moderate scoliosis thoracic    Myocardial infarction 2016    Paralyzed hemidiaphragm left - s/p plication    Sciatica     Severe asthma on advair/ spiriva/ fasenra every 2 mths    Stented coronary artery x 8 stents

## 2020-05-26 NOTE — ED PROVIDER NOTE - CARE PLAN
Principal Discharge DX:	Inability to swallow  Secondary Diagnosis:	Constipation  Secondary Diagnosis:	Left lower quadrant abdominal pain

## 2020-05-26 NOTE — H&P ADULT - NSICDXPASTSURGICALHX_GEN_ALL_CORE_FT
PAST SURGICAL HISTORY:  S/P carpal tunnel release right    S/P inguinal hernia repair left    S/P laparoscopic-assisted sigmoidectomy     S/P BELEN-BSO (total abdominal hysterectomy and bilateral salpingo-oophorectomy)     Status post craniectomy with mesh - left side for migraines    Status post plication of diaphragm left

## 2020-05-26 NOTE — H&P ADULT - PROBLEM SELECTOR PLAN 2
no acute findings on CT, possible 2/2 to constipation , had BM with enema   - continue home Linzess   - senna   - Dulcolax PRN

## 2020-05-26 NOTE — ED PROVIDER NOTE - PROGRESS NOTE DETAILS
Received signout from Kristin DONAHUE - normal CTAP, pt can go home after repeat labs and bowel movement. Pt reports epigatric pain with reflux/nausea/SOB and states she does not feel comfortable going home as she has not been eating for 1 week. Will give GI cocktail, albuterol neb, repeat trop and CXR. Will send COVID swab for possible CDU if symptoms unimproved - Ky Hopson PA-C Reflux improved with GI cocktail. Delta trop and CXR unremarkable. Pt able to make small BM but reports constant LLQ pain. expressed concerns about dysphagia, reports she is having difficulty swallowing her pills/soft foods and is not comfortable going home. Unable to tolerate PO in ED. Will admit for LLQ pain and inability to tolerate PO - Ky Hopson PA-C

## 2020-05-26 NOTE — ED PROVIDER NOTE - OBJECTIVE STATEMENT
70F h.o status asthmatica (last intubation >20 yrs ago), 70F h.o status asthmatica (last intubation >20 yrs ago), HTN, CAD (stents), sciatica (implanted stimulator), diverticulitis s/p colon resection and hysterectyomy, p.w dull nonradiating LLQ pain and last BM 3 days ago. nausea no vomiting or fever. denied any chest pain, shortness of breath, headaches, dysuria.

## 2020-05-26 NOTE — H&P ADULT - PROBLEM SELECTOR PLAN 9
Transitions of Care Status:  1.  Name of PCP: Dr Daya Polanco   2.  PCP Contacted on Admission: [ ] Y    [x] N    3.  PCP contacted at Discharge: [ ] Y    [ ] N    [ ] N/A  4.  Post-Discharge Appointment Date and Location:  5.  Summary of Handoff given to PCP:

## 2020-05-26 NOTE — ED ADULT NURSE REASSESSMENT NOTE - NS ED NURSE REASSESS COMMENT FT1
Patient currently resting comfortably with c/o abdominal pain,  or requests at this time. Plan of care explained, pt is going to be admitted  Bed locked and in lowest position with call bell within reach, commode at the bedside . Comfort and safety provided.

## 2020-05-26 NOTE — H&P ADULT - PROBLEM SELECTOR PLAN 1
reports GERD like symptoms  not responding to PPI   - Famotidine   - Maalox prn   - advance diet as tolerates   - Zofran PRN   - GI consult - to be arranged by day team

## 2020-05-26 NOTE — ED ADULT NURSE REASSESSMENT NOTE - NS ED NURSE REASSESS COMMENT FT1
Report received from . Troy Stewart at 1907 Pt AAOx4, NAD, resp nonlabored, skin warm/dry, resting comfortably in bed Pt c/o . nausea, sore throat  Pt denies headache, dizziness, chest pain, palpitations, SOB, abd pain, /v/d, urinary symptoms, fevers, chills, weakness at this time. Pt awaiting .results  Safety maintained with call bell within reach.

## 2020-05-26 NOTE — H&P ADULT - PROBLEM SELECTOR PLAN 5
suspect contributing to presentation ,  recently stopped Seroquel , now on zoloft   - continue zoloft   - clonopin hs

## 2020-05-26 NOTE — H&P ADULT - ASSESSMENT
70F w/pmh CAD, Stented coronary artery - 8 stents, MI ( 2017) , Severe asthma, Left hemidiaphragm s/p plication, moderate thoracic scoliosis , Osteoporosis, diverticulitis s/p colon resection and hysterectomy, depression, hypothyroidism,   presents for left lower quadrant pain and decreased po intake x one week.

## 2020-05-27 LAB
ALBUMIN SERPL ELPH-MCNC: 3.8 G/DL — SIGNIFICANT CHANGE UP (ref 3.3–5)
ALP SERPL-CCNC: 71 U/L — SIGNIFICANT CHANGE UP (ref 40–120)
ALT FLD-CCNC: 10 U/L — SIGNIFICANT CHANGE UP (ref 10–45)
ANION GAP SERPL CALC-SCNC: 11 MMOL/L — SIGNIFICANT CHANGE UP (ref 5–17)
AST SERPL-CCNC: 15 U/L — SIGNIFICANT CHANGE UP (ref 10–40)
BASOPHILS # BLD AUTO: 0.01 K/UL — SIGNIFICANT CHANGE UP (ref 0–0.2)
BASOPHILS NFR BLD AUTO: 0.2 % — SIGNIFICANT CHANGE UP (ref 0–2)
BILIRUB SERPL-MCNC: 0.7 MG/DL — SIGNIFICANT CHANGE UP (ref 0.2–1.2)
BUN SERPL-MCNC: 7 MG/DL — SIGNIFICANT CHANGE UP (ref 7–23)
CALCIUM SERPL-MCNC: 9.1 MG/DL — SIGNIFICANT CHANGE UP (ref 8.4–10.5)
CHLORIDE SERPL-SCNC: 103 MMOL/L — SIGNIFICANT CHANGE UP (ref 96–108)
CO2 SERPL-SCNC: 25 MMOL/L — SIGNIFICANT CHANGE UP (ref 22–31)
CREAT SERPL-MCNC: 0.7 MG/DL — SIGNIFICANT CHANGE UP (ref 0.5–1.3)
EOSINOPHIL # BLD AUTO: 0 K/UL — SIGNIFICANT CHANGE UP (ref 0–0.5)
EOSINOPHIL NFR BLD AUTO: 0 % — SIGNIFICANT CHANGE UP (ref 0–6)
GLUCOSE SERPL-MCNC: 77 MG/DL — SIGNIFICANT CHANGE UP (ref 70–99)
HCT VFR BLD CALC: 40.5 % — SIGNIFICANT CHANGE UP (ref 34.5–45)
HCV AB S/CO SERPL IA: 0.22 S/CO — SIGNIFICANT CHANGE UP (ref 0–0.99)
HCV AB SERPL-IMP: SIGNIFICANT CHANGE UP
HGB BLD-MCNC: 13.3 G/DL — SIGNIFICANT CHANGE UP (ref 11.5–15.5)
IMM GRANULOCYTES NFR BLD AUTO: 0.2 % — SIGNIFICANT CHANGE UP (ref 0–1.5)
LYMPHOCYTES # BLD AUTO: 1.44 K/UL — SIGNIFICANT CHANGE UP (ref 1–3.3)
LYMPHOCYTES # BLD AUTO: 25 % — SIGNIFICANT CHANGE UP (ref 13–44)
MCHC RBC-ENTMCNC: 29.8 PG — SIGNIFICANT CHANGE UP (ref 27–34)
MCHC RBC-ENTMCNC: 32.8 GM/DL — SIGNIFICANT CHANGE UP (ref 32–36)
MCV RBC AUTO: 90.6 FL — SIGNIFICANT CHANGE UP (ref 80–100)
MONOCYTES # BLD AUTO: 0.53 K/UL — SIGNIFICANT CHANGE UP (ref 0–0.9)
MONOCYTES NFR BLD AUTO: 9.2 % — SIGNIFICANT CHANGE UP (ref 2–14)
NEUTROPHILS # BLD AUTO: 3.78 K/UL — SIGNIFICANT CHANGE UP (ref 1.8–7.4)
NEUTROPHILS NFR BLD AUTO: 65.4 % — SIGNIFICANT CHANGE UP (ref 43–77)
NRBC # BLD: 0 /100 WBCS — SIGNIFICANT CHANGE UP (ref 0–0)
PLATELET # BLD AUTO: 254 K/UL — SIGNIFICANT CHANGE UP (ref 150–400)
POTASSIUM SERPL-MCNC: 3.6 MMOL/L — SIGNIFICANT CHANGE UP (ref 3.5–5.3)
POTASSIUM SERPL-SCNC: 3.6 MMOL/L — SIGNIFICANT CHANGE UP (ref 3.5–5.3)
PROT SERPL-MCNC: 5.6 G/DL — LOW (ref 6–8.3)
RBC # BLD: 4.47 M/UL — SIGNIFICANT CHANGE UP (ref 3.8–5.2)
RBC # FLD: 13.6 % — SIGNIFICANT CHANGE UP (ref 10.3–14.5)
SODIUM SERPL-SCNC: 139 MMOL/L — SIGNIFICANT CHANGE UP (ref 135–145)
TSH SERPL-MCNC: 0.85 UIU/ML — SIGNIFICANT CHANGE UP (ref 0.27–4.2)
WBC # BLD: 5.77 K/UL — SIGNIFICANT CHANGE UP (ref 3.8–10.5)
WBC # FLD AUTO: 5.77 K/UL — SIGNIFICANT CHANGE UP (ref 3.8–10.5)

## 2020-05-27 PROCEDURE — 99232 SBSQ HOSP IP/OBS MODERATE 35: CPT

## 2020-05-27 RX ORDER — PANTOPRAZOLE SODIUM 20 MG/1
40 TABLET, DELAYED RELEASE ORAL
Refills: 0 | Status: DISCONTINUED | OUTPATIENT
Start: 2020-05-27 | End: 2020-05-28

## 2020-05-27 RX ADMIN — ALBUTEROL 2 PUFF(S): 90 AEROSOL, METERED ORAL at 17:43

## 2020-05-27 RX ADMIN — OXYCODONE HYDROCHLORIDE 5 MILLIGRAM(S): 5 TABLET ORAL at 11:07

## 2020-05-27 RX ADMIN — Medication 81 MILLIGRAM(S): at 11:52

## 2020-05-27 RX ADMIN — ALBUTEROL 2 PUFF(S): 90 AEROSOL, METERED ORAL at 06:43

## 2020-05-27 RX ADMIN — Medication 30 MILLILITER(S): at 16:19

## 2020-05-27 RX ADMIN — Medication 100 MILLIGRAM(S): at 08:34

## 2020-05-27 RX ADMIN — Medication 75 MICROGRAM(S): at 06:37

## 2020-05-27 RX ADMIN — SPIRONOLACTONE 12.5 MILLIGRAM(S): 25 TABLET, FILM COATED ORAL at 06:37

## 2020-05-27 RX ADMIN — ONDANSETRON 4 MILLIGRAM(S): 8 TABLET, FILM COATED ORAL at 03:06

## 2020-05-27 RX ADMIN — ALBUTEROL 2 PUFF(S): 90 AEROSOL, METERED ORAL at 23:37

## 2020-05-27 RX ADMIN — ALBUTEROL 2 PUFF(S): 90 AEROSOL, METERED ORAL at 12:01

## 2020-05-27 RX ADMIN — Medication 650 MILLIGRAM(S): at 20:23

## 2020-05-27 RX ADMIN — ONDANSETRON 4 MILLIGRAM(S): 8 TABLET, FILM COATED ORAL at 17:44

## 2020-05-27 RX ADMIN — Medication 0.5 MILLIGRAM(S): at 21:21

## 2020-05-27 RX ADMIN — SERTRALINE 12.5 MILLIGRAM(S): 25 TABLET, FILM COATED ORAL at 11:52

## 2020-05-27 RX ADMIN — Medication 0.5 MILLIGRAM(S): at 00:19

## 2020-05-27 RX ADMIN — BUDESONIDE AND FORMOTEROL FUMARATE DIHYDRATE 2 PUFF(S): 160; 4.5 AEROSOL RESPIRATORY (INHALATION) at 06:37

## 2020-05-27 RX ADMIN — OXYCODONE HYDROCHLORIDE 5 MILLIGRAM(S): 5 TABLET ORAL at 06:37

## 2020-05-27 RX ADMIN — TIOTROPIUM BROMIDE 1 CAPSULE(S): 18 CAPSULE ORAL; RESPIRATORY (INHALATION) at 16:18

## 2020-05-27 RX ADMIN — SODIUM CHLORIDE 100 MILLILITER(S): 9 INJECTION INTRAMUSCULAR; INTRAVENOUS; SUBCUTANEOUS at 02:20

## 2020-05-27 RX ADMIN — FAMOTIDINE 20 MILLIGRAM(S): 10 INJECTION INTRAVENOUS at 06:37

## 2020-05-27 RX ADMIN — BUDESONIDE AND FORMOTEROL FUMARATE DIHYDRATE 2 PUFF(S): 160; 4.5 AEROSOL RESPIRATORY (INHALATION) at 18:20

## 2020-05-27 RX ADMIN — Medication 100 MILLIGRAM(S): at 17:44

## 2020-05-27 RX ADMIN — FAMOTIDINE 20 MILLIGRAM(S): 10 INJECTION INTRAVENOUS at 17:50

## 2020-05-27 RX ADMIN — CLOPIDOGREL BISULFATE 75 MILLIGRAM(S): 75 TABLET, FILM COATED ORAL at 11:53

## 2020-05-27 NOTE — PHYSICAL THERAPY INITIAL EVALUATION ADULT - PLANNED THERAPY INTERVENTIONS, PT EVAL
gait training/strengthening/GOAL: Pt will negotiate 5 steps  up and down using HR support, independent  within 2 weeks./balance training/transfer training

## 2020-05-27 NOTE — PROGRESS NOTE ADULT - PROBLEM SELECTOR PLAN 1
reports GERD like symptoms  not responding to PPI and now with odynophagia without any obvious physical exam findings. Etiology includes GERD/esophagitis vs. possible esophageal candidiasis related to budesonide inhaler use.  - Famotidine   - continue protonix  - Maalox prn   - advance diet as tolerates- puree  - Zofran PRN   - GI consulted- Dr. Garcia

## 2020-05-27 NOTE — PROGRESS NOTE ADULT - SUBJECTIVE AND OBJECTIVE BOX
Jean Cohen MD  Division of Hospital Medicine  Pager: [phone]  If no response or off-hours, page [phone]  -------------------------------------    Patient is a 70y old  Female who presents with a chief complaint of abdominal pain and poor po intake x 7 days (26 May 2020 22:24)      SUBJECTIVE / OVERNIGHT EVENTS: None acute  ADDITIONAL REVIEW OF SYSTEMS: pt tolerated PO clear liquid breakfast. Reports ongoing 'acid type pain' on swallowing, as well as ongoing LLQ pain that she reports has actually been ongoing x 1-2 weeks. Reports 'diarrhea' but also notes that is because she had been taking lactulose and now has needed an enema. No fevers/chills, +nausea but no vomiting.    14 point ros otherwise negative.     MEDICATIONS  (STANDING):  ALBUTerol    90 MICROgram(s) HFA Inhaler 2 Puff(s) Inhalation every 6 hours  aspirin enteric coated 81 milliGRAM(s) Oral daily  budesonide 160 MICROgram(s)/formoterol 4.5 MICROgram(s) Inhaler 2 Puff(s) Inhalation two times a day  clonazePAM  Tablet 0.5 milliGRAM(s) Oral at bedtime  clopidogrel Tablet 75 milliGRAM(s) Oral daily  famotidine Injectable 20 milliGRAM(s) IV Push two times a day  levothyroxine 75 MICROGram(s) Oral daily  linaclotide 290 MICROGram(s) Oral daily  nitrofurantoin monohydrate/macrocrystals (MACROBID) 100 milliGRAM(s) Oral two times a day with meals  sertraline 12.5 milliGRAM(s) Oral daily  spironolactone 12.5 milliGRAM(s) Oral daily  tiotropium 18 MICROgram(s) Capsule 1 Capsule(s) Inhalation daily    MEDICATIONS  (PRN):  acetaminophen   Tablet .. 650 milliGRAM(s) Oral every 4 hours PRN Mild Pain (1 - 3)  aluminum hydroxide/magnesium hydroxide/simethicone Suspension 30 milliLiter(s) Oral every 6 hours PRN Dyspepsia  bisacodyl Suppository 10 milliGRAM(s) Rectal once PRN Constipation  ondansetron Injectable 4 milliGRAM(s) IV Push every 6 hours PRN Nausea  oxyCODONE    IR 5 milliGRAM(s) Oral every 4 hours PRN Moderate Pain (4 - 6)  senna 2 Tablet(s) Oral at bedtime PRN Constipation      CAPILLARY BLOOD GLUCOSE        I&O's Summary    26 May 2020 07:01  -  27 May 2020 07:00  --------------------------------------------------------  IN: 300 mL / OUT: 0 mL / NET: 300 mL        PHYSICAL EXAM:  Vital Signs Last 24 Hrs  T(C): 37 (27 May 2020 15:49), Max: 37 (27 May 2020 15:49)  T(F): 98.6 (27 May 2020 15:49), Max: 98.6 (27 May 2020 15:49)  HR: 90 (27 May 2020 15:49) (73 - 90)  BP: 116/72 (27 May 2020 15:49) (100/67 - 135/84)  BP(mean): --  RR: 18 (27 May 2020 15:49) (18 - 18)  SpO2: 93% (27 May 2020 15:49) (93% - 99%)  CONSTITUTIONAL: NAD, well-developed, well-groomed  EYES: PERRLA; conjunctiva and sclera clear  ENMT: Moist oral mucosa, no pharyngeal injection or exudates; normal dentition, no oropharyngeal erythema/ulceration or thrush.  NECK: Supple, no palpable masses; no thyromegaly  RESPIRATORY: Normal respiratory effort; lungs are clear to auscultation bilaterally  CARDIOVASCULAR: Regular rate and rhythm, normal S1 and S2, no murmur/rub/gallop; No lower extremity edema; Peripheral pulses are 2+ bilaterally  ABDOMEN: Nontender to deep palpation, including LLQ, normoactive bowel sounds, no rebound/guarding; No hepatosplenomegaly  MUSCLOSKELETAL:  Normal gait; no clubbing or cyanosis of digits; no joint swelling or tenderness to palpation  PSYCH: A+O to person, place, and time; affect appropriate  NEUROLOGY: CN 2-12 are intact and symmetric; no gross sensory deficits;   SKIN: No rashes; no palpable lesions    LABS:                        13.3   5.77  )-----------( 254      ( 27 May 2020 06:59 )             40.5         139  |  103  |  7   ----------------------------<  77  3.6   |  25  |  0.70    Ca    9.1      27 May 2020 06:59    TPro  5.6<L>  /  Alb  3.8  /  TBili  0.7  /  DBili  x   /  AST  15  /  ALT  10  /  AlkPhos  71            Urinalysis Basic - ( 26 May 2020 16:58 )    Color: Light Yellow / Appearance: Clear / S.014 / pH: x  Gluc: x / Ketone: Trace  / Bili: Negative / Urobili: Negative   Blood: x / Protein: Negative / Nitrite: Negative   Leuk Esterase: Moderate / RBC: 1 /hpf / WBC 6 /HPF   Sq Epi: x / Non Sq Epi: 1 /hpf / Bacteria: Negative

## 2020-05-28 ENCOUNTER — TRANSCRIPTION ENCOUNTER (OUTPATIENT)
Age: 70
End: 2020-05-28

## 2020-05-28 VITALS
OXYGEN SATURATION: 92 % | SYSTOLIC BLOOD PRESSURE: 111 MMHG | TEMPERATURE: 98 F | RESPIRATION RATE: 18 BRPM | HEART RATE: 104 BPM | DIASTOLIC BLOOD PRESSURE: 71 MMHG

## 2020-05-28 LAB
ANION GAP SERPL CALC-SCNC: 11 MMOL/L — SIGNIFICANT CHANGE UP (ref 5–17)
BUN SERPL-MCNC: 8 MG/DL — SIGNIFICANT CHANGE UP (ref 7–23)
CALCIUM SERPL-MCNC: 8.9 MG/DL — SIGNIFICANT CHANGE UP (ref 8.4–10.5)
CHLORIDE SERPL-SCNC: 102 MMOL/L — SIGNIFICANT CHANGE UP (ref 96–108)
CO2 SERPL-SCNC: 26 MMOL/L — SIGNIFICANT CHANGE UP (ref 22–31)
CREAT SERPL-MCNC: 0.77 MG/DL — SIGNIFICANT CHANGE UP (ref 0.5–1.3)
CULTURE RESULTS: SIGNIFICANT CHANGE UP
GLUCOSE SERPL-MCNC: 83 MG/DL — SIGNIFICANT CHANGE UP (ref 70–99)
HCT VFR BLD CALC: 38.3 % — SIGNIFICANT CHANGE UP (ref 34.5–45)
HGB BLD-MCNC: 13 G/DL — SIGNIFICANT CHANGE UP (ref 11.5–15.5)
MCHC RBC-ENTMCNC: 31 PG — SIGNIFICANT CHANGE UP (ref 27–34)
MCHC RBC-ENTMCNC: 33.9 GM/DL — SIGNIFICANT CHANGE UP (ref 32–36)
MCV RBC AUTO: 91.4 FL — SIGNIFICANT CHANGE UP (ref 80–100)
NRBC # BLD: 0 /100 WBCS — SIGNIFICANT CHANGE UP (ref 0–0)
PLATELET # BLD AUTO: 243 K/UL — SIGNIFICANT CHANGE UP (ref 150–400)
POTASSIUM SERPL-MCNC: 3.7 MMOL/L — SIGNIFICANT CHANGE UP (ref 3.5–5.3)
POTASSIUM SERPL-SCNC: 3.7 MMOL/L — SIGNIFICANT CHANGE UP (ref 3.5–5.3)
RBC # BLD: 4.19 M/UL — SIGNIFICANT CHANGE UP (ref 3.8–5.2)
RBC # FLD: 13.8 % — SIGNIFICANT CHANGE UP (ref 10.3–14.5)
SODIUM SERPL-SCNC: 139 MMOL/L — SIGNIFICANT CHANGE UP (ref 135–145)
SPECIMEN SOURCE: SIGNIFICANT CHANGE UP
WBC # BLD: 3.85 K/UL — SIGNIFICANT CHANGE UP (ref 3.8–10.5)
WBC # FLD AUTO: 3.85 K/UL — SIGNIFICANT CHANGE UP (ref 3.8–10.5)

## 2020-05-28 PROCEDURE — 99239 HOSP IP/OBS DSCHRG MGMT >30: CPT

## 2020-05-28 RX ORDER — NITROFURANTOIN MACROCRYSTAL 50 MG
1 CAPSULE ORAL
Qty: 6 | Refills: 0
Start: 2020-05-28 | End: 2020-05-30

## 2020-05-28 RX ORDER — BENRALIZUMAB 30 MG/ML
0 INJECTION, SOLUTION SUBCUTANEOUS
Qty: 0 | Refills: 0 | DISCHARGE

## 2020-05-28 RX ORDER — FAMOTIDINE 10 MG/ML
1 INJECTION INTRAVENOUS
Qty: 60 | Refills: 0
Start: 2020-05-28 | End: 2020-06-26

## 2020-05-28 RX ORDER — SENNA PLUS 8.6 MG/1
2 TABLET ORAL
Qty: 0 | Refills: 0 | DISCHARGE
Start: 2020-05-28

## 2020-05-28 RX ORDER — ACETAMINOPHEN 500 MG
2 TABLET ORAL
Qty: 0 | Refills: 0 | DISCHARGE
Start: 2020-05-28

## 2020-05-28 RX ADMIN — SERTRALINE 12.5 MILLIGRAM(S): 25 TABLET, FILM COATED ORAL at 11:43

## 2020-05-28 RX ADMIN — ALBUTEROL 2 PUFF(S): 90 AEROSOL, METERED ORAL at 11:43

## 2020-05-28 RX ADMIN — ONDANSETRON 4 MILLIGRAM(S): 8 TABLET, FILM COATED ORAL at 00:04

## 2020-05-28 RX ADMIN — ONDANSETRON 4 MILLIGRAM(S): 8 TABLET, FILM COATED ORAL at 13:37

## 2020-05-28 RX ADMIN — Medication 100 MILLIGRAM(S): at 07:57

## 2020-05-28 RX ADMIN — BUDESONIDE AND FORMOTEROL FUMARATE DIHYDRATE 2 PUFF(S): 160; 4.5 AEROSOL RESPIRATORY (INHALATION) at 05:28

## 2020-05-28 RX ADMIN — PANTOPRAZOLE SODIUM 40 MILLIGRAM(S): 20 TABLET, DELAYED RELEASE ORAL at 05:28

## 2020-05-28 RX ADMIN — Medication 75 MICROGRAM(S): at 05:28

## 2020-05-28 RX ADMIN — FAMOTIDINE 20 MILLIGRAM(S): 10 INJECTION INTRAVENOUS at 05:28

## 2020-05-28 RX ADMIN — ALBUTEROL 2 PUFF(S): 90 AEROSOL, METERED ORAL at 05:29

## 2020-05-28 RX ADMIN — Medication 30 MILLILITER(S): at 10:54

## 2020-05-28 RX ADMIN — Medication 81 MILLIGRAM(S): at 11:44

## 2020-05-28 RX ADMIN — OXYCODONE HYDROCHLORIDE 5 MILLIGRAM(S): 5 TABLET ORAL at 00:04

## 2020-05-28 RX ADMIN — CLOPIDOGREL BISULFATE 75 MILLIGRAM(S): 75 TABLET, FILM COATED ORAL at 11:45

## 2020-05-28 RX ADMIN — SPIRONOLACTONE 12.5 MILLIGRAM(S): 25 TABLET, FILM COATED ORAL at 05:28

## 2020-05-28 RX ADMIN — TIOTROPIUM BROMIDE 1 CAPSULE(S): 18 CAPSULE ORAL; RESPIRATORY (INHALATION) at 11:42

## 2020-05-28 NOTE — DISCHARGE NOTE PROVIDER - NSDCCPCAREPLAN_GEN_ALL_CORE_FT
PRINCIPAL DISCHARGE DIAGNOSIS  Diagnosis: Inability to swallow  Assessment and Plan of Treatment: ·  Problem: Inability to swallow.  Plan: reports GERD like symptoms  not responding to PPI and now with odynophagia without any obvious physical exam findings. Etiology includes GERD/esophagitis vs. possible esophageal candidiasis related to budesonide inhaler use.  - Famotidine   - continue protonix  - Maalox prn   - advance diet as tolerates- puree  - Zofran PRN   - GI consulted- Dr. Garcia.         SECONDARY DISCHARGE DIAGNOSES  Diagnosis: Gastroesophageal reflux disease, esophagitis presence not specified  Assessment and Plan of Treatment: Take medication as recommended  Acid reflux is when the acid that is normally in your stomach backs up into the esophagus, tube that carries food from your mouth to your stomach [place] as"gastroesophageal reflux disease," or GERD.   The symptoms include - Burning in the chest, known as heartburn, burning in the throat or an acid taste in the throat, stomach or chest pain, trouble swallowing, unexplained cough   Measures to follow to avoid symptoms are lose weight (if you are overweight), raise the head of your bed by 6 to 8 inches, avoid foods that make your symptoms worse like coffee, chocolate, alcohol, fatty foods.  Seek medical if your symptoms are severe or last a long time.   Seek immediate help for trouble swallowing, feel as though food gets "stuck" on the way down, choke when you eat, vomit blood, dark or black stool, chest pain      Diagnosis: UTI (urinary tract infection)  Assessment and Plan of Treatment: HOME CARE INSTRUCTIONS  f you were prescribed antibiotics, take them exactly as your caregiver instructs you. Finish the medication even if you feel better after you have only taken some of the medication.  Drink enough water and fluids to keep your urine clear or pale yellow.  Avoid caffeine, tea, and carbonated beverages. They tend to irritate your bladder.  Empty your bladder often. Avoid holding urine for long periods of time.  Empty your bladder before and after sexual intercourse.  After a bowel movement, women should cleanse from front to back. Use each tissue only once.  SEEK MEDICAL CARE IF:  You have back pain.  You develop a fever.  Your symptoms do not begin to resolve within 3 days.  SEEK IMMEDIATE MEDICAL CARE IF:  You have severe back pain or lower abdominal pain.  You develop chills.  You have nausea or vomiting.  You have continued burning or discomfort with urination.      Diagnosis: Severe asthma  Assessment and Plan of Treatment: stable with all inhalers, please follow up with pulmonary as advised    Diagnosis: CAD (coronary artery disease)  Assessment and Plan of Treatment: Coronary artery disease is a condition where the arteries the supply the heart muscle get clogges with fatty deposits & puts you at risk for a heart attack  Call your doctor if you have any new pain, pressure, or discomfort in the center of your chest, pain, tingling or discomfort in arms, back, neck, jaw, or stomach, shortness of breath, nausea, vomiting, burping or heartburn, sweating, cold and clammy skin, racing or abnormal heartbeat for more than 10 minutes or if they keep coming & going.  Call 911 and do not tr to get to hospital by care  You can help yourself with lefestyle changes (quitting smoking if you smoke), eat lots of fruits & vegetables & low fat dairy products, not a lot of meat & fatty foods, walk or some form of physical activity most days of the week, lose weight if you are overweight  Take your cardiac medication as prescribed to lower cholesterol, to lower blood pressure, aspirin to prevent blood clots, and diabetes control  Make sure to keep appointments with doctor for cardiac follow up care      Diagnosis: Constipation  Assessment and Plan of Treatment: stable with senna and Linzess. Please follow up with Ish    Diagnosis: Left lower quadrant abdominal pain  Assessment and Plan of Treatment: PRINCIPAL DISCHARGE DIAGNOSIS  Diagnosis: Inability to swallow  Assessment and Plan of Treatment: ·  Problem: Inability to swallow.  Plan: reports GERD like symptoms  not responding to PPI and now with odynophagia without any obvious physical exam findings. Etiology includes GERD/esophagitis vs. possible esophageal candidiasis related to budesonide inhaler use.  - Famotidine   - continue protonix  - Maalox prn   - advance diet as tolerates- soft  - Zofran PRN   - GI consulted- Dr. Garcia.         SECONDARY DISCHARGE DIAGNOSES  Diagnosis: Gastroesophageal reflux disease, esophagitis presence not specified  Assessment and Plan of Treatment: Take medication as recommended  Acid reflux is when the acid that is normally in your stomach backs up into the esophagus, tube that carries food from your mouth to your stomach [place] as"gastroesophageal reflux disease," or GERD.   The symptoms include - Burning in the chest, known as heartburn, burning in the throat or an acid taste in the throat, stomach or chest pain, trouble swallowing, unexplained cough   Measures to follow to avoid symptoms are lose weight (if you are overweight), raise the head of your bed by 6 to 8 inches, avoid foods that make your symptoms worse like coffee, chocolate, alcohol, fatty foods.  Seek medical if your symptoms are severe or last a long time.   Seek immediate help for trouble swallowing, feel as though food gets "stuck" on the way down, choke when you eat, vomit blood, dark or black stool, chest pain      Diagnosis: UTI (urinary tract infection)  Assessment and Plan of Treatment: HOME CARE INSTRUCTIONS  f you were prescribed antibiotics, take them exactly as your caregiver instructs you. Finish the medication even if you feel better after you have only taken some of the medication.  Drink enough water and fluids to keep your urine clear or pale yellow.  Avoid caffeine, tea, and carbonated beverages. They tend to irritate your bladder.  Empty your bladder often. Avoid holding urine for long periods of time.  Empty your bladder before and after sexual intercourse.  After a bowel movement, women should cleanse from front to back. Use each tissue only once.  SEEK MEDICAL CARE IF:  You have back pain.  You develop a fever.  Your symptoms do not begin to resolve within 3 days.  SEEK IMMEDIATE MEDICAL CARE IF:  You have severe back pain or lower abdominal pain.  You develop chills.  You have nausea or vomiting.  You have continued burning or discomfort with urination.      Diagnosis: Severe asthma  Assessment and Plan of Treatment: stable with all inhalers, please follow up with pulmonary as advised    Diagnosis: CAD (coronary artery disease)  Assessment and Plan of Treatment: Coronary artery disease is a condition where the arteries the supply the heart muscle get clogges with fatty deposits & puts you at risk for a heart attack  Call your doctor if you have any new pain, pressure, or discomfort in the center of your chest, pain, tingling or discomfort in arms, back, neck, jaw, or stomach, shortness of breath, nausea, vomiting, burping or heartburn, sweating, cold and clammy skin, racing or abnormal heartbeat for more than 10 minutes or if they keep coming & going.  Call 911 and do not tr to get to hospital by care  You can help yourself with lefestyle changes (quitting smoking if you smoke), eat lots of fruits & vegetables & low fat dairy products, not a lot of meat & fatty foods, walk or some form of physical activity most days of the week, lose weight if you are overweight  Take your cardiac medication as prescribed to lower cholesterol, to lower blood pressure, aspirin to prevent blood clots, and diabetes control  Make sure to keep appointments with doctor for cardiac follow up care      Diagnosis: Constipation  Assessment and Plan of Treatment: stable with senna and Linzess. Please follow up with Ish    Diagnosis: Left lower quadrant abdominal pain  Assessment and Plan of Treatment:

## 2020-05-28 NOTE — PROGRESS NOTE ADULT - SUBJECTIVE AND OBJECTIVE BOX
Jean Cohen MD  Division of Hospital Medicine  Pager: [phone]  If no response or off-hours, page [phone]  -------------------------------------    Patient is a 70y old  Female who presents with a chief complaint of abdominal pain and poor po intake x 7 days (28 May 2020 07:46)      SUBJECTIVE / OVERNIGHT EVENTS: none acute  ADDITIONAL REVIEW OF SYSTEMS: pt tolerated pureed diet wlel, requesting full regular diet. No abd pain, no n/v. Still has some heartburn, but overall improved. Notes she has an appointment with her OP GI to discuss which PPI she should be on. 14 point ros otherwise negative.     MEDICATIONS  (STANDING):  ALBUTerol    90 MICROgram(s) HFA Inhaler 2 Puff(s) Inhalation every 6 hours  aspirin enteric coated 81 milliGRAM(s) Oral daily  budesonide 160 MICROgram(s)/formoterol 4.5 MICROgram(s) Inhaler 2 Puff(s) Inhalation two times a day  clonazePAM  Tablet 0.5 milliGRAM(s) Oral at bedtime  clopidogrel Tablet 75 milliGRAM(s) Oral daily  famotidine Injectable 20 milliGRAM(s) IV Push two times a day  levothyroxine 75 MICROGram(s) Oral daily  linaclotide 290 MICROGram(s) Oral daily  nitrofurantoin monohydrate/macrocrystals (MACROBID) 100 milliGRAM(s) Oral two times a day with meals  pantoprazole    Tablet 40 milliGRAM(s) Oral before breakfast  sertraline 12.5 milliGRAM(s) Oral daily  spironolactone 12.5 milliGRAM(s) Oral daily  tiotropium 18 MICROgram(s) Capsule 1 Capsule(s) Inhalation daily    MEDICATIONS  (PRN):  acetaminophen   Tablet .. 650 milliGRAM(s) Oral every 4 hours PRN Mild Pain (1 - 3)  aluminum hydroxide/magnesium hydroxide/simethicone Suspension 30 milliLiter(s) Oral every 6 hours PRN Dyspepsia  bisacodyl Suppository 10 milliGRAM(s) Rectal once PRN Constipation  ondansetron Injectable 4 milliGRAM(s) IV Push every 6 hours PRN Nausea  oxyCODONE    IR 5 milliGRAM(s) Oral every 4 hours PRN Moderate Pain (4 - 6)  senna 2 Tablet(s) Oral at bedtime PRN Constipation      CAPILLARY BLOOD GLUCOSE        I&O's Summary    27 May 2020 07:01  -  28 May 2020 07:00  --------------------------------------------------------  IN: 200 mL / OUT: 0 mL / NET: 200 mL        PHYSICAL EXAM:  Vital Signs Last 24 Hrs  T(C): 36.8 (28 May 2020 08:23), Max: 37 (27 May 2020 15:49)  T(F): 98.2 (28 May 2020 08:23), Max: 98.6 (27 May 2020 15:49)  HR: 104 (28 May 2020 08:23) (73 - 104)  BP: 111/71 (28 May 2020 08:23) (106/67 - 123/79)  BP(mean): --  RR: 18 (28 May 2020 08:23) (16 - 18)  SpO2: 92% (28 May 2020 08:23) (92% - 96%)  CONSTITUTIONAL: NAD, well-developed, well-groomed  EYES: PERRLA; conjunctiva and sclera clear  ENMT: Moist oral mucosa, no pharyngeal injection or exudates; normal dentition  NECK: Supple, no palpable masses; no thyromegaly  RESPIRATORY: Normal respiratory effort; lungs are clear to auscultation bilaterally  CARDIOVASCULAR: Regular rate and rhythm, normal S1 and S2, no murmur/rub/gallop; No lower extremity edema; Peripheral pulses are 2+ bilaterally  ABDOMEN: Nontender to palpation, normoactive bowel sounds, no rebound/guarding; No hepatosplenomegaly  MUSCLOSKELETAL:  Normal gait; no clubbing or cyanosis of digits; no joint swelling or tenderness to palpation  PSYCH: A+O to person, place, and time; affect appropriate  NEUROLOGY: CN 2-12 are intact and symmetric; no gross sensory deficits;   SKIN: No rashes; no palpable lesions    LABS:                        13.0   3.85  )-----------( 243      ( 28 May 2020 06:38 )             38.3         139  |  102  |  8   ----------------------------<  83  3.7   |  26  |  0.77    Ca    8.9      28 May 2020 06:38    TPro  5.6<L>  /  Alb  3.8  /  TBili  0.7  /  DBili  x   /  AST  15  /  ALT  10  /  AlkPhos  71  05-27          Urinalysis Basic - ( 26 May 2020 16:58 )    Color: Light Yellow / Appearance: Clear / S.014 / pH: x  Gluc: x / Ketone: Trace  / Bili: Negative / Urobili: Negative   Blood: x / Protein: Negative / Nitrite: Negative   Leuk Esterase: Moderate / RBC: 1 /hpf / WBC 6 /HPF   Sq Epi: x / Non Sq Epi: 1 /hpf / Bacteria: Negative          COORDINATION OF CARE:  Care Discussed with Consultants/Other Providers [Y/N]: Emailed OP GI: Dr. Garcia and Dr. Quarles  Prior or Outpatient Records Reviewed [Y/N]:

## 2020-05-28 NOTE — DISCHARGE NOTE NURSING/CASE MANAGEMENT/SOCIAL WORK - PATIENT PORTAL LINK FT
You can access the FollowMyHealth Patient Portal offered by [facility] by registering at the following website: [email or web address]. By joining [facility]’s FollowMyHealth portal, you will also be able to view your health information using other applications (apps) compatible with our system.

## 2020-05-28 NOTE — PROGRESS NOTE ADULT - PROBLEM SELECTOR PLAN 3
+ LE and WBC on UA , neg nitrite and bacteria , reports symptoms  - urine culture   - Macrobid x 5 days

## 2020-05-28 NOTE — DISCHARGE NOTE PROVIDER - NSDCMRMEDTOKEN_GEN_ALL_CORE_FT
acetaminophen 325 mg oral tablet: 2 tab(s) orally every 4 hours, As needed, Mild Pain (1 - 3)  Advair Diskus 500 mcg-50 mcg inhalation powder: 1 puff(s) inhaled 2 times a day  albuterol 0.63 mg/3 mL (0.021%) inhalation solution: 3 milliliter(s) inhaled every 6 hours, As Needed - for shortness of breath and/or wheezing  aluminum hydroxide-magnesium hydroxide 200 mg-200 mg/5 mL oral suspension: 30 milliliter(s) orally every 6 hours, As needed, Dyspepsia  aspirin 81 mg oral delayed release tablet: 1 tab(s) orally once a day  clonazePAM 0.5 mg oral tablet: 1 tab(s) orally once a day (at bedtime)  clopidogrel 75 mg oral tablet: 1 tab(s) orally once a day, last dose 6/21/19  DuoNeb 0.5 mg-2.5 mg/3 mL inhalation solution: 3 milliliter(s) inhaled 4 times a day, As Needed  levocetirizine 5 mg oral tablet: 1 tab(s) orally once a day (in the evening)  levothyroxine 75 mcg (0.075 mg) oral tablet: 1 tab(s) orally once a day  Linzess 290 mcg oral capsule: 1 cap(s) orally once a day  omeprazole 40 mg oral delayed release capsule: 1 cap(s) orally once a day  Proventil HFA 90 mcg/inh inhalation aerosol: 2 puff(s) inhaled 4 times a day, As Needed  senna oral tablet: 2 tab(s) orally once a day (at bedtime), As needed, Constipation  Spiriva 18 mcg inhalation capsule: 1 cap(s) inhaled once a day  spironolactone 25 mg oral tablet: 0.5 tab(s) (12.5mg) orally every other day  Zoloft 25 mg oral tablet: 0.5 tab(s) orally once a day acetaminophen 325 mg oral tablet: 2 tab(s) orally every 4 hours, As needed, Mild Pain (1 - 3)  Advair Diskus 500 mcg-50 mcg inhalation powder: 1 puff(s) inhaled 2 times a day  albuterol 0.63 mg/3 mL (0.021%) inhalation solution: 3 milliliter(s) inhaled every 6 hours, As Needed - for shortness of breath and/or wheezing  aluminum hydroxide-magnesium hydroxide 200 mg-200 mg/5 mL oral suspension: 30 milliliter(s) orally every 6 hours, As needed, Dyspepsia  aspirin 81 mg oral delayed release tablet: 1 tab(s) orally once a day  clonazePAM 0.5 mg oral tablet: 1 tab(s) orally once a day (at bedtime)  clopidogrel 75 mg oral tablet: 1 tab(s) orally once a day, last dose 6/21/19  DuoNeb 0.5 mg-2.5 mg/3 mL inhalation solution: 3 milliliter(s) inhaled 4 times a day, As Needed  levocetirizine 5 mg oral tablet: 1 tab(s) orally once a day (in the evening)  levothyroxine 75 mcg (0.075 mg) oral tablet: 1 tab(s) orally once a day  Linzess 290 mcg oral capsule: 1 cap(s) orally once a day  nitrofurantoin macrocrystals-monohydrate 100 mg oral capsule: 1 cap(s) orally 2 times a day (with meals)  omeprazole 40 mg oral delayed release capsule: 1 cap(s) orally once a day  Pepcid 20 mg oral tablet: 1 tab(s) orally 2 times a day   Proventil HFA 90 mcg/inh inhalation aerosol: 2 puff(s) inhaled 4 times a day, As Needed  senna oral tablet: 2 tab(s) orally once a day (at bedtime), As needed, Constipation  Spiriva 18 mcg inhalation capsule: 1 cap(s) inhaled once a day  spironolactone 25 mg oral tablet: 0.5 tab(s) (12.5mg) orally every other day  Zoloft 25 mg oral tablet: 0.5 tab(s) orally once a day

## 2020-05-28 NOTE — PROGRESS NOTE ADULT - PROBLEM SELECTOR PLAN 1
reports GERD like symptoms  not responding to PPI and now with odynophagia without any obvious physical exam findings. Etiology includes GERD/esophagitis vs. possible esophageal candidiasis related to budesonide inhaler use. symptoms improved today  - Famotidine, maalox  - continue protonix, pt sates she does not need PPI rx on discharge as she wishes to discuss which PPI she should be on with her GI tomorrow.  - Maalox prn   - advance diet as tolerates  - Zofran PRN   - emailed her OP GI Dr. Garcia and Willam

## 2020-05-28 NOTE — DISCHARGE NOTE PROVIDER - CARE PROVIDER_API CALL
Massimo Garcia  GASTROENTEROLOGY  [address] [place] [address] [address]  [place] [address]  Phone: [phone]  Fax: [phone]  Follow Up Time:

## 2020-05-28 NOTE — DISCHARGE NOTE PROVIDER - HOSPITAL COURSE
Patient is a 70 year old female with history of CAD, Stented coronary artery - 8 stents, MI ( 2017) - in Plavix & aspirin, Left ear deafness, Severe asthma, Left hemidiaphragm s/p plication, moderate thoracic scoliosis , Osteoporosis, diverticulitis s/p colon resection and hysterectyomy presents for left lower quadrant pain for the past week. Patient reports dull 6/10 pain located in left lower quadrant non radiating ,  associated with nausea but no vomiting ,  no blood in stool , last bowel movement was about three days piror to admission, she also reports dysphagia GERD,  associated with  poor appetite and decreased po intake during the past week , not responding to zofran. Started on a PPI bid by her GI about one week prior to admission.  Patient also noted increased urinary frequency and dysuria.     Of note, patient reports discontinuing her Seroquel about one month prior to admission and recently started Zoloft.  She has no fever , but does report occasional chills.      last endoscopy about one year ago reportedly without major findings.

## 2020-05-28 NOTE — DISCHARGE NOTE PROVIDER - NSDCFUSCHEDAPPT_GEN_ALL_CORE_FT
PRIMROSE, LETY ; 06/16/2020 ; [facility] [address]  PRIMROSE, LETY ; 06/16/2020 ; [facility] [address]  PRIMROSE, LETY ; 06/16/2020 ; [facility] [address]  PRIMROSE, LETY ; 06/16/2020 ; [facility] [address]  PRIMROSE, LETY ; 07/08/2020 ; [facility] [address]

## 2020-05-28 NOTE — PROGRESS NOTE ADULT - PROBLEM SELECTOR PLAN 9
Transitions of Care Status:  1.  Name of PCP: Dr Daya Polanco   2.  PCP Contacted on Admission: [ ] Y    [x] N    3.  PCP contacted at Discharge: [ ] Y    [ ] N    [ ] N/A  4.  Post-Discharge Appointment Date and Location:  5.  Summary of Handoff given to PCP: Emailed Dr. Daya Polanco re: hospitalization and f/u plans.      total discharge time: 36 minutes.

## 2020-05-28 NOTE — PROGRESS NOTE ADULT - ASSESSMENT
70F w/pmh CAD, Stented coronary artery - 8 stents, MI ( 2017) , Severe asthma, Left hemidiaphragm s/p plication, moderate thoracic scoliosis , Osteoporosis, diverticulitis s/p colon resection and hysterectomy, depression, hypothyroidism,   presents for left lower quadrant pain and decreased po intake x one week, now improved

## 2020-06-01 ENCOUNTER — APPOINTMENT (OUTPATIENT)
Dept: INTERNAL MEDICINE | Facility: CLINIC | Age: 70
End: 2020-06-01

## 2020-06-02 PROCEDURE — 84484 ASSAY OF TROPONIN QUANT: CPT

## 2020-06-02 PROCEDURE — 83605 ASSAY OF LACTIC ACID: CPT

## 2020-06-02 PROCEDURE — 74177 CT ABD & PELVIS W/CONTRAST: CPT

## 2020-06-02 PROCEDURE — 80048 BASIC METABOLIC PNL TOTAL CA: CPT

## 2020-06-02 PROCEDURE — 82803 BLOOD GASES ANY COMBINATION: CPT

## 2020-06-02 PROCEDURE — 93005 ELECTROCARDIOGRAM TRACING: CPT

## 2020-06-02 PROCEDURE — 82435 ASSAY OF BLOOD CHLORIDE: CPT

## 2020-06-02 PROCEDURE — 87086 URINE CULTURE/COLONY COUNT: CPT

## 2020-06-02 PROCEDURE — 85014 HEMATOCRIT: CPT

## 2020-06-02 PROCEDURE — 71045 X-RAY EXAM CHEST 1 VIEW: CPT

## 2020-06-02 PROCEDURE — 84443 ASSAY THYROID STIM HORMONE: CPT

## 2020-06-02 PROCEDURE — 86803 HEPATITIS C AB TEST: CPT

## 2020-06-02 PROCEDURE — 97161 PT EVAL LOW COMPLEX 20 MIN: CPT

## 2020-06-02 PROCEDURE — 94640 AIRWAY INHALATION TREATMENT: CPT

## 2020-06-02 PROCEDURE — 96374 THER/PROPH/DIAG INJ IV PUSH: CPT | Mod: XU

## 2020-06-02 PROCEDURE — 96376 TX/PRO/DX INJ SAME DRUG ADON: CPT | Mod: XU

## 2020-06-02 PROCEDURE — 82330 ASSAY OF CALCIUM: CPT

## 2020-06-02 PROCEDURE — 81001 URINALYSIS AUTO W/SCOPE: CPT

## 2020-06-02 PROCEDURE — 96361 HYDRATE IV INFUSION ADD-ON: CPT | Mod: XU

## 2020-06-02 PROCEDURE — 84132 ASSAY OF SERUM POTASSIUM: CPT

## 2020-06-02 PROCEDURE — 82947 ASSAY GLUCOSE BLOOD QUANT: CPT

## 2020-06-02 PROCEDURE — 84295 ASSAY OF SERUM SODIUM: CPT

## 2020-06-02 PROCEDURE — 83690 ASSAY OF LIPASE: CPT

## 2020-06-02 PROCEDURE — 96375 TX/PRO/DX INJ NEW DRUG ADDON: CPT | Mod: XU

## 2020-06-02 PROCEDURE — 99285 EMERGENCY DEPT VISIT HI MDM: CPT | Mod: 25

## 2020-06-02 PROCEDURE — 80053 COMPREHEN METABOLIC PANEL: CPT

## 2020-06-02 PROCEDURE — 85027 COMPLETE CBC AUTOMATED: CPT

## 2020-06-16 ENCOUNTER — APPOINTMENT (OUTPATIENT)
Dept: CARDIOLOGY | Facility: CLINIC | Age: 70
End: 2020-06-16
Payer: MEDICARE

## 2020-06-16 VITALS
HEIGHT: 55 IN | BODY MASS INDEX: 21.98 KG/M2 | HEART RATE: 78 BPM | WEIGHT: 95 LBS | DIASTOLIC BLOOD PRESSURE: 78 MMHG | RESPIRATION RATE: 16 BRPM | SYSTOLIC BLOOD PRESSURE: 128 MMHG

## 2020-06-16 PROCEDURE — 93306 TTE W/DOPPLER COMPLETE: CPT

## 2020-06-16 PROCEDURE — 93880 EXTRACRANIAL BILAT STUDY: CPT

## 2020-06-16 PROCEDURE — 99214 OFFICE O/P EST MOD 30 MIN: CPT

## 2020-06-16 PROCEDURE — 93922 UPR/L XTREMITY ART 2 LEVELS: CPT

## 2020-07-17 ENCOUNTER — APPOINTMENT (OUTPATIENT)
Dept: CARDIOLOGY | Facility: CLINIC | Age: 70
End: 2020-07-17

## 2020-10-20 ENCOUNTER — APPOINTMENT (OUTPATIENT)
Dept: CARDIOLOGY | Facility: CLINIC | Age: 70
End: 2020-10-20

## 2020-10-27 ENCOUNTER — NON-APPOINTMENT (OUTPATIENT)
Age: 70
End: 2020-10-27

## 2020-11-02 ENCOUNTER — APPOINTMENT (OUTPATIENT)
Dept: PULMONOLOGY | Facility: CLINIC | Age: 70
End: 2020-11-02
Payer: MEDICARE

## 2020-11-02 VITALS
HEART RATE: 88 BPM | RESPIRATION RATE: 16 BRPM | WEIGHT: 95 LBS | HEIGHT: 55 IN | SYSTOLIC BLOOD PRESSURE: 120 MMHG | OXYGEN SATURATION: 97 % | BODY MASS INDEX: 21.98 KG/M2 | TEMPERATURE: 97.2 F | DIASTOLIC BLOOD PRESSURE: 80 MMHG

## 2020-11-02 DIAGNOSIS — Z23 ENCOUNTER FOR IMMUNIZATION: ICD-10-CM

## 2020-11-02 DIAGNOSIS — J01.80 OTHER ACUTE SINUSITIS: ICD-10-CM

## 2020-11-02 DIAGNOSIS — J01.91 ACUTE RECURRENT SINUSITIS, UNSPECIFIED: ICD-10-CM

## 2020-11-02 PROCEDURE — 90662 IIV NO PRSV INCREASED AG IM: CPT

## 2020-11-02 PROCEDURE — 99214 OFFICE O/P EST MOD 30 MIN: CPT | Mod: 25

## 2020-11-02 PROCEDURE — G0008: CPT

## 2020-11-02 NOTE — ASSESSMENT
[FreeTextEntry1] : Ms. Primrose is a 61 year old female with history of COPD / asthma / allergy / paralyzed hemidiaphragm / cardiac disease / CHF/ eosinophilic asthma. She presents to the office for poor sleep #1 issue; #2 orthopedic \par \par Her shortness of breath is multifactorial due to:\par -cardiac/ mild congestive heart failure\par -severe persistent asthma\par -allergic rhinitis/post nasal drip syndrome\par -paralyzed hemidiaphragm s/p plication\par -poor breathing mechanics and anxiety \par \par problem 1: COPD/ asthma \par -continue Xopenex 0.63 by the nebulizer up to QID \par -continue to use Advair 500 at 1 inhalation BID\par -continue Spiriva Respimat 2 inhalations QD; change to Tudorza at 1 inhalation BID\par -continue Singulair 10 mg before bed\par -continue to use Proventil PRN\par -continue Acapella device to be used multiple times daily \par -Asthma is believed to be caused by inherited (genetic) and environmental factor, but its exact cause is unknown. Asthma may be triggered by allergens, lung infections, or irritants in the air. Asthma triggers are different for each person \par -Inhaler technique reviewed as well as oral hygiene techniques reviewed with patient. Avoidance of cold air, extremes of temperature, rescue inhaler should be used before exercise. Order of medication reviewed with patient. Recommended use of a cool mist humidifier in the bedroom\par \par problem 2: eosinophilic asthma\par -patient receiving Fasenra and has had 8 injections \par -The safety and efficacy of Nucala was established in three double-blind, randomized, placebo-controlled trials in patients with severe asthma. Compared to a placebo, patients with severe asthma receiving Nucala had fewer exacerbation requiring hospitalization and/or emergency department visits, and a longer time to first exacerbation. In addition, patients with severe asthma receiving Nucala or Fasenra experienced greater reductions in their daily maintenance oral corticosteroid dose, while maintaining asthma control compared with patients receiving placebo. Treatment with Nucala did not result in a significant improvement in lung function, as measured by the volume of air exhaled by patients in one second. The most common side effects include: headache, injection site reactions, back pain, weakness, and fatigue; hypersensitivity reactions can occur within hours or days including swelling of the face, mouth, and tongue, fainting, dizziness, hives, breathing problems, and rash; herpes zoster infections have occurred. The drug is a monoclonal antibody that inhibits interleukin-5 which helps regular eosinophils, a type of white blood cell that contributes to asthma. The over-production of eosinophils can cause inflammation in the lungs, increasing the frequency of asthma attacks. Patients must also take other medications, including high dose inhaled corticosteroids and at least one additional asthma drug\par \par problem 3: allergic rhinitis/post nasal drip syndrome \par -recommended Navage sinus rinse\par -continue Qnasl 1 sniff each nostril BID \par -continue Olopatadine 0.6% 1 sniff each nostril BID \par -continue claritin-d 24hr AM\par -continue Xyzal 5 mg before bed \par s/p Blood work: eosinophil level, IgE level, and immunocap testing \par -Environmental measures for allergies were encouraged including mattress and pillow cover, air purifier, and environmental controls.\par \par problem 4: GERD\par -continue to use good diet and good timing of meals\par -Rule of 2s: avoid eating too much, eating too late, eating too spicy, eating two hours before bed\par -Things to avoid including overeating, spicy foods, tight clothing, eating within three hours of bed, this list is not all inclusive. \par -For treatment of reflux, possible options discussed including diet control, H2 blockers, PPIs, as well as coating motility agents discussed as treatment options. Timing of meals and proximity of last meal to sleep were discussed. If symptoms persist, a formal gastrointestinal evaluation is needed. \par \par problem 5: ALIS\par -add trazadone 50 mg qhs \par -she has refused any treatment or diagnosing\par -she is being recommended to use Oxy-Aid by [employer]\par -Discussed the risks/associations with coronary artery disease, atrial fibrillation, arrhythmia, memory loss, issues with concentration, stroke risk, hypertension, nocturia, chronic reflux/Kimbrough’s esophagus some but not all inclusive. Treatment options discussed including CPAP/BiPAP machine, oral appliance, ProVent therapy, Oxy-Aid by [employer], new technologies, or positional sleep.\par \par problem 6: cardiac component \par -she is being recommended to have a consultation with Dr. Reese ([facility]) ([facility]) (3/22 pending)\par \par Problem 7: Anxiety\par - Continue Klonopin 0.5 mg prn \par \par problem 8: health maintenance \par -recommended orthopedic evaluation with (Dr. Perez)\par -recommended Evaluation with Neurology (Dr. Baker)\par -recommended to have medical marijuana evaluation with Dr. Alma Matias\par -s/p 2020 yearly flu shot \par -recommended strep pneumonia vaccines: Prevnar-13 vaccine, followed by Pneumo vaccine 23 one year following\par -recommended early intervention for URIs\par -recommended regular osteoporosis evaluations\par -recommended early dermatological evaluations\par -recommended after the age of 50 to the age of 70, colonoscopy every 5 years \par  \par \par F/U in 2 months\par She is encouraged to call with any changes,concerns, or questions.

## 2020-11-02 NOTE — PROCEDURE
[FreeTextEntry1] : CT abdomen pelvis with IV contrast (4.30.2019) reveals status post sigmoid resection. colonic diverticulosis. No CT evidence of diverticulitis. no significant change compared 8/17/2018.\par \par Blood work (10.23.2020) reveals cholesterol 222, cmp WNL, TFT wnl, PTH wnl, wbc 6.5, hgb 14.8, hct 44.4, plt 307, eos 163, vitamin D 34\par \par MRI cervical (10.14.2020) reveals spine c5 c6 disc protrusion with stenosis.

## 2020-11-02 NOTE — HISTORY OF PRESENT ILLNESS
[FreeTextEntry1] : Ms. Primrose is a 70 year old female presenting to the office for a sick visit. She has PMHx of allergic rhinitis, eosinophilic/moderate persistent asthma, ALIS, GERD, and shortness of breath. Her chief complaint is\par \par -she notes generally feeling anxious and fatigued\par -she notes general body weakness\par -she notes pain control implant\par -she notes poor sleep\par -she notes not getting enough sleep\par -she notes supplementing with vitamin b12\par -she notes Klonopin does not aid sleep\par -she notes right shoulder, wrist, and left hip orthopedic discomforts\par -she notes heartburn and reflux controlled with omeprazole Rx\par -she notes anti-depressant Rx leads to GI distress\par -she notes intermittent nasal and sinus discomfort\par -she denies ankle swelling\par -she notes intermittent dry cough\par -she notes chest tightness\par -she notes inhaler compliance\par -she denies wheeze\par \par -denies any chest pain, chest pressure, diarrhea, constipation, dysphagia, dizziness, leg swelling, leg pain, itchy eyes, itchy ears, or sour taste in the mouth.\par \par

## 2020-11-10 ENCOUNTER — APPOINTMENT (OUTPATIENT)
Dept: SPINE | Facility: CLINIC | Age: 70
End: 2020-11-10
Payer: MEDICARE

## 2020-11-10 VITALS
WEIGHT: 94 LBS | HEART RATE: 92 BPM | DIASTOLIC BLOOD PRESSURE: 82 MMHG | TEMPERATURE: 97.5 F | HEIGHT: 55 IN | BODY MASS INDEX: 21.76 KG/M2 | SYSTOLIC BLOOD PRESSURE: 114 MMHG

## 2020-11-10 DIAGNOSIS — M41.80 OTHER FORMS OF SCOLIOSIS, SITE UNSPECIFIED: ICD-10-CM

## 2020-11-10 PROCEDURE — 99203 OFFICE O/P NEW LOW 30 MIN: CPT

## 2020-11-10 RX ORDER — FLUTICASONE PROPIONATE 50 UG/1
50 SPRAY, METERED NASAL TWICE DAILY
Qty: 1 | Refills: 1 | Status: DISCONTINUED | COMMUNITY
Start: 2019-12-03 | End: 2020-11-10

## 2020-11-11 RX ORDER — DENOSUMAB 60 MG/ML
60 INJECTION SUBCUTANEOUS
Refills: 0 | Status: ACTIVE | COMMUNITY

## 2020-11-11 NOTE — PHYSICAL EXAM
[General Appearance - Alert] : alert [General Appearance - In No Acute Distress] : in no acute distress [Oriented To Time, Place, And Person] : oriented to person, place, and time [Impaired Insight] : insight and judgment were intact [Cranial Nerves Optic (II)] : visual acuity intact bilaterally,  pupils equal round and reactive to light [Cranial Nerves Oculomotor (III)] : extraocular motion intact [Cranial Nerves Trigeminal (V)] : facial sensation intact symmetrically [Cranial Nerves Facial (VII)] : face symmetrical [Cranial Nerves Vestibulocochlear (VIII)] : hearing was intact bilaterally [Cranial Nerves Glossopharyngeal (IX)] : tongue and palate midline [Cranial Nerves Accessory (XI - Cranial And Spinal)] : head turning and shoulder shrug symmetric [Cranial Nerves Hypoglossal (XII)] : there was no tongue deviation with protrusion [Motor Tone] : muscle tone was normal in all four extremities [Sensation Tactile Decrease] : light touch was intact [Limited Balance] : the patient's balance was impaired [No Visual Abnormalities] : no visible abnormailities [Deformity] : deformity [Scoliosis] : scoliosis [Mid Thoracic] : left midthoracic spine rib prominence [ThoracoLumbar] : left thoracolumbar spine rib prominence [Lumbar Spine] : left lumbar spine rib prominence [Sclera] : the sclera and conjunctiva were normal [Outer Ear] : the ears and nose were normal in appearance [Neck Appearance] : the appearance of the neck was normal [] : no respiratory distress [Heart Rate And Rhythm] : heart rate was normal and rhythm regular [Skin Color & Pigmentation] : normal skin color and pigmentation [5] : C8 finger flexors 5/5 [4] : S1 toe walking 4/5 [FreeTextEntry8] : Pitched forward gait [FreeTextEntry1] : Abnormal ataxic gait

## 2020-11-11 NOTE — HISTORY OF PRESENT ILLNESS
[FreeTextEntry1] : Chronic Lowerback Pain and progressive difficulty walking [de-identified] : This is a 70 Year old woman here for comprehensive evaluation of her thoracic and Lumbar Spine. She is presenting with severe kyphoscoliosis\par S/P SCS Placement Jan 2020 by Dr. Merino, [facility]. over the past several years she has had multiple SI joint injections in her lower back as well as epidural steroid injections in her neck. She has had long-term physical therapy over the years and continues to present time. In addition she has had laser procedure on her back for her chronic pain. Over the past several months she does report worsening LEG and Lowerback pain. she reports worsening forward pitch posture. As well as progressive difficulty ambulating. The pain currently interferes with her ADLs and her ability to function.

## 2020-11-11 NOTE — REVIEW OF SYSTEMS
[Feeling Poorly] : feeling poorly [Feeling Tired] : feeling tired [Leg Weakness] : leg weakness [Numbness] : numbness [Tingling] : tingling [Abnormal Sensation] : an abnormal sensation [Difficulty Walking] : difficulty walking [Ataxia] : ataxia [Arthralgias] : arthralgias [Joint Pain] : joint pain [Joint Stiffness] : joint stiffness [Limb Pain] : limb pain [Negative] : Heme/Lymph [Joint Swelling] : no joint swelling [Limb Swelling] : no limb swelling

## 2020-11-11 NOTE — ASSESSMENT
[FreeTextEntry1] : a 70-year-old woman with severe kyphoscoliosis\par Comprehensive workup recommended CT thoracic and lumbar spine and scoliosis x-rays\par \par return to office when completed to finalize surgical discussion

## 2020-11-17 DIAGNOSIS — Z01.812 ENCOUNTER FOR PREPROCEDURAL LABORATORY EXAMINATION: ICD-10-CM

## 2020-11-19 ENCOUNTER — APPOINTMENT (OUTPATIENT)
Dept: CT IMAGING | Facility: CLINIC | Age: 70
End: 2020-11-19
Payer: MEDICARE

## 2020-11-19 ENCOUNTER — APPOINTMENT (OUTPATIENT)
Dept: RADIOLOGY | Facility: CLINIC | Age: 70
End: 2020-11-19
Payer: MEDICARE

## 2020-11-19 ENCOUNTER — OUTPATIENT (OUTPATIENT)
Dept: OUTPATIENT SERVICES | Facility: HOSPITAL | Age: 70
LOS: 1 days | End: 2020-11-19
Payer: MEDICARE

## 2020-11-19 DIAGNOSIS — Z98.890 OTHER SPECIFIED POSTPROCEDURAL STATES: Chronic | ICD-10-CM

## 2020-11-19 DIAGNOSIS — Z90.49 ACQUIRED ABSENCE OF OTHER SPECIFIED PARTS OF DIGESTIVE TRACT: Chronic | ICD-10-CM

## 2020-11-19 DIAGNOSIS — M54.5 LOW BACK PAIN: ICD-10-CM

## 2020-11-19 DIAGNOSIS — Z90.79 ACQUIRED ABSENCE OF OTHER GENITAL ORGAN(S): Chronic | ICD-10-CM

## 2020-11-19 PROCEDURE — 72082 X-RAY EXAM ENTIRE SPI 2/3 VW: CPT | Mod: 26

## 2020-11-19 PROCEDURE — 72131 CT LUMBAR SPINE W/O DYE: CPT | Mod: 26

## 2020-11-19 PROCEDURE — 72131 CT LUMBAR SPINE W/O DYE: CPT

## 2020-11-19 PROCEDURE — 72128 CT CHEST SPINE W/O DYE: CPT | Mod: 26

## 2020-11-19 PROCEDURE — 72128 CT CHEST SPINE W/O DYE: CPT

## 2020-11-19 PROCEDURE — 72082 X-RAY EXAM ENTIRE SPI 2/3 VW: CPT

## 2020-11-24 ENCOUNTER — APPOINTMENT (OUTPATIENT)
Dept: SPINE | Facility: CLINIC | Age: 70
End: 2020-11-24
Payer: MEDICARE

## 2020-11-24 ENCOUNTER — NON-APPOINTMENT (OUTPATIENT)
Age: 70
End: 2020-11-24

## 2020-11-24 VITALS
SYSTOLIC BLOOD PRESSURE: 118 MMHG | BODY MASS INDEX: 21.76 KG/M2 | HEIGHT: 55 IN | WEIGHT: 94 LBS | TEMPERATURE: 97.8 F | HEART RATE: 97 BPM | DIASTOLIC BLOOD PRESSURE: 81 MMHG

## 2020-11-24 DIAGNOSIS — M41.9 SCOLIOSIS, UNSPECIFIED: ICD-10-CM

## 2020-11-24 DIAGNOSIS — M54.5 LOW BACK PAIN: ICD-10-CM

## 2020-11-24 DIAGNOSIS — G89.29 LOW BACK PAIN: ICD-10-CM

## 2020-11-24 PROCEDURE — 99214 OFFICE O/P EST MOD 30 MIN: CPT

## 2020-11-25 ENCOUNTER — NON-APPOINTMENT (OUTPATIENT)
Age: 70
End: 2020-11-25

## 2020-11-25 NOTE — REASON FOR VISIT
[Follow-Up: _____] : a [unfilled] follow-up visit [FreeTextEntry1] : Here for discussion regrading surgery\par She reports that she continues to have progressive difficulty walking\par She further reports that the pain in her upper and lower back is tremendous and interferes with her quality of life

## 2020-11-25 NOTE — ASSESSMENT
[FreeTextEntry1] : 70 Year old woman with Kyphoscoliosis\par T10- Pelvis Fusion\par \par Complex Spine Reconstructive fusion surgery recommended\par After detailed imaging review and patient examination surgical intervention was discussed with the patient to halt progression of symptoms. \par Potential surgical risks and benefits and alternative discussed with time allowed for questions and answers given. \par \par Dr Christine discussed in detail that loss of function preop is not guaranteed to return. Surgical intervention is to halt further progression although at times the process still continues despite intervention Risks could include but is not limited to infection, no resolution of symptoms/device failure; Paralysis, death\par .\par Pre-op requirements and postop recovery and expectations discussed regarding Follow up with  NP for wound care assessment and medication management.\par PREOP MEDICATIONS REVIEWED. PT will STOP Blood Thinners 7-10 Days prior to Surgery)\par Verbalized understanding of Pre-op Plan of care\par \par \par \par \par \par

## 2020-11-25 NOTE — HISTORY OF PRESENT ILLNESS
[FreeTextEntry1] : Chronic Lowerback Pain and progressive difficulty walking [de-identified] : This is a 70 Year old woman here for comprehensive evaluation of her thoracic and Lumbar Spine. She is presenting with severe kyphoscoliosis\par S/P SCS Placement Jan 2020 by Dr. Merino, [facility]. over the past several years she has had multiple SI joint injections in her lower back as well as epidural steroid injections in her neck. She has had long-term physical therapy over the years and continues to present time. In addition she has had laser procedure on her back for her chronic pain. Over the past several months she does report worsening LEG and Lowerback pain. she reports worsening forward pitch posture. As well as progressive difficulty ambulating. The pain currently interferes with her ADLs and her ability to function.

## 2020-11-25 NOTE — PHYSICAL EXAM
[General Appearance - Alert] : alert [General Appearance - In No Acute Distress] : in no acute distress [Oriented To Time, Place, And Person] : oriented to person, place, and time [Impaired Insight] : insight and judgment were intact [Cranial Nerves Optic (II)] : visual acuity intact bilaterally,  pupils equal round and reactive to light [Cranial Nerves Oculomotor (III)] : extraocular motion intact [Cranial Nerves Trigeminal (V)] : facial sensation intact symmetrically [Cranial Nerves Facial (VII)] : face symmetrical [Cranial Nerves Vestibulocochlear (VIII)] : hearing was intact bilaterally [Cranial Nerves Glossopharyngeal (IX)] : tongue and palate midline [Cranial Nerves Accessory (XI - Cranial And Spinal)] : head turning and shoulder shrug symmetric [Cranial Nerves Hypoglossal (XII)] : there was no tongue deviation with protrusion [Motor Tone] : muscle tone was normal in all four extremities [5] : C8 finger flexors 5/5 [4] : S1 toe walking 4/5 [Sensation Tactile Decrease] : light touch was intact [Limited Balance] : the patient's balance was impaired [No Visual Abnormalities] : no visible abnormailities [Deformity] : deformity [Scoliosis] : scoliosis [Mid Thoracic] : left midthoracic spine rib prominence [ThoracoLumbar] : left thoracolumbar spine rib prominence [Lumbar Spine] : left lumbar spine rib prominence [Sclera] : the sclera and conjunctiva were normal [Outer Ear] : the ears and nose were normal in appearance [Neck Appearance] : the appearance of the neck was normal [] : no respiratory distress [Heart Rate And Rhythm] : heart rate was normal and rhythm regular [Skin Color & Pigmentation] : normal skin color and pigmentation [FreeTextEntry8] : Pitched forward gait [FreeTextEntry1] : Abnormal ataxic gait

## 2020-12-01 ENCOUNTER — APPOINTMENT (OUTPATIENT)
Dept: PULMONOLOGY | Facility: CLINIC | Age: 70
End: 2020-12-01

## 2020-12-18 ENCOUNTER — RX RENEWAL (OUTPATIENT)
Age: 70
End: 2020-12-18

## 2020-12-22 ENCOUNTER — NON-APPOINTMENT (OUTPATIENT)
Age: 70
End: 2020-12-22

## 2021-01-06 ENCOUNTER — NON-APPOINTMENT (OUTPATIENT)
Age: 71
End: 2021-01-06

## 2021-01-27 NOTE — ED CLERICAL - NS ED CLERK NOTE PRE-ARRIVAL INFORMATION; ADDITIONAL PRE-ARRIVAL INFORMATION
Pt alert, conversational 1 week ago. Now speech slurred, oriented to name only. Hx depression.  Daughter Estela [phone] . EMS called  pt with [facility]
Procedure To Be Performed At Next Visit: Excision
Introduction Text (Please End With A Colon): The following procedure was deferred:
Instructions (Optional): size est 4 cm
Detail Level: Simple

## 2021-01-29 ENCOUNTER — APPOINTMENT (OUTPATIENT)
Dept: CARDIOLOGY | Facility: CLINIC | Age: 71
End: 2021-01-29

## 2021-02-07 ENCOUNTER — EMERGENCY (EMERGENCY)
Facility: HOSPITAL | Age: 71
LOS: 1 days | Discharge: HOME | End: 2021-02-07
Payer: MEDICARE

## 2021-02-07 VITALS
SYSTOLIC BLOOD PRESSURE: 135 MMHG | OXYGEN SATURATION: 96 % | TEMPERATURE: 98 F | WEIGHT: 95.02 LBS | HEART RATE: 110 BPM | HEIGHT: 55 IN | RESPIRATION RATE: 18 BRPM | DIASTOLIC BLOOD PRESSURE: 80 MMHG

## 2021-02-07 VITALS
SYSTOLIC BLOOD PRESSURE: 140 MMHG | TEMPERATURE: 98 F | DIASTOLIC BLOOD PRESSURE: 98 MMHG | OXYGEN SATURATION: 99 % | RESPIRATION RATE: 18 BRPM

## 2021-02-07 DIAGNOSIS — Z90.49 ACQUIRED ABSENCE OF OTHER SPECIFIED PARTS OF DIGESTIVE TRACT: Chronic | ICD-10-CM

## 2021-02-07 DIAGNOSIS — Z98.890 OTHER SPECIFIED POSTPROCEDURAL STATES: Chronic | ICD-10-CM

## 2021-02-07 DIAGNOSIS — Z90.79 ACQUIRED ABSENCE OF OTHER GENITAL ORGAN(S): Chronic | ICD-10-CM

## 2021-02-07 LAB
ALBUMIN SERPL ELPH-MCNC: 4.1 G/DL — SIGNIFICANT CHANGE UP (ref 3.3–5)
ALP SERPL-CCNC: 39 U/L — LOW (ref 40–120)
ALT FLD-CCNC: 17 U/L — SIGNIFICANT CHANGE UP (ref 10–45)
ANION GAP SERPL CALC-SCNC: 10 MMOL/L — SIGNIFICANT CHANGE UP (ref 5–17)
ANION GAP SERPL CALC-SCNC: 11 MMOL/L — SIGNIFICANT CHANGE UP (ref 5–17)
APTT BLD: 26.9 SEC — LOW (ref 27.5–35.5)
AST SERPL-CCNC: 63 U/L — HIGH (ref 10–40)
BASOPHILS # BLD AUTO: 0.01 K/UL — SIGNIFICANT CHANGE UP (ref 0–0.2)
BASOPHILS NFR BLD AUTO: 0.2 % — SIGNIFICANT CHANGE UP (ref 0–2)
BILIRUB SERPL-MCNC: 0.4 MG/DL — SIGNIFICANT CHANGE UP (ref 0.2–1.2)
BUN SERPL-MCNC: 20 MG/DL — SIGNIFICANT CHANGE UP (ref 7–23)
BUN SERPL-MCNC: 20 MG/DL — SIGNIFICANT CHANGE UP (ref 7–23)
CALCIUM SERPL-MCNC: 9.5 MG/DL — SIGNIFICANT CHANGE UP (ref 8.4–10.5)
CALCIUM SERPL-MCNC: 9.8 MG/DL — SIGNIFICANT CHANGE UP (ref 8.4–10.5)
CHLORIDE SERPL-SCNC: 102 MMOL/L — SIGNIFICANT CHANGE UP (ref 96–108)
CHLORIDE SERPL-SCNC: 102 MMOL/L — SIGNIFICANT CHANGE UP (ref 96–108)
CO2 SERPL-SCNC: 25 MMOL/L — SIGNIFICANT CHANGE UP (ref 22–31)
CO2 SERPL-SCNC: 27 MMOL/L — SIGNIFICANT CHANGE UP (ref 22–31)
CREAT SERPL-MCNC: 0.64 MG/DL — SIGNIFICANT CHANGE UP (ref 0.5–1.3)
CREAT SERPL-MCNC: 0.68 MG/DL — SIGNIFICANT CHANGE UP (ref 0.5–1.3)
EOSINOPHIL # BLD AUTO: 0 K/UL — SIGNIFICANT CHANGE UP (ref 0–0.5)
EOSINOPHIL NFR BLD AUTO: 0 % — SIGNIFICANT CHANGE UP (ref 0–6)
GLUCOSE SERPL-MCNC: 84 MG/DL — SIGNIFICANT CHANGE UP (ref 70–99)
GLUCOSE SERPL-MCNC: 88 MG/DL — SIGNIFICANT CHANGE UP (ref 70–99)
HCT VFR BLD CALC: 45.5 % — HIGH (ref 34.5–45)
HGB BLD-MCNC: 15.3 G/DL — SIGNIFICANT CHANGE UP (ref 11.5–15.5)
IMM GRANULOCYTES NFR BLD AUTO: 0.6 % — SIGNIFICANT CHANGE UP (ref 0–1.5)
INR BLD: 0.91 RATIO — SIGNIFICANT CHANGE UP (ref 0.88–1.16)
LIDOCAIN IGE QN: 37 U/L — SIGNIFICANT CHANGE UP (ref 7–60)
LYMPHOCYTES # BLD AUTO: 1.58 K/UL — SIGNIFICANT CHANGE UP (ref 1–3.3)
LYMPHOCYTES # BLD AUTO: 24.8 % — SIGNIFICANT CHANGE UP (ref 13–44)
MAGNESIUM SERPL-MCNC: 2.4 MG/DL — SIGNIFICANT CHANGE UP (ref 1.6–2.6)
MCHC RBC-ENTMCNC: 31 PG — SIGNIFICANT CHANGE UP (ref 27–34)
MCHC RBC-ENTMCNC: 33.6 GM/DL — SIGNIFICANT CHANGE UP (ref 32–36)
MCV RBC AUTO: 92.3 FL — SIGNIFICANT CHANGE UP (ref 80–100)
MONOCYTES # BLD AUTO: 0.6 K/UL — SIGNIFICANT CHANGE UP (ref 0–0.9)
MONOCYTES NFR BLD AUTO: 9.4 % — SIGNIFICANT CHANGE UP (ref 2–14)
NEUTROPHILS # BLD AUTO: 4.15 K/UL — SIGNIFICANT CHANGE UP (ref 1.8–7.4)
NEUTROPHILS NFR BLD AUTO: 65 % — SIGNIFICANT CHANGE UP (ref 43–77)
NRBC # BLD: 0 /100 WBCS — SIGNIFICANT CHANGE UP (ref 0–0)
PLATELET # BLD AUTO: 296 K/UL — SIGNIFICANT CHANGE UP (ref 150–400)
POTASSIUM SERPL-MCNC: 3.9 MMOL/L — SIGNIFICANT CHANGE UP (ref 3.5–5.3)
POTASSIUM SERPL-MCNC: 6.8 MMOL/L — CRITICAL HIGH (ref 3.5–5.3)
POTASSIUM SERPL-SCNC: 3.9 MMOL/L — SIGNIFICANT CHANGE UP (ref 3.5–5.3)
POTASSIUM SERPL-SCNC: 6.8 MMOL/L — CRITICAL HIGH (ref 3.5–5.3)
PROT SERPL-MCNC: 6.8 G/DL — SIGNIFICANT CHANGE UP (ref 6–8.3)
PROTHROM AB SERPL-ACNC: 11 SEC — SIGNIFICANT CHANGE UP (ref 10.6–13.6)
RBC # BLD: 4.93 M/UL — SIGNIFICANT CHANGE UP (ref 3.8–5.2)
RBC # FLD: 13.4 % — SIGNIFICANT CHANGE UP (ref 10.3–14.5)
SARS-COV-2 RNA SPEC QL NAA+PROBE: SIGNIFICANT CHANGE UP
SODIUM SERPL-SCNC: 137 MMOL/L — SIGNIFICANT CHANGE UP (ref 135–145)
SODIUM SERPL-SCNC: 140 MMOL/L — SIGNIFICANT CHANGE UP (ref 135–145)
TROPONIN T, HIGH SENSITIVITY RESULT: 10 NG/L — SIGNIFICANT CHANGE UP (ref 0–51)
TROPONIN T, HIGH SENSITIVITY RESULT: 8 NG/L — SIGNIFICANT CHANGE UP (ref 0–51)
WBC # BLD: 6.38 K/UL — SIGNIFICANT CHANGE UP (ref 3.8–10.5)
WBC # FLD AUTO: 6.38 K/UL — SIGNIFICANT CHANGE UP (ref 3.8–10.5)

## 2021-02-07 PROCEDURE — 85730 THROMBOPLASTIN TIME PARTIAL: CPT

## 2021-02-07 PROCEDURE — U0005: CPT

## 2021-02-07 PROCEDURE — 96374 THER/PROPH/DIAG INJ IV PUSH: CPT

## 2021-02-07 PROCEDURE — U0003: CPT

## 2021-02-07 PROCEDURE — 83690 ASSAY OF LIPASE: CPT

## 2021-02-07 PROCEDURE — 80048 BASIC METABOLIC PNL TOTAL CA: CPT

## 2021-02-07 PROCEDURE — 72125 CT NECK SPINE W/O DYE: CPT | Mod: 26,MG

## 2021-02-07 PROCEDURE — 85610 PROTHROMBIN TIME: CPT

## 2021-02-07 PROCEDURE — 99284 EMERGENCY DEPT VISIT MOD MDM: CPT | Mod: 25

## 2021-02-07 PROCEDURE — 80053 COMPREHEN METABOLIC PANEL: CPT

## 2021-02-07 PROCEDURE — 72128 CT CHEST SPINE W/O DYE: CPT | Mod: 26,MG

## 2021-02-07 PROCEDURE — 71260 CT THORAX DX C+: CPT | Mod: 26,MG

## 2021-02-07 PROCEDURE — 71260 CT THORAX DX C+: CPT

## 2021-02-07 PROCEDURE — 70450 CT HEAD/BRAIN W/O DYE: CPT

## 2021-02-07 PROCEDURE — 70450 CT HEAD/BRAIN W/O DYE: CPT | Mod: 26,ME

## 2021-02-07 PROCEDURE — 83735 ASSAY OF MAGNESIUM: CPT

## 2021-02-07 PROCEDURE — 84484 ASSAY OF TROPONIN QUANT: CPT

## 2021-02-07 PROCEDURE — 72131 CT LUMBAR SPINE W/O DYE: CPT | Mod: 26,MG

## 2021-02-07 PROCEDURE — 99284 EMERGENCY DEPT VISIT MOD MDM: CPT | Mod: GC

## 2021-02-07 PROCEDURE — 85025 COMPLETE CBC W/AUTO DIFF WBC: CPT

## 2021-02-07 PROCEDURE — G1004: CPT

## 2021-02-07 PROCEDURE — 74177 CT ABD & PELVIS W/CONTRAST: CPT | Mod: 26,MG

## 2021-02-07 PROCEDURE — 72125 CT NECK SPINE W/O DYE: CPT

## 2021-02-07 PROCEDURE — 93005 ELECTROCARDIOGRAM TRACING: CPT

## 2021-02-07 PROCEDURE — 74177 CT ABD & PELVIS W/CONTRAST: CPT

## 2021-02-07 RX ORDER — CLONAZEPAM 1 MG
0.5 TABLET ORAL ONCE
Refills: 0 | Status: DISCONTINUED | OUTPATIENT
Start: 2021-02-07 | End: 2021-02-07

## 2021-02-07 RX ORDER — ONDANSETRON 8 MG/1
4 TABLET, FILM COATED ORAL ONCE
Refills: 0 | Status: COMPLETED | OUTPATIENT
Start: 2021-02-07 | End: 2021-02-07

## 2021-02-07 RX ORDER — OXYCODONE HYDROCHLORIDE 5 MG/1
5 TABLET ORAL ONCE
Refills: 0 | Status: DISCONTINUED | OUTPATIENT
Start: 2021-02-07 | End: 2021-02-07

## 2021-02-07 RX ORDER — SODIUM CHLORIDE 9 MG/ML
500 INJECTION INTRAMUSCULAR; INTRAVENOUS; SUBCUTANEOUS ONCE
Refills: 0 | Status: COMPLETED | OUTPATIENT
Start: 2021-02-07 | End: 2021-02-07

## 2021-02-07 RX ADMIN — ONDANSETRON 4 MILLIGRAM(S): 8 TABLET, FILM COATED ORAL at 11:30

## 2021-02-07 RX ADMIN — SODIUM CHLORIDE 500 MILLILITER(S): 9 INJECTION INTRAMUSCULAR; INTRAVENOUS; SUBCUTANEOUS at 14:00

## 2021-02-07 RX ADMIN — OXYCODONE HYDROCHLORIDE 5 MILLIGRAM(S): 5 TABLET ORAL at 11:30

## 2021-02-07 RX ADMIN — Medication 0.5 MILLIGRAM(S): at 12:20

## 2021-02-07 NOTE — ED ADULT TRIAGE NOTE - CHIEF COMPLAINT QUOTE
pt fell on the ice yesterday while walking her dog. Pt now c/o back pain, headache, dizziness, nausea, neck pain and left lower abdominal pain after the fall. No LOC; pt on coumadin

## 2021-02-07 NOTE — ED ADULT NURSE REASSESSMENT NOTE - NS ED NURSE REASSESS COMMENT FT1
Patient resting in bed comfortably in no acute distress, vital signs stable, respirations even and nonlabored, breathing spontaneously on room air, pending further orders/disposition. SHARMAINE Gregory

## 2021-02-07 NOTE — ED PROVIDER NOTE - PHYSICAL EXAMINATION
Gen: NAD  Head: NC/NT  Eyes: PERRL   ENT: airway patent, mmm   CV: RRR, +S1/S2, no M/R/G  Resp: CTAB, symmetric breath sounds   GI:  abdomen soft non-distended, +LLQ TTP  Back: +ttp of lumbar spine and paraspinal region no cervical and thoracic region ttp, neuromodulator in place  Extremities -  symmetric pulses,  no edema, 5/5 strength, sensation intact, FROM in lower and upper extremities  Neuro: A&Ox3, following commands, speech clear, moving all four extremities spontaneously  Psych: appropriate mood, normal insight

## 2021-02-07 NOTE — ED PROVIDER NOTE - PROGRESS NOTE DETAILS
Resident Hersimran: c-collar applied - pt was unable to tolerate. States she did not want to wear the collar due to respiratory discomfort. Risks of cervical injury are explained including loss of sensation and strength in extremities, and even death. Pt verbalizes understanding and continues to refuse Ccollar. Witnessed by Dr Willett. MD Willett:  CTs show no acute injury or pathology.  Patient clinically well.  Dc home.  patient calling son for pickup.  Spoke with patient extensively regarding current differential diagnosis for ongoing symptoms, and patient acknowledged understanding. All questions and concerns have been addressed with the patient. I have discussed the plan for care and patient is in agreement. Patient is instructed to follow up with Primary Care Provider, and has been given strict return precautions.

## 2021-02-07 NOTE — ED ADULT NURSE REASSESSMENT NOTE - NS ED NURSE REASSESS COMMENT FT1
Patient resting in bed comfortably in no acute distress, ambulatory to bathroom with steady gait. SHARMAINE Gregory

## 2021-02-07 NOTE — ED PROVIDER NOTE - OBJECTIVE STATEMENT
71 yo F with pmhx of history of CAD, Stented coronary artery - 8 stents, MI ( 2017) - in Plavix, Left ear deafness, Severe asthma, Left hemidiaphragm s/p plication, moderate thoracic scoliosis , Osteoporosis, diverticulitis s/p colon resection and hysterectomy, has neuromodulators for pain mgmt presents with LT sided chest pain, back pain, nausea s/p slip and fall on ice yesterday at 8A. States she slipped and fell on her back at first. Attempted to get up and fell on to the left side again. Denies f/c. States she woke up and her pain worsened today.

## 2021-02-07 NOTE — ED PROVIDER NOTE - CARE PLAN
Principal Discharge DX:	Back contusion, left, initial encounter  Secondary Diagnosis:	Neck sprain, initial encounter

## 2021-02-07 NOTE — ED ADULT NURSE NOTE - OBJECTIVE STATEMENT
69 yo female presents ambulatory from triage alert and oriented x 4 with complaints of several falls yesterday while walking her dog, states hx implanted nerve stimulator in back, 8 cardiac stents, asthma, +bilateral lower back pain, +left knee pain, denies loc, +dizziness, +shortness of breath, worsened on exertion. Respirations even and nonlabored, breathing spontaneously on room air. Pending MD evaluation. Will continue to monitor. SHARMAINE Gregory 69 yo female presents ambulatory from triage alert and oriented x 4 with complaints of several falls yesterday while walking her dog, states hx implanted nerve stimulator in back, 8 cardiac stents, asthma, +neck pain, +chest pain, +bilateral lower back pain, +left knee pain, denies loc, +dizziness, +shortness of breath, worsened on exertion. Respirations even and nonlabored, breathing spontaneously on room air. Placed in neck collar, C-spine precautions initiated, labs collected. Pending MD evaluation. Will continue to monitor. SHARMAINE Gregory

## 2021-02-07 NOTE — ED PROVIDER NOTE - PATIENT PORTAL LINK FT
You can access the FollowMyHealth Patient Portal offered by [facility] by registering at the following website: [email or web address]. By joining "[employer]’s FollowMyHealth portal, you will also be able to view your health information using other applications (apps) compatible with our system.

## 2021-02-07 NOTE — ED PROVIDER NOTE - ATTENDING CONTRIBUTION TO CARE
MD Willett:  patient seen and evaluated with the resident.  I was present for key portions of the History & Physical, and I agree with the Impression & Plan.  MD Willett:  71 yo F, c/o whole-spine pain ~ 24 hrs s/p 2 falls yesterday (one after the other) while walking her dog.  No LOC, but c/o c/t/l spine pain as well as HA.   MHx of spinal stenosis with DJD and a [place] spine stimulator (has appointment to see pain management in 2days).    Associated Sx:  No weakness/numbness, no bowel/bladder incontinence, no urinary hesitancy, no saddle anaesthesia, no CP/SOB, no abdominal pain, & no fever/chills.    VS: wnl.  Physical Exam: elderly F, mild distress, NCAT, PERRL, EOMI, CTA B, RRR, Abd: s/nd/nt, Ext: no edema.    Spine: +midline pain throughout her spine (patient placed in a c-collar), no step-offs, + lumbar paraspinal muscle spasm,  Strength in BLE 5/5. MSK: no pain in hip with axial load.    Impression:  pan-spine pain in elderly patient with chronic spine pain.  Difficult to know what pain is chronic, acute on chronic, or acute without imaging of spine.  Fortunately, she is neurologically intact and does not require MRI at this time.    Plan:  pain meds, nausea meds, CT head with C/T/L spines.  reassess.

## 2021-02-07 NOTE — ED PROVIDER NOTE - CLINICAL SUMMARY MEDICAL DECISION MAKING FREE TEXT BOX
Impression:  pan-spine pain in elderly patient with chronic spine pain.  Difficult to know what pain is chronic, acute on chronic, or acute without imaging of spine.  Fortunately, she is neurologically intact and does not require MRI at this time.    Plan:  pain meds, nausea meds, CT head with C/T/L spines.  reassess.

## 2021-02-07 NOTE — ED PROVIDER NOTE - NS ED ROS FT
Gen: Denies fever, chills  CV: +chest pain  Skin: Denies rash, erythema  Resp: Denies SOB, cough  ENT: Denies nasal congestion  Eyes: Denies blurry vision  GI: Denies nausea, vomiting, diarrhea  Msk: +back pain Denies LE swelling  : Denies dysuria  Neuro: Denies headache

## 2021-02-07 NOTE — ED PROVIDER NOTE - NSFOLLOWUPINSTRUCTIONS_ED_ALL_ED_FT
CONTUSION  A contusion is a deep bruise. Contusions are the result of a blunt injury to tissues and muscle fibers under the skin. The skin overlying the contusion may turn blue, purple, or yellow. Symptoms also include pain and swelling in the injured area.    SEEK IMMEDIATE MEDICAL CARE IF YOU HAVE ANY OF THE FOLLOWING SYMPTOMS: severe pain, numbness, tingling, pain, weakness, or skin color/temperature change in any part of your body distal to the injury.      BACK PAIN  Back pain is very common in adults. The cause of back pain is rarely dangerous and the pain often gets better over time. The cause of your back pain may not be known and may include strain of muscles or ligaments, degeneration of the spinal disks, or arthritis. Occasionally the pain may radiate down your leg(s). Over-the-counter medicines to reduce pain and inflammation are often the most helpful. Stretching and remaining active frequently helps the healing process.     SEEK IMMEDIATE MEDICAL CARE IF YOU HAVE ANY OF THE FOLLOWING SYMPTOMS: bowel or bladder control problems, unusual weakness or numbness in your arms or legs, nausea or vomiting, abdominal pain, fever, dizziness/lightheadedness.

## 2021-02-10 ENCOUNTER — APPOINTMENT (OUTPATIENT)
Dept: CARDIOLOGY | Facility: CLINIC | Age: 71
End: 2021-02-10

## 2021-02-10 ENCOUNTER — RX RENEWAL (OUTPATIENT)
Age: 71
End: 2021-02-10

## 2021-02-26 ENCOUNTER — RX RENEWAL (OUTPATIENT)
Age: 71
End: 2021-02-26

## 2021-03-02 ENCOUNTER — RX RENEWAL (OUTPATIENT)
Age: 71
End: 2021-03-02

## 2021-03-17 ENCOUNTER — NON-APPOINTMENT (OUTPATIENT)
Age: 71
End: 2021-03-17

## 2021-03-17 ENCOUNTER — LABORATORY RESULT (OUTPATIENT)
Age: 71
End: 2021-03-17

## 2021-03-17 ENCOUNTER — APPOINTMENT (OUTPATIENT)
Dept: CARDIOLOGY | Facility: CLINIC | Age: 71
End: 2021-03-17
Payer: MEDICARE

## 2021-03-17 VITALS
DIASTOLIC BLOOD PRESSURE: 70 MMHG | OXYGEN SATURATION: 97 % | HEIGHT: 55 IN | HEART RATE: 88 BPM | WEIGHT: 92 LBS | BODY MASS INDEX: 21.29 KG/M2 | SYSTOLIC BLOOD PRESSURE: 155 MMHG | RESPIRATION RATE: 16 BRPM

## 2021-03-17 PROCEDURE — 93000 ELECTROCARDIOGRAM COMPLETE: CPT

## 2021-03-17 PROCEDURE — 99214 OFFICE O/P EST MOD 30 MIN: CPT

## 2021-03-17 RX ORDER — ARIPIPRAZOLE 2 MG/1
2 TABLET ORAL
Qty: 30 | Refills: 0 | Status: COMPLETED | COMMUNITY
Start: 2020-10-26 | End: 2021-03-17

## 2021-03-17 RX ORDER — TRAZODONE HYDROCHLORIDE 50 MG/1
50 TABLET ORAL
Qty: 90 | Refills: 1 | Status: COMPLETED | COMMUNITY
Start: 2020-11-02 | End: 2021-03-17

## 2021-03-17 RX ORDER — VORTIOXETINE 5 MG/1
5 TABLET, FILM COATED ORAL
Qty: 23 | Refills: 0 | Status: COMPLETED | COMMUNITY
Start: 2020-10-09 | End: 2021-03-17

## 2021-03-17 RX ORDER — OMEPRAZOLE 40 MG/1
40 CAPSULE, DELAYED RELEASE ORAL
Refills: 0 | Status: DISCONTINUED | COMMUNITY
End: 2021-03-17

## 2021-03-17 RX ORDER — ALPRAZOLAM 0.25 MG/1
0.25 TABLET ORAL
Qty: 14 | Refills: 0 | Status: COMPLETED | COMMUNITY
Start: 2020-08-13 | End: 2021-03-17

## 2021-03-17 RX ORDER — HYOSCYAMINE SULFATE 0.38 MG/1
0.38 TABLET, EXTENDED RELEASE ORAL
Qty: 30 | Refills: 0 | Status: COMPLETED | COMMUNITY
Start: 2020-09-24 | End: 2021-03-17

## 2021-03-17 RX ORDER — NITROFURANTOIN (MONOHYDRATE/MACROCRYSTALS) 25; 75 MG/1; MG/1
100 CAPSULE ORAL
Qty: 6 | Refills: 0 | Status: COMPLETED | COMMUNITY
Start: 2020-05-28 | End: 2021-03-17

## 2021-03-17 RX ORDER — LORAZEPAM 0.5 MG/1
0.5 TABLET ORAL
Qty: 28 | Refills: 0 | Status: COMPLETED | COMMUNITY
Start: 2020-08-20 | End: 2021-03-17

## 2021-03-17 RX ORDER — AZITHROMYCIN 250 MG/1
250 TABLET, FILM COATED ORAL
Qty: 6 | Refills: 0 | Status: COMPLETED | COMMUNITY
Start: 2020-07-14 | End: 2021-03-17

## 2021-03-17 RX ORDER — QUETIAPINE 50 MG/1
50 TABLET, FILM COATED, EXTENDED RELEASE ORAL
Qty: 30 | Refills: 0 | Status: COMPLETED | COMMUNITY
Start: 2020-05-12 | End: 2021-03-17

## 2021-03-17 RX ORDER — QUETIAPINE 25 MG/1
25 TABLET, FILM COATED ORAL
Refills: 0 | Status: COMPLETED | COMMUNITY
Start: 2020-05-13 | End: 2021-03-17

## 2021-03-17 NOTE — ASSESSMENT
[FreeTextEntry1] : This is a 71 year year old female here today for follow up cardiac followup evaluation. \par She has a past medical history significant for  coronary artery disease, status post myocardial infarction received 6-8 stents by her report, history of significant asthma, status post sigmoid resection for diverticulitis, status post implantation of a back stimulator for chronic pain, status post surgery for rectocele, status post surgery for cystocele, sciatica, diaphragmatic surgery to improve her breathing.\par \par -Pt is stable from a cardiac standpoint and does not have any complaints at this time. \par -BP is well controlled in today's visit and patient is on spironolactone 25mg PO daily.\par \par -EKG done in March 17 2021 which demonstrated regular sinus rhythm with nonspecific ST-T wave changes BPM of 88.\par -Echocardiogram done 6/16/2020 demonstrated mild mitral regurgitation, thickened aortic valve, minimal aortic regurgitation, mild LVH, severe segmental LV dysfunction with hypokinesis of the anterior wall, ape, and interventricular septum, moderate-severe tricuspid regurgitation with mild pulmonary pressures.\par \par -I explained that she needs to follow up with Dr. Bull to help manage her HF.\par \par -Recent lab work done in May 13 2020 which demonstrated Total cholesterol 212 . I advised to start Crestor 20mg PO Daily however she refused statin therapy at this time. She states "I have tried these medications before and I don’t tolerate it well. 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 \par -New blood work done today to repeat lipid profile. \par \par -The patient will schedule an Echo Doppler examination to evaluate murmur, left ventricular function, chamber size, and rule out hypertrophy. \par -She will follow up in 3 months \par -She will follow up with Dr. Bull.\par \par The patient understands that aerobic exercises must be increased to minutes 4 times/week and a detailed discussion of lifestyle modification was done today. \par The patient has a good understanding of the diagnosis, treatment plan and lifestyle modification. She will contact me at the office for any questions with their care or any changes in their health status.\par \par The plan of care was discussed with supervising physician, Dr. Carlson while present in the office at the time of the visit. \par \par Carolyn CASTLE

## 2021-03-17 NOTE — DISCUSSION/SUMMARY
[FreeTextEntry1] : Dr. Carlson-(PRIOR VISIT and PMH WITH Dr. Carlson): \par This is a 70-year-old female with past medical history significant for coronary artery disease, status post myocardial infarction received 6-8 stents by her report, history of significant asthma, status post sigmoid resection for diverticulitis, status post implantation of a back stimulator for chronic pain, status post surgery for rectocele, status post surgery for cystocele, sciatica, diaphragmatic surgery to improve her breathing, comes in for cardiac consultation.\par The patient reports that she had a myocardial infarction followed by coronary intervention approximately 5 to 6 years ago [facility].  Details of this hospitalization are not available for my review.  At one point she was offered an AICD but refused wanting to live her life out.  She also reports that at one point she was in a coma related to her asthma.\par Mother and 3 sisters had severe asthma.  She has no history of rheumatic fever.  She does not drink excessive caffeine or alcohol.\par She reports that she was tried on statin therapy in the past but it did not agree with her.  She does not remember which statin she was on.  She is currently on Plavix 75 mg/day and aspirin 81 mg/day, Aldactone 25 mg/day, in addition to her pulmonary medication.  Blood work done January 31, 2020 demonstrated total cholesterol of 211, HDL of 83 mg/dL, calculated LDL of 101 mg/dL, triglycerides 134 mg/dL.\par Electrocardiogram done May 13, 2020 demonstrated normal sinus rhythm rate of 98 bpm is otherwise remarkable for left atrial abnormality, left axis deviation, and nonspecific ST wave flattening.\par The patient had an echo Doppler examination February 21, 2017 which demonstrated severe left ventricular dysfunction with an estimated ejection fraction of 25 to 30%.  This was an echocardiogram without commentary on Doppler flow.  A pulmonary systolic pressure was noted to be 39 mmHg.  She had a nuclear stress test done September 22, 2014 which demonstrated normal perfusion with an estimated ejection fraction 62%.\par The patient had a recent cardiac catheterization done at [facility] in March 2020.  Results are still pending at this time. She should be on lipid-lowering therapy. \par She will follow-up with her pulmonologist.\par She is currently hemodynamically stable from a cardiac standpoint.

## 2021-03-17 NOTE — PHYSICAL EXAM
[S3] : no S3 [S4] : no S4 [Right Carotid Bruit] : no bruit heard over the right carotid [Left Carotid Bruit] : no bruit heard over the left carotid

## 2021-03-17 NOTE — REASON FOR VISIT
[FreeTextEntry1] : This is a 71 year year old female here today for follow up cardiac followup evaluation. \par She has a past medical history significant for  coronary artery disease, status post myocardial infarction received 6-8 stents by her report, history of significant asthma, status post sigmoid resection for diverticulitis, status post implantation of a back stimulator for chronic pain, status post surgery for rectocele, status post surgery for cystocele, sciatica, diaphragmatic surgery to improve her breathing.\par \par Today she is feeling generally well but does admit that at times she feels that she is experiencing more  and SOB.\par  \par She denies fever, chills, weight loss, malaise, rash, alteration bowel habits, weakness, abdominal  pain, bloating, changes in urination, visual disturbances, chest pain, headaches, dizziness, heart palpitations, recent episodes of syncope or falls, SOB, or dyspnea at this time.\par \par \par

## 2021-03-18 RX ORDER — LACTULOSE 10 G/15ML
10 SOLUTION ORAL
Qty: 473 | Refills: 0 | Status: DISCONTINUED | COMMUNITY
Start: 2020-05-20 | End: 2021-03-18

## 2021-03-18 RX ORDER — DIAZEPAM 2 MG/1
2 TABLET ORAL
Qty: 7 | Refills: 0 | Status: DISCONTINUED | COMMUNITY
Start: 2020-07-08 | End: 2021-03-18

## 2021-04-14 ENCOUNTER — APPOINTMENT (OUTPATIENT)
Dept: PULMONOLOGY | Facility: CLINIC | Age: 71
End: 2021-04-14

## 2021-05-05 ENCOUNTER — APPOINTMENT (OUTPATIENT)
Dept: HEART FAILURE | Facility: CLINIC | Age: 71
End: 2021-05-05

## 2021-05-20 ENCOUNTER — INPATIENT (INPATIENT)
Facility: HOSPITAL | Age: 71
LOS: 48 days | Discharge: HOME | End: 2021-07-08
Attending: PSYCHIATRY & NEUROLOGY | Admitting: PSYCHIATRY & NEUROLOGY
Payer: MEDICARE

## 2021-05-20 VITALS
OXYGEN SATURATION: 100 % | DIASTOLIC BLOOD PRESSURE: 95 MMHG | HEART RATE: 92 BPM | TEMPERATURE: 98 F | SYSTOLIC BLOOD PRESSURE: 150 MMHG | HEIGHT: 55 IN | RESPIRATION RATE: 18 BRPM

## 2021-05-20 DIAGNOSIS — Z90.49 ACQUIRED ABSENCE OF OTHER SPECIFIED PARTS OF DIGESTIVE TRACT: Chronic | ICD-10-CM

## 2021-05-20 DIAGNOSIS — Z98.890 OTHER SPECIFIED POSTPROCEDURAL STATES: Chronic | ICD-10-CM

## 2021-05-20 DIAGNOSIS — Z90.79 ACQUIRED ABSENCE OF OTHER GENITAL ORGAN(S): Chronic | ICD-10-CM

## 2021-05-20 LAB
ALBUMIN SERPL ELPH-MCNC: 3.6 G/DL — SIGNIFICANT CHANGE UP (ref 3.3–5)
ALP SERPL-CCNC: 39 U/L — LOW (ref 40–120)
ALT FLD-CCNC: 21 U/L — SIGNIFICANT CHANGE UP (ref 4–33)
ANION GAP SERPL CALC-SCNC: 21 MMOL/L — HIGH (ref 7–14)
AST SERPL-CCNC: 42 U/L — HIGH (ref 4–32)
BASOPHILS # BLD AUTO: 0.08 K/UL — SIGNIFICANT CHANGE UP (ref 0–0.2)
BASOPHILS NFR BLD AUTO: 0.7 % — SIGNIFICANT CHANGE UP (ref 0–2)
BILIRUB SERPL-MCNC: 0.5 MG/DL — SIGNIFICANT CHANGE UP (ref 0.2–1.2)
BUN SERPL-MCNC: 14 MG/DL — SIGNIFICANT CHANGE UP (ref 7–23)
CALCIUM SERPL-MCNC: 9.8 MG/DL — SIGNIFICANT CHANGE UP (ref 8.4–10.5)
CHLORIDE SERPL-SCNC: 104 MMOL/L — SIGNIFICANT CHANGE UP (ref 98–107)
CO2 SERPL-SCNC: 13 MMOL/L — LOW (ref 22–31)
CREAT SERPL-MCNC: 1.01 MG/DL — SIGNIFICANT CHANGE UP (ref 0.5–1.3)
EOSINOPHIL # BLD AUTO: 0.15 K/UL — SIGNIFICANT CHANGE UP (ref 0–0.5)
EOSINOPHIL NFR BLD AUTO: 1.3 % — SIGNIFICANT CHANGE UP (ref 0–6)
GLUCOSE SERPL-MCNC: 78 MG/DL — SIGNIFICANT CHANGE UP (ref 70–99)
HCT VFR BLD CALC: 47.3 % — HIGH (ref 34.5–45)
HGB BLD-MCNC: 15.3 G/DL — SIGNIFICANT CHANGE UP (ref 11.5–15.5)
IANC: 8.12 K/UL — SIGNIFICANT CHANGE UP (ref 1.5–8.5)
IMM GRANULOCYTES NFR BLD AUTO: 0.4 % — SIGNIFICANT CHANGE UP (ref 0–1.5)
LYMPHOCYTES # BLD AUTO: 17.7 % — SIGNIFICANT CHANGE UP (ref 13–44)
LYMPHOCYTES # BLD AUTO: 2.03 K/UL — SIGNIFICANT CHANGE UP (ref 1–3.3)
MCHC RBC-ENTMCNC: 30.7 PG — SIGNIFICANT CHANGE UP (ref 27–34)
MCHC RBC-ENTMCNC: 32.3 GM/DL — SIGNIFICANT CHANGE UP (ref 32–36)
MCV RBC AUTO: 95 FL — SIGNIFICANT CHANGE UP (ref 80–100)
MONOCYTES # BLD AUTO: 1.03 K/UL — HIGH (ref 0–0.9)
MONOCYTES NFR BLD AUTO: 9 % — SIGNIFICANT CHANGE UP (ref 2–14)
NEUTROPHILS # BLD AUTO: 8.12 K/UL — HIGH (ref 1.8–7.4)
NEUTROPHILS NFR BLD AUTO: 70.9 % — SIGNIFICANT CHANGE UP (ref 43–77)
NRBC # BLD: 0 /100 WBCS — SIGNIFICANT CHANGE UP
NRBC # FLD: 0 K/UL — SIGNIFICANT CHANGE UP
PLATELET # BLD AUTO: 301 K/UL — SIGNIFICANT CHANGE UP (ref 150–400)
POTASSIUM SERPL-MCNC: 4.2 MMOL/L — SIGNIFICANT CHANGE UP (ref 3.5–5.3)
POTASSIUM SERPL-SCNC: 4.2 MMOL/L — SIGNIFICANT CHANGE UP (ref 3.5–5.3)
PROT SERPL-MCNC: 6.6 G/DL — SIGNIFICANT CHANGE UP (ref 6–8.3)
RBC # BLD: 4.98 M/UL — SIGNIFICANT CHANGE UP (ref 3.8–5.2)
RBC # FLD: 14.3 % — SIGNIFICANT CHANGE UP (ref 10.3–14.5)
SODIUM SERPL-SCNC: 138 MMOL/L — SIGNIFICANT CHANGE UP (ref 135–145)
WBC # BLD: 11.46 K/UL — HIGH (ref 3.8–10.5)
WBC # FLD AUTO: 11.46 K/UL — HIGH (ref 3.8–10.5)

## 2021-05-20 PROCEDURE — 71045 X-RAY EXAM CHEST 1 VIEW: CPT | Mod: 26

## 2021-05-20 PROCEDURE — 70450 CT HEAD/BRAIN W/O DYE: CPT | Mod: 26

## 2021-05-20 NOTE — ED PROVIDER NOTE - PSH
S/P carpal tunnel release  right  S/P inguinal hernia repair  left  S/P laparoscopic-assisted sigmoidectomy    S/P BELEN-BSO (total abdominal hysterectomy and bilateral salpingo-oophorectomy)    Status post craniectomy  with mesh - left side for migraines  Status post plication of diaphragm  left

## 2021-05-20 NOTE — ED PROVIDER NOTE - PHYSICAL EXAMINATION
Physical Exam:    I have reviewed the triage vital signs.    Gen: NAD, alert to person place and time, confused, non-toxic appearing, able to ambulate without assistance  Head and Neck: NCAT, Neck supple without meningismus   HEENT: EOMI, PEERLA, normal conjunctiva, tongue midline, oral mucosa moist  Lung: CTAB, no respiratory distress, no wheezes/rhonchi/rales B/L, speaking in full sentences  CV: RRR, no murmurs, rubs or gallops  Abd: soft, NT, ND, no guarding, no rigidity, no rebound tenderness, no CVA tenderness, no masses.   MSK: no gross deformities, ROM normal in UE/LE, no back tenderness  Neuro: CNs II-XII grossly intact. No focal sensory or motor deficits  Skin: Warm, well perfused, no rash, no leg swelling  Psych: Appropriate mood and affect

## 2021-05-20 NOTE — ED ADULT TRIAGE NOTE - CHIEF COMPLAINT QUOTE
Pt brought in by her son, h/o depression, non-compliant with medication for the past 2 weeks, son states that her neighbor called him and told him that she was out wandering in the neighborhood. Pt has been exhibiting unusual behavior at home, calling people at 0300 in the morning. Accompanied with her son.

## 2021-05-20 NOTE — ED PROVIDER NOTE - OBJECTIVE STATEMENT
71F presents to ED accompanied by Son. Patient states she feels like something is off. Patient states she stopped taking her psychiatric medications  but is unable to clarify which one. Patient lives alone and takes care of her self and dog. Son states over past 2-3 weeks there has been mental decline in his mother. she is wandering outside of her house and the neighbors are calling. He states she in not acting her normal self and appears to be confused and not making any sense. Patient was at [facility] yesterday and according to the son, had a normal urine and ct head.

## 2021-05-20 NOTE — ED PROVIDER NOTE - ATTENDING CONTRIBUTION TO CARE
71F with pmh depression, CAD, asthma, GERD presenting with AMS. Per family patient has had several weeks of reported mental decline, wandering outside, intermittently not acting quite herself, sometimes confused. Family endorses was evaluated at OSH yesterday with unremarkable workup including urine and CT head with discharge. Given patient living alone and safety concerns with persistent symptoms brought to hospital today. No report of fever, chills, cp, sob, nausea, vomiting, diarrhea, dysuria, headache, weakness, numbness    GEN: NAD, awake, well appearing  HEENT: NCAT, MMM, normal conjunctiva, perrl  CHEST/LUNGS: Non-tachypneic, CTAB, bilateral breath sounds  CARDIAC: Non-tachycardic, s1s2, normal perfusion, no peripheral edema  ABDOMEN: Soft, NTND, No rebound/guarding  MSK: No joint tenderness, no gross deformity of extremities  SKIN: No rashes, no petechiae, no vesicles  NEURO: CN grossly intact, normal coordination, no focal motor or sensory deficits  PSYCH: Alert, appropriate, cooperative     Patient presenting with possible intermittent delerium, early progressive dementia. Well appearing, neuro intact. Screening labs, CT head. No infectious signs or symptoms noted.

## 2021-05-20 NOTE — ED PROVIDER NOTE - CLINICAL SUMMARY MEDICAL DECISION MAKING FREE TEXT BOX
Sriram - elderly female with delerium x2-3 weeks, possible due to psych med d/c? vss. pe unremarkable. will asses for brain bleed, infection/UTI, reassess.

## 2021-05-20 NOTE — ED ADULT NURSE NOTE - OBJECTIVE STATEMENT
Albert RN: Patient is a 71-year-old female, A&OX3, ambulatory received to room 29 with confusion at home over the last two weeks. Pt with a Phx of [place], CHF, GERD, CAD (s/p stents on Plavix), diverticulitis, depression. Per pt son, pt on antidepressants but stopped taking medications about two weeks ago. Since stopping medications, son has noticed pt more confused, neighbor found patient wandering in front of the house earlier today. Pt also noted to be showing up to appointments on days not scheduled. Pt calm and cooperative. Denies HI/SI, AH/VH, drugs, alcohol use. Breathing is even and unlabored, chest rise equal b/l. Pt also with a fall last week, was w/u at [facility] with a negative work up. VSS. NAD. Bed set in lowest position. Safety being maintained. Will continue to monitor.

## 2021-05-20 NOTE — ED PROVIDER NOTE - PROGRESS NOTE DETAILS
[facility] called to confirm asthma medications. Patient was prescribed prednisone 5mg taper, 90 count as well as Levaquin on 5/4/2021.

## 2021-05-21 DIAGNOSIS — F33.3 MAJOR DEPRESSIVE DISORDER, RECURRENT, SEVERE WITH PSYCHOTIC SYMPTOMS: ICD-10-CM

## 2021-05-21 DIAGNOSIS — F33.9 MAJOR DEPRESSIVE DISORDER, RECURRENT, UNSPECIFIED: ICD-10-CM

## 2021-05-21 DIAGNOSIS — F41.9 ANXIETY DISORDER, UNSPECIFIED: ICD-10-CM

## 2021-05-21 LAB
APPEARANCE UR: CLEAR — SIGNIFICANT CHANGE UP
BILIRUB UR-MCNC: NEGATIVE — SIGNIFICANT CHANGE UP
COLOR SPEC: YELLOW — SIGNIFICANT CHANGE UP
DIFF PNL FLD: NEGATIVE — SIGNIFICANT CHANGE UP
GLUCOSE UR QL: NEGATIVE — SIGNIFICANT CHANGE UP
KETONES UR-MCNC: ABNORMAL
LEUKOCYTE ESTERASE UR-ACNC: NEGATIVE — SIGNIFICANT CHANGE UP
NITRITE UR-MCNC: NEGATIVE — SIGNIFICANT CHANGE UP
PH UR: 6 — SIGNIFICANT CHANGE UP (ref 5–8)
PROT UR-MCNC: ABNORMAL
SARS-COV-2 RNA SPEC QL NAA+PROBE: SIGNIFICANT CHANGE UP
SP GR SPEC: 1.02 — SIGNIFICANT CHANGE UP (ref 1.01–1.02)
UROBILINOGEN FLD QL: SIGNIFICANT CHANGE UP

## 2021-05-21 PROCEDURE — 99222 1ST HOSP IP/OBS MODERATE 55: CPT

## 2021-05-21 RX ORDER — ALBUTEROL 90 UG/1
3 AEROSOL, METERED ORAL
Qty: 0 | Refills: 0 | DISCHARGE

## 2021-05-21 RX ORDER — OXYCODONE HYDROCHLORIDE 5 MG/1
1 TABLET ORAL
Qty: 0 | Refills: 0 | DISCHARGE

## 2021-05-21 RX ORDER — OXYCODONE HYDROCHLORIDE 5 MG/1
5 TABLET ORAL EVERY 8 HOURS
Refills: 0 | Status: DISCONTINUED | OUTPATIENT
Start: 2021-05-21 | End: 2021-05-27

## 2021-05-21 RX ORDER — FLUTICASONE PROPIONATE AND SALMETEROL 50; 250 UG/1; UG/1
1 POWDER ORAL; RESPIRATORY (INHALATION)
Qty: 0 | Refills: 0 | DISCHARGE

## 2021-05-21 RX ORDER — OLANZAPINE 15 MG/1
2.5 TABLET, FILM COATED ORAL ONCE
Refills: 0 | Status: DISCONTINUED | OUTPATIENT
Start: 2021-05-21 | End: 2021-07-08

## 2021-05-21 RX ORDER — ALBUTEROL 90 UG/1
2 AEROSOL, METERED ORAL
Qty: 0 | Refills: 0 | DISCHARGE

## 2021-05-21 RX ORDER — CLONAZEPAM 1 MG
1 TABLET ORAL AT BEDTIME
Refills: 0 | Status: DISCONTINUED | OUTPATIENT
Start: 2021-05-21 | End: 2021-05-21

## 2021-05-21 RX ORDER — FLUVOXAMINE MALEATE 25 MG/1
1 TABLET ORAL
Qty: 0 | Refills: 0 | DISCHARGE

## 2021-05-21 RX ORDER — MIRTAZAPINE 45 MG/1
7.5 TABLET, ORALLY DISINTEGRATING ORAL AT BEDTIME
Refills: 0 | Status: DISCONTINUED | OUTPATIENT
Start: 2021-05-21 | End: 2021-05-25

## 2021-05-21 RX ORDER — QUETIAPINE FUMARATE 200 MG/1
12.5 TABLET, FILM COATED ORAL
Refills: 0 | Status: DISCONTINUED | OUTPATIENT
Start: 2021-05-21 | End: 2021-05-25

## 2021-05-21 RX ORDER — IPRATROPIUM/ALBUTEROL SULFATE 18-103MCG
3 AEROSOL WITH ADAPTER (GRAM) INHALATION
Qty: 0 | Refills: 0 | DISCHARGE

## 2021-05-21 RX ORDER — ONDANSETRON 8 MG/1
4 TABLET, FILM COATED ORAL EVERY 6 HOURS
Refills: 0 | Status: DISCONTINUED | OUTPATIENT
Start: 2021-05-21 | End: 2021-07-08

## 2021-05-21 RX ORDER — TIOTROPIUM BROMIDE 18 UG/1
1 CAPSULE ORAL; RESPIRATORY (INHALATION)
Qty: 0 | Refills: 0 | DISCHARGE

## 2021-05-21 RX ORDER — LEVOTHYROXINE SODIUM 125 MCG
75 TABLET ORAL DAILY
Refills: 0 | Status: DISCONTINUED | OUTPATIENT
Start: 2021-05-21 | End: 2021-06-04

## 2021-05-21 RX ORDER — OXYCODONE HYDROCHLORIDE 5 MG/1
10 TABLET ORAL
Qty: 0 | Refills: 0 | DISCHARGE

## 2021-05-21 RX ORDER — ASPIRIN/CALCIUM CARB/MAGNESIUM 324 MG
81 TABLET ORAL DAILY
Refills: 0 | Status: DISCONTINUED | OUTPATIENT
Start: 2021-05-21 | End: 2021-07-08

## 2021-05-21 RX ORDER — CLONAZEPAM 1 MG
0.5 TABLET ORAL
Refills: 0 | Status: DISCONTINUED | OUTPATIENT
Start: 2021-05-21 | End: 2021-05-27

## 2021-05-21 RX ORDER — ACETAMINOPHEN 500 MG
650 TABLET ORAL EVERY 6 HOURS
Refills: 0 | Status: DISCONTINUED | OUTPATIENT
Start: 2021-05-21 | End: 2021-07-08

## 2021-05-21 RX ORDER — SPIRONOLACTONE 25 MG/1
0.5 TABLET, FILM COATED ORAL
Qty: 0 | Refills: 0 | DISCHARGE

## 2021-05-21 RX ORDER — ALBUTEROL 90 UG/1
2 AEROSOL, METERED ORAL EVERY 6 HOURS
Refills: 0 | Status: DISCONTINUED | OUTPATIENT
Start: 2021-05-21 | End: 2021-07-08

## 2021-05-21 RX ORDER — LINACLOTIDE 145 UG/1
1 CAPSULE, GELATIN COATED ORAL
Qty: 0 | Refills: 0 | DISCHARGE

## 2021-05-21 RX ORDER — QUETIAPINE FUMARATE 200 MG/1
25 TABLET, FILM COATED ORAL EVERY 6 HOURS
Refills: 0 | Status: DISCONTINUED | OUTPATIENT
Start: 2021-05-21 | End: 2021-05-23

## 2021-05-21 RX ORDER — LEVOCETIRIZINE DIHYDROCHLORIDE 0.5 MG/ML
1 SOLUTION ORAL
Qty: 0 | Refills: 0 | DISCHARGE

## 2021-05-21 RX ORDER — CLOPIDOGREL BISULFATE 75 MG/1
75 TABLET, FILM COATED ORAL DAILY
Refills: 0 | Status: DISCONTINUED | OUTPATIENT
Start: 2021-05-21 | End: 2021-07-08

## 2021-05-21 RX ORDER — SERTRALINE 25 MG/1
0.5 TABLET, FILM COATED ORAL
Qty: 0 | Refills: 0 | DISCHARGE

## 2021-05-21 RX ORDER — OXYCODONE HYDROCHLORIDE 5 MG/1
10 TABLET ORAL
Refills: 0 | Status: DISCONTINUED | OUTPATIENT
Start: 2021-05-21 | End: 2021-05-21

## 2021-05-21 RX ORDER — OLANZAPINE 15 MG/1
2.5 TABLET, FILM COATED ORAL EVERY 6 HOURS
Refills: 0 | Status: DISCONTINUED | OUTPATIENT
Start: 2021-05-21 | End: 2021-05-21

## 2021-05-21 RX ORDER — OMEPRAZOLE 10 MG/1
1 CAPSULE, DELAYED RELEASE ORAL
Qty: 0 | Refills: 0 | DISCHARGE

## 2021-05-21 RX ORDER — CIPROFLOXACIN LACTATE 400MG/40ML
1 VIAL (ML) INTRAVENOUS
Qty: 0 | Refills: 0 | DISCHARGE

## 2021-05-21 RX ADMIN — QUETIAPINE FUMARATE 12.5 MILLIGRAM(S): 200 TABLET, FILM COATED ORAL at 21:18

## 2021-05-21 RX ADMIN — MIRTAZAPINE 7.5 MILLIGRAM(S): 45 TABLET, ORALLY DISINTEGRATING ORAL at 21:17

## 2021-05-21 RX ADMIN — ALBUTEROL 2 PUFF(S): 90 AEROSOL, METERED ORAL at 21:18

## 2021-05-21 RX ADMIN — Medication 0.5 MILLIGRAM(S): at 21:18

## 2021-05-21 NOTE — ED BEHAVIORAL HEALTH ASSESSMENT NOTE - PSYCHIATRIC ISSUES AND PLAN (INCLUDE STANDING AND PRN MEDICATION)
continue Klonopin 1mg bid continue Klonopin 1mg bid; start SSRI per unit; PRN zydis 2.5mg po agitation Continue Klonopin 1mg- take 1-2 tablets QHS PRN, Start SSRI per unit; PRN zydis 2.5mg po agitation Continue Klonopin 1mg BID PRN, Start SSRI per unit; PRN zydis 2.5mg po/IM agitation Continue Klonopin 1-2mg QHS PRN, Start SSRI per unit; PRN zydis 2.5mg po/IM agitation

## 2021-05-21 NOTE — ED BEHAVIORAL HEALTH ASSESSMENT NOTE - SUMMARY
Pt is a 72 yo  female, with h/o psychotic depression, medical history pertinent for CAD, Asthma, hypothyroid, who presented to the ED with her family for evaluation of confusion. On exam, pt is not oriented, is confused, anxious, with AH, some paranoia. Medical work up was negative, and family reports that this has occurred in the past when she is off her medications. DUe to her confusion, she is now wandering into the streets. Pt is a danger to self, is in need of psychiatric stabilization. Pt is agreeable to admission, but writer does not feel she can appropriately sign legal papers due to her confusion. Family aware of plan, in agreement. Pt is a 72 yo  female, with h/o psychotic depression, medical history pertinent for CAD, Asthma, hypothyroid, who presented to the ED with her family for evaluation of confusion. On exam, pt is not oriented, is confused, anxious, with AH, some paranoia. Medical work up was negative, and family reports that this has occurred in the past when she is off her medications. DUe to her confusion, she is now wandering into the streets. Pt is a danger to self, is in need of psychiatric stabilization. Pt is agreeable to admission, but writer does not feel she can appropriately sign legal papers due to her confusion. Family aware of plan, in agreement.  Pt with unclear med list, will need to call [facility] [phone] to clarify prior to sending over to [facility].

## 2021-05-21 NOTE — BH INPATIENT PSYCHIATRY ASSESSMENT NOTE - NSBHMEDSOTHERFT_PSY_A_CORE
Albuterol PRN; ASA 81mg daily; Klonopin 1-2mg QHS PRN; Plavix 75mg daily; Synthroid 75mcg daily; Zofran 4mg PRN Q8; Oxycodone 10mg bid PRN

## 2021-05-21 NOTE — ED BEHAVIORAL HEALTH ASSESSMENT NOTE - UNABLE TO CARE FOR SELF DETAILS
Pt not eating, sleeping, wandering outside in the street due to confusion otherwise remains in bed all day; pt is not taking her meds and is currently in a confused state. SHe is unable to care for self at this time, or meet essential needs.

## 2021-05-21 NOTE — BH INPATIENT PSYCHIATRY ASSESSMENT NOTE - NSBHMETABOLIC_PSY_ALL_CORE_FT
BMI: BMI (kg/m2): 22.1 (05-20-21 @ 18:25)  HbA1c:   Glucose:   BP: 130/79 (05-21-21 @ 06:48) (103/62 - 150/95)  Lipid Panel:

## 2021-05-21 NOTE — BH INPATIENT PSYCHIATRY ASSESSMENT NOTE - CASE SUMMARY
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 Pt confused to situation however reports depressive sx, no SI/HI.  Agree with plan as stated.  Pt to be transferred to  and handoff given to Dr. Koch verbally.

## 2021-05-21 NOTE — BH INPATIENT PSYCHIATRY ASSESSMENT NOTE - CURRENT PLAN:
01/02/20 1943   Treatment Plan   Plan Type Initial Plan   Goals   Patient Reported Goal #1 \"stop drinking\"   Pt Reported Goal #1 Type Long Term Goal   Goal #1 Progression Initiated   Goal #2 safely detox from alcohol   Goal #2 Type Short Term Goal   Goal #2 Progression Initiated   Additional Medical Outcomes Pain   Pain Outcome Acceptable pain/comfort level is achieved/maintained.   Pain Outcome Progression Initiated   Focus Indicators   Indicators Anxiety;Restlessness;Urges to Use Alcohol/Drugs   Patient Objectives   Objectives Patient will attend and participate in therapeutic groups;Patient will collaborate with treatment team in planning continuing care;Patient will demonstrate compliance with and knowledge of medications   Therapy Interventions   Interventions Encourage identification and sharing of feelings in group to practice appropriate outlets;Encourage group attendance to learn and reinforce positive coping strategies;Discuss role medications play in symptom management   Treatment Plan Agreement   Patient has reviewed and agrees to the above treatment plan Yes      None known

## 2021-05-21 NOTE — ED BEHAVIORAL HEALTH ASSESSMENT NOTE - RISK ASSESSMENT
Pt is at low to moderate acute risk of self harm. She reports passive SI, no active, intent. She has static factors of depression history, divorce, no prior attempts or self injury; modifiable factors of noncompliance. Protective factors of stable housing, her dog, and family. Low Acute Suicide Risk

## 2021-05-21 NOTE — BH INPATIENT PSYCHIATRY ASSESSMENT NOTE - NSSUICPROTFACT_PSY_ALL_CORE
Responsibility to children, family, or others/Supportive social network of family or friends/Beloved pets

## 2021-05-21 NOTE — BH INPATIENT PSYCHIATRY ASSESSMENT NOTE - PAST PSYCHOTROPIC MEDICATION
Per ED  Assessment: lithium, zyprexa, trazodone, seroquel, effexor, cymbalta, klonopin, remeron, melatonin

## 2021-05-21 NOTE — ED BEHAVIORAL HEALTH ASSESSMENT NOTE - CURRENT MEDICATION
Albuterol; ASA 81mg daily; Klonopin 1mg bid; Plavix 75mg daily; Lunesta; Advair; Synthroid 75mcg daily; Zofran 4mg; Oxycodone 10mg bid; Prednisone 5mg daily; Levaquin 500mg daily; Denosumab; [place] Albuterol PRN; ASA 81mg daily; Klonopin 1mg bid; Plavix 75mg daily; Synthroid 75mcg daily; Zofran 4mg   PRN Q8; Oxycodone 10mg bid Albuterol PRN; ASA 81mg daily; Klonopin 1-2mg QHS PRN; Plavix 75mg daily; Synthroid 75mcg daily; Zofran 4mg PRN Q8; Oxycodone 10mg bid PRN

## 2021-05-21 NOTE — BH INPATIENT PSYCHIATRY ASSESSMENT NOTE - NSBHCHARTREVIEWINVESTIGATE_PSY_A_CORE FT
Noncontrast head CT (5/20/2021)  COMPARISON EXAMINATION: Head CT dated 2/7/2021    FINDINGS:    No acute intracranial hemorrhage, midline shift or hydrocephalus. Mild to moderate chronic microvascular ischemic change, stable compared to the prior. Mild cerebral atrophy, in keeping with the patient's age. No central herniation or evidence of acute mass effect. Basilar cisterns are clear.    Left occipital craniectomy with subtle underlying encephalomalacia. Changes of prior paranasal sinus surgery with bilateral antrectomy and uncinectomy. Mild paranasal sinus mucosal thickening. Mastoid air cells and middle ear cavities are clear.    IMPRESSION:  Stable exam, no acute intracranial CT finding

## 2021-05-21 NOTE — ED BEHAVIORAL HEALTH NOTE - BEHAVIORAL HEALTH NOTE
Called [facility] pharmacy [place] ([phone]) and spoke with Reyna to verify patient's medication:    Prednisone 5mg - take as directed  Levaquin 500mg QD- #7 filled 5/4/21  Oxycodone 10mg 1 tablet TID - filled on 4/21/21  Klonopin 1mg - 1-2 tablets QHS- filled on 4/27/21  Fluvoxamine 25mg-2  tablets QHS- filled on 4/27/21  Synthroid 75mg Daily #90- filled on 4/26/21  Zofran 4mg 1 tablets Q8 PRN- filled on 4/13/21 #90  Plavix 75mg daily - Filled on 3/31/21 #90  Ventolin 90mcg HAS - 1-2 puffs Q4-6 hours PRN- filled 3/21/21    Dayvigo 5mg QHS= #1- 2/19/21  Lunesta 2mg QHS- #14-3/30/21    Protonix 40mg daily #30- 12/2020    No Advair or Prolia listed Called [facility] pharmacy [place] ([phone]) and spoke with Reyna to verify patient's medication:    Prednisone 5mg - take as directed 5/4/21  Levaquin 500mg QD- #7 filled 5/4/21  Oxycodone 10mg 1 tablet TID - filled on 4/21/21  Klonopin 1mg - 1-2 tablets QHS- filled on 4/27/21  Fluvoxamine 25mg-2  tablets QHS- filled on 4/27/21  Synthroid 75mg Daily #90- filled on 4/26/21  Zofran 4mg 1 tablets Q8 PRN- filled on 4/13/21 #90  Plavix 75mg daily - Filled on 3/31/21 #90  Ventolin 90mcg HAS - 1-2 puffs Q4-6 hours PRN- filled 3/21/21    Dayvigo 5mg QHS= #1- 2/19/21  Lunesta 2mg QHS- #14-3/30/21    Protonix 40mg daily #30- 12/2020    No Advair or Prolia listed Called [facility] pharmacy [place] ([phone]) and spoke with Reyna to verify patient's medication:    Prednisone 5mg - take as directed 5/4/21  Levaquin 500mg QD- #7 filled 5/4/21  Oxycodone 10mg 1 tablet BID - filled on 4/27/21  Klonopin 1mg - 1-2 tablets QHS- filled on 4/27/21  Fluvoxamine 25mg-2  tablets QHS- filled on 4/27/21  Synthroid 75mg Daily #90- filled on 4/26/21  Zofran 4mg 1 tablets Q8 PRN- filled on 4/13/21 #90  Plavix 75mg daily - Filled on 3/31/21 #90  Ventolin 90mcg HAS - 1-2 puffs Q4-6 hours PRN- filled 3/21/21    Dayvigo 5mg QHS= #1- 2/19/21  Lunesta 2mg QHS- #14-3/30/21    Protonix 40mg daily #30- 12/2020    No Advair or Prolia listed    [facility]  Reference #: [id number] Called [facility] pharmacy [place] ([phone]) and spoke with Reyna to verify patient's medication:    Prednisone 5mg - take as directed 5/4/21  Levaquin 500mg QD- #7 filled 5/4/21  Oxycodone 10mg 1 tablet BID PRN- filled on 4/27/21  Klonopin 1mg - 1-2 tablets QHS PRN- filled on 4/27/21  Fluvoxamine 25mg-2  tablets QHS- filled on 4/27/21  Synthroid 75mg Daily #90- filled on 4/26/21  Zofran 4mg 1 tablets Q8 PRN- filled on 4/13/21 #90  Plavix 75mg daily - Filled on 3/31/21 #90  Ventolin 90mcg HAS - 1-2 puffs Q4-6 hours PRN- filled 3/21/21    Dayvigo 5mg QHS= #1- 2/19/21  Lunesta 2mg QHS- #14-3/30/21    Protonix 40mg daily #30- 12/2020    No Advair or Prolia listed    [place]  Reference #: [id number]

## 2021-05-21 NOTE — BH SCALES AND SCREENS - NSMMSELANG_PSY_ALL_CORE
Named object #1 (e.g.Pen)/Named object #2 (e.g.Watch)/Repeated "no ifs, ands or buts"/Completed command (Stage #1)/Completed command (Stage #2)/Completed command (Stage #3)/Patient able to read and obey a written command/Able to write a sentence that is sensible with a subject and a verb

## 2021-05-21 NOTE — BH PATIENT PROFILE - HOME MEDICATIONS
oxyCODONE 5 mg oral tablet , 10 milligram(s) orally 2 times a day  clonazePAM 0.5 mg oral tablet , 2 milligram(s) orally once a day (at bedtime), As Needed  Ventolin HFA 90 mcg/inh inhalation aerosol , 2 puff(s) inhaled every 6 hours, As Needed  levothyroxine 75 mcg (0.075 mg) oral tablet , 1 tab(s) orally once a day  aspirin 81 mg oral delayed release tablet , 1 tab(s) orally once a day  clopidogrel 75 mg oral tablet , 1 tab(s) orally once a day, last dose 6/21/19

## 2021-05-21 NOTE — BH SOCIAL WORK INITIAL PSYCHOSOCIAL EVALUATION - NSPTSTATEDGOAL_PSY_ALL_CORE
Patient seeks psychiatric stabilization to return to the community with appropriate aftercare and supports.

## 2021-05-21 NOTE — ED BEHAVIORAL HEALTH ASSESSMENT NOTE - MEDICAL ISSUES AND PLAN (INCLUDE STANDING AND PRN MEDICATION)
continue home meds: to be verified by [facility] pharmacy Albuterol PRN; ASA 81mg daily; Plavix 75mg daily; Synthroid 75mcg daily; Zofran 4mg Albuterol PRN; ASA 81mg daily; Plavix 75mg daily; Synthroid 75mcg daily; Zofran 4mg PRN, Oxycodone 10mg BID Albuterol PRN; ASA 81mg daily; Plavix 75mg daily; Synthroid 75mcg daily; Zofran 4mg PRN, Oxycodone 10mg BID PRN

## 2021-05-21 NOTE — BH INPATIENT PSYCHIATRY ASSESSMENT NOTE - NSBHASSESSSUMMFT_PSY_ALL_CORE
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 Pt confused to situation however reports depressive sx, no SI/HI.    Plan:  1. Start Fluvoxamine 50mg qHs for depression with psychotic features  2. Seroquel 12.5mg BID for depression with psychotic features  3. Continue with Klonopin 1-2mg PRN at bedtime for insomnia  4. Zyprexa 2.5mg PO/IM PRN for agitation  5. Labs: Lipid profile, HbA1c, TSH, free T4, total T3, B12, folate, RPR, urine tox  6. Continue with home meds - ASA 81mg and Plavix 75mg daily, Synthroid 75mcg daily  7. Oxycodone 10mg BID PRN for pain  8. Albuterol 90mcg q6h PRN for wheezing  9. Zofran 4mg q6h PRN for nausea  10. PT evaluation ordered  11. 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 Pt confused to situation however reports depressive sx, no SI/HI.    Plan:  1. Start Remeron 7.5mg qHs for depression  2. Seroquel 12.5mg BID for depression with psychotic features  3. Continue with Klonopin 1-2mg PRN at bedtime for insomnia  4. Zyprexa 2.5mg PO/IM PRN for agitation  5. Labs: Lipid profile, HbA1c, TSH, free T4, total T3, B12, folate, RPR, urine tox  6. Continue with home meds - ASA 81mg and Plavix 75mg daily, Synthroid 75mcg daily  7. Oxycodone 10mg BID PRN for pain  8. Albuterol 90mcg q6h PRN for wheezing  9. Zofran 4mg q6h PRN for nausea  10. PT evaluation ordered  11. 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 Pt confused to situation however reports depressive sx, no SI/HI.    Plan:  1. Start Remeron 7.5mg qHs for depression  2. Seroquel 12.5mg BID for depression with psychotic features  3. Continue with Klonopin 1-2mg PRN at bedtime for insomnia  4. Zyprexa 2.5mg PO/IM PRN for agitation  5. Labs: Lipid profile, HbA1c, TSH, free T4, total T3, B12, folate, RPR, urine tox  6. Continue with home meds - ASA 81mg and Plavix 75mg daily, Synthroid 75mcg daily  7. Tylenol 650mg q6h PRN for pain  8. Oxycodone 10mg BID PRN for severe pain  9. Albuterol 90mcg q6h PRN for wheezing  10. Zofran 4mg q6h PRN for nausea  11. PT evaluation ordered  12. 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 Pt confused to situation however reports depressive sx, no SI/HI.    Plan:  1. Start Remeron 7.5mg qHs for depression  2. Seroquel 12.5mg BID for depression with psychotic features  3. Continue with Klonopin 1-2mg PRN at bedtime for insomnia  4. Zyprexa 2.5mg PO/IM PRN for agitation  5. Labs: Lipid profile, HbA1c, TSH, free T4, total T3, B12, folate, RPR, urine tox  6. Continue with home meds - ASA 81mg and Plavix 75mg daily, Synthroid 75mcg daily  7. Tylenol 650mg q6h PRN for pain  8. Oxycodone 10mg BID PRN for severe pain  9. Albuterol 90mcg q6h PRN for wheezing  10. Zofran 4mg q6h PRN for nausea  11. PT evaluation ordered  12. 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 Pt confused to situation however reports depressive sx, no SI/HI.    Plan:  1. Start Remeron 7.5mg qHs for depression  2. Seroquel 12.5mg BID for depression with psychotic features  3. Klonopin changed from 1-2mg PRN at bedtime for insomnia to standing regimen of 0.5mg BID  4. Seroquel 25mg PO q6hr prn agitation, Zyprexa 2.5mg IM PRN for agitation  5. Labs: Lipid profile, HbA1c, TSH, free T4, total T3, B12, folate, RPR  6. Continue with home meds - ASA 81mg and Plavix 75mg daily, Synthroid 75mcg daily  7. Tylenol 650mg q6h PRN for pain  8. Oxycodone changed from 10mg BID PRN for severe pain to 5mg prn  9. Albuterol 90mcg q6h PRN for wheezing  10. Zofran 4mg q6h PRN for nausea  11. PT evaluation ordered  12. Routine monitoring, no current SI/HI, CO not indicated

## 2021-05-21 NOTE — ED BEHAVIORAL HEALTH ASSESSMENT NOTE - DESCRIPTION
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

## 2021-05-21 NOTE — ED ADULT NURSE REASSESSMENT NOTE - NS ED NURSE REASSESS COMMENT FT1
Received report from night RN, pt calm & cooperative denies si/hi  presently, pt aware of admission to  eval on going.

## 2021-05-21 NOTE — ED BEHAVIORAL HEALTH ASSESSMENT NOTE - DETAILS
daughter Estela pt endorsed recent passive SI, denies active, no intent or plan PAUL Dennis pt has had numerous c/o nausea and other mild side effects on meds, unclear if founded nausea, pain daughter Estela informed

## 2021-05-21 NOTE — BH INPATIENT PSYCHIATRY ASSESSMENT NOTE - NSBHMSEEYE_PSY_A_CORE
[facility] ICU Progress Note Admit Date: 10/19/2019 POD:  2 Days Post-Op Procedure:  Procedure(s): LEFT HEART CATH / CORONARY ANGIOGRAPHY Aortagram Abdominal W Runoff Subjective:  
Pt seen with Dr. Gigi Hurst. Tmax 99.2, 2L O2 per NC. No chest pain since Saturday. NTG has been off since  afternoon. Objective:  
Vitals: 
Blood pressure 93/51, pulse 72, temperature 99.2 °F (37.3 °C), resp. rate 14, height 5' 7\" (1.702 m), weight 132 lb 4.4 oz (60 kg), SpO2 99 %. Temp (24hrs), Av.6 °F (37 °C), Min:98.1 °F (36.7 °C), Max:99.2 °F (37.3 °C) EKG/Rhythm:  Sinus Rhythm in the 70s Oxygen Therapy: 
Oxygen Therapy O2 Sat (%): 99 % (10/21/19 1000) Pulse via Oximetry: 74 beats per minute (10/21/19 0400) O2 Device: Nasal cannula (10/21/19 0854) O2 Flow Rate (L/min): 2 l/min (10/21/19 0854) CXR:  
CXR Results  (Last 48 hours) 10/21/19 0503  XR CHEST PORT Final result Impression:  IMPRESSION: No change. Narrative:  EXAM: XR CHEST PORT INDICATION: Pulmonary congestion COMPARISON:  FINDINGS: A portable AP radiograph of the chest was obtained at 0 432 hours. The  
patient is on a cardiac monitor. There is interstitial lung disease without  
change. The cardiac and mediastinal contours and pulmonary vascularity are  
normal.  The bones and soft tissues are grossly within normal limits. 10/20/19 0828  XR CHEST PORT Final result Impression:  IMPRESSION: Mild-moderate interstitial pulmonary edema. Narrative:  EXAM: XR CHEST PORT INDICATION: preop cabg, acute coronary syndrome, unstable angina COMPARISON: None. FINDINGS: A portable AP radiograph of the chest was obtained at 0757 hours. There is mild to moderate interstitial pulmonary edema. No pneumothorax or  
pleural effusion is shown. Cardiac size is within normal limits. No mediastinal  
or hilar enlargement is evident. Admission Weight: Last Weight Weight: 141 lb 5 oz (64.1 kg) Weight: 132 lb 4.4 oz (60 kg) Intake / Output / Drain: 
Current Shift: 10/21 0701 - 10/21 1900 In: -  
Out: 725 [Urine:725] Last 24 hrs.:  
 
Intake/Output Summary (Last 24 hours) at 10/21/2019 1125 Last data filed at 10/21/2019 4684 Gross per 24 hour Intake 27.1 ml Output 2020 ml Net -1992.9 ml EXAM: 
General:  Sitting in bed. No acute distress Lungs: Inspiratory crackles throughout. Incision:  No erythema, drainage or swelling. Heart:  Regular rate and rhythm, S1, S2 normal, no murmur, click, rub or gallop. Abdomen:   Soft, non-tender. Bowel sounds normal. No masses,  No organomegaly. Extremities:  No edema. PPP. Neurologic:  Gross motor and sensory apparatus intact. Labs:  
Recent Labs 10/21/19 
0304  10/19/19 
1836 WBC 7.7   < > 6.3 HGB 9.5*   < > 10.9* HCT 28.3*   < > 33.3*  
*   < > 180 *   < > 139  
K 4.4   < > 4.7 BUN 25*   < > 28* CREA 1.45*   < > 1.32* *   < > 130* INR  --   --  1.0  
 < > = values in this interval not displayed. Assessment:  
 
Principal Problem: 
  Coronary artery disease of native artery of native heart with stable angina pectoris ([facility] (10/21/2019) Active Problems: 
  ACS (acute coronary syndrome) ([facility] (10/19/2019) Unstable angina ([facility] (10/19/2019) Plan/Recommendations/Medical Decision Makin. CAD with NSTEMI on this admission: preop CABG, tentative date for 10/23. Plavix washout. Last dose was Friday. ASA/Statin/BB, pre-op amio load. Lovenox. NTG gtt as needed for CP (has been off since  afternoon) 
  
2. NYHA Class II Systolic Heart Failure: TTE showing EF of 25-30%. CXR showing pulmonary congestion. Will continue BB, Lasix. Hold ACE I prior to surgery. 
  
3. HTN: cont coreg, Lasix. Hold ACE 
  
4. HLD: Continue statin 
  
5. Anemia: May be Chronic. Stable today, continue to Trend.  May need to check stool occult, etc. If trending down 
  
Surgery is scheduled for 10/23 tentatively. The patient is a [place] patient and Case management is discussing possible transfer with the [place]. Signed By: Jonothan Gosselin, NP Risk of morbidity and mortality - high Medical decision making - high complexity 
  
1.  CAD: plan for CABG 
  
2. NYHA Class II Systolic Heart Failure: TTE pending. Coreg. Hold lasix/ace/arb for surgery. 
  
3.  HTN: cont coreg. Hold ACE 
  
4.  HLD: statin 
  
5. Anemia: ? Chronic. Trend. May need to check stool occult, etc. If trending down Poor

## 2021-05-21 NOTE — BH INPATIENT PSYCHIATRY ASSESSMENT NOTE - RISK ASSESSMENT
Static Risk Factors: depressed mood, underlying dementia, hx of mood disorder, prior psych hospitalizations    Modifiable Risk Factors: chronic back pain, poor medication adherence    Protective Factors: residential stability, no prior SA, no current SIIP/HIIP, supportive family, no access to firearms

## 2021-05-21 NOTE — BH INPATIENT PSYCHIATRY ASSESSMENT NOTE - NSBHCHARTREVIEWLAB_PSY_A_CORE FT
15.3   11.46 )-----------( 301      ( 20 May 2021 21:50 )             47.3   05-20    138  |  104  |  14  ----------------------------<  78  4.2   |  13<L>  |  1.01    Ca    9.8      20 May 2021 21:50    TPro  6.6  /  Alb  3.6  /  TBili  0.5  /  DBili  x   /  AST  42<H>  /  ALT  21  /  AlkPhos  39<L>  05-20  Thyroid Stimulating Hormone, Serum (05.20.21 @ 21:54)    Thyroid Stimulating Hormone, Serum: 8.12 uIU/mL

## 2021-05-21 NOTE — BH INPATIENT PSYCHIATRY ASSESSMENT NOTE - OTHER
thoracic scoliosis at times linear with intermittent periods of illogical thoughts. denies AVH however repeatedly looking a the floor and walls for "stars" CVM

## 2021-05-21 NOTE — BH INPATIENT PSYCHIATRY ASSESSMENT NOTE - NSBHCHARTREVIEWVS_PSY_A_CORE FT
Vital Signs Last 24 Hrs  T(C): 36.7 (21 May 2021 06:48), Max: 36.8 (21 May 2021 06:37)  T(F): 98.1 (21 May 2021 06:48), Max: 98.3 (21 May 2021 06:37)  HR: 92 (21 May 2021 06:48) (84 - 97)  BP: 130/79 (21 May 2021 06:48) (103/62 - 150/95)  BP(mean): --  RR: 16 (21 May 2021 06:48) (16 - 18)  SpO2: 100% (21 May 2021 06:48) (98% - 100%)

## 2021-05-21 NOTE — PSYCHIATRIC REHAB INITIAL EVALUATION - NSBHSIGPRIORMED_PSY_ALL_CORE
As per chart, patient is reportedly hearing impaired in one ear. Per collateral, patient's confusion presentation as been consistent on and off for ten years./Yes (Specify)

## 2021-05-21 NOTE — PSYCHIATRIC REHAB INITIAL EVALUATION - NSBHPRRECOMMEND_PSY_ALL_CORE
Upon approach patient was agreeable to meet with writer. However, during interview it should be noted that patient presented as confused, disorganized, and distracted. Therefore at this time patient was unable to fully engage in interview with writer. Consequently writer was unable to met with patient for initial assessment, in order to orient patient to unit, provide patient with a welcome packet, introduce patient to psychiatric rehabilitation staff and department functions or also informed patient that in response to COVID19 there has been a shift in hospital wide policies and protocols. Patient was cooperative upon approach however, confused and not oriented to place or time. Writer and patient were unable to establish a collaborative psychiatric rehabilitation goal at this time and therefore a goal will be provided for patient and discussed and adjusted if needed at a later time. Psychiatric rehabilitation staff will continue to engage patient daily in order to develop therapeutic rapport.

## 2021-05-21 NOTE — ED BEHAVIORAL HEALTH ASSESSMENT NOTE - HPI (INCLUDE ILLNESS QUALITY, SEVERITY, DURATION, TIMING, CONTEXT, MODIFYING FACTORS, ASSOCIATED SIGNS AND SYMPTOMS)
Pt is a 72yo ,  female, with 5 children, h/o depression w/psychotic features, and possible personality disorder; two prior psychiatric hospitalizations at [facility], last in 2014; was previously following at [facility], but stopped attending 2/2020 and during Covid, was seeing someone elsewhere on zoom. Pt has no prio suicide attempts, no known substance use issues. She presents here today due to family's concern over confusion which she has been exhibiting over the last 2 weeks.     On exam, pt is in a stretcher in the main ED. Interview was constrained, due to her being deaf in one ear, and confused. In sum, she reports that she has been struggling for the past few weeks and feels depressed, with decreased appetite, passive SI.  She resides alone in her apt, with her dog, and describes her daily routine of getting up at 4am, taking her dog for a walk, then showering/eating breakfast; but she cannot say what she does for the remainder of the day. She reports functioning to the effect that she cooks/cleans for herself. She also admits to hearing AH of music for the past 2 weeks, which has never occurred in the past. Pt states that she also thinks she is having VH as she is seeing things that she later realizes are not there. She denies any active SI, or any intent/plan to harm herself. In terms of paranoia, she overtly denies, but at some point during the interview, she asks if her son "set this up." When asked what she was referring to, she did not answer.     Spoke with daughter Estela, who reports that these periods of confusion occur on/off for the past 10 years, and they have all been due to medication noncompliance. She states that for the past month, her mother has been isolative, nonsensical, not getting out of bed; calling her She has complained that she is not sleeping, but whenever she goes over, pt is in bed. She says that her mother has been having day/night confusion, calling people at all hours. She also is showing up for doctor's appointments when she does not have any. Yesterday, her brother received a call from their neighbor saying that the patient was walking in the middle of the street looking for her cellphone. Pt also was trying to get into random people's car.

## 2021-05-21 NOTE — ED BEHAVIORAL HEALTH NOTE - BEHAVIORAL HEALTH NOTE
COVID Exposure Screen- Patient  1.	*Have you had a COVID-19 test in the last 90 days?  (  ) Yes   (  x) No   (  ) Unknown- Reason: _____  IF YES PROCEED TO QUESTION #2. IF NO OR UNKNOWN, PLEASE SKIP TO QUESTION #3.  2.	Date of test(s) and result(s): ________  3.	*Have you tested positive for COVID-19 antibodies? (  ) Yes   ( x ) No   (  ) Unknown- Reason: _____  IF YES PROCEED TO QUESTION #4. IF NO or UNKNOWN, PLEASE SKIP TO QUESTION #5.  4.	Date of positive antibody test: ________  5.	*Have you received 2 doses of the COVID-19 vaccine? ( x ) Yes   (  ) No   (  ) Unknown- Reason: _____   IF YES PROCEED TO QUESTION #6. IF NO or UNKNOWN, PLEASE SKIP TO QUESTION #7.  6.	Date of second dose: ____unk____  7.	*In the past 10 days, have you been around anyone with a positive COVID-19 test?* (  ) Yes   (  x) No   (  ) Unknown- Reason: ____  IF YES PROCEED TO QUESTION #8. IF NO or UNKNOWN, PLEASE SKIP TO QUESTION #13.  8.	Were you within 6 feet of them for at least 15 minutes? (  ) Yes   (  ) No   (  ) Unknown- Reason: _____  9.	Have you provided care for them? (  ) Yes   (  ) No   (  ) Unknown- Reason: ______  10.	Have you had direct physical contact with them (touched, hugged, or kissed them)? (  ) Yes   (  ) No    (  ) Unknown- Reason: _____  11.	Have you shared eating or drinking utensils with them? (  ) Yes   (  ) No    (  ) Unknown- Reason: ____  12.	Have they sneezed, coughed, or somehow gotten respiratory droplets on you? (  ) Yes   (  ) No    (  ) Unknown- Reason: ______  13.	*Have you been out of [place] within the past 10 days?* (  ) Yes   ( x ) No   (  ) Unknown- Reason: _____  IF YES PLEASE ANSWER THE FOLLOWING QUESTIONS:  14.	Which state/country have you been to? ______  15.	Were you there over 24 hours? (  ) Yes   (  ) No    (  ) Unknown- Reason: ______  16.	Date of return to [facility]: ______     COVID Exposure Screen- collateral (i.e. third-party, chart review, belongings, etc; include EMS and ED staff)  1.	*Has the patient had a COVID-19 test in the last 90 days?  (x  ) Yes   (  ) No   (  ) Unknown- Reason: _____  IF YES PROCEED TO QUESTION #2. IF NO OR UNKNOWN, PLEASE SKIP TO QUESTION #3.  2.	Date of test(s) and result(s): ______negative, unknown date__  3.	*Has the patient tested positive for COVID-19 antibodies? (  ) Yes   ( x ) No   (  ) Unknown- Reason: _____  IF YES PROCEED TO QUESTION #4. IF NO or UNKNOWN, PLEASE SKIP TO QUESTION #5.  4.	Date of positive antibody test: ________  5.	*Has the patient received 2 doses of the COVID-19 vaccine? ( x ) Yes   (  ) No   (  ) Unknown- Reason: _____  IF YES PROCEED TO QUESTION #6. IF NO or UNKNOWN, PLEASE SKIP TO QUESTION #7.  6.	 Date of second dose: ____over 3 weeks ago completed series per dtr____  7.	*In the past 10 days, has the patient been around anyone with a positive COVID-19 test?* (  ) Yes   (x  ) No   (  ) Unknown- Reason: __  IF YES PROCEED TO QUESTION #8. IF NO or UNKNOWN, PLEASE SKIP TO QUESTION #13.  8.	Was the patient within 6 feet of them for at least 15 minutes? (  ) Yes   (  ) No   (  ) Unknown- Reason: _____  9.	Did the patient provide care for them? (  ) Yes   (  ) No   (  ) Unknown- Reason: ______  10.	Did the patient have direct physical contact with them (touched, hugged, or kissed them)? (  ) Yes   (  ) No    (  ) Unknown- Reason: __  11.	Did the patient share eating or drinking utensils with them? (  ) Yes   (  ) No    (  ) Unknown- Reason: ____  12.	Did they sneeze, cough, or somehow get respiratory droplets on the patient? (  ) Yes   (  ) No    (  ) Unknown- Reason: ______  13.	*Has the patient been out of [place] within the past 10 days?* (  ) Yes   (x  ) No   (  ) Unknown- Reason: _____  IF YES PLEASE ANSWER THE FOLLOWING QUESTIONS:  14.	Which state/country did they go to? ______  15.	Were they there over 24 hours? (  ) Yes   (  ) No    (  ) Unknown- Reason: ______  16.	Date of return to [facility]: ______

## 2021-05-21 NOTE — BH INPATIENT PSYCHIATRY ASSESSMENT NOTE - HPI (INCLUDE ILLNESS QUALITY, SEVERITY, DURATION, TIMING, CONTEXT, MODIFYING FACTORS, ASSOCIATED SIGNS AND SYMPTOMS)
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     On interview, pt is unsure why she is hospitalized. She reports she is in the hospital because she fell and hit her head. She reports feeling depressed for the past 2-3 weeks with associated poor sleep, poor appetite, and difficulty concentrating. She cites her back pain as a recent stressor in her life. She denies current SIIP/HIIP or prior SAs. Pt reports 2 prior hospitalizations for depression. She denies manic sx, AVH; however during the interview points to the floor and the walls, citing that she sees stars. Denies any command hallucinations. Pt reports anxiety when it comes to her memory but denies feelings of paranoia, however pt repeatedly looks out the door of her room. When asked what she was looking for, she does not answer. At times during the interview, the pt responds to questions with nonsensical answers, requiring additional prompting and redirecting.    Pt live alone in a house with her dog. 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 She reports having good relations with her children and feeling safe at home. Denies substance use or access to firearms.    Per ED  Assessment Note:  Pt is a 72yo ,  female, with 5 children, h/o depression w/psychotic features, and possible personality disorder; two prior psychiatric hospitalizations at [facility], last in 2014; was previously following at [facility], but stopped attending 2/2020 and during Covid, was seeing someone elsewhere on zoom. Pt has no prior suicide attempts, no known substance use issues. She presents here today due to family's concern over confusion which she has been exhibiting over the last 2 weeks.     On exam, pt is in a stretcher in the main ED. Interview was constrained, due to her being deaf in one ear, and confused. In sum, she reports that she has been struggling for the past few weeks and feels depressed, with decreased appetite, passive SI.  She resides alone in her apt, with her dog, and describes her daily routine of getting up at 4am, taking her dog for a walk, then showering/eating breakfast; but she cannot say what she does for the remainder of the day. She reports functioning to the effect that she cooks/cleans for herself. She also admits to hearing AH of music for the past 2 weeks, which has never occurred in the past. Pt states that she also thinks she is having VH as she is seeing things that she later realizes are not there. She denies any active SI, or any intent/plan to harm herself. In terms of paranoia, she overtly denies, but at some point during the interview, she asks if her son "set this up." When asked what she was referring to, she did not answer.     Spoke with daughter Estela, who reports that these periods of confusion occur on/off for the past 10 years, and they have all been due to medication noncompliance. She states that for the past month, her mother has been isolative, nonsensical, not getting out of bed; calling her She has complained that she is not sleeping, but whenever she goes over, pt is in bed. She says that her mother has been having day/night confusion, calling people at all hours. She also is showing up for doctor's appointments when she does not have any. Yesterday, her brother received a call from their neighbor saying that the patient was walking in the middle of the street looking for her cellphone. Pt also was trying to get into [employer] people's car.   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     On interview, pt is unsure why she is hospitalized. She reports she is in the hospital because she fell and hit her head. She reports feeling depressed for the past 2-3 weeks with associated poor sleep, poor appetite, and difficulty concentrating. She cites her back pain as a recent stressor in her life. She denies current SIIP/HIIP or prior SAs. Pt reports 2 prior hospitalizations for depression. She denies manic sx, AVH; however during the interview points to the floor and the walls, citing that she sees stars. Denies any command hallucinations. Pt reports anxiety when it comes to her memory but denies feelings of paranoia, however pt repeatedly looks out the door of her room. When asked what she was looking for, she does not answer. At times during the interview, the pt responds to questions with nonsensical answers, requiring additional prompting and redirecting.    Pt live alone in a house with her dog. 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 She reports having good relations with her children and feeling safe at home. Denies substance use or access to firearms.    I-STOP database obtained and reviewed (#[id number])    Per ED BH Assessment Note:  Pt is a 70yo ,  female, with 5 children, h/o depression w/psychotic features, and possible personality disorder; two prior psychiatric hospitalizations at [facility], last in 2014; [facility], but stopped attending 2/2020 and during Covid, was seeing someone elsewhere on zoom. Pt has no prior suicide attempts, no known substance use issues. She presents here today due to family's concern over confusion which she has been exhibiting over the last 2 weeks.     On exam, pt is in a stretcher in the main ED. Interview was constrained, due to her being deaf in one ear, and confused. In sum, she reports that she has been struggling for the past few weeks and feels depressed, with decreased appetite, passive SI.  She resides alone in her apt, with her dog, and describes her daily routine of getting up at 4am, taking her dog for a walk, then showering/eating breakfast; but she cannot say what she does for the remainder of the day. She reports functioning to the effect that she cooks/cleans for herself. She also admits to hearing AH of music for the past 2 weeks, which has never occurred in the past. Pt states that she also thinks she is having VH as she is seeing things that she later realizes are not there. She denies any active SI, or any intent/plan to harm herself. In terms of paranoia, she overtly denies, but at some point during the interview, she asks if her son "set this up." When asked what she was referring to, she did not answer.     Spoke with daughter Estela, who reports that these periods of confusion occur on/off for the past 10 years, and they have all been due to medication noncompliance. She states that for the past month, her mother has been isolative, nonsensical, not getting out of bed; calling her She has complained that she is not sleeping, but whenever she goes over, pt is in bed. She says that her mother has been having day/night confusion, calling people at all hours. She also is showing up for doctor's appointments when she does not have any. Yesterday, her brother received a call from their neighbor saying that the patient was walking in the middle of the street looking for her cellphone. Pt also was trying to get into random people's car.     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 (stopped in 2/2020 due to COVID), no substance use or prior SAs, PMH of CAD s/p multiple stents, MI, hypothyroidism, left ear deafness, severe asthma, osteoporosis, diverticulitis who presented to the ED for confusion for 2 weeks, admitted to inpatient psychiatric unit for depression with psychotic features and passive SI.     On interview, pt is unsure why she is hospitalized. She reports she is in the hospital because she fell and hit her head. She reports feeling depressed for the past 2-3 weeks with associated poor sleep, poor appetite, and difficulty concentrating. She cites her back pain as a recent stressor in her life. She denies current SIIP/HIIP or prior SAs. Pt reports 2 prior hospitalizations for depression. She denies manic sx, AVH; however during the interview points to the floor and the walls, citing that she sees stars. Denies any command hallucinations. Pt reports anxiety when it comes to her memory but denies feelings of paranoia, however pt repeatedly looks out the door of her room. When asked what she was looking for, she does not answer. At times during the interview, the pt responds to questions with nonsensical answers, requiring additional prompting and redirecting.    Pt live alone in a house with her dog. 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 She reports having good relations with her children and feeling safe at home. Denies substance use or access to firearms.    I-STOP database obtained and reviewed (#[id number])    Per ED BH Assessment Note:  Pt is a 70yo ,  female, with 5 children, h/o depression w/psychotic features, and possible personality disorder; two prior psychiatric hospitalizations at [facility], last in 2014; [facility], but stopped attending 2/2020 and during Covid, was seeing someone elsewhere on zoom. Pt has no prior suicide attempts, no known substance use issues. She presents here today due to family's concern over confusion which she has been exhibiting over the last 2 weeks.     On exam, pt is in a stretcher in the main ED. Interview was constrained, due to her being deaf in one ear, and confused. In sum, she reports that she has been struggling for the past few weeks and feels depressed, with decreased appetite, passive SI.  She resides alone in her apt, with her dog, and describes her daily routine of getting up at 4am, taking her dog for a walk, then showering/eating breakfast; but she cannot say what she does for the remainder of the day. She reports functioning to the effect that she cooks/cleans for herself. She also admits to hearing AH of music for the past 2 weeks, which has never occurred in the past. Pt states that she also thinks she is having VH as she is seeing things that she later realizes are not there. She denies any active SI, or any intent/plan to harm herself. In terms of paranoia, she overtly denies, but at some point during the interview, she asks if her son "set this up." When asked what she was referring to, she did not answer.     Spoke with daughter Estela, who reports that these periods of confusion occur on/off for the past 10 years, and they have all been due to medication noncompliance. She states that for the past month, her mother has been isolative, nonsensical, not getting out of bed; calling her She has complained that she is not sleeping, but whenever she goes over, pt is in bed. She says that her mother has been having day/night confusion, calling people at all hours. She also is showing up for doctor's appointments when she does not have any. Yesterday, her brother received a call from their neighbor saying that the patient was walking in the middle of the street looking for her cellphone. Pt also was trying to get into [email or web address] people's car.

## 2021-05-21 NOTE — BH INPATIENT PSYCHIATRY ASSESSMENT NOTE - DETAILS
Per ED assessment: pt has had numerous c/o nausea and other mild side effects on meds, unclear if founded pt endorsed recent passive SI, denies active, no intent or plan back pain

## 2021-05-21 NOTE — BH INPATIENT PSYCHIATRY ASSESSMENT NOTE - DESCRIPTION
Pt is , livs alone in a house with her dog. 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 She reports having good relations with her children and feeling safe at home. Denies substance use or access to firearms.

## 2021-05-21 NOTE — BH INPATIENT PSYCHIATRY ASSESSMENT NOTE - OTHER PAST PSYCHIATRIC HISTORY (INCLUDE DETAILS REGARDING ONSET, COURSE OF ILLNESS, INPATIENT/OUTPATIENT TREATMENT)
Pt has been hospitalized at [facility] twice, last in 2014; she was also a participant at [facility], and [facility]. She stopped the [facility] in 2/2020.  Pt has no prior suicide attempts  Pt's admissions were for depression with psychotic fx

## 2021-05-21 NOTE — ED BEHAVIORAL HEALTH ASSESSMENT NOTE - OTHER PAST PSYCHIATRIC HISTORY (INCLUDE DETAILS REGARDING ONSET, COURSE OF ILLNESS, INPATIENT/OUTPATIENT TREATMENT)
Pt has been hospitalized at [facility] twice, last in 2014; she was also a participant at [facility], and [facility]. She stopped the [facility] in 2/2020.  Pt has no prior suicide attempts  Pt's admissions were for depression with psychotic fx

## 2021-05-21 NOTE — BH INPATIENT PSYCHIATRY ASSESSMENT NOTE - NSCOMMENTSUICRISKFACT_PSY_ALL_CORE
chronic back pain, underlying dementia, hx of mood disorder, prior psych hospitalizations, poor medication adherence

## 2021-05-22 LAB
A1C WITH ESTIMATED AVERAGE GLUCOSE RESULT: 5.2 % — SIGNIFICANT CHANGE UP (ref 4–5.6)
CHOLEST SERPL-MCNC: 254 MG/DL — HIGH
CULTURE RESULTS: NO GROWTH — SIGNIFICANT CHANGE UP
ESTIMATED AVERAGE GLUCOSE: 103 MG/DL — SIGNIFICANT CHANGE UP (ref 68–114)
FOLATE SERPL-MCNC: 20 NG/ML — HIGH (ref 3.1–17.5)
HDLC SERPL-MCNC: 105 MG/DL — SIGNIFICANT CHANGE UP
LIPID PNL WITH DIRECT LDL SERPL: 133 MG/DL — HIGH
NON HDL CHOLESTEROL: 149 MG/DL — HIGH
SPECIMEN SOURCE: SIGNIFICANT CHANGE UP
T PALLIDUM AB TITR SER: NEGATIVE — SIGNIFICANT CHANGE UP
T3FREE SERPL-MCNC: 1.27 PG/ML — LOW (ref 1.8–4.6)
T4 FREE SERPL-MCNC: 0.8 NG/DL — LOW (ref 0.9–1.8)
TRIGL SERPL-MCNC: 81 MG/DL — SIGNIFICANT CHANGE UP
TSH SERPL-MCNC: 8.8 UIU/ML — HIGH (ref 0.27–4.2)
VIT B12 SERPL-MCNC: 466 PG/ML — SIGNIFICANT CHANGE UP (ref 200–900)

## 2021-05-22 PROCEDURE — 99232 SBSQ HOSP IP/OBS MODERATE 35: CPT

## 2021-05-22 PROCEDURE — 93010 ELECTROCARDIOGRAM REPORT: CPT

## 2021-05-22 RX ADMIN — ALBUTEROL 2 PUFF(S): 90 AEROSOL, METERED ORAL at 10:26

## 2021-05-22 RX ADMIN — ALBUTEROL 2 PUFF(S): 90 AEROSOL, METERED ORAL at 23:40

## 2021-05-22 RX ADMIN — Medication 0.5 MILLIGRAM(S): at 09:45

## 2021-05-22 RX ADMIN — QUETIAPINE FUMARATE 12.5 MILLIGRAM(S): 200 TABLET, FILM COATED ORAL at 09:45

## 2021-05-22 RX ADMIN — QUETIAPINE FUMARATE 25 MILLIGRAM(S): 200 TABLET, FILM COATED ORAL at 15:14

## 2021-05-22 RX ADMIN — Medication 75 MICROGRAM(S): at 09:47

## 2021-05-22 RX ADMIN — Medication 0.5 MILLIGRAM(S): at 20:34

## 2021-05-22 RX ADMIN — CLOPIDOGREL BISULFATE 75 MILLIGRAM(S): 75 TABLET, FILM COATED ORAL at 09:45

## 2021-05-22 RX ADMIN — Medication 81 MILLIGRAM(S): at 09:47

## 2021-05-22 NOTE — BH INPATIENT PSYCHIATRY PROGRESS NOTE - NSBHFUPINTERVALHXFT_PSY_A_CORE
Pt seen, chart reviewed and discussed with nursing staff. Reports feeling ok, but admits feeling anxious, depressed. When asked  about circumstances before hospitalization, she reports that she is here for get support, was not eating well.  Endorses poor sleep, low appetite, low energy. Appears disorganized, confused and hard of hearing. Denies any paranoia, SI, HI, hallucination.

## 2021-05-22 NOTE — BH INPATIENT PSYCHIATRY PROGRESS NOTE - NSBHCHARTREVIEWVS_PSY_A_CORE FT
Vital Signs Last 24 Hrs  T(C): 36.5 (22 May 2021 10:33), Max: 36.9 (22 May 2021 06:51)  T(F): 97.7 (22 May 2021 10:33), Max: 98.5 (22 May 2021 06:51)  HR: --  BP: --  BP(mean): --  RR: --  SpO2: --

## 2021-05-22 NOTE — BH INPATIENT PSYCHIATRY PROGRESS NOTE - NSBHASSESSSUMMFT_PSY_ALL_CORE
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 Pt confused to situation however reports depressive sx, no SI/HI.    5/22/21: dysphoric, confused, anxious, disorganized. No SI/HI.    Plan:  1. Remeron 7.5mg qHs for depression  2. Seroquel 12.5mg BID for depression with psychotic features  3. Klonopin changed from 1-2mg PRN at bedtime for insomnia to standing regimen of 0.5mg BID  4. Seroquel 12.5mg PO q6hr prn agitation, Zyprexa 2.5mg IM PRN for agitation  5. Labs: reviewed 5/22, discussed with internist, high cholesterol, low  free T4 0.8, high TSH, suspected noncompliance, recommended to f/u TFT end of next week, medicine consult requested, pt to be seen on 5/24/21.   6. Continue with home meds - ASA 81mg and Plavix 75mg daily, Synthroid 75mcg daily  7. Tylenol 650mg q6h PRN for pain  8. Oxycodone changed from 10mg BID PRN for severe pain to 5mg prn  9. Albuterol 90mcg q6h PRN for wheezing  10. Zofran 4mg q6h PRN for nausea  11. PT evaluation ordered  12. Routine monitoring, no current SI/HI, CO not indicated

## 2021-05-23 PROCEDURE — 99232 SBSQ HOSP IP/OBS MODERATE 35: CPT

## 2021-05-23 RX ORDER — QUETIAPINE FUMARATE 200 MG/1
12.5 TABLET, FILM COATED ORAL EVERY 6 HOURS
Refills: 0 | Status: DISCONTINUED | OUTPATIENT
Start: 2021-05-23 | End: 2021-07-08

## 2021-05-23 RX ADMIN — Medication 0.5 MILLIGRAM(S): at 10:06

## 2021-05-23 RX ADMIN — QUETIAPINE FUMARATE 12.5 MILLIGRAM(S): 200 TABLET, FILM COATED ORAL at 10:06

## 2021-05-23 RX ADMIN — Medication 75 MICROGRAM(S): at 10:06

## 2021-05-23 RX ADMIN — Medication 81 MILLIGRAM(S): at 10:06

## 2021-05-23 RX ADMIN — OXYCODONE HYDROCHLORIDE 5 MILLIGRAM(S): 5 TABLET ORAL at 13:04

## 2021-05-23 RX ADMIN — CLOPIDOGREL BISULFATE 75 MILLIGRAM(S): 75 TABLET, FILM COATED ORAL at 10:06

## 2021-05-23 RX ADMIN — Medication 650 MILLIGRAM(S): at 21:53

## 2021-05-23 RX ADMIN — Medication 0.5 MILLIGRAM(S): at 21:14

## 2021-05-23 RX ADMIN — OXYCODONE HYDROCHLORIDE 5 MILLIGRAM(S): 5 TABLET ORAL at 11:45

## 2021-05-23 RX ADMIN — Medication 650 MILLIGRAM(S): at 21:13

## 2021-05-23 RX ADMIN — ALBUTEROL 2 PUFF(S): 90 AEROSOL, METERED ORAL at 21:26

## 2021-05-23 NOTE — BH INPATIENT PSYCHIATRY PROGRESS NOTE - NSBHFUPINTERVALHXFT_PSY_A_CORE
Pt seen, chart reviewed and discussed with nursing staff. Per staff, Pt remains disorganized, anxious and dysphoric. On exam, Pt reports not feeling well. has left lower back pain, anxious, depressed. Endorses poor sleep, low appetite, low energy and motivation. Feels her memory is not that good. Appears dysphoric, hard of hearing but less confused. Denies any jose c, paranoia, SI, HI, hallucination.

## 2021-05-23 NOTE — BH INPATIENT PSYCHIATRY PROGRESS NOTE - NSBHCHARTREVIEWVS_PSY_A_CORE FT
Vital Signs Last 24 Hrs  T(C): 36.4 (23 May 2021 11:08), Max: 36.8 (23 May 2021 06:45)  T(F): 97.5 (23 May 2021 11:08), Max: 98.3 (23 May 2021 06:45)  HR: --  BP: 128/77 (23 May 2021 08:08) (128/77 - 128/77)  BP(mean): 93 (23 May 2021 08:08) (93 - 93)  RR: 20 (22 May 2021 16:40) (20 - 20)  SpO2: 97% (22 May 2021 16:40) (97% - 97%)

## 2021-05-23 NOTE — BH INPATIENT PSYCHIATRY PROGRESS NOTE - NSBHASSESSSUMMFT_PSY_ALL_CORE
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 Pt confused to situation however reports depressive sx, no SI/HI.    5/22/21: dysphoric, confused, anxious, disorganized. No SI/HI.    5/23/21: less confused compared to yesterday, but dysphoric/anxious, no SI/HI.    Plan:  1. Remeron 7.5mg qHs for depression  2. Seroquel 12.5mg BID for depression with psychotic features  3. Klonopin changed from 1-2mg PRN at bedtime for insomnia to standing regimen of 0.5mg BID  4. Seroquel 12.5mg PO q6hr prn agitation, Zyprexa 2.5mg IM PRN for agitation  5. Labs: reviewed 5/22, discussed with internist, high cholesterol, low  free T4 0.8, high TSH, suspected noncompliance, recommended to f/u TFT end of next week, medicine consult requested, pt to be seen on 5/24/21.   6. Continue with home meds - ASA 81mg and Plavix 75mg daily, Synthroid 75mcg daily  7. Tylenol 650mg q6h PRN for pain  8. Oxycodone changed from 10mg BID PRN for severe pain to 5mg prn  9. Albuterol 90mcg q6h PRN for wheezing  10. Zofran 4mg q6h PRN for nausea  11. PT evaluation ordered  12. Routine monitoring, no current SI/HI, CO not indicated

## 2021-05-23 NOTE — BH INPATIENT PSYCHIATRY PROGRESS NOTE - NSBHCHARTREVIEWLAB_PSY_A_CORE FT
15.3   11.46 )-----------( 301      ( 20 May 2021 21:50 )             47.3   05-20    138  |  104  |  14  ----------------------------<  78  4.2   |  13<L>  |  1.01    Ca    9.8      20 May 2021 21:50    TPro  6.6  /  Alb  3.6  /  TBili  0.5  /  DBili  x   /  AST  42<H>  /  ALT  21  /  AlkPhos  39<L>  05-20  Thyroid Stimulating Hormone, Serum (05.20.21 @ 21:54)    Thyroid Stimulating Hormone, Serum: 8.12 uIU/mL  
                      15.3   11.46 )-----------( 301      ( 20 May 2021 21:50 )             47.3   05-20    138  |  104  |  14  ----------------------------<  78  4.2   |  13<L>  |  1.01    Ca    9.8      20 May 2021 21:50    TPro  6.6  /  Alb  3.6  /  TBili  0.5  /  DBili  x   /  AST  42<H>  /  ALT  21  /  AlkPhos  39<L>  05-20  Thyroid Stimulating Hormone, Serum (05.20.21 @ 21:54)    Thyroid Stimulating Hormone, Serum: 8.12 uIU/mL

## 2021-05-23 NOTE — BH INPATIENT PSYCHIATRY PROGRESS NOTE - NSBHCHARTREVIEWINVESTIGATE_PSY_A_CORE FT
Noncontrast head CT (5/20/2021)  COMPARISON EXAMINATION: Head CT dated 2/7/2021    FINDINGS:    No acute intracranial hemorrhage, midline shift or hydrocephalus. Mild to moderate chronic microvascular ischemic change, stable compared to the prior. Mild cerebral atrophy, in keeping with the patient's age. No central herniation or evidence of acute mass effect. Basilar cisterns are clear.    Left occipital craniectomy with subtle underlying encephalomalacia. Changes of prior paranasal sinus surgery with bilateral antrectomy and uncinectomy. Mild paranasal sinus mucosal thickening. Mastoid air cells and middle ear cavities are clear.    IMPRESSION:  Stable exam, no acute intracranial CT finding    
Noncontrast head CT (5/20/2021)  COMPARISON EXAMINATION: Head CT dated 2/7/2021    FINDINGS:    No acute intracranial hemorrhage, midline shift or hydrocephalus. Mild to moderate chronic microvascular ischemic change, stable compared to the prior. Mild cerebral atrophy, in keeping with the patient's age. No central herniation or evidence of acute mass effect. Basilar cisterns are clear.    Left occipital craniectomy with subtle underlying encephalomalacia. Changes of prior paranasal sinus surgery with bilateral antrectomy and uncinectomy. Mild paranasal sinus mucosal thickening. Mastoid air cells and middle ear cavities are clear.    IMPRESSION:  Stable exam, no acute intracranial CT finding

## 2021-05-24 PROCEDURE — 99232 SBSQ HOSP IP/OBS MODERATE 35: CPT

## 2021-05-24 RX ORDER — SENNA PLUS 8.6 MG/1
2 TABLET ORAL AT BEDTIME
Refills: 0 | Status: DISCONTINUED | OUTPATIENT
Start: 2021-05-24 | End: 2021-07-08

## 2021-05-24 RX ADMIN — ALBUTEROL 2 PUFF(S): 90 AEROSOL, METERED ORAL at 05:08

## 2021-05-24 RX ADMIN — Medication 0.5 MILLIGRAM(S): at 09:44

## 2021-05-24 RX ADMIN — ALBUTEROL 2 PUFF(S): 90 AEROSOL, METERED ORAL at 18:48

## 2021-05-24 RX ADMIN — SENNA PLUS 2 TABLET(S): 8.6 TABLET ORAL at 21:53

## 2021-05-24 RX ADMIN — QUETIAPINE FUMARATE 12.5 MILLIGRAM(S): 200 TABLET, FILM COATED ORAL at 18:49

## 2021-05-24 RX ADMIN — MIRTAZAPINE 7.5 MILLIGRAM(S): 45 TABLET, ORALLY DISINTEGRATING ORAL at 21:52

## 2021-05-24 RX ADMIN — Medication 0.5 MILLIGRAM(S): at 21:53

## 2021-05-24 RX ADMIN — QUETIAPINE FUMARATE 12.5 MILLIGRAM(S): 200 TABLET, FILM COATED ORAL at 09:44

## 2021-05-24 RX ADMIN — CLOPIDOGREL BISULFATE 75 MILLIGRAM(S): 75 TABLET, FILM COATED ORAL at 09:44

## 2021-05-24 RX ADMIN — Medication 81 MILLIGRAM(S): at 09:44

## 2021-05-24 RX ADMIN — OXYCODONE HYDROCHLORIDE 5 MILLIGRAM(S): 5 TABLET ORAL at 12:54

## 2021-05-24 RX ADMIN — QUETIAPINE FUMARATE 12.5 MILLIGRAM(S): 200 TABLET, FILM COATED ORAL at 21:52

## 2021-05-24 RX ADMIN — OXYCODONE HYDROCHLORIDE 5 MILLIGRAM(S): 5 TABLET ORAL at 14:30

## 2021-05-24 RX ADMIN — Medication 75 MICROGRAM(S): at 09:44

## 2021-05-24 NOTE — BH INPATIENT PSYCHIATRY PROGRESS NOTE - NSBHFUPINTERVALHXFT_PSY_A_CORE
Chart reviewed and case discussed with treatment team. No events reported over the weekend. Sleep and appetite is "better". Patient denies any current AH/VH but endorsed +AH and +VH prior to admission. Patient reported feeling very depressed since being isolated due to covid, she endorsed passive SI with no intent or plan. She also reported taking less of her medications by splitting pills in half at home. Patient is out on the unit, social with peers but is still very anxious. Patient is compliant with medications, no adverse effects reported.

## 2021-05-24 NOTE — PHYSICAL THERAPY INITIAL EVALUATION ADULT - RANGE OF MOTION EXAMINATION, REHAB EVAL
+kyphosis/bilateral upper extremity ROM was WFL (within functional limits)/bilateral lower extremity ROM was WFL (within functional limits)

## 2021-05-24 NOTE — BH INPATIENT PSYCHIATRY PROGRESS NOTE - NSBHASSESSSUMMFT_PSY_ALL_CORE
Patient is A&O X3, she appears less confused but still very anxious. She endorses passive SI.     c/w current medications and titrate.

## 2021-05-24 NOTE — BH INPATIENT PSYCHIATRY PROGRESS NOTE - OTHER
passive SI, no intent or plan thoracic scoliosis at times linear with intermittent periods of illogical thoughts.

## 2021-05-24 NOTE — PHYSICAL THERAPY INITIAL EVALUATION ADULT - GAIT DEVIATIONS NOTED, PT EVAL
decreased eryn/decreased step length/decreased stride length/decreased swing-to-stance ratio/decreased weight-shifting ability

## 2021-05-24 NOTE — BH INPATIENT PSYCHIATRY PROGRESS NOTE - NSBHCHARTREVIEWVS_PSY_A_CORE FT
Vital Signs Last 24 Hrs  T(C): 37.1 (24 May 2021 11:22), Max: 37.1 (24 May 2021 06:42)  T(F): 98.7 (24 May 2021 11:22), Max: 98.7 (24 May 2021 06:42)  HR: --  BP: --  BP(mean): --  RR: 18 (24 May 2021 08:05) (18 - 18)  SpO2: --

## 2021-05-24 NOTE — PHYSICAL THERAPY INITIAL EVALUATION ADULT - PERTINENT HX OF CURRENT PROBLEM, REHAB EVAL
Pt is a 71 year old female with confusion for 2 weeks, admitted to inpatient psychiatric unit for depression with psychotic features and passive SI. PMH: depression with psychotic features, possible personality disorder, +psychiatric hospitalizations x2 (last in 2014 at [facility]), PMH of CAD s/p multiple stents, MI, hypothyroidism, left ear deafness, severe asthma, osteoporosis, diverticulitis.

## 2021-05-25 PROCEDURE — 99232 SBSQ HOSP IP/OBS MODERATE 35: CPT

## 2021-05-25 RX ORDER — QUETIAPINE FUMARATE 200 MG/1
25 TABLET, FILM COATED ORAL AT BEDTIME
Refills: 0 | Status: DISCONTINUED | OUTPATIENT
Start: 2021-05-25 | End: 2021-06-03

## 2021-05-25 RX ORDER — QUETIAPINE FUMARATE 200 MG/1
12.5 TABLET, FILM COATED ORAL DAILY
Refills: 0 | Status: DISCONTINUED | OUTPATIENT
Start: 2021-05-25 | End: 2021-06-03

## 2021-05-25 RX ORDER — DULOXETINE HYDROCHLORIDE 30 MG/1
20 CAPSULE, DELAYED RELEASE ORAL ONCE
Refills: 0 | Status: COMPLETED | OUTPATIENT
Start: 2021-05-25 | End: 2021-05-25

## 2021-05-25 RX ORDER — DULOXETINE HYDROCHLORIDE 30 MG/1
20 CAPSULE, DELAYED RELEASE ORAL DAILY
Refills: 0 | Status: DISCONTINUED | OUTPATIENT
Start: 2021-05-26 | End: 2021-06-02

## 2021-05-25 RX ADMIN — CLOPIDOGREL BISULFATE 75 MILLIGRAM(S): 75 TABLET, FILM COATED ORAL at 09:44

## 2021-05-25 RX ADMIN — DULOXETINE HYDROCHLORIDE 20 MILLIGRAM(S): 30 CAPSULE, DELAYED RELEASE ORAL at 10:48

## 2021-05-25 RX ADMIN — Medication 0.5 MILLIGRAM(S): at 22:59

## 2021-05-25 RX ADMIN — OXYCODONE HYDROCHLORIDE 5 MILLIGRAM(S): 5 TABLET ORAL at 12:44

## 2021-05-25 RX ADMIN — ONDANSETRON 4 MILLIGRAM(S): 8 TABLET, FILM COATED ORAL at 15:01

## 2021-05-25 RX ADMIN — QUETIAPINE FUMARATE 12.5 MILLIGRAM(S): 200 TABLET, FILM COATED ORAL at 09:44

## 2021-05-25 RX ADMIN — QUETIAPINE FUMARATE 25 MILLIGRAM(S): 200 TABLET, FILM COATED ORAL at 23:01

## 2021-05-25 RX ADMIN — Medication 75 MICROGRAM(S): at 09:44

## 2021-05-25 RX ADMIN — SENNA PLUS 2 TABLET(S): 8.6 TABLET ORAL at 23:00

## 2021-05-25 RX ADMIN — Medication 650 MILLIGRAM(S): at 20:18

## 2021-05-25 RX ADMIN — OXYCODONE HYDROCHLORIDE 5 MILLIGRAM(S): 5 TABLET ORAL at 10:47

## 2021-05-25 RX ADMIN — OXYCODONE HYDROCHLORIDE 5 MILLIGRAM(S): 5 TABLET ORAL at 03:42

## 2021-05-25 RX ADMIN — Medication 0.5 MILLIGRAM(S): at 09:44

## 2021-05-25 RX ADMIN — ALBUTEROL 2 PUFF(S): 90 AEROSOL, METERED ORAL at 23:08

## 2021-05-25 RX ADMIN — Medication 650 MILLIGRAM(S): at 09:45

## 2021-05-25 RX ADMIN — Medication 81 MILLIGRAM(S): at 09:45

## 2021-05-25 RX ADMIN — OXYCODONE HYDROCHLORIDE 5 MILLIGRAM(S): 5 TABLET ORAL at 04:52

## 2021-05-25 NOTE — BH INPATIENT PSYCHIATRY PROGRESS NOTE - NSBHFUPINTERVALHXFT_PSY_A_CORE
Chart reviewed and case discussed with treatment team. No events reported over the weekend. Sleep is "not as good" and appetite is "improved". Patient reported being in significant pain in her arm and also of a headache. Patient given PRN Oxycodone dose earlier and pain management consult ordered. Spoke with patient about starting Cymbalta for depression. She was hesitant at first but then agreed. Some paranoia noted around medications. Patient is compliant with medications, no adverse effects reported.   Chart reviewed and case discussed with treatment team. No events reported over the weekend. Sleep is "not as good" and appetite is "improved". Patient reported being in significant pain in her arm and also of a headache. Patient given PRN Oxycodone dose earlier and pain management consult ordered. Spoke with patient about starting Cymbalta for depression. She was hesitant at first but then agreed. Some paranoia noted around medications. Patient also stated that she feels like she is bothering her daughter too much and did not want us to call her frequently, but daughter called hospital looking to get updates and denied telling her mother that she needs a break. Patient is compliant with medications, no adverse effects reported.

## 2021-05-25 NOTE — BH INPATIENT PSYCHIATRY PROGRESS NOTE - NSBHASSESSSUMMFT_PSY_ALL_CORE
Patient is A&O X3, she appears less confused but is still very anxious. She endorses passive SI with no intent or plan.     c/w Seroquel titration and Remeron d/c for Cymbalta

## 2021-05-25 NOTE — BH INPATIENT PSYCHIATRY PROGRESS NOTE - OTHER
passive SI, no intent or plan  some paranoia noted about medications at times linear with intermittent periods of illogical thoughts. thoracic scoliosis

## 2021-05-25 NOTE — BH INPATIENT PSYCHIATRY PROGRESS NOTE - NSBHCHARTREVIEWVS_PSY_A_CORE FT
Vital Signs Last 24 Hrs  T(C): 36.8 (25 May 2021 11:01), Max: 36.9 (25 May 2021 06:53)  T(F): 98.3 (25 May 2021 11:01), Max: 98.4 (25 May 2021 06:53)  HR: --  BP: --  BP(mean): --  RR: --  SpO2: --

## 2021-05-26 PROCEDURE — 99232 SBSQ HOSP IP/OBS MODERATE 35: CPT

## 2021-05-26 RX ADMIN — Medication 81 MILLIGRAM(S): at 09:48

## 2021-05-26 RX ADMIN — ALBUTEROL 2 PUFF(S): 90 AEROSOL, METERED ORAL at 06:15

## 2021-05-26 RX ADMIN — Medication 75 MICROGRAM(S): at 09:48

## 2021-05-26 RX ADMIN — CLOPIDOGREL BISULFATE 75 MILLIGRAM(S): 75 TABLET, FILM COATED ORAL at 09:48

## 2021-05-26 RX ADMIN — Medication 0.5 MILLIGRAM(S): at 09:48

## 2021-05-26 RX ADMIN — SENNA PLUS 2 TABLET(S): 8.6 TABLET ORAL at 20:36

## 2021-05-26 RX ADMIN — DULOXETINE HYDROCHLORIDE 20 MILLIGRAM(S): 30 CAPSULE, DELAYED RELEASE ORAL at 09:48

## 2021-05-26 RX ADMIN — Medication 0.5 MILLIGRAM(S): at 20:36

## 2021-05-26 RX ADMIN — OXYCODONE HYDROCHLORIDE 5 MILLIGRAM(S): 5 TABLET ORAL at 16:52

## 2021-05-26 RX ADMIN — OXYCODONE HYDROCHLORIDE 5 MILLIGRAM(S): 5 TABLET ORAL at 16:13

## 2021-05-26 RX ADMIN — ONDANSETRON 4 MILLIGRAM(S): 8 TABLET, FILM COATED ORAL at 11:56

## 2021-05-26 RX ADMIN — QUETIAPINE FUMARATE 12.5 MILLIGRAM(S): 200 TABLET, FILM COATED ORAL at 09:48

## 2021-05-26 RX ADMIN — QUETIAPINE FUMARATE 25 MILLIGRAM(S): 200 TABLET, FILM COATED ORAL at 20:36

## 2021-05-26 RX ADMIN — ALBUTEROL 2 PUFF(S): 90 AEROSOL, METERED ORAL at 22:42

## 2021-05-26 NOTE — BH INPATIENT PSYCHIATRY PROGRESS NOTE - NSBHASSESSSUMMFT_PSY_ALL_CORE
71-year-old female with history of MDD presenting with worsening depression with passive SI and psychosis.   Patient with somatic complaints, believes she should be on more meds, appears comfortable. Denies SI/ HI today   Plan: Will c/w Seroquel and Cymbalta at current dose and titrate up as needed.

## 2021-05-26 NOTE — BH INPATIENT PSYCHIATRY PROGRESS NOTE - NSBHFUPINTERVALHXFT_PSY_A_CORE
Patient seen for follow up for depression and psychosis. Chart reviewed and case discussed with interdisciplinary staff. per staff, patient is very somatic. On encounter, patient is pleasant but believes she is not getting adequate care, reports that she used to be many more medications and thinks we are missing something, insists that I speak with her pulmonologist Dr. Ketan Morfin. Otherwise, eating and sleeping well. Patient remains compliant with medications, no adverse effects reported or in evidence.

## 2021-05-26 NOTE — BH INPATIENT PSYCHIATRY PROGRESS NOTE - NSBHCHARTREVIEWVS_PSY_A_CORE FT
Vital Signs Last 24 Hrs  T(C): 36.4 (26 May 2021 10:25), Max: 36.8 (25 May 2021 21:40)  T(F): 97.6 (26 May 2021 10:25), Max: 98.2 (25 May 2021 21:40)  RR: 18 (26 May 2021 08:35) (18 - 18)  Orthostatic VS  05-26-21 @ 08:35  Sitting BP: 111/86 HR: 102  Standing BP: 117/86 HR: 101  Site: upper left arm   Mode: electronic

## 2021-05-26 NOTE — BH INPATIENT PSYCHIATRY PROGRESS NOTE - OTHER
thoracic scoliosis at times linear with intermittent periods of illogical thoughts. passive SI, no intent or plan  some paranoia noted about medications

## 2021-05-27 PROCEDURE — 99232 SBSQ HOSP IP/OBS MODERATE 35: CPT

## 2021-05-27 RX ORDER — CLONAZEPAM 1 MG
0.5 TABLET ORAL
Refills: 0 | Status: DISCONTINUED | OUTPATIENT
Start: 2021-05-27 | End: 2021-06-01

## 2021-05-27 RX ORDER — OXYCODONE HYDROCHLORIDE 5 MG/1
5 TABLET ORAL EVERY 8 HOURS
Refills: 0 | Status: DISCONTINUED | OUTPATIENT
Start: 2021-05-27 | End: 2021-06-01

## 2021-05-27 RX ADMIN — ALBUTEROL 2 PUFF(S): 90 AEROSOL, METERED ORAL at 14:06

## 2021-05-27 RX ADMIN — OXYCODONE HYDROCHLORIDE 5 MILLIGRAM(S): 5 TABLET ORAL at 16:40

## 2021-05-27 RX ADMIN — Medication 0.5 MILLIGRAM(S): at 09:01

## 2021-05-27 RX ADMIN — ONDANSETRON 4 MILLIGRAM(S): 8 TABLET, FILM COATED ORAL at 10:02

## 2021-05-27 RX ADMIN — QUETIAPINE FUMARATE 12.5 MILLIGRAM(S): 200 TABLET, FILM COATED ORAL at 09:00

## 2021-05-27 RX ADMIN — DULOXETINE HYDROCHLORIDE 20 MILLIGRAM(S): 30 CAPSULE, DELAYED RELEASE ORAL at 09:01

## 2021-05-27 RX ADMIN — Medication 10 MILLIGRAM(S): at 14:01

## 2021-05-27 RX ADMIN — Medication 75 MICROGRAM(S): at 09:01

## 2021-05-27 RX ADMIN — QUETIAPINE FUMARATE 25 MILLIGRAM(S): 200 TABLET, FILM COATED ORAL at 20:56

## 2021-05-27 RX ADMIN — OXYCODONE HYDROCHLORIDE 5 MILLIGRAM(S): 5 TABLET ORAL at 17:40

## 2021-05-27 RX ADMIN — Medication 81 MILLIGRAM(S): at 09:01

## 2021-05-27 RX ADMIN — CLOPIDOGREL BISULFATE 75 MILLIGRAM(S): 75 TABLET, FILM COATED ORAL at 09:01

## 2021-05-27 RX ADMIN — SENNA PLUS 2 TABLET(S): 8.6 TABLET ORAL at 20:56

## 2021-05-27 RX ADMIN — ALBUTEROL 2 PUFF(S): 90 AEROSOL, METERED ORAL at 05:52

## 2021-05-27 RX ADMIN — ALBUTEROL 2 PUFF(S): 90 AEROSOL, METERED ORAL at 21:39

## 2021-05-27 RX ADMIN — Medication 0.5 MILLIGRAM(S): at 20:56

## 2021-05-27 NOTE — BH INPATIENT PSYCHIATRY PROGRESS NOTE - NSBHFUPINTERVALHXFT_PSY_A_CORE
Patient seen for follow up for depression and psychosis. Chart reviewed and case discussed with interdisciplinary staff. Per staff, patient is very somatic, requesting more meds for constipation. On encounter, patient is pleasant, happy that she had PT today, complains of constipation, requesting increase in Senna, Dulcolax suppository offered and patient agress. Otherwise, eating and sleeping well. Patient remains compliant with medications, no adverse effects reported or in evidence.

## 2021-05-27 NOTE — BH INPATIENT PSYCHIATRY PROGRESS NOTE - NSBHASSESSSUMMFT_PSY_ALL_CORE
71-year-old female with history of MDD presenting with worsening depression with passive SI and psychosis.   Patient reports constipation and other somatic complaints; appetite and sleep are fair; denies SI/ HI today   Plan: Dulcolax suppository X 1 dose for constipation. Will c/w Seroquel and Cymbalta at current dose and titrate up as needed.

## 2021-05-27 NOTE — BH INPATIENT PSYCHIATRY PROGRESS NOTE - NSBHCHARTREVIEWVS_PSY_A_CORE FT
Vital Signs Last 24 Hrs  T(C): 35.9 (27 May 2021 11:10), Max: 36.9 (26 May 2021 21:18)  T(F): 96.7 (27 May 2021 11:10), Max: 98.5 (26 May 2021 21:18)    05-27-21 @ 07:50  Sitting BP: 113/73 HR: 102  Site: upper left arm   Mode: electronic

## 2021-05-28 LAB
APPEARANCE UR: ABNORMAL
BACTERIA # UR AUTO: ABNORMAL
BILIRUB UR-MCNC: NEGATIVE — SIGNIFICANT CHANGE UP
COLOR SPEC: YELLOW — SIGNIFICANT CHANGE UP
DIFF PNL FLD: ABNORMAL
EPI CELLS # UR: 0 /HPF — SIGNIFICANT CHANGE UP (ref 0–5)
GLUCOSE UR QL: NEGATIVE — SIGNIFICANT CHANGE UP
HYALINE CASTS # UR AUTO: 1 /LPF — SIGNIFICANT CHANGE UP (ref 0–7)
KETONES UR-MCNC: NEGATIVE — SIGNIFICANT CHANGE UP
LEUKOCYTE ESTERASE UR-ACNC: ABNORMAL
NITRITE UR-MCNC: NEGATIVE — SIGNIFICANT CHANGE UP
PH UR: 6.5 — SIGNIFICANT CHANGE UP (ref 5–8)
PROT UR-MCNC: ABNORMAL
RBC CASTS # UR COMP ASSIST: 2 /HPF — SIGNIFICANT CHANGE UP (ref 0–4)
SP GR SPEC: 1.01 — SIGNIFICANT CHANGE UP (ref 1.01–1.02)
UROBILINOGEN FLD QL: SIGNIFICANT CHANGE UP
WBC UR QL: >720 /HPF — HIGH (ref 0–5)

## 2021-05-28 PROCEDURE — 99232 SBSQ HOSP IP/OBS MODERATE 35: CPT

## 2021-05-28 RX ORDER — LIDOCAINE 4 G/100G
1 CREAM TOPICAL DAILY
Refills: 0 | Status: DISCONTINUED | OUTPATIENT
Start: 2021-05-28 | End: 2021-05-29

## 2021-05-28 RX ORDER — IBUPROFEN 200 MG
400 TABLET ORAL
Refills: 0 | Status: DISCONTINUED | OUTPATIENT
Start: 2021-05-28 | End: 2021-07-08

## 2021-05-28 RX ADMIN — Medication 0.5 MILLIGRAM(S): at 08:59

## 2021-05-28 RX ADMIN — QUETIAPINE FUMARATE 25 MILLIGRAM(S): 200 TABLET, FILM COATED ORAL at 22:38

## 2021-05-28 RX ADMIN — Medication 0.5 MILLIGRAM(S): at 22:37

## 2021-05-28 RX ADMIN — ALBUTEROL 2 PUFF(S): 90 AEROSOL, METERED ORAL at 22:46

## 2021-05-28 RX ADMIN — Medication 81 MILLIGRAM(S): at 08:59

## 2021-05-28 RX ADMIN — DULOXETINE HYDROCHLORIDE 20 MILLIGRAM(S): 30 CAPSULE, DELAYED RELEASE ORAL at 09:00

## 2021-05-28 RX ADMIN — QUETIAPINE FUMARATE 12.5 MILLIGRAM(S): 200 TABLET, FILM COATED ORAL at 08:59

## 2021-05-28 RX ADMIN — CLOPIDOGREL BISULFATE 75 MILLIGRAM(S): 75 TABLET, FILM COATED ORAL at 09:00

## 2021-05-28 RX ADMIN — ALBUTEROL 2 PUFF(S): 90 AEROSOL, METERED ORAL at 12:11

## 2021-05-28 RX ADMIN — ALBUTEROL 2 PUFF(S): 90 AEROSOL, METERED ORAL at 05:45

## 2021-05-28 RX ADMIN — OXYCODONE HYDROCHLORIDE 5 MILLIGRAM(S): 5 TABLET ORAL at 14:00

## 2021-05-28 RX ADMIN — OXYCODONE HYDROCHLORIDE 5 MILLIGRAM(S): 5 TABLET ORAL at 15:57

## 2021-05-28 RX ADMIN — Medication 75 MICROGRAM(S): at 08:59

## 2021-05-28 RX ADMIN — SENNA PLUS 2 TABLET(S): 8.6 TABLET ORAL at 22:38

## 2021-05-28 NOTE — BH INPATIENT PSYCHIATRY PROGRESS NOTE - NSBHFUPINTERVALHXFT_PSY_A_CORE
Patient seen for follow up for depression and psychosis. Chart reviewed and case discussed with interdisciplinary staff. No significant overnight event. On encounter, patient is calm, reports that she had a small BM, reports that she has some "sinus" issues now and requests something additional for pain relief, would also like to be evaluated for UTI. Otherwise, eating and sleeping well. Patient remains compliant with medications, no adverse effects reported or in evidence.

## 2021-05-28 NOTE — BH INPATIENT PSYCHIATRY PROGRESS NOTE - NSBHCHARTREVIEWVS_PSY_A_CORE FT
Vital Signs Last 24 Hrs  T(C): 36.7 (28 May 2021 06:41), Max: 36.7 (27 May 2021 21:45)  T(F): 98.1 (28 May 2021 06:41), Max: 98.1 (28 May 2021 06:41)  Orthostatic VS  05-28-21 @ 07:53  Sitting BP: 124/85 HR: 101  Standing BP: 109/89 HR: 111

## 2021-05-28 NOTE — BH INPATIENT PSYCHIATRY PROGRESS NOTE - NSBHASSESSSUMMFT_PSY_ALL_CORE
71-year-old female with history of MDD presenting with worsening depression with passive SI and psychosis.   Patient pleasant on encounter, continue to report pain and other somatic complaints; appetite and sleep are fair; denies SI/ HI. Patient wants evaluated for UTI.   Plan: Will c/w Seroquel and Cymbalta at current dose and titrate up as needed. Will continue bowel regimen. Will order labs-CBC, Lipid profile, UA.

## 2021-05-29 LAB
CHOLEST SERPL-MCNC: 238 MG/DL — HIGH
HCT VFR BLD CALC: 46.8 % — HIGH (ref 34.5–45)
HDLC SERPL-MCNC: 80 MG/DL — SIGNIFICANT CHANGE UP
HGB BLD-MCNC: 15.2 G/DL — SIGNIFICANT CHANGE UP (ref 11.5–15.5)
LIPID PNL WITH DIRECT LDL SERPL: 137 MG/DL — HIGH
MCHC RBC-ENTMCNC: 31 PG — SIGNIFICANT CHANGE UP (ref 27–34)
MCHC RBC-ENTMCNC: 32.5 GM/DL — SIGNIFICANT CHANGE UP (ref 32–36)
MCV RBC AUTO: 95.5 FL — SIGNIFICANT CHANGE UP (ref 80–100)
NON HDL CHOLESTEROL: 158 MG/DL — HIGH
NRBC # BLD: 0 /100 WBCS — SIGNIFICANT CHANGE UP
NRBC # FLD: 0 K/UL — SIGNIFICANT CHANGE UP
PLATELET # BLD AUTO: 364 K/UL — SIGNIFICANT CHANGE UP (ref 150–400)
RBC # BLD: 4.9 M/UL — SIGNIFICANT CHANGE UP (ref 3.8–5.2)
RBC # FLD: 14.4 % — SIGNIFICANT CHANGE UP (ref 10.3–14.5)
TRIGL SERPL-MCNC: 104 MG/DL — SIGNIFICANT CHANGE UP
VIT B12 SERPL-MCNC: 442 PG/ML — SIGNIFICANT CHANGE UP (ref 200–900)
VIT D25+D1,25 OH+D1,25 PNL SERPL-MCNC: 108 PG/ML — HIGH (ref 19.9–79.3)
WBC # BLD: 6.29 K/UL — SIGNIFICANT CHANGE UP (ref 3.8–10.5)
WBC # FLD AUTO: 6.29 K/UL — SIGNIFICANT CHANGE UP (ref 3.8–10.5)

## 2021-05-29 PROCEDURE — 99223 1ST HOSP IP/OBS HIGH 75: CPT

## 2021-05-29 PROCEDURE — 99232 SBSQ HOSP IP/OBS MODERATE 35: CPT

## 2021-05-29 RX ORDER — POLYETHYLENE GLYCOL 3350 17 G/17G
17 POWDER, FOR SOLUTION ORAL DAILY
Refills: 0 | Status: DISCONTINUED | OUTPATIENT
Start: 2021-05-29 | End: 2021-07-08

## 2021-05-29 RX ORDER — NITROFURANTOIN MACROCRYSTAL 50 MG
100 CAPSULE ORAL
Refills: 0 | Status: DISCONTINUED | OUTPATIENT
Start: 2021-05-29 | End: 2021-06-01

## 2021-05-29 RX ORDER — LIDOCAINE 4 G/100G
2 CREAM TOPICAL DAILY
Refills: 0 | Status: DISCONTINUED | OUTPATIENT
Start: 2021-05-29 | End: 2021-07-08

## 2021-05-29 RX ADMIN — OXYCODONE HYDROCHLORIDE 5 MILLIGRAM(S): 5 TABLET ORAL at 02:50

## 2021-05-29 RX ADMIN — Medication 0.5 MILLIGRAM(S): at 08:32

## 2021-05-29 RX ADMIN — QUETIAPINE FUMARATE 12.5 MILLIGRAM(S): 200 TABLET, FILM COATED ORAL at 08:33

## 2021-05-29 RX ADMIN — SENNA PLUS 2 TABLET(S): 8.6 TABLET ORAL at 21:41

## 2021-05-29 RX ADMIN — Medication 81 MILLIGRAM(S): at 08:32

## 2021-05-29 RX ADMIN — Medication 75 MICROGRAM(S): at 08:33

## 2021-05-29 RX ADMIN — POLYETHYLENE GLYCOL 3350 17 GRAM(S): 17 POWDER, FOR SOLUTION ORAL at 14:03

## 2021-05-29 RX ADMIN — ALBUTEROL 2 PUFF(S): 90 AEROSOL, METERED ORAL at 21:50

## 2021-05-29 RX ADMIN — OXYCODONE HYDROCHLORIDE 5 MILLIGRAM(S): 5 TABLET ORAL at 16:31

## 2021-05-29 RX ADMIN — OXYCODONE HYDROCHLORIDE 5 MILLIGRAM(S): 5 TABLET ORAL at 02:15

## 2021-05-29 RX ADMIN — ALBUTEROL 2 PUFF(S): 90 AEROSOL, METERED ORAL at 10:29

## 2021-05-29 RX ADMIN — Medication 400 MILLIGRAM(S): at 15:20

## 2021-05-29 RX ADMIN — ONDANSETRON 4 MILLIGRAM(S): 8 TABLET, FILM COATED ORAL at 14:04

## 2021-05-29 RX ADMIN — DULOXETINE HYDROCHLORIDE 20 MILLIGRAM(S): 30 CAPSULE, DELAYED RELEASE ORAL at 08:34

## 2021-05-29 RX ADMIN — CLOPIDOGREL BISULFATE 75 MILLIGRAM(S): 75 TABLET, FILM COATED ORAL at 08:34

## 2021-05-29 RX ADMIN — Medication 0.5 MILLIGRAM(S): at 21:40

## 2021-05-29 RX ADMIN — Medication 400 MILLIGRAM(S): at 14:07

## 2021-05-29 RX ADMIN — Medication 100 MILLIGRAM(S): at 21:42

## 2021-05-29 RX ADMIN — LIDOCAINE 1 PATCH: 4 CREAM TOPICAL at 09:24

## 2021-05-29 RX ADMIN — QUETIAPINE FUMARATE 25 MILLIGRAM(S): 200 TABLET, FILM COATED ORAL at 21:41

## 2021-05-29 NOTE — BH INPATIENT PSYCHIATRY PROGRESS NOTE - NSBHCONSDANGERSELF_PSY_A_CORE
suicidal behavior/unable to care for self
unable to care for self
suicidal behavior/unable to care for self
unable to care for self

## 2021-05-29 NOTE — BH INPATIENT PSYCHIATRY PROGRESS NOTE - NSBHFUPINTERVALHXFT_PSY_A_CORE
Patient seen for follow up for depression and psychosis. Chart reviewed and case discussed with interdisciplinary staff. No significant overnight event. On encounter, patient reports mood as "little better", still reports generalized pain including lower abdominal pain. Patient is eating and sleeping well. She remains compliant with medications; no adverse effects reported or in evidence.

## 2021-05-29 NOTE — CONSULT NOTE ADULT - ASSESSMENT
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      #Acute cystitis  - pt known to endorse multiple complaints, currently c/o lower abd pain and dysuria  - given +UA (WBC > 700 +large leuk esterase) will start on macrobid 100 mg BID x 5 days  - obtain urine culture prior to initiating abx    #Constipation  - monitor stool count  - can add standing miralax qdaily, c/w senna  - will monitor closely for opoid induced constipation    #CAD s/p stents  - c/w asa and plavix    #Hypothyroidism  - c/w levothyroxine    #MDD  - care per psychiatry

## 2021-05-29 NOTE — BH INPATIENT PSYCHIATRY PROGRESS NOTE - NSBHASSESSSUMMFT_PSY_ALL_CORE
71-year-old female with history of MDD presenting with worsening depression with passive SI and psychosis.   Patient reports improvement in mood but continues to report pain and other somatic complaints; appetite and sleep are fair; denies SI/ HI. UA from 05/28 positive for leukocyte esterases, WBC, blood and few bacteria. Vitals stable except for some tachycardia. Urine sent for C&S  Plan: Will c/w Seroquel and Cymbalta at current dose and titrate up as needed. Will continue bowel regimen. Hospitalist informed about UA, will f/u recommendations.

## 2021-05-29 NOTE — BH INPATIENT PSYCHIATRY PROGRESS NOTE - NSBHPSYCHOLCOGABN_PSY_A_CORE
disoriented to situation
disoriented to place/disoriented to situation
disoriented to situation

## 2021-05-29 NOTE — CONSULT NOTE ADULT - SUBJECTIVE AND OBJECTIVE BOX
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 Medicine consulted for lower abdominal pain and +UA. On interview pt states she has increased urinary frequency with mild dysuria x2 days, no hematuria or foul smelling urine. She also endorses chronic lower abdominal pain attributed to her hx of diverticulitis and constipation. Currently denies fever, chills, diarrhea.    PAST MEDICAL & SURGICAL HISTORY:  CAD (coronary artery disease)    Stented coronary artery  x 8 stents    Depression, unspecified depression type    Hypothyroidism, unspecified type    Diverticulitis  sigmoid, treated surgically    Insomnia    Gastroesophageal reflux disease, esophagitis presence not specified    Paralyzed hemidiaphragm  left - s/p plication    Chronic nonintractable headache, unspecified headache type    HF (heart failure)  resolved    Moderate scoliosis  thoracic    Deafness  left    Severe asthma  on advair/ spiriva/ fasenra every 2 mths    Myocardial infarction  2016    History of coma  41 yrs ago, while pregnant due to asthma    Sciatica    Brachial neuritis    S/P laparoscopic-assisted sigmoidectomy    S/P inguinal hernia repair  left    S/P BELEN-BSO (total abdominal hysterectomy and bilateral salpingo-oophorectomy)    Status post plication of diaphragm  left    S/P carpal tunnel release  right    Status post craniectomy  with mesh - left side for migraines        Review of Systems:   CONSTITUTIONAL: No fever, weight loss, or fatigue  EYES: No eye pain, visual disturbances, or discharge  ENMT:  No difficulty hearing, tinnitus, vertigo; No sinus or throat pain  NECK: No pain or stiffness  RESPIRATORY: No cough, wheezing, chills or hemoptysis; No shortness of breath  CARDIOVASCULAR: No chest pain, palpitations, dizziness, or leg swelling  GASTROINTESTINAL: +lower abdominal  pain. No nausea, vomiting, or hematemesis; No diarrhea or constipation. No melena or hematochezia.  GENITOURINARY: +dysuria, frequency, No hematuria, or incontinence  NEUROLOGICAL: No headaches, memory loss, loss of strength, numbness, or tremors  SKIN: No itching, burning, rashes, or lesions   LYMPH NODES: No enlarged glands  ENDOCRINE: No heat or cold intolerance; No hair loss  MUSCULOSKELETAL: +chronic back pain joint   HEME/LYMPH: +easy bruising  ALLERY AND IMMUNOLOGIC: No hives or eczema    Allergies    penicillin (Anaphylaxis)    Intolerances        Social History:     FAMILY HISTORY:  Family history of asthma (Sibling)        MEDICATIONS  (STANDING):  aspirin enteric coated 81 milliGRAM(s) Oral daily  clonazePAM  Tablet 0.5 milliGRAM(s) Oral two times a day  clopidogrel Tablet 75 milliGRAM(s) Oral daily  DULoxetine 20 milliGRAM(s) Oral daily  levothyroxine 75 MICROGram(s) Oral daily  QUEtiapine 12.5 milliGRAM(s) Oral daily  QUEtiapine 25 milliGRAM(s) Oral at bedtime  senna 2 Tablet(s) Oral at bedtime    MEDICATIONS  (PRN):  acetaminophen   Tablet .. 650 milliGRAM(s) Oral every 6 hours PRN Mild Pain (1 - 3), Moderate Pain (4 - 6)  ALBUTerol    90 MICROgram(s) HFA Inhaler 2 Puff(s) Inhalation every 6 hours PRN wheezing  ibuprofen  Tablet. 400 milliGRAM(s) Oral two times a day PRN Moderate Pain (4 - 6)  lidocaine   Patch 1 Patch Transdermal daily PRN Pain  OLANZapine Injectable 2.5 milliGRAM(s) IntraMuscular once PRN extreme agitation  ondansetron    Tablet 4 milliGRAM(s) Oral every 6 hours PRN Nausea  oxyCODONE    IR 5 milliGRAM(s) Oral every 8 hours PRN Severe Pain (7 - 10)  QUEtiapine 12.5 milliGRAM(s) Oral every 6 hours PRN agitation      Vital Signs Last 24 Hrs  T(C): 36.8 (29 May 2021 10:44), Max: 36.8 (29 May 2021 10:44)  T(F): 98.2 (29 May 2021 10:44), Max: 98.2 (29 May 2021 10:44)  HR: 100 (29 May 2021 07:53) (100 - 100)  BP: 135/81 (29 May 2021 07:53) (135/81 - 135/81)  BP(mean): --  RR: --  SpO2: --  CAPILLARY BLOOD GLUCOSE            PHYSICAL EXAM:  GENERAL: NAD, sitting comfortably in chair  HEAD:  Atraumatic, Normocephalic  EYES: EOMI, conjunctiva and sclera clear  NECK: Supple, No JVD  CHEST/LUNG: Clear to auscultation bilaterally; No wheeze  HEART: Regular rate and rhythm; No murmurs, rubs, or gallops  ABDOMEN: Soft, Nontender, Nondistended; Bowel sounds present  EXTREMITIES:  2+ Peripheral Pulses, No clubbing, cyanosis, or edema  NEUROLOGY: non-focal  SKIN: No rashes or lesions    LABS:                        15.2   6.29  )-----------( 364      ( 29 May 2021 10:23 )             46.8                 Urinalysis Basic - ( 28 May 2021 14:57 )    Color: Yellow / Appearance: Slightly Turbid / S.012 / pH: x  Gluc: x / Ketone: Negative  / Bili: Negative / Urobili: <2 mg/dL   Blood: x / Protein: Trace / Nitrite: Negative   Leuk Esterase: Large / RBC: 2 /HPF / WBC >720 /HPF   Sq Epi: x / Non Sq Epi: 0 /HPF / Bacteria: Few        EKG(personally reviewed):    RADIOLOGY & ADDITIONAL TESTS:    Imaging Personally Reviewed:    Consultant(s) Notes Reviewed:      Care Discussed with Consultants/Other Providers: Dr. Schreiber (psych)  )

## 2021-05-29 NOTE — BH INPATIENT PSYCHIATRY PROGRESS NOTE - NSBHCHARTREVIEWVS_PSY_A_CORE FT
Vital Signs Last 24 Hrs  T(C): 36.8 (29 May 2021 10:44), Max: 36.8 (29 May 2021 10:44)  T(F): 98.2 (29 May 2021 10:44), Max: 98.2 (29 May 2021 10:44)  HR: 100 (29 May 2021 07:53) (100 - 100)  BP: 135/81 (29 May 2021 07:53) (135/81 - 135/81)

## 2021-05-29 NOTE — BH INPATIENT PSYCHIATRY PROGRESS NOTE - NSICDXBHTERTIARYDX_PSY_ALL_CORE
R/O Delirium   R41.0  R/O Cognitive disorder   F09  R/O Delirium due to general medical condition   F05  
R/O Cognitive disorder   F09  R/O Delirium due to general medical condition   F05  
R/O Delirium   R41.0  R/O Cognitive disorder   F09  R/O Delirium due to general medical condition   F05  
R/O Cognitive disorder   F09  R/O Delirium due to general medical condition   F05

## 2021-05-30 RX ORDER — LACTULOSE 10 G/15ML
10 SOLUTION ORAL ONCE
Refills: 0 | Status: COMPLETED | OUTPATIENT
Start: 2021-05-30 | End: 2021-05-30

## 2021-05-30 RX ADMIN — Medication 400 MILLIGRAM(S): at 04:55

## 2021-05-30 RX ADMIN — POLYETHYLENE GLYCOL 3350 17 GRAM(S): 17 POWDER, FOR SOLUTION ORAL at 10:08

## 2021-05-30 RX ADMIN — OXYCODONE HYDROCHLORIDE 5 MILLIGRAM(S): 5 TABLET ORAL at 12:16

## 2021-05-30 RX ADMIN — OXYCODONE HYDROCHLORIDE 5 MILLIGRAM(S): 5 TABLET ORAL at 14:50

## 2021-05-30 RX ADMIN — Medication 10 MILLIGRAM(S): at 11:00

## 2021-05-30 RX ADMIN — DULOXETINE HYDROCHLORIDE 20 MILLIGRAM(S): 30 CAPSULE, DELAYED RELEASE ORAL at 10:08

## 2021-05-30 RX ADMIN — LACTULOSE 10 GRAM(S): 10 SOLUTION ORAL at 15:48

## 2021-05-30 RX ADMIN — Medication 400 MILLIGRAM(S): at 04:22

## 2021-05-30 RX ADMIN — ALBUTEROL 2 PUFF(S): 90 AEROSOL, METERED ORAL at 17:43

## 2021-05-30 RX ADMIN — LIDOCAINE 2 PATCH: 4 CREAM TOPICAL at 10:15

## 2021-05-30 RX ADMIN — QUETIAPINE FUMARATE 12.5 MILLIGRAM(S): 200 TABLET, FILM COATED ORAL at 10:08

## 2021-05-30 RX ADMIN — Medication 81 MILLIGRAM(S): at 10:08

## 2021-05-30 RX ADMIN — Medication 100 MILLIGRAM(S): at 10:08

## 2021-05-30 RX ADMIN — Medication 0.5 MILLIGRAM(S): at 10:08

## 2021-05-30 RX ADMIN — Medication 400 MILLIGRAM(S): at 14:07

## 2021-05-30 RX ADMIN — Medication 0.5 MILLIGRAM(S): at 22:40

## 2021-05-30 RX ADMIN — QUETIAPINE FUMARATE 25 MILLIGRAM(S): 200 TABLET, FILM COATED ORAL at 22:41

## 2021-05-30 RX ADMIN — Medication 75 MICROGRAM(S): at 10:08

## 2021-05-30 RX ADMIN — ALBUTEROL 2 PUFF(S): 90 AEROSOL, METERED ORAL at 10:45

## 2021-05-30 RX ADMIN — Medication 100 MILLIGRAM(S): at 22:40

## 2021-05-30 RX ADMIN — SENNA PLUS 2 TABLET(S): 8.6 TABLET ORAL at 22:40

## 2021-05-30 RX ADMIN — Medication 400 MILLIGRAM(S): at 14:50

## 2021-05-30 RX ADMIN — ALBUTEROL 2 PUFF(S): 90 AEROSOL, METERED ORAL at 22:48

## 2021-05-30 RX ADMIN — CLOPIDOGREL BISULFATE 75 MILLIGRAM(S): 75 TABLET, FILM COATED ORAL at 10:08

## 2021-05-31 RX ADMIN — LIDOCAINE 2 PATCH: 4 CREAM TOPICAL at 10:29

## 2021-05-31 RX ADMIN — CLOPIDOGREL BISULFATE 75 MILLIGRAM(S): 75 TABLET, FILM COATED ORAL at 09:41

## 2021-05-31 RX ADMIN — Medication 81 MILLIGRAM(S): at 09:41

## 2021-05-31 RX ADMIN — QUETIAPINE FUMARATE 12.5 MILLIGRAM(S): 200 TABLET, FILM COATED ORAL at 09:39

## 2021-05-31 RX ADMIN — ONDANSETRON 4 MILLIGRAM(S): 8 TABLET, FILM COATED ORAL at 13:28

## 2021-05-31 RX ADMIN — LIDOCAINE 2 PATCH: 4 CREAM TOPICAL at 21:59

## 2021-05-31 RX ADMIN — SENNA PLUS 2 TABLET(S): 8.6 TABLET ORAL at 21:38

## 2021-05-31 RX ADMIN — Medication 100 MILLIGRAM(S): at 21:37

## 2021-05-31 RX ADMIN — Medication 75 MICROGRAM(S): at 09:41

## 2021-05-31 RX ADMIN — OXYCODONE HYDROCHLORIDE 5 MILLIGRAM(S): 5 TABLET ORAL at 18:45

## 2021-05-31 RX ADMIN — ALBUTEROL 2 PUFF(S): 90 AEROSOL, METERED ORAL at 18:50

## 2021-05-31 RX ADMIN — DULOXETINE HYDROCHLORIDE 20 MILLIGRAM(S): 30 CAPSULE, DELAYED RELEASE ORAL at 09:41

## 2021-05-31 RX ADMIN — Medication 0.5 MILLIGRAM(S): at 09:40

## 2021-05-31 RX ADMIN — QUETIAPINE FUMARATE 25 MILLIGRAM(S): 200 TABLET, FILM COATED ORAL at 21:38

## 2021-05-31 RX ADMIN — Medication 100 MILLIGRAM(S): at 09:41

## 2021-05-31 RX ADMIN — OXYCODONE HYDROCHLORIDE 5 MILLIGRAM(S): 5 TABLET ORAL at 19:05

## 2021-05-31 RX ADMIN — Medication 0.5 MILLIGRAM(S): at 21:38

## 2021-05-31 RX ADMIN — ALBUTEROL 2 PUFF(S): 90 AEROSOL, METERED ORAL at 09:41

## 2021-05-31 RX ADMIN — POLYETHYLENE GLYCOL 3350 17 GRAM(S): 17 POWDER, FOR SOLUTION ORAL at 09:41

## 2021-05-31 RX ADMIN — OXYCODONE HYDROCHLORIDE 5 MILLIGRAM(S): 5 TABLET ORAL at 02:26

## 2021-06-01 PROCEDURE — 99232 SBSQ HOSP IP/OBS MODERATE 35: CPT

## 2021-06-01 PROCEDURE — 99231 SBSQ HOSP IP/OBS SF/LOW 25: CPT

## 2021-06-01 RX ORDER — NITROFURANTOIN MACROCRYSTAL 50 MG
100 CAPSULE ORAL
Refills: 0 | Status: DISCONTINUED | OUTPATIENT
Start: 2021-06-01 | End: 2021-06-01

## 2021-06-01 RX ORDER — NITROFURANTOIN MACROCRYSTAL 50 MG
100 CAPSULE ORAL
Refills: 0 | Status: DISCONTINUED | OUTPATIENT
Start: 2021-06-01 | End: 2021-06-02

## 2021-06-01 RX ORDER — OXYCODONE HYDROCHLORIDE 5 MG/1
5 TABLET ORAL EVERY 8 HOURS
Refills: 0 | Status: DISCONTINUED | OUTPATIENT
Start: 2021-06-01 | End: 2021-06-07

## 2021-06-01 RX ORDER — CLONAZEPAM 1 MG
0.5 TABLET ORAL
Refills: 0 | Status: DISCONTINUED | OUTPATIENT
Start: 2021-06-01 | End: 2021-06-05

## 2021-06-01 RX ORDER — TIOTROPIUM BROMIDE 18 UG/1
1 CAPSULE ORAL; RESPIRATORY (INHALATION) ONCE
Refills: 0 | Status: COMPLETED | OUTPATIENT
Start: 2021-06-01 | End: 2021-06-01

## 2021-06-01 RX ORDER — BUDESONIDE AND FORMOTEROL FUMARATE DIHYDRATE 160; 4.5 UG/1; UG/1
2 AEROSOL RESPIRATORY (INHALATION)
Refills: 0 | Status: DISCONTINUED | OUTPATIENT
Start: 2021-06-01 | End: 2021-07-08

## 2021-06-01 RX ORDER — CEFPODOXIME PROXETIL 100 MG
100 TABLET ORAL EVERY 12 HOURS
Refills: 0 | Status: DISCONTINUED | OUTPATIENT
Start: 2021-06-01 | End: 2021-06-01

## 2021-06-01 RX ADMIN — LIDOCAINE 2 PATCH: 4 CREAM TOPICAL at 18:52

## 2021-06-01 RX ADMIN — Medication 400 MILLIGRAM(S): at 18:52

## 2021-06-01 RX ADMIN — POLYETHYLENE GLYCOL 3350 17 GRAM(S): 17 POWDER, FOR SOLUTION ORAL at 14:57

## 2021-06-01 RX ADMIN — CLOPIDOGREL BISULFATE 75 MILLIGRAM(S): 75 TABLET, FILM COATED ORAL at 08:52

## 2021-06-01 RX ADMIN — OXYCODONE HYDROCHLORIDE 5 MILLIGRAM(S): 5 TABLET ORAL at 12:54

## 2021-06-01 RX ADMIN — BUDESONIDE AND FORMOTEROL FUMARATE DIHYDRATE 2 PUFF(S): 160; 4.5 AEROSOL RESPIRATORY (INHALATION) at 21:29

## 2021-06-01 RX ADMIN — TIOTROPIUM BROMIDE 1 CAPSULE(S): 18 CAPSULE ORAL; RESPIRATORY (INHALATION) at 15:51

## 2021-06-01 RX ADMIN — Medication 400 MILLIGRAM(S): at 18:16

## 2021-06-01 RX ADMIN — LIDOCAINE 2 PATCH: 4 CREAM TOPICAL at 22:30

## 2021-06-01 RX ADMIN — OXYCODONE HYDROCHLORIDE 5 MILLIGRAM(S): 5 TABLET ORAL at 12:27

## 2021-06-01 RX ADMIN — Medication 75 MICROGRAM(S): at 08:52

## 2021-06-01 RX ADMIN — Medication 100 MILLIGRAM(S): at 08:52

## 2021-06-01 RX ADMIN — SENNA PLUS 2 TABLET(S): 8.6 TABLET ORAL at 21:28

## 2021-06-01 RX ADMIN — Medication 0.5 MILLIGRAM(S): at 21:29

## 2021-06-01 RX ADMIN — QUETIAPINE FUMARATE 25 MILLIGRAM(S): 200 TABLET, FILM COATED ORAL at 21:29

## 2021-06-01 RX ADMIN — Medication 81 MILLIGRAM(S): at 08:51

## 2021-06-01 RX ADMIN — LIDOCAINE 2 PATCH: 4 CREAM TOPICAL at 10:07

## 2021-06-01 RX ADMIN — ONDANSETRON 4 MILLIGRAM(S): 8 TABLET, FILM COATED ORAL at 10:07

## 2021-06-01 RX ADMIN — ALBUTEROL 2 PUFF(S): 90 AEROSOL, METERED ORAL at 08:57

## 2021-06-01 RX ADMIN — Medication 400 MILLIGRAM(S): at 04:19

## 2021-06-01 RX ADMIN — Medication 400 MILLIGRAM(S): at 05:03

## 2021-06-01 RX ADMIN — QUETIAPINE FUMARATE 12.5 MILLIGRAM(S): 200 TABLET, FILM COATED ORAL at 08:52

## 2021-06-01 RX ADMIN — DULOXETINE HYDROCHLORIDE 20 MILLIGRAM(S): 30 CAPSULE, DELAYED RELEASE ORAL at 08:52

## 2021-06-01 RX ADMIN — Medication 0.5 MILLIGRAM(S): at 08:52

## 2021-06-01 RX ADMIN — Medication 100 MILLIGRAM(S): at 21:28

## 2021-06-01 NOTE — BH INPATIENT PSYCHIATRY PROGRESS NOTE - NSBHCHARTREVIEWVS_PSY_A_CORE FT
Vital Signs Last 24 Hrs  T(C): 36.4 (01 Jun 2021 10:39), Max: 36.4 (01 Jun 2021 10:39)  T(F): 97.6 (01 Jun 2021 10:39), Max: 97.6 (01 Jun 2021 10:39)  HR: --  BP: --  BP(mean): --  RR: 18 (01 Jun 2021 08:45) (18 - 18)  SpO2: 96% (01 Jun 2021 08:45) (96% - 96%)

## 2021-06-01 NOTE — BH INPATIENT PSYCHIATRY PROGRESS NOTE - NSBHFUPINTERVALHXFT_PSY_A_CORE
Spoke with Ms. Primrose this morning at bedside. Writer introduced self as new provider. Pt Shared some of her recent history, stated that prior to coming to the hospital she felt she was "in a decline", stopped taking care of herself, stopped eating. Since arrival to the unit patient has shown some improvement, is eating better, no longer disorganized however remains somatically preoccupied. Called patient's pharmacy ([facility] at [place]), regarding her asthma medications. They stated pt only picks up her Ventolin from them and her other meds are delivered to her house, pharmacists was unable to see what those are. Reached out to her pulmonologist Dr. Ketan Morfin, he indicated that in addition to her Albuterol rescue inhaler she should be on Symbicort 160mcg, 2 puffs BID and Spiriva 1 puff QD

## 2021-06-01 NOTE — PROGRESS NOTE ADULT - ASSESSMENT
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      #Acute Cystitis  - Pt known to endorse multiple complaints, currently lower abd pain and dysuria- improving.   - given +UA (WBC > 700 +large leuk esterase) on Macrobid 100 mg BID x 5 days  - Urine cultures + Ecoli >100,000. Reviewed sensitivities with ID pharm- agree to continue Macrobid.     #Constipation  - Monitor stool count  - Continue standing miralax qdaily, c/w senna  - will monitor closely for opoid induced constipation    #CAD s/p stents  - c/w Asa and Plavix    #Hypothyroidism  - c/w Levothyroxine  - Repeat TFTs in 4 weeks as outpatient.     #MDD  - Continue care per Psychiatry

## 2021-06-01 NOTE — BH INPATIENT PSYCHIATRY PROGRESS NOTE - NSBHASSESSSUMMFT_PSY_ALL_CORE
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 Pt confused to situation however reports depressive sx, no SI/HI.    Initial collateral from daughter Estela: "Estela, who reports that these periods of confusion occur on/off for the past 10 years, and they have all been due to medication noncompliance. She states that for the past month, her mother has been isolative, nonsensical, not getting out of bed; calling her She has complained that she is not sleeping, but whenever she goes over, pt is in bed. She says that her mother has been having day/night confusion, calling people at all hours. She also is showing up for doctor's appointments when she does not have any. Yesterday, her brother received a call from their neighbor saying that the patient was walking in the middle of the street looking for her cellphone. Pt also was trying to get into random people's car."    5/22: Pt seen, chart reviewed and discussed with nursing staff. Reports feeling ok, but admits feeling anxious, depressed. When asked  about circumstances before hospitalization, she reports that she is here for get support, was not eating well.  Endorses poor sleep, low appetite, low energy. Appears disorganized, confused and hard of hearing. Denies any paranoia, SI, HI, hallucination.    5/23: Pt remains disorganized, anxious and dysphoric. On exam, Pt reports not feeling well. has left lower back pain, anxious, depressed. Endorses poor sleep, low appetite, low energy and motivation. Feels her memory is not that good. Appears dysphoric, hard of hearing but less confused. Denies any jose c, paranoia, SI, HI, hallucination.    5/24:  No events reported over the weekend. Sleep and appetite is "better". Patient denies any current AH/VH but endorsed +AH and +VH prior to admission. Patient reported feeling very depressed since being isolated due to covid, she endorsed passive SI with no intent or plan. She also reported taking less of her medications by splitting pills in half at home. Patient is out on the unit, social with peers but is still very anxious. Patient is compliant with medications, no adverse effects reported.    5/25:  No events reported over the weekend. Sleep is "not as good" and appetite is "improved". Patient reported being in significant pain in her arm and also of a headache. Patient given PRN Oxycodone dose earlier and pain management consult ordered. Spoke with patient about starting Cymbalta for depression. She was hesitant at first but then agreed. Some paranoia noted around medications. Patient also stated that she feels like she is bothering her daughter too much and did not want us to call her frequently, but daughter called hospital looking to get updates and denied telling her mother that she needs a break.    5/26: Patient seen for follow up for depression and psychosis. Chart reviewed and case discussed with interdisciplinary staff. per staff, patient is very somatic. On encounter, patient is pleasant but believes she is not getting adequate care, reports that she used to be many more medications and thinks we are missing something, insists that I speak with her pulmonologist Dr. Ketan Morfin. Otherwise, eating and sleeping well. Patient remains compliant with medications, no adverse effects reported or in evidence.    5/27:  Per staff, patient is very somatic, requesting more meds for constipation. On encounter, patient is pleasant, happy that she had PT today, complains of constipation, requesting increase in Senna, Dulcolax suppository offered and patient agress. Otherwise, eating and sleeping well.    5/28:  No significant overnight event. On encounter, patient is calm, reports that she had a small BM, reports that she has some "sinus" issues now and requests something additional for pain relief, would also like to be evaluated for UTI. Otherwise, eating and sleeping well.     5/29: No significant overnight event. On encounter, patient reports mood as "little better", still reports generalized pain including lower abdominal pain. Patient is eating and sleeping well.     6/1: No reported overnight events. On encounter pt remains somatically preoccupied, states she needs to be on additional medications for her asthma and complains of headaches. In terms of her mood pt describes feeling "bored", but states she is doing better, she is eating well and states she has been able to gain back some of the weight she had lost.     Plan:   Observation status: Routine  Psych: Cont Duloxetine 20mg QD, Seroquel 12.5mg QAM + 25mg QHS, Klonopin 0.5mg BID   Medical: Oxycodone 5mg PO Q8H PRN for pain, Macrobid (stop 6/5), Plavix, Albuterol, Symbicort and Spiriva.

## 2021-06-02 PROCEDURE — 99232 SBSQ HOSP IP/OBS MODERATE 35: CPT

## 2021-06-02 RX ORDER — DULOXETINE HYDROCHLORIDE 30 MG/1
40 CAPSULE, DELAYED RELEASE ORAL DAILY
Refills: 0 | Status: DISCONTINUED | OUTPATIENT
Start: 2021-06-02 | End: 2021-06-07

## 2021-06-02 RX ORDER — NITROFURANTOIN MACROCRYSTAL 50 MG
100 CAPSULE ORAL
Refills: 0 | Status: COMPLETED | OUTPATIENT
Start: 2021-06-02 | End: 2021-06-03

## 2021-06-02 RX ADMIN — Medication 0.5 MILLIGRAM(S): at 20:31

## 2021-06-02 RX ADMIN — DULOXETINE HYDROCHLORIDE 20 MILLIGRAM(S): 30 CAPSULE, DELAYED RELEASE ORAL at 09:00

## 2021-06-02 RX ADMIN — BUDESONIDE AND FORMOTEROL FUMARATE DIHYDRATE 2 PUFF(S): 160; 4.5 AEROSOL RESPIRATORY (INHALATION) at 09:00

## 2021-06-02 RX ADMIN — QUETIAPINE FUMARATE 25 MILLIGRAM(S): 200 TABLET, FILM COATED ORAL at 20:31

## 2021-06-02 RX ADMIN — Medication 81 MILLIGRAM(S): at 09:00

## 2021-06-02 RX ADMIN — QUETIAPINE FUMARATE 12.5 MILLIGRAM(S): 200 TABLET, FILM COATED ORAL at 09:00

## 2021-06-02 RX ADMIN — Medication 100 MILLIGRAM(S): at 20:31

## 2021-06-02 RX ADMIN — Medication 75 MICROGRAM(S): at 09:02

## 2021-06-02 RX ADMIN — Medication 0.5 MILLIGRAM(S): at 09:01

## 2021-06-02 RX ADMIN — OXYCODONE HYDROCHLORIDE 5 MILLIGRAM(S): 5 TABLET ORAL at 12:19

## 2021-06-02 RX ADMIN — SENNA PLUS 2 TABLET(S): 8.6 TABLET ORAL at 20:32

## 2021-06-02 RX ADMIN — Medication 100 MILLIGRAM(S): at 09:00

## 2021-06-02 RX ADMIN — ALBUTEROL 2 PUFF(S): 90 AEROSOL, METERED ORAL at 19:44

## 2021-06-02 RX ADMIN — POLYETHYLENE GLYCOL 3350 17 GRAM(S): 17 POWDER, FOR SOLUTION ORAL at 09:00

## 2021-06-02 RX ADMIN — CLOPIDOGREL BISULFATE 75 MILLIGRAM(S): 75 TABLET, FILM COATED ORAL at 09:02

## 2021-06-02 RX ADMIN — OXYCODONE HYDROCHLORIDE 5 MILLIGRAM(S): 5 TABLET ORAL at 11:31

## 2021-06-02 NOTE — BH INPATIENT PSYCHIATRY PROGRESS NOTE - NSBHASSESSSUMMFT_PSY_ALL_CORE
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 Pt confused to situation however reports depressive sx, no SI/HI.    Initial collateral from daughter Estela: "Estela, who reports that these periods of confusion occur on/off for the past 10 years, and they have all been due to medication noncompliance. She states that for the past month, her mother has been isolative, nonsensical, not getting out of bed; calling her She has complained that she is not sleeping, but whenever she goes over, pt is in bed. She says that her mother has been having day/night confusion, calling people at all hours. She also is showing up for doctor's appointments when she does not have any. Yesterday, her brother received a call from their neighbor saying that the patient was walking in the middle of the street looking for her cellphone. Pt also was trying to get into random people's car."    5/22: Pt seen, chart reviewed and discussed with nursing staff. Reports feeling ok, but admits feeling anxious, depressed. When asked  about circumstances before hospitalization, she reports that she is here for get support, was not eating well.  Endorses poor sleep, low appetite, low energy. Appears disorganized, confused and hard of hearing. Denies any paranoia, SI, HI, hallucination.    5/23: Pt remains disorganized, anxious and dysphoric. On exam, Pt reports not feeling well. has left lower back pain, anxious, depressed. Endorses poor sleep, low appetite, low energy and motivation. Feels her memory is not that good. Appears dysphoric, hard of hearing but less confused. Denies any jose c, paranoia, SI, HI, hallucination.    5/24:  No events reported over the weekend. Sleep and appetite is "better". Patient denies any current AH/VH but endorsed +AH and +VH prior to admission. Patient reported feeling very depressed since being isolated due to covid, she endorsed passive SI with no intent or plan. She also reported taking less of her medications by splitting pills in half at home. Patient is out on the unit, social with peers but is still very anxious. Patient is compliant with medications, no adverse effects reported.    5/25:  No events reported over the weekend. Sleep is "not as good" and appetite is "improved". Patient reported being in significant pain in her arm and also of a headache. Patient given PRN Oxycodone dose earlier and pain management consult ordered. Spoke with patient about starting Cymbalta for depression. She was hesitant at first but then agreed. Some paranoia noted around medications. Patient also stated that she feels like she is bothering her daughter too much and did not want us to call her frequently, but daughter called hospital looking to get updates and denied telling her mother that she needs a break.    5/26: Patient seen for follow up for depression and psychosis. Chart reviewed and case discussed with interdisciplinary staff. per staff, patient is very somatic. On encounter, patient is pleasant but believes she is not getting adequate care, reports that she used to be many more medications and thinks we are missing something, insists that I speak with her pulmonologist Dr. Ketan Morfin. Otherwise, eating and sleeping well. Patient remains compliant with medications, no adverse effects reported or in evidence.    5/27:  Per staff, patient is very somatic, requesting more meds for constipation. On encounter, patient is pleasant, happy that she had PT today, complains of constipation, requesting increase in Senna, Dulcolax suppository offered and patient agress. Otherwise, eating and sleeping well.    5/28:  No significant overnight event. On encounter, patient is calm, reports that she had a small BM, reports that she has some "sinus" issues now and requests something additional for pain relief, would also like to be evaluated for UTI. Otherwise, eating and sleeping well.     5/29: No significant overnight event. On encounter, patient reports mood as "little better", still reports generalized pain including lower abdominal pain. Patient is eating and sleeping well.     6/1: No reported overnight events. On encounter pt remains somatically preoccupied, states she needs to be on additional medications for her asthma and complains of headaches. In terms of her mood pt describes feeling "bored", but states she is doing better, she is eating well and states she has been able to gain back some of the weight she had lost.  6/2: Continues to endorse depression, anxiety and somatic complaints. Increased Cymbalta to 40mg QD.   6/2:      Plan:   Observation status: Routine  Psych: Cont Duloxetine 40mg QD, Seroquel 12.5mg QAM + 25mg QHS, Klonopin 0.5mg BID   Medical: Oxycodone 5mg PO Q8H PRN for pain, Macrobid (stop 6/5), Plavix, Albuterol, Symbicort and Spiriva.

## 2021-06-02 NOTE — BH INPATIENT PSYCHIATRY PROGRESS NOTE - NSBHFUPINTERVALHXFT_PSY_A_CORE
Met with Ms. Primrose this morning in the dining area. She complains about her hearing loss, stating it is making her mood worse because she feels isolated/cut off from the rest of the unit. Reports her mood is still "low" and feels depressed. She adds various somatic complaints such as constipation, nausea and headaches. Discussed that nausea is likely related to Macrobid, which is scheduled to be discontinued tomorrow. Pt verbalized understanding.

## 2021-06-02 NOTE — BH INPATIENT PSYCHIATRY PROGRESS NOTE - NSBHCHARTREVIEWVS_PSY_A_CORE FT
Vital Signs Last 24 Hrs  T(C): 37.2 (02 Jun 2021 10:55), Max: 37.2 (02 Jun 2021 10:55)  T(F): 98.9 (02 Jun 2021 10:55), Max: 98.9 (02 Jun 2021 10:55)  HR: --  BP: 135/84 (02 Jun 2021 07:53) (135/84 - 135/84)  BP(mean): 98 (02 Jun 2021 07:53) (98 - 98)  RR: --  SpO2: --

## 2021-06-02 NOTE — BH INPATIENT PSYCHIATRY PROGRESS NOTE - CASE SUMMARY
Agree with above also though would increase Cymbalta Agree with assessment and plan of resident will contact her pain management team on outside

## 2021-06-03 PROCEDURE — 99232 SBSQ HOSP IP/OBS MODERATE 35: CPT

## 2021-06-03 RX ORDER — QUETIAPINE FUMARATE 200 MG/1
50 TABLET, FILM COATED ORAL AT BEDTIME
Refills: 0 | Status: DISCONTINUED | OUTPATIENT
Start: 2021-06-03 | End: 2021-06-04

## 2021-06-03 RX ADMIN — ONDANSETRON 4 MILLIGRAM(S): 8 TABLET, FILM COATED ORAL at 11:10

## 2021-06-03 RX ADMIN — Medication 0.5 MILLIGRAM(S): at 08:25

## 2021-06-03 RX ADMIN — Medication 100 MILLIGRAM(S): at 08:26

## 2021-06-03 RX ADMIN — POLYETHYLENE GLYCOL 3350 17 GRAM(S): 17 POWDER, FOR SOLUTION ORAL at 08:25

## 2021-06-03 RX ADMIN — SENNA PLUS 2 TABLET(S): 8.6 TABLET ORAL at 20:10

## 2021-06-03 RX ADMIN — OXYCODONE HYDROCHLORIDE 5 MILLIGRAM(S): 5 TABLET ORAL at 15:47

## 2021-06-03 RX ADMIN — CLOPIDOGREL BISULFATE 75 MILLIGRAM(S): 75 TABLET, FILM COATED ORAL at 08:27

## 2021-06-03 RX ADMIN — BUDESONIDE AND FORMOTEROL FUMARATE DIHYDRATE 2 PUFF(S): 160; 4.5 AEROSOL RESPIRATORY (INHALATION) at 08:26

## 2021-06-03 RX ADMIN — LIDOCAINE 2 PATCH: 4 CREAM TOPICAL at 15:49

## 2021-06-03 RX ADMIN — Medication 400 MILLIGRAM(S): at 12:59

## 2021-06-03 RX ADMIN — QUETIAPINE FUMARATE 50 MILLIGRAM(S): 200 TABLET, FILM COATED ORAL at 20:10

## 2021-06-03 RX ADMIN — DULOXETINE HYDROCHLORIDE 40 MILLIGRAM(S): 30 CAPSULE, DELAYED RELEASE ORAL at 08:27

## 2021-06-03 RX ADMIN — Medication 75 MICROGRAM(S): at 08:27

## 2021-06-03 RX ADMIN — Medication 650 MILLIGRAM(S): at 03:41

## 2021-06-03 RX ADMIN — Medication 0.5 MILLIGRAM(S): at 20:10

## 2021-06-03 RX ADMIN — QUETIAPINE FUMARATE 12.5 MILLIGRAM(S): 200 TABLET, FILM COATED ORAL at 08:26

## 2021-06-03 RX ADMIN — BUDESONIDE AND FORMOTEROL FUMARATE DIHYDRATE 2 PUFF(S): 160; 4.5 AEROSOL RESPIRATORY (INHALATION) at 20:10

## 2021-06-03 RX ADMIN — Medication 650 MILLIGRAM(S): at 00:17

## 2021-06-03 RX ADMIN — Medication 81 MILLIGRAM(S): at 08:25

## 2021-06-03 NOTE — BH INPATIENT PSYCHIATRY PROGRESS NOTE - NSBHFUPINTERVALHXFT_PSY_A_CORE
Met with Ms. Primrose this morning in the dayroom. Pt reports feeling dizzy and unsteady on her feet early this morning. Checked her vitals later in the morning, she was not orthostatic at that point. She continues to verbalize multiple somatic complaints such as nausea and later reflux. Continues to endorse difficulty sleeping. Eliminated morning dose of seroquel 12.5mg due to patient's reports of morning dizziness/lightheadedness and increased her nighttime dose to 50mg.

## 2021-06-03 NOTE — BH INPATIENT PSYCHIATRY PROGRESS NOTE - NSBHASSESSSUMMFT_PSY_ALL_CORE
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 Pt confused to situation however reports depressive sx, no SI/HI.    Initial collateral from daughter Estela: "Estela, who reports that these periods of confusion occur on/off for the past 10 years, and they have all been due to medication noncompliance. She states that for the past month, her mother has been isolative, nonsensical, not getting out of bed; calling her She has complained that she is not sleeping, but whenever she goes over, pt is in bed. She says that her mother has been having day/night confusion, calling people at all hours. She also is showing up for doctor's appointments when she does not have any. Yesterday, her brother received a call from their neighbor saying that the patient was walking in the middle of the street looking for her cellphone. Pt also was trying to get into random people's car."    5/22: Pt seen, chart reviewed and discussed with nursing staff. Reports feeling ok, but admits feeling anxious, depressed. When asked  about circumstances before hospitalization, she reports that she is here for get support, was not eating well.  Endorses poor sleep, low appetite, low energy. Appears disorganized, confused and hard of hearing. Denies any paranoia, SI, HI, hallucination.    5/23: Pt remains disorganized, anxious and dysphoric. On exam, Pt reports not feeling well. has left lower back pain, anxious, depressed. Endorses poor sleep, low appetite, low energy and motivation. Feels her memory is not that good. Appears dysphoric, hard of hearing but less confused. Denies any jose c, paranoia, SI, HI, hallucination.    5/24:  No events reported over the weekend. Sleep and appetite is "better". Patient denies any current AH/VH but endorsed +AH and +VH prior to admission. Patient reported feeling very depressed since being isolated due to covid, she endorsed passive SI with no intent or plan. She also reported taking less of her medications by splitting pills in half at home. Patient is out on the unit, social with peers but is still very anxious. Patient is compliant with medications, no adverse effects reported.    5/25:  No events reported over the weekend. Sleep is "not as good" and appetite is "improved". Patient reported being in significant pain in her arm and also of a headache. Patient given PRN Oxycodone dose earlier and pain management consult ordered. Spoke with patient about starting Cymbalta for depression. She was hesitant at first but then agreed. Some paranoia noted around medications. Patient also stated that she feels like she is bothering her daughter too much and did not want us to call her frequently, but daughter called hospital looking to get updates and denied telling her mother that she needs a break.    5/26: Patient seen for follow up for depression and psychosis. Chart reviewed and case discussed with interdisciplinary staff. per staff, patient is very somatic. On encounter, patient is pleasant but believes she is not getting adequate care, reports that she used to be many more medications and thinks we are missing something, insists that I speak with her pulmonologist Dr. Ketan Morfin. Otherwise, eating and sleeping well. Patient remains compliant with medications, no adverse effects reported or in evidence.    5/27:  Per staff, patient is very somatic, requesting more meds for constipation. On encounter, patient is pleasant, happy that she had PT today, complains of constipation, requesting increase in Senna, Dulcolax suppository offered and patient agress. Otherwise, eating and sleeping well.    5/28:  No significant overnight event. On encounter, patient is calm, reports that she had a small BM, reports that she has some "sinus" issues now and requests something additional for pain relief, would also like to be evaluated for UTI. Otherwise, eating and sleeping well.     5/29: No significant overnight event. On encounter, patient reports mood as "little better", still reports generalized pain including lower abdominal pain. Patient is eating and sleeping well.     6/1: No reported overnight events. On encounter pt remains somatically preoccupied, states she needs to be on additional medications for her asthma and complains of headaches. In terms of her mood pt describes feeling "bored", but states she is doing better, she is eating well and states she has been able to gain back some of the weight she had lost.  6/2: Continues to endorse depression, anxiety and somatic complaints. Increased Cymbalta to 40mg QD.   6/3:  Met with Ms. Primrose this morning in the dayroom. Pt reports feeling dizzy and unsteady on her feet early this morning. Checked her vitals later in the morning, she was not orthostatic at that point. She continues to verbalize multiple somatic complaints such as nausea and later reflux. Continues to endorse difficulty sleeping. Eliminated morning dose of seroquel 12.5mg due to patient's reports of morning dizziness/lightheadedness and increased her nighttime dose to 50mg. Signed conversion to voluntary status (9.13)    Plan:   Observation status: Routine  Psych: Cont Duloxetine 40mg QD, Seroquel 50mg QHS, Klonopin 0.5mg BID   Medical: Oxycodone 5mg PO Q8H PRN for pain, Plavix, Albuterol, Symbicort and Spiriva.

## 2021-06-03 NOTE — BH INPATIENT PSYCHIATRY PROGRESS NOTE - NSBHCHARTREVIEWVS_PSY_A_CORE FT
Vital Signs Last 24 Hrs  T(C): 36.5 (03 Jun 2021 10:19), Max: 36.9 (02 Jun 2021 20:36)  T(F): 97.7 (03 Jun 2021 10:19), Max: 98.4 (02 Jun 2021 20:36)  HR: --  BP: --  BP(mean): --  RR: --  SpO2: --

## 2021-06-03 NOTE — BH INPATIENT PSYCHIATRY PROGRESS NOTE - CASE SUMMARY
Agree with assessment and plan of resident will contact her pain management team on outside Agree with plan to titrate Cymbalta

## 2021-06-04 LAB
ANION GAP SERPL CALC-SCNC: 12 MMOL/L — SIGNIFICANT CHANGE UP (ref 7–14)
BUN SERPL-MCNC: 14 MG/DL — SIGNIFICANT CHANGE UP (ref 7–23)
CALCIUM SERPL-MCNC: 8.9 MG/DL — SIGNIFICANT CHANGE UP (ref 8.4–10.5)
CHLORIDE SERPL-SCNC: 103 MMOL/L — SIGNIFICANT CHANGE UP (ref 98–107)
CO2 SERPL-SCNC: 26 MMOL/L — SIGNIFICANT CHANGE UP (ref 22–31)
CREAT SERPL-MCNC: 0.66 MG/DL — SIGNIFICANT CHANGE UP (ref 0.5–1.3)
GLUCOSE SERPL-MCNC: 135 MG/DL — HIGH (ref 70–99)
MAGNESIUM SERPL-MCNC: 2.1 MG/DL — SIGNIFICANT CHANGE UP (ref 1.6–2.6)
PHOSPHATE SERPL-MCNC: 2.4 MG/DL — LOW (ref 2.5–4.5)
POTASSIUM SERPL-MCNC: 4.6 MMOL/L — SIGNIFICANT CHANGE UP (ref 3.5–5.3)
POTASSIUM SERPL-SCNC: 4.6 MMOL/L — SIGNIFICANT CHANGE UP (ref 3.5–5.3)
SODIUM SERPL-SCNC: 141 MMOL/L — SIGNIFICANT CHANGE UP (ref 135–145)

## 2021-06-04 PROCEDURE — 99232 SBSQ HOSP IP/OBS MODERATE 35: CPT

## 2021-06-04 PROCEDURE — 90832 PSYTX W PT 30 MINUTES: CPT

## 2021-06-04 RX ORDER — QUETIAPINE FUMARATE 200 MG/1
75 TABLET, FILM COATED ORAL AT BEDTIME
Refills: 0 | Status: DISCONTINUED | OUTPATIENT
Start: 2021-06-04 | End: 2021-06-09

## 2021-06-04 RX ORDER — CALCIUM CARBONATE 500(1250)
1 TABLET ORAL EVERY 8 HOURS
Refills: 0 | Status: DISCONTINUED | OUTPATIENT
Start: 2021-06-04 | End: 2021-07-08

## 2021-06-04 RX ORDER — CALCIUM CARBONATE 500(1250)
1 TABLET ORAL EVERY 6 HOURS
Refills: 0 | Status: DISCONTINUED | OUTPATIENT
Start: 2021-06-04 | End: 2021-06-04

## 2021-06-04 RX ORDER — LEVOTHYROXINE SODIUM 125 MCG
75 TABLET ORAL
Refills: 0 | Status: DISCONTINUED | OUTPATIENT
Start: 2021-06-04 | End: 2021-07-08

## 2021-06-04 RX ADMIN — BUDESONIDE AND FORMOTEROL FUMARATE DIHYDRATE 2 PUFF(S): 160; 4.5 AEROSOL RESPIRATORY (INHALATION) at 08:46

## 2021-06-04 RX ADMIN — LIDOCAINE 2 PATCH: 4 CREAM TOPICAL at 11:13

## 2021-06-04 RX ADMIN — POLYETHYLENE GLYCOL 3350 17 GRAM(S): 17 POWDER, FOR SOLUTION ORAL at 08:41

## 2021-06-04 RX ADMIN — BUDESONIDE AND FORMOTEROL FUMARATE DIHYDRATE 2 PUFF(S): 160; 4.5 AEROSOL RESPIRATORY (INHALATION) at 21:18

## 2021-06-04 RX ADMIN — LIDOCAINE 2 PATCH: 4 CREAM TOPICAL at 18:47

## 2021-06-04 RX ADMIN — OXYCODONE HYDROCHLORIDE 5 MILLIGRAM(S): 5 TABLET ORAL at 23:31

## 2021-06-04 RX ADMIN — Medication 0.5 MILLIGRAM(S): at 21:18

## 2021-06-04 RX ADMIN — OXYCODONE HYDROCHLORIDE 5 MILLIGRAM(S): 5 TABLET ORAL at 11:45

## 2021-06-04 RX ADMIN — Medication 81 MILLIGRAM(S): at 08:41

## 2021-06-04 RX ADMIN — QUETIAPINE FUMARATE 75 MILLIGRAM(S): 200 TABLET, FILM COATED ORAL at 21:20

## 2021-06-04 RX ADMIN — CLOPIDOGREL BISULFATE 75 MILLIGRAM(S): 75 TABLET, FILM COATED ORAL at 08:42

## 2021-06-04 RX ADMIN — Medication 75 MICROGRAM(S): at 08:43

## 2021-06-04 RX ADMIN — Medication 0.5 MILLIGRAM(S): at 08:41

## 2021-06-04 RX ADMIN — DULOXETINE HYDROCHLORIDE 40 MILLIGRAM(S): 30 CAPSULE, DELAYED RELEASE ORAL at 08:41

## 2021-06-04 RX ADMIN — LIDOCAINE 2 PATCH: 4 CREAM TOPICAL at 23:25

## 2021-06-04 RX ADMIN — OXYCODONE HYDROCHLORIDE 5 MILLIGRAM(S): 5 TABLET ORAL at 11:13

## 2021-06-04 NOTE — BH INPATIENT PSYCHIATRY PROGRESS NOTE - NSBHASSESSSUMMFT_PSY_ALL_CORE
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 Pt confused to situation however reports depressive sx, no SI/HI.    Initial collateral from daughter Estela: "Estela, who reports that these periods of confusion occur on/off for the past 10 years, and they have all been due to medication noncompliance. She states that for the past month, her mother has been isolative, nonsensical, not getting out of bed; calling her She has complained that she is not sleeping, but whenever she goes over, pt is in bed. She says that her mother has been having day/night confusion, calling people at all hours. She also is showing up for doctor's appointments when she does not have any. Yesterday, her brother received a call from their neighbor saying that the patient was walking in the middle of the street looking for her cellphone. Pt also was trying to get into random people's car."    5/22: Pt seen, chart reviewed and discussed with nursing staff. Reports feeling ok, but admits feeling anxious, depressed. When asked  about circumstances before hospitalization, she reports that she is here for get support, was not eating well.  Endorses poor sleep, low appetite, low energy. Appears disorganized, confused and hard of hearing. Denies any paranoia, SI, HI, hallucination.    5/23: Pt remains disorganized, anxious and dysphoric. On exam, Pt reports not feeling well. has left lower back pain, anxious, depressed. Endorses poor sleep, low appetite, low energy and motivation. Feels her memory is not that good. Appears dysphoric, hard of hearing but less confused. Denies any jose c, paranoia, SI, HI, hallucination.    5/24:  No events reported over the weekend. Sleep and appetite is "better". Patient denies any current AH/VH but endorsed +AH and +VH prior to admission. Patient reported feeling very depressed since being isolated due to covid, she endorsed passive SI with no intent or plan. She also reported taking less of her medications by splitting pills in half at home. Patient is out on the unit, social with peers but is still very anxious. Patient is compliant with medications, no adverse effects reported.    5/25:  No events reported over the weekend. Sleep is "not as good" and appetite is "improved". Patient reported being in significant pain in her arm and also of a headache. Patient given PRN Oxycodone dose earlier and pain management consult ordered. Spoke with patient about starting Cymbalta for depression. She was hesitant at first but then agreed. Some paranoia noted around medications. Patient also stated that she feels like she is bothering her daughter too much and did not want us to call her frequently, but daughter called hospital looking to get updates and denied telling her mother that she needs a break.    5/26: Patient seen for follow up for depression and psychosis. Chart reviewed and case discussed with interdisciplinary staff. per staff, patient is very somatic. On encounter, patient is pleasant but believes she is not getting adequate care, reports that she used to be many more medications and thinks we are missing something, insists that I speak with her pulmonologist Dr. Ketan Morfin. Otherwise, eating and sleeping well. Patient remains compliant with medications, no adverse effects reported or in evidence.    5/27:  Per staff, patient is very somatic, requesting more meds for constipation. On encounter, patient is pleasant, happy that she had PT today, complains of constipation, requesting increase in Senna, Dulcolax suppository offered and patient agress. Otherwise, eating and sleeping well.    5/28:  No significant overnight event. On encounter, patient is calm, reports that she had a small BM, reports that she has some "sinus" issues now and requests something additional for pain relief, would also like to be evaluated for UTI. Otherwise, eating and sleeping well.     5/29: No significant overnight event. On encounter, patient reports mood as "little better", still reports generalized pain including lower abdominal pain. Patient is eating and sleeping well.     6/1: No reported overnight events. On encounter pt remains somatically preoccupied, states she needs to be on additional medications for her asthma and complains of headaches. In terms of her mood pt describes feeling "bored", but states she is doing better, she is eating well and states she has been able to gain back some of the weight she had lost.  6/2: Continues to endorse depression, anxiety and somatic complaints. Increased Cymbalta to 40mg QD.   6/3:  Met with Ms. Primrose this morning in the dayroom. Pt reports feeling dizzy and unsteady on her feet early this morning. Checked her vitals later in the morning, she was not orthostatic at that point. She continues to verbalize multiple somatic complaints such as nausea and later reflux. Continues to endorse difficulty sleeping. Eliminated morning dose of seroquel 12.5mg due to patient's reports of morning dizziness/lightheadedness and increased her nighttime dose to 50mg. Signed conversion to voluntary status (9.13)  6/4: Pt continues to endorse several somatic complaints such as GERD and cramping in her legs. Reports her sleep was slightly better last night. She requests to speak to a therapist, reached out to Dr. Newman who agreed to meet with pt today. Called patient's daughter in order to discuss treatment plan and explore options to improve pill management at home but she was unavailable. Left a message. Increased Seroquel to 75mg QHS.     Plan:   Observation status: Routine  Psych: Cont Duloxetine 40mg QD, Seroquel 75mg QHS, Klonopin 0.5mg BID   Medical: Oxycodone 5mg PO Q8H PRN for pain, Plavix, Albuterol, Symbicort and Spiriva.

## 2021-06-04 NOTE — BH INPATIENT PSYCHIATRY PROGRESS NOTE - NSBHFUPINTERVALHXFT_PSY_A_CORE
Met with Ms. Primrose this morning in the dayroom. Pt continues to endorse several somatic complaints such as GERD and cramping in her legs. She requests to speak to a therapist, reached out to Dr. Newman who agreed to meet with pt today. Called patient's daughter in order to discuss treatment plan and explore options to improve pill management at home but she was unavailable. Left a message.

## 2021-06-04 NOTE — BH INPATIENT PSYCHIATRY PROGRESS NOTE - CASE SUMMARY
Patient improving has isolated misidentification of roommate as neighbor , will monitor, try to reduce BDZ possibly contributing to cog dysfunction

## 2021-06-05 RX ORDER — CLONAZEPAM 1 MG
0.5 TABLET ORAL
Refills: 0 | Status: DISCONTINUED | OUTPATIENT
Start: 2021-06-05 | End: 2021-06-10

## 2021-06-05 RX ADMIN — BUDESONIDE AND FORMOTEROL FUMARATE DIHYDRATE 2 PUFF(S): 160; 4.5 AEROSOL RESPIRATORY (INHALATION) at 21:29

## 2021-06-05 RX ADMIN — BUDESONIDE AND FORMOTEROL FUMARATE DIHYDRATE 2 PUFF(S): 160; 4.5 AEROSOL RESPIRATORY (INHALATION) at 08:49

## 2021-06-05 RX ADMIN — OXYCODONE HYDROCHLORIDE 5 MILLIGRAM(S): 5 TABLET ORAL at 11:45

## 2021-06-05 RX ADMIN — LIDOCAINE 2 PATCH: 4 CREAM TOPICAL at 21:00

## 2021-06-05 RX ADMIN — Medication 0.5 MILLIGRAM(S): at 08:46

## 2021-06-05 RX ADMIN — Medication 75 MICROGRAM(S): at 06:49

## 2021-06-05 RX ADMIN — CLOPIDOGREL BISULFATE 75 MILLIGRAM(S): 75 TABLET, FILM COATED ORAL at 08:46

## 2021-06-05 RX ADMIN — Medication 81 MILLIGRAM(S): at 08:46

## 2021-06-05 RX ADMIN — POLYETHYLENE GLYCOL 3350 17 GRAM(S): 17 POWDER, FOR SOLUTION ORAL at 08:47

## 2021-06-05 RX ADMIN — QUETIAPINE FUMARATE 12.5 MILLIGRAM(S): 200 TABLET, FILM COATED ORAL at 15:50

## 2021-06-05 RX ADMIN — ALBUTEROL 2 PUFF(S): 90 AEROSOL, METERED ORAL at 15:45

## 2021-06-05 RX ADMIN — OXYCODONE HYDROCHLORIDE 5 MILLIGRAM(S): 5 TABLET ORAL at 11:27

## 2021-06-05 RX ADMIN — LIDOCAINE 2 PATCH: 4 CREAM TOPICAL at 08:53

## 2021-06-05 RX ADMIN — QUETIAPINE FUMARATE 75 MILLIGRAM(S): 200 TABLET, FILM COATED ORAL at 21:29

## 2021-06-05 RX ADMIN — LIDOCAINE 2 PATCH: 4 CREAM TOPICAL at 19:35

## 2021-06-05 RX ADMIN — Medication 0.5 MILLIGRAM(S): at 21:28

## 2021-06-05 RX ADMIN — SENNA PLUS 2 TABLET(S): 8.6 TABLET ORAL at 21:29

## 2021-06-05 RX ADMIN — DULOXETINE HYDROCHLORIDE 40 MILLIGRAM(S): 30 CAPSULE, DELAYED RELEASE ORAL at 08:46

## 2021-06-05 RX ADMIN — OXYCODONE HYDROCHLORIDE 5 MILLIGRAM(S): 5 TABLET ORAL at 00:30

## 2021-06-06 RX ADMIN — CLOPIDOGREL BISULFATE 75 MILLIGRAM(S): 75 TABLET, FILM COATED ORAL at 09:44

## 2021-06-06 RX ADMIN — OXYCODONE HYDROCHLORIDE 5 MILLIGRAM(S): 5 TABLET ORAL at 11:00

## 2021-06-06 RX ADMIN — BUDESONIDE AND FORMOTEROL FUMARATE DIHYDRATE 2 PUFF(S): 160; 4.5 AEROSOL RESPIRATORY (INHALATION) at 21:17

## 2021-06-06 RX ADMIN — DULOXETINE HYDROCHLORIDE 40 MILLIGRAM(S): 30 CAPSULE, DELAYED RELEASE ORAL at 09:44

## 2021-06-06 RX ADMIN — LIDOCAINE 2 PATCH: 4 CREAM TOPICAL at 22:02

## 2021-06-06 RX ADMIN — Medication 0.5 MILLIGRAM(S): at 21:17

## 2021-06-06 RX ADMIN — Medication 75 MICROGRAM(S): at 06:57

## 2021-06-06 RX ADMIN — QUETIAPINE FUMARATE 75 MILLIGRAM(S): 200 TABLET, FILM COATED ORAL at 21:16

## 2021-06-06 RX ADMIN — SENNA PLUS 2 TABLET(S): 8.6 TABLET ORAL at 21:17

## 2021-06-06 RX ADMIN — LIDOCAINE 2 PATCH: 4 CREAM TOPICAL at 09:45

## 2021-06-06 RX ADMIN — OXYCODONE HYDROCHLORIDE 5 MILLIGRAM(S): 5 TABLET ORAL at 10:32

## 2021-06-06 RX ADMIN — POLYETHYLENE GLYCOL 3350 17 GRAM(S): 17 POWDER, FOR SOLUTION ORAL at 09:45

## 2021-06-06 RX ADMIN — OXYCODONE HYDROCHLORIDE 5 MILLIGRAM(S): 5 TABLET ORAL at 21:17

## 2021-06-06 RX ADMIN — BUDESONIDE AND FORMOTEROL FUMARATE DIHYDRATE 2 PUFF(S): 160; 4.5 AEROSOL RESPIRATORY (INHALATION) at 09:44

## 2021-06-06 RX ADMIN — Medication 650 MILLIGRAM(S): at 16:00

## 2021-06-06 RX ADMIN — Medication 0.5 MILLIGRAM(S): at 09:44

## 2021-06-06 RX ADMIN — LIDOCAINE 2 PATCH: 4 CREAM TOPICAL at 19:05

## 2021-06-06 RX ADMIN — Medication 650 MILLIGRAM(S): at 15:21

## 2021-06-06 RX ADMIN — ALBUTEROL 2 PUFF(S): 90 AEROSOL, METERED ORAL at 18:00

## 2021-06-06 RX ADMIN — Medication 81 MILLIGRAM(S): at 09:44

## 2021-06-07 PROCEDURE — 90834 PSYTX W PT 45 MINUTES: CPT

## 2021-06-07 PROCEDURE — 99232 SBSQ HOSP IP/OBS MODERATE 35: CPT

## 2021-06-07 RX ORDER — DULOXETINE HYDROCHLORIDE 30 MG/1
60 CAPSULE, DELAYED RELEASE ORAL DAILY
Refills: 0 | Status: DISCONTINUED | OUTPATIENT
Start: 2021-06-07 | End: 2021-06-10

## 2021-06-07 RX ORDER — OXYCODONE HYDROCHLORIDE 5 MG/1
5 TABLET ORAL EVERY 8 HOURS
Refills: 0 | Status: DISCONTINUED | OUTPATIENT
Start: 2021-06-07 | End: 2021-06-14

## 2021-06-07 RX ADMIN — Medication 75 MICROGRAM(S): at 06:56

## 2021-06-07 RX ADMIN — OXYCODONE HYDROCHLORIDE 5 MILLIGRAM(S): 5 TABLET ORAL at 13:37

## 2021-06-07 RX ADMIN — OXYCODONE HYDROCHLORIDE 5 MILLIGRAM(S): 5 TABLET ORAL at 14:10

## 2021-06-07 RX ADMIN — POLYETHYLENE GLYCOL 3350 17 GRAM(S): 17 POWDER, FOR SOLUTION ORAL at 08:52

## 2021-06-07 RX ADMIN — CLOPIDOGREL BISULFATE 75 MILLIGRAM(S): 75 TABLET, FILM COATED ORAL at 08:53

## 2021-06-07 RX ADMIN — Medication 0.5 MILLIGRAM(S): at 08:59

## 2021-06-07 RX ADMIN — SENNA PLUS 2 TABLET(S): 8.6 TABLET ORAL at 20:29

## 2021-06-07 RX ADMIN — BUDESONIDE AND FORMOTEROL FUMARATE DIHYDRATE 2 PUFF(S): 160; 4.5 AEROSOL RESPIRATORY (INHALATION) at 08:53

## 2021-06-07 RX ADMIN — QUETIAPINE FUMARATE 75 MILLIGRAM(S): 200 TABLET, FILM COATED ORAL at 20:29

## 2021-06-07 RX ADMIN — LIDOCAINE 2 PATCH: 4 CREAM TOPICAL at 22:27

## 2021-06-07 RX ADMIN — LIDOCAINE 2 PATCH: 4 CREAM TOPICAL at 10:20

## 2021-06-07 RX ADMIN — LIDOCAINE 2 PATCH: 4 CREAM TOPICAL at 19:29

## 2021-06-07 RX ADMIN — Medication 1 TABLET(S): at 11:44

## 2021-06-07 RX ADMIN — ALBUTEROL 2 PUFF(S): 90 AEROSOL, METERED ORAL at 20:45

## 2021-06-07 RX ADMIN — BUDESONIDE AND FORMOTEROL FUMARATE DIHYDRATE 2 PUFF(S): 160; 4.5 AEROSOL RESPIRATORY (INHALATION) at 20:28

## 2021-06-07 RX ADMIN — Medication 81 MILLIGRAM(S): at 08:53

## 2021-06-07 RX ADMIN — Medication 0.5 MILLIGRAM(S): at 20:29

## 2021-06-07 RX ADMIN — DULOXETINE HYDROCHLORIDE 40 MILLIGRAM(S): 30 CAPSULE, DELAYED RELEASE ORAL at 08:53

## 2021-06-07 NOTE — BH INPATIENT PSYCHIATRY PROGRESS NOTE - NSBHFUPINTERVALHXFT_PSY_A_CORE
Met with Ms. Primrose this morning in a common area. She reports feeling a little low over the weekend and fearing "Im headed back to where I started". Provided reassurance that some mood fluctuations are normal and do not necessarily mean she has lost all the progress she has made. Pt complained of headaches today which are suggestive of tension headaches by her description of the distribution. Appears less somatically preoccupied. Reports sleeping better. Will f/u with daughter regarding ways to monitor pt's pill intake and use at home.

## 2021-06-07 NOTE — BH INPATIENT PSYCHIATRY PROGRESS NOTE - NSBHCHARTREVIEWVS_PSY_A_CORE FT
Vital Signs Last 24 Hrs  T(C): 36.9 (07 Jun 2021 06:51), Max: 36.9 (07 Jun 2021 06:51)  T(F): 98.5 (07 Jun 2021 06:51), Max: 98.5 (07 Jun 2021 06:51)  HR: 101 (07 Jun 2021 07:59) (101 - 101)  BP: 125/99 (07 Jun 2021 07:59) (125/99 - 125/99)  BP(mean): --  RR: --  SpO2: --

## 2021-06-07 NOTE — BH INPATIENT PSYCHIATRY PROGRESS NOTE - NSBHASSESSSUMMFT_PSY_ALL_CORE
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 Pt confused to situation however reports depressive sx, no SI/HI.    Initial collateral from daughter Estela: "Estela, who reports that these periods of confusion occur on/off for the past 10 years, and they have all been due to medication noncompliance. She states that for the past month, her mother has been isolative, nonsensical, not getting out of bed; calling her She has complained that she is not sleeping, but whenever she goes over, pt is in bed. She says that her mother has been having day/night confusion, calling people at all hours. She also is showing up for doctor's appointments when she does not have any. Yesterday, her brother received a call from their neighbor saying that the patient was walking in the middle of the street looking for her cellphone. Pt also was trying to get into random people's car."    5/22: Pt seen, chart reviewed and discussed with nursing staff. Reports feeling ok, but admits feeling anxious, depressed. When asked  about circumstances before hospitalization, she reports that she is here for get support, was not eating well.  Endorses poor sleep, low appetite, low energy. Appears disorganized, confused and hard of hearing. Denies any paranoia, SI, HI, hallucination.    5/23: Pt remains disorganized, anxious and dysphoric. On exam, Pt reports not feeling well. has left lower back pain, anxious, depressed. Endorses poor sleep, low appetite, low energy and motivation. Feels her memory is not that good. Appears dysphoric, hard of hearing but less confused. Denies any jose c, paranoia, SI, HI, hallucination.    5/24:  No events reported over the weekend. Sleep and appetite is "better". Patient denies any current AH/VH but endorsed +AH and +VH prior to admission. Patient reported feeling very depressed since being isolated due to covid, she endorsed passive SI with no intent or plan. She also reported taking less of her medications by splitting pills in half at home. Patient is out on the unit, social with peers but is still very anxious. Patient is compliant with medications, no adverse effects reported.    5/25:  No events reported over the weekend. Sleep is "not as good" and appetite is "improved". Patient reported being in significant pain in her arm and also of a headache. Patient given PRN Oxycodone dose earlier and pain management consult ordered. Spoke with patient about starting Cymbalta for depression. She was hesitant at first but then agreed. Some paranoia noted around medications. Patient also stated that she feels like she is bothering her daughter too much and did not want us to call her frequently, but daughter called hospital looking to get updates and denied telling her mother that she needs a break.    5/26: Patient seen for follow up for depression and psychosis. Chart reviewed and case discussed with interdisciplinary staff. per staff, patient is very somatic. On encounter, patient is pleasant but believes she is not getting adequate care, reports that she used to be many more medications and thinks we are missing something, insists that I speak with her pulmonologist Dr. Ketan Morfin. Otherwise, eating and sleeping well. Patient remains compliant with medications, no adverse effects reported or in evidence.    5/27:  Per staff, patient is very somatic, requesting more meds for constipation. On encounter, patient is pleasant, happy that she had PT today, complains of constipation, requesting increase in Senna, Dulcolax suppository offered and patient agress. Otherwise, eating and sleeping well.    5/28:  No significant overnight event. On encounter, patient is calm, reports that she had a small BM, reports that she has some "sinus" issues now and requests something additional for pain relief, would also like to be evaluated for UTI. Otherwise, eating and sleeping well.     5/29: No significant overnight event. On encounter, patient reports mood as "little better", still reports generalized pain including lower abdominal pain. Patient is eating and sleeping well.     6/1: No reported overnight events. On encounter pt remains somatically preoccupied, states she needs to be on additional medications for her asthma and complains of headaches. In terms of her mood pt describes feeling "bored", but states she is doing better, she is eating well and states she has been able to gain back some of the weight she had lost.  6/2: Continues to endorse depression, anxiety and somatic complaints. Increased Cymbalta to 40mg QD.   6/3:  Met with Ms. Primrose this morning in the dayroom. Pt reports feeling dizzy and unsteady on her feet early this morning. Checked her vitals later in the morning, she was not orthostatic at that point. She continues to verbalize multiple somatic complaints such as nausea and later reflux. Continues to endorse difficulty sleeping. Eliminated morning dose of seroquel 12.5mg due to patient's reports of morning dizziness/lightheadedness and increased her nighttime dose to 50mg. Signed conversion to voluntary status (9.13)  6/4: Pt continues to endorse several somatic complaints such as GERD and cramping in her legs. Reports her sleep was slightly better last night. She requests to speak to a therapist, reached out to Dr. Newman who agreed to meet with pt today. Called patient's daughter in order to discuss treatment plan and explore options to improve pill management at home but she was unavailable. Left a message. Increased Seroquel to 75mg QHS.   6/7: Met with Ms. Primrose this morning in a common area. She reports feeling a little low over the weekend and fearing "Im headed back to where I started". Provided reassurance that some mood fluctuations are normal and do not necessarily mean she has lost all the progress she has made. Pt complained of headaches today which are suggestive of tension headaches by her description of the distribution. Appears less somatically preoccupied. Reports sleeping better. Will f/u with daughter regarding ways to monitor pt's pill intake and use at home.     Plan:   Observation status: Routine  Psych: Increased Duloxetine to 60mg QD, cont Seroquel 75mg QHS, cont Klonopin 0.5mg BID   Medical: Oxycodone 5mg PO Q8H PRN for pain, Plavix, Albuterol, Symbicort and Spiriva.

## 2021-06-08 PROCEDURE — 99232 SBSQ HOSP IP/OBS MODERATE 35: CPT

## 2021-06-08 RX ORDER — QUETIAPINE FUMARATE 200 MG/1
75 TABLET, FILM COATED ORAL
Refills: 0 | Status: DISCONTINUED | OUTPATIENT
Start: 2021-06-08 | End: 2021-06-09

## 2021-06-08 RX ADMIN — Medication 81 MILLIGRAM(S): at 08:52

## 2021-06-08 RX ADMIN — QUETIAPINE FUMARATE 75 MILLIGRAM(S): 200 TABLET, FILM COATED ORAL at 21:14

## 2021-06-08 RX ADMIN — Medication 75 MICROGRAM(S): at 06:31

## 2021-06-08 RX ADMIN — Medication 0.5 MILLIGRAM(S): at 08:53

## 2021-06-08 RX ADMIN — ALBUTEROL 2 PUFF(S): 90 AEROSOL, METERED ORAL at 21:12

## 2021-06-08 RX ADMIN — BUDESONIDE AND FORMOTEROL FUMARATE DIHYDRATE 2 PUFF(S): 160; 4.5 AEROSOL RESPIRATORY (INHALATION) at 21:12

## 2021-06-08 RX ADMIN — LIDOCAINE 2 PATCH: 4 CREAM TOPICAL at 20:45

## 2021-06-08 RX ADMIN — OXYCODONE HYDROCHLORIDE 5 MILLIGRAM(S): 5 TABLET ORAL at 13:15

## 2021-06-08 RX ADMIN — OXYCODONE HYDROCHLORIDE 5 MILLIGRAM(S): 5 TABLET ORAL at 10:49

## 2021-06-08 RX ADMIN — LIDOCAINE 2 PATCH: 4 CREAM TOPICAL at 19:44

## 2021-06-08 RX ADMIN — SENNA PLUS 2 TABLET(S): 8.6 TABLET ORAL at 21:13

## 2021-06-08 RX ADMIN — LIDOCAINE 2 PATCH: 4 CREAM TOPICAL at 08:55

## 2021-06-08 RX ADMIN — OXYCODONE HYDROCHLORIDE 5 MILLIGRAM(S): 5 TABLET ORAL at 03:26

## 2021-06-08 RX ADMIN — Medication 0.5 MILLIGRAM(S): at 21:13

## 2021-06-08 RX ADMIN — POLYETHYLENE GLYCOL 3350 17 GRAM(S): 17 POWDER, FOR SOLUTION ORAL at 08:54

## 2021-06-08 RX ADMIN — CLOPIDOGREL BISULFATE 75 MILLIGRAM(S): 75 TABLET, FILM COATED ORAL at 08:53

## 2021-06-08 RX ADMIN — OXYCODONE HYDROCHLORIDE 5 MILLIGRAM(S): 5 TABLET ORAL at 05:25

## 2021-06-08 RX ADMIN — Medication 400 MILLIGRAM(S): at 15:35

## 2021-06-08 RX ADMIN — DULOXETINE HYDROCHLORIDE 60 MILLIGRAM(S): 30 CAPSULE, DELAYED RELEASE ORAL at 08:53

## 2021-06-08 NOTE — BH INPATIENT PSYCHIATRY PROGRESS NOTE - NSBHFUPINTERVALHXFT_PSY_A_CORE
Met with Ms. Primrose this morning in a day room. Pr reports feeling better, reports good sleep, reports her anxiety is "better". Pt requests her seroquel to be given later in the night since she doesn't want to feel tired too early. Called daughter to discuss case but she didn't . Will try again tomorrow.

## 2021-06-08 NOTE — BH INPATIENT PSYCHIATRY PROGRESS NOTE - NSBHCHARTREVIEWVS_PSY_A_CORE FT
Vital Signs Last 24 Hrs  T(C): 36.3 (08 Jun 2021 06:38), Max: 36.3 (08 Jun 2021 06:38)  T(F): 97.4 (08 Jun 2021 06:38), Max: 97.4 (08 Jun 2021 06:38)  HR: --  BP: --  BP(mean): --  RR: 16 (08 Jun 2021 06:38) (16 - 16)  SpO2: 98% (08 Jun 2021 06:38) (98% - 98%)

## 2021-06-08 NOTE — BH INPATIENT PSYCHIATRY PROGRESS NOTE - NSBHASSESSSUMMFT_PSY_ALL_CORE
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 Pt confused to situation however reports depressive sx, no SI/HI.    Initial collateral from daughter Estela: "Estela, who reports that these periods of confusion occur on/off for the past 10 years, and they have all been due to medication noncompliance. She states that for the past month, her mother has been isolative, nonsensical, not getting out of bed; calling her She has complained that she is not sleeping, but whenever she goes over, pt is in bed. She says that her mother has been having day/night confusion, calling people at all hours. She also is showing up for doctor's appointments when she does not have any. Yesterday, her brother received a call from their neighbor saying that the patient was walking in the middle of the street looking for her cellphone. Pt also was trying to get into random people's car."    5/22: Pt seen, chart reviewed and discussed with nursing staff. Reports feeling ok, but admits feeling anxious, depressed. When asked  about circumstances before hospitalization, she reports that she is here for get support, was not eating well.  Endorses poor sleep, low appetite, low energy. Appears disorganized, confused and hard of hearing. Denies any paranoia, SI, HI, hallucination.    5/23: Pt remains disorganized, anxious and dysphoric. On exam, Pt reports not feeling well. has left lower back pain, anxious, depressed. Endorses poor sleep, low appetite, low energy and motivation. Feels her memory is not that good. Appears dysphoric, hard of hearing but less confused. Denies any jose c, paranoia, SI, HI, hallucination.    5/24:  No events reported over the weekend. Sleep and appetite is "better". Patient denies any current AH/VH but endorsed +AH and +VH prior to admission. Patient reported feeling very depressed since being isolated due to covid, she endorsed passive SI with no intent or plan. She also reported taking less of her medications by splitting pills in half at home. Patient is out on the unit, social with peers but is still very anxious. Patient is compliant with medications, no adverse effects reported.    5/25:  No events reported over the weekend. Sleep is "not as good" and appetite is "improved". Patient reported being in significant pain in her arm and also of a headache. Patient given PRN Oxycodone dose earlier and pain management consult ordered. Spoke with patient about starting Cymbalta for depression. She was hesitant at first but then agreed. Some paranoia noted around medications. Patient also stated that she feels like she is bothering her daughter too much and did not want us to call her frequently, but daughter called hospital looking to get updates and denied telling her mother that she needs a break.    5/26: Patient seen for follow up for depression and psychosis. Chart reviewed and case discussed with interdisciplinary staff. per staff, patient is very somatic. On encounter, patient is pleasant but believes she is not getting adequate care, reports that she used to be many more medications and thinks we are missing something, insists that I speak with her pulmonologist Dr. Ketan Morfin. Otherwise, eating and sleeping well. Patient remains compliant with medications, no adverse effects reported or in evidence.    5/27:  Per staff, patient is very somatic, requesting more meds for constipation. On encounter, patient is pleasant, happy that she had PT today, complains of constipation, requesting increase in Senna, Dulcolax suppository offered and patient agress. Otherwise, eating and sleeping well.    5/28:  No significant overnight event. On encounter, patient is calm, reports that she had a small BM, reports that she has some "sinus" issues now and requests something additional for pain relief, would also like to be evaluated for UTI. Otherwise, eating and sleeping well.     5/29: No significant overnight event. On encounter, patient reports mood as "little better", still reports generalized pain including lower abdominal pain. Patient is eating and sleeping well.     6/1: No reported overnight events. On encounter pt remains somatically preoccupied, states she needs to be on additional medications for her asthma and complains of headaches. In terms of her mood pt describes feeling "bored", but states she is doing better, she is eating well and states she has been able to gain back some of the weight she had lost.  6/2: Continues to endorse depression, anxiety and somatic complaints. Increased Cymbalta to 40mg QD.   6/3:  Met with Ms. Primrose this morning in the dayroom. Pt reports feeling dizzy and unsteady on her feet early this morning. Checked her vitals later in the morning, she was not orthostatic at that point. She continues to verbalize multiple somatic complaints such as nausea and later reflux. Continues to endorse difficulty sleeping. Eliminated morning dose of seroquel 12.5mg due to patient's reports of morning dizziness/lightheadedness and increased her nighttime dose to 50mg. Signed conversion to voluntary status (9.13)  6/4: Pt continues to endorse several somatic complaints such as GERD and cramping in her legs. Reports her sleep was slightly better last night. She requests to speak to a therapist, reached out to Dr. Newman who agreed to meet with pt today. Called patient's daughter in order to discuss treatment plan and explore options to improve pill management at home but she was unavailable. Left a message. Increased Seroquel to 75mg QHS.   6/7: Met with Ms. Primrose this morning in a common area. She reports feeling a little low over the weekend and fearing "Im headed back to where I started". Provided reassurance that some mood fluctuations are normal and do not necessarily mean she has lost all the progress she has made. Pt complained of headaches today which are suggestive of tension headaches by her description of the distribution. Appears less somatically preoccupied. Reports sleeping better. Will f/u with daughter regarding ways to monitor pt's pill intake and use at home.   6/8: Pr reports feeling better, reports good sleep, reports her anxiety is "better". Pt requests her seroquel to be given later in the night since she doesn't want to feel tired too early. Called daughter to discuss case but she didn't . Will try again tomorrow.     Plan:   Observation status: Routine  Psych: Duloxetine to 60mg QD, cont Seroquel 75mg at 10am, cont Klonopin 0.5mg BID   Medical: Oxycodone 5mg PO Q8H PRN for pain, Plavix, Albuterol, Symbicort and Spiriva.

## 2021-06-09 PROCEDURE — 99231 SBSQ HOSP IP/OBS SF/LOW 25: CPT

## 2021-06-09 RX ORDER — QUETIAPINE FUMARATE 200 MG/1
100 TABLET, FILM COATED ORAL
Refills: 0 | Status: DISCONTINUED | OUTPATIENT
Start: 2021-06-09 | End: 2021-06-29

## 2021-06-09 RX ADMIN — BUDESONIDE AND FORMOTEROL FUMARATE DIHYDRATE 2 PUFF(S): 160; 4.5 AEROSOL RESPIRATORY (INHALATION) at 21:53

## 2021-06-09 RX ADMIN — Medication 0.5 MILLIGRAM(S): at 08:37

## 2021-06-09 RX ADMIN — Medication 400 MILLIGRAM(S): at 13:07

## 2021-06-09 RX ADMIN — ALBUTEROL 2 PUFF(S): 90 AEROSOL, METERED ORAL at 22:00

## 2021-06-09 RX ADMIN — Medication 81 MILLIGRAM(S): at 08:36

## 2021-06-09 RX ADMIN — Medication 400 MILLIGRAM(S): at 14:17

## 2021-06-09 RX ADMIN — QUETIAPINE FUMARATE 100 MILLIGRAM(S): 200 TABLET, FILM COATED ORAL at 21:59

## 2021-06-09 RX ADMIN — OXYCODONE HYDROCHLORIDE 5 MILLIGRAM(S): 5 TABLET ORAL at 08:35

## 2021-06-09 RX ADMIN — BUDESONIDE AND FORMOTEROL FUMARATE DIHYDRATE 2 PUFF(S): 160; 4.5 AEROSOL RESPIRATORY (INHALATION) at 08:37

## 2021-06-09 RX ADMIN — CLOPIDOGREL BISULFATE 75 MILLIGRAM(S): 75 TABLET, FILM COATED ORAL at 08:37

## 2021-06-09 RX ADMIN — Medication 75 MICROGRAM(S): at 06:27

## 2021-06-09 RX ADMIN — Medication 0.5 MILLIGRAM(S): at 21:52

## 2021-06-09 RX ADMIN — LIDOCAINE 2 PATCH: 4 CREAM TOPICAL at 08:36

## 2021-06-09 RX ADMIN — POLYETHYLENE GLYCOL 3350 17 GRAM(S): 17 POWDER, FOR SOLUTION ORAL at 08:36

## 2021-06-09 RX ADMIN — DULOXETINE HYDROCHLORIDE 60 MILLIGRAM(S): 30 CAPSULE, DELAYED RELEASE ORAL at 08:36

## 2021-06-09 RX ADMIN — SENNA PLUS 2 TABLET(S): 8.6 TABLET ORAL at 21:52

## 2021-06-09 NOTE — BH INPATIENT PSYCHIATRY PROGRESS NOTE - NSBHASSESSSUMMFT_PSY_ALL_CORE
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 Pt confused to situation however reports depressive sx, no SI/HI.    Initial collateral from daughter Estela: "Estela, who reports that these periods of confusion occur on/off for the past 10 years, and they have all been due to medication noncompliance. She states that for the past month, her mother has been isolative, nonsensical, not getting out of bed; calling her She has complained that she is not sleeping, but whenever she goes over, pt is in bed. She says that her mother has been having day/night confusion, calling people at all hours. She also is showing up for doctor's appointments when she does not have any. Yesterday, her brother received a call from their neighbor saying that the patient was walking in the middle of the street looking for her cellphone. Pt also was trying to get into random people's car."    5/22: Pt seen, chart reviewed and discussed with nursing staff. Reports feeling ok, but admits feeling anxious, depressed. When asked  about circumstances before hospitalization, she reports that she is here for get support, was not eating well.  Endorses poor sleep, low appetite, low energy. Appears disorganized, confused and hard of hearing. Denies any paranoia, SI, HI, hallucination.    5/23: Pt remains disorganized, anxious and dysphoric. On exam, Pt reports not feeling well. has left lower back pain, anxious, depressed. Endorses poor sleep, low appetite, low energy and motivation. Feels her memory is not that good. Appears dysphoric, hard of hearing but less confused. Denies any jose c, paranoia, SI, HI, hallucination.    5/24:  No events reported over the weekend. Sleep and appetite is "better". Patient denies any current AH/VH but endorsed +AH and +VH prior to admission. Patient reported feeling very depressed since being isolated due to covid, she endorsed passive SI with no intent or plan. She also reported taking less of her medications by splitting pills in half at home. Patient is out on the unit, social with peers but is still very anxious. Patient is compliant with medications, no adverse effects reported.    5/25:  No events reported over the weekend. Sleep is "not as good" and appetite is "improved". Patient reported being in significant pain in her arm and also of a headache. Patient given PRN Oxycodone dose earlier and pain management consult ordered. Spoke with patient about starting Cymbalta for depression. She was hesitant at first but then agreed. Some paranoia noted around medications. Patient also stated that she feels like she is bothering her daughter too much and did not want us to call her frequently, but daughter called hospital looking to get updates and denied telling her mother that she needs a break.    5/26: Patient seen for follow up for depression and psychosis. Chart reviewed and case discussed with interdisciplinary staff. per staff, patient is very somatic. On encounter, patient is pleasant but believes she is not getting adequate care, reports that she used to be many more medications and thinks we are missing something, insists that I speak with her pulmonologist Dr. Ketan Morfin. Otherwise, eating and sleeping well. Patient remains compliant with medications, no adverse effects reported or in evidence.    5/27:  Per staff, patient is very somatic, requesting more meds for constipation. On encounter, patient is pleasant, happy that she had PT today, complains of constipation, requesting increase in Senna, Dulcolax suppository offered and patient agress. Otherwise, eating and sleeping well.    5/28:  No significant overnight event. On encounter, patient is calm, reports that she had a small BM, reports that she has some "sinus" issues now and requests something additional for pain relief, would also like to be evaluated for UTI. Otherwise, eating and sleeping well.     5/29: No significant overnight event. On encounter, patient reports mood as "little better", still reports generalized pain including lower abdominal pain. Patient is eating and sleeping well.     6/1: No reported overnight events. On encounter pt remains somatically preoccupied, states she needs to be on additional medications for her asthma and complains of headaches. In terms of her mood pt describes feeling "bored", but states she is doing better, she is eating well and states she has been able to gain back some of the weight she had lost.  6/2: Continues to endorse depression, anxiety and somatic complaints. Increased Cymbalta to 40mg QD.   6/3:  Met with Ms. Primrose this morning in the dayroom. Pt reports feeling dizzy and unsteady on her feet early this morning. Checked her vitals later in the morning, she was not orthostatic at that point. She continues to verbalize multiple somatic complaints such as nausea and later reflux. Continues to endorse difficulty sleeping. Eliminated morning dose of seroquel 12.5mg due to patient's reports of morning dizziness/lightheadedness and increased her nighttime dose to 50mg. Signed conversion to voluntary status (9.13)  6/4: Pt continues to endorse several somatic complaints such as GERD and cramping in her legs. Reports her sleep was slightly better last night. She requests to speak to a therapist, reached out to Dr. Newman who agreed to meet with pt today. Called patient's daughter in order to discuss treatment plan and explore options to improve pill management at home but she was unavailable. Left a message. Increased Seroquel to 75mg QHS.   6/7: Met with Ms. Primrose this morning in a common area. She reports feeling a little low over the weekend and fearing "Im headed back to where I started". Provided reassurance that some mood fluctuations are normal and do not necessarily mean she has lost all the progress she has made. Pt complained of headaches today which are suggestive of tension headaches by her description of the distribution. Appears less somatically preoccupied. Reports sleeping better. Will f/u with daughter regarding ways to monitor pt's pill intake and use at home.   6/8: Pr reports feeling better, reports good sleep, reports her anxiety is "better". Pt requests her seroquel to be given later in the night since she doesn't want to feel tired too early. Called daughter to discuss case but she didn't . Will try again tomorrow.   Chart reviewed, no overnight events. No PRNs needed for anxiety over the past 24 hrs. 1 dose of oxycodone PRN taken over past 24 hrs for pain. Pt has requested to be referred to a day program or partial hospital after discharge, stating she will miss the feeling of community once she goes back home. Discussed with SW. Called daughter again, left a VM.     Plan:   Observation status: Routine  Psych: Duloxetine to 60mg QD, cont Seroquel 75mg at 10pm, cont Klonopin 0.5mg BID   Medical: Oxycodone 5mg PO Q8H PRN for pain, Plavix, Albuterol, Symbicort and Spiriva.

## 2021-06-09 NOTE — BH INPATIENT PSYCHIATRY PROGRESS NOTE - NSBHFUPINTERVALHXFT_PSY_A_CORE
Chart reviewed, no overnight events. No PRNs needed for anxiety over the past 24 hrs. 1 dose of oxycodone PRN taken over past 24 hrs for pain. Pt has requested to be referred to a day program or partial hospital after discharge, stating she will miss the feeling of community once she goes back home. Discussed with ALLIE.

## 2021-06-10 PROCEDURE — 99232 SBSQ HOSP IP/OBS MODERATE 35: CPT

## 2021-06-10 RX ORDER — DULOXETINE HYDROCHLORIDE 30 MG/1
90 CAPSULE, DELAYED RELEASE ORAL DAILY
Refills: 0 | Status: DISCONTINUED | OUTPATIENT
Start: 2021-06-10 | End: 2021-06-17

## 2021-06-10 RX ORDER — CLONAZEPAM 1 MG
0.5 TABLET ORAL
Refills: 0 | Status: DISCONTINUED | OUTPATIENT
Start: 2021-06-10 | End: 2021-06-15

## 2021-06-10 RX ADMIN — Medication 1 TABLET(S): at 08:18

## 2021-06-10 RX ADMIN — CLOPIDOGREL BISULFATE 75 MILLIGRAM(S): 75 TABLET, FILM COATED ORAL at 08:19

## 2021-06-10 RX ADMIN — POLYETHYLENE GLYCOL 3350 17 GRAM(S): 17 POWDER, FOR SOLUTION ORAL at 08:17

## 2021-06-10 RX ADMIN — BUDESONIDE AND FORMOTEROL FUMARATE DIHYDRATE 2 PUFF(S): 160; 4.5 AEROSOL RESPIRATORY (INHALATION) at 21:44

## 2021-06-10 RX ADMIN — DULOXETINE HYDROCHLORIDE 60 MILLIGRAM(S): 30 CAPSULE, DELAYED RELEASE ORAL at 08:18

## 2021-06-10 RX ADMIN — Medication 75 MICROGRAM(S): at 06:46

## 2021-06-10 RX ADMIN — OXYCODONE HYDROCHLORIDE 5 MILLIGRAM(S): 5 TABLET ORAL at 08:18

## 2021-06-10 RX ADMIN — Medication 0.5 MILLIGRAM(S): at 08:19

## 2021-06-10 RX ADMIN — Medication 0.5 MILLIGRAM(S): at 21:44

## 2021-06-10 RX ADMIN — OXYCODONE HYDROCHLORIDE 5 MILLIGRAM(S): 5 TABLET ORAL at 09:02

## 2021-06-10 RX ADMIN — QUETIAPINE FUMARATE 100 MILLIGRAM(S): 200 TABLET, FILM COATED ORAL at 22:38

## 2021-06-10 RX ADMIN — SENNA PLUS 2 TABLET(S): 8.6 TABLET ORAL at 21:44

## 2021-06-10 RX ADMIN — OXYCODONE HYDROCHLORIDE 5 MILLIGRAM(S): 5 TABLET ORAL at 18:05

## 2021-06-10 RX ADMIN — LIDOCAINE 2 PATCH: 4 CREAM TOPICAL at 08:18

## 2021-06-10 RX ADMIN — BUDESONIDE AND FORMOTEROL FUMARATE DIHYDRATE 2 PUFF(S): 160; 4.5 AEROSOL RESPIRATORY (INHALATION) at 08:19

## 2021-06-10 RX ADMIN — Medication 81 MILLIGRAM(S): at 08:19

## 2021-06-10 NOTE — BH INPATIENT PSYCHIATRY PROGRESS NOTE - NSBHASSESSSUMMFT_PSY_ALL_CORE
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 Pt confused to situation however reports depressive sx, no SI/HI.    Initial collateral from daughter Estela: "Estela, who reports that these periods of confusion occur on/off for the past 10 years, and they have all been due to medication noncompliance. She states that for the past month, her mother has been isolative, nonsensical, not getting out of bed; calling her She has complained that she is not sleeping, but whenever she goes over, pt is in bed. She says that her mother has been having day/night confusion, calling people at all hours. She also is showing up for doctor's appointments when she does not have any. Yesterday, her brother received a call from their neighbor saying that the patient was walking in the middle of the street looking for her cellphone. Pt also was trying to get into random people's car."    5/22: Pt seen, chart reviewed and discussed with nursing staff. Reports feeling ok, but admits feeling anxious, depressed. When asked  about circumstances before hospitalization, she reports that she is here for get support, was not eating well.  Endorses poor sleep, low appetite, low energy. Appears disorganized, confused and hard of hearing. Denies any paranoia, SI, HI, hallucination.    5/23: Pt remains disorganized, anxious and dysphoric. On exam, Pt reports not feeling well. has left lower back pain, anxious, depressed. Endorses poor sleep, low appetite, low energy and motivation. Feels her memory is not that good. Appears dysphoric, hard of hearing but less confused. Denies any jose c, paranoia, SI, HI, hallucination.    5/24:  No events reported over the weekend. Sleep and appetite is "better". Patient denies any current AH/VH but endorsed +AH and +VH prior to admission. Patient reported feeling very depressed since being isolated due to covid, she endorsed passive SI with no intent or plan. She also reported taking less of her medications by splitting pills in half at home. Patient is out on the unit, social with peers but is still very anxious. Patient is compliant with medications, no adverse effects reported.    5/25:  No events reported over the weekend. Sleep is "not as good" and appetite is "improved". Patient reported being in significant pain in her arm and also of a headache. Patient given PRN Oxycodone dose earlier and pain management consult ordered. Spoke with patient about starting Cymbalta for depression. She was hesitant at first but then agreed. Some paranoia noted around medications. Patient also stated that she feels like she is bothering her daughter too much and did not want us to call her frequently, but daughter called hospital looking to get updates and denied telling her mother that she needs a break.    5/26: Patient seen for follow up for depression and psychosis. Chart reviewed and case discussed with interdisciplinary staff. per staff, patient is very somatic. On encounter, patient is pleasant but believes she is not getting adequate care, reports that she used to be many more medications and thinks we are missing something, insists that I speak with her pulmonologist Dr. Ketan Morfin. Otherwise, eating and sleeping well. Patient remains compliant with medications, no adverse effects reported or in evidence.    5/27:  Per staff, patient is very somatic, requesting more meds for constipation. On encounter, patient is pleasant, happy that she had PT today, complains of constipation, requesting increase in Senna, Dulcolax suppository offered and patient agress. Otherwise, eating and sleeping well.    5/28:  No significant overnight event. On encounter, patient is calm, reports that she had a small BM, reports that she has some "sinus" issues now and requests something additional for pain relief, would also like to be evaluated for UTI. Otherwise, eating and sleeping well.     5/29: No significant overnight event. On encounter, patient reports mood as "little better", still reports generalized pain including lower abdominal pain. Patient is eating and sleeping well.     6/1: No reported overnight events. On encounter pt remains somatically preoccupied, states she needs to be on additional medications for her asthma and complains of headaches. In terms of her mood pt describes feeling "bored", but states she is doing better, she is eating well and states she has been able to gain back some of the weight she had lost.  6/2: Continues to endorse depression, anxiety and somatic complaints. Increased Cymbalta to 40mg QD.   6/3:  Met with Ms. Primrose this morning in the dayroom. Pt reports feeling dizzy and unsteady on her feet early this morning. Checked her vitals later in the morning, she was not orthostatic at that point. She continues to verbalize multiple somatic complaints such as nausea and later reflux. Continues to endorse difficulty sleeping. Eliminated morning dose of seroquel 12.5mg due to patient's reports of morning dizziness/lightheadedness and increased her nighttime dose to 50mg. Signed conversion to voluntary status (9.13)  6/4: Pt continues to endorse several somatic complaints such as GERD and cramping in her legs. Reports her sleep was slightly better last night. She requests to speak to a therapist, reached out to Dr. Newman who agreed to meet with pt today. Called patient's daughter in order to discuss treatment plan and explore options to improve pill management at home but she was unavailable. Left a message. Increased Seroquel to 75mg QHS.   6/7: Met with Ms. Primrose this morning in a common area. She reports feeling a little low over the weekend and fearing "Im headed back to where I started". Provided reassurance that some mood fluctuations are normal and do not necessarily mean she has lost all the progress she has made. Pt complained of headaches today which are suggestive of tension headaches by her description of the distribution. Appears less somatically preoccupied. Reports sleeping better. Will f/u with daughter regarding ways to monitor pt's pill intake and use at home.   6/8: Pr reports feeling better, reports good sleep, reports her anxiety is "better". Pt requests her seroquel to be given later in the night since she doesn't want to feel tired too early. Called daughter to discuss case but she didn't . Will try again tomorrow.   Chart reviewed, no overnight events. No PRNs needed for anxiety over the past 24 hrs. 1 dose of oxycodone PRN taken over past 24 hrs for pain. Pt has requested to be referred to a day program or partial hospital after discharge, stating she will miss the feeling of community once she goes back home. Discussed with SW. Called daughter again, left a VM.   6/10:  Pt reports increasing anxiety as time for discharge approaches. She fears being "sent home without a plan" and feeling lonely at home. Reassured pt that she would not be sent home without an aftercare plan. Pt aware team is looking into day programs/partial hospitals to refer her to. Left daughter another VM.     Plan:   Observation status: Routine  Psych: Increased Duloxetine to 90mg QD, cont Seroquel 75mg at 10pm, cont Klonopin 0.5mg BID   Medical: Oxycodone 5mg PO Q8H PRN for pain, Plavix, Albuterol, Symbicort and Spiriva.

## 2021-06-10 NOTE — BH INPATIENT PSYCHIATRY PROGRESS NOTE - NSBHCHARTREVIEWVS_PSY_A_CORE FT
Vital Signs Last 24 Hrs  T(C): 36.9 (10 Robin 2021 10:24), Max: 36.9 (10 Robin 2021 07:02)  T(F): 98.5 (10 Robin 2021 10:24), Max: 98.5 (10 Robin 2021 07:02)  HR: 107 (10 Robin 2021 07:45) (107 - 107)  BP: 123/84 (10 Robin 2021 07:45) (123/84 - 123/84)  BP(mean): --  RR: --  SpO2: --

## 2021-06-10 NOTE — BH INPATIENT PSYCHIATRY PROGRESS NOTE - NSBHFUPINTERVALHXFT_PSY_A_CORE
Chart reviewed, no overnight events. No PRNs needed for anxiety over the past 24 hrs. Pt taking 1 dose of oxycodone PRN for pain every 24 hrs. Pt reports increasing anxiety as time for discharge approaches. She fears being "sent home without a plan" and feeling lonely at home. Reassured pt that she would not be sent home without an aftercare plan. Pt aware team is looking into day programs/partial hospitals to refer her to.

## 2021-06-11 PROCEDURE — 99232 SBSQ HOSP IP/OBS MODERATE 35: CPT

## 2021-06-11 RX ORDER — LORATADINE 10 MG/1
10 TABLET ORAL DAILY
Refills: 0 | Status: DISCONTINUED | OUTPATIENT
Start: 2021-06-11 | End: 2021-06-14

## 2021-06-11 RX ORDER — OXYMETAZOLINE HYDROCHLORIDE 0.5 MG/ML
1 SPRAY NASAL
Refills: 0 | Status: DISCONTINUED | OUTPATIENT
Start: 2021-06-11 | End: 2021-06-18

## 2021-06-11 RX ADMIN — BUDESONIDE AND FORMOTEROL FUMARATE DIHYDRATE 2 PUFF(S): 160; 4.5 AEROSOL RESPIRATORY (INHALATION) at 10:38

## 2021-06-11 RX ADMIN — Medication 0.5 MILLIGRAM(S): at 21:45

## 2021-06-11 RX ADMIN — CLOPIDOGREL BISULFATE 75 MILLIGRAM(S): 75 TABLET, FILM COATED ORAL at 10:38

## 2021-06-11 RX ADMIN — Medication 0.5 MILLIGRAM(S): at 10:35

## 2021-06-11 RX ADMIN — QUETIAPINE FUMARATE 100 MILLIGRAM(S): 200 TABLET, FILM COATED ORAL at 21:45

## 2021-06-11 RX ADMIN — Medication 81 MILLIGRAM(S): at 10:36

## 2021-06-11 RX ADMIN — LIDOCAINE 2 PATCH: 4 CREAM TOPICAL at 18:39

## 2021-06-11 RX ADMIN — BUDESONIDE AND FORMOTEROL FUMARATE DIHYDRATE 2 PUFF(S): 160; 4.5 AEROSOL RESPIRATORY (INHALATION) at 21:44

## 2021-06-11 RX ADMIN — LIDOCAINE 2 PATCH: 4 CREAM TOPICAL at 10:40

## 2021-06-11 RX ADMIN — OXYCODONE HYDROCHLORIDE 5 MILLIGRAM(S): 5 TABLET ORAL at 14:07

## 2021-06-11 RX ADMIN — DULOXETINE HYDROCHLORIDE 90 MILLIGRAM(S): 30 CAPSULE, DELAYED RELEASE ORAL at 10:36

## 2021-06-11 RX ADMIN — Medication 75 MICROGRAM(S): at 06:48

## 2021-06-11 RX ADMIN — Medication 400 MILLIGRAM(S): at 10:37

## 2021-06-11 RX ADMIN — Medication 400 MILLIGRAM(S): at 11:06

## 2021-06-11 RX ADMIN — Medication 600 MILLIGRAM(S): at 21:45

## 2021-06-11 RX ADMIN — OXYCODONE HYDROCHLORIDE 5 MILLIGRAM(S): 5 TABLET ORAL at 13:21

## 2021-06-11 RX ADMIN — POLYETHYLENE GLYCOL 3350 17 GRAM(S): 17 POWDER, FOR SOLUTION ORAL at 10:36

## 2021-06-11 RX ADMIN — SENNA PLUS 2 TABLET(S): 8.6 TABLET ORAL at 21:45

## 2021-06-11 NOTE — PROGRESS NOTE ADULT - SUBJECTIVE AND OBJECTIVE BOX
CC/Reason for Consult:     SUBJECTIVE / OVERNIGHT EVENTS:    Patient seen and examined. She reports over last 48 hours she has been having worsening productive rhinorrhea consistent w/ prior sinusitis symptoms and mild headaches. No changes in vision. No dyspnea and no chest pain. She reports mild chills but no febrile events.     MEDICATIONS  (STANDING):  aspirin enteric coated 81 milliGRAM(s) Oral daily  budesonide 160 MICROgram(s)/formoterol 4.5 MICROgram(s) Inhaler 2 Puff(s) Inhalation two times a day  clonazePAM  Tablet 0.5 milliGRAM(s) Oral two times a day  clopidogrel Tablet 75 milliGRAM(s) Oral daily  DULoxetine 90 milliGRAM(s) Oral daily  levothyroxine 75 MICROGram(s) Oral <User Schedule>  polyethylene glycol 3350 17 Gram(s) Oral daily  QUEtiapine 100 milliGRAM(s) Oral <User Schedule>  senna 2 Tablet(s) Oral at bedtime    MEDICATIONS  (PRN):  acetaminophen   Tablet .. 650 milliGRAM(s) Oral every 6 hours PRN Mild Pain (1 - 3), Moderate Pain (4 - 6)  ALBUTerol    90 MICROgram(s) HFA Inhaler 2 Puff(s) Inhalation every 6 hours PRN wheezing  calcium carbonate    500 mG (Tums) Chewable 1 Tablet(s) Chew every 8 hours PRN GERD  ibuprofen  Tablet. 400 milliGRAM(s) Oral two times a day PRN Moderate Pain (4 - 6)  lidocaine   Patch 2 Patch Transdermal daily PRN Pain  OLANZapine Injectable 2.5 milliGRAM(s) IntraMuscular once PRN extreme agitation  ondansetron    Tablet 4 milliGRAM(s) Oral every 6 hours PRN Nausea  oxyCODONE    IR 5 milliGRAM(s) Oral every 8 hours PRN Pain  QUEtiapine 12.5 milliGRAM(s) Oral every 6 hours PRN agitation        CAPILLARY BLOOD GLUCOSE            PHYSICAL EXAM:  GENERAL: NAD, well-developed  HEAD:  Atraumatic, Normocephalic  EYES: EOMI, PERRLA, conjunctiva and sclera clear  NECK: Supple, No JVD  CHEST/LUNG: Clear to auscultation bilaterally; No wheeze  HEART: Regular rate and rhythm; No murmurs, rubs, or gallops  ABDOMEN: Soft, Nontender, Nondistended; Bowel sounds present  EXTREMITIES:  2+ Peripheral Pulses, No clubbing, cyanosis, or edema  PSYCH: AAOx3  NEUROLOGY: non-focal  SKIN: No rashes or lesions    LABS:                    RADIOLOGY & ADDITIONAL TESTS:    Imaging Personally Reviewed:    Consultant(s) Notes Reviewed:      Care Discussed with Consultants/Other Providers:  
PROGRESS NOTE:     Patient is a 71y old  Female who presents with a chief complaint of     SUBJECTIVE / OVERNIGHT EVENTS: Patient seen and evaluated at bedside. No acute distress evident, no overnight events. Reports improvement in lower abdominal pain and dysuria for the past few days. Denies any other complaints, no sob, chest pain, abd pain, nausea, vomiting diarrhea.     ADDITIONAL REVIEW OF SYSTEMS:    MEDICATIONS  (STANDING):  aspirin enteric coated 81 milliGRAM(s) Oral daily  budesonide 160 MICROgram(s)/formoterol 4.5 MICROgram(s) Inhaler 2 Puff(s) Inhalation two times a day  clonazePAM  Tablet 0.5 milliGRAM(s) Oral two times a day  clopidogrel Tablet 75 milliGRAM(s) Oral daily  DULoxetine 20 milliGRAM(s) Oral daily  levothyroxine 75 MICROGram(s) Oral daily  nitrofurantoin monohydrate/macrocrystals (MACROBID) 100 milliGRAM(s) Oral two times a day  polyethylene glycol 3350 17 Gram(s) Oral daily  QUEtiapine 12.5 milliGRAM(s) Oral daily  QUEtiapine 25 milliGRAM(s) Oral at bedtime  senna 2 Tablet(s) Oral at bedtime  tiotropium 18 MICROgram(s) Capsule 1 Capsule(s) Inhalation once    MEDICATIONS  (PRN):  acetaminophen   Tablet .. 650 milliGRAM(s) Oral every 6 hours PRN Mild Pain (1 - 3), Moderate Pain (4 - 6)  ALBUTerol    90 MICROgram(s) HFA Inhaler 2 Puff(s) Inhalation every 6 hours PRN wheezing  ibuprofen  Tablet. 400 milliGRAM(s) Oral two times a day PRN Moderate Pain (4 - 6)  lidocaine   Patch 2 Patch Transdermal daily PRN Pain  OLANZapine Injectable 2.5 milliGRAM(s) IntraMuscular once PRN extreme agitation  ondansetron    Tablet 4 milliGRAM(s) Oral every 6 hours PRN Nausea  oxyCODONE    IR 5 milliGRAM(s) Oral every 8 hours PRN Pain  QUEtiapine 12.5 milliGRAM(s) Oral every 6 hours PRN agitation    CAPILLARY BLOOD GLUCOSE    I&O's Summary    PHYSICAL EXAM:  Vital Signs Last 24 Hrs  T(C): 36.4 (01 Jun 2021 10:39), Max: 36.4 (01 Jun 2021 10:39)  T(F): 97.6 (01 Jun 2021 10:39), Max: 97.6 (01 Jun 2021 10:39)  HR: --  BP: --  BP(mean): --  RR: 18 (01 Jun 2021 08:45) (18 - 18)  SpO2: 96% (01 Jun 2021 08:45) (96% - 96%)    CONSTITUTIONAL: NAD, elderly frail woman, appears older than age, ambulating freely.   RESPIRATORY: Normal respiratory effort; lungs are clear to auscultation bilaterally  CARDIOVASCULAR: Regular rate and rhythm, normal S1 and S2,   No lower extremity edema; Peripheral pulses are 2+ bilaterally  ABDOMEN: Nontender to palpation, normoactive bowel sounds,   MUSCLOSKELETAL: no clubbing or cyanosis of digits; no joint swelling or tenderness to palpation  PSYCH: A+O to person, place, and time; affect appropriate    LABS: reviewed.    RADIOLOGY & ADDITIONAL TESTS:  Results Reviewed:   Imaging Personally Reviewed:  Electrocardiogram Personally Reviewed:    COORDINATION OF CARE:  Care Discussed with Consultants/Other Providers [Y/N]:  Prior or Outpatient Records Reviewed [Y/N]:

## 2021-06-11 NOTE — BH INPATIENT PSYCHIATRY PROGRESS NOTE - CASE SUMMARY
Agree with assessment and plan of  fellow titrating Cymbalta, Seroquel received regimen for sinus complaints

## 2021-06-11 NOTE — BH INPATIENT PSYCHIATRY PROGRESS NOTE - NSBHFUPINTERVALHXFT_PSY_A_CORE
Chart reviewed, no overnight events. No PRNs needed for anxiety over the past 24 hrs. Pt took 2 doses of Oxycodone 5mg PRN for pain yesterday and 1 dose today so far. Pt received her home hearing aides, she may use them during the daytime. Pt reports feeling "ok", still concerned about having a set plan and referrals for discharge. Spoke with patient's son Sue ([phone]) about possible aftercare referrals and discussed ways to have more control over her medications. Pt is on a complicated med regimen, including multiple sedative medications (oxycodone, klonopin, seroquel). Pad states he and sister could try taking all of her medications home, organize her doses in a pillbox and give that to her to take home every week. This could reduce the possibility of patient taking more or less than she is supposed to. Spoke with patient's pain doctor (Dr. Tai Farooq, Anesthesiologist, [phone]), discussed events leading to patient's hospitalization and concerns of pain medication misuse. Dr. Farooq stated he would be in touch with patient's children regarding her pain management in the future.

## 2021-06-11 NOTE — BH INPATIENT PSYCHIATRY PROGRESS NOTE - NSBHASSESSSUMMFT_PSY_ALL_CORE
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 Pt confused to situation however reports depressive sx, no SI/HI.    Initial collateral from daughter Estela: "Estela, who reports that these periods of confusion occur on/off for the past 10 years, and they have all been due to medication noncompliance. She states that for the past month, her mother has been isolative, nonsensical, not getting out of bed; calling her She has complained that she is not sleeping, but whenever she goes over, pt is in bed. She says that her mother has been having day/night confusion, calling people at all hours. She also is showing up for doctor's appointments when she does not have any. Yesterday, her brother received a call from their neighbor saying that the patient was walking in the middle of the street looking for her cellphone. Pt also was trying to get into random people's car."    5/22: Pt seen, chart reviewed and discussed with nursing staff. Reports feeling ok, but admits feeling anxious, depressed. When asked  about circumstances before hospitalization, she reports that she is here for get support, was not eating well.  Endorses poor sleep, low appetite, low energy. Appears disorganized, confused and hard of hearing. Denies any paranoia, SI, HI, hallucination.    5/23: Pt remains disorganized, anxious and dysphoric. On exam, Pt reports not feeling well. has left lower back pain, anxious, depressed. Endorses poor sleep, low appetite, low energy and motivation. Feels her memory is not that good. Appears dysphoric, hard of hearing but less confused. Denies any jose c, paranoia, SI, HI, hallucination.    5/24:  No events reported over the weekend. Sleep and appetite is "better". Patient denies any current AH/VH but endorsed +AH and +VH prior to admission. Patient reported feeling very depressed since being isolated due to covid, she endorsed passive SI with no intent or plan. She also reported taking less of her medications by splitting pills in half at home. Patient is out on the unit, social with peers but is still very anxious. Patient is compliant with medications, no adverse effects reported.    5/25:  No events reported over the weekend. Sleep is "not as good" and appetite is "improved". Patient reported being in significant pain in her arm and also of a headache. Patient given PRN Oxycodone dose earlier and pain management consult ordered. Spoke with patient about starting Cymbalta for depression. She was hesitant at first but then agreed. Some paranoia noted around medications. Patient also stated that she feels like she is bothering her daughter too much and did not want us to call her frequently, but daughter called hospital looking to get updates and denied telling her mother that she needs a break.    5/26: Patient seen for follow up for depression and psychosis. Chart reviewed and case discussed with interdisciplinary staff. per staff, patient is very somatic. On encounter, patient is pleasant but believes she is not getting adequate care, reports that she used to be many more medications and thinks we are missing something, insists that I speak with her pulmonologist Dr. Ketan Morfin. Otherwise, eating and sleeping well. Patient remains compliant with medications, no adverse effects reported or in evidence.    5/27:  Per staff, patient is very somatic, requesting more meds for constipation. On encounter, patient is pleasant, happy that she had PT today, complains of constipation, requesting increase in Senna, Dulcolax suppository offered and patient agress. Otherwise, eating and sleeping well.    5/28:  No significant overnight event. On encounter, patient is calm, reports that she had a small BM, reports that she has some "sinus" issues now and requests something additional for pain relief, would also like to be evaluated for UTI. Otherwise, eating and sleeping well.     5/29: No significant overnight event. On encounter, patient reports mood as "little better", still reports generalized pain including lower abdominal pain. Patient is eating and sleeping well.     6/1: No reported overnight events. On encounter pt remains somatically preoccupied, states she needs to be on additional medications for her asthma and complains of headaches. In terms of her mood pt describes feeling "bored", but states she is doing better, she is eating well and states she has been able to gain back some of the weight she had lost.  6/2: Continues to endorse depression, anxiety and somatic complaints. Increased Cymbalta to 40mg QD.   6/3:  Met with Ms. Primrose this morning in the dayroom. Pt reports feeling dizzy and unsteady on her feet early this morning. Checked her vitals later in the morning, she was not orthostatic at that point. She continues to verbalize multiple somatic complaints such as nausea and later reflux. Continues to endorse difficulty sleeping. Eliminated morning dose of seroquel 12.5mg due to patient's reports of morning dizziness/lightheadedness and increased her nighttime dose to 50mg. Signed conversion to voluntary status (9.13)  6/4: Pt continues to endorse several somatic complaints such as GERD and cramping in her legs. Reports her sleep was slightly better last night. She requests to speak to a therapist, reached out to Dr. Newman who agreed to meet with pt today. Called patient's daughter in order to discuss treatment plan and explore options to improve pill management at home but she was unavailable. Left a message. Increased Seroquel to 75mg QHS.   6/7: Met with Ms. Primrose this morning in a common area. She reports feeling a little low over the weekend and fearing "Im headed back to where I started". Provided reassurance that some mood fluctuations are normal and do not necessarily mean she has lost all the progress she has made. Pt complained of headaches today which are suggestive of tension headaches by her description of the distribution. Appears less somatically preoccupied. Reports sleeping better. Will f/u with daughter regarding ways to monitor pt's pill intake and use at home.   6/8: Pr reports feeling better, reports good sleep, reports her anxiety is "better". Pt requests her seroquel to be given later in the night since she doesn't want to feel tired too early. Called daughter to discuss case but she didn't . Will try again tomorrow.   Chart reviewed, no overnight events. No PRNs needed for anxiety over the past 24 hrs. 1 dose of oxycodone PRN taken over past 24 hrs for pain. Pt has requested to be referred to a day program or partial hospital after discharge, stating she will miss the feeling of community once she goes back home. Discussed with SW. Called daughter again, left a VM.   6/10:  Pt reports increasing anxiety as time for discharge approaches. She fears being "sent home without a plan" and feeling lonely at home. Reassured pt that she would not be sent home without an aftercare plan. Pt aware team is looking into day programs/partial hospitals to refer her to. Left daughter another VM.   6/11: Patient continues to endorse feeling anxious about being discharged, going home and feeling lonely. Spoke with son Pad and patient's pain management dr (Dr. Farooq) about patient's care and ways to better control/manage her medication regimen in the community. Pt received the hearing aids her daughter brought and may use them.     Plan:   Observation status: Routine  Psych: Continue Duloxetine to 90mg QD, cont Seroquel 100mg at 10pm, cont Klonopin 0.5mg BID   Medical: Oxycodone 5mg PO Q8H PRN for pain, Plavix, Albuterol, Symbicort and Spiriva.

## 2021-06-11 NOTE — BH INPATIENT PSYCHIATRY PROGRESS NOTE - NSBHCHARTREVIEWVS_PSY_A_CORE FT
Vital Signs Last 24 Hrs  T(C): 36.4 (11 Jun 2021 10:16), Max: 37.1 (11 Jun 2021 06:43)  T(F): 97.5 (11 Jun 2021 10:16), Max: 98.8 (11 Jun 2021 06:43)  HR: 98 (11 Jun 2021 07:46) (98 - 98)  BP: 113/70 (11 Jun 2021 07:46) (113/70 - 113/70)  BP(mean): --  RR: --  SpO2: --

## 2021-06-11 NOTE — PROGRESS NOTE ADULT - ASSESSMENT
70 y/o female, , domiciled alone with her dog, retired nurse, PPHx of depression w/psychotic features,CAD s/p multiple stents, MI, hypothyroidism, left ear deafness, asthma, osteoporosis, diverticulitis admitted for MDD, passive SI now with sinusitis symptoms    #Sinusitis  - patient reports previously being on steroids, antibiotics, looking through prior prescriptions was taking advair, levocetirizine, levaquin, montelukast, prednisone  - resume home levocetirizine 5 mg QD interchange  - afrin spray prn twice a day for decongestion   - c/w budesonide  - will add guanifesin as mucolytic  - as pt afebrile would monitor off antibiotics however if no improvement in next 72 hours will need to consider starting course of augmentin if symptom worsening.     #CAD s/p stents  - c/w asa and plavix    #Hypothyroidism  - c/w levothyroxine    #MDD  - care per psychiatry

## 2021-06-12 RX ADMIN — DULOXETINE HYDROCHLORIDE 90 MILLIGRAM(S): 30 CAPSULE, DELAYED RELEASE ORAL at 08:41

## 2021-06-12 RX ADMIN — OXYCODONE HYDROCHLORIDE 5 MILLIGRAM(S): 5 TABLET ORAL at 11:23

## 2021-06-12 RX ADMIN — LORATADINE 10 MILLIGRAM(S): 10 TABLET ORAL at 08:39

## 2021-06-12 RX ADMIN — OXYCODONE HYDROCHLORIDE 5 MILLIGRAM(S): 5 TABLET ORAL at 22:50

## 2021-06-12 RX ADMIN — Medication 75 MICROGRAM(S): at 05:57

## 2021-06-12 RX ADMIN — Medication 0.5 MILLIGRAM(S): at 08:40

## 2021-06-12 RX ADMIN — Medication 1 TABLET(S): at 08:39

## 2021-06-12 RX ADMIN — CLOPIDOGREL BISULFATE 75 MILLIGRAM(S): 75 TABLET, FILM COATED ORAL at 08:40

## 2021-06-12 RX ADMIN — BUDESONIDE AND FORMOTEROL FUMARATE DIHYDRATE 2 PUFF(S): 160; 4.5 AEROSOL RESPIRATORY (INHALATION) at 21:44

## 2021-06-12 RX ADMIN — SENNA PLUS 2 TABLET(S): 8.6 TABLET ORAL at 21:45

## 2021-06-12 RX ADMIN — POLYETHYLENE GLYCOL 3350 17 GRAM(S): 17 POWDER, FOR SOLUTION ORAL at 08:38

## 2021-06-12 RX ADMIN — OXYCODONE HYDROCHLORIDE 5 MILLIGRAM(S): 5 TABLET ORAL at 14:22

## 2021-06-12 RX ADMIN — OXYMETAZOLINE HYDROCHLORIDE 1 SPRAY(S): 0.5 SPRAY NASAL at 09:09

## 2021-06-12 RX ADMIN — LIDOCAINE 2 PATCH: 4 CREAM TOPICAL at 09:05

## 2021-06-12 RX ADMIN — Medication 0.5 MILLIGRAM(S): at 21:45

## 2021-06-12 RX ADMIN — Medication 81 MILLIGRAM(S): at 08:41

## 2021-06-12 RX ADMIN — ALBUTEROL 2 PUFF(S): 90 AEROSOL, METERED ORAL at 21:44

## 2021-06-12 RX ADMIN — Medication 400 MILLIGRAM(S): at 18:19

## 2021-06-12 RX ADMIN — LIDOCAINE 2 PATCH: 4 CREAM TOPICAL at 18:07

## 2021-06-12 RX ADMIN — QUETIAPINE FUMARATE 100 MILLIGRAM(S): 200 TABLET, FILM COATED ORAL at 21:45

## 2021-06-12 RX ADMIN — OXYCODONE HYDROCHLORIDE 5 MILLIGRAM(S): 5 TABLET ORAL at 21:53

## 2021-06-12 RX ADMIN — Medication 600 MILLIGRAM(S): at 21:53

## 2021-06-12 RX ADMIN — Medication 600 MILLIGRAM(S): at 08:40

## 2021-06-12 RX ADMIN — BUDESONIDE AND FORMOTEROL FUMARATE DIHYDRATE 2 PUFF(S): 160; 4.5 AEROSOL RESPIRATORY (INHALATION) at 09:04

## 2021-06-13 RX ADMIN — CLOPIDOGREL BISULFATE 75 MILLIGRAM(S): 75 TABLET, FILM COATED ORAL at 08:20

## 2021-06-13 RX ADMIN — Medication 0.5 MILLIGRAM(S): at 08:20

## 2021-06-13 RX ADMIN — OXYCODONE HYDROCHLORIDE 5 MILLIGRAM(S): 5 TABLET ORAL at 16:49

## 2021-06-13 RX ADMIN — OXYMETAZOLINE HYDROCHLORIDE 1 SPRAY(S): 0.5 SPRAY NASAL at 08:22

## 2021-06-13 RX ADMIN — BUDESONIDE AND FORMOTEROL FUMARATE DIHYDRATE 2 PUFF(S): 160; 4.5 AEROSOL RESPIRATORY (INHALATION) at 09:35

## 2021-06-13 RX ADMIN — LIDOCAINE 2 PATCH: 4 CREAM TOPICAL at 18:10

## 2021-06-13 RX ADMIN — Medication 600 MILLIGRAM(S): at 08:21

## 2021-06-13 RX ADMIN — LORATADINE 10 MILLIGRAM(S): 10 TABLET ORAL at 08:19

## 2021-06-13 RX ADMIN — OXYCODONE HYDROCHLORIDE 5 MILLIGRAM(S): 5 TABLET ORAL at 14:51

## 2021-06-13 RX ADMIN — POLYETHYLENE GLYCOL 3350 17 GRAM(S): 17 POWDER, FOR SOLUTION ORAL at 08:18

## 2021-06-13 RX ADMIN — ALBUTEROL 2 PUFF(S): 90 AEROSOL, METERED ORAL at 22:09

## 2021-06-13 RX ADMIN — LIDOCAINE 2 PATCH: 4 CREAM TOPICAL at 09:35

## 2021-06-13 RX ADMIN — Medication 75 MICROGRAM(S): at 06:34

## 2021-06-13 RX ADMIN — Medication 400 MILLIGRAM(S): at 12:28

## 2021-06-13 RX ADMIN — Medication 0.5 MILLIGRAM(S): at 21:56

## 2021-06-13 RX ADMIN — BUDESONIDE AND FORMOTEROL FUMARATE DIHYDRATE 2 PUFF(S): 160; 4.5 AEROSOL RESPIRATORY (INHALATION) at 21:55

## 2021-06-13 RX ADMIN — DULOXETINE HYDROCHLORIDE 90 MILLIGRAM(S): 30 CAPSULE, DELAYED RELEASE ORAL at 08:21

## 2021-06-13 RX ADMIN — Medication 400 MILLIGRAM(S): at 11:23

## 2021-06-13 RX ADMIN — QUETIAPINE FUMARATE 100 MILLIGRAM(S): 200 TABLET, FILM COATED ORAL at 22:09

## 2021-06-13 RX ADMIN — SENNA PLUS 2 TABLET(S): 8.6 TABLET ORAL at 21:56

## 2021-06-13 RX ADMIN — Medication 600 MILLIGRAM(S): at 21:56

## 2021-06-13 RX ADMIN — LIDOCAINE 2 PATCH: 4 CREAM TOPICAL at 22:06

## 2021-06-13 RX ADMIN — Medication 1 TABLET(S): at 08:19

## 2021-06-13 RX ADMIN — Medication 81 MILLIGRAM(S): at 08:20

## 2021-06-14 PROCEDURE — 99232 SBSQ HOSP IP/OBS MODERATE 35: CPT

## 2021-06-14 PROCEDURE — 90832 PSYTX W PT 30 MINUTES: CPT

## 2021-06-14 RX ORDER — OXYCODONE HYDROCHLORIDE 5 MG/1
5 TABLET ORAL EVERY 8 HOURS
Refills: 0 | Status: DISCONTINUED | OUTPATIENT
Start: 2021-06-14 | End: 2021-06-21

## 2021-06-14 RX ORDER — QUETIAPINE FUMARATE 200 MG/1
25 TABLET, FILM COATED ORAL DAILY
Refills: 0 | Status: DISCONTINUED | OUTPATIENT
Start: 2021-06-14 | End: 2021-06-16

## 2021-06-14 RX ADMIN — CLOPIDOGREL BISULFATE 75 MILLIGRAM(S): 75 TABLET, FILM COATED ORAL at 08:00

## 2021-06-14 RX ADMIN — LIDOCAINE 2 PATCH: 4 CREAM TOPICAL at 19:26

## 2021-06-14 RX ADMIN — BUDESONIDE AND FORMOTEROL FUMARATE DIHYDRATE 2 PUFF(S): 160; 4.5 AEROSOL RESPIRATORY (INHALATION) at 21:47

## 2021-06-14 RX ADMIN — OXYCODONE HYDROCHLORIDE 5 MILLIGRAM(S): 5 TABLET ORAL at 17:04

## 2021-06-14 RX ADMIN — Medication 0.5 MILLIGRAM(S): at 21:48

## 2021-06-14 RX ADMIN — LIDOCAINE 2 PATCH: 4 CREAM TOPICAL at 11:52

## 2021-06-14 RX ADMIN — POLYETHYLENE GLYCOL 3350 17 GRAM(S): 17 POWDER, FOR SOLUTION ORAL at 08:01

## 2021-06-14 RX ADMIN — ALBUTEROL 2 PUFF(S): 90 AEROSOL, METERED ORAL at 21:47

## 2021-06-14 RX ADMIN — Medication 81 MILLIGRAM(S): at 08:01

## 2021-06-14 RX ADMIN — Medication 400 MILLIGRAM(S): at 13:27

## 2021-06-14 RX ADMIN — Medication 0.5 MILLIGRAM(S): at 08:01

## 2021-06-14 RX ADMIN — Medication 600 MILLIGRAM(S): at 08:01

## 2021-06-14 RX ADMIN — SENNA PLUS 2 TABLET(S): 8.6 TABLET ORAL at 21:47

## 2021-06-14 RX ADMIN — LORATADINE 10 MILLIGRAM(S): 10 TABLET ORAL at 08:01

## 2021-06-14 RX ADMIN — Medication 75 MICROGRAM(S): at 06:50

## 2021-06-14 RX ADMIN — OXYMETAZOLINE HYDROCHLORIDE 1 SPRAY(S): 0.5 SPRAY NASAL at 11:52

## 2021-06-14 RX ADMIN — OXYCODONE HYDROCHLORIDE 5 MILLIGRAM(S): 5 TABLET ORAL at 18:12

## 2021-06-14 RX ADMIN — OXYMETAZOLINE HYDROCHLORIDE 1 SPRAY(S): 0.5 SPRAY NASAL at 21:59

## 2021-06-14 RX ADMIN — BUDESONIDE AND FORMOTEROL FUMARATE DIHYDRATE 2 PUFF(S): 160; 4.5 AEROSOL RESPIRATORY (INHALATION) at 08:00

## 2021-06-14 RX ADMIN — DULOXETINE HYDROCHLORIDE 90 MILLIGRAM(S): 30 CAPSULE, DELAYED RELEASE ORAL at 08:01

## 2021-06-14 RX ADMIN — Medication 600 MILLIGRAM(S): at 21:47

## 2021-06-14 RX ADMIN — QUETIAPINE FUMARATE 100 MILLIGRAM(S): 200 TABLET, FILM COATED ORAL at 21:48

## 2021-06-14 NOTE — BH INPATIENT PSYCHIATRY PROGRESS NOTE - CASE SUMMARY
I agree with assessment and plan of fellow will titrate Seroquel to improve response as augmenting agent

## 2021-06-14 NOTE — BH INPATIENT PSYCHIATRY PROGRESS NOTE - NSBHASSESSSUMMFT_PSY_ALL_CORE
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 Pt confused to situation however reports depressive sx, no SI/HI.    Initial collateral from daughter Estela: "Estela, who reports that these periods of confusion occur on/off for the past 10 years, and they have all been due to medication noncompliance. She states that for the past month, her mother has been isolative, nonsensical, not getting out of bed; calling her She has complained that she is not sleeping, but whenever she goes over, pt is in bed. She says that her mother has been having day/night confusion, calling people at all hours. She also is showing up for doctor's appointments when she does not have any. Yesterday, her brother received a call from their neighbor saying that the patient was walking in the middle of the street looking for her cellphone. Pt also was trying to get into random people's car."    5/22: Pt seen, chart reviewed and discussed with nursing staff. Reports feeling ok, but admits feeling anxious, depressed. When asked  about circumstances before hospitalization, she reports that she is here for get support, was not eating well.  Endorses poor sleep, low appetite, low energy. Appears disorganized, confused and hard of hearing. Denies any paranoia, SI, HI, hallucination.    5/23: Pt remains disorganized, anxious and dysphoric. On exam, Pt reports not feeling well. has left lower back pain, anxious, depressed. Endorses poor sleep, low appetite, low energy and motivation. Feels her memory is not that good. Appears dysphoric, hard of hearing but less confused. Denies any jose c, paranoia, SI, HI, hallucination.    5/24:  No events reported over the weekend. Sleep and appetite is "better". Patient denies any current AH/VH but endorsed +AH and +VH prior to admission. Patient reported feeling very depressed since being isolated due to covid, she endorsed passive SI with no intent or plan. She also reported taking less of her medications by splitting pills in half at home. Patient is out on the unit, social with peers but is still very anxious. Patient is compliant with medications, no adverse effects reported.    5/25:  No events reported over the weekend. Sleep is "not as good" and appetite is "improved". Patient reported being in significant pain in her arm and also of a headache. Patient given PRN Oxycodone dose earlier and pain management consult ordered. Spoke with patient about starting Cymbalta for depression. She was hesitant at first but then agreed. Some paranoia noted around medications. Patient also stated that she feels like she is bothering her daughter too much and did not want us to call her frequently, but daughter called hospital looking to get updates and denied telling her mother that she needs a break.    5/26: Patient seen for follow up for depression and psychosis. Chart reviewed and case discussed with interdisciplinary staff. per staff, patient is very somatic. On encounter, patient is pleasant but believes she is not getting adequate care, reports that she used to be many more medications and thinks we are missing something, insists that I speak with her pulmonologist Dr. Ketan Morfin. Otherwise, eating and sleeping well. Patient remains compliant with medications, no adverse effects reported or in evidence.    5/27:  Per staff, patient is very somatic, requesting more meds for constipation. On encounter, patient is pleasant, happy that she had PT today, complains of constipation, requesting increase in Senna, Dulcolax suppository offered and patient agress. Otherwise, eating and sleeping well.    5/28:  No significant overnight event. On encounter, patient is calm, reports that she had a small BM, reports that she has some "sinus" issues now and requests something additional for pain relief, would also like to be evaluated for UTI. Otherwise, eating and sleeping well.     5/29: No significant overnight event. On encounter, patient reports mood as "little better", still reports generalized pain including lower abdominal pain. Patient is eating and sleeping well.     6/1: No reported overnight events. On encounter pt remains somatically preoccupied, states she needs to be on additional medications for her asthma and complains of headaches. In terms of her mood pt describes feeling "bored", but states she is doing better, she is eating well and states she has been able to gain back some of the weight she had lost.  6/2: Continues to endorse depression, anxiety and somatic complaints. Increased Cymbalta to 40mg QD.   6/3:  Met with Ms. Primrose this morning in the dayroom. Pt reports feeling dizzy and unsteady on her feet early this morning. Checked her vitals later in the morning, she was not orthostatic at that point. She continues to verbalize multiple somatic complaints such as nausea and later reflux. Continues to endorse difficulty sleeping. Eliminated morning dose of seroquel 12.5mg due to patient's reports of morning dizziness/lightheadedness and increased her nighttime dose to 50mg. Signed conversion to voluntary status (9.13)  6/4: Pt continues to endorse several somatic complaints such as GERD and cramping in her legs. Reports her sleep was slightly better last night. She requests to speak to a therapist, reached out to Dr. Newman who agreed to meet with pt today. Called patient's daughter in order to discuss treatment plan and explore options to improve pill management at home but she was unavailable. Left a message. Increased Seroquel to 75mg QHS.   6/7: Met with Ms. Primrose this morning in a common area. She reports feeling a little low over the weekend and fearing "Im headed back to where I started". Provided reassurance that some mood fluctuations are normal and do not necessarily mean she has lost all the progress she has made. Pt complained of headaches today which are suggestive of tension headaches by her description of the distribution. Appears less somatically preoccupied. Reports sleeping better. Will f/u with daughter regarding ways to monitor pt's pill intake and use at home.   6/8: Pr reports feeling better, reports good sleep, reports her anxiety is "better". Pt requests her seroquel to be given later in the night since she doesn't want to feel tired too early. Called daughter to discuss case but she didn't . Will try again tomorrow.   Chart reviewed, no overnight events. No PRNs needed for anxiety over the past 24 hrs. 1 dose of oxycodone PRN taken over past 24 hrs for pain. Pt has requested to be referred to a day program or partial hospital after discharge, stating she will miss the feeling of community once she goes back home. Discussed with SW. Called daughter again, left a VM.   6/10:  Pt reports increasing anxiety as time for discharge approaches. She fears being "sent home without a plan" and feeling lonely at home. Reassured pt that she would not be sent home without an aftercare plan. Pt aware team is looking into day programs/partial hospitals to refer her to. Left daughter another VM.   6/11: Patient continues to endorse feeling anxious about being discharged, going home and feeling lonely. Spoke with son Pad and patient's pain management dr (Dr. Farooq) about patient's care and ways to better control/manage her medication regimen in the community. Pt received the hearing aids her daughter brought and may use them.   6/14: Pt reports sometimes experiencing some anxiety in the afternoons around 3pm. States that at that time she usually begins to ruminate about her current situation, how bored she feels and "what am I doing". Writer validated these feelings, normalizing her emotions in the context of how long she has been in the hospital. Encouraged patient to use her coping skills. Added a morning dose of Seroquel 25mg.     Plan:   Observation status: Routine  Psych: Continue Duloxetine to 90mg QD, cont Seroquel 25mg QAM + 100mg at 10pm, cont Klonopin 0.5mg BID   Medical: Oxycodone 5mg PO Q8H PRN for pain, Plavix, Albuterol, Symbicort and Spiriva.

## 2021-06-14 NOTE — BH INPATIENT PSYCHIATRY PROGRESS NOTE - NSBHFUPINTERVALHXFT_PSY_A_CORE
Chart reviewed, no overnight events. No events over the weekend. Pt reports sometimes experiencing some anxiety in the afternoons around 3pm. States that at that time she usually begins to ruminate about her current situation, how bored she feels and "what am I doing". Writer validated these feelings, normalizing her emotions in the context of how long she has been in the hospital. Encouraged patient to use her coping skills. Added a morning dose of Seroquel 25mg.

## 2021-06-14 NOTE — BH INPATIENT PSYCHIATRY PROGRESS NOTE - NSBHCHARTREVIEWVS_PSY_A_CORE FT
Vital Signs Last 24 Hrs  T(C): 36.8 (14 Jun 2021 07:24), Max: 36.8 (14 Jun 2021 07:24)  T(F): 98.3 (14 Jun 2021 07:24), Max: 98.3 (14 Jun 2021 07:24)  HR: --  BP: 108/66 (14 Jun 2021 08:04) (108/66 - 108/66)  BP(mean): 113 (14 Jun 2021 08:04) (113 - 113)  RR: --  SpO2: --

## 2021-06-15 PROCEDURE — 99232 SBSQ HOSP IP/OBS MODERATE 35: CPT

## 2021-06-15 RX ORDER — CLONAZEPAM 1 MG
0.5 TABLET ORAL
Refills: 0 | Status: DISCONTINUED | OUTPATIENT
Start: 2021-06-15 | End: 2021-06-21

## 2021-06-15 RX ADMIN — BUDESONIDE AND FORMOTEROL FUMARATE DIHYDRATE 2 PUFF(S): 160; 4.5 AEROSOL RESPIRATORY (INHALATION) at 09:15

## 2021-06-15 RX ADMIN — LIDOCAINE 2 PATCH: 4 CREAM TOPICAL at 06:31

## 2021-06-15 RX ADMIN — OXYMETAZOLINE HYDROCHLORIDE 1 SPRAY(S): 0.5 SPRAY NASAL at 21:58

## 2021-06-15 RX ADMIN — Medication 81 MILLIGRAM(S): at 09:05

## 2021-06-15 RX ADMIN — OXYCODONE HYDROCHLORIDE 5 MILLIGRAM(S): 5 TABLET ORAL at 11:44

## 2021-06-15 RX ADMIN — Medication 600 MILLIGRAM(S): at 09:05

## 2021-06-15 RX ADMIN — LIDOCAINE 2 PATCH: 4 CREAM TOPICAL at 09:14

## 2021-06-15 RX ADMIN — Medication 0.5 MILLIGRAM(S): at 21:37

## 2021-06-15 RX ADMIN — BUDESONIDE AND FORMOTEROL FUMARATE DIHYDRATE 2 PUFF(S): 160; 4.5 AEROSOL RESPIRATORY (INHALATION) at 21:38

## 2021-06-15 RX ADMIN — Medication 0.5 MILLIGRAM(S): at 09:05

## 2021-06-15 RX ADMIN — QUETIAPINE FUMARATE 25 MILLIGRAM(S): 200 TABLET, FILM COATED ORAL at 09:04

## 2021-06-15 RX ADMIN — QUETIAPINE FUMARATE 100 MILLIGRAM(S): 200 TABLET, FILM COATED ORAL at 21:37

## 2021-06-15 RX ADMIN — CLOPIDOGREL BISULFATE 75 MILLIGRAM(S): 75 TABLET, FILM COATED ORAL at 09:05

## 2021-06-15 RX ADMIN — ALBUTEROL 2 PUFF(S): 90 AEROSOL, METERED ORAL at 21:58

## 2021-06-15 RX ADMIN — POLYETHYLENE GLYCOL 3350 17 GRAM(S): 17 POWDER, FOR SOLUTION ORAL at 09:04

## 2021-06-15 RX ADMIN — SENNA PLUS 2 TABLET(S): 8.6 TABLET ORAL at 21:36

## 2021-06-15 RX ADMIN — Medication 600 MILLIGRAM(S): at 21:36

## 2021-06-15 RX ADMIN — DULOXETINE HYDROCHLORIDE 90 MILLIGRAM(S): 30 CAPSULE, DELAYED RELEASE ORAL at 09:04

## 2021-06-15 RX ADMIN — LIDOCAINE 2 PATCH: 4 CREAM TOPICAL at 19:39

## 2021-06-15 RX ADMIN — Medication 75 MICROGRAM(S): at 06:45

## 2021-06-15 NOTE — BH INPATIENT PSYCHIATRY PROGRESS NOTE - NSBHFUPINTERVALHXFT_PSY_A_CORE
Chart reviewed, no overnight events. No events over the weekend. Pt started morning seroquel today in addition to her nighttime dose.

## 2021-06-15 NOTE — BH INPATIENT PSYCHIATRY PROGRESS NOTE - NSBHASSESSSUMMFT_PSY_ALL_CORE
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 Pt confused to situation however reports depressive sx, no SI/HI.    Initial collateral from daughter Estela: "Estela, who reports that these periods of confusion occur on/off for the past 10 years, and they have all been due to medication noncompliance. She states that for the past month, her mother has been isolative, nonsensical, not getting out of bed; calling her She has complained that she is not sleeping, but whenever she goes over, pt is in bed. She says that her mother has been having day/night confusion, calling people at all hours. She also is showing up for doctor's appointments when she does not have any. Yesterday, her brother received a call from their neighbor saying that the patient was walking in the middle of the street looking for her cellphone. Pt also was trying to get into random people's car."    5/22: Pt seen, chart reviewed and discussed with nursing staff. Reports feeling ok, but admits feeling anxious, depressed. When asked  about circumstances before hospitalization, she reports that she is here for get support, was not eating well.  Endorses poor sleep, low appetite, low energy. Appears disorganized, confused and hard of hearing. Denies any paranoia, SI, HI, hallucination.    5/23: Pt remains disorganized, anxious and dysphoric. On exam, Pt reports not feeling well. has left lower back pain, anxious, depressed. Endorses poor sleep, low appetite, low energy and motivation. Feels her memory is not that good. Appears dysphoric, hard of hearing but less confused. Denies any jose c, paranoia, SI, HI, hallucination.    5/24:  No events reported over the weekend. Sleep and appetite is "better". Patient denies any current AH/VH but endorsed +AH and +VH prior to admission. Patient reported feeling very depressed since being isolated due to covid, she endorsed passive SI with no intent or plan. She also reported taking less of her medications by splitting pills in half at home. Patient is out on the unit, social with peers but is still very anxious. Patient is compliant with medications, no adverse effects reported.    5/25:  No events reported over the weekend. Sleep is "not as good" and appetite is "improved". Patient reported being in significant pain in her arm and also of a headache. Patient given PRN Oxycodone dose earlier and pain management consult ordered. Spoke with patient about starting Cymbalta for depression. She was hesitant at first but then agreed. Some paranoia noted around medications. Patient also stated that she feels like she is bothering her daughter too much and did not want us to call her frequently, but daughter called hospital looking to get updates and denied telling her mother that she needs a break.    5/26: Patient seen for follow up for depression and psychosis. Chart reviewed and case discussed with interdisciplinary staff. per staff, patient is very somatic. On encounter, patient is pleasant but believes she is not getting adequate care, reports that she used to be many more medications and thinks we are missing something, insists that I speak with her pulmonologist Dr. Ketan Morfin. Otherwise, eating and sleeping well. Patient remains compliant with medications, no adverse effects reported or in evidence.    5/27:  Per staff, patient is very somatic, requesting more meds for constipation. On encounter, patient is pleasant, happy that she had PT today, complains of constipation, requesting increase in Senna, Dulcolax suppository offered and patient agress. Otherwise, eating and sleeping well.    5/28:  No significant overnight event. On encounter, patient is calm, reports that she had a small BM, reports that she has some "sinus" issues now and requests something additional for pain relief, would also like to be evaluated for UTI. Otherwise, eating and sleeping well.     5/29: No significant overnight event. On encounter, patient reports mood as "little better", still reports generalized pain including lower abdominal pain. Patient is eating and sleeping well.     6/1: No reported overnight events. On encounter pt remains somatically preoccupied, states she needs to be on additional medications for her asthma and complains of headaches. In terms of her mood pt describes feeling "bored", but states she is doing better, she is eating well and states she has been able to gain back some of the weight she had lost.  6/2: Continues to endorse depression, anxiety and somatic complaints. Increased Cymbalta to 40mg QD.   6/3:  Met with Ms. Primrose this morning in the dayroom. Pt reports feeling dizzy and unsteady on her feet early this morning. Checked her vitals later in the morning, she was not orthostatic at that point. She continues to verbalize multiple somatic complaints such as nausea and later reflux. Continues to endorse difficulty sleeping. Eliminated morning dose of seroquel 12.5mg due to patient's reports of morning dizziness/lightheadedness and increased her nighttime dose to 50mg. Signed conversion to voluntary status (9.13)  6/4: Pt continues to endorse several somatic complaints such as GERD and cramping in her legs. Reports her sleep was slightly better last night. She requests to speak to a therapist, reached out to Dr. Newman who agreed to meet with pt today. Called patient's daughter in order to discuss treatment plan and explore options to improve pill management at home but she was unavailable. Left a message. Increased Seroquel to 75mg QHS.   6/7: Met with Ms. Primrose this morning in a common area. She reports feeling a little low over the weekend and fearing "Im headed back to where I started". Provided reassurance that some mood fluctuations are normal and do not necessarily mean she has lost all the progress she has made. Pt complained of headaches today which are suggestive of tension headaches by her description of the distribution. Appears less somatically preoccupied. Reports sleeping better. Will f/u with daughter regarding ways to monitor pt's pill intake and use at home.   6/8: Pr reports feeling better, reports good sleep, reports her anxiety is "better". Pt requests her seroquel to be given later in the night since she doesn't want to feel tired too early. Called daughter to discuss case but she didn't . Will try again tomorrow.   Chart reviewed, no overnight events. No PRNs needed for anxiety over the past 24 hrs. 1 dose of oxycodone PRN taken over past 24 hrs for pain. Pt has requested to be referred to a day program or partial hospital after discharge, stating she will miss the feeling of community once she goes back home. Discussed with SW. Called daughter again, left a VM.   6/10:  Pt reports increasing anxiety as time for discharge approaches. She fears being "sent home without a plan" and feeling lonely at home. Reassured pt that she would not be sent home without an aftercare plan. Pt aware team is looking into day programs/partial hospitals to refer her to. Left daughter another VM.   6/11: Patient continues to endorse feeling anxious about being discharged, going home and feeling lonely. Spoke with son Pad and patient's pain management dr (Dr. Farooq) about patient's care and ways to better control/manage her medication regimen in the community. Pt received the hearing aids her daughter brought and may use them.   6/14: Pt reports sometimes experiencing some anxiety in the afternoons around 3pm. States that at that time she usually begins to ruminate about her current situation, how bored she feels and "what am I doing". Writer validated these feelings, normalizing her emotions in the context of how long she has been in the hospital. Encouraged patient to use her coping skills. Added a morning dose of Seroquel 25mg.     Plan:   Observation status: Routine  Psych: Continue Duloxetine to 90mg QD, cont Seroquel 25mg QAM + 100mg at 10pm, cont Klonopin 0.5mg BID   Medical: Oxycodone 5mg PO Q8H PRN for pain, Plavix, Albuterol, Symbicort and Spiriva.

## 2021-06-15 NOTE — BH INPATIENT PSYCHIATRY PROGRESS NOTE - NSBHCHARTREVIEWVS_PSY_A_CORE FT
Vital Signs Last 24 Hrs  T(C): 36.8 (15 Robin 2021 10:49), Max: 36.9 (14 Jun 2021 21:48)  T(F): 98.2 (15 Robin 2021 10:49), Max: 98.5 (14 Jun 2021 21:48)  HR: --  BP: 104/78 (15 Robin 2021 08:11) (104/78 - 104/78)  BP(mean): 98 (15 Robin 2021 08:11) (98 - 98)  RR: --  SpO2: --

## 2021-06-16 PROCEDURE — 99232 SBSQ HOSP IP/OBS MODERATE 35: CPT

## 2021-06-16 RX ORDER — QUETIAPINE FUMARATE 200 MG/1
50 TABLET, FILM COATED ORAL DAILY
Refills: 0 | Status: DISCONTINUED | OUTPATIENT
Start: 2021-06-16 | End: 2021-06-29

## 2021-06-16 RX ORDER — QUETIAPINE FUMARATE 200 MG/1
25 TABLET, FILM COATED ORAL ONCE
Refills: 0 | Status: DISCONTINUED | OUTPATIENT
Start: 2021-06-16 | End: 2021-06-22

## 2021-06-16 RX ADMIN — DULOXETINE HYDROCHLORIDE 90 MILLIGRAM(S): 30 CAPSULE, DELAYED RELEASE ORAL at 09:36

## 2021-06-16 RX ADMIN — BUDESONIDE AND FORMOTEROL FUMARATE DIHYDRATE 2 PUFF(S): 160; 4.5 AEROSOL RESPIRATORY (INHALATION) at 10:35

## 2021-06-16 RX ADMIN — Medication 600 MILLIGRAM(S): at 21:56

## 2021-06-16 RX ADMIN — Medication 0.5 MILLIGRAM(S): at 21:56

## 2021-06-16 RX ADMIN — CLOPIDOGREL BISULFATE 75 MILLIGRAM(S): 75 TABLET, FILM COATED ORAL at 09:36

## 2021-06-16 RX ADMIN — OXYCODONE HYDROCHLORIDE 5 MILLIGRAM(S): 5 TABLET ORAL at 14:11

## 2021-06-16 RX ADMIN — POLYETHYLENE GLYCOL 3350 17 GRAM(S): 17 POWDER, FOR SOLUTION ORAL at 09:37

## 2021-06-16 RX ADMIN — Medication 81 MILLIGRAM(S): at 09:35

## 2021-06-16 RX ADMIN — ALBUTEROL 2 PUFF(S): 90 AEROSOL, METERED ORAL at 22:18

## 2021-06-16 RX ADMIN — Medication 0.5 MILLIGRAM(S): at 09:36

## 2021-06-16 RX ADMIN — QUETIAPINE FUMARATE 25 MILLIGRAM(S): 200 TABLET, FILM COATED ORAL at 09:37

## 2021-06-16 RX ADMIN — BUDESONIDE AND FORMOTEROL FUMARATE DIHYDRATE 2 PUFF(S): 160; 4.5 AEROSOL RESPIRATORY (INHALATION) at 22:18

## 2021-06-16 RX ADMIN — SENNA PLUS 2 TABLET(S): 8.6 TABLET ORAL at 21:56

## 2021-06-16 RX ADMIN — Medication 75 MICROGRAM(S): at 06:09

## 2021-06-16 RX ADMIN — LIDOCAINE 2 PATCH: 4 CREAM TOPICAL at 10:35

## 2021-06-16 RX ADMIN — Medication 600 MILLIGRAM(S): at 09:36

## 2021-06-16 RX ADMIN — QUETIAPINE FUMARATE 100 MILLIGRAM(S): 200 TABLET, FILM COATED ORAL at 21:56

## 2021-06-16 NOTE — BH INPATIENT PSYCHIATRY PROGRESS NOTE - NSBHMSETHTPROC_PSY_A_CORE
Linear/Impaired reasoning
Linear/Illogical/Impaired reasoning/Other
Linear/Impaired reasoning
Linear/Illogical/Impaired reasoning/Other
Linear/Impaired reasoning
Linear/Illogical/Impaired reasoning/Other
Linear/Illogical/Impaired reasoning/Other
Linear/Impaired reasoning
Linear/Illogical/Impaired reasoning/Other
Linear/Impaired reasoning

## 2021-06-16 NOTE — BH INPATIENT PSYCHIATRY PROGRESS NOTE - NSBHCHARTREVIEWVS_PSY_A_CORE FT
Vital Signs Last 24 Hrs  T(C): 36.7 (16 Jun 2021 10:50), Max: 36.9 (15 Robin 2021 21:27)  T(F): 98.1 (16 Jun 2021 10:50), Max: 98.4 (15 Robin 2021 21:27)  HR: 93 (16 Jun 2021 08:05) (93 - 93)  BP: 119/82 (16 Jun 2021 08:05) (119/82 - 119/82)  BP(mean): --  RR: --  SpO2: --

## 2021-06-16 NOTE — BH INPATIENT PSYCHIATRY PROGRESS NOTE - NSBHMSEBODY_PSY_A_CORE
Underweight/Other

## 2021-06-16 NOTE — BH INPATIENT PSYCHIATRY PROGRESS NOTE - NSBHASSESSSUMMFT_PSY_ALL_CORE
70 y/o female, , domiciled alone with her dog, retired nurse, PPHx of depression w/psychotic features, possible personality disorder, +psychiatric hospitalizations x2 (last in 2014 at [facility]), previously followed at [facility] (stopped in 2/2020 due to COVID), no substance use or prior SAs, PMH of CAD s/p multiple stents, MI, hypothyroidism, left ear deafness, severe asthma, osteoporosis, diverticulitis who presented to the ED for confusion for 2 weeks, admitted to inpatient psychiatric unit for depression with psychotic features and passive SI. Pt confused to situation however reports depressive sx, no SI/HI.    Initial collateral from daughter Estela: "Estela, who reports that these periods of confusion occur on/off for the past 10 years, and they have all been due to medication noncompliance. She states that for the past month, her mother has been isolative, nonsensical, not getting out of bed; calling her She has complained that she is not sleeping, but whenever she goes over, pt is in bed. She says that her mother has been having day/night confusion, calling people at all hours. She also is showing up for doctor's appointments when she does not have any. Yesterday, her brother received a call from their neighbor saying that the patient was walking in the middle of the street looking for her cellphone. Pt also was trying to get into random people's car."    5/22: Pt seen, chart reviewed and discussed with nursing staff. Reports feeling ok, but admits feeling anxious, depressed. When asked  about circumstances before hospitalization, she reports that she is here for get support, was not eating well.  Endorses poor sleep, low appetite, low energy. Appears disorganized, confused and hard of hearing. Denies any paranoia, SI, HI, hallucination.    5/23: Pt remains disorganized, anxious and dysphoric. On exam, Pt reports not feeling well. has left lower back pain, anxious, depressed. Endorses poor sleep, low appetite, low energy and motivation. Feels her memory is not that good. Appears dysphoric, hard of hearing but less confused. Denies any jose c, paranoia, SI, HI, hallucination.    5/24:  No events reported over the weekend. Sleep and appetite is "better". Patient denies any current AH/VH but endorsed +AH and +VH prior to admission. Patient reported feeling very depressed since being isolated due to covid, she endorsed passive SI with no intent or plan. She also reported taking less of her medications by splitting pills in half at home. Patient is out on the unit, social with peers but is still very anxious. Patient is compliant with medications, no adverse effects reported.    5/25:  No events reported over the weekend. Sleep is "not as good" and appetite is "improved". Patient reported being in significant pain in her arm and also of a headache. Patient given PRN Oxycodone dose earlier and pain management consult ordered. Spoke with patient about starting Cymbalta for depression. She was hesitant at first but then agreed. Some paranoia noted around medications. Patient also stated that she feels like she is bothering her daughter too much and did not want us to call her frequently, but daughter called hospital looking to get updates and denied telling her mother that she needs a break.    5/26: Patient seen for follow up for depression and psychosis. Chart reviewed and case discussed with interdisciplinary staff. per staff, patient is very somatic. On encounter, patient is pleasant but believes she is not getting adequate care, reports that she used to be many more medications and thinks we are missing something, insists that I speak with her pulmonologist Dr. Ketan Morfin. Otherwise, eating and sleeping well. Patient remains compliant with medications, no adverse effects reported or in evidence.    5/27:  Per staff, patient is very somatic, requesting more meds for constipation. On encounter, patient is pleasant, happy that she had PT today, complains of constipation, requesting increase in Senna, Dulcolax suppository offered and patient agress. Otherwise, eating and sleeping well.    5/28:  No significant overnight event. On encounter, patient is calm, reports that she had a small BM, reports that she has some "sinus" issues now and requests something additional for pain relief, would also like to be evaluated for UTI. Otherwise, eating and sleeping well.     5/29: No significant overnight event. On encounter, patient reports mood as "little better", still reports generalized pain including lower abdominal pain. Patient is eating and sleeping well.     6/1: No reported overnight events. On encounter pt remains somatically preoccupied, states she needs to be on additional medications for her asthma and complains of headaches. In terms of her mood pt describes feeling "bored", but states she is doing better, she is eating well and states she has been able to gain back some of the weight she had lost.  6/2: Continues to endorse depression, anxiety and somatic complaints. Increased Cymbalta to 40mg QD.   6/3:  Met with Ms. Primrose this morning in the dayroom. Pt reports feeling dizzy and unsteady on her feet early this morning. Checked her vitals later in the morning, she was not orthostatic at that point. She continues to verbalize multiple somatic complaints such as nausea and later reflux. Continues to endorse difficulty sleeping. Eliminated morning dose of seroquel 12.5mg due to patient's reports of morning dizziness/lightheadedness and increased her nighttime dose to 50mg. Signed conversion to voluntary status (9.13)  6/4: Pt continues to endorse several somatic complaints such as GERD and cramping in her legs. Reports her sleep was slightly better last night. She requests to speak to a therapist, reached out to Dr. Newman who agreed to meet with pt today. Called patient's daughter in order to discuss treatment plan and explore options to improve pill management at home but she was unavailable. Left a message. Increased Seroquel to 75mg QHS.   6/7: Met with Ms. Primrose this morning in a common area. She reports feeling a little low over the weekend and fearing "Im headed back to where I started". Provided reassurance that some mood fluctuations are normal and do not necessarily mean she has lost all the progress she has made. Pt complained of headaches today which are suggestive of tension headaches by her description of the distribution. Appears less somatically preoccupied. Reports sleeping better. Will f/u with daughter regarding ways to monitor pt's pill intake and use at home.   6/8: Pr reports feeling better, reports good sleep, reports her anxiety is "better". Pt requests her seroquel to be given later in the night since she doesn't want to feel tired too early. Called daughter to discuss case but she didn't . Will try again tomorrow.   Chart reviewed, no overnight events. No PRNs needed for anxiety over the past 24 hrs. 1 dose of oxycodone PRN taken over past 24 hrs for pain. Pt has requested to be referred to a day program or partial hospital after discharge, stating she will miss the feeling of community once she goes back home. Discussed with SW. Called daughter again, left a VM.   6/10:  Pt reports increasing anxiety as time for discharge approaches. She fears being "sent home without a plan" and feeling lonely at home. Reassured pt that she would not be sent home without an aftercare plan. Pt aware team is looking into day programs/partial hospitals to refer her to. Left daughter another VM.   6/11: Patient continues to endorse feeling anxious about being discharged, going home and feeling lonely. Spoke with son Sue and patient's pain management dr (Dr. Farooq) about patient's care and ways to better control/manage her medication regimen in the community. Pt received the hearing aids her daughter brought and may use them.   6/14: Pt reports sometimes experiencing some anxiety in the afternoons around 3pm. States that at that time she usually begins to ruminate about her current situation, how bored she feels and "what am I doing". Writer validated these feelings, normalizing her emotions in the context of how long she has been in the hospital. Encouraged patient to use her coping skills. Added a morning dose of Seroquel 25mg.     Plan:   Observation status: Routine  Psych: Continue Duloxetine to 90mg QD, cont Seroquel 25mg QAM + 100mg at 10pm, cont Klonopin 0.5mg BID   Medical: Oxycodone 5mg PO Q8H PRN for pain, Plavix, Albuterol, Symbicort and Spiriva.   6/16: Patient depressed, somatic, appears to be failing this trial but still room to titrate, if not responding may need to re visit ECT. Not actively suicidal

## 2021-06-16 NOTE — BH INPATIENT PSYCHIATRY PROGRESS NOTE - NSBHMSEATTEN_PSY_A_CORE
Normal
Impaired
Normal

## 2021-06-16 NOTE — BH INPATIENT PSYCHIATRY PROGRESS NOTE - NSBHMSESPEECH_PSY_A_CORE
Normal volume, rate, productivity, spontaneity and articulation

## 2021-06-16 NOTE — BH INPATIENT PSYCHIATRY PROGRESS NOTE - NSBHMSEINTELL_PSY_A_CORE
Average

## 2021-06-16 NOTE — UM REPORT PROGRESS NOTE - NSUMSWACTION_GEN_A_CORE FT
ALLIE meets with pt throughout the week for support.  Pt accepted referral to [facility], and [facility].  Pt would prefer to see her provider in person, writer will clarify the availability through the clinic.  Writer updated with pt's daughter, Estela.  Family are arranging for an aide to assist pt at home, 8 am-3 pm, to provide some structure and decrease her isolation. SW meets with pt throughout the week for support.  [facility], and [facility] referrals will be initiated when pt is clinically stable.  Pt will not be able to sit with a psychiatric provider in person through the [facility], so pt may choose to go elsewhere, as this is important to her.  Pt is anxious, but agreeable with ECT plan with her children's support SW meets with pt throughout the week for support.  Writer maintains contact with pt's children.  They have engaged a part-time private aide to assist with pt's transition home. [facility], and [facility] referrals will be initiated when pt is clinically stable.  Pt will not be able to sit with a psychiatric provider in person through the [facility], so pt may choose to go elsewhere, as this is important to her.

## 2021-06-16 NOTE — BH INPATIENT PSYCHIATRY PROGRESS NOTE - ADDITIONAL DETAILS / COMMENTS
Somatic complaints

## 2021-06-16 NOTE — BH INPATIENT PSYCHIATRY PROGRESS NOTE - NSBHMSETHTCONTENT_PSY_A_CORE
Unremarkable
Delusions/Suicidality/Other
Preoccupations/Other
Preoccupations/Other
Delusions/Suicidality/Other
Preoccupations/Other
Preoccupations/Other
Suicidality/Other
Delusions/Suicidality/Other
Preoccupations/Other
Delusions/Suicidality/Other
Unremarkable
Preoccupations/Other
Unremarkable
Unremarkable

## 2021-06-16 NOTE — UM REPORT PROGRESS NOTE - NSUMSWNOTE_GEN_A_CORE FT
Pt continues to report depressive symptoms, with somatic focus.  Pt with overall improvement since admission, able to retain details, and express her thoughts coherently.  Medication adjustments continue, and pt may consider ECT, which has helped her in the past.  Pt continues to report depressive symptoms, with somatic focus.  Pt with overall improvement since admission, able to retain details, and express her thoughts coherently.  Medication adjustments continue have continued, but pt with persistent depression and anxiety symptoms.  Pt has agreed to ECT, and has been medically cleared, scheduled for her first treatment 6/25. Pt with slow improvement as ECT course has been initiated with three to date.  Pt observed to have brighter affect, with more activity outside of her room.  Pt self-reporting only small improvement.  Pt is willing to continue with ECT.  Maintenance ECT may be possible logistically if clinically indicated.

## 2021-06-16 NOTE — BH INPATIENT PSYCHIATRY PROGRESS NOTE - NSBHMSEKNOWHOW_PSY_ALL_CORE
Vocabulary
Educational attainment/Vocabulary
Vocabulary
Educational attainment
Educational attainment/Vocabulary
Vocabulary

## 2021-06-16 NOTE — BH INPATIENT PSYCHIATRY PROGRESS NOTE - NSBHMSEREMMEM_PSY_A_CORE
Impaired

## 2021-06-16 NOTE — BH INPATIENT PSYCHIATRY PROGRESS NOTE - NSBHMSERECMEM_PSY_A_CORE
Normal
Impaired
Normal
Impaired
Normal
Impaired
Normal
Impaired
Impaired
Normal
Normal
Impaired

## 2021-06-16 NOTE — BH INPATIENT PSYCHIATRY PROGRESS NOTE - NSBHMSEMOOD_PSY_A_CORE
Depressed
Depressed/Anxious
Depressed
Depressed/Anxious
Depressed
Depressed/Anxious
Depressed
Depressed
Depressed/Anxious
Depressed/Anxious
Depressed
Depressed/Anxious

## 2021-06-16 NOTE — BH INPATIENT PSYCHIATRY PROGRESS NOTE - NSBHMSEAFFQUAL_PSY_A_CORE
Euthymic/Anxious
Depressed/Anxious
Euthymic/Anxious
Depressed/Anxious
Euthymic/Anxious
Depressed/Anxious
Euthymic/Anxious
Euthymic/Anxious
Depressed/Anxious
Euthymic/Anxious
Depressed/Anxious
Euthymic/Anxious
Depressed/Anxious
Depressed/Anxious

## 2021-06-16 NOTE — BH INPATIENT PSYCHIATRY PROGRESS NOTE - NSBHMSEBEHAV_PSY_A_CORE
Cooperative

## 2021-06-16 NOTE — BH INPATIENT PSYCHIATRY PROGRESS NOTE - NSBHPSYCHOLCOGORIENT_PSY_A_CORE
Oriented to time, place, person, situation
Oriented to time, place, person, situation
Not fully oriented...
Oriented to time, place, person, situation
Not fully oriented...
Not fully oriented...
Oriented to time, place, person, situation
Not fully oriented...
Oriented to time, place, person, situation
Not fully oriented...
Oriented to time, place, person, situation
Oriented to time, place, person, situation
Not fully oriented...

## 2021-06-16 NOTE — BH INPATIENT PSYCHIATRY PROGRESS NOTE - GENERAL APPEARANCE
No deformities present

## 2021-06-16 NOTE — BH INPATIENT PSYCHIATRY PROGRESS NOTE - NSBHMSELANG_PSY_A_CORE
No abnormalities noted
No abnormalities noted/Normal naming/Normal repetition
No abnormalities noted
No abnormalities noted/Normal naming/Normal repetition
No abnormalities noted/Normal naming/Normal repetition

## 2021-06-17 PROCEDURE — 90832 PSYTX W PT 30 MINUTES: CPT

## 2021-06-17 PROCEDURE — ZZZZZ: CPT

## 2021-06-17 RX ORDER — DULOXETINE HYDROCHLORIDE 30 MG/1
120 CAPSULE, DELAYED RELEASE ORAL DAILY
Refills: 0 | Status: DISCONTINUED | OUTPATIENT
Start: 2021-06-17 | End: 2021-06-18

## 2021-06-17 RX ADMIN — BUDESONIDE AND FORMOTEROL FUMARATE DIHYDRATE 2 PUFF(S): 160; 4.5 AEROSOL RESPIRATORY (INHALATION) at 08:35

## 2021-06-17 RX ADMIN — CLOPIDOGREL BISULFATE 75 MILLIGRAM(S): 75 TABLET, FILM COATED ORAL at 08:26

## 2021-06-17 RX ADMIN — OXYCODONE HYDROCHLORIDE 5 MILLIGRAM(S): 5 TABLET ORAL at 13:28

## 2021-06-17 RX ADMIN — QUETIAPINE FUMARATE 100 MILLIGRAM(S): 200 TABLET, FILM COATED ORAL at 21:49

## 2021-06-17 RX ADMIN — POLYETHYLENE GLYCOL 3350 17 GRAM(S): 17 POWDER, FOR SOLUTION ORAL at 10:48

## 2021-06-17 RX ADMIN — Medication 0.5 MILLIGRAM(S): at 08:28

## 2021-06-17 RX ADMIN — Medication 600 MILLIGRAM(S): at 21:48

## 2021-06-17 RX ADMIN — DULOXETINE HYDROCHLORIDE 120 MILLIGRAM(S): 30 CAPSULE, DELAYED RELEASE ORAL at 08:32

## 2021-06-17 RX ADMIN — QUETIAPINE FUMARATE 50 MILLIGRAM(S): 200 TABLET, FILM COATED ORAL at 08:27

## 2021-06-17 RX ADMIN — Medication 0.5 MILLIGRAM(S): at 21:48

## 2021-06-17 RX ADMIN — Medication 75 MICROGRAM(S): at 06:21

## 2021-06-17 RX ADMIN — Medication 81 MILLIGRAM(S): at 08:25

## 2021-06-17 RX ADMIN — SENNA PLUS 2 TABLET(S): 8.6 TABLET ORAL at 21:48

## 2021-06-17 RX ADMIN — BUDESONIDE AND FORMOTEROL FUMARATE DIHYDRATE 2 PUFF(S): 160; 4.5 AEROSOL RESPIRATORY (INHALATION) at 21:49

## 2021-06-17 RX ADMIN — LIDOCAINE 2 PATCH: 4 CREAM TOPICAL at 08:32

## 2021-06-17 RX ADMIN — Medication 600 MILLIGRAM(S): at 08:26

## 2021-06-17 NOTE — BH INPATIENT PSYCHIATRY PROGRESS NOTE - NSBHASSESSSUMMFT_PSY_ALL_CORE
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), previously followed at [facility] (stopped in 2/2020 due to COVID), no substance use or prior SAs, PMH of CAD s/p multiple stents, MI, hypothyroidism, left ear deafness, severe asthma, osteoporosis, diverticulitis who presented to the ED for confusion for 2 weeks, admitted to inpatient psychiatric unit for depression with psychotic features and passive SI. Pt confused to situation however reports depressive sx, no SI/HI.    Initial collateral from daughter Estela: "sEtela, who reports that these periods of confusion occur on/off for the past 10 years, and they have all been due to medication noncompliance. She states that for the past month, her mother has been isolative, nonsensical, not getting out of bed; calling her She has complained that she is not sleeping, but whenever she goes over, pt is in bed. She says that her mother has been having day/night confusion, calling people at all hours. She also is showing up for doctor's appointments when she does not have any. Yesterday, her brother received a call from their neighbor saying that the patient was walking in the middle of the street looking for her cellphone. Pt also was trying to get into random people's car."    5/22: Pt seen, chart reviewed and discussed with nursing staff. Reports feeling ok, but admits feeling anxious, depressed. When asked  about circumstances before hospitalization, she reports that she is here for get support, was not eating well.  Endorses poor sleep, low appetite, low energy. Appears disorganized, confused and hard of hearing. Denies any paranoia, SI, HI, hallucination.    5/23: Pt remains disorganized, anxious and dysphoric. On exam, Pt reports not feeling well. has left lower back pain, anxious, depressed. Endorses poor sleep, low appetite, low energy and motivation. Feels her memory is not that good. Appears dysphoric, hard of hearing but less confused. Denies any jose c, paranoia, SI, HI, hallucination.    5/24:  No events reported over the weekend. Sleep and appetite is "better". Patient denies any current AH/VH but endorsed +AH and +VH prior to admission. Patient reported feeling very depressed since being isolated due to covid, she endorsed passive SI with no intent or plan. She also reported taking less of her medications by splitting pills in half at home. Patient is out on the unit, social with peers but is still very anxious. Patient is compliant with medications, no adverse effects reported.    5/25:  No events reported over the weekend. Sleep is "not as good" and appetite is "improved". Patient reported being in significant pain in her arm and also of a headache. Patient given PRN Oxycodone dose earlier and pain management consult ordered. Spoke with patient about starting Cymbalta for depression. She was hesitant at first but then agreed. Some paranoia noted around medications. Patient also stated that she feels like she is bothering her daughter too much and did not want us to call her frequently, but daughter called hospital looking to get updates and denied telling her mother that she needs a break.    5/26: Patient seen for follow up for depression and psychosis. Chart reviewed and case discussed with interdisciplinary staff. per staff, patient is very somatic. On encounter, patient is pleasant but believes she is not getting adequate care, reports that she used to be many more medications and thinks we are missing something, insists that I speak with her pulmonologist Dr. Ketan Morfin. Otherwise, eating and sleeping well. Patient remains compliant with medications, no adverse effects reported or in evidence.    5/27:  Per staff, patient is very somatic, requesting more meds for constipation. On encounter, patient is pleasant, happy that she had PT today, complains of constipation, requesting increase in Senna, Dulcolax suppository offered and patient agress. Otherwise, eating and sleeping well.    5/28:  No significant overnight event. On encounter, patient is calm, reports that she had a small BM, reports that she has some "sinus" issues now and requests something additional for pain relief, would also like to be evaluated for UTI. Otherwise, eating and sleeping well.     5/29: No significant overnight event. On encounter, patient reports mood as "little better", still reports generalized pain including lower abdominal pain. Patient is eating and sleeping well.     6/1: No reported overnight events. On encounter pt remains somatically preoccupied, states she needs to be on additional medications for her asthma and complains of headaches. In terms of her mood pt describes feeling "bored", but states she is doing better, she is eating well and states she has been able to gain back some of the weight she had lost.  6/2: Continues to endorse depression, anxiety and somatic complaints. Increased Cymbalta to 40mg QD.   6/3:  Met with Ms. Primrose this morning in the dayroom. Pt reports feeling dizzy and unsteady on her feet early this morning. Checked her vitals later in the morning, she was not orthostatic at that point. She continues to verbalize multiple somatic complaints such as nausea and later reflux. Continues to endorse difficulty sleeping. Eliminated morning dose of seroquel 12.5mg due to patient's reports of morning dizziness/lightheadedness and increased her nighttime dose to 50mg. Signed conversion to voluntary status (9.13)  6/4: Pt continues to endorse several somatic complaints such as GERD and cramping in her legs. Reports her sleep was slightly better last night. She requests to speak to a therapist, reached out to Dr. Newman who agreed to meet with pt today. Called patient's daughter in order to discuss treatment plan and explore options to improve pill management at home but she was unavailable. Left a message. Increased Seroquel to 75mg QHS.   6/7: Met with Ms. Primrose this morning in a common area. She reports feeling a little low over the weekend and fearing "Im headed back to where I started". Provided reassurance that some mood fluctuations are normal and do not necessarily mean she has lost all the progress she has made. Pt complained of headaches today which are suggestive of tension headaches by her description of the distribution. Appears less somatically preoccupied. Reports sleeping better. Will f/u with daughter regarding ways to monitor pt's pill intake and use at home.   6/8: Pr reports feeling better, reports good sleep, reports her anxiety is "better". Pt requests her seroquel to be given later in the night since she doesn't want to feel tired too early. Called daughter to discuss case but she didn't . Will try again tomorrow.   Chart reviewed, no overnight events. No PRNs needed for anxiety over the past 24 hrs. 1 dose of oxycodone PRN taken over past 24 hrs for pain. Pt has requested to be referred to a day program or partial hospital after discharge, stating she will miss the feeling of community once she goes back home. Discussed with SW. Called daughter again, left a VM.   6/10:  Pt reports increasing anxiety as time for discharge approaches. She fears being "sent home without a plan" and feeling lonely at home. Reassured pt that she would not be sent home without an aftercare plan. Pt aware team is looking into day programs/partial hospitals to refer her to. Left daughter another VM.   6/11: Patient continues to endorse feeling anxious about being discharged, going home and feeling lonely. Spoke with son Sue and patient's pain management dr (Dr. Farooq) about patient's care and ways to better control/manage her medication regimen in the community. Pt received the hearing aids her daughter brought and may use them.   6/14: Pt reports sometimes experiencing some anxiety in the afternoons around 3pm. States that at that time she usually begins to ruminate about her current situation, how bored she feels and "what am I doing". Writer validated these feelings, normalizing her emotions in the context of how long she has been in the hospital. Encouraged patient to use her coping skills. Added a morning dose of Seroquel 25mg.     Plan:   Observation status: Routine  Psych: Continue Duloxetine to 90mg QD, cont Seroquel 25mg QAM + 100mg at 10pm, cont Klonopin 0.5mg BID   Medical: Oxycodone 5mg PO Q8H PRN for pain, Plavix, Albuterol, Symbicort and Spiriva.   6/16: Patient depressed, somatic, appears to be failing this trial but still room to titrate, if not responding may need to re visit ECT. Not actively suicidal  6/17: Still depressed. AThere is lack of steady gains. Will increase Seroquel and Cymbalta, can revisit ECT if not improving further

## 2021-06-17 NOTE — BH INPATIENT PSYCHIATRY PROGRESS NOTE - MSE UNSTRUCTURED FT
Thin female, appropriate attire. [facility]. Nl speech, no EPS. Mood dysphoric somewhat anxious. No SI. Is somatically focused. No gross delusions. No voices. SOme focus on hospital itself being cause of symptoms. No FTD. Oriented. Partial insight

## 2021-06-17 NOTE — BH INPATIENT PSYCHIATRY PROGRESS NOTE - NSBHFUPINTERVALHXFT_PSY_A_CORE
Chart reviewed, no overnight events. No events over the weekend. Patient seen for depression. Eating sleeping okay. Denies sedation dizziness reports chronic back pain, also nasal congestion.

## 2021-06-17 NOTE — BH INPATIENT PSYCHIATRY PROGRESS NOTE - NSBHCHARTREVIEWVS_PSY_A_CORE FT
Vital Signs Last 24 Hrs  T(C): 36.4 (17 Jun 2021 06:46), Max: 36.5 (16 Jun 2021 21:24)  T(F): 97.6 (17 Jun 2021 06:46), Max: 97.7 (16 Jun 2021 21:24)  HR: --  BP: --  BP(mean): --  RR: --  SpO2: --

## 2021-06-18 PROCEDURE — 99232 SBSQ HOSP IP/OBS MODERATE 35: CPT

## 2021-06-18 RX ORDER — DULOXETINE HYDROCHLORIDE 30 MG/1
60 CAPSULE, DELAYED RELEASE ORAL DAILY
Refills: 0 | Status: DISCONTINUED | OUTPATIENT
Start: 2021-06-18 | End: 2021-07-02

## 2021-06-18 RX ADMIN — Medication 400 MILLIGRAM(S): at 11:27

## 2021-06-18 RX ADMIN — Medication 0.5 MILLIGRAM(S): at 22:25

## 2021-06-18 RX ADMIN — LIDOCAINE 2 PATCH: 4 CREAM TOPICAL at 09:54

## 2021-06-18 RX ADMIN — BUDESONIDE AND FORMOTEROL FUMARATE DIHYDRATE 2 PUFF(S): 160; 4.5 AEROSOL RESPIRATORY (INHALATION) at 22:25

## 2021-06-18 RX ADMIN — ALBUTEROL 2 PUFF(S): 90 AEROSOL, METERED ORAL at 22:39

## 2021-06-18 RX ADMIN — SENNA PLUS 2 TABLET(S): 8.6 TABLET ORAL at 22:26

## 2021-06-18 RX ADMIN — BUDESONIDE AND FORMOTEROL FUMARATE DIHYDRATE 2 PUFF(S): 160; 4.5 AEROSOL RESPIRATORY (INHALATION) at 09:54

## 2021-06-18 RX ADMIN — CLOPIDOGREL BISULFATE 75 MILLIGRAM(S): 75 TABLET, FILM COATED ORAL at 09:57

## 2021-06-18 RX ADMIN — LIDOCAINE 2 PATCH: 4 CREAM TOPICAL at 18:01

## 2021-06-18 RX ADMIN — QUETIAPINE FUMARATE 100 MILLIGRAM(S): 200 TABLET, FILM COATED ORAL at 22:26

## 2021-06-18 RX ADMIN — Medication 400 MILLIGRAM(S): at 13:49

## 2021-06-18 RX ADMIN — OXYMETAZOLINE HYDROCHLORIDE 1 SPRAY(S): 0.5 SPRAY NASAL at 09:54

## 2021-06-18 RX ADMIN — OXYCODONE HYDROCHLORIDE 5 MILLIGRAM(S): 5 TABLET ORAL at 13:12

## 2021-06-18 RX ADMIN — POLYETHYLENE GLYCOL 3350 17 GRAM(S): 17 POWDER, FOR SOLUTION ORAL at 09:54

## 2021-06-18 RX ADMIN — Medication 75 MICROGRAM(S): at 06:23

## 2021-06-18 RX ADMIN — ONDANSETRON 4 MILLIGRAM(S): 8 TABLET, FILM COATED ORAL at 11:28

## 2021-06-18 RX ADMIN — Medication 0.5 MILLIGRAM(S): at 09:57

## 2021-06-18 RX ADMIN — QUETIAPINE FUMARATE 50 MILLIGRAM(S): 200 TABLET, FILM COATED ORAL at 09:57

## 2021-06-18 RX ADMIN — OXYCODONE HYDROCHLORIDE 5 MILLIGRAM(S): 5 TABLET ORAL at 17:02

## 2021-06-18 RX ADMIN — Medication 600 MILLIGRAM(S): at 09:56

## 2021-06-18 RX ADMIN — DULOXETINE HYDROCHLORIDE 120 MILLIGRAM(S): 30 CAPSULE, DELAYED RELEASE ORAL at 09:58

## 2021-06-18 RX ADMIN — Medication 81 MILLIGRAM(S): at 09:58

## 2021-06-18 NOTE — BH INPATIENT PSYCHIATRY PROGRESS NOTE - NSBHASSESSSUMMFT_PSY_ALL_CORE
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 Pt confused to situation however reports depressive sx, no SI/HI.    Initial collateral from daughter Estela: "Estela, who reports that these periods of confusion occur on/off for the past 10 years, and they have all been due to medication noncompliance. She states that for the past month, her mother has been isolative, nonsensical, not getting out of bed; calling her She has complained that she is not sleeping, but whenever she goes over, pt is in bed. She says that her mother has been having day/night confusion, calling people at all hours. She also is showing up for doctor's appointments when she does not have any. Yesterday, her brother received a call from their neighbor saying that the patient was walking in the middle of the street looking for her cellphone. Pt also was trying to get into random people's car."    5/22: Pt seen, chart reviewed and discussed with nursing staff. Reports feeling ok, but admits feeling anxious, depressed. When asked  about circumstances before hospitalization, she reports that she is here for get support, was not eating well.  Endorses poor sleep, low appetite, low energy. Appears disorganized, confused and hard of hearing. Denies any paranoia, SI, HI, hallucination.    5/23: Pt remains disorganized, anxious and dysphoric. On exam, Pt reports not feeling well. has left lower back pain, anxious, depressed. Endorses poor sleep, low appetite, low energy and motivation. Feels her memory is not that good. Appears dysphoric, hard of hearing but less confused. Denies any jose c, paranoia, SI, HI, hallucination.    5/24:  No events reported over the weekend. Sleep and appetite is "better". Patient denies any current AH/VH but endorsed +AH and +VH prior to admission. Patient reported feeling very depressed since being isolated due to covid, she endorsed passive SI with no intent or plan. She also reported taking less of her medications by splitting pills in half at home. Patient is out on the unit, social with peers but is still very anxious. Patient is compliant with medications, no adverse effects reported.    5/25:  No events reported over the weekend. Sleep is "not as good" and appetite is "improved". Patient reported being in significant pain in her arm and also of a headache. Patient given PRN Oxycodone dose earlier and pain management consult ordered. Spoke with patient about starting Cymbalta for depression. She was hesitant at first but then agreed. Some paranoia noted around medications. Patient also stated that she feels like she is bothering her daughter too much and did not want us to call her frequently, but daughter called hospital looking to get updates and denied telling her mother that she needs a break.    5/26: Patient seen for follow up for depression and psychosis. Chart reviewed and case discussed with interdisciplinary staff. per staff, patient is very somatic. On encounter, patient is pleasant but believes she is not getting adequate care, reports that she used to be many more medications and thinks we are missing something, insists that I speak with her pulmonologist Dr. Ketan Morfin. Otherwise, eating and sleeping well. Patient remains compliant with medications, no adverse effects reported or in evidence.    5/27:  Per staff, patient is very somatic, requesting more meds for constipation. On encounter, patient is pleasant, happy that she had PT today, complains of constipation, requesting increase in Senna, Dulcolax suppository offered and patient agress. Otherwise, eating and sleeping well.    5/28:  No significant overnight event. On encounter, patient is calm, reports that she had a small BM, reports that she has some "sinus" issues now and requests something additional for pain relief, would also like to be evaluated for UTI. Otherwise, eating and sleeping well.     5/29: No significant overnight event. On encounter, patient reports mood as "little better", still reports generalized pain including lower abdominal pain. Patient is eating and sleeping well.     6/1: No reported overnight events. On encounter pt remains somatically preoccupied, states she needs to be on additional medications for her asthma and complains of headaches. In terms of her mood pt describes feeling "bored", but states she is doing better, she is eating well and states she has been able to gain back some of the weight she had lost.  6/2: Continues to endorse depression, anxiety and somatic complaints. Increased Cymbalta to 40mg QD.   6/3:  Met with Ms. Primrose this morning in the dayroom. Pt reports feeling dizzy and unsteady on her feet early this morning. Checked her vitals later in the morning, she was not orthostatic at that point. She continues to verbalize multiple somatic complaints such as nausea and later reflux. Continues to endorse difficulty sleeping. Eliminated morning dose of seroquel 12.5mg due to patient's reports of morning dizziness/lightheadedness and increased her nighttime dose to 50mg. Signed conversion to voluntary status (9.13)  6/4: Pt continues to endorse several somatic complaints such as GERD and cramping in her legs. Reports her sleep was slightly better last night. She requests to speak to a therapist, reached out to Dr. Newman who agreed to meet with pt today. Called patient's daughter in order to discuss treatment plan and explore options to improve pill management at home but she was unavailable. Left a message. Increased Seroquel to 75mg QHS.   6/7: Met with Ms. Primrose this morning in a common area. She reports feeling a little low over the weekend and fearing "Im headed back to where I started". Provided reassurance that some mood fluctuations are normal and do not necessarily mean she has lost all the progress she has made. Pt complained of headaches today which are suggestive of tension headaches by her description of the distribution. Appears less somatically preoccupied. Reports sleeping better. Will f/u with daughter regarding ways to monitor pt's pill intake and use at home.   6/8: Pr reports feeling better, reports good sleep, reports her anxiety is "better". Pt requests her seroquel to be given later in the night since she doesn't want to feel tired too early. Called daughter to discuss case but she didn't . Will try again tomorrow.   Chart reviewed, no overnight events. No PRNs needed for anxiety over the past 24 hrs. 1 dose of oxycodone PRN taken over past 24 hrs for pain. Pt has requested to be referred to a day program or partial hospital after discharge, stating she will miss the feeling of community once she goes back home. Discussed with SW. Called daughter again, left a VM.   6/10:  Pt reports increasing anxiety as time for discharge approaches. She fears being "sent home without a plan" and feeling lonely at home. Reassured pt that she would not be sent home without an aftercare plan. Pt aware team is looking into day programs/partial hospitals to refer her to. Left daughter another VM.   6/11: Patient continues to endorse feeling anxious about being discharged, going home and feeling lonely. Spoke with son uSe and patient's pain management dr (Dr. Farooq) about patient's care and ways to better control/manage her medication regimen in the community. Pt received the hearing aids her daughter brought and may use them.   6/14: Pt reports sometimes experiencing some anxiety in the afternoons around 3pm. States that at that time she usually begins to ruminate about her current situation, how bored she feels and "what am I doing". Writer validated these feelings, normalizing her emotions in the context of how long she has been in the hospital. Encouraged patient to use her coping skills. Added a morning dose of Seroquel 25mg.     Plan:   Observation status: Routine  Psych: Continue Duloxetine to 90mg QD, cont Seroquel 25mg QAM + 100mg at 10pm, cont Klonopin 0.5mg BID   Medical: Oxycodone 5mg PO Q8H PRN for pain, Plavix, Albuterol, Symbicort and Spiriva.   6/16: Patient depressed, somatic, appears to be failing this trial but still room to titrate, if not responding may need to re visit ECT. Not actively suicidal  6/17: Still depressed. AThere is lack of steady gains. Will increase Seroquel and Cymbalta, can revisit ECT if not improving further  6/18 Doing worse depressed somatic and new se nausea maybe from SSREI and Mucinex and tremor from increased Cymbalta., Will lower Cymbalta will d/c Afrin and Mucinex. Will start ECT w/u

## 2021-06-18 NOTE — BH INPATIENT PSYCHIATRY PROGRESS NOTE - NSBHCHARTREVIEWVS_PSY_A_CORE FT
Vital Signs Last 24 Hrs  T(C): 36.4 (18 Jun 2021 10:51), Max: 36.4 (18 Jun 2021 10:51)  T(F): 97.6 (18 Jun 2021 10:51), Max: 97.6 (18 Jun 2021 10:51)  HR: --  BP: 117/83 (18 Jun 2021 08:00) (117/83 - 117/83)  BP(mean): 98 (18 Jun 2021 08:00) (98 - 98)  RR: --  SpO2: --

## 2021-06-18 NOTE — BH INPATIENT PSYCHIATRY PROGRESS NOTE - NSBHFUPINTERVALHXFT_PSY_A_CORE
Chart reviewed, no overnight events. No events over the weekend. Patient seen for depression. Eating sleeping okay. Denies sedation dizziness reports chronic back pain,now c/o nausea

## 2021-06-18 NOTE — BH INPATIENT PSYCHIATRY PROGRESS NOTE - MSE UNSTRUCTURED FT
Thin female, appropriate attire. [place]. Nl speech, no EPS, but has increased postural tremor. Mood dysphoric somewhat anxious. No SI. Is somatically focused. No gross delusions. No voices. No FTD. Oriented. Partial insight

## 2021-06-19 RX ADMIN — Medication 81 MILLIGRAM(S): at 09:08

## 2021-06-19 RX ADMIN — LIDOCAINE 2 PATCH: 4 CREAM TOPICAL at 09:12

## 2021-06-19 RX ADMIN — ONDANSETRON 4 MILLIGRAM(S): 8 TABLET, FILM COATED ORAL at 12:41

## 2021-06-19 RX ADMIN — DULOXETINE HYDROCHLORIDE 60 MILLIGRAM(S): 30 CAPSULE, DELAYED RELEASE ORAL at 09:09

## 2021-06-19 RX ADMIN — ALBUTEROL 2 PUFF(S): 90 AEROSOL, METERED ORAL at 23:12

## 2021-06-19 RX ADMIN — CLOPIDOGREL BISULFATE 75 MILLIGRAM(S): 75 TABLET, FILM COATED ORAL at 09:10

## 2021-06-19 RX ADMIN — POLYETHYLENE GLYCOL 3350 17 GRAM(S): 17 POWDER, FOR SOLUTION ORAL at 09:08

## 2021-06-19 RX ADMIN — QUETIAPINE FUMARATE 100 MILLIGRAM(S): 200 TABLET, FILM COATED ORAL at 23:04

## 2021-06-19 RX ADMIN — Medication 0.5 MILLIGRAM(S): at 23:03

## 2021-06-19 RX ADMIN — Medication 400 MILLIGRAM(S): at 09:56

## 2021-06-19 RX ADMIN — Medication 0.5 MILLIGRAM(S): at 09:11

## 2021-06-19 RX ADMIN — Medication 75 MICROGRAM(S): at 06:55

## 2021-06-19 RX ADMIN — BUDESONIDE AND FORMOTEROL FUMARATE DIHYDRATE 2 PUFF(S): 160; 4.5 AEROSOL RESPIRATORY (INHALATION) at 23:03

## 2021-06-19 RX ADMIN — QUETIAPINE FUMARATE 50 MILLIGRAM(S): 200 TABLET, FILM COATED ORAL at 09:09

## 2021-06-19 RX ADMIN — OXYCODONE HYDROCHLORIDE 5 MILLIGRAM(S): 5 TABLET ORAL at 17:25

## 2021-06-19 RX ADMIN — LIDOCAINE 2 PATCH: 4 CREAM TOPICAL at 17:25

## 2021-06-19 RX ADMIN — BUDESONIDE AND FORMOTEROL FUMARATE DIHYDRATE 2 PUFF(S): 160; 4.5 AEROSOL RESPIRATORY (INHALATION) at 09:22

## 2021-06-19 RX ADMIN — OXYCODONE HYDROCHLORIDE 5 MILLIGRAM(S): 5 TABLET ORAL at 13:06

## 2021-06-19 RX ADMIN — SENNA PLUS 2 TABLET(S): 8.6 TABLET ORAL at 23:04

## 2021-06-19 RX ADMIN — Medication 400 MILLIGRAM(S): at 09:10

## 2021-06-20 LAB
APPEARANCE UR: CLEAR — SIGNIFICANT CHANGE UP
BILIRUB UR-MCNC: NEGATIVE — SIGNIFICANT CHANGE UP
COLOR SPEC: SIGNIFICANT CHANGE UP
DIFF PNL FLD: NEGATIVE — SIGNIFICANT CHANGE UP
GLUCOSE UR QL: NEGATIVE — SIGNIFICANT CHANGE UP
KETONES UR-MCNC: NEGATIVE — SIGNIFICANT CHANGE UP
LEUKOCYTE ESTERASE UR-ACNC: NEGATIVE — SIGNIFICANT CHANGE UP
NITRITE UR-MCNC: NEGATIVE — SIGNIFICANT CHANGE UP
PH UR: 6 — SIGNIFICANT CHANGE UP (ref 5–8)
PROT UR-MCNC: NEGATIVE — SIGNIFICANT CHANGE UP
SP GR SPEC: 1.01 — SIGNIFICANT CHANGE UP (ref 1.01–1.02)
UROBILINOGEN FLD QL: SIGNIFICANT CHANGE UP

## 2021-06-20 RX ADMIN — SENNA PLUS 2 TABLET(S): 8.6 TABLET ORAL at 21:34

## 2021-06-20 RX ADMIN — Medication 0.5 MILLIGRAM(S): at 21:33

## 2021-06-20 RX ADMIN — Medication 0.5 MILLIGRAM(S): at 08:08

## 2021-06-20 RX ADMIN — Medication 400 MILLIGRAM(S): at 08:08

## 2021-06-20 RX ADMIN — Medication 75 MICROGRAM(S): at 06:55

## 2021-06-20 RX ADMIN — LIDOCAINE 2 PATCH: 4 CREAM TOPICAL at 18:21

## 2021-06-20 RX ADMIN — QUETIAPINE FUMARATE 100 MILLIGRAM(S): 200 TABLET, FILM COATED ORAL at 21:34

## 2021-06-20 RX ADMIN — Medication 400 MILLIGRAM(S): at 10:17

## 2021-06-20 RX ADMIN — CLOPIDOGREL BISULFATE 75 MILLIGRAM(S): 75 TABLET, FILM COATED ORAL at 08:09

## 2021-06-20 RX ADMIN — Medication 81 MILLIGRAM(S): at 08:07

## 2021-06-20 RX ADMIN — BUDESONIDE AND FORMOTEROL FUMARATE DIHYDRATE 2 PUFF(S): 160; 4.5 AEROSOL RESPIRATORY (INHALATION) at 09:10

## 2021-06-20 RX ADMIN — OXYCODONE HYDROCHLORIDE 5 MILLIGRAM(S): 5 TABLET ORAL at 13:55

## 2021-06-20 RX ADMIN — QUETIAPINE FUMARATE 12.5 MILLIGRAM(S): 200 TABLET, FILM COATED ORAL at 17:50

## 2021-06-20 RX ADMIN — DULOXETINE HYDROCHLORIDE 60 MILLIGRAM(S): 30 CAPSULE, DELAYED RELEASE ORAL at 08:08

## 2021-06-20 RX ADMIN — LIDOCAINE 2 PATCH: 4 CREAM TOPICAL at 09:12

## 2021-06-20 RX ADMIN — OXYCODONE HYDROCHLORIDE 5 MILLIGRAM(S): 5 TABLET ORAL at 12:10

## 2021-06-20 RX ADMIN — QUETIAPINE FUMARATE 50 MILLIGRAM(S): 200 TABLET, FILM COATED ORAL at 08:07

## 2021-06-20 RX ADMIN — ALBUTEROL 2 PUFF(S): 90 AEROSOL, METERED ORAL at 21:35

## 2021-06-20 RX ADMIN — BUDESONIDE AND FORMOTEROL FUMARATE DIHYDRATE 2 PUFF(S): 160; 4.5 AEROSOL RESPIRATORY (INHALATION) at 21:19

## 2021-06-20 RX ADMIN — POLYETHYLENE GLYCOL 3350 17 GRAM(S): 17 POWDER, FOR SOLUTION ORAL at 08:11

## 2021-06-21 ENCOUNTER — APPOINTMENT (OUTPATIENT)
Dept: CV DIAGNOSITCS | Facility: HOSPITAL | Age: 71
End: 2021-06-21

## 2021-06-21 LAB
CULTURE RESULTS: SIGNIFICANT CHANGE UP
SPECIMEN SOURCE: SIGNIFICANT CHANGE UP

## 2021-06-21 PROCEDURE — 99232 SBSQ HOSP IP/OBS MODERATE 35: CPT

## 2021-06-21 RX ORDER — OXYCODONE HYDROCHLORIDE 5 MG/1
5 TABLET ORAL EVERY 8 HOURS
Refills: 0 | Status: DISCONTINUED | OUTPATIENT
Start: 2021-06-21 | End: 2021-06-28

## 2021-06-21 RX ORDER — CLONAZEPAM 1 MG
0.5 TABLET ORAL
Refills: 0 | Status: DISCONTINUED | OUTPATIENT
Start: 2021-06-21 | End: 2021-06-23

## 2021-06-21 RX ADMIN — ALBUTEROL 2 PUFF(S): 90 AEROSOL, METERED ORAL at 21:05

## 2021-06-21 RX ADMIN — BUDESONIDE AND FORMOTEROL FUMARATE DIHYDRATE 2 PUFF(S): 160; 4.5 AEROSOL RESPIRATORY (INHALATION) at 09:46

## 2021-06-21 RX ADMIN — ONDANSETRON 4 MILLIGRAM(S): 8 TABLET, FILM COATED ORAL at 10:34

## 2021-06-21 RX ADMIN — SENNA PLUS 2 TABLET(S): 8.6 TABLET ORAL at 20:53

## 2021-06-21 RX ADMIN — DULOXETINE HYDROCHLORIDE 60 MILLIGRAM(S): 30 CAPSULE, DELAYED RELEASE ORAL at 09:41

## 2021-06-21 RX ADMIN — POLYETHYLENE GLYCOL 3350 17 GRAM(S): 17 POWDER, FOR SOLUTION ORAL at 09:46

## 2021-06-21 RX ADMIN — Medication 650 MILLIGRAM(S): at 09:41

## 2021-06-21 RX ADMIN — QUETIAPINE FUMARATE 50 MILLIGRAM(S): 200 TABLET, FILM COATED ORAL at 09:45

## 2021-06-21 RX ADMIN — Medication 0.5 MILLIGRAM(S): at 09:41

## 2021-06-21 RX ADMIN — BUDESONIDE AND FORMOTEROL FUMARATE DIHYDRATE 2 PUFF(S): 160; 4.5 AEROSOL RESPIRATORY (INHALATION) at 20:54

## 2021-06-21 RX ADMIN — QUETIAPINE FUMARATE 100 MILLIGRAM(S): 200 TABLET, FILM COATED ORAL at 20:53

## 2021-06-21 RX ADMIN — LIDOCAINE 2 PATCH: 4 CREAM TOPICAL at 19:13

## 2021-06-21 RX ADMIN — OXYCODONE HYDROCHLORIDE 5 MILLIGRAM(S): 5 TABLET ORAL at 13:27

## 2021-06-21 RX ADMIN — CLOPIDOGREL BISULFATE 75 MILLIGRAM(S): 75 TABLET, FILM COATED ORAL at 09:41

## 2021-06-21 RX ADMIN — Medication 75 MICROGRAM(S): at 06:12

## 2021-06-21 RX ADMIN — LIDOCAINE 2 PATCH: 4 CREAM TOPICAL at 09:46

## 2021-06-21 RX ADMIN — Medication 0.5 MILLIGRAM(S): at 20:53

## 2021-06-21 RX ADMIN — Medication 400 MILLIGRAM(S): at 10:34

## 2021-06-21 RX ADMIN — Medication 81 MILLIGRAM(S): at 09:41

## 2021-06-21 NOTE — BH INPATIENT PSYCHIATRY PROGRESS NOTE - NSBHCHARTREVIEWVS_PSY_A_CORE FT
Vital Signs Last 24 Hrs  T(C): 36.4 (21 Jun 2021 11:26), Max: 36.5 (21 Jun 2021 06:33)  T(F): 97.6 (21 Jun 2021 11:26), Max: 97.7 (21 Jun 2021 06:33)  HR: --111  BP: --112/68  BP(mean): --  RR: --  SpO2: --

## 2021-06-21 NOTE — BH INPATIENT PSYCHIATRY PROGRESS NOTE - NSBHFUPINTERVALHXFT_PSY_A_CORE
Pt seen for MDD, anxiety,  chart reviewed, discussed with team. No events overnight. On exam, Pt reports feeling good, sleeping and eating ok. Denies any SI, HI, paranoia.

## 2021-06-21 NOTE — BH INPATIENT PSYCHIATRY PROGRESS NOTE - MSE UNSTRUCTURED FT
Pt appears stated age, thin, frail, alert, awake, oriented x 3, in no acute physical distress. Dressed casually, fair grooming and hygiene. No agitation/retardation/EPS noted, stable gait. Cooperative, makes eye contact. Speech is relevant, normal in volume, rate, and productivity. Mood is ok, affect is congruent. Thoughts are linear, no gross delusions, denies any suicidal/homicidal ideas/plan. No hallucination elicited. Insight and judgment are fair.

## 2021-06-21 NOTE — BH INPATIENT PSYCHIATRY PROGRESS NOTE - NSBHASSESSSUMMFT_PSY_ALL_CORE
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 Pt confused to situation however reports depressive sx, no SI/HI.    Initial collateral from daughter Estela: "Estela, who reports that these periods of confusion occur on/off for the past 10 years, and they have all been due to medication noncompliance. She states that for the past month, her mother has been isolative, nonsensical, not getting out of bed; calling her She has complained that she is not sleeping, but whenever she goes over, pt is in bed. She says that her mother has been having day/night confusion, calling people at all hours. She also is showing up for doctor's appointments when she does not have any. Yesterday, her brother received a call from their neighbor saying that the patient was walking in the middle of the street looking for her cellphone. Pt also was trying to get into random people's car."    5/22: Pt seen, chart reviewed and discussed with nursing staff. Reports feeling ok, but admits feeling anxious, depressed. When asked  about circumstances before hospitalization, she reports that she is here for get support, was not eating well.  Endorses poor sleep, low appetite, low energy. Appears disorganized, confused and hard of hearing. Denies any paranoia, SI, HI, hallucination.    5/23: Pt remains disorganized, anxious and dysphoric. On exam, Pt reports not feeling well. has left lower back pain, anxious, depressed. Endorses poor sleep, low appetite, low energy and motivation. Feels her memory is not that good. Appears dysphoric, hard of hearing but less confused. Denies any jose c, paranoia, SI, HI, hallucination.    5/24:  No events reported over the weekend. Sleep and appetite is "better". Patient denies any current AH/VH but endorsed +AH and +VH prior to admission. Patient reported feeling very depressed since being isolated due to covid, she endorsed passive SI with no intent or plan. She also reported taking less of her medications by splitting pills in half at home. Patient is out on the unit, social with peers but is still very anxious. Patient is compliant with medications, no adverse effects reported.    5/25:  No events reported over the weekend. Sleep is "not as good" and appetite is "improved". Patient reported being in significant pain in her arm and also of a headache. Patient given PRN Oxycodone dose earlier and pain management consult ordered. Spoke with patient about starting Cymbalta for depression. She was hesitant at first but then agreed. Some paranoia noted around medications. Patient also stated that she feels like she is bothering her daughter too much and did not want us to call her frequently, but daughter called hospital looking to get updates and denied telling her mother that she needs a break.    5/26: Patient seen for follow up for depression and psychosis. Chart reviewed and case discussed with interdisciplinary staff. per staff, patient is very somatic. On encounter, patient is pleasant but believes she is not getting adequate care, reports that she used to be many more medications and thinks we are missing something, insists that I speak with her pulmonologist Dr. Ketan Morfin. Otherwise, eating and sleeping well. Patient remains compliant with medications, no adverse effects reported or in evidence.    5/27:  Per staff, patient is very somatic, requesting more meds for constipation. On encounter, patient is pleasant, happy that she had PT today, complains of constipation, requesting increase in Senna, Dulcolax suppository offered and patient agress. Otherwise, eating and sleeping well.    5/28:  No significant overnight event. On encounter, patient is calm, reports that she had a small BM, reports that she has some "sinus" issues now and requests something additional for pain relief, would also like to be evaluated for UTI. Otherwise, eating and sleeping well.     5/29: No significant overnight event. On encounter, patient reports mood as "little better", still reports generalized pain including lower abdominal pain. Patient is eating and sleeping well.     6/1: No reported overnight events. On encounter pt remains somatically preoccupied, states she needs to be on additional medications for her asthma and complains of headaches. In terms of her mood pt describes feeling "bored", but states she is doing better, she is eating well and states she has been able to gain back some of the weight she had lost.  6/2: Continues to endorse depression, anxiety and somatic complaints. Increased Cymbalta to 40mg QD.   6/3:  Met with Ms. Primrose this morning in the dayroom. Pt reports feeling dizzy and unsteady on her feet early this morning. Checked her vitals later in the morning, she was not orthostatic at that point. She continues to verbalize multiple somatic complaints such as nausea and later reflux. Continues to endorse difficulty sleeping. Eliminated morning dose of seroquel 12.5mg due to patient's reports of morning dizziness/lightheadedness and increased her nighttime dose to 50mg. Signed conversion to voluntary status (9.13)  6/4: Pt continues to endorse several somatic complaints such as GERD and cramping in her legs. Reports her sleep was slightly better last night. She requests to speak to a therapist, reached out to Dr. Newman who agreed to meet with pt today. Called patient's daughter in order to discuss treatment plan and explore options to improve pill management at home but she was unavailable. Left a message. Increased Seroquel to 75mg QHS.   6/7: Met with Ms. Primrose this morning in a common area. She reports feeling a little low over the weekend and fearing "Im headed back to where I started". Provided reassurance that some mood fluctuations are normal and do not necessarily mean she has lost all the progress she has made. Pt complained of headaches today which are suggestive of tension headaches by her description of the distribution. Appears less somatically preoccupied. Reports sleeping better. Will f/u with daughter regarding ways to monitor pt's pill intake and use at home.   6/8: Pr reports feeling better, reports good sleep, reports her anxiety is "better". Pt requests her seroquel to be given later in the night since she doesn't want to feel tired too early. Called daughter to discuss case but she didn't . Will try again tomorrow.   Chart reviewed, no overnight events. No PRNs needed for anxiety over the past 24 hrs. 1 dose of oxycodone PRN taken over past 24 hrs for pain. Pt has requested to be referred to a day program or partial hospital after discharge, stating she will miss the feeling of community once she goes back home. Discussed with SW. Called daughter again, left a VM.   6/10:  Pt reports increasing anxiety as time for discharge approaches. She fears being "sent home without a plan" and feeling lonely at home. Reassured pt that she would not be sent home without an aftercare plan. Pt aware team is looking into day programs/partial hospitals to refer her to. Left daughter another VM.   6/11: Patient continues to endorse feeling anxious about being discharged, going home and feeling lonely. Spoke with son Sue and patient's pain management dr (Dr. Farooq) about patient's care and ways to better control/manage her medication regimen in the community. Pt received the hearing aids her daughter brought and may use them.   6/14: Pt reports sometimes experiencing some anxiety in the afternoons around 3pm. States that at that time she usually begins to ruminate about her current situation, how bored she feels and "what am I doing". Writer validated these feelings, normalizing her emotions in the context of how long she has been in the hospital. Encouraged patient to use her coping skills. Added a morning dose of Seroquel 25mg.     Plan:   Observation status: Routine  Psych: Continue Duloxetine to 90mg QD, cont Seroquel 25mg QAM + 100mg at 10pm, cont Klonopin 0.5mg BID   Medical: Oxycodone 5mg PO Q8H PRN for pain, Plavix, Albuterol, Symbicort and Spiriva.   6/16: Patient depressed, somatic, appears to be failing this trial but still room to titrate, if not responding may need to re visit ECT. Not actively suicidal  6/17: Still depressed. AThere is lack of steady gains. Will increase Seroquel and Cymbalta, can revisit ECT if not improving further  6/18 Doing worse depressed somatic and new se nausea maybe from SSREI and Mucinex and tremor from increased Cymbalta., Will lower Cymbalta will d/c Afrin and Mucinex. Will start ECT w/u.  6/21: Less anxious, remains dysphoric, no SI/HI, no psychosis. Continue ECT w/u.

## 2021-06-22 ENCOUNTER — RX RENEWAL (OUTPATIENT)
Age: 71
End: 2021-06-22

## 2021-06-22 ENCOUNTER — OUTPATIENT (OUTPATIENT)
Dept: OUTPATIENT SERVICES | Facility: HOSPITAL | Age: 71
LOS: 1 days | End: 2021-06-22

## 2021-06-22 ENCOUNTER — APPOINTMENT (OUTPATIENT)
Dept: CV DIAGNOSITCS | Facility: HOSPITAL | Age: 71
End: 2021-06-22

## 2021-06-22 DIAGNOSIS — Z90.79 ACQUIRED ABSENCE OF OTHER GENITAL ORGAN(S): Chronic | ICD-10-CM

## 2021-06-22 DIAGNOSIS — I50.9 HEART FAILURE, UNSPECIFIED: ICD-10-CM

## 2021-06-22 DIAGNOSIS — Z90.49 ACQUIRED ABSENCE OF OTHER SPECIFIED PARTS OF DIGESTIVE TRACT: Chronic | ICD-10-CM

## 2021-06-22 DIAGNOSIS — Z98.890 OTHER SPECIFIED POSTPROCEDURAL STATES: Chronic | ICD-10-CM

## 2021-06-22 LAB
ALBUMIN SERPL ELPH-MCNC: 4 G/DL — SIGNIFICANT CHANGE UP (ref 3.3–5)
ALP SERPL-CCNC: 68 U/L — SIGNIFICANT CHANGE UP (ref 40–120)
ALT FLD-CCNC: 14 U/L — SIGNIFICANT CHANGE UP (ref 4–33)
ANION GAP SERPL CALC-SCNC: 12 MMOL/L — SIGNIFICANT CHANGE UP (ref 7–14)
AST SERPL-CCNC: 19 U/L — SIGNIFICANT CHANGE UP (ref 4–32)
BASOPHILS # BLD AUTO: 0.02 K/UL — SIGNIFICANT CHANGE UP (ref 0–0.2)
BASOPHILS NFR BLD AUTO: 0.3 % — SIGNIFICANT CHANGE UP (ref 0–2)
BILIRUB SERPL-MCNC: <0.2 MG/DL — SIGNIFICANT CHANGE UP (ref 0.2–1.2)
BUN SERPL-MCNC: 15 MG/DL — SIGNIFICANT CHANGE UP (ref 7–23)
CALCIUM SERPL-MCNC: 9.2 MG/DL — SIGNIFICANT CHANGE UP (ref 8.4–10.5)
CHLORIDE SERPL-SCNC: 102 MMOL/L — SIGNIFICANT CHANGE UP (ref 98–107)
CO2 SERPL-SCNC: 27 MMOL/L — SIGNIFICANT CHANGE UP (ref 22–31)
CREAT SERPL-MCNC: 0.84 MG/DL — SIGNIFICANT CHANGE UP (ref 0.5–1.3)
EOSINOPHIL # BLD AUTO: 0.13 K/UL — SIGNIFICANT CHANGE UP (ref 0–0.5)
EOSINOPHIL NFR BLD AUTO: 1.9 % — SIGNIFICANT CHANGE UP (ref 0–6)
GLUCOSE SERPL-MCNC: 105 MG/DL — HIGH (ref 70–99)
HCT VFR BLD CALC: 39.9 % — SIGNIFICANT CHANGE UP (ref 34.5–45)
HGB BLD-MCNC: 13 G/DL — SIGNIFICANT CHANGE UP (ref 11.5–15.5)
IANC: 4.03 K/UL — SIGNIFICANT CHANGE UP (ref 1.5–8.5)
IMM GRANULOCYTES NFR BLD AUTO: 0.7 % — SIGNIFICANT CHANGE UP (ref 0–1.5)
LYMPHOCYTES # BLD AUTO: 2.1 K/UL — SIGNIFICANT CHANGE UP (ref 1–3.3)
LYMPHOCYTES # BLD AUTO: 30.3 % — SIGNIFICANT CHANGE UP (ref 13–44)
MCHC RBC-ENTMCNC: 31.8 PG — SIGNIFICANT CHANGE UP (ref 27–34)
MCHC RBC-ENTMCNC: 32.6 GM/DL — SIGNIFICANT CHANGE UP (ref 32–36)
MCV RBC AUTO: 97.6 FL — SIGNIFICANT CHANGE UP (ref 80–100)
MONOCYTES # BLD AUTO: 0.6 K/UL — SIGNIFICANT CHANGE UP (ref 0–0.9)
MONOCYTES NFR BLD AUTO: 8.7 % — SIGNIFICANT CHANGE UP (ref 2–14)
NEUTROPHILS # BLD AUTO: 4.03 K/UL — SIGNIFICANT CHANGE UP (ref 1.8–7.4)
NEUTROPHILS NFR BLD AUTO: 58.1 % — SIGNIFICANT CHANGE UP (ref 43–77)
NRBC # BLD: 0 /100 WBCS — SIGNIFICANT CHANGE UP
NRBC # FLD: 0 K/UL — SIGNIFICANT CHANGE UP
PLATELET # BLD AUTO: 283 K/UL — SIGNIFICANT CHANGE UP (ref 150–400)
POTASSIUM SERPL-MCNC: 4.4 MMOL/L — SIGNIFICANT CHANGE UP (ref 3.5–5.3)
POTASSIUM SERPL-SCNC: 4.4 MMOL/L — SIGNIFICANT CHANGE UP (ref 3.5–5.3)
PROT SERPL-MCNC: 6 G/DL — SIGNIFICANT CHANGE UP (ref 6–8.3)
RBC # BLD: 4.09 M/UL — SIGNIFICANT CHANGE UP (ref 3.8–5.2)
RBC # FLD: 15.2 % — HIGH (ref 10.3–14.5)
SODIUM SERPL-SCNC: 141 MMOL/L — SIGNIFICANT CHANGE UP (ref 135–145)
WBC # BLD: 6.93 K/UL — SIGNIFICANT CHANGE UP (ref 3.8–10.5)
WBC # FLD AUTO: 6.93 K/UL — SIGNIFICANT CHANGE UP (ref 3.8–10.5)

## 2021-06-22 PROCEDURE — 99223 1ST HOSP IP/OBS HIGH 75: CPT

## 2021-06-22 PROCEDURE — 93306 TTE W/DOPPLER COMPLETE: CPT | Mod: 26

## 2021-06-22 PROCEDURE — 99232 SBSQ HOSP IP/OBS MODERATE 35: CPT

## 2021-06-22 PROCEDURE — 93010 ELECTROCARDIOGRAM REPORT: CPT

## 2021-06-22 PROCEDURE — 90832 PSYTX W PT 30 MINUTES: CPT

## 2021-06-22 RX ADMIN — Medication 75 MICROGRAM(S): at 06:18

## 2021-06-22 RX ADMIN — Medication 400 MILLIGRAM(S): at 08:15

## 2021-06-22 RX ADMIN — DULOXETINE HYDROCHLORIDE 60 MILLIGRAM(S): 30 CAPSULE, DELAYED RELEASE ORAL at 08:15

## 2021-06-22 RX ADMIN — SENNA PLUS 2 TABLET(S): 8.6 TABLET ORAL at 20:56

## 2021-06-22 RX ADMIN — Medication 0.5 MILLIGRAM(S): at 20:56

## 2021-06-22 RX ADMIN — QUETIAPINE FUMARATE 50 MILLIGRAM(S): 200 TABLET, FILM COATED ORAL at 08:16

## 2021-06-22 RX ADMIN — Medication 81 MILLIGRAM(S): at 08:15

## 2021-06-22 RX ADMIN — QUETIAPINE FUMARATE 100 MILLIGRAM(S): 200 TABLET, FILM COATED ORAL at 20:56

## 2021-06-22 RX ADMIN — OXYCODONE HYDROCHLORIDE 5 MILLIGRAM(S): 5 TABLET ORAL at 13:57

## 2021-06-22 RX ADMIN — Medication 400 MILLIGRAM(S): at 09:01

## 2021-06-22 RX ADMIN — BUDESONIDE AND FORMOTEROL FUMARATE DIHYDRATE 2 PUFF(S): 160; 4.5 AEROSOL RESPIRATORY (INHALATION) at 20:56

## 2021-06-22 RX ADMIN — CLOPIDOGREL BISULFATE 75 MILLIGRAM(S): 75 TABLET, FILM COATED ORAL at 08:16

## 2021-06-22 RX ADMIN — Medication 0.5 MILLIGRAM(S): at 08:16

## 2021-06-22 RX ADMIN — BUDESONIDE AND FORMOTEROL FUMARATE DIHYDRATE 2 PUFF(S): 160; 4.5 AEROSOL RESPIRATORY (INHALATION) at 08:18

## 2021-06-22 RX ADMIN — POLYETHYLENE GLYCOL 3350 17 GRAM(S): 17 POWDER, FOR SOLUTION ORAL at 08:17

## 2021-06-22 NOTE — ECT CONSULT NOTE - NSECTPASTTXTRIALSDETAILS_PSY_ALL_CORE
Past trials of Lithium, Zyprexa, Seroquel.   Had ECT at [facility] in 2014, RUL #6 with good effect.

## 2021-06-22 NOTE — BH INPATIENT PSYCHIATRY PROGRESS NOTE - MSE UNSTRUCTURED FT
Pt appears stated age, thin, frail, alert, awake, oriented x 3, in no acute physical distress. Dressed casually, fair grooming and hygiene. No agitation/retardation/EPS noted, stable gait. Cooperative, makes eye contact. Speech is relevant, normal in volume, rate, and productivity. Mood is ok, affect is congruent. Thoughts are linear, no gross delusions, denies any suicidal/homicidal ideas/plan. No hallucination elicited. Insight and judgment are fair.      Pt appears stated age, thin, frail, alert, awake, oriented x 3, in no acute physical distress. Dressed casually, fair grooming and hygiene. No agitation/retardation/EPS noted, stable gait. Cooperative, makes eye contact. Speech is relevant, normal in volume, rate, and productivity. Mood is ok, affect is dysphoric, anxious. Thoughts are linear, no gross delusions, denies any suicidal/homicidal ideas/plan. No hallucination elicited. Insight and judgment are fair.

## 2021-06-22 NOTE — ECT CONSULT NOTE - RISK ASSESSMENT
Pt is at low to moderate acute risk of self harm. She reports passive SI, no active, intent. She has static factors of depression history, divorce, no prior attempts or self injury; modifiable factors of noncompliance. Protective factors of stable housing, her dog, and family.

## 2021-06-22 NOTE — ECT CONSULT NOTE - NSECTASSESSRECOMM_PSY_ALL_CORE
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      After discussion with team, prior to starting ECT, case should be reviewed by Neurologist for clearance (Eg. Kelvin Valentin often helps with ECT clearance questions)  Two concerns for clearance.  1) Pt reports she had brain stimulator placed L cheek area (evidence of past craniectomy seen on last CT).  Pt reports she still feels cartridge she feels in face.    Is there any concern with ECT (we will likely offer RUL but would like to know area was evaluated.    2) pt has pain simulator in back, Does this need to be adjusted/turned off before ECT?    As previously mentioned, pt will need labs, EKG within 7 days of 1st treatment and COVID test within 72 hrs of 1st treatment.    Primary team to do full MMSE prior to ECT initiation of treatment.      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      After discussion with team, prior to starting ECT, case should be reviewed by Neurologist for clearance (Eg. Kelvin Valentin often helps with ECT clearance questions)  Two concerns for clearance.  1) Pt reports she had brain stimulator placed L cheek area (evidence of past craniectomy seen on last CT).  Pt reports she still feels cartridge in L cheek.    Is there any concern with ECT (we will likely offer RUL but would like to know area was evaluated).    2) pt has pain simulator in back, Does this need to be adjusted/turned off before ECT?    As previously mentioned, pt will need labs, EKG within 7 days of 1st treatment and COVID test within 72 hrs of 1st treatment.    Primary team to do full MMSE prior to ECT initiation of treatment.

## 2021-06-22 NOTE — BH INPATIENT PSYCHIATRY PROGRESS NOTE - NSBHASSESSSUMMFT_PSY_ALL_CORE
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 Pt confused to situation however reports depressive sx, no SI/HI.    Initial collateral from daughter Estela: "Estela, who reports that these periods of confusion occur on/off for the past 10 years, and they have all been due to medication noncompliance. She states that for the past month, her mother has been isolative, nonsensical, not getting out of bed; calling her She has complained that she is not sleeping, but whenever she goes over, pt is in bed. She says that her mother has been having day/night confusion, calling people at all hours. She also is showing up for doctor's appointments when she does not have any. Yesterday, her brother received a call from their neighbor saying that the patient was walking in the middle of the street looking for her cellphone. Pt also was trying to get into random people's car."    5/22: Pt seen, chart reviewed and discussed with nursing staff. Reports feeling ok, but admits feeling anxious, depressed. When asked  about circumstances before hospitalization, she reports that she is here for get support, was not eating well.  Endorses poor sleep, low appetite, low energy. Appears disorganized, confused and hard of hearing. Denies any paranoia, SI, HI, hallucination.    5/23: Pt remains disorganized, anxious and dysphoric. On exam, Pt reports not feeling well. has left lower back pain, anxious, depressed. Endorses poor sleep, low appetite, low energy and motivation. Feels her memory is not that good. Appears dysphoric, hard of hearing but less confused. Denies any jose c, paranoia, SI, HI, hallucination.    5/24:  No events reported over the weekend. Sleep and appetite is "better". Patient denies any current AH/VH but endorsed +AH and +VH prior to admission. Patient reported feeling very depressed since being isolated due to covid, she endorsed passive SI with no intent or plan. She also reported taking less of her medications by splitting pills in half at home. Patient is out on the unit, social with peers but is still very anxious. Patient is compliant with medications, no adverse effects reported.    5/25:  No events reported over the weekend. Sleep is "not as good" and appetite is "improved". Patient reported being in significant pain in her arm and also of a headache. Patient given PRN Oxycodone dose earlier and pain management consult ordered. Spoke with patient about starting Cymbalta for depression. She was hesitant at first but then agreed. Some paranoia noted around medications. Patient also stated that she feels like she is bothering her daughter too much and did not want us to call her frequently, but daughter called hospital looking to get updates and denied telling her mother that she needs a break.    5/26: Patient seen for follow up for depression and psychosis. Chart reviewed and case discussed with interdisciplinary staff. per staff, patient is very somatic. On encounter, patient is pleasant but believes she is not getting adequate care, reports that she used to be many more medications and thinks we are missing something, insists that I speak with her pulmonologist Dr. Ketan Morfin. Otherwise, eating and sleeping well. Patient remains compliant with medications, no adverse effects reported or in evidence.    5/27:  Per staff, patient is very somatic, requesting more meds for constipation. On encounter, patient is pleasant, happy that she had PT today, complains of constipation, requesting increase in Senna, Dulcolax suppository offered and patient agress. Otherwise, eating and sleeping well.    5/28:  No significant overnight event. On encounter, patient is calm, reports that she had a small BM, reports that she has some "sinus" issues now and requests something additional for pain relief, would also like to be evaluated for UTI. Otherwise, eating and sleeping well.     5/29: No significant overnight event. On encounter, patient reports mood as "little better", still reports generalized pain including lower abdominal pain. Patient is eating and sleeping well.     6/1: No reported overnight events. On encounter pt remains somatically preoccupied, states she needs to be on additional medications for her asthma and complains of headaches. In terms of her mood pt describes feeling "bored", but states she is doing better, she is eating well and states she has been able to gain back some of the weight she had lost.  6/2: Continues to endorse depression, anxiety and somatic complaints. Increased Cymbalta to 40mg QD.   6/3:  Met with Ms. Primrose this morning in the dayroom. Pt reports feeling dizzy and unsteady on her feet early this morning. Checked her vitals later in the morning, she was not orthostatic at that point. She continues to verbalize multiple somatic complaints such as nausea and later reflux. Continues to endorse difficulty sleeping. Eliminated morning dose of seroquel 12.5mg due to patient's reports of morning dizziness/lightheadedness and increased her nighttime dose to 50mg. Signed conversion to voluntary status (9.13)  6/4: Pt continues to endorse several somatic complaints such as GERD and cramping in her legs. Reports her sleep was slightly better last night. She requests to speak to a therapist, reached out to Dr. Newman who agreed to meet with pt today. Called patient's daughter in order to discuss treatment plan and explore options to improve pill management at home but she was unavailable. Left a message. Increased Seroquel to 75mg QHS.   6/7: Met with Ms. Primrose this morning in a common area. She reports feeling a little low over the weekend and fearing "Im headed back to where I started". Provided reassurance that some mood fluctuations are normal and do not necessarily mean she has lost all the progress she has made. Pt complained of headaches today which are suggestive of tension headaches by her description of the distribution. Appears less somatically preoccupied. Reports sleeping better. Will f/u with daughter regarding ways to monitor pt's pill intake and use at home.   6/8: Pr reports feeling better, reports good sleep, reports her anxiety is "better". Pt requests her seroquel to be given later in the night since she doesn't want to feel tired too early. Called daughter to discuss case but she didn't . Will try again tomorrow.   Chart reviewed, no overnight events. No PRNs needed for anxiety over the past 24 hrs. 1 dose of oxycodone PRN taken over past 24 hrs for pain. Pt has requested to be referred to a day program or partial hospital after discharge, stating she will miss the feeling of community once she goes back home. Discussed with SW. Called daughter again, left a VM.   6/10:  Pt reports increasing anxiety as time for discharge approaches. She fears being "sent home without a plan" and feeling lonely at home. Reassured pt that she would not be sent home without an aftercare plan. Pt aware team is looking into day programs/partial hospitals to refer her to. Left daughter another VM.   6/11: Patient continues to endorse feeling anxious about being discharged, going home and feeling lonely. Spoke with son Sue and patient's pain management dr (Dr. Farooq) about patient's care and ways to better control/manage her medication regimen in the community. Pt received the hearing aids her daughter brought and may use them.   6/14: Pt reports sometimes experiencing some anxiety in the afternoons around 3pm. States that at that time she usually begins to ruminate about her current situation, how bored she feels and "what am I doing". Writer validated these feelings, normalizing her emotions in the context of how long she has been in the hospital. Encouraged patient to use her coping skills. Added a morning dose of Seroquel 25mg.     Plan:   Observation status: Routine  Psych: Continue Duloxetine to 90mg QD, cont Seroquel 25mg QAM + 100mg at 10pm, cont Klonopin 0.5mg BID   Medical: Oxycodone 5mg PO Q8H PRN for pain, Plavix, Albuterol, Symbicort and Spiriva.   6/16: Patient depressed, somatic, appears to be failing this trial but still room to titrate, if not responding may need to re visit ECT. Not actively suicidal  6/17: Still depressed. AThere is lack of steady gains. Will increase Seroquel and Cymbalta, can revisit ECT if not improving further  6/18 Doing worse depressed somatic and new se nausea maybe from SSREI and Mucinex and tremor from increased Cymbalta., Will lower Cymbalta will d/c Afrin and Mucinex. Will start ECT w/u.  6/21: Less anxious, remains dysphoric, no SI/HI, no psychosis. Continue ECT w/u.  6/21: No events overnight. Pt was seen this morning in the dining area after breakfast. Reports feeling low and hoping to get ECT started soon. Pt reports having had around 4 treatments in the past and doing well afterwards. Pt went for ECHO and was seen by cardiology and cleared on their end for ECT. ECT consult placed.     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 Pt confused to situation however reports depressive sx, no SI/HI.    Initial collateral from daughter Estela: "Estela, who reports that these periods of confusion occur on/off for the past 10 years, and they have all been due to medication noncompliance. She states that for the past month, her mother has been isolative, nonsensical, not getting out of bed; calling her She has complained that she is not sleeping, but whenever she goes over, pt is in bed. She says that her mother has been having day/night confusion, calling people at all hours. She also is showing up for doctor's appointments when she does not have any. Yesterday, her brother received a call from their neighbor saying that the patient was walking in the middle of the street looking for her cellphone. Pt also was trying to get into random people's car."    5/22: Pt seen, chart reviewed and discussed with nursing staff. Reports feeling ok, but admits feeling anxious, depressed. When asked  about circumstances before hospitalization, she reports that she is here for get support, was not eating well.  Endorses poor sleep, low appetite, low energy. Appears disorganized, confused and hard of hearing. Denies any paranoia, SI, HI, hallucination.    5/23: Pt remains disorganized, anxious and dysphoric. On exam, Pt reports not feeling well. has left lower back pain, anxious, depressed. Endorses poor sleep, low appetite, low energy and motivation. Feels her memory is not that good. Appears dysphoric, hard of hearing but less confused. Denies any jose c, paranoia, SI, HI, hallucination.    5/24:  No events reported over the weekend. Sleep and appetite is "better". Patient denies any current AH/VH but endorsed +AH and +VH prior to admission. Patient reported feeling very depressed since being isolated due to covid, she endorsed passive SI with no intent or plan. She also reported taking less of her medications by splitting pills in half at home. Patient is out on the unit, social with peers but is still very anxious. Patient is compliant with medications, no adverse effects reported.    5/25:  No events reported over the weekend. Sleep is "not as good" and appetite is "improved". Patient reported being in significant pain in her arm and also of a headache. Patient given PRN Oxycodone dose earlier and pain management consult ordered. Spoke with patient about starting Cymbalta for depression. She was hesitant at first but then agreed. Some paranoia noted around medications. Patient also stated that she feels like she is bothering her daughter too much and did not want us to call her frequently, but daughter called hospital looking to get updates and denied telling her mother that she needs a break.    5/26: Patient seen for follow up for depression and psychosis. Chart reviewed and case discussed with interdisciplinary staff. per staff, patient is very somatic. On encounter, patient is pleasant but believes she is not getting adequate care, reports that she used to be many more medications and thinks we are missing something, insists that I speak with her pulmonologist Dr. Ketan Morfin. Otherwise, eating and sleeping well. Patient remains compliant with medications, no adverse effects reported or in evidence.    5/27:  Per staff, patient is very somatic, requesting more meds for constipation. On encounter, patient is pleasant, happy that she had PT today, complains of constipation, requesting increase in Senna, Dulcolax suppository offered and patient agress. Otherwise, eating and sleeping well.    5/28:  No significant overnight event. On encounter, patient is calm, reports that she had a small BM, reports that she has some "sinus" issues now and requests something additional for pain relief, would also like to be evaluated for UTI. Otherwise, eating and sleeping well.     5/29: No significant overnight event. On encounter, patient reports mood as "little better", still reports generalized pain including lower abdominal pain. Patient is eating and sleeping well.     6/1: No reported overnight events. On encounter pt remains somatically preoccupied, states she needs to be on additional medications for her asthma and complains of headaches. In terms of her mood pt describes feeling "bored", but states she is doing better, she is eating well and states she has been able to gain back some of the weight she had lost.  6/2: Continues to endorse depression, anxiety and somatic complaints. Increased Cymbalta to 40mg QD.   6/3:  Met with Ms. Primrose this morning in the dayroom. Pt reports feeling dizzy and unsteady on her feet early this morning. Checked her vitals later in the morning, she was not orthostatic at that point. She continues to verbalize multiple somatic complaints such as nausea and later reflux. Continues to endorse difficulty sleeping. Eliminated morning dose of seroquel 12.5mg due to patient's reports of morning dizziness/lightheadedness and increased her nighttime dose to 50mg. Signed conversion to voluntary status (9.13)  6/4: Pt continues to endorse several somatic complaints such as GERD and cramping in her legs. Reports her sleep was slightly better last night. She requests to speak to a therapist, reached out to Dr. Newman who agreed to meet with pt today. Called patient's daughter in order to discuss treatment plan and explore options to improve pill management at home but she was unavailable. Left a message. Increased Seroquel to 75mg QHS.   6/7: Met with Ms. Primrose this morning in a common area. She reports feeling a little low over the weekend and fearing "Im headed back to where I started". Provided reassurance that some mood fluctuations are normal and do not necessarily mean she has lost all the progress she has made. Pt complained of headaches today which are suggestive of tension headaches by her description of the distribution. Appears less somatically preoccupied. Reports sleeping better. Will f/u with daughter regarding ways to monitor pt's pill intake and use at home.   6/8: Pr reports feeling better, reports good sleep, reports her anxiety is "better". Pt requests her seroquel to be given later in the night since she doesn't want to feel tired too early. Called daughter to discuss case but she didn't . Will try again tomorrow.   Chart reviewed, no overnight events. No PRNs needed for anxiety over the past 24 hrs. 1 dose of oxycodone PRN taken over past 24 hrs for pain. Pt has requested to be referred to a day program or partial hospital after discharge, stating she will miss the feeling of community once she goes back home. Discussed with SW. Called daughter again, left a VM.   6/10:  Pt reports increasing anxiety as time for discharge approaches. She fears being "sent home without a plan" and feeling lonely at home. Reassured pt that she would not be sent home without an aftercare plan. Pt aware team is looking into day programs/partial hospitals to refer her to. Left daughter another VM.   6/11: Patient continues to endorse feeling anxious about being discharged, going home and feeling lonely. Spoke with son Sue and patient's pain management dr (Dr. Farooq) about patient's care and ways to better control/manage her medication regimen in the community. Pt received the hearing aids her daughter brought and may use them.   6/14: Pt reports sometimes experiencing some anxiety in the afternoons around 3pm. States that at that time she usually begins to ruminate about her current situation, how bored she feels and "what am I doing". Writer validated these feelings, normalizing her emotions in the context of how long she has been in the hospital. Encouraged patient to use her coping skills. Added a morning dose of Seroquel 25mg.     Plan:   Observation status: Routine  Psych: Continue Duloxetine to 90mg QD, cont Seroquel 25mg QAM + 100mg at 10pm, cont Klonopin 0.5mg BID   Medical: Oxycodone 5mg PO Q8H PRN for pain, Plavix, Albuterol, Symbicort and Spiriva.   6/16: Patient depressed, somatic, appears to be failing this trial but still room to titrate, if not responding may need to re visit ECT. Not actively suicidal  6/17: Still depressed. AThere is lack of steady gains. Will increase Seroquel and Cymbalta, can revisit ECT if not improving further  6/18 Doing worse depressed somatic and new se nausea maybe from SSREI and Mucinex and tremor from increased Cymbalta., Will lower Cymbalta will d/c Afrin and Mucinex. Will start ECT w/u.  6/21: Less anxious, remains dysphoric, no SI/HI, no psychosis. Continue ECT w/u.  6/22: No events overnight. Pt was seen this morning in the dining area after breakfast. Reports feeling low and hoping to get ECT started soon. Pt reports having had around 4 treatments in the past and doing well afterwards. Pt went for ECHO and was seen by cardiology and cleared on their end for ECT. ECT consult placed.

## 2021-06-22 NOTE — BH INPATIENT PSYCHIATRY PROGRESS NOTE - NSBHCHARTREVIEWVS_PSY_A_CORE FT
Vital Signs Last 24 Hrs  T(C): 36.9 (22 Jun 2021 10:26), Max: 36.9 (22 Jun 2021 10:26)  T(F): 98.5 (22 Jun 2021 10:26), Max: 98.5 (22 Jun 2021 10:26)  HR: --  BP: 123/82 (22 Jun 2021 08:01) (123/82 - 123/82)  BP(mean): 90 (22 Jun 2021 08:01) (90 - 90)  RR: --  SpO2: -- Vital Signs Last 24 Hrs  T(C): 36.9 (22 Jun 2021 10:26), Max: 36.9 (22 Jun 2021 10:26)  T(F): 98.5 (22 Jun 2021 10:26), Max: 98.5 (22 Jun 2021 10:26)  HR: --90  BP: 123/82 (22 Jun 2021 08:01) (123/82 - 123/82)  BP(mean): 90 (22 Jun 2021 08:01) (90 - 90)  RR: --  SpO2: --

## 2021-06-22 NOTE — ECT CONSULT NOTE - CURRENT MEDICATION
MEDICATIONS  (STANDING):  aspirin enteric coated 81 milliGRAM(s) Oral daily  budesonide 160 MICROgram(s)/formoterol 4.5 MICROgram(s) Inhaler 2 Puff(s) Inhalation two times a day  clonazePAM  Tablet 0.5 milliGRAM(s) Oral two times a day  clopidogrel Tablet 75 milliGRAM(s) Oral daily  DULoxetine 60 milliGRAM(s) Oral daily  levothyroxine 75 MICROGram(s) Oral <User Schedule>  polyethylene glycol 3350 17 Gram(s) Oral daily  QUEtiapine 50 milliGRAM(s) Oral daily  QUEtiapine 100 milliGRAM(s) Oral <User Schedule>  senna 2 Tablet(s) Oral at bedtime    MEDICATIONS  (PRN):  acetaminophen   Tablet .. 650 milliGRAM(s) Oral every 6 hours PRN Mild Pain (1 - 3), Moderate Pain (4 - 6)  ALBUTerol    90 MICROgram(s) HFA Inhaler 2 Puff(s) Inhalation every 6 hours PRN wheezing  calcium carbonate    500 mG (Tums) Chewable 1 Tablet(s) Chew every 8 hours PRN GERD  ibuprofen  Tablet. 400 milliGRAM(s) Oral two times a day PRN Moderate Pain (4 - 6)  lidocaine   Patch 2 Patch Transdermal daily PRN Pain  OLANZapine Injectable 2.5 milliGRAM(s) IntraMuscular once PRN extreme agitation  ondansetron    Tablet 4 milliGRAM(s) Oral every 6 hours PRN Nausea  oxyCODONE    IR 5 milliGRAM(s) Oral every 8 hours PRN Pain  QUEtiapine 12.5 milliGRAM(s) Oral every 6 hours PRN agitation

## 2021-06-22 NOTE — ECT CONSULT NOTE - NSECTREASONCONS_PSY_ALL_CORE
Severe depression/Treatment resistant depression Severe depression/Severe jose c/Treatment resistant depression

## 2021-06-22 NOTE — ECT CONSULT NOTE - NSECTMENTALSTATUSEXAM_PSY_ALL_CORE
Conscious, cooperative, alert.   No psychomotor agitation/retardation.   Good eye contact.   Speech: regular rate and rhythm,   Mood: "Depressed" Affect: appropriate, full range.   Thought Process: linear, goal directed   Thought Content: No Death wish, No Suicidal ideation/intent/plan, No homicidal ideation/intent/plan. No delusions   Perception: No hallucinations   Insight and Judgement: fair  Impulse Control: fair at this time.

## 2021-06-22 NOTE — CONSULT NOTE ADULT - SUBJECTIVE AND OBJECTIVE BOX
HPI:     71F w/ depression w/ psychotic features, CAD s/p stents, MI, L ear deafness, asthma, osteoporosis admitted for psychiatric stabilization.       PAST MEDICAL & SURGICAL HISTORY:  CAD (coronary artery disease)    Stented coronary artery  x 8 stents    Depression, unspecified depression type    Hypothyroidism, unspecified type    Diverticulitis  sigmoid, treated surgically    Insomnia    Gastroesophageal reflux disease, esophagitis presence not specified    Paralyzed hemidiaphragm  left - s/p plication    Chronic nonintractable headache, unspecified headache type    HF (heart failure)  resolved    Moderate scoliosis  thoracic    Deafness  left    Severe asthma  on advair/ spiriva/ fasenra every 2 mths    Myocardial infarction  2016    History of coma  41 yrs ago, while pregnant due to asthma    Sciatica    Brachial neuritis    S/P laparoscopic-assisted sigmoidectomy    S/P inguinal hernia repair  left    S/P BELEN-BSO (total abdominal hysterectomy and bilateral salpingo-oophorectomy)    Status post plication of diaphragm  left    S/P carpal tunnel release  right    Status post craniectomy  with mesh - left side for migraines        Review of Systems:   CONSTITUTIONAL: No fever, weight loss, or fatigue  EYES: No eye pain, visual disturbances, or discharge  ENMT:  No difficulty hearing, tinnitus, vertigo; No sinus or throat pain  NECK: No pain or stiffness  RESPIRATORY: No cough, wheezing, chills or hemoptysis; No shortness of breath  CARDIOVASCULAR: No chest pain, palpitations, dizziness, or leg swelling  GASTROINTESTINAL: No abdominal or epigastric pain. No nausea, vomiting, or hematemesis; No diarrhea or constipation. No melena or hematochezia.  GENITOURINARY: No dysuria, frequency, hematuria, or incontinence  NEUROLOGICAL: No headaches, memory loss, loss of strength, numbness, or tremors  SKIN: No itching, burning, rashes, or lesions   LYMPH NODES: No enlarged glands  ENDOCRINE: No heat or cold intolerance; No hair loss  MUSCULOSKELETAL: No joint pain or swelling; No muscle, back, or extremity pain  HEME/LYMPH: No easy bruising, or bleeding gums  ALLERY AND IMMUNOLOGIC: No hives or eczema    Allergies    penicillin (Anaphylaxis)    Intolerances        Social History:     FAMILY HISTORY:  Family history of asthma (Sibling)        MEDICATIONS  (STANDING):  aspirin enteric coated 81 milliGRAM(s) Oral daily  budesonide 160 MICROgram(s)/formoterol 4.5 MICROgram(s) Inhaler 2 Puff(s) Inhalation two times a day  clonazePAM  Tablet 0.5 milliGRAM(s) Oral two times a day  clopidogrel Tablet 75 milliGRAM(s) Oral daily  DULoxetine 60 milliGRAM(s) Oral daily  levothyroxine 75 MICROGram(s) Oral <User Schedule>  polyethylene glycol 3350 17 Gram(s) Oral daily  QUEtiapine 50 milliGRAM(s) Oral daily  QUEtiapine 100 milliGRAM(s) Oral <User Schedule>  senna 2 Tablet(s) Oral at bedtime    MEDICATIONS  (PRN):  acetaminophen   Tablet .. 650 milliGRAM(s) Oral every 6 hours PRN Mild Pain (1 - 3), Moderate Pain (4 - 6)  ALBUTerol    90 MICROgram(s) HFA Inhaler 2 Puff(s) Inhalation every 6 hours PRN wheezing  calcium carbonate    500 mG (Tums) Chewable 1 Tablet(s) Chew every 8 hours PRN GERD  ibuprofen  Tablet. 400 milliGRAM(s) Oral two times a day PRN Moderate Pain (4 - 6)  lidocaine   Patch 2 Patch Transdermal daily PRN Pain  OLANZapine Injectable 2.5 milliGRAM(s) IntraMuscular once PRN extreme agitation  ondansetron    Tablet 4 milliGRAM(s) Oral every 6 hours PRN Nausea  oxyCODONE    IR 5 milliGRAM(s) Oral every 8 hours PRN Pain  QUEtiapine 12.5 milliGRAM(s) Oral every 6 hours PRN agitation      Vital Signs Last 24 Hrs  T(C): 36.3 (23 Jun 2021 06:55), Max: 36.3 (23 Jun 2021 06:55)  T(F): 97.4 (23 Jun 2021 06:55), Max: 97.4 (23 Jun 2021 06:55)  HR: --  BP: --  BP(mean): --  RR: --  SpO2: --  CAPILLARY BLOOD GLUCOSE            PHYSICAL EXAM:  GENERAL: NAD, well-developed  HEAD:  Atraumatic, Normocephalic  EYES: EOMI, PERRLA, conjunctiva and sclera clear  NECK: Supple, No JVD  CHEST/LUNG: Clear to auscultation bilaterally; No wheeze  HEART: Regular rate and rhythm; No murmurs, rubs, or gallops  ABDOMEN: Soft, Nontender, Nondistended; Bowel sounds present  EXTREMITIES:  2+ Peripheral Pulses, No clubbing, cyanosis, or edema  PSYCH: AAOx3  NEUROLOGY: non-focal  SKIN: No rashes or lesions    LABS:                        13.0   6.93  )-----------( 283      ( 22 Jun 2021 19:40 )             39.9     06-22    141  |  102  |  15  ----------------------------<  105<H>  4.4   |  27  |  0.84    Ca    9.2      22 Jun 2021 19:40    TPro  6.0  /  Alb  4.0  /  TBili  <0.2  /  DBili  x   /  AST  19  /  ALT  14  /  AlkPhos  68  06-22              EKG(personally reviewed):    RADIOLOGY & ADDITIONAL TESTS:    Imaging Personally Reviewed:    Consultant(s) Notes Reviewed:  Cardiology     Care Discussed with Consultants/Other Providers: Cardiology     c< from: Transthoracic Echocardiogram (06.22.21 @ 11:41) >    Patient name: PRIMROSE, TERESA  YOB: 1950   Age: 71 (F)   MR#: [id number]  Study Date: 6/22/2021  Location: O/PSonographer: [facility]  Study quality: Technically good  Referring Physician: Mason Koch MD  Blood Pressure: 107/61 mmHg  Height: 140 cm  Weight: 51 kg  BSA: 1.4 m2  ------------------------------------------------------------------------  PROCEDURE: Transthoracic echocardiogram with 2-D, M-Mode  and complete spectral and color flow Doppler.  INDICATION: Abnormal electrocardiogram (ECG) (EKG) (R94.31)  ------------------------------------------------------------------------  DIMENSIONS:  Dimensions:     Normal Values:  LA:     4.3 cm    2.0 - 4.0 cm  Ao:     3.3 cm    2.0 - 3.8 cm  SEPTUM: 0.6 cm    0.6 - 1.2 cm  PWT:    0.6 cm    0.6 - 1.1 cm  LVIDd:  4.7 cm    3.0 - 5.6 cm  LVIDs:  4.2 cm    1.8 - 4.0 cm  Derived Variables:  LVMI: 62 g/m2  RWT: 0.25  Fractional short: 11 %  Ejection Fraction (Teicholtz): 23 %  ------------------------------------------------------------------------  OBSERVATIONS:  Mitral Valve: Normal mitral valve.  Aortic Root: Normal aortic root.  Aortic Valve: Calcified trileaflet aortic valve with normal  opening.  Left Atrium: Normal left atrium.  LA volume index = 27  cc/m2.  Left Ventricle: Severe global left ventricular systolic  dysfunction. Basal lateral and basal anterior walls are  best preserved. Normal left ventricular internal dimensions  and wall thicknesses.  Right Heart: Normal right atrium. Normal right ventricular  size and function. Normal tricuspid valve. No tricuspid  regurgitation. Normal pulmonic valve.  Pericardium/PleuraNormal pericardium with no pericardial  effusion.  ------------------------------------------------------------------------  CONCLUSIONS:  1. Normal left ventricular internal dimensions and wall  thicknesses.  2. Severe global left ventricular systolic dysfunction.  Basal lateral and basal anterior walls are best preserved.  3. Normal right ventricular size and function.  ------------------------------------------------------------------------  Confirmed on  6/22/2021 - 12:51:20 by Badewattie Neela,  M.D. RPVI  ------------------------------------------------------------------------    < end of copied text >   HPI:     71F w/ depression w/ psychotic features, CAD s/p stents, MI, L ear deafness, asthma, osteoporosis admitted for psychiatric stabilization.   Psych plans to begin ECT treatment.   Pt returned from Echo and Cards eval.  Pt denies any current or recent CP, SOB, JEAN.   She states that she can comfortably walk about 1 block, but that is also limited by her h/o asthma.       PAST MEDICAL & SURGICAL HISTORY:  CAD (coronary artery disease)    Stented coronary artery  x 8 stents    Depression, unspecified depression type    Hypothyroidism, unspecified type    Diverticulitis  sigmoid, treated surgically    Insomnia    Gastroesophageal reflux disease, esophagitis presence not specified    Paralyzed hemidiaphragm  left - s/p plication    Chronic nonintractable headache, unspecified headache type    HF (heart failure)  resolved    Moderate scoliosis  thoracic    Deafness  left    Severe asthma  on advair/ spiriva/ fasenra every 2 mths    Myocardial infarction  2016    History of coma  41 yrs ago, while pregnant due to asthma    Sciatica    Brachial neuritis    S/P laparoscopic-assisted sigmoidectomy    S/P inguinal hernia repair  left    S/P BELEN-BSO (total abdominal hysterectomy and bilateral salpingo-oophorectomy)    Status post plication of diaphragm  left    S/P carpal tunnel release  right    Status post craniectomy  with mesh - left side for migraines        Review of Systems:   CONSTITUTIONAL: No fever, weight loss, or fatigue  EYES: No eye pain, visual disturbances, or discharge  ENMT:  No difficulty hearing, tinnitus, vertigo; No sinus or throat pain  NECK: No pain or stiffness  RESPIRATORY: No cough, wheezing, chills or hemoptysis; No shortness of breath  CARDIOVASCULAR: No chest pain, palpitations, dizziness, or leg swelling  GASTROINTESTINAL: No abdominal or epigastric pain. No nausea, vomiting, or hematemesis; No diarrhea or constipation. No melena or hematochezia.  GENITOURINARY: No dysuria, frequency, hematuria, or incontinence  NEUROLOGICAL: No headaches, memory loss, loss of strength, numbness, or tremors  SKIN: No itching, burning, rashes, or lesions   LYMPH NODES: No enlarged glands  ENDOCRINE: No heat or cold intolerance; No hair loss  MUSCULOSKELETAL: No joint pain or swelling; No muscle, back, or extremity pain  HEME/LYMPH: No easy bruising, or bleeding gums  ALLERY AND IMMUNOLOGIC: No hives or eczema    Allergies    penicillin (Anaphylaxis)    Intolerances        Social History:     FAMILY HISTORY:  Family history of asthma (Sibling)        MEDICATIONS  (STANDING):  aspirin enteric coated 81 milliGRAM(s) Oral daily  budesonide 160 MICROgram(s)/formoterol 4.5 MICROgram(s) Inhaler 2 Puff(s) Inhalation two times a day  clonazePAM  Tablet 0.5 milliGRAM(s) Oral two times a day  clopidogrel Tablet 75 milliGRAM(s) Oral daily  DULoxetine 60 milliGRAM(s) Oral daily  levothyroxine 75 MICROGram(s) Oral <User Schedule>  polyethylene glycol 3350 17 Gram(s) Oral daily  QUEtiapine 50 milliGRAM(s) Oral daily  QUEtiapine 100 milliGRAM(s) Oral <User Schedule>  senna 2 Tablet(s) Oral at bedtime    MEDICATIONS  (PRN):  acetaminophen   Tablet .. 650 milliGRAM(s) Oral every 6 hours PRN Mild Pain (1 - 3), Moderate Pain (4 - 6)  ALBUTerol    90 MICROgram(s) HFA Inhaler 2 Puff(s) Inhalation every 6 hours PRN wheezing  calcium carbonate    500 mG (Tums) Chewable 1 Tablet(s) Chew every 8 hours PRN GERD  ibuprofen  Tablet. 400 milliGRAM(s) Oral two times a day PRN Moderate Pain (4 - 6)  lidocaine   Patch 2 Patch Transdermal daily PRN Pain  OLANZapine Injectable 2.5 milliGRAM(s) IntraMuscular once PRN extreme agitation  ondansetron    Tablet 4 milliGRAM(s) Oral every 6 hours PRN Nausea  oxyCODONE    IR 5 milliGRAM(s) Oral every 8 hours PRN Pain  QUEtiapine 12.5 milliGRAM(s) Oral every 6 hours PRN agitation      Vital Signs Last 24 Hrs  T(C): 36.3 (23 Jun 2021 06:55), Max: 36.3 (23 Jun 2021 06:55)  T(F): 97.4 (23 Jun 2021 06:55), Max: 97.4 (23 Jun 2021 06:55)  HR: --  BP: --  BP(mean): --  RR: --  SpO2: --  CAPILLARY BLOOD GLUCOSE            PHYSICAL EXAM:  GENERAL: NAD, well-developed  HEAD:  Atraumatic, Normocephalic  EYES: EOMI, PERRLA, conjunctiva and sclera clear  NECK: Supple, No JVD  CHEST/LUNG: Clear to auscultation bilaterally; No wheeze  HEART: Regular rate and rhythm; No murmurs, rubs, or gallops  ABDOMEN: Soft, Nontender, Nondistended; Bowel sounds present  EXTREMITIES:  2+ Peripheral Pulses, No clubbing, cyanosis, or edema  PSYCH: AAOx3  NEUROLOGY: non-focal  SKIN: No rashes or lesions    LABS:                        13.0   6.93  )-----------( 283      ( 22 Jun 2021 19:40 )             39.9     06-22    141  |  102  |  15  ----------------------------<  105<H>  4.4   |  27  |  0.84    Ca    9.2      22 Jun 2021 19:40    TPro  6.0  /  Alb  4.0  /  TBili  <0.2  /  DBili  x   /  AST  19  /  ALT  14  /  AlkPhos  68  06-22              EKG(personally reviewed):    RADIOLOGY & ADDITIONAL TESTS:    Imaging Personally Reviewed:    Consultant(s) Notes Reviewed:  Cardiology     Care Discussed with Consultants/Other Providers: Cardiology     c< from: Transthoracic Echocardiogram (06.22.21 @ 11:41) >    Patient name: PRIMROSE, TERESA  YOB: 1950   Age: 71 (F)   MR#: [id number]  Study Date: 6/22/2021  Location: O/PSonographer: [facility]  Study quality: Technically good  Referring Physician: Mason Koch MD  Blood Pressure: 107/61 mmHg  Height: 140 cm  Weight: 51 kg  BSA: 1.4 m2  ------------------------------------------------------------------------  PROCEDURE: Transthoracic echocardiogram with 2-D, M-Mode  and complete spectral and color flow Doppler.  INDICATION: Abnormal electrocardiogram (ECG) (EKG) (R94.31)  ------------------------------------------------------------------------  DIMENSIONS:  Dimensions:     Normal Values:  LA:     4.3 cm    2.0 - 4.0 cm  Ao:     3.3 cm    2.0 - 3.8 cm  SEPTUM: 0.6 cm    0.6 - 1.2 cm  PWT:    0.6 cm    0.6 - 1.1 cm  LVIDd:  4.7 cm    3.0 - 5.6 cm  LVIDs:  4.2 cm    1.8 - 4.0 cm  Derived Variables:  LVMI: 62 g/m2  RWT: 0.25  Fractional short: 11 %  Ejection Fraction (Teicholtz): 23 %  ------------------------------------------------------------------------  OBSERVATIONS:  Mitral Valve: Normal mitral valve.  Aortic Root: Normal aortic root.  Aortic Valve: Calcified trileaflet aortic valve with normal  opening.  Left Atrium: Normal left atrium.  LA volume index = 27  cc/m2.  Left Ventricle: Severe global left ventricular systolic  dysfunction. Basal lateral and basal anterior walls are  best preserved. Normal left ventricular internal dimensions  and wall thicknesses.  Right Heart: Normal right atrium. Normal right ventricular  size and function. Normal tricuspid valve. No tricuspid  regurgitation. Normal pulmonic valve.  Pericardium/PleuraNormal pericardium with no pericardial  effusion.  ------------------------------------------------------------------------  CONCLUSIONS:  1. Normal left ventricular internal dimensions and wall  thicknesses.  2. Severe global left ventricular systolic dysfunction.  Basal lateral and basal anterior walls are best preserved.  3. Normal right ventricular size and function.  ------------------------------------------------------------------------  Confirmed on  6/22/2021 - 12:51:20 by Jennifer Keita M.D. RPVI  ------------------------------------------------------------------------    < end of copied text >

## 2021-06-22 NOTE — ECT CONSULT NOTE - DESCRIPTION
MI s/p stents; CAD; Asthma; HTN; Hypothyroid  (having cardiology clearance 6/22/21) MI s/p stents; CAD; Asthma; HTN; Hypothyroid, uncomplicated asthma, psoriasis, diverticulitis, ALIS, GERD, paralyzed hemidiaphragm, HA, "Heart failure" (being seen by cardiology 6/22/21), herntiated cervical dsic, scoliosis, deafness.    ALL: PCN MI s/p stents; CAD; Asthma; HTN; Hypothyroid, uncomplicated asthma, psoriasis, diverticulitis, ALIS, GERD, paralyzed hemidiaphragm, HA, "Heart failure" (being seen by cardiology 6/22/21), herniated lumbar/sacral disc, scoliosis, colon resection.    ALL: PCN

## 2021-06-22 NOTE — BH INPATIENT PSYCHIATRY PROGRESS NOTE - NSBHFUPINTERVALHXFT_PSY_A_CORE
Pt seen for MDD, anxiety,  chart reviewed, discussed with team. No events overnight. Pt was seen this morning in the dining area after breakfast. Reports feeling low and hoping to get ECT started soon. Pt reports having had around 4 treatments in the past and doing well afterwards. Pt went for ECHO and was seen by cardiology and cleared on their end for ECT (must click all available charts to see their note). ECT consult placed.

## 2021-06-22 NOTE — ECT CONSULT NOTE - NSBHSOCIALHXDETAILSFT_PSY_A_CORE
rectocele, h/o paralyzed hemidiaphragm, sigmoidectomy, hernia repair, BELEN-BSO, craniectomy rectocele, h/o paralyzed hemidiaphragm, sigmoidectomy, hernia repair, BELEN-BSO, craniectomy "10 yrs ago for CNS pain stimulator which was removed when they realized insurance would not pay for it"    Also has pain stimulators implanted in lower back for Bilateral sciatica. rectocele, h/o paralyzed hemidiaphragm, sigmoidectomy, hernia repair, BELEN-BSO, craniectomy "10 yrs ago for CNS pain stimulator which was removed when they realized insurance would not pay for it" (still feels foreign object in L cheek area which was not reported on last CT scan).   Also has pain stimulators implanted in lower back for Bilateral sciatica.

## 2021-06-22 NOTE — CONSULT NOTE ADULT - ASSESSMENT
No further medical/cardiac work-up needed prior to initiating ECT  full note to follow 6/22 Echo reviewed - severe global LV systolic function, EF 23%  last Echo was March 2019 - EF 40-45%       New echo results reviewed w/ Cardiology   As patient asymptomatic, does not change recommendation:  No further medical/cardiac work-up needed prior to initiating ECT    As per Cards note:     Cardiovascular Risk Stratification - RCRI =  2  1. History of ischemic heart disease: 1  2. History of congestive heart failure: 1  3. History of cerebrovascular disease (stroke or transient ischemic attack): 0  4. History of diabetes requiring preoperative insulin use: 0  5. Chronic kidney disease (creatinine > 2 mg/dL): 0  6. Undergoing suprainguinal vascular, intraperitoneal, or intrathoracic surgery: 0  0 predictors = 0.4%, 1 predictor = 0.9%, 2 predictors = 6.6%, =3 predictors = >11%    - Overall this patient is as 6.6 % risk (for cardiac death, nonfatal myocardial infarction, and nonfatal cardiac arrest perioperatively for this low risk procedure). Currently, pt denies any anginal or HF symptoms. There is NO cardiac contra-indication for the planned ECT therapy

## 2021-06-22 NOTE — ECT CONSULT NOTE - NSECTREFERRALSOURCE_PSY_ALL_CORE
[facility] team (Leah Kilpatrick) [facility] team (Leah Kilpatrick MD) [facility] team (Leah Dillon MD)

## 2021-06-22 NOTE — ECT CONSULT NOTE - DETAILS
pt endorsed recent passive SI, denies active, no intent or plan None known  Son etoh, mother possible depression

## 2021-06-22 NOTE — ECT CONSULT NOTE - OTHER PAST PSYCHIATRIC HISTORY (INCLUDE DETAILS REGARDING ONSET, COURSE OF ILLNESS, INPATIENT/OUTPATIENT TREATMENT)
72 yo female admitted with CC: I fell so they brought me here on 5/21/21.  (Per ED  Assessment Note:  She presents here today due to family's concern over confusion which she has been exhibiting over the last 2 weeks).    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    On interview, pt reported feeling depressed for the past 2-3 weeks with associated poor sleep, poor appetite, and difficulty concentrating. She cited her back pain as a recent stressor in her life. She denied current SIIP/HIIP or prior SAs. Pt reported 2 prior hospitalizations for depression. She denied manic sx, A/VH; however during the interview on the unit she reportedly pointed to the floor and the walls, citing that she sees stars. Denied any command hallucinations. Pt reported anxiety when it comes to her memory but denied feelings of paranoia, however pt repeatedly looked out the door of her room. When asked what she was looking for, she did not answer. At times during the interview, the pt responded to questions with nonsensical answers, requiring additional prompting and redirecting.    Pt lives alone in a house with her dog. 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 She reports having good relations with her children and feeling safe at home. Denies substance use or access to firearms.    Collateral from daughter Estela to unit team, who reported that these periods of confusion occur on/off for the past 10 years, and they have all been due to medication noncompliance. She states that for the past month, her mother has been isolative, nonsensical, not getting out of bed.   She has complained that she is not sleeping, but whenever she goes over, pt is in bed. She says that her mother has been having day/night confusion, calling people at all hours. She also is showing up for doctor's appointments when she does not have any. Yesterday, her brother received a call from their neighbor saying that the patient was walking in the middle of the street looking for her cellphone. Pt also was trying to get into [employer] people's car.    During hospital, patient treated with Cymbalta and Seroquel.   Reportedly had different somatic complaints on unit.    On exam,       70 yo female admitted with CC: I fell so they brought me here on 5/21/21.  (Per ED  Assessment Note:  She presents here today due to family's concern over confusion which she has been exhibiting over the last 2 weeks).    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    On interview, pt reported feeling depressed for the past 2-3 weeks with associated poor sleep, poor appetite, and difficulty concentrating. She cited her back pain as a recent stressor in her life. She denied current SIIP/HIIP or prior SAs. Pt reported 2 prior hospitalizations for depression. She denied manic sx, A/VH; however during the interview on the unit she reportedly pointed to the floor and the walls, citing that she sees stars. Denied any command hallucinations. Pt reported anxiety when it comes to her memory but denied feelings of paranoia, however pt repeatedly looked out the door of her room. When asked what she was looking for, she did not answer. At times during the interview, the pt responded to questions with nonsensical answers, requiring additional prompting and redirecting.    Pt lives alone in a house with her dog. 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 She reports having good relations with her children and feeling safe at home. Denies substance use or access to firearms.    Collateral from daughter Estela to unit team, who reported that these periods of confusion occur on/off for the past 10 years, and they have all been due to medication noncompliance. She states that for the past month, her mother has been isolative, nonsensical, not getting out of bed.   She has complained that she is not sleeping, but whenever she goes over, pt is in bed. She says that her mother has been having day/night confusion, calling people at all hours. She also is showing up for doctor's appointments when she does not have any. Yesterday, her brother received a call from their neighbor saying that the patient was walking in the middle of the street looking for her cellphone. Pt also was trying to get into random people's car.    During hospital, patient treated with Cymbalta and Seroquel.   Reportedly had different somatic complaints on unit.      On exam, patient continues to feel depressed despite compliance with meds.   Is no longer confused, able to give good history of events.   Does not think she stopped meds as SA.  Pt states she has a bunch of kids and need to be there for them.          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    72 yo female admitted with CC: I fell so they brought me here on 5/21/21.  (Per ED BH Assessment Note:  She presents here today due to family's concern over confusion which she has been exhibiting over the last 2 weeks).    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    On interview, pt reported feeling depressed for the past 2-3 weeks with associated poor sleep, poor appetite, and difficulty concentrating. She cited her back pain as a recent stressor in her life. She denied current SIIP/HIIP or prior SAs. Pt reported 2 prior hospitalizations for depression. She denied manic sx, A/VH; however during the interview on the unit she reportedly pointed to the floor and the walls, citing that she sees stars. Denied any command hallucinations. Pt reported anxiety when it comes to her memory but denied feelings of paranoia, however pt repeatedly looked out the door of her room. When asked what she was looking for, she did not answer. At times during the interview, the pt responded to questions with nonsensical answers, requiring additional prompting and redirecting.    Pt lives alone in a house with her dog. 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 She reports having good relations with her children and feeling safe at home. Denies substance use or access to firearms.    Collateral from daughter Estela to unit team, who reported that these periods of confusion occur on/off for the past 10 years, and they have all been due to medication noncompliance. She states that for the past month, her mother has been isolative, nonsensical, not getting out of bed.   She has complained that she is not sleeping, but whenever she goes over, pt is in bed. She says that her mother has been having day/night confusion, calling people at all hours. She also is showing up for doctor's appointments when she does not have any. Yesterday, her brother received a call from their neighbor saying that the patient was walking in the middle of the street looking for her cellphone. Pt also was trying to get into random people's car.    During hospital, patient treated with Cymbalta and Seroquel.   Reportedly had different somatic complaints on unit.      On exam, patient continues to feel depressed despite compliance with meds.   Is no longer confused, able to give good history of events.   Does not think she stopped meds as SA.  Pt states she has a bunch of kids and need to be there for them.

## 2021-06-23 LAB — SARS-COV-2 RNA SPEC QL NAA+PROBE: SIGNIFICANT CHANGE UP

## 2021-06-23 PROCEDURE — 99233 SBSQ HOSP IP/OBS HIGH 50: CPT

## 2021-06-23 PROCEDURE — 99232 SBSQ HOSP IP/OBS MODERATE 35: CPT

## 2021-06-23 RX ORDER — CLONAZEPAM 1 MG
0.5 TABLET ORAL
Refills: 0 | Status: DISCONTINUED | OUTPATIENT
Start: 2021-06-23 | End: 2021-06-25

## 2021-06-23 RX ADMIN — SENNA PLUS 2 TABLET(S): 8.6 TABLET ORAL at 22:21

## 2021-06-23 RX ADMIN — Medication 0.5 MILLIGRAM(S): at 10:05

## 2021-06-23 RX ADMIN — ONDANSETRON 4 MILLIGRAM(S): 8 TABLET, FILM COATED ORAL at 15:38

## 2021-06-23 RX ADMIN — Medication 0.5 MILLIGRAM(S): at 22:21

## 2021-06-23 RX ADMIN — Medication 75 MICROGRAM(S): at 06:08

## 2021-06-23 RX ADMIN — BUDESONIDE AND FORMOTEROL FUMARATE DIHYDRATE 2 PUFF(S): 160; 4.5 AEROSOL RESPIRATORY (INHALATION) at 10:11

## 2021-06-23 RX ADMIN — Medication 400 MILLIGRAM(S): at 20:18

## 2021-06-23 RX ADMIN — Medication 81 MILLIGRAM(S): at 10:05

## 2021-06-23 RX ADMIN — QUETIAPINE FUMARATE 50 MILLIGRAM(S): 200 TABLET, FILM COATED ORAL at 10:06

## 2021-06-23 RX ADMIN — BUDESONIDE AND FORMOTEROL FUMARATE DIHYDRATE 2 PUFF(S): 160; 4.5 AEROSOL RESPIRATORY (INHALATION) at 22:21

## 2021-06-23 RX ADMIN — Medication 400 MILLIGRAM(S): at 21:38

## 2021-06-23 RX ADMIN — OXYCODONE HYDROCHLORIDE 5 MILLIGRAM(S): 5 TABLET ORAL at 11:28

## 2021-06-23 RX ADMIN — LIDOCAINE 2 PATCH: 4 CREAM TOPICAL at 10:40

## 2021-06-23 RX ADMIN — POLYETHYLENE GLYCOL 3350 17 GRAM(S): 17 POWDER, FOR SOLUTION ORAL at 10:05

## 2021-06-23 RX ADMIN — CLOPIDOGREL BISULFATE 75 MILLIGRAM(S): 75 TABLET, FILM COATED ORAL at 10:05

## 2021-06-23 RX ADMIN — DULOXETINE HYDROCHLORIDE 60 MILLIGRAM(S): 30 CAPSULE, DELAYED RELEASE ORAL at 10:05

## 2021-06-23 RX ADMIN — OXYCODONE HYDROCHLORIDE 5 MILLIGRAM(S): 5 TABLET ORAL at 12:25

## 2021-06-23 RX ADMIN — QUETIAPINE FUMARATE 100 MILLIGRAM(S): 200 TABLET, FILM COATED ORAL at 22:21

## 2021-06-23 NOTE — CHART NOTE - NSCHARTNOTEFT_GEN_A_CORE
NEUROLOGY CONSULT NOTE     The patient is a 71 year old woman with history of left suboccipital craniectomy >7 years ago and spinal cord stimulator placed in 01/2020 who is currently admitted for depression. Neurology is being consulted for assistance with ECT clearance given the patient's history of the aforementioned procedures.     Left Suboccipital Craniectomy: According to the patient and her imaging, the procedure was done sometime between 2009 and 2014 for purposes of placing a brain stimulator, although it was reportedly removed due to lack of insurance coverage. Since then, the patient has had ECT without issue. The patient currently does not have a brain stimulator in place.    Spinal Cord Stimulator: This was placed by Dr. Tai Merino at [facility]. We spoke with Dr. Merino who confirmed the placement of an [employer] device in Jan 2020. In his 15 years of practice, he has not encountered a patient with this device who required ECT. Per our literature search, there is no known risk of ECT with spinal cord stimulators, and we were able to find one case report of it occurring safely. There are theoretical concerns regarding diathermia, but it is not clear what the effects of ECT would be since it is being administered to the head. Per Dr. Merino, this theoretical risk can be mitigated by putting her device into 'surgery mode' and recommended further discussion with company representative José Luis Jeff who agreed that this mode would optimize the patient for ECT as it turns off the battery in addition to the device. He recommended using the patient's control device to change settings before and after each ECT session and does not see any issue with doing this 2-3 times per week.    We met with the patient to discuss the theoretical risks of ECT given her neurosurgical history, as well as our recommendation to use 'surgery mode' in order to minimize these risks. The patient verbalized understanding and agreed to proceed with ECT given its prior efficacy. Her son will be bringing in her control device this evening. We also discussed our recommendations for risk optimization with Slick Dillon and Justin of  and Dr. Payne of [facility] ECT service. If there are any concerns regarding the controls, the team should reach out to José Luis Jeff at [phone] with the patient's permission.     Estuardo Vidal MD   [id number] NEUROLOGY CONSULT NOTE     The patient is a 71 year-old woman with history of left suboccipital craniectomy >7 years ago and spinal cord stimulator placed in 01/2020 who is currently admitted for depression. Neurology is being consulted for assistance with ECT clearance given the patient's history of the aforementioned procedures.     Left Suboccipital Craniectomy: According to the patient and her imaging, the procedure was done sometime between 2009 and 2014 for purposes of placing a brain stimulator, although it was reportedly removed due to lack of insurance coverage. Since then, the patient has had ECT without issue. The patient currently does not have a brain stimulator in place.    Spinal Cord Stimulator: This was placed by Dr. Tai Merino at [facility]. We spoke with Dr. Merino who confirmed the placement of an [employer] device in Jan 2020. In his 15 years of practice, he has not encountered a patient with this device who required ECT. Per our literature search, the risk of ECT with spinal cord stimulators is not clear, although we were able to find one case report of successful ECT treatment with a spinal cord stimulator in place. There are theoretical concerns regarding diathermy and electrocautery, but it is not clear what the effects of ECT would be. Per Dr. Merino and a representative from [employer],, this theoretical risk can be mitigated by putting her device into 'surgery mode' which would minimize the risk of ECT, as it turns off the battery and precludes conduction of electrical current to the leads. The patient's control device can be used to change settings before and after each ECT session and there is no problem with doing this 2-3 times per week, as needed.    We met with the patient to discuss the theoretical risks of ECT given her neurosurgical history, as well as our recommendation to use 'surgery mode' in order to minimize these risks. The patient verbalized understanding and agreed to proceed with ECT given its prior efficacy. Her son will be bringing in her control device this evening. We also discussed our recommendations for risk optimization with Slick Dillon and Justin of  and Dr. Payne of [facility] ECT service. If there are any concerns regarding the controls, the team should reach out to José Luis Jeff at [phone] with the patient's permission.     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 Prior neuroimaging reviewed on [facility], including head CT scans from 5/20/21, 2/7/21, 9/3/18, 4/6/17, 6/23/14, 4/13/14, and 1/22/09, brain MRI scans from 5/8/14, 4/25/14, and 4/23/14, and CT scans of thoracic and lumbar spine from 2/7/21 and 11/19/20. The brain imaging since 2014 demonstrates that she is status post left suboccipital craniectomy with mesh in place, but there are no leads present within the brain. The thoracolumbar imaging demonstrates a spinal cord stimulator in place with leads in the lower thoracic epidural space. Case discussed with Dr. Tai Merino. Patient interviewed with Dr. Vidal and Dr. Dillon. History as noted above. PE notable for left subocciptal skull defect (S/P craniectomy), subcutaneous SCS device in the right lumbar region, and prominent kyphoscoliosis. The prior history of left suboccipital craniectomy is not a contraindication to ECT. As noted above, while the risks of ECT in the presence of SCS has not been extensively studied, the theoretical risk should be mitigated by placing the device in the "surgical" mode prior to each ECT session. Recommendations discussed with Dr. Dillon, Dr. Anderson, and Dr. Zapata.    Gustavo Valentin MD  [phone]  [facility] License # [id number]  NPI # [id number]

## 2021-06-23 NOTE — BH INPATIENT PSYCHIATRY PROGRESS NOTE - NSBHASSESSSUMMFT_PSY_ALL_CORE
70 y/o female, , domiciled alone with her dog, retired nurse, PPHx of depression w/psychotic features, possible personality disorder, +psychiatric hospitalizations x2 (last in 2014 at [facility]), previously followed at [facility] (stopped in 2/2020 due to COVID), no substance use or prior SAs, PMH of CAD s/p multiple stents, MI, hypothyroidism, left ear deafness, severe asthma, osteoporosis, diverticulitis who presented to the ED for confusion for 2 weeks, admitted to inpatient psychiatric unit for depression with psychotic features and passive SI. Pt confused to situation however reports depressive sx, no SI/HI.    Initial collateral from daughter Estela: "Estela, who reports that these periods of confusion occur on/off for the past 10 years, and they have all been due to medication noncompliance. She states that for the past month, her mother has been isolative, nonsensical, not getting out of bed; calling her She has complained that she is not sleeping, but whenever she goes over, pt is in bed. She says that her mother has been having day/night confusion, calling people at all hours. She also is showing up for doctor's appointments when she does not have any. Yesterday, her brother received a call from their neighbor saying that the patient was walking in the middle of the street looking for her cellphone. Pt also was trying to get into random people's car."    5/22: Pt seen, chart reviewed and discussed with nursing staff. Reports feeling ok, but admits feeling anxious, depressed. When asked  about circumstances before hospitalization, she reports that she is here for get support, was not eating well.  Endorses poor sleep, low appetite, low energy. Appears disorganized, confused and hard of hearing. Denies any paranoia, SI, HI, hallucination.    5/23: Pt remains disorganized, anxious and dysphoric. On exam, Pt reports not feeling well. has left lower back pain, anxious, depressed. Endorses poor sleep, low appetite, low energy and motivation. Feels her memory is not that good. Appears dysphoric, hard of hearing but less confused. Denies any jose c, paranoia, SI, HI, hallucination.    5/24:  No events reported over the weekend. Sleep and appetite is "better". Patient denies any current AH/VH but endorsed +AH and +VH prior to admission. Patient reported feeling very depressed since being isolated due to covid, she endorsed passive SI with no intent or plan. She also reported taking less of her medications by splitting pills in half at home. Patient is out on the unit, social with peers but is still very anxious. Patient is compliant with medications, no adverse effects reported.    5/25:  No events reported over the weekend. Sleep is "not as good" and appetite is "improved". Patient reported being in significant pain in her arm and also of a headache. Patient given PRN Oxycodone dose earlier and pain management consult ordered. Spoke with patient about starting Cymbalta for depression. She was hesitant at first but then agreed. Some paranoia noted around medications. Patient also stated that she feels like she is bothering her daughter too much and did not want us to call her frequently, but daughter called hospital looking to get updates and denied telling her mother that she needs a break.    5/26: Patient seen for follow up for depression and psychosis. Chart reviewed and case discussed with interdisciplinary staff. per staff, patient is very somatic. On encounter, patient is pleasant but believes she is not getting adequate care, reports that she used to be many more medications and thinks we are missing something, insists that I speak with her pulmonologist Dr. Ketan Morfin. Otherwise, eating and sleeping well. Patient remains compliant with medications, no adverse effects reported or in evidence.    5/27:  Per staff, patient is very somatic, requesting more meds for constipation. On encounter, patient is pleasant, happy that she had PT today, complains of constipation, requesting increase in Senna, Dulcolax suppository offered and patient agress. Otherwise, eating and sleeping well.    5/28:  No significant overnight event. On encounter, patient is calm, reports that she had a small BM, reports that she has some "sinus" issues now and requests something additional for pain relief, would also like to be evaluated for UTI. Otherwise, eating and sleeping well.     5/29: No significant overnight event. On encounter, patient reports mood as "little better", still reports generalized pain including lower abdominal pain. Patient is eating and sleeping well.     6/1: No reported overnight events. On encounter pt remains somatically preoccupied, states she needs to be on additional medications for her asthma and complains of headaches. In terms of her mood pt describes feeling "bored", but states she is doing better, she is eating well and states she has been able to gain back some of the weight she had lost.  6/2: Continues to endorse depression, anxiety and somatic complaints. Increased Cymbalta to 40mg QD.   6/3:  Met with Ms. Primrose this morning in the dayroom. Pt reports feeling dizzy and unsteady on her feet early this morning. Checked her vitals later in the morning, she was not orthostatic at that point. She continues to verbalize multiple somatic complaints such as nausea and later reflux. Continues to endorse difficulty sleeping. Eliminated morning dose of seroquel 12.5mg due to patient's reports of morning dizziness/lightheadedness and increased her nighttime dose to 50mg. Signed conversion to voluntary status (9.13)  6/4: Pt continues to endorse several somatic complaints such as GERD and cramping in her legs. Reports her sleep was slightly better last night. She requests to speak to a therapist, reached out to Dr. Newman who agreed to meet with pt today. Called patient's daughter in order to discuss treatment plan and explore options to improve pill management at home but she was unavailable. Left a message. Increased Seroquel to 75mg QHS.   6/7: Met with Ms. Primrose this morning in a common area. She reports feeling a little low over the weekend and fearing "Im headed back to where I started". Provided reassurance that some mood fluctuations are normal and do not necessarily mean she has lost all the progress she has made. Pt complained of headaches today which are suggestive of tension headaches by her description of the distribution. Appears less somatically preoccupied. Reports sleeping better. Will f/u with daughter regarding ways to monitor pt's pill intake and use at home.   6/8: Pr reports feeling better, reports good sleep, reports her anxiety is "better". Pt requests her seroquel to be given later in the night since she doesn't want to feel tired too early. Called daughter to discuss case but she didn't . Will try again tomorrow.   Chart reviewed, no overnight events. No PRNs needed for anxiety over the past 24 hrs. 1 dose of oxycodone PRN taken over past 24 hrs for pain. Pt has requested to be referred to a day program or partial hospital after discharge, stating she will miss the feeling of community once she goes back home. Discussed with SW. Called daughter again, left a VM.   6/10:  Pt reports increasing anxiety as time for discharge approaches. She fears being "sent home without a plan" and feeling lonely at home. Reassured pt that she would not be sent home without an aftercare plan. Pt aware team is looking into day programs/partial hospitals to refer her to. Left daughter another VM.   6/11: Patient continues to endorse feeling anxious about being discharged, going home and feeling lonely. Spoke with son Sue and patient's pain management dr (Dr. Farooq) about patient's care and ways to better control/manage her medication regimen in the community. Pt received the hearing aids her daughter brought and may use them.   6/14: Pt reports sometimes experiencing some anxiety in the afternoons around 3pm. States that at that time she usually begins to ruminate about her current situation, how bored she feels and "what am I doing". Writer validated these feelings, normalizing her emotions in the context of how long she has been in the hospital. Encouraged patient to use her coping skills. Added a morning dose of Seroquel 25mg.     Plan:   Observation status: Routine  Psych: Continue Duloxetine to 90mg QD, cont Seroquel 25mg QAM + 100mg at 10pm, cont Klonopin 0.5mg BID   Medical: Oxycodone 5mg PO Q8H PRN for pain, Plavix, Albuterol, Symbicort and Spiriva.   6/16: Patient depressed, somatic, appears to be failing this trial but still room to titrate, if not responding may need to re visit ECT. Not actively suicidal  6/17: Still depressed. AThere is lack of steady gains. Will increase Seroquel and Cymbalta, can revisit ECT if not improving further  6/18 Doing worse depressed somatic and new se nausea maybe from SSREI and Mucinex and tremor from increased Cymbalta., Will lower Cymbalta will d/c Afrin and Mucinex. Will start ECT w/u.  6/21: Less anxious, remains dysphoric, no SI/HI, no psychosis. Continue ECT w/u.  6/22: No events overnight. Pt was seen this morning in the dining area after breakfast. Reports feeling low and hoping to get ECT started soon. Pt reports having had around 4 treatments in the past and doing well afterwards. Pt went for ECHO and was seen by cardiology and cleared on their end for ECT. ECT consult placed.  6/23: Pt was cleared for ECT by cardiology and neuro. She was seen by the ECT team for her ECT consult yesterday afternoon and they suggested neurology evaluate her case as well due to hx of craniotomy and the fact she has an implanted spinal cord stimulator in the thoracolumbar region. Discussed case with Dr. Valentin and met with patient together. Discussed that there is a theoretical risk of current being transmitted through the device leads during ECT, however this risk can be mitigated by placing the stimulator on surgical mode, which can be done with a device patient has at home and her son is bringing later tonight. Patient verbalized understanding the possible risks and states her wish is to proceed with ECT. Pt states that despite experiencing a partial improvement of her depressive symptoms on Cymbalta and Seroquel she remains depressed, anxious, with low motivation and she fears going back home still feeling like this. We tried higher doses of Cymbalta, however patient developed side effects. Pt received ECT in 2014 and states that she felt "fully back to normal" after the treatment at that time and wishes to try again. Discussed case with ECT, will aim to have patient start treatment this Friday 6/25.

## 2021-06-23 NOTE — BH INPATIENT PSYCHIATRY PROGRESS NOTE - MSE UNSTRUCTURED FT
Pt appears stated age, thin, frail, alert, awake, oriented x 3, in no acute physical distress. Dressed in hospital gown, fair grooming and hygiene. No agitation/retardation/EPS noted, stable gait. Cooperative, makes eye contact. Speech is relevant, normal in volume, rate, and productivity. Mood is ok, affect is dysphoric, anxious. Thoughts are linear, no gross delusions, denies any suicidal/homicidal ideas/plan. No hallucination elicited. Insight and judgment are fair.

## 2021-06-23 NOTE — BH INPATIENT PSYCHIATRY PROGRESS NOTE - NSBHCHARTREVIEWVS_PSY_A_CORE FT
Vital Signs Last 24 Hrs  T(C): 36.3 (23 Jun 2021 06:55), Max: 36.3 (23 Jun 2021 06:55)  T(F): 97.4 (23 Jun 2021 06:55), Max: 97.4 (23 Jun 2021 06:55)  HR: --  BP: --  BP(mean): --  RR: --  SpO2: -- Vital Signs Last 24 Hrs  T(C): 36.3 (23 Jun 2021 06:55), Max: 36.3 (23 Jun 2021 06:55)  T(F): 97.4 (23 Jun 2021 06:55), Max: 97.4 (23 Jun 2021 06:55)  HR: --98  BP: --119/76  BP(mean): --  RR: --  SpO2: --

## 2021-06-23 NOTE — BH INPATIENT PSYCHIATRY PROGRESS NOTE - NSBHFUPINTERVALHXFT_PSY_A_CORE
Pt was cleared for ECT by cardiology and neuro. She was seen by the ECT team for her ECT consult yesterday afternoon and they suggested neurology evaluate her case as well due to hx of craniotomy and the fact she has an implanted spinal cord stimulator in the thoracolumbar region. Discussed case with Dr. Valentin and met with patient together. Discussed that there is a theoretical risk of current being transmitted through the device leads during ECT, however this risk can be mitigated by placing the stimulator on surgical mode, which can be done with a device patient has at home and her son is bringing later tonight. Patient verbalized understanding the possible risks and states her wish is to proceed with ECT. Pt states that despite experiencing a partial improvement of her depressive symptoms on Cymbalta and Seroquel she remains depressed, anxious, with low motivation and she fears going back home still feeling like this. We tried higher doses of Cymbalta, however patient developed side effects. Pt received ECT in 2014 and states that she felt "fully back to normal" after the treatment at that time and wishes to try again. Discussed case with ECT, will aim to have patient start treatment this Friday 6/25.

## 2021-06-23 NOTE — BH INPATIENT PSYCHIATRY PROGRESS NOTE - CASE SUMMARY
Pt continues to feel depressed, anxious, confused at times. She wants to feel 100% better and that happened after ECT in the past. After discussion of benefits/side effects of ECT, she decided to have ECT. Denies any SI/HI.

## 2021-06-24 LAB — SARS-COV-2 RNA SPEC QL NAA+PROBE: SIGNIFICANT CHANGE UP

## 2021-06-24 PROCEDURE — 99232 SBSQ HOSP IP/OBS MODERATE 35: CPT

## 2021-06-24 PROCEDURE — 93010 ELECTROCARDIOGRAM REPORT: CPT

## 2021-06-24 RX ADMIN — Medication 0.5 MILLIGRAM(S): at 21:45

## 2021-06-24 RX ADMIN — DULOXETINE HYDROCHLORIDE 60 MILLIGRAM(S): 30 CAPSULE, DELAYED RELEASE ORAL at 08:09

## 2021-06-24 RX ADMIN — POLYETHYLENE GLYCOL 3350 17 GRAM(S): 17 POWDER, FOR SOLUTION ORAL at 08:08

## 2021-06-24 RX ADMIN — Medication 75 MICROGRAM(S): at 06:17

## 2021-06-24 RX ADMIN — OXYCODONE HYDROCHLORIDE 5 MILLIGRAM(S): 5 TABLET ORAL at 14:15

## 2021-06-24 RX ADMIN — CLOPIDOGREL BISULFATE 75 MILLIGRAM(S): 75 TABLET, FILM COATED ORAL at 08:09

## 2021-06-24 RX ADMIN — OXYCODONE HYDROCHLORIDE 5 MILLIGRAM(S): 5 TABLET ORAL at 13:21

## 2021-06-24 RX ADMIN — LIDOCAINE 2 PATCH: 4 CREAM TOPICAL at 20:52

## 2021-06-24 RX ADMIN — Medication 0.5 MILLIGRAM(S): at 06:16

## 2021-06-24 RX ADMIN — BUDESONIDE AND FORMOTEROL FUMARATE DIHYDRATE 2 PUFF(S): 160; 4.5 AEROSOL RESPIRATORY (INHALATION) at 21:48

## 2021-06-24 RX ADMIN — Medication 81 MILLIGRAM(S): at 08:09

## 2021-06-24 RX ADMIN — QUETIAPINE FUMARATE 50 MILLIGRAM(S): 200 TABLET, FILM COATED ORAL at 08:09

## 2021-06-24 RX ADMIN — LIDOCAINE 2 PATCH: 4 CREAM TOPICAL at 18:34

## 2021-06-24 RX ADMIN — BUDESONIDE AND FORMOTEROL FUMARATE DIHYDRATE 2 PUFF(S): 160; 4.5 AEROSOL RESPIRATORY (INHALATION) at 08:08

## 2021-06-24 RX ADMIN — QUETIAPINE FUMARATE 100 MILLIGRAM(S): 200 TABLET, FILM COATED ORAL at 21:45

## 2021-06-24 RX ADMIN — SENNA PLUS 2 TABLET(S): 8.6 TABLET ORAL at 21:46

## 2021-06-24 RX ADMIN — LIDOCAINE 2 PATCH: 4 CREAM TOPICAL at 08:59

## 2021-06-24 RX ADMIN — Medication 400 MILLIGRAM(S): at 08:58

## 2021-06-24 RX ADMIN — Medication 400 MILLIGRAM(S): at 09:55

## 2021-06-24 NOTE — BH INPATIENT PSYCHIATRY PROGRESS NOTE - NSBHASSESSSUMMFT_PSY_ALL_CORE
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 Pt confused to situation however reports depressive sx, no SI/HI.    Initial collateral from daughter Estela: "Estela, who reports that these periods of confusion occur on/off for the past 10 years, and they have all been due to medication noncompliance. She states that for the past month, her mother has been isolative, nonsensical, not getting out of bed; calling her She has complained that she is not sleeping, but whenever she goes over, pt is in bed. She says that her mother has been having day/night confusion, calling people at all hours. She also is showing up for doctor's appointments when she does not have any. Yesterday, her brother received a call from their neighbor saying that the patient was walking in the middle of the street looking for her cellphone. Pt also was trying to get into random people's car."    5/22: Pt seen, chart reviewed and discussed with nursing staff. Reports feeling ok, but admits feeling anxious, depressed. When asked  about circumstances before hospitalization, she reports that she is here for get support, was not eating well.  Endorses poor sleep, low appetite, low energy. Appears disorganized, confused and hard of hearing. Denies any paranoia, SI, HI, hallucination.    5/23: Pt remains disorganized, anxious and dysphoric. On exam, Pt reports not feeling well. has left lower back pain, anxious, depressed. Endorses poor sleep, low appetite, low energy and motivation. Feels her memory is not that good. Appears dysphoric, hard of hearing but less confused. Denies any jose c, paranoia, SI, HI, hallucination.    5/24:  No events reported over the weekend. Sleep and appetite is "better". Patient denies any current AH/VH but endorsed +AH and +VH prior to admission. Patient reported feeling very depressed since being isolated due to covid, she endorsed passive SI with no intent or plan. She also reported taking less of her medications by splitting pills in half at home. Patient is out on the unit, social with peers but is still very anxious. Patient is compliant with medications, no adverse effects reported.    5/25:  No events reported over the weekend. Sleep is "not as good" and appetite is "improved". Patient reported being in significant pain in her arm and also of a headache. Patient given PRN Oxycodone dose earlier and pain management consult ordered. Spoke with patient about starting Cymbalta for depression. She was hesitant at first but then agreed. Some paranoia noted around medications. Patient also stated that she feels like she is bothering her daughter too much and did not want us to call her frequently, but daughter called hospital looking to get updates and denied telling her mother that she needs a break.    5/26: Patient seen for follow up for depression and psychosis. Chart reviewed and case discussed with interdisciplinary staff. per staff, patient is very somatic. On encounter, patient is pleasant but believes she is not getting adequate care, reports that she used to be many more medications and thinks we are missing something, insists that I speak with her pulmonologist Dr. Ketan Morfin. Otherwise, eating and sleeping well. Patient remains compliant with medications, no adverse effects reported or in evidence.    5/27:  Per staff, patient is very somatic, requesting more meds for constipation. On encounter, patient is pleasant, happy that she had PT today, complains of constipation, requesting increase in Senna, Dulcolax suppository offered and patient agress. Otherwise, eating and sleeping well.    5/28:  No significant overnight event. On encounter, patient is calm, reports that she had a small BM, reports that she has some "sinus" issues now and requests something additional for pain relief, would also like to be evaluated for UTI. Otherwise, eating and sleeping well.     5/29: No significant overnight event. On encounter, patient reports mood as "little better", still reports generalized pain including lower abdominal pain. Patient is eating and sleeping well.     6/1: No reported overnight events. On encounter pt remains somatically preoccupied, states she needs to be on additional medications for her asthma and complains of headaches. In terms of her mood pt describes feeling "bored", but states she is doing better, she is eating well and states she has been able to gain back some of the weight she had lost.  6/2: Continues to endorse depression, anxiety and somatic complaints. Increased Cymbalta to 40mg QD.   6/3:  Met with Ms. Primrose this morning in the dayroom. Pt reports feeling dizzy and unsteady on her feet early this morning. Checked her vitals later in the morning, she was not orthostatic at that point. She continues to verbalize multiple somatic complaints such as nausea and later reflux. Continues to endorse difficulty sleeping. Eliminated morning dose of seroquel 12.5mg due to patient's reports of morning dizziness/lightheadedness and increased her nighttime dose to 50mg. Signed conversion to voluntary status (9.13)  6/4: Pt continues to endorse several somatic complaints such as GERD and cramping in her legs. Reports her sleep was slightly better last night. She requests to speak to a therapist, reached out to Dr. Newman who agreed to meet with pt today. Called patient's daughter in order to discuss treatment plan and explore options to improve pill management at home but she was unavailable. Left a message. Increased Seroquel to 75mg QHS.   6/7: Met with Ms. Primrose this morning in a common area. She reports feeling a little low over the weekend and fearing "Im headed back to where I started". Provided reassurance that some mood fluctuations are normal and do not necessarily mean she has lost all the progress she has made. Pt complained of headaches today which are suggestive of tension headaches by her description of the distribution. Appears less somatically preoccupied. Reports sleeping better. Will f/u with daughter regarding ways to monitor pt's pill intake and use at home.   6/8: Pr reports feeling better, reports good sleep, reports her anxiety is "better". Pt requests her seroquel to be given later in the night since she doesn't want to feel tired too early. Called daughter to discuss case but she didn't . Will try again tomorrow.   Chart reviewed, no overnight events. No PRNs needed for anxiety over the past 24 hrs. 1 dose of oxycodone PRN taken over past 24 hrs for pain. Pt has requested to be referred to a day program or partial hospital after discharge, stating she will miss the feeling of community once she goes back home. Discussed with SW. Called daughter again, left a VM.   6/10:  Pt reports increasing anxiety as time for discharge approaches. She fears being "sent home without a plan" and feeling lonely at home. Reassured pt that she would not be sent home without an aftercare plan. Pt aware team is looking into day programs/partial hospitals to refer her to. Left daughter another VM.   6/11: Patient continues to endorse feeling anxious about being discharged, going home and feeling lonely. Spoke with son Sue and patient's pain management dr (Dr. Farooq) about patient's care and ways to better control/manage her medication regimen in the community. Pt received the hearing aids her daughter brought and may use them.   6/14: Pt reports sometimes experiencing some anxiety in the afternoons around 3pm. States that at that time she usually begins to ruminate about her current situation, how bored she feels and "what am I doing". Writer validated these feelings, normalizing her emotions in the context of how long she has been in the hospital. Encouraged patient to use her coping skills. Added a morning dose of Seroquel 25mg.     Plan:   Observation status: Routine  Psych: Continue Duloxetine to 90mg QD, cont Seroquel 25mg QAM + 100mg at 10pm, cont Klonopin 0.5mg BID   Medical: Oxycodone 5mg PO Q8H PRN for pain, Plavix, Albuterol, Symbicort and Spiriva.   6/16: Patient depressed, somatic, appears to be failing this trial but still room to titrate, if not responding may need to re visit ECT. Not actively suicidal  6/17: Still depressed. AThere is lack of steady gains. Will increase Seroquel and Cymbalta, can revisit ECT if not improving further  6/18 Doing worse depressed somatic and new se nausea maybe from SSREI and Mucinex and tremor from increased Cymbalta., Will lower Cymbalta will d/c Afrin and Mucinex. Will start ECT w/u.  6/21: Less anxious, remains dysphoric, no SI/HI, no psychosis. Continue ECT w/u.  6/22: No events overnight. Pt was seen this morning in the dining area after breakfast. Reports feeling low and hoping to get ECT started soon. Pt reports having had around 4 treatments in the past and doing well afterwards. Pt went for ECHO and was seen by cardiology and cleared on their end for ECT. ECT consult placed.  6/23: Pt was cleared for ECT by cardiology and neuro. She was seen by the ECT team for her ECT consult yesterday afternoon and they suggested neurology evaluate her case as well due to hx of craniotomy and the fact she has an implanted spinal cord stimulator in the thoracolumbar region. Discussed case with Dr. Valentin and met with patient together. Discussed that there is a theoretical risk of current being transmitted through the device leads during ECT, however this risk can be mitigated by placing the stimulator on surgical mode, which can be done with a device patient has at home and her son is bringing later tonight. Patient verbalized understanding the possible risks and states her wish is to proceed with ECT. Pt states that despite experiencing a partial improvement of her depressive symptoms on Cymbalta and Seroquel she remains depressed, anxious, with low motivation and she fears going back home still feeling like this. We tried higher doses of Cymbalta, however patient developed side effects. Pt received ECT in 2014 and states that she felt "fully back to normal" after the treatment at that time and wishes to try again. Discussed case with ECT, will aim to have patient start treatment this Friday 6/25.   6/24: Pt is visible in the unit. Spinal stimulator control was delivered by her son yesterday, 2S staff will turn stimulator into surgery mode tomorrow before patient goes down for ECT and back "on" after pt returns. Pt made aware and verbalized agreement with plan.

## 2021-06-24 NOTE — BH INPATIENT PSYCHIATRY PROGRESS NOTE - NSBHFUPINTERVALHXFT_PSY_A_CORE
Pt was seen this morning in the dayroom. She has been cleared for ECT and scheduled for her first treatment tomorrow morning. Patient's son pad delivered the device to control her spinal stimulator yesterday. Writer and nurse manager reviewed with pt how to set her spinal stimulator in surgery mode prior to ECT. Staff will place stimulator on surgery mode before patient goes down for ECT and back to regular "on" mode after the treatment.

## 2021-06-24 NOTE — BH INPATIENT PSYCHIATRY PROGRESS NOTE - NSBHCHARTREVIEWVS_PSY_A_CORE FT
Vital Signs Last 24 Hrs  T(C): 35.8 (24 Jun 2021 06:19), Max: 36.7 (23 Jun 2021 16:45)  T(F): 96.5 (24 Jun 2021 06:19), Max: 98.1 (23 Jun 2021 16:45)  HR: --  BP: --  BP(mean): --  RR: --  SpO2: -- Vital Signs Last 24 Hrs  T(C): 35.8 (24 Jun 2021 06:19), Max: 36.7 (23 Jun 2021 16:45)  T(F): 96.5 (24 Jun 2021 06:19), Max: 98.1 (23 Jun 2021 16:45)  HR: --96  BP: --118/84  BP(mean): --  RR: --  SpO2: --

## 2021-06-25 PROCEDURE — 99232 SBSQ HOSP IP/OBS MODERATE 35: CPT | Mod: 25

## 2021-06-25 PROCEDURE — 90870 ELECTROCONVULSIVE THERAPY: CPT

## 2021-06-25 RX ORDER — FLUMAZENIL 0.1 MG/ML
0.2 VIAL (ML) INTRAVENOUS ONCE
Refills: 0 | Status: COMPLETED | OUTPATIENT
Start: 2021-06-25 | End: 2021-06-25

## 2021-06-25 RX ORDER — MIDAZOLAM HYDROCHLORIDE 1 MG/ML
2 INJECTION, SOLUTION INTRAMUSCULAR; INTRAVENOUS ONCE
Refills: 0 | Status: DISCONTINUED | OUTPATIENT
Start: 2021-06-25 | End: 2021-06-25

## 2021-06-25 RX ORDER — CLONAZEPAM 1 MG
0.5 TABLET ORAL EVERY 12 HOURS
Refills: 0 | Status: DISCONTINUED | OUTPATIENT
Start: 2021-06-25 | End: 2021-06-29

## 2021-06-25 RX ADMIN — Medication 0.2 MILLIGRAM(S): at 11:10

## 2021-06-25 RX ADMIN — OXYCODONE HYDROCHLORIDE 5 MILLIGRAM(S): 5 TABLET ORAL at 17:44

## 2021-06-25 RX ADMIN — DULOXETINE HYDROCHLORIDE 60 MILLIGRAM(S): 30 CAPSULE, DELAYED RELEASE ORAL at 14:17

## 2021-06-25 RX ADMIN — Medication 0.5 MILLIGRAM(S): at 06:26

## 2021-06-25 RX ADMIN — CLOPIDOGREL BISULFATE 75 MILLIGRAM(S): 75 TABLET, FILM COATED ORAL at 14:17

## 2021-06-25 RX ADMIN — SENNA PLUS 2 TABLET(S): 8.6 TABLET ORAL at 21:39

## 2021-06-25 RX ADMIN — QUETIAPINE FUMARATE 100 MILLIGRAM(S): 200 TABLET, FILM COATED ORAL at 21:39

## 2021-06-25 RX ADMIN — Medication 0.5 MILLIGRAM(S): at 22:30

## 2021-06-25 RX ADMIN — BUDESONIDE AND FORMOTEROL FUMARATE DIHYDRATE 2 PUFF(S): 160; 4.5 AEROSOL RESPIRATORY (INHALATION) at 14:18

## 2021-06-25 RX ADMIN — ALBUTEROL 2 PUFF(S): 90 AEROSOL, METERED ORAL at 22:13

## 2021-06-25 RX ADMIN — BUDESONIDE AND FORMOTEROL FUMARATE DIHYDRATE 2 PUFF(S): 160; 4.5 AEROSOL RESPIRATORY (INHALATION) at 21:38

## 2021-06-25 RX ADMIN — Medication 400 MILLIGRAM(S): at 14:24

## 2021-06-25 RX ADMIN — Medication 81 MILLIGRAM(S): at 14:16

## 2021-06-25 RX ADMIN — Medication 75 MICROGRAM(S): at 06:26

## 2021-06-25 RX ADMIN — QUETIAPINE FUMARATE 50 MILLIGRAM(S): 200 TABLET, FILM COATED ORAL at 14:18

## 2021-06-25 NOTE — BH INPATIENT PSYCHIATRY PROGRESS NOTE - NSBHFUPINTERVALHXFT_PSY_A_CORE
Pt was seen this morning at bedside prior to ECT. She reports feeling sad today, "I don't think I'll ever get out of here, I've been here so long already". Pt expressed feeling a little anxious about going for ECT but believes it is the best option for her at this point. Pt went down and had a successful first treatment. Scheduled her for 2nd ECT next Monday.

## 2021-06-25 NOTE — BH INPATIENT PSYCHIATRY PROGRESS NOTE - NSBHASSESSSUMMFT_PSY_ALL_CORE
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 Pt confused to situation however reports depressive sx, no SI/HI.    Initial collateral from daughter Estela: "Estela, who reports that these periods of confusion occur on/off for the past 10 years, and they have all been due to medication noncompliance. She states that for the past month, her mother has been isolative, nonsensical, not getting out of bed; calling her She has complained that she is not sleeping, but whenever she goes over, pt is in bed. She says that her mother has been having day/night confusion, calling people at all hours. She also is showing up for doctor's appointments when she does not have any. Yesterday, her brother received a call from their neighbor saying that the patient was walking in the middle of the street looking for her cellphone. Pt also was trying to get into random people's car."    5/22: Pt seen, chart reviewed and discussed with nursing staff. Reports feeling ok, but admits feeling anxious, depressed. When asked  about circumstances before hospitalization, she reports that she is here for get support, was not eating well.  Endorses poor sleep, low appetite, low energy. Appears disorganized, confused and hard of hearing. Denies any paranoia, SI, HI, hallucination.    5/23: Pt remains disorganized, anxious and dysphoric. On exam, Pt reports not feeling well. has left lower back pain, anxious, depressed. Endorses poor sleep, low appetite, low energy and motivation. Feels her memory is not that good. Appears dysphoric, hard of hearing but less confused. Denies any jose c, paranoia, SI, HI, hallucination.    5/24:  No events reported over the weekend. Sleep and appetite is "better". Patient denies any current AH/VH but endorsed +AH and +VH prior to admission. Patient reported feeling very depressed since being isolated due to covid, she endorsed passive SI with no intent or plan. She also reported taking less of her medications by splitting pills in half at home. Patient is out on the unit, social with peers but is still very anxious. Patient is compliant with medications, no adverse effects reported.    5/25:  No events reported over the weekend. Sleep is "not as good" and appetite is "improved". Patient reported being in significant pain in her arm and also of a headache. Patient given PRN Oxycodone dose earlier and pain management consult ordered. Spoke with patient about starting Cymbalta for depression. She was hesitant at first but then agreed. Some paranoia noted around medications. Patient also stated that she feels like she is bothering her daughter too much and did not want us to call her frequently, but daughter called hospital looking to get updates and denied telling her mother that she needs a break.    5/26: Patient seen for follow up for depression and psychosis. Chart reviewed and case discussed with interdisciplinary staff. per staff, patient is very somatic. On encounter, patient is pleasant but believes she is not getting adequate care, reports that she used to be many more medications and thinks we are missing something, insists that I speak with her pulmonologist Dr. Ketan Morfin. Otherwise, eating and sleeping well. Patient remains compliant with medications, no adverse effects reported or in evidence.    5/27:  Per staff, patient is very somatic, requesting more meds for constipation. On encounter, patient is pleasant, happy that she had PT today, complains of constipation, requesting increase in Senna, Dulcolax suppository offered and patient agress. Otherwise, eating and sleeping well.    5/28:  No significant overnight event. On encounter, patient is calm, reports that she had a small BM, reports that she has some "sinus" issues now and requests something additional for pain relief, would also like to be evaluated for UTI. Otherwise, eating and sleeping well.     5/29: No significant overnight event. On encounter, patient reports mood as "little better", still reports generalized pain including lower abdominal pain. Patient is eating and sleeping well.     6/1: No reported overnight events. On encounter pt remains somatically preoccupied, states she needs to be on additional medications for her asthma and complains of headaches. In terms of her mood pt describes feeling "bored", but states she is doing better, she is eating well and states she has been able to gain back some of the weight she had lost.  6/2: Continues to endorse depression, anxiety and somatic complaints. Increased Cymbalta to 40mg QD.   6/3:  Met with Ms. Primrose this morning in the dayroom. Pt reports feeling dizzy and unsteady on her feet early this morning. Checked her vitals later in the morning, she was not orthostatic at that point. She continues to verbalize multiple somatic complaints such as nausea and later reflux. Continues to endorse difficulty sleeping. Eliminated morning dose of seroquel 12.5mg due to patient's reports of morning dizziness/lightheadedness and increased her nighttime dose to 50mg. Signed conversion to voluntary status (9.13)  6/4: Pt continues to endorse several somatic complaints such as GERD and cramping in her legs. Reports her sleep was slightly better last night. She requests to speak to a therapist, reached out to Dr. Newman who agreed to meet with pt today. Called patient's daughter in order to discuss treatment plan and explore options to improve pill management at home but she was unavailable. Left a message. Increased Seroquel to 75mg QHS.   6/7: Met with Ms. Primrose this morning in a common area. She reports feeling a little low over the weekend and fearing "Im headed back to where I started". Provided reassurance that some mood fluctuations are normal and do not necessarily mean she has lost all the progress she has made. Pt complained of headaches today which are suggestive of tension headaches by her description of the distribution. Appears less somatically preoccupied. Reports sleeping better. Will f/u with daughter regarding ways to monitor pt's pill intake and use at home.   6/8: Pr reports feeling better, reports good sleep, reports her anxiety is "better". Pt requests her seroquel to be given later in the night since she doesn't want to feel tired too early. Called daughter to discuss case but she didn't . Will try again tomorrow.   Chart reviewed, no overnight events. No PRNs needed for anxiety over the past 24 hrs. 1 dose of oxycodone PRN taken over past 24 hrs for pain. Pt has requested to be referred to a day program or partial hospital after discharge, stating she will miss the feeling of community once she goes back home. Discussed with SW. Called daughter again, left a VM.   6/10:  Pt reports increasing anxiety as time for discharge approaches. She fears being "sent home without a plan" and feeling lonely at home. Reassured pt that she would not be sent home without an aftercare plan. Pt aware team is looking into day programs/partial hospitals to refer her to. Left daughter another VM.   6/11: Patient continues to endorse feeling anxious about being discharged, going home and feeling lonely. Spoke with son Sue and patient's pain management dr (Dr. Farooq) about patient's care and ways to better control/manage her medication regimen in the community. Pt received the hearing aids her daughter brought and may use them.   6/14: Pt reports sometimes experiencing some anxiety in the afternoons around 3pm. States that at that time she usually begins to ruminate about her current situation, how bored she feels and "what am I doing". Writer validated these feelings, normalizing her emotions in the context of how long she has been in the hospital. Encouraged patient to use her coping skills. Added a morning dose of Seroquel 25mg.     Plan:   Observation status: Routine  Psych: Continue Duloxetine to 90mg QD, cont Seroquel 25mg QAM + 100mg at 10pm, cont Klonopin 0.5mg BID   Medical: Oxycodone 5mg PO Q8H PRN for pain, Plavix, Albuterol, Symbicort and Spiriva.   6/16: Patient depressed, somatic, appears to be failing this trial but still room to titrate, if not responding may need to re visit ECT. Not actively suicidal  6/17: Still depressed. AThere is lack of steady gains. Will increase Seroquel and Cymbalta, can revisit ECT if not improving further  6/18 Doing worse depressed somatic and new se nausea maybe from SSREI and Mucinex and tremor from increased Cymbalta., Will lower Cymbalta will d/c Afrin and Mucinex. Will start ECT w/u.  6/21: Less anxious, remains dysphoric, no SI/HI, no psychosis. Continue ECT w/u.  6/22: No events overnight. Pt was seen this morning in the dining area after breakfast. Reports feeling low and hoping to get ECT started soon. Pt reports having had around 4 treatments in the past and doing well afterwards. Pt went for ECHO and was seen by cardiology and cleared on their end for ECT. ECT consult placed.  6/23: Pt was cleared for ECT by cardiology and neuro. She was seen by the ECT team for her ECT consult yesterday afternoon and they suggested neurology evaluate her case as well due to hx of craniotomy and the fact she has an implanted spinal cord stimulator in the thoracolumbar region. Discussed case with Dr. Valentin and met with patient together. Discussed that there is a theoretical risk of current being transmitted through the device leads during ECT, however this risk can be mitigated by placing the stimulator on surgical mode, which can be done with a device patient has at home and her son is bringing later tonight. Patient verbalized understanding the possible risks and states her wish is to proceed with ECT. Pt states that despite experiencing a partial improvement of her depressive symptoms on Cymbalta and Seroquel she remains depressed, anxious, with low motivation and she fears going back home still feeling like this. We tried higher doses of Cymbalta, however patient developed side effects. Pt received ECT in 2014 and states that she felt "fully back to normal" after the treatment at that time and wishes to try again. Discussed case with ECT, will aim to have patient start treatment this Friday 6/25.   6/24: Pt is visible in the unit. Spinal stimulator control was delivered by her son yesterday, 2S staff will turn stimulator into surgery mode tomorrow before patient goes down for ECT and back "on" after pt returns. Pt made aware and verbalized agreement with plan.   6/25: Pt had ECT #1 today. Next ECT Monday 6/28

## 2021-06-25 NOTE — BH INPATIENT PSYCHIATRY PROGRESS NOTE - NSBHCHARTREVIEWVS_PSY_A_CORE FT
Vital Signs Last 24 Hrs  T(C): 36.9 (25 Jun 2021 11:14), Max: 36.9 (25 Jun 2021 06:43)  T(F): 98.4 (25 Jun 2021 11:14), Max: 98.4 (25 Jun 2021 06:43)  HR: 94 (25 Jun 2021 11:45) (94 - 105)  BP: 114/81 (25 Jun 2021 11:45) (114/81 - 147/104)  BP(mean): --  RR: 18 (25 Jun 2021 11:45) (18 - 30)  SpO2: 95% (25 Jun 2021 11:45) (95% - 99%)

## 2021-06-26 PROCEDURE — 99232 SBSQ HOSP IP/OBS MODERATE 35: CPT

## 2021-06-26 RX ADMIN — QUETIAPINE FUMARATE 50 MILLIGRAM(S): 200 TABLET, FILM COATED ORAL at 09:34

## 2021-06-26 RX ADMIN — QUETIAPINE FUMARATE 100 MILLIGRAM(S): 200 TABLET, FILM COATED ORAL at 20:18

## 2021-06-26 RX ADMIN — BUDESONIDE AND FORMOTEROL FUMARATE DIHYDRATE 2 PUFF(S): 160; 4.5 AEROSOL RESPIRATORY (INHALATION) at 20:17

## 2021-06-26 RX ADMIN — POLYETHYLENE GLYCOL 3350 17 GRAM(S): 17 POWDER, FOR SOLUTION ORAL at 11:31

## 2021-06-26 RX ADMIN — SENNA PLUS 2 TABLET(S): 8.6 TABLET ORAL at 20:18

## 2021-06-26 RX ADMIN — DULOXETINE HYDROCHLORIDE 60 MILLIGRAM(S): 30 CAPSULE, DELAYED RELEASE ORAL at 09:34

## 2021-06-26 RX ADMIN — CLOPIDOGREL BISULFATE 75 MILLIGRAM(S): 75 TABLET, FILM COATED ORAL at 09:34

## 2021-06-26 RX ADMIN — OXYCODONE HYDROCHLORIDE 5 MILLIGRAM(S): 5 TABLET ORAL at 15:15

## 2021-06-26 RX ADMIN — Medication 75 MICROGRAM(S): at 06:07

## 2021-06-26 RX ADMIN — Medication 0.5 MILLIGRAM(S): at 09:34

## 2021-06-26 RX ADMIN — Medication 0.5 MILLIGRAM(S): at 20:18

## 2021-06-26 RX ADMIN — Medication 81 MILLIGRAM(S): at 09:34

## 2021-06-26 NOTE — BH INPATIENT PSYCHIATRY PROGRESS NOTE - NSBHASSESSSUMMFT_PSY_ALL_CORE
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 Pt confused to situation however reports depressive sx, no SI/HI.    Initial collateral from daughter Estela: "Estela, who reports that these periods of confusion occur on/off for the past 10 years, and they have all been due to medication noncompliance. She states that for the past month, her mother has been isolative, nonsensical, not getting out of bed; calling her She has complained that she is not sleeping, but whenever she goes over, pt is in bed. She says that her mother has been having day/night confusion, calling people at all hours. She also is showing up for doctor's appointments when she does not have any. Yesterday, her brother received a call from their neighbor saying that the patient was walking in the middle of the street looking for her cellphone. Pt also was trying to get into random people's car."    5/22: Pt seen, chart reviewed and discussed with nursing staff. Reports feeling ok, but admits feeling anxious, depressed. When asked  about circumstances before hospitalization, she reports that she is here for get support, was not eating well.  Endorses poor sleep, low appetite, low energy. Appears disorganized, confused and hard of hearing. Denies any paranoia, SI, HI, hallucination.    5/23: Pt remains disorganized, anxious and dysphoric. On exam, Pt reports not feeling well. has left lower back pain, anxious, depressed. Endorses poor sleep, low appetite, low energy and motivation. Feels her memory is not that good. Appears dysphoric, hard of hearing but less confused. Denies any jose c, paranoia, SI, HI, hallucination.    5/24:  No events reported over the weekend. Sleep and appetite is "better". Patient denies any current AH/VH but endorsed +AH and +VH prior to admission. Patient reported feeling very depressed since being isolated due to covid, she endorsed passive SI with no intent or plan. She also reported taking less of her medications by splitting pills in half at home. Patient is out on the unit, social with peers but is still very anxious. Patient is compliant with medications, no adverse effects reported.    5/25:  No events reported over the weekend. Sleep is "not as good" and appetite is "improved". Patient reported being in significant pain in her arm and also of a headache. Patient given PRN Oxycodone dose earlier and pain management consult ordered. Spoke with patient about starting Cymbalta for depression. She was hesitant at first but then agreed. Some paranoia noted around medications. Patient also stated that she feels like she is bothering her daughter too much and did not want us to call her frequently, but daughter called hospital looking to get updates and denied telling her mother that she needs a break.    5/26: Patient seen for follow up for depression and psychosis. Chart reviewed and case discussed with interdisciplinary staff. per staff, patient is very somatic. On encounter, patient is pleasant but believes she is not getting adequate care, reports that she used to be many more medications and thinks we are missing something, insists that I speak with her pulmonologist Dr. Ketan Morfin. Otherwise, eating and sleeping well. Patient remains compliant with medications, no adverse effects reported or in evidence.    5/27:  Per staff, patient is very somatic, requesting more meds for constipation. On encounter, patient is pleasant, happy that she had PT today, complains of constipation, requesting increase in Senna, Dulcolax suppository offered and patient agress. Otherwise, eating and sleeping well.    5/28:  No significant overnight event. On encounter, patient is calm, reports that she had a small BM, reports that she has some "sinus" issues now and requests something additional for pain relief, would also like to be evaluated for UTI. Otherwise, eating and sleeping well.     5/29: No significant overnight event. On encounter, patient reports mood as "little better", still reports generalized pain including lower abdominal pain. Patient is eating and sleeping well.     6/1: No reported overnight events. On encounter pt remains somatically preoccupied, states she needs to be on additional medications for her asthma and complains of headaches. In terms of her mood pt describes feeling "bored", but states she is doing better, she is eating well and states she has been able to gain back some of the weight she had lost.  6/2: Continues to endorse depression, anxiety and somatic complaints. Increased Cymbalta to 40mg QD.   6/3:  Met with Ms. Primrose this morning in the dayroom. Pt reports feeling dizzy and unsteady on her feet early this morning. Checked her vitals later in the morning, she was not orthostatic at that point. She continues to verbalize multiple somatic complaints such as nausea and later reflux. Continues to endorse difficulty sleeping. Eliminated morning dose of seroquel 12.5mg due to patient's reports of morning dizziness/lightheadedness and increased her nighttime dose to 50mg. Signed conversion to voluntary status (9.13)  6/4: Pt continues to endorse several somatic complaints such as GERD and cramping in her legs. Reports her sleep was slightly better last night. She requests to speak to a therapist, reached out to Dr. Newman who agreed to meet with pt today. Called patient's daughter in order to discuss treatment plan and explore options to improve pill management at home but she was unavailable. Left a message. Increased Seroquel to 75mg QHS.   6/7: Met with Ms. Primrose this morning in a common area. She reports feeling a little low over the weekend and fearing "Im headed back to where I started". Provided reassurance that some mood fluctuations are normal and do not necessarily mean she has lost all the progress she has made. Pt complained of headaches today which are suggestive of tension headaches by her description of the distribution. Appears less somatically preoccupied. Reports sleeping better. Will f/u with daughter regarding ways to monitor pt's pill intake and use at home.   6/8: Pr reports feeling better, reports good sleep, reports her anxiety is "better". Pt requests her seroquel to be given later in the night since she doesn't want to feel tired too early. Called daughter to discuss case but she didn't . Will try again tomorrow.   Chart reviewed, no overnight events. No PRNs needed for anxiety over the past 24 hrs. 1 dose of oxycodone PRN taken over past 24 hrs for pain. Pt has requested to be referred to a day program or partial hospital after discharge, stating she will miss the feeling of community once she goes back home. Discussed with SW. Called daughter again, left a VM.   6/10:  Pt reports increasing anxiety as time for discharge approaches. She fears being "sent home without a plan" and feeling lonely at home. Reassured pt that she would not be sent home without an aftercare plan. Pt aware team is looking into day programs/partial hospitals to refer her to. Left daughter another VM.   6/11: Patient continues to endorse feeling anxious about being discharged, going home and feeling lonely. Spoke with son Sue and patient's pain management dr (Dr. Farooq) about patient's care and ways to better control/manage her medication regimen in the community. Pt received the hearing aids her daughter brought and may use them.   6/14: Pt reports sometimes experiencing some anxiety in the afternoons around 3pm. States that at that time she usually begins to ruminate about her current situation, how bored she feels and "what am I doing". Writer validated these feelings, normalizing her emotions in the context of how long she has been in the hospital. Encouraged patient to use her coping skills. Added a morning dose of Seroquel 25mg.     Plan:   Observation status: Routine  Psych: Continue Duloxetine to 90mg QD, cont Seroquel 25mg QAM + 100mg at 10pm, cont Klonopin 0.5mg BID   Medical: Oxycodone 5mg PO Q8H PRN for pain, Plavix, Albuterol, Symbicort and Spiriva.   6/16: Patient depressed, somatic, appears to be failing this trial but still room to titrate, if not responding may need to re visit ECT. Not actively suicidal  6/17: Still depressed. AThere is lack of steady gains. Will increase Seroquel and Cymbalta, can revisit ECT if not improving further  6/18 Doing worse depressed somatic and new se nausea maybe from SSREI and Mucinex and tremor from increased Cymbalta., Will lower Cymbalta will d/c Afrin and Mucinex. Will start ECT w/u.  6/21: Less anxious, remains dysphoric, no SI/HI, no psychosis. Continue ECT w/u.  6/22: No events overnight. Pt was seen this morning in the dining area after breakfast. Reports feeling low and hoping to get ECT started soon. Pt reports having had around 4 treatments in the past and doing well afterwards. Pt went for ECHO and was seen by cardiology and cleared on their end for ECT. ECT consult placed.  6/23: Pt was cleared for ECT by cardiology and neuro. She was seen by the ECT team for her ECT consult yesterday afternoon and they suggested neurology evaluate her case as well due to hx of craniotomy and the fact she has an implanted spinal cord stimulator in the thoracolumbar region. Discussed case with Dr. Valentin and met with patient together. Discussed that there is a theoretical risk of current being transmitted through the device leads during ECT, however this risk can be mitigated by placing the stimulator on surgical mode, which can be done with a device patient has at home and her son is bringing later tonight. Patient verbalized understanding the possible risks and states her wish is to proceed with ECT. Pt states that despite experiencing a partial improvement of her depressive symptoms on Cymbalta and Seroquel she remains depressed, anxious, with low motivation and she fears going back home still feeling like this. We tried higher doses of Cymbalta, however patient developed side effects. Pt received ECT in 2014 and states that she felt "fully back to normal" after the treatment at that time and wishes to try again. Discussed case with ECT, will aim to have patient start treatment this Friday 6/25.   6/24: Pt is visible in the unit. Spinal stimulator control was delivered by her son yesterday, 2S staff will turn stimulator into surgery mode tomorrow before patient goes down for ECT and back "on" after pt returns. Pt made aware and verbalized agreement with plan.   6/25: Pt had ECT #1 today. Next ECT Monday 6/28.  6/26: Had ECT yesterday, vitals stable, in no acute distress, not confused.

## 2021-06-26 NOTE — BH INPATIENT PSYCHIATRY PROGRESS NOTE - MSE UNSTRUCTURED FT
Pt is visible in day room, seen interacting with peers, ambulating stable. She appears stated age, thin, frail, alert, awake, oriented x 3, in no acute physical distress. Dressed in hospital gown, fair grooming and hygiene. No agitation/retardation/EPS noted. Cooperative, makes eye contact. Speech is relevant, normal in volume, rate, and productivity. Mood is ok, affect is dysphoric, anxious. Thoughts are linear, no gross delusions, denies any suicidal/homicidal ideas/plan. No hallucination elicited. Insight and judgment are fair.

## 2021-06-26 NOTE — BH INPATIENT PSYCHIATRY PROGRESS NOTE - NSBHFUPINTERVALHXFT_PSY_A_CORE
Pt seen for depression, anxiety, chart reviewed and discussed with nursing staff. She reports feeling ok, when asked about her ECT yesterday, she says, ' it was horrible, I had 6 hours of chills, headache yesterday' and now mild sore throat, denies any cough. Her vitals are stable, compliant with meds, no acute events overnight. Reports sleeping and eating ok. Denies any SI, HI, paranoia, jose c.

## 2021-06-26 NOTE — BH INPATIENT PSYCHIATRY PROGRESS NOTE - NSBHCHARTREVIEWVS_PSY_A_CORE FT
Vital Signs Last 24 Hrs  T(C): 36.9 (26 Jun 2021 06:36), Max: 36.9 (26 Jun 2021 06:36)  T(F): 98.4 (26 Jun 2021 06:36), Max: 98.4 (26 Jun 2021 06:36)  HR: 71 (25 Jun 2021 12:30) (71 - 71)  BP: 113/69 (25 Jun 2021 12:30) (113/69 - 113/69)  BP(mean): --  RR: 17 (25 Jun 2021 12:30) (17 - 17)  SpO2: 99% (25 Jun 2021 12:30) (99% - 99%)

## 2021-06-27 PROCEDURE — 99231 SBSQ HOSP IP/OBS SF/LOW 25: CPT

## 2021-06-27 RX ADMIN — POLYETHYLENE GLYCOL 3350 17 GRAM(S): 17 POWDER, FOR SOLUTION ORAL at 08:49

## 2021-06-27 RX ADMIN — Medication 75 MICROGRAM(S): at 06:16

## 2021-06-27 RX ADMIN — CLOPIDOGREL BISULFATE 75 MILLIGRAM(S): 75 TABLET, FILM COATED ORAL at 08:48

## 2021-06-27 RX ADMIN — QUETIAPINE FUMARATE 100 MILLIGRAM(S): 200 TABLET, FILM COATED ORAL at 22:29

## 2021-06-27 RX ADMIN — Medication 0.5 MILLIGRAM(S): at 08:48

## 2021-06-27 RX ADMIN — Medication 81 MILLIGRAM(S): at 08:47

## 2021-06-27 RX ADMIN — QUETIAPINE FUMARATE 50 MILLIGRAM(S): 200 TABLET, FILM COATED ORAL at 08:48

## 2021-06-27 RX ADMIN — Medication 0.5 MILLIGRAM(S): at 22:29

## 2021-06-27 RX ADMIN — OXYCODONE HYDROCHLORIDE 5 MILLIGRAM(S): 5 TABLET ORAL at 14:16

## 2021-06-27 RX ADMIN — DULOXETINE HYDROCHLORIDE 60 MILLIGRAM(S): 30 CAPSULE, DELAYED RELEASE ORAL at 08:47

## 2021-06-27 RX ADMIN — BUDESONIDE AND FORMOTEROL FUMARATE DIHYDRATE 2 PUFF(S): 160; 4.5 AEROSOL RESPIRATORY (INHALATION) at 08:49

## 2021-06-27 RX ADMIN — Medication 400 MILLIGRAM(S): at 10:05

## 2021-06-27 RX ADMIN — SENNA PLUS 2 TABLET(S): 8.6 TABLET ORAL at 22:29

## 2021-06-27 RX ADMIN — BUDESONIDE AND FORMOTEROL FUMARATE DIHYDRATE 2 PUFF(S): 160; 4.5 AEROSOL RESPIRATORY (INHALATION) at 22:28

## 2021-06-27 RX ADMIN — ALBUTEROL 2 PUFF(S): 90 AEROSOL, METERED ORAL at 23:04

## 2021-06-27 NOTE — BH INPATIENT PSYCHIATRY PROGRESS NOTE - NSBHFUPINTERVALHXFT_PSY_A_CORE
Patient seen for depression and anxiety. Chart reviewed and case discussed with nursing staff. No significant overnight event. On encounter, patient reports she is "okay", but then goes on to report that she has not been sleeping well since the ECT, that she had chills and a headache after treatment, but is willing to go through the treatment on Monday. Appetite remains good. Denies any SI, HI, paranoia, jose c.

## 2021-06-27 NOTE — BH INPATIENT PSYCHIATRY PROGRESS NOTE - NSBHASSESSSUMMFT_PSY_ALL_CORE
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 Pt confused to situation however reports depressive sx, no SI/HI.    Initial collateral from daughter Estela: "Estela, who reports that these periods of confusion occur on/off for the past 10 years, and they have all been due to medication noncompliance. She states that for the past month, her mother has been isolative, nonsensical, not getting out of bed; calling her She has complained that she is not sleeping, but whenever she goes over, pt is in bed. She says that her mother has been having day/night confusion, calling people at all hours. She also is showing up for doctor's appointments when she does not have any. Yesterday, her brother received a call from their neighbor saying that the patient was walking in the middle of the street looking for her cellphone. Pt also was trying to get into random people's car."    On encounter, patient is calm and cooperative but reports poor sleep since Friday. Reports chills and headache after ECT but better now, willing to have ECT on Monday.  Plan: Will continue current treatment management.

## 2021-06-27 NOTE — BH INPATIENT PSYCHIATRY PROGRESS NOTE - MSE UNSTRUCTURED FT
Patient appears stated age, adequate grooming and hygiene, awake, oriented x 3, in no acute physical distress. No agitation/retardation/EPS noted. Cooperative, makes eye contact. Speech is relevant, normal in volume, rate, and productivity. Mood is ok, affect is dysphoric, anxious. Thought process is are linear. Thought content remarkable for somatic complaints, denies any suicidal/homicidal ideas/plan. No hallucination elicited. Insight and judgment are fair. Impulse control intact at the time of exam.

## 2021-06-27 NOTE — BH INPATIENT PSYCHIATRY PROGRESS NOTE - NSBHCHARTREVIEWVS_PSY_A_CORE FT
Vital Signs Last 24 Hrs  BP: 116/72 (27 Jun 2021 08:03) (116/72 - 116/72)  BP(mean): 93 (27 Jun 2021 08:03) (93 - 93)

## 2021-06-28 PROCEDURE — 99232 SBSQ HOSP IP/OBS MODERATE 35: CPT | Mod: 25

## 2021-06-28 PROCEDURE — 90870 ELECTROCONVULSIVE THERAPY: CPT

## 2021-06-28 RX ORDER — FLUMAZENIL 0.1 MG/ML
0.2 VIAL (ML) INTRAVENOUS ONCE
Refills: 0 | Status: COMPLETED | OUTPATIENT
Start: 2021-06-28 | End: 2021-06-28

## 2021-06-28 RX ORDER — OXYCODONE HYDROCHLORIDE 5 MG/1
5 TABLET ORAL EVERY 8 HOURS
Refills: 0 | Status: DISCONTINUED | OUTPATIENT
Start: 2021-06-28 | End: 2021-07-02

## 2021-06-28 RX ORDER — MIDAZOLAM HYDROCHLORIDE 1 MG/ML
2 INJECTION, SOLUTION INTRAMUSCULAR; INTRAVENOUS ONCE
Refills: 0 | Status: DISCONTINUED | OUTPATIENT
Start: 2021-06-28 | End: 2021-06-28

## 2021-06-28 RX ORDER — PANTOPRAZOLE SODIUM 20 MG/1
40 TABLET, DELAYED RELEASE ORAL
Refills: 0 | Status: DISCONTINUED | OUTPATIENT
Start: 2021-06-28 | End: 2021-07-06

## 2021-06-28 RX ADMIN — BUDESONIDE AND FORMOTEROL FUMARATE DIHYDRATE 2 PUFF(S): 160; 4.5 AEROSOL RESPIRATORY (INHALATION) at 21:07

## 2021-06-28 RX ADMIN — Medication 0.5 MILLIGRAM(S): at 08:25

## 2021-06-28 RX ADMIN — MIDAZOLAM HYDROCHLORIDE 2 MILLIGRAM(S): 1 INJECTION, SOLUTION INTRAMUSCULAR; INTRAVENOUS at 10:35

## 2021-06-28 RX ADMIN — Medication 0.2 MILLIGRAM(S): at 10:29

## 2021-06-28 RX ADMIN — DULOXETINE HYDROCHLORIDE 60 MILLIGRAM(S): 30 CAPSULE, DELAYED RELEASE ORAL at 11:59

## 2021-06-28 RX ADMIN — Medication 0.5 MILLIGRAM(S): at 21:07

## 2021-06-28 RX ADMIN — Medication 400 MILLIGRAM(S): at 21:06

## 2021-06-28 RX ADMIN — SENNA PLUS 2 TABLET(S): 8.6 TABLET ORAL at 21:07

## 2021-06-28 RX ADMIN — Medication 400 MILLIGRAM(S): at 22:10

## 2021-06-28 RX ADMIN — OXYCODONE HYDROCHLORIDE 5 MILLIGRAM(S): 5 TABLET ORAL at 14:23

## 2021-06-28 RX ADMIN — ONDANSETRON 4 MILLIGRAM(S): 8 TABLET, FILM COATED ORAL at 11:58

## 2021-06-28 RX ADMIN — QUETIAPINE FUMARATE 50 MILLIGRAM(S): 200 TABLET, FILM COATED ORAL at 12:00

## 2021-06-28 RX ADMIN — Medication 75 MICROGRAM(S): at 06:48

## 2021-06-28 RX ADMIN — QUETIAPINE FUMARATE 100 MILLIGRAM(S): 200 TABLET, FILM COATED ORAL at 21:07

## 2021-06-28 RX ADMIN — POLYETHYLENE GLYCOL 3350 17 GRAM(S): 17 POWDER, FOR SOLUTION ORAL at 12:00

## 2021-06-28 RX ADMIN — CLOPIDOGREL BISULFATE 75 MILLIGRAM(S): 75 TABLET, FILM COATED ORAL at 11:59

## 2021-06-28 RX ADMIN — LIDOCAINE 2 PATCH: 4 CREAM TOPICAL at 18:52

## 2021-06-28 RX ADMIN — BUDESONIDE AND FORMOTEROL FUMARATE DIHYDRATE 2 PUFF(S): 160; 4.5 AEROSOL RESPIRATORY (INHALATION) at 09:05

## 2021-06-28 RX ADMIN — Medication 81 MILLIGRAM(S): at 12:01

## 2021-06-28 RX ADMIN — LIDOCAINE 2 PATCH: 4 CREAM TOPICAL at 12:01

## 2021-06-28 NOTE — ECT TREATMENT NOTE - NSICDXBHSECONDARYDX_PSY_ALL_CORE
Severe episode of recurrent major depressive disorder, with psychotic features   F33.3  Anxiety   F41.9

## 2021-06-28 NOTE — BH CHART NOTE - NSPSYPRGNOTEFT_PSY_ALL_CORE
Patient was offered psychotherapy but declined saying she had just returned from her second ECT and was not feeling well. Patient agreed to meet again at another time.

## 2021-06-28 NOTE — BH INPATIENT PSYCHIATRY PROGRESS NOTE - NSBHASSESSSUMMFT_PSY_ALL_CORE
70 y/o female, , domiciled alone with her dog, retired nurse, PPHx of depression w/psychotic features, possible personality disorder, +psychiatric hospitalizations x2 (last in 2014 at [facility]), previously followed at [facility] (stopped in 2/2020 due to COVID), no substance use or prior SAs, PMH of CAD s/p multiple stents, MI, hypothyroidism, left ear deafness, severe asthma, osteoporosis, diverticulitis who presented to the ED for confusion for 2 weeks, admitted to inpatient psychiatric unit for depression with psychotic features and passive SI. Pt confused to situation however reports depressive sx, no SI/HI.    Initial collateral from daughter Estela: "Estela, who reports that these periods of confusion occur on/off for the past 10 years, and they have all been due to medication noncompliance. She states that for the past month, her mother has been isolative, nonsensical, not getting out of bed; calling her She has complained that she is not sleeping, but whenever she goes over, pt is in bed. She says that her mother has been having day/night confusion, calling people at all hours. She also is showing up for doctor's appointments when she does not have any. Yesterday, her brother received a call from their neighbor saying that the patient was walking in the middle of the street looking for her cellphone. Pt also was trying to get into random people's car."    5/22: Pt seen, chart reviewed and discussed with nursing staff. Reports feeling ok, but admits feeling anxious, depressed. When asked  about circumstances before hospitalization, she reports that she is here for get support, was not eating well.  Endorses poor sleep, low appetite, low energy. Appears disorganized, confused and hard of hearing. Denies any paranoia, SI, HI, hallucination.    5/23: Pt remains disorganized, anxious and dysphoric. On exam, Pt reports not feeling well. has left lower back pain, anxious, depressed. Endorses poor sleep, low appetite, low energy and motivation. Feels her memory is not that good. Appears dysphoric, hard of hearing but less confused. Denies any jose c, paranoia, SI, HI, hallucination.    5/24:  No events reported over the weekend. Sleep and appetite is "better". Patient denies any current AH/VH but endorsed +AH and +VH prior to admission. Patient reported feeling very depressed since being isolated due to covid, she endorsed passive SI with no intent or plan. She also reported taking less of her medications by splitting pills in half at home. Patient is out on the unit, social with peers but is still very anxious. Patient is compliant with medications, no adverse effects reported.    5/25:  No events reported over the weekend. Sleep is "not as good" and appetite is "improved". Patient reported being in significant pain in her arm and also of a headache. Patient given PRN Oxycodone dose earlier and pain management consult ordered. Spoke with patient about starting Cymbalta for depression. She was hesitant at first but then agreed. Some paranoia noted around medications. Patient also stated that she feels like she is bothering her daughter too much and did not want us to call her frequently, but daughter called hospital looking to get updates and denied telling her mother that she needs a break.    5/26: Patient seen for follow up for depression and psychosis. Chart reviewed and case discussed with interdisciplinary staff. per staff, patient is very somatic. On encounter, patient is pleasant but believes she is not getting adequate care, reports that she used to be many more medications and thinks we are missing something, insists that I speak with her pulmonologist Dr. Ketan Morfin. Otherwise, eating and sleeping well. Patient remains compliant with medications, no adverse effects reported or in evidence.    5/27:  Per staff, patient is very somatic, requesting more meds for constipation. On encounter, patient is pleasant, happy that she had PT today, complains of constipation, requesting increase in Senna, Dulcolax suppository offered and patient agress. Otherwise, eating and sleeping well.    5/28:  No significant overnight event. On encounter, patient is calm, reports that she had a small BM, reports that she has some "sinus" issues now and requests something additional for pain relief, would also like to be evaluated for UTI. Otherwise, eating and sleeping well.     5/29: No significant overnight event. On encounter, patient reports mood as "little better", still reports generalized pain including lower abdominal pain. Patient is eating and sleeping well.     6/1: No reported overnight events. On encounter pt remains somatically preoccupied, states she needs to be on additional medications for her asthma and complains of headaches. In terms of her mood pt describes feeling "bored", but states she is doing better, she is eating well and states she has been able to gain back some of the weight she had lost.  6/2: Continues to endorse depression, anxiety and somatic complaints. Increased Cymbalta to 40mg QD.   6/3:  Met with Ms. Primrose this morning in the dayroom. Pt reports feeling dizzy and unsteady on her feet early this morning. Checked her vitals later in the morning, she was not orthostatic at that point. She continues to verbalize multiple somatic complaints such as nausea and later reflux. Continues to endorse difficulty sleeping. Eliminated morning dose of seroquel 12.5mg due to patient's reports of morning dizziness/lightheadedness and increased her nighttime dose to 50mg. Signed conversion to voluntary status (9.13)  6/4: Pt continues to endorse several somatic complaints such as GERD and cramping in her legs. Reports her sleep was slightly better last night. She requests to speak to a therapist, reached out to Dr. Newman who agreed to meet with pt today. Called patient's daughter in order to discuss treatment plan and explore options to improve pill management at home but she was unavailable. Left a message. Increased Seroquel to 75mg QHS.   6/7: Met with Ms. Primrose this morning in a common area. She reports feeling a little low over the weekend and fearing "Im headed back to where I started". Provided reassurance that some mood fluctuations are normal and do not necessarily mean she has lost all the progress she has made. Pt complained of headaches today which are suggestive of tension headaches by her description of the distribution. Appears less somatically preoccupied. Reports sleeping better. Will f/u with daughter regarding ways to monitor pt's pill intake and use at home.   6/8: Pr reports feeling better, reports good sleep, reports her anxiety is "better". Pt requests her seroquel to be given later in the night since she doesn't want to feel tired too early. Called daughter to discuss case but she didn't . Will try again tomorrow.   Chart reviewed, no overnight events. No PRNs needed for anxiety over the past 24 hrs. 1 dose of oxycodone PRN taken over past 24 hrs for pain. Pt has requested to be referred to a day program or partial hospital after discharge, stating she will miss the feeling of community once she goes back home. Discussed with SW. Called daughter again, left a VM.   6/10:  Pt reports increasing anxiety as time for discharge approaches. She fears being "sent home without a plan" and feeling lonely at home. Reassured pt that she would not be sent home without an aftercare plan. Pt aware team is looking into day programs/partial hospitals to refer her to. Left daughter another VM.   6/11: Patient continues to endorse feeling anxious about being discharged, going home and feeling lonely. Spoke with son Sue and patient's pain management dr (Dr. Farooq) about patient's care and ways to better control/manage her medication regimen in the community. Pt received the hearing aids her daughter brought and may use them.   6/14: Pt reports sometimes experiencing some anxiety in the afternoons around 3pm. States that at that time she usually begins to ruminate about her current situation, how bored she feels and "what am I doing". Writer validated these feelings, normalizing her emotions in the context of how long she has been in the hospital. Encouraged patient to use her coping skills. Added a morning dose of Seroquel 25mg.     Plan:   Observation status: Routine  Psych: Continue Duloxetine to 90mg QD, cont Seroquel 25mg QAM + 100mg at 10pm, cont Klonopin 0.5mg BID   Medical: Oxycodone 5mg PO Q8H PRN for pain, Plavix, Albuterol, Symbicort and Spiriva.   6/16: Patient depressed, somatic, appears to be failing this trial but still room to titrate, if not responding may need to re visit ECT. Not actively suicidal  6/17: Still depressed. AThere is lack of steady gains. Will increase Seroquel and Cymbalta, can revisit ECT if not improving further  6/18 Doing worse depressed somatic and new se nausea maybe from SSREI and Mucinex and tremor from increased Cymbalta., Will lower Cymbalta will d/c Afrin and Mucinex. Will start ECT w/u.  6/21: Less anxious, remains dysphoric, no SI/HI, no psychosis. Continue ECT w/u.  6/22: No events overnight. Pt was seen this morning in the dining area after breakfast. Reports feeling low and hoping to get ECT started soon. Pt reports having had around 4 treatments in the past and doing well afterwards. Pt went for ECHO and was seen by cardiology and cleared on their end for ECT. ECT consult placed.  6/23: Pt was cleared for ECT by cardiology and neuro. She was seen by the ECT team for her ECT consult yesterday afternoon and they suggested neurology evaluate her case as well due to hx of craniotomy and the fact she has an implanted spinal cord stimulator in the thoracolumbar region. Discussed case with Dr. Valentin and met with patient together. Discussed that there is a theoretical risk of current being transmitted through the device leads during ECT, however this risk can be mitigated by placing the stimulator on surgical mode, which can be done with a device patient has at home and her son is bringing later tonight. Patient verbalized understanding the possible risks and states her wish is to proceed with ECT. Pt states that despite experiencing a partial improvement of her depressive symptoms on Cymbalta and Seroquel she remains depressed, anxious, with low motivation and she fears going back home still feeling like this. We tried higher doses of Cymbalta, however patient developed side effects. Pt received ECT in 2014 and states that she felt "fully back to normal" after the treatment at that time and wishes to try again. Discussed case with ECT, will aim to have patient start treatment this Friday 6/25.   6/24: Pt is visible in the unit. Spinal stimulator control was delivered by her son yesterday, 2S staff will turn stimulator into surgery mode tomorrow before patient goes down for ECT and back "on" after pt returns. Pt made aware and verbalized agreement with plan.   6/25: Pt had ECT #1 today. Next ECT Monday 6/28 6/28: Pt was seen this morning in the dayroom after returning from ECT. Pt has been complaining of headaches, chills and nausea after her first ECT session last Friday. Will add Protonix before ECT for her next session this Wednesday.

## 2021-06-28 NOTE — BH INPATIENT PSYCHIATRY PROGRESS NOTE - NSBHFUPINTERVALHXFT_PSY_A_CORE
Pt was seen this morning in the dayroom after returning from ECT. Pt has been complaining of headaches, chills and nausea after her first ECT session last Friday. Will add Protonix before ECT for her next session this Wednesday. Pt appeared slightly confused but was able to recognize staff and answer questions in a relevant way.

## 2021-06-29 PROCEDURE — 90832 PSYTX W PT 30 MINUTES: CPT

## 2021-06-29 PROCEDURE — 99232 SBSQ HOSP IP/OBS MODERATE 35: CPT

## 2021-06-29 RX ORDER — CLONAZEPAM 1 MG
0.5 TABLET ORAL
Refills: 0 | Status: DISCONTINUED | OUTPATIENT
Start: 2021-06-29 | End: 2021-07-06

## 2021-06-29 RX ORDER — QUETIAPINE FUMARATE 200 MG/1
125 TABLET, FILM COATED ORAL
Refills: 0 | Status: DISCONTINUED | OUTPATIENT
Start: 2021-06-29 | End: 2021-07-03

## 2021-06-29 RX ADMIN — LIDOCAINE 2 PATCH: 4 CREAM TOPICAL at 09:34

## 2021-06-29 RX ADMIN — LIDOCAINE 2 PATCH: 4 CREAM TOPICAL at 18:12

## 2021-06-29 RX ADMIN — Medication 81 MILLIGRAM(S): at 09:33

## 2021-06-29 RX ADMIN — DULOXETINE HYDROCHLORIDE 60 MILLIGRAM(S): 30 CAPSULE, DELAYED RELEASE ORAL at 09:32

## 2021-06-29 RX ADMIN — SENNA PLUS 2 TABLET(S): 8.6 TABLET ORAL at 21:41

## 2021-06-29 RX ADMIN — Medication 0.5 MILLIGRAM(S): at 21:30

## 2021-06-29 RX ADMIN — QUETIAPINE FUMARATE 125 MILLIGRAM(S): 200 TABLET, FILM COATED ORAL at 21:41

## 2021-06-29 RX ADMIN — POLYETHYLENE GLYCOL 3350 17 GRAM(S): 17 POWDER, FOR SOLUTION ORAL at 09:32

## 2021-06-29 RX ADMIN — BUDESONIDE AND FORMOTEROL FUMARATE DIHYDRATE 2 PUFF(S): 160; 4.5 AEROSOL RESPIRATORY (INHALATION) at 09:33

## 2021-06-29 RX ADMIN — CLOPIDOGREL BISULFATE 75 MILLIGRAM(S): 75 TABLET, FILM COATED ORAL at 09:33

## 2021-06-29 RX ADMIN — OXYCODONE HYDROCHLORIDE 5 MILLIGRAM(S): 5 TABLET ORAL at 18:13

## 2021-06-29 RX ADMIN — BUDESONIDE AND FORMOTEROL FUMARATE DIHYDRATE 2 PUFF(S): 160; 4.5 AEROSOL RESPIRATORY (INHALATION) at 21:29

## 2021-06-29 RX ADMIN — QUETIAPINE FUMARATE 50 MILLIGRAM(S): 200 TABLET, FILM COATED ORAL at 09:32

## 2021-06-29 RX ADMIN — ALBUTEROL 2 PUFF(S): 90 AEROSOL, METERED ORAL at 21:41

## 2021-06-29 RX ADMIN — Medication 0.5 MILLIGRAM(S): at 09:32

## 2021-06-29 RX ADMIN — Medication 75 MICROGRAM(S): at 06:55

## 2021-06-29 RX ADMIN — OXYCODONE HYDROCHLORIDE 5 MILLIGRAM(S): 5 TABLET ORAL at 14:59

## 2021-06-29 RX ADMIN — LIDOCAINE 2 PATCH: 4 CREAM TOPICAL at 22:48

## 2021-06-29 NOTE — BH INPATIENT PSYCHIATRY PROGRESS NOTE - NSBHASSESSSUMMFT_PSY_ALL_CORE
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 Pt confused to situation however reports depressive sx, no SI/HI.    Initial collateral from daughter Estela: "Estela, who reports that these periods of confusion occur on/off for the past 10 years, and they have all been due to medication noncompliance. She states that for the past month, her mother has been isolative, nonsensical, not getting out of bed; calling her She has complained that she is not sleeping, but whenever she goes over, pt is in bed. She says that her mother has been having day/night confusion, calling people at all hours. She also is showing up for doctor's appointments when she does not have any. Yesterday, her brother received a call from their neighbor saying that the patient was walking in the middle of the street looking for her cellphone. Pt also was trying to get into random people's car."    5/22: Pt seen, chart reviewed and discussed with nursing staff. Reports feeling ok, but admits feeling anxious, depressed. When asked  about circumstances before hospitalization, she reports that she is here for get support, was not eating well.  Endorses poor sleep, low appetite, low energy. Appears disorganized, confused and hard of hearing. Denies any paranoia, SI, HI, hallucination.    5/23: Pt remains disorganized, anxious and dysphoric. On exam, Pt reports not feeling well. has left lower back pain, anxious, depressed. Endorses poor sleep, low appetite, low energy and motivation. Feels her memory is not that good. Appears dysphoric, hard of hearing but less confused. Denies any jose c, paranoia, SI, HI, hallucination.    5/24:  No events reported over the weekend. Sleep and appetite is "better". Patient denies any current AH/VH but endorsed +AH and +VH prior to admission. Patient reported feeling very depressed since being isolated due to covid, she endorsed passive SI with no intent or plan. She also reported taking less of her medications by splitting pills in half at home. Patient is out on the unit, social with peers but is still very anxious. Patient is compliant with medications, no adverse effects reported.    5/25:  No events reported over the weekend. Sleep is "not as good" and appetite is "improved". Patient reported being in significant pain in her arm and also of a headache. Patient given PRN Oxycodone dose earlier and pain management consult ordered. Spoke with patient about starting Cymbalta for depression. She was hesitant at first but then agreed. Some paranoia noted around medications. Patient also stated that she feels like she is bothering her daughter too much and did not want us to call her frequently, but daughter called hospital looking to get updates and denied telling her mother that she needs a break.    5/26: Patient seen for follow up for depression and psychosis. Chart reviewed and case discussed with interdisciplinary staff. per staff, patient is very somatic. On encounter, patient is pleasant but believes she is not getting adequate care, reports that she used to be many more medications and thinks we are missing something, insists that I speak with her pulmonologist Dr. Ketan Morfin. Otherwise, eating and sleeping well. Patient remains compliant with medications, no adverse effects reported or in evidence.    5/27:  Per staff, patient is very somatic, requesting more meds for constipation. On encounter, patient is pleasant, happy that she had PT today, complains of constipation, requesting increase in Senna, Dulcolax suppository offered and patient agress. Otherwise, eating and sleeping well.    5/28:  No significant overnight event. On encounter, patient is calm, reports that she had a small BM, reports that she has some "sinus" issues now and requests something additional for pain relief, would also like to be evaluated for UTI. Otherwise, eating and sleeping well.     5/29: No significant overnight event. On encounter, patient reports mood as "little better", still reports generalized pain including lower abdominal pain. Patient is eating and sleeping well.     6/1: No reported overnight events. On encounter pt remains somatically preoccupied, states she needs to be on additional medications for her asthma and complains of headaches. In terms of her mood pt describes feeling "bored", but states she is doing better, she is eating well and states she has been able to gain back some of the weight she had lost.  6/2: Continues to endorse depression, anxiety and somatic complaints. Increased Cymbalta to 40mg QD.   6/3:  Met with Ms. Primrose this morning in the dayroom. Pt reports feeling dizzy and unsteady on her feet early this morning. Checked her vitals later in the morning, she was not orthostatic at that point. She continues to verbalize multiple somatic complaints such as nausea and later reflux. Continues to endorse difficulty sleeping. Eliminated morning dose of seroquel 12.5mg due to patient's reports of morning dizziness/lightheadedness and increased her nighttime dose to 50mg. Signed conversion to voluntary status (9.13)  6/4: Pt continues to endorse several somatic complaints such as GERD and cramping in her legs. Reports her sleep was slightly better last night. She requests to speak to a therapist, reached out to Dr. Newman who agreed to meet with pt today. Called patient's daughter in order to discuss treatment plan and explore options to improve pill management at home but she was unavailable. Left a message. Increased Seroquel to 75mg QHS.   6/7: Met with Ms. Primrose this morning in a common area. She reports feeling a little low over the weekend and fearing "Im headed back to where I started". Provided reassurance that some mood fluctuations are normal and do not necessarily mean she has lost all the progress she has made. Pt complained of headaches today which are suggestive of tension headaches by her description of the distribution. Appears less somatically preoccupied. Reports sleeping better. Will f/u with daughter regarding ways to monitor pt's pill intake and use at home.   6/8: Pr reports feeling better, reports good sleep, reports her anxiety is "better". Pt requests her seroquel to be given later in the night since she doesn't want to feel tired too early. Called daughter to discuss case but she didn't . Will try again tomorrow.   Chart reviewed, no overnight events. No PRNs needed for anxiety over the past 24 hrs. 1 dose of oxycodone PRN taken over past 24 hrs for pain. Pt has requested to be referred to a day program or partial hospital after discharge, stating she will miss the feeling of community once she goes back home. Discussed with SW. Called daughter again, left a VM.   6/10:  Pt reports increasing anxiety as time for discharge approaches. She fears being "sent home without a plan" and feeling lonely at home. Reassured pt that she would not be sent home without an aftercare plan. Pt aware team is looking into day programs/partial hospitals to refer her to. Left daughter another VM.   6/11: Patient continues to endorse feeling anxious about being discharged, going home and feeling lonely. Spoke with son Sue and patient's pain management dr (Dr. Farooq) about patient's care and ways to better control/manage her medication regimen in the community. Pt received the hearing aids her daughter brought and may use them.   6/14: Pt reports sometimes experiencing some anxiety in the afternoons around 3pm. States that at that time she usually begins to ruminate about her current situation, how bored she feels and "what am I doing". Writer validated these feelings, normalizing her emotions in the context of how long she has been in the hospital. Encouraged patient to use her coping skills. Added a morning dose of Seroquel 25mg.     Plan:   Observation status: Routine  Psych: Continue Duloxetine to 90mg QD, cont Seroquel 25mg QAM + 100mg at 10pm, cont Klonopin 0.5mg BID   Medical: Oxycodone 5mg PO Q8H PRN for pain, Plavix, Albuterol, Symbicort and Spiriva.   6/16: Patient depressed, somatic, appears to be failing this trial but still room to titrate, if not responding may need to re visit ECT. Not actively suicidal  6/17: Still depressed. AThere is lack of steady gains. Will increase Seroquel and Cymbalta, can revisit ECT if not improving further  6/18 Doing worse depressed somatic and new se nausea maybe from SSREI and Mucinex and tremor from increased Cymbalta., Will lower Cymbalta will d/c Afrin and Mucinex. Will start ECT w/u.  6/21: Less anxious, remains dysphoric, no SI/HI, no psychosis. Continue ECT w/u.  6/22: No events overnight. Pt was seen this morning in the dining area after breakfast. Reports feeling low and hoping to get ECT started soon. Pt reports having had around 4 treatments in the past and doing well afterwards. Pt went for ECHO and was seen by cardiology and cleared on their end for ECT. ECT consult placed.  6/23: Pt was cleared for ECT by cardiology and neuro. She was seen by the ECT team for her ECT consult yesterday afternoon and they suggested neurology evaluate her case as well due to hx of craniotomy and the fact she has an implanted spinal cord stimulator in the thoracolumbar region. Discussed case with Dr. Valentin and met with patient together. Discussed that there is a theoretical risk of current being transmitted through the device leads during ECT, however this risk can be mitigated by placing the stimulator on surgical mode, which can be done with a device patient has at home and her son is bringing later tonight. Patient verbalized understanding the possible risks and states her wish is to proceed with ECT. Pt states that despite experiencing a partial improvement of her depressive symptoms on Cymbalta and Seroquel she remains depressed, anxious, with low motivation and she fears going back home still feeling like this. We tried higher doses of Cymbalta, however patient developed side effects. Pt received ECT in 2014 and states that she felt "fully back to normal" after the treatment at that time and wishes to try again. Discussed case with ECT, will aim to have patient start treatment this Friday 6/25.   6/24: Pt is visible in the unit. Spinal stimulator control was delivered by her son yesterday, 2S staff will turn stimulator into surgery mode tomorrow before patient goes down for ECT and back "on" after pt returns. Pt made aware and verbalized agreement with plan.   6/25: Pt had ECT #1 today. Next ECT Monday 6/28 6/28: Pt was seen this morning in the dayroom after returning from ECT. Pt has been complaining of headaches, chills and nausea after her first ECT session last Friday. Will add Protonix before ECT for her next session this Wednesday.   6/29: Mood has improved slightly. Oriented. Scheduled for ECT #3 tomorrow.  D/C morning Seroquel and increased bedtime Seroquel to 125mg due to complaints of difficulty sleeping.

## 2021-06-29 NOTE — BH INPATIENT PSYCHIATRY PROGRESS NOTE - MSE UNSTRUCTURED FT
Patient appears stated age, adequate grooming and hygiene, awake, oriented x 3, in no acute physical distress. No agitation/retardation/EPS noted. Cooperative, makes eye contact. Speech is relevant, normal in volume, rate, and productivity. Mood is "a little ", affect is dysphoric, anxious. Thought process is are linear. Thought content remarkable for somatic complaints, denies any suicidal/homicidal ideas/plan. No hallucination elicited. Insight and judgment are fair. Impulse control intact at the time of exam.

## 2021-06-29 NOTE — BH INPATIENT PSYCHIATRY PROGRESS NOTE - NSBHCHARTREVIEWVS_PSY_A_CORE FT
Vital Signs Last 24 Hrs  T(C): 36.7 (29 Jun 2021 08:08), Max: 36.7 (29 Jun 2021 08:08)  T(F): 98.1 (29 Jun 2021 08:08), Max: 98.1 (29 Jun 2021 08:08)  HR: --  BP: --  BP(mean): --  RR: --  SpO2: --

## 2021-06-29 NOTE — BH INPATIENT PSYCHIATRY PROGRESS NOTE - NSBHFUPINTERVALHXFT_PSY_A_CORE
Pt was seen this morning in the dayroom this morning. She reports feeling better than yesterday. Continues to endorse headache, denies nausea and/or chills. When asked about her mood she states "it's hard to say, Im not sure I can see a big difference yet but I guess Im more ".  Reports difficulty sleeping over the past few nights. D/C morning Seroquel and increased bedtime Seroquel to 125mg. Scheduled for ECT #3 tomorrow morning.

## 2021-06-30 PROCEDURE — 90870 ELECTROCONVULSIVE THERAPY: CPT

## 2021-06-30 PROCEDURE — 99232 SBSQ HOSP IP/OBS MODERATE 35: CPT | Mod: 25

## 2021-06-30 RX ORDER — FLUMAZENIL 0.1 MG/ML
0.2 VIAL (ML) INTRAVENOUS ONCE
Refills: 0 | Status: COMPLETED | OUTPATIENT
Start: 2021-06-30 | End: 2021-06-30

## 2021-06-30 RX ORDER — MIDAZOLAM HYDROCHLORIDE 1 MG/ML
2 INJECTION, SOLUTION INTRAMUSCULAR; INTRAVENOUS ONCE
Refills: 0 | Status: DISCONTINUED | OUTPATIENT
Start: 2021-06-30 | End: 2021-06-30

## 2021-06-30 RX ADMIN — LIDOCAINE 2 PATCH: 4 CREAM TOPICAL at 21:50

## 2021-06-30 RX ADMIN — QUETIAPINE FUMARATE 125 MILLIGRAM(S): 200 TABLET, FILM COATED ORAL at 21:49

## 2021-06-30 RX ADMIN — SENNA PLUS 2 TABLET(S): 8.6 TABLET ORAL at 21:49

## 2021-06-30 RX ADMIN — OXYCODONE HYDROCHLORIDE 5 MILLIGRAM(S): 5 TABLET ORAL at 14:43

## 2021-06-30 RX ADMIN — PANTOPRAZOLE SODIUM 40 MILLIGRAM(S): 20 TABLET, DELAYED RELEASE ORAL at 06:41

## 2021-06-30 RX ADMIN — CLOPIDOGREL BISULFATE 75 MILLIGRAM(S): 75 TABLET, FILM COATED ORAL at 10:43

## 2021-06-30 RX ADMIN — POLYETHYLENE GLYCOL 3350 17 GRAM(S): 17 POWDER, FOR SOLUTION ORAL at 10:42

## 2021-06-30 RX ADMIN — MIDAZOLAM HYDROCHLORIDE 2 MILLIGRAM(S): 1 INJECTION, SOLUTION INTRAMUSCULAR; INTRAVENOUS at 08:53

## 2021-06-30 RX ADMIN — Medication 0.5 MILLIGRAM(S): at 21:49

## 2021-06-30 RX ADMIN — LIDOCAINE 2 PATCH: 4 CREAM TOPICAL at 10:43

## 2021-06-30 RX ADMIN — BUDESONIDE AND FORMOTEROL FUMARATE DIHYDRATE 2 PUFF(S): 160; 4.5 AEROSOL RESPIRATORY (INHALATION) at 11:02

## 2021-06-30 RX ADMIN — Medication 0.2 MILLIGRAM(S): at 08:48

## 2021-06-30 RX ADMIN — Medication 75 MICROGRAM(S): at 06:42

## 2021-06-30 RX ADMIN — OXYCODONE HYDROCHLORIDE 5 MILLIGRAM(S): 5 TABLET ORAL at 15:40

## 2021-06-30 RX ADMIN — BUDESONIDE AND FORMOTEROL FUMARATE DIHYDRATE 2 PUFF(S): 160; 4.5 AEROSOL RESPIRATORY (INHALATION) at 21:51

## 2021-06-30 RX ADMIN — Medication 81 MILLIGRAM(S): at 10:42

## 2021-06-30 RX ADMIN — Medication 0.5 MILLIGRAM(S): at 06:35

## 2021-06-30 RX ADMIN — LIDOCAINE 2 PATCH: 4 CREAM TOPICAL at 18:35

## 2021-06-30 RX ADMIN — DULOXETINE HYDROCHLORIDE 60 MILLIGRAM(S): 30 CAPSULE, DELAYED RELEASE ORAL at 10:43

## 2021-06-30 NOTE — BH INPATIENT PSYCHIATRY PROGRESS NOTE - NSBHCHARTREVIEWVS_PSY_A_CORE FT
Vital Signs Last 24 Hrs  T(C): 36.1 (30 Jun 2021 09:40), Max: 36.7 (30 Jun 2021 06:43)  T(F): 97 (30 Jun 2021 09:40), Max: 98 (30 Jun 2021 06:43)  HR: 89 (30 Jun 2021 09:40) (77 - 105)  BP: 120/77 (30 Jun 2021 09:40) (102/66 - 165/98)  BP(mean): --  RR: 18 (30 Jun 2021 09:40) (18 - 19)  SpO2: 96% (30 Jun 2021 09:40) (94% - 100%)

## 2021-06-30 NOTE — BH INPATIENT PSYCHIATRY PROGRESS NOTE - NSBHFUPINTERVALHXFT_PSY_A_CORE
Chart reviewed, no overnight events, vitals stable. Patient seen in the dining area this morning. Pt went for ECT this morning, tolerating treatment well. Agreed to go for ECT #4 this Friday. No complaints.

## 2021-06-30 NOTE — BH INPATIENT PSYCHIATRY PROGRESS NOTE - NSBHASSESSSUMMFT_PSY_ALL_CORE
70 y/o female, , domiciled alone with her dog, retired nurse, PPHx of depression w/psychotic features, possible personality disorder, +psychiatric hospitalizations x2 (last in 2014 at [facility]), previously followed at [facility] (stopped in 2/2020 due to COVID), no substance use or prior SAs, PMH of CAD s/p multiple stents, MI, hypothyroidism, left ear deafness, severe asthma, osteoporosis, diverticulitis who presented to the ED for confusion for 2 weeks, admitted to inpatient psychiatric unit for depression with psychotic features and passive SI. Pt confused to situation however reports depressive sx, no SI/HI.    Initial collateral from daughter Estela: "Estela, who reports that these periods of confusion occur on/off for the past 10 years, and they have all been due to medication noncompliance. She states that for the past month, her mother has been isolative, nonsensical, not getting out of bed; calling her She has complained that she is not sleeping, but whenever she goes over, pt is in bed. She says that her mother has been having day/night confusion, calling people at all hours. She also is showing up for doctor's appointments when she does not have any. Yesterday, her brother received a call from their neighbor saying that the patient was walking in the middle of the street looking for her cellphone. Pt also was trying to get into random people's car."    5/22: Pt seen, chart reviewed and discussed with nursing staff. Reports feeling ok, but admits feeling anxious, depressed. When asked  about circumstances before hospitalization, she reports that she is here for get support, was not eating well.  Endorses poor sleep, low appetite, low energy. Appears disorganized, confused and hard of hearing. Denies any paranoia, SI, HI, hallucination.    5/23: Pt remains disorganized, anxious and dysphoric. On exam, Pt reports not feeling well. has left lower back pain, anxious, depressed. Endorses poor sleep, low appetite, low energy and motivation. Feels her memory is not that good. Appears dysphoric, hard of hearing but less confused. Denies any jose c, paranoia, SI, HI, hallucination.    5/24:  No events reported over the weekend. Sleep and appetite is "better". Patient denies any current AH/VH but endorsed +AH and +VH prior to admission. Patient reported feeling very depressed since being isolated due to covid, she endorsed passive SI with no intent or plan. She also reported taking less of her medications by splitting pills in half at home. Patient is out on the unit, social with peers but is still very anxious. Patient is compliant with medications, no adverse effects reported.    5/25:  No events reported over the weekend. Sleep is "not as good" and appetite is "improved". Patient reported being in significant pain in her arm and also of a headache. Patient given PRN Oxycodone dose earlier and pain management consult ordered. Spoke with patient about starting Cymbalta for depression. She was hesitant at first but then agreed. Some paranoia noted around medications. Patient also stated that she feels like she is bothering her daughter too much and did not want us to call her frequently, but daughter called hospital looking to get updates and denied telling her mother that she needs a break.    5/26: Patient seen for follow up for depression and psychosis. Chart reviewed and case discussed with interdisciplinary staff. per staff, patient is very somatic. On encounter, patient is pleasant but believes she is not getting adequate care, reports that she used to be many more medications and thinks we are missing something, insists that I speak with her pulmonologist Dr. Ketan Morfin. Otherwise, eating and sleeping well. Patient remains compliant with medications, no adverse effects reported or in evidence.    5/27:  Per staff, patient is very somatic, requesting more meds for constipation. On encounter, patient is pleasant, happy that she had PT today, complains of constipation, requesting increase in Senna, Dulcolax suppository offered and patient agress. Otherwise, eating and sleeping well.    5/28:  No significant overnight event. On encounter, patient is calm, reports that she had a small BM, reports that she has some "sinus" issues now and requests something additional for pain relief, would also like to be evaluated for UTI. Otherwise, eating and sleeping well.     5/29: No significant overnight event. On encounter, patient reports mood as "little better", still reports generalized pain including lower abdominal pain. Patient is eating and sleeping well.     6/1: No reported overnight events. On encounter pt remains somatically preoccupied, states she needs to be on additional medications for her asthma and complains of headaches. In terms of her mood pt describes feeling "bored", but states she is doing better, she is eating well and states she has been able to gain back some of the weight she had lost.  6/2: Continues to endorse depression, anxiety and somatic complaints. Increased Cymbalta to 40mg QD.   6/3:  Met with Ms. Primrose this morning in the dayroom. Pt reports feeling dizzy and unsteady on her feet early this morning. Checked her vitals later in the morning, she was not orthostatic at that point. She continues to verbalize multiple somatic complaints such as nausea and later reflux. Continues to endorse difficulty sleeping. Eliminated morning dose of seroquel 12.5mg due to patient's reports of morning dizziness/lightheadedness and increased her nighttime dose to 50mg. Signed conversion to voluntary status (9.13)  6/4: Pt continues to endorse several somatic complaints such as GERD and cramping in her legs. Reports her sleep was slightly better last night. She requests to speak to a therapist, reached out to Dr. Newman who agreed to meet with pt today. Called patient's daughter in order to discuss treatment plan and explore options to improve pill management at home but she was unavailable. Left a message. Increased Seroquel to 75mg QHS.   6/7: Met with Ms. Primrose this morning in a common area. She reports feeling a little low over the weekend and fearing "Im headed back to where I started". Provided reassurance that some mood fluctuations are normal and do not necessarily mean she has lost all the progress she has made. Pt complained of headaches today which are suggestive of tension headaches by her description of the distribution. Appears less somatically preoccupied. Reports sleeping better. Will f/u with daughter regarding ways to monitor pt's pill intake and use at home.   6/8: Pr reports feeling better, reports good sleep, reports her anxiety is "better". Pt requests her seroquel to be given later in the night since she doesn't want to feel tired too early. Called daughter to discuss case but she didn't . Will try again tomorrow.   Chart reviewed, no overnight events. No PRNs needed for anxiety over the past 24 hrs. 1 dose of oxycodone PRN taken over past 24 hrs for pain. Pt has requested to be referred to a day program or partial hospital after discharge, stating she will miss the feeling of community once she goes back home. Discussed with SW. Called daughter again, left a VM.   6/10:  Pt reports increasing anxiety as time for discharge approaches. She fears being "sent home without a plan" and feeling lonely at home. Reassured pt that she would not be sent home without an aftercare plan. Pt aware team is looking into day programs/partial hospitals to refer her to. Left daughter another VM.   6/11: Patient continues to endorse feeling anxious about being discharged, going home and feeling lonely. Spoke with son Sue and patient's pain management dr (Dr. Farooq) about patient's care and ways to better control/manage her medication regimen in the community. Pt received the hearing aids her daughter brought and may use them.   6/14: Pt reports sometimes experiencing some anxiety in the afternoons around 3pm. States that at that time she usually begins to ruminate about her current situation, how bored she feels and "what am I doing". Writer validated these feelings, normalizing her emotions in the context of how long she has been in the hospital. Encouraged patient to use her coping skills. Added a morning dose of Seroquel 25mg.     Plan:   Observation status: Routine  Psych: Continue Duloxetine to 90mg QD, cont Seroquel 25mg QAM + 100mg at 10pm, cont Klonopin 0.5mg BID   Medical: Oxycodone 5mg PO Q8H PRN for pain, Plavix, Albuterol, Symbicort and Spiriva.   6/16: Patient depressed, somatic, appears to be failing this trial but still room to titrate, if not responding may need to re visit ECT. Not actively suicidal  6/17: Still depressed. AThere is lack of steady gains. Will increase Seroquel and Cymbalta, can revisit ECT if not improving further  6/18 Doing worse depressed somatic and new se nausea maybe from SSREI and Mucinex and tremor from increased Cymbalta., Will lower Cymbalta will d/c Afrin and Mucinex. Will start ECT w/u.  6/21: Less anxious, remains dysphoric, no SI/HI, no psychosis. Continue ECT w/u.  6/22: No events overnight. Pt was seen this morning in the dining area after breakfast. Reports feeling low and hoping to get ECT started soon. Pt reports having had around 4 treatments in the past and doing well afterwards. Pt went for ECHO and was seen by cardiology and cleared on their end for ECT. ECT consult placed.  6/23: Pt was cleared for ECT by cardiology and neuro. She was seen by the ECT team for her ECT consult yesterday afternoon and they suggested neurology evaluate her case as well due to hx of craniotomy and the fact she has an implanted spinal cord stimulator in the thoracolumbar region. Discussed case with Dr. Valentin and met with patient together. Discussed that there is a theoretical risk of current being transmitted through the device leads during ECT, however this risk can be mitigated by placing the stimulator on surgical mode, which can be done with a device patient has at home and her son is bringing later tonight. Patient verbalized understanding the possible risks and states her wish is to proceed with ECT. Pt states that despite experiencing a partial improvement of her depressive symptoms on Cymbalta and Seroquel she remains depressed, anxious, with low motivation and she fears going back home still feeling like this. We tried higher doses of Cymbalta, however patient developed side effects. Pt received ECT in 2014 and states that she felt "fully back to normal" after the treatment at that time and wishes to try again. Discussed case with ECT, will aim to have patient start treatment this Friday 6/25.   6/24: Pt is visible in the unit. Spinal stimulator control was delivered by her son yesterday, 2S staff will turn stimulator into surgery mode tomorrow before patient goes down for ECT and back "on" after pt returns. Pt made aware and verbalized agreement with plan.   6/25: Pt had ECT #1 today. Next ECT Monday 6/28 6/28: Pt was seen this morning in the dayroom after returning from ECT. Pt has been complaining of headaches, chills and nausea after her first ECT session last Friday. Will add Protonix before ECT for her next session this Wednesday.   6/29: Mood has improved slightly. Oriented. Scheduled for ECT #3 tomorrow.  D/C morning Seroquel and increased bedtime Seroquel to 125mg due to complaints of difficulty sleeping.  6/30: Chart reviewed, no overnight events, vitals stable. Patient seen in the dining area this morning. Pt went for ECT this morning, tolerating treatment well. Agreed to go for ECT #4 this Friday. No complaints.

## 2021-06-30 NOTE — BH INPATIENT PSYCHIATRY PROGRESS NOTE - MSE UNSTRUCTURED FT
Patient appears stated age, adequate grooming and hygiene, awake, oriented x 3, in no acute physical distress. No agitation/retardation/EPS noted. Cooperative, makes eye contact. Speech is relevant, normal in volume, rate, and productivity. Mood is "better", affect is dysphoric, anxious. Thought process is are linear. Thought content remarkable for somatic complaints, denies any suicidal/homicidal ideas/plan. No hallucination elicited. Insight and judgment are fair. Impulse control intact at the time of exam.

## 2021-07-01 PROCEDURE — 99232 SBSQ HOSP IP/OBS MODERATE 35: CPT

## 2021-07-01 RX ADMIN — BUDESONIDE AND FORMOTEROL FUMARATE DIHYDRATE 2 PUFF(S): 160; 4.5 AEROSOL RESPIRATORY (INHALATION) at 08:57

## 2021-07-01 RX ADMIN — Medication 81 MILLIGRAM(S): at 08:56

## 2021-07-01 RX ADMIN — Medication 75 MICROGRAM(S): at 06:08

## 2021-07-01 RX ADMIN — POLYETHYLENE GLYCOL 3350 17 GRAM(S): 17 POWDER, FOR SOLUTION ORAL at 08:57

## 2021-07-01 RX ADMIN — Medication 0.5 MILLIGRAM(S): at 06:08

## 2021-07-01 RX ADMIN — DULOXETINE HYDROCHLORIDE 60 MILLIGRAM(S): 30 CAPSULE, DELAYED RELEASE ORAL at 08:57

## 2021-07-01 RX ADMIN — CLOPIDOGREL BISULFATE 75 MILLIGRAM(S): 75 TABLET, FILM COATED ORAL at 08:57

## 2021-07-01 RX ADMIN — SENNA PLUS 2 TABLET(S): 8.6 TABLET ORAL at 21:02

## 2021-07-01 RX ADMIN — OXYCODONE HYDROCHLORIDE 5 MILLIGRAM(S): 5 TABLET ORAL at 15:36

## 2021-07-01 RX ADMIN — OXYCODONE HYDROCHLORIDE 5 MILLIGRAM(S): 5 TABLET ORAL at 15:35

## 2021-07-01 RX ADMIN — QUETIAPINE FUMARATE 125 MILLIGRAM(S): 200 TABLET, FILM COATED ORAL at 21:02

## 2021-07-01 RX ADMIN — BUDESONIDE AND FORMOTEROL FUMARATE DIHYDRATE 2 PUFF(S): 160; 4.5 AEROSOL RESPIRATORY (INHALATION) at 21:02

## 2021-07-01 RX ADMIN — Medication 0.5 MILLIGRAM(S): at 21:02

## 2021-07-01 NOTE — BH INPATIENT PSYCHIATRY PROGRESS NOTE - NSBHASSESSSUMMFT_PSY_ALL_CORE
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 Pt confused to situation however reports depressive sx, no SI/HI.    Initial collateral from daughter Estela: "Estela, who reports that these periods of confusion occur on/off for the past 10 years, and they have all been due to medication noncompliance. She states that for the past month, her mother has been isolative, nonsensical, not getting out of bed; calling her She has complained that she is not sleeping, but whenever she goes over, pt is in bed. She says that her mother has been having day/night confusion, calling people at all hours. She also is showing up for doctor's appointments when she does not have any. Yesterday, her brother received a call from their neighbor saying that the patient was walking in the middle of the street looking for her cellphone. Pt also was trying to get into random people's car."    5/22: Pt seen, chart reviewed and discussed with nursing staff. Reports feeling ok, but admits feeling anxious, depressed. When asked  about circumstances before hospitalization, she reports that she is here for get support, was not eating well.  Endorses poor sleep, low appetite, low energy. Appears disorganized, confused and hard of hearing. Denies any paranoia, SI, HI, hallucination.    5/23: Pt remains disorganized, anxious and dysphoric. On exam, Pt reports not feeling well. has left lower back pain, anxious, depressed. Endorses poor sleep, low appetite, low energy and motivation. Feels her memory is not that good. Appears dysphoric, hard of hearing but less confused. Denies any jose c, paranoia, SI, HI, hallucination.    5/24:  No events reported over the weekend. Sleep and appetite is "better". Patient denies any current AH/VH but endorsed +AH and +VH prior to admission. Patient reported feeling very depressed since being isolated due to covid, she endorsed passive SI with no intent or plan. She also reported taking less of her medications by splitting pills in half at home. Patient is out on the unit, social with peers but is still very anxious. Patient is compliant with medications, no adverse effects reported.    5/25:  No events reported over the weekend. Sleep is "not as good" and appetite is "improved". Patient reported being in significant pain in her arm and also of a headache. Patient given PRN Oxycodone dose earlier and pain management consult ordered. Spoke with patient about starting Cymbalta for depression. She was hesitant at first but then agreed. Some paranoia noted around medications. Patient also stated that she feels like she is bothering her daughter too much and did not want us to call her frequently, but daughter called hospital looking to get updates and denied telling her mother that she needs a break.    5/26: Patient seen for follow up for depression and psychosis. Chart reviewed and case discussed with interdisciplinary staff. per staff, patient is very somatic. On encounter, patient is pleasant but believes she is not getting adequate care, reports that she used to be many more medications and thinks we are missing something, insists that I speak with her pulmonologist Dr. Ketan Morfin. Otherwise, eating and sleeping well. Patient remains compliant with medications, no adverse effects reported or in evidence.    5/27:  Per staff, patient is very somatic, requesting more meds for constipation. On encounter, patient is pleasant, happy that she had PT today, complains of constipation, requesting increase in Senna, Dulcolax suppository offered and patient agress. Otherwise, eating and sleeping well.    5/28:  No significant overnight event. On encounter, patient is calm, reports that she had a small BM, reports that she has some "sinus" issues now and requests something additional for pain relief, would also like to be evaluated for UTI. Otherwise, eating and sleeping well.     5/29: No significant overnight event. On encounter, patient reports mood as "little better", still reports generalized pain including lower abdominal pain. Patient is eating and sleeping well.     6/1: No reported overnight events. On encounter pt remains somatically preoccupied, states she needs to be on additional medications for her asthma and complains of headaches. In terms of her mood pt describes feeling "bored", but states she is doing better, she is eating well and states she has been able to gain back some of the weight she had lost.  6/2: Continues to endorse depression, anxiety and somatic complaints. Increased Cymbalta to 40mg QD.   6/3:  Met with Ms. Primrose this morning in the dayroom. Pt reports feeling dizzy and unsteady on her feet early this morning. Checked her vitals later in the morning, she was not orthostatic at that point. She continues to verbalize multiple somatic complaints such as nausea and later reflux. Continues to endorse difficulty sleeping. Eliminated morning dose of seroquel 12.5mg due to patient's reports of morning dizziness/lightheadedness and increased her nighttime dose to 50mg. Signed conversion to voluntary status (9.13)  6/4: Pt continues to endorse several somatic complaints such as GERD and cramping in her legs. Reports her sleep was slightly better last night. She requests to speak to a therapist, reached out to Dr. Newman who agreed to meet with pt today. Called patient's daughter in order to discuss treatment plan and explore options to improve pill management at home but she was unavailable. Left a message. Increased Seroquel to 75mg QHS.   6/7: Met with Ms. Primrose this morning in a common area. She reports feeling a little low over the weekend and fearing "Im headed back to where I started". Provided reassurance that some mood fluctuations are normal and do not necessarily mean she has lost all the progress she has made. Pt complained of headaches today which are suggestive of tension headaches by her description of the distribution. Appears less somatically preoccupied. Reports sleeping better. Will f/u with daughter regarding ways to monitor pt's pill intake and use at home.   6/8: Pr reports feeling better, reports good sleep, reports her anxiety is "better". Pt requests her seroquel to be given later in the night since she doesn't want to feel tired too early. Called daughter to discuss case but she didn't . Will try again tomorrow.   Chart reviewed, no overnight events. No PRNs needed for anxiety over the past 24 hrs. 1 dose of oxycodone PRN taken over past 24 hrs for pain. Pt has requested to be referred to a day program or partial hospital after discharge, stating she will miss the feeling of community once she goes back home. Discussed with SW. Called daughter again, left a VM.   6/10:  Pt reports increasing anxiety as time for discharge approaches. She fears being "sent home without a plan" and feeling lonely at home. Reassured pt that she would not be sent home without an aftercare plan. Pt aware team is looking into day programs/partial hospitals to refer her to. Left daughter another VM.   6/11: Patient continues to endorse feeling anxious about being discharged, going home and feeling lonely. Spoke with son Sue and patient's pain management dr (Dr. Farooq) about patient's care and ways to better control/manage her medication regimen in the community. Pt received the hearing aids her daughter brought and may use them.   6/14: Pt reports sometimes experiencing some anxiety in the afternoons around 3pm. States that at that time she usually begins to ruminate about her current situation, how bored she feels and "what am I doing". Writer validated these feelings, normalizing her emotions in the context of how long she has been in the hospital. Encouraged patient to use her coping skills. Added a morning dose of Seroquel 25mg.     Plan:   Observation status: Routine  Psych: Continue Duloxetine to 90mg QD, cont Seroquel 25mg QAM + 100mg at 10pm, cont Klonopin 0.5mg BID   Medical: Oxycodone 5mg PO Q8H PRN for pain, Plavix, Albuterol, Symbicort and Spiriva.   6/16: Patient depressed, somatic, appears to be failing this trial but still room to titrate, if not responding may need to re visit ECT. Not actively suicidal  6/17: Still depressed. AThere is lack of steady gains. Will increase Seroquel and Cymbalta, can revisit ECT if not improving further  6/18 Doing worse depressed somatic and new se nausea maybe from SSREI and Mucinex and tremor from increased Cymbalta., Will lower Cymbalta will d/c Afrin and Mucinex. Will start ECT w/u.  6/21: Less anxious, remains dysphoric, no SI/HI, no psychosis. Continue ECT w/u.  6/22: No events overnight. Pt was seen this morning in the dining area after breakfast. Reports feeling low and hoping to get ECT started soon. Pt reports having had around 4 treatments in the past and doing well afterwards. Pt went for ECHO and was seen by cardiology and cleared on their end for ECT. ECT consult placed.  6/23: Pt was cleared for ECT by cardiology and neuro. She was seen by the ECT team for her ECT consult yesterday afternoon and they suggested neurology evaluate her case as well due to hx of craniotomy and the fact she has an implanted spinal cord stimulator in the thoracolumbar region. Discussed case with Dr. Valentin and met with patient together. Discussed that there is a theoretical risk of current being transmitted through the device leads during ECT, however this risk can be mitigated by placing the stimulator on surgical mode, which can be done with a device patient has at home and her son is bringing later tonight. Patient verbalized understanding the possible risks and states her wish is to proceed with ECT. Pt states that despite experiencing a partial improvement of her depressive symptoms on Cymbalta and Seroquel she remains depressed, anxious, with low motivation and she fears going back home still feeling like this. We tried higher doses of Cymbalta, however patient developed side effects. Pt received ECT in 2014 and states that she felt "fully back to normal" after the treatment at that time and wishes to try again. Discussed case with ECT, will aim to have patient start treatment this Friday 6/25.   6/24: Pt is visible in the unit. Spinal stimulator control was delivered by her son yesterday, 2S staff will turn stimulator into surgery mode tomorrow before patient goes down for ECT and back "on" after pt returns. Pt made aware and verbalized agreement with plan.   6/25: Pt had ECT #1 today. Next ECT Monday 6/28 6/28: Pt was seen this morning in the dayroom after returning from ECT. Pt has been complaining of headaches, chills and nausea after her first ECT session last Friday. Will add Protonix before ECT for her next session this Wednesday.   6/29: Mood has improved slightly. Oriented. Scheduled for ECT #3 tomorrow.  D/C morning Seroquel and increased bedtime Seroquel to 125mg due to complaints of difficulty sleeping.  6/30: Chart reviewed, no overnight events, vitals stable. Patient seen in the dining area this morning. Pt went for ECT this morning, tolerating treatment well. Agreed to go for ECT #4 this Friday. No complaints.   7/1: Chart reviewed, no overnight events, vitals stable. She is visible in the unit, seen interacting and helping a peer sit down to use the phone. Patient seen in the a common area this morning. She is more animated on approach. Reports her mood has improved since starting ECT, does not verbalize any somatic complaints. AAOx3, denies SIIP, AH/VH/paranoid thoughts. On board with going for ECT #4 tomorrow morning. Will begin discharge planning for next week.

## 2021-07-01 NOTE — BH INPATIENT PSYCHIATRY PROGRESS NOTE - NSBHCHARTREVIEWVS_PSY_A_CORE FT
Vital Signs Last 24 Hrs  T(C): 36.4 (01 Jul 2021 06:31), Max: 36.8 (30 Jun 2021 16:31)  T(F): 97.6 (01 Jul 2021 06:31), Max: 98.2 (30 Jun 2021 16:31)  HR: --  BP: --  BP(mean): --  RR: --  SpO2: --

## 2021-07-01 NOTE — BH INPATIENT PSYCHIATRY PROGRESS NOTE - MSE UNSTRUCTURED FT
Patient appears stated age, adequate grooming and hygiene, awake, oriented x 3, in no acute physical distress. No agitation/retardation/EPS noted. Cooperative, makes eye contact. Speech is relevant, normal in volume, rate, and productivity. Mood is "better", affect is reactive. Thought process is are linear. Thought content remarkable for somatic complaints, denies any suicidal/homicidal ideas/plan. No hallucination elicited. Insight and judgment are fair. Impulse control intact at the time of exam.

## 2021-07-01 NOTE — BH INPATIENT PSYCHIATRY PROGRESS NOTE - NSBHFUPINTERVALHXFT_PSY_A_CORE
Chart reviewed, no overnight events, vitals stable. She is visible in the unit, seen interacting and helping a peer sit down to use the phone. Patient seen in the a common area this morning. She is more animated on approach. Reports her mood has improved since starting ECT, does not verbalize any somatic complaints. AAOx3, denies SIIP, AH/VH/paranoid thoughts. On board with going for ECT #4 tomorrow morning. Will begin discharge planning for next week.

## 2021-07-02 PROCEDURE — 99232 SBSQ HOSP IP/OBS MODERATE 35: CPT | Mod: 25

## 2021-07-02 PROCEDURE — 90870 ELECTROCONVULSIVE THERAPY: CPT

## 2021-07-02 RX ORDER — DULOXETINE HYDROCHLORIDE 30 MG/1
20 CAPSULE, DELAYED RELEASE ORAL DAILY
Refills: 0 | Status: DISCONTINUED | OUTPATIENT
Start: 2021-07-02 | End: 2021-07-08

## 2021-07-02 RX ORDER — FLUMAZENIL 0.1 MG/ML
0.2 VIAL (ML) INTRAVENOUS ONCE
Refills: 0 | Status: COMPLETED | OUTPATIENT
Start: 2021-07-02 | End: 2021-07-02

## 2021-07-02 RX ORDER — MIDAZOLAM HYDROCHLORIDE 1 MG/ML
2 INJECTION, SOLUTION INTRAMUSCULAR; INTRAVENOUS ONCE
Refills: 0 | Status: DISCONTINUED | OUTPATIENT
Start: 2021-07-02 | End: 2021-07-02

## 2021-07-02 RX ORDER — OXYCODONE HYDROCHLORIDE 5 MG/1
5 TABLET ORAL EVERY 8 HOURS
Refills: 0 | Status: DISCONTINUED | OUTPATIENT
Start: 2021-07-02 | End: 2021-07-08

## 2021-07-02 RX ORDER — PANTOPRAZOLE SODIUM 20 MG/1
40 TABLET, DELAYED RELEASE ORAL ONCE
Refills: 0 | Status: COMPLETED | OUTPATIENT
Start: 2021-07-02 | End: 2021-07-02

## 2021-07-02 RX ADMIN — OXYCODONE HYDROCHLORIDE 5 MILLIGRAM(S): 5 TABLET ORAL at 13:11

## 2021-07-02 RX ADMIN — CLOPIDOGREL BISULFATE 75 MILLIGRAM(S): 75 TABLET, FILM COATED ORAL at 11:21

## 2021-07-02 RX ADMIN — OXYCODONE HYDROCHLORIDE 5 MILLIGRAM(S): 5 TABLET ORAL at 14:00

## 2021-07-02 RX ADMIN — Medication 650 MILLIGRAM(S): at 11:21

## 2021-07-02 RX ADMIN — DULOXETINE HYDROCHLORIDE 20 MILLIGRAM(S): 30 CAPSULE, DELAYED RELEASE ORAL at 21:19

## 2021-07-02 RX ADMIN — Medication 0.2 MILLIGRAM(S): at 09:57

## 2021-07-02 RX ADMIN — Medication 400 MILLIGRAM(S): at 21:29

## 2021-07-02 RX ADMIN — BUDESONIDE AND FORMOTEROL FUMARATE DIHYDRATE 2 PUFF(S): 160; 4.5 AEROSOL RESPIRATORY (INHALATION) at 08:37

## 2021-07-02 RX ADMIN — Medication 0.5 MILLIGRAM(S): at 06:08

## 2021-07-02 RX ADMIN — POLYETHYLENE GLYCOL 3350 17 GRAM(S): 17 POWDER, FOR SOLUTION ORAL at 11:21

## 2021-07-02 RX ADMIN — QUETIAPINE FUMARATE 125 MILLIGRAM(S): 200 TABLET, FILM COATED ORAL at 21:20

## 2021-07-02 RX ADMIN — PANTOPRAZOLE SODIUM 40 MILLIGRAM(S): 20 TABLET, DELAYED RELEASE ORAL at 08:34

## 2021-07-02 RX ADMIN — Medication 0.5 MILLIGRAM(S): at 21:20

## 2021-07-02 RX ADMIN — Medication 75 MICROGRAM(S): at 06:08

## 2021-07-02 RX ADMIN — ALBUTEROL 2 PUFF(S): 90 AEROSOL, METERED ORAL at 21:30

## 2021-07-02 RX ADMIN — Medication 400 MILLIGRAM(S): at 22:29

## 2021-07-02 RX ADMIN — BUDESONIDE AND FORMOTEROL FUMARATE DIHYDRATE 2 PUFF(S): 160; 4.5 AEROSOL RESPIRATORY (INHALATION) at 21:21

## 2021-07-02 RX ADMIN — ALBUTEROL 2 PUFF(S): 90 AEROSOL, METERED ORAL at 07:52

## 2021-07-02 RX ADMIN — Medication 81 MILLIGRAM(S): at 11:21

## 2021-07-02 RX ADMIN — SENNA PLUS 2 TABLET(S): 8.6 TABLET ORAL at 21:18

## 2021-07-02 RX ADMIN — MIDAZOLAM HYDROCHLORIDE 2 MILLIGRAM(S): 1 INJECTION, SOLUTION INTRAMUSCULAR; INTRAVENOUS at 10:01

## 2021-07-02 RX ADMIN — DULOXETINE HYDROCHLORIDE 60 MILLIGRAM(S): 30 CAPSULE, DELAYED RELEASE ORAL at 11:21

## 2021-07-02 RX ADMIN — Medication 650 MILLIGRAM(S): at 11:55

## 2021-07-02 NOTE — BH INPATIENT PSYCHIATRY PROGRESS NOTE - NSBHASSESSSUMMFT_PSY_ALL_CORE
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), previously followed at [facility] (stopped in 2/2020 due to COVID), no substance use or prior SAs, PMH of CAD s/p multiple stents, MI, hypothyroidism, left ear deafness, severe asthma, osteoporosis, diverticulitis who presented to the ED for confusion for 2 weeks, admitted to inpatient psychiatric unit for depression with psychotic features and passive SI. Pt confused to situation however reports depressive sx, no SI/HI.    Initial collateral from daughter Estela: "Estela, who reports that these periods of confusion occur on/off for the past 10 years, and they have all been due to medication noncompliance. She states that for the past month, her mother has been isolative, nonsensical, not getting out of bed; calling her She has complained that she is not sleeping, but whenever she goes over, pt is in bed. She says that her mother has been having day/night confusion, calling people at all hours. She also is showing up for doctor's appointments when she does not have any. Yesterday, her brother received a call from their neighbor saying that the patient was walking in the middle of the street looking for her cellphone. Pt also was trying to get into random people's car."    5/22: Pt seen, chart reviewed and discussed with nursing staff. Reports feeling ok, but admits feeling anxious, depressed. When asked  about circumstances before hospitalization, she reports that she is here for get support, was not eating well.  Endorses poor sleep, low appetite, low energy. Appears disorganized, confused and hard of hearing. Denies any paranoia, SI, HI, hallucination.    5/23: Pt remains disorganized, anxious and dysphoric. On exam, Pt reports not feeling well. has left lower back pain, anxious, depressed. Endorses poor sleep, low appetite, low energy and motivation. Feels her memory is not that good. Appears dysphoric, hard of hearing but less confused. Denies any jose c, paranoia, SI, HI, hallucination.    5/24:  No events reported over the weekend. Sleep and appetite is "better". Patient denies any current AH/VH but endorsed +AH and +VH prior to admission. Patient reported feeling very depressed since being isolated due to covid, she endorsed passive SI with no intent or plan. She also reported taking less of her medications by splitting pills in half at home. Patient is out on the unit, social with peers but is still very anxious. Patient is compliant with medications, no adverse effects reported.    5/25:  No events reported over the weekend. Sleep is "not as good" and appetite is "improved". Patient reported being in significant pain in her arm and also of a headache. Patient given PRN Oxycodone dose earlier and pain management consult ordered. Spoke with patient about starting Cymbalta for depression. She was hesitant at first but then agreed. Some paranoia noted around medications. Patient also stated that she feels like she is bothering her daughter too much and did not want us to call her frequently, but daughter called hospital looking to get updates and denied telling her mother that she needs a break.    5/26: Patient seen for follow up for depression and psychosis. Chart reviewed and case discussed with interdisciplinary staff. per staff, patient is very somatic. On encounter, patient is pleasant but believes she is not getting adequate care, reports that she used to be many more medications and thinks we are missing something, insists that I speak with her pulmonologist Dr. Ketan Morfin. Otherwise, eating and sleeping well. Patient remains compliant with medications, no adverse effects reported or in evidence.    5/27:  Per staff, patient is very somatic, requesting more meds for constipation. On encounter, patient is pleasant, happy that she had PT today, complains of constipation, requesting increase in Senna, Dulcolax suppository offered and patient agress. Otherwise, eating and sleeping well.    5/28:  No significant overnight event. On encounter, patient is calm, reports that she had a small BM, reports that she has some "sinus" issues now and requests something additional for pain relief, would also like to be evaluated for UTI. Otherwise, eating and sleeping well.     5/29: No significant overnight event. On encounter, patient reports mood as "little better", still reports generalized pain including lower abdominal pain. Patient is eating and sleeping well.     6/1: No reported overnight events. On encounter pt remains somatically preoccupied, states she needs to be on additional medications for her asthma and complains of headaches. In terms of her mood pt describes feeling "bored", but states she is doing better, she is eating well and states she has been able to gain back some of the weight she had lost.  6/2: Continues to endorse depression, anxiety and somatic complaints. Increased Cymbalta to 40mg QD.   6/3:  Met with Ms. Primrose this morning in the dayroom. Pt reports feeling dizzy and unsteady on her feet early this morning. Checked her vitals later in the morning, she was not orthostatic at that point. She continues to verbalize multiple somatic complaints such as nausea and later reflux. Continues to endorse difficulty sleeping. Eliminated morning dose of seroquel 12.5mg due to patient's reports of morning dizziness/lightheadedness and increased her nighttime dose to 50mg. Signed conversion to voluntary status (9.13)  6/4: Pt continues to endorse several somatic complaints such as GERD and cramping in her legs. Reports her sleep was slightly better last night. She requests to speak to a therapist, reached out to Dr. Newman who agreed to meet with pt today. Called patient's daughter in order to discuss treatment plan and explore options to improve pill management at home but she was unavailable. Left a message. Increased Seroquel to 75mg QHS.   6/7: Met with Ms. Primrose this morning in a common area. She reports feeling a little low over the weekend and fearing "Im headed back to where I started". Provided reassurance that some mood fluctuations are normal and do not necessarily mean she has lost all the progress she has made. Pt complained of headaches today which are suggestive of tension headaches by her description of the distribution. Appears less somatically preoccupied. Reports sleeping better. Will f/u with daughter regarding ways to monitor pt's pill intake and use at home.   6/8: Pr reports feeling better, reports good sleep, reports her anxiety is "better". Pt requests her seroquel to be given later in the night since she doesn't want to feel tired too early. Called daughter to discuss case but she didn't . Will try again tomorrow.   Chart reviewed, no overnight events. No PRNs needed for anxiety over the past 24 hrs. 1 dose of oxycodone PRN taken over past 24 hrs for pain. Pt has requested to be referred to a day program or partial hospital after discharge, stating she will miss the feeling of community once she goes back home. Discussed with SW. Called daughter again, left a VM.   6/10:  Pt reports increasing anxiety as time for discharge approaches. She fears being "sent home without a plan" and feeling lonely at home. Reassured pt that she would not be sent home without an aftercare plan. Pt aware team is looking into day programs/partial hospitals to refer her to. Left daughter another VM.   6/11: Patient continues to endorse feeling anxious about being discharged, going home and feeling lonely. Spoke with son Sue and patient's pain management dr (Dr. Farooq) about patient's care and ways to better control/manage her medication regimen in the community. Pt received the hearing aids her daughter brought and may use them.   6/14: Pt reports sometimes experiencing some anxiety in the afternoons around 3pm. States that at that time she usually begins to ruminate about her current situation, how bored she feels and "what am I doing". Writer validated these feelings, normalizing her emotions in the context of how long she has been in the hospital. Encouraged patient to use her coping skills. Added a morning dose of Seroquel 25mg.     Plan:   Observation status: Routine  Psych: Continue Duloxetine to 90mg QD, cont Seroquel 25mg QAM + 100mg at 10pm, cont Klonopin 0.5mg BID   Medical: Oxycodone 5mg PO Q8H PRN for pain, Plavix, Albuterol, Symbicort and Spiriva.   6/16: Patient depressed, somatic, appears to be failing this trial but still room to titrate, if not responding may need to re visit ECT. Not actively suicidal  6/17: Still depressed. AThere is lack of steady gains. Will increase Seroquel and Cymbalta, can revisit ECT if not improving further  6/18 Doing worse depressed somatic and new se nausea maybe from SSREI and Mucinex and tremor from increased Cymbalta., Will lower Cymbalta will d/c Afrin and Mucinex. Will start ECT w/u.  6/21: Less anxious, remains dysphoric, no SI/HI, no psychosis. Continue ECT w/u.  6/22: No events overnight. Pt was seen this morning in the dining area after breakfast. Reports feeling low and hoping to get ECT started soon. Pt reports having had around 4 treatments in the past and doing well afterwards. Pt went for ECHO and was seen by cardiology and cleared on their end for ECT. ECT consult placed.  6/23: Pt was cleared for ECT by cardiology and neuro. She was seen by the ECT team for her ECT consult yesterday afternoon and they suggested neurology evaluate her case as well due to hx of craniotomy and the fact she has an implanted spinal cord stimulator in the thoracolumbar region. Discussed case with Dr. Valentin and met with patient together. Discussed that there is a theoretical risk of current being transmitted through the device leads during ECT, however this risk can be mitigated by placing the stimulator on surgical mode, which can be done with a device patient has at home and her son is bringing later tonight. Patient verbalized understanding the possible risks and states her wish is to proceed with ECT. Pt states that despite experiencing a partial improvement of her depressive symptoms on Cymbalta and Seroquel she remains depressed, anxious, with low motivation and she fears going back home still feeling like this. We tried higher doses of Cymbalta, however patient developed side effects. Pt received ECT in 2014 and states that she felt "fully back to normal" after the treatment at that time and wishes to try again. Discussed case with ECT, will aim to have patient start treatment this Friday 6/25.   6/24: Pt is visible in the unit. Spinal stimulator control was delivered by her son yesterday, 2S staff will turn stimulator into surgery mode tomorrow before patient goes down for ECT and back "on" after pt returns. Pt made aware and verbalized agreement with plan.   6/25: Pt had ECT #1 today. Next ECT Monday 6/28 6/28: Pt was seen this morning in the dayroom after returning from ECT. Pt has been complaining of headaches, chills and nausea after her first ECT session last Friday. Will add Protonix before ECT for her next session this Wednesday.   6/29: Mood has improved slightly. Oriented. Scheduled for ECT #3 tomorrow.  D/C morning Seroquel and increased bedtime Seroquel to 125mg due to complaints of difficulty sleeping.  6/30: Chart reviewed, no overnight events, vitals stable. Patient seen in the dining area this morning. Pt went for ECT this morning, tolerating treatment well. Agreed to go for ECT #4 this Friday. No complaints.   7/1: Chart reviewed, no overnight events, vitals stable. She is visible in the unit, seen interacting and helping a peer sit down to use the phone. Patient seen in the a common area this morning. She is more animated on approach. Reports her mood has improved since starting ECT, does not verbalize any somatic complaints. AAOx3, denies SIIP, AH/VH/paranoid thoughts. On board with going for ECT #4 tomorrow morning. Will begin discharge planning for next week.  7/1: Patient had 4th ECT today, tolerating treatment well with very few cognitive side effects and good response. Will cross taper cymbalta with lexapro due to failed trial prior to starting ect. Next ECT on 7/6, planning for d/c next week.

## 2021-07-02 NOTE — BH INPATIENT PSYCHIATRY PROGRESS NOTE - NSBHCHARTREVIEWVS_PSY_A_CORE FT
Vital Signs Last 24 Hrs  T(C): 36.2 (02 Jul 2021 10:45), Max: 37.1 (02 Jul 2021 09:45)  T(F): 97.1 (02 Jul 2021 10:45), Max: 98.7 (02 Jul 2021 09:45)  HR: 87 (02 Jul 2021 10:45) (87 - 105)  BP: 136/67 (02 Jul 2021 10:45) (118/77 - 167/82)  BP(mean): --  RR: 18 (02 Jul 2021 10:45) (18 - 27)  SpO2: 99% (02 Jul 2021 10:45) (95% - 100%)

## 2021-07-02 NOTE — BH TREATMENT PLAN - NSTXPLANTHERAPYSESSIONSFT_PSY_ALL_CORE
07-02-21  Type of therapy: Coping skills,Creative arts therapy,Health and fitness,Music therapy,Spirituality,Stress management  Type of session: Group  Level of patient participation: Participated with encouragement  Duration of participation: 45 minutes  Therapy conducted by: Psych rehab  Therapy Summary: In response to the COVID-19 outbreak there has been a shift in hospital wide policies and protocols. As a result it should be noted the unit activities and structure have been temporarily modified to promote safety of patients and staff. Patient over the past week made some gains towards her rehab goals. Patient reports feeling less anxious and less dysphoric as oppose to previous weeks. Patient remains compliant with her medications and ECT. Patient is attentive to her hygiene and grooming. Patient with minimal encouragement participates in most of the morning and afternoon rehab activities. During activities patient is pleaant, supportive of her peers and is able to stay task focused. Patient responds to the exercise, meditation, music and spiritual groups.

## 2021-07-02 NOTE — BH TREATMENT PLAN - NSTXANXGOAL_PSY_ALL_CORE
Report that he/she was able to initiate conversations with peers 3 times daily despite panic attacks

## 2021-07-02 NOTE — BH TREATMENT PLAN - NSTXCOPEINTERPR_PSY_ALL_CORE
Patient will identify and utilize two coping skills daily to manage her depressive symptoms within seven days.

## 2021-07-02 NOTE — BH INPATIENT PSYCHIATRY PROGRESS NOTE - NSTXDCOPNOGOALOTHER_PSY_ALL_CORE
Pt will have a remission of symptoms as ECT course progresses.  Pt has been consistently accepting of outpatient services.
Pt will have a remission of symptoms as ECT course progresses.  Pt has been consistently accepting of outpatient services.

## 2021-07-02 NOTE — BH INPATIENT PSYCHIATRY PROGRESS NOTE - NSBHFUPINTERVALHXFT_PSY_A_CORE
Chart reviewed, no overnight events, vitals stable. Pt was seen in the dayroom after returning from her 4th ECT. She was AAOx3, knew the date (July 2nd, 2021), the name of the hospital and recognized writer. Reports her mood has improved, denies anhednoia, low energy or lack of motivation. Planning for d/c next week.

## 2021-07-02 NOTE — BH INPATIENT PSYCHIATRY PROGRESS NOTE - MSE UNSTRUCTURED FT
Patient appears stated age, adequate grooming and hygiene, awake, oriented x 3, in no acute physical distress. No agitation/retardation/EPS noted. Cooperative, makes eye contact. Speech is relevant, normal in volume, rate, and productivity. Mood is "I feel good", affect is reactive, brighter. Thought process is are linear. No longer endorsing somatic complaints, denies any suicidal/homicidal ideas/plan. No hallucination elicited. Insight and judgment are fair. Impulse control intact at the time of exam.

## 2021-07-03 PROCEDURE — 99232 SBSQ HOSP IP/OBS MODERATE 35: CPT

## 2021-07-03 RX ORDER — QUETIAPINE FUMARATE 200 MG/1
100 TABLET, FILM COATED ORAL
Refills: 0 | Status: DISCONTINUED | OUTPATIENT
Start: 2021-07-03 | End: 2021-07-08

## 2021-07-03 RX ADMIN — Medication 0.5 MILLIGRAM(S): at 06:40

## 2021-07-03 RX ADMIN — BUDESONIDE AND FORMOTEROL FUMARATE DIHYDRATE 2 PUFF(S): 160; 4.5 AEROSOL RESPIRATORY (INHALATION) at 21:46

## 2021-07-03 RX ADMIN — ALBUTEROL 2 PUFF(S): 90 AEROSOL, METERED ORAL at 13:08

## 2021-07-03 RX ADMIN — CLOPIDOGREL BISULFATE 75 MILLIGRAM(S): 75 TABLET, FILM COATED ORAL at 09:56

## 2021-07-03 RX ADMIN — DULOXETINE HYDROCHLORIDE 20 MILLIGRAM(S): 30 CAPSULE, DELAYED RELEASE ORAL at 09:56

## 2021-07-03 RX ADMIN — BUDESONIDE AND FORMOTEROL FUMARATE DIHYDRATE 2 PUFF(S): 160; 4.5 AEROSOL RESPIRATORY (INHALATION) at 09:56

## 2021-07-03 RX ADMIN — Medication 81 MILLIGRAM(S): at 09:56

## 2021-07-03 RX ADMIN — Medication 75 MICROGRAM(S): at 06:40

## 2021-07-03 RX ADMIN — Medication 0.5 MILLIGRAM(S): at 21:45

## 2021-07-03 RX ADMIN — SENNA PLUS 2 TABLET(S): 8.6 TABLET ORAL at 21:45

## 2021-07-03 RX ADMIN — OXYCODONE HYDROCHLORIDE 5 MILLIGRAM(S): 5 TABLET ORAL at 15:12

## 2021-07-03 RX ADMIN — LIDOCAINE 2 PATCH: 4 CREAM TOPICAL at 11:48

## 2021-07-03 RX ADMIN — QUETIAPINE FUMARATE 100 MILLIGRAM(S): 200 TABLET, FILM COATED ORAL at 21:45

## 2021-07-03 RX ADMIN — POLYETHYLENE GLYCOL 3350 17 GRAM(S): 17 POWDER, FOR SOLUTION ORAL at 09:56

## 2021-07-03 RX ADMIN — ALBUTEROL 2 PUFF(S): 90 AEROSOL, METERED ORAL at 21:47

## 2021-07-03 NOTE — BH INPATIENT PSYCHIATRY PROGRESS NOTE - NSBHCHARTREVIEWVS_PSY_A_CORE FT
Vital Signs Last 24 Hrs  T(C): 36.7 (03 Jul 2021 06:45), Max: 36.8 (02 Jul 2021 16:45)  T(F): 98.1 (03 Jul 2021 06:45), Max: 98.2 (02 Jul 2021 16:45)  HR: --  BP: --  BP(mean): --  RR: --  SpO2: --

## 2021-07-03 NOTE — BH INPATIENT PSYCHIATRY PROGRESS NOTE - NSBHFUPINTERVALHXFT_PSY_A_CORE
Chart reviewed, no overnight events, had low BP  was better on recheck. Pt was seen in the dayroom after returning from her 4th ECT. She was AAOx3, knew the date (July 2nd, 2021), the name of the hospital and recognized writer. Reports her mood has improved, denies anhednoia, low energy or lack of motivation. Planning for d/c next week.

## 2021-07-03 NOTE — BH INPATIENT PSYCHIATRY PROGRESS NOTE - NSBHASSESSSUMMFT_PSY_ALL_CORE
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 Pt confused to situation however reports depressive sx, no SI/HI.    Initial collateral from daughter Estela: "Estela, who reports that these periods of confusion occur on/off for the past 10 years, and they have all been due to medication noncompliance. She states that for the past month, her mother has been isolative, nonsensical, not getting out of bed; calling her She has complained that she is not sleeping, but whenever she goes over, pt is in bed. She says that her mother has been having day/night confusion, calling people at all hours. She also is showing up for doctor's appointments when she does not have any. Yesterday, her brother received a call from their neighbor saying that the patient was walking in the middle of the street looking for her cellphone. Pt also was trying to get into random people's car."    5/22: Pt seen, chart reviewed and discussed with nursing staff. Reports feeling ok, but admits feeling anxious, depressed. When asked  about circumstances before hospitalization, she reports that she is here for get support, was not eating well.  Endorses poor sleep, low appetite, low energy. Appears disorganized, confused and hard of hearing. Denies any paranoia, SI, HI, hallucination.    5/23: Pt remains disorganized, anxious and dysphoric. On exam, Pt reports not feeling well. has left lower back pain, anxious, depressed. Endorses poor sleep, low appetite, low energy and motivation. Feels her memory is not that good. Appears dysphoric, hard of hearing but less confused. Denies any jose c, paranoia, SI, HI, hallucination.    5/24:  No events reported over the weekend. Sleep and appetite is "better". Patient denies any current AH/VH but endorsed +AH and +VH prior to admission. Patient reported feeling very depressed since being isolated due to covid, she endorsed passive SI with no intent or plan. She also reported taking less of her medications by splitting pills in half at home. Patient is out on the unit, social with peers but is still very anxious. Patient is compliant with medications, no adverse effects reported.    5/25:  No events reported over the weekend. Sleep is "not as good" and appetite is "improved". Patient reported being in significant pain in her arm and also of a headache. Patient given PRN Oxycodone dose earlier and pain management consult ordered. Spoke with patient about starting Cymbalta for depression. She was hesitant at first but then agreed. Some paranoia noted around medications. Patient also stated that she feels like she is bothering her daughter too much and did not want us to call her frequently, but daughter called hospital looking to get updates and denied telling her mother that she needs a break.    5/26: Patient seen for follow up for depression and psychosis. Chart reviewed and case discussed with interdisciplinary staff. per staff, patient is very somatic. On encounter, patient is pleasant but believes she is not getting adequate care, reports that she used to be many more medications and thinks we are missing something, insists that I speak with her pulmonologist Dr. Ketan Morfin. Otherwise, eating and sleeping well. Patient remains compliant with medications, no adverse effects reported or in evidence.    5/27:  Per staff, patient is very somatic, requesting more meds for constipation. On encounter, patient is pleasant, happy that she had PT today, complains of constipation, requesting increase in Senna, Dulcolax suppository offered and patient agress. Otherwise, eating and sleeping well.    5/28:  No significant overnight event. On encounter, patient is calm, reports that she had a small BM, reports that she has some "sinus" issues now and requests something additional for pain relief, would also like to be evaluated for UTI. Otherwise, eating and sleeping well.     5/29: No significant overnight event. On encounter, patient reports mood as "little better", still reports generalized pain including lower abdominal pain. Patient is eating and sleeping well.     6/1: No reported overnight events. On encounter pt remains somatically preoccupied, states she needs to be on additional medications for her asthma and complains of headaches. In terms of her mood pt describes feeling "bored", but states she is doing better, she is eating well and states she has been able to gain back some of the weight she had lost.  6/2: Continues to endorse depression, anxiety and somatic complaints. Increased Cymbalta to 40mg QD.   6/3:  Met with Ms. Primrose this morning in the dayroom. Pt reports feeling dizzy and unsteady on her feet early this morning. Checked her vitals later in the morning, she was not orthostatic at that point. She continues to verbalize multiple somatic complaints such as nausea and later reflux. Continues to endorse difficulty sleeping. Eliminated morning dose of seroquel 12.5mg due to patient's reports of morning dizziness/lightheadedness and increased her nighttime dose to 50mg. Signed conversion to voluntary status (9.13)  6/4: Pt continues to endorse several somatic complaints such as GERD and cramping in her legs. Reports her sleep was slightly better last night. She requests to speak to a therapist, reached out to Dr. Newman who agreed to meet with pt today. Called patient's daughter in order to discuss treatment plan and explore options to improve pill management at home but she was unavailable. Left a message. Increased Seroquel to 75mg QHS.   6/7: Met with Ms. Primrose this morning in a common area. She reports feeling a little low over the weekend and fearing "Im headed back to where I started". Provided reassurance that some mood fluctuations are normal and do not necessarily mean she has lost all the progress she has made. Pt complained of headaches today which are suggestive of tension headaches by her description of the distribution. Appears less somatically preoccupied. Reports sleeping better. Will f/u with daughter regarding ways to monitor pt's pill intake and use at home.   6/8: Pr reports feeling better, reports good sleep, reports her anxiety is "better". Pt requests her seroquel to be given later in the night since she doesn't want to feel tired too early. Called daughter to discuss case but she didn't . Will try again tomorrow.   Chart reviewed, no overnight events. No PRNs needed for anxiety over the past 24 hrs. 1 dose of oxycodone PRN taken over past 24 hrs for pain. Pt has requested to be referred to a day program or partial hospital after discharge, stating she will miss the feeling of community once she goes back home. Discussed with SW. Called daughter again, left a VM.   6/10:  Pt reports increasing anxiety as time for discharge approaches. She fears being "sent home without a plan" and feeling lonely at home. Reassured pt that she would not be sent home without an aftercare plan. Pt aware team is looking into day programs/partial hospitals to refer her to. Left daughter another VM.   6/11: Patient continues to endorse feeling anxious about being discharged, going home and feeling lonely. Spoke with son Sue and patient's pain management dr (Dr. Farooq) about patient's care and ways to better control/manage her medication regimen in the community. Pt received the hearing aids her daughter brought and may use them.   6/14: Pt reports sometimes experiencing some anxiety in the afternoons around 3pm. States that at that time she usually begins to ruminate about her current situation, how bored she feels and "what am I doing". Writer validated these feelings, normalizing her emotions in the context of how long she has been in the hospital. Encouraged patient to use her coping skills. Added a morning dose of Seroquel 25mg.     Plan:   Observation status: Routine  Psych: Continue Duloxetine to 90mg QD, cont Seroquel 25mg QAM + 100mg at 10pm, cont Klonopin 0.5mg BID   Medical: Oxycodone 5mg PO Q8H PRN for pain, Plavix, Albuterol, Symbicort and Spiriva.   6/16: Patient depressed, somatic, appears to be failing this trial but still room to titrate, if not responding may need to re visit ECT. Not actively suicidal  6/17: Still depressed. AThere is lack of steady gains. Will increase Seroquel and Cymbalta, can revisit ECT if not improving further  6/18 Doing worse depressed somatic and new se nausea maybe from SSREI and Mucinex and tremor from increased Cymbalta., Will lower Cymbalta will d/c Afrin and Mucinex. Will start ECT w/u.  6/21: Less anxious, remains dysphoric, no SI/HI, no psychosis. Continue ECT w/u.  6/22: No events overnight. Pt was seen this morning in the dining area after breakfast. Reports feeling low and hoping to get ECT started soon. Pt reports having had around 4 treatments in the past and doing well afterwards. Pt went for ECHO and was seen by cardiology and cleared on their end for ECT. ECT consult placed.  6/23: Pt was cleared for ECT by cardiology and neuro. She was seen by the ECT team for her ECT consult yesterday afternoon and they suggested neurology evaluate her case as well due to hx of craniotomy and the fact she has an implanted spinal cord stimulator in the thoracolumbar region. Discussed case with Dr. Valentin and met with patient together. Discussed that there is a theoretical risk of current being transmitted through the device leads during ECT, however this risk can be mitigated by placing the stimulator on surgical mode, which can be done with a device patient has at home and her son is bringing later tonight. Patient verbalized understanding the possible risks and states her wish is to proceed with ECT. Pt states that despite experiencing a partial improvement of her depressive symptoms on Cymbalta and Seroquel she remains depressed, anxious, with low motivation and she fears going back home still feeling like this. We tried higher doses of Cymbalta, however patient developed side effects. Pt received ECT in 2014 and states that she felt "fully back to normal" after the treatment at that time and wishes to try again. Discussed case with ECT, will aim to have patient start treatment this Friday 6/25.   6/24: Pt is visible in the unit. Spinal stimulator control was delivered by her son yesterday, 2S staff will turn stimulator into surgery mode tomorrow before patient goes down for ECT and back "on" after pt returns. Pt made aware and verbalized agreement with plan.   6/25: Pt had ECT #1 today. Next ECT Monday 6/28 6/28: Pt was seen this morning in the dayroom after returning from ECT. Pt has been complaining of headaches, chills and nausea after her first ECT session last Friday. Will add Protonix before ECT for her next session this Wednesday.   6/29: Mood has improved slightly. Oriented. Scheduled for ECT #3 tomorrow.  D/C morning Seroquel and increased bedtime Seroquel to 125mg due to complaints of difficulty sleeping.  6/30: Chart reviewed, no overnight events, vitals stable. Patient seen in the dining area this morning. Pt went for ECT this morning, tolerating treatment well. Agreed to go for ECT #4 this Friday. No complaints.   7/1: Chart reviewed, no overnight events, vitals stable. She is visible in the unit, seen interacting and helping a peer sit down to use the phone. Patient seen in the a common area this morning. She is more animated on approach. Reports her mood has improved since starting ECT, does not verbalize any somatic complaints. AAOx3, denies SIIP, AH/VH/paranoid thoughts. On board with going for ECT #4 tomorrow morning. Will begin discharge planning for next week.  7/2: Patient had 4th ECT today, tolerating treatment well with very few cognitive side effects and good response. Will cross taper cymbalta with lexapro due to failed trial prior to starting ect. Next ECT on 7/6, planning for d/c next week.   7/3 Patient improved, sloem drop in BP. 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 Pt confused to situation however reports depressive sx, no SI/HI.    Initial collateral from daughter Estela: "Estela, who reports that these periods of confusion occur on/off for the past 10 years, and they have all been due to medication noncompliance. She states that for the past month, her mother has been isolative, nonsensical, not getting out of bed; calling her She has complained that she is not sleeping, but whenever she goes over, pt is in bed. She says that her mother has been having day/night confusion, calling people at all hours. She also is showing up for doctor's appointments when she does not have any. Yesterday, her brother received a call from their neighbor saying that the patient was walking in the middle of the street looking for her cellphone. Pt also was trying to get into random people's car."    5/22: Pt seen, chart reviewed and discussed with nursing staff. Reports feeling ok, but admits feeling anxious, depressed. When asked  about circumstances before hospitalization, she reports that she is here for get support, was not eating well.  Endorses poor sleep, low appetite, low energy. Appears disorganized, confused and hard of hearing. Denies any paranoia, SI, HI, hallucination.    5/23: Pt remains disorganized, anxious and dysphoric. On exam, Pt reports not feeling well. has left lower back pain, anxious, depressed. Endorses poor sleep, low appetite, low energy and motivation. Feels her memory is not that good. Appears dysphoric, hard of hearing but less confused. Denies any jose c, paranoia, SI, HI, hallucination.    5/24:  No events reported over the weekend. Sleep and appetite is "better". Patient denies any current AH/VH but endorsed +AH and +VH prior to admission. Patient reported feeling very depressed since being isolated due to covid, she endorsed passive SI with no intent or plan. She also reported taking less of her medications by splitting pills in half at home. Patient is out on the unit, social with peers but is still very anxious. Patient is compliant with medications, no adverse effects reported.    5/25:  No events reported over the weekend. Sleep is "not as good" and appetite is "improved". Patient reported being in significant pain in her arm and also of a headache. Patient given PRN Oxycodone dose earlier and pain management consult ordered. Spoke with patient about starting Cymbalta for depression. She was hesitant at first but then agreed. Some paranoia noted around medications. Patient also stated that she feels like she is bothering her daughter too much and did not want us to call her frequently, but daughter called hospital looking to get updates and denied telling her mother that she needs a break.    5/26: Patient seen for follow up for depression and psychosis. Chart reviewed and case discussed with interdisciplinary staff. per staff, patient is very somatic. On encounter, patient is pleasant but believes she is not getting adequate care, reports that she used to be many more medications and thinks we are missing something, insists that I speak with her pulmonologist Dr. Ketan Morfin. Otherwise, eating and sleeping well. Patient remains compliant with medications, no adverse effects reported or in evidence.    5/27:  Per staff, patient is very somatic, requesting more meds for constipation. On encounter, patient is pleasant, happy that she had PT today, complains of constipation, requesting increase in Senna, Dulcolax suppository offered and patient agress. Otherwise, eating and sleeping well.    5/28:  No significant overnight event. On encounter, patient is calm, reports that she had a small BM, reports that she has some "sinus" issues now and requests something additional for pain relief, would also like to be evaluated for UTI. Otherwise, eating and sleeping well.     5/29: No significant overnight event. On encounter, patient reports mood as "little better", still reports generalized pain including lower abdominal pain. Patient is eating and sleeping well.     6/1: No reported overnight events. On encounter pt remains somatically preoccupied, states she needs to be on additional medications for her asthma and complains of headaches. In terms of her mood pt describes feeling "bored", but states she is doing better, she is eating well and states she has been able to gain back some of the weight she had lost.  6/2: Continues to endorse depression, anxiety and somatic complaints. Increased Cymbalta to 40mg QD.   6/3:  Met with Ms. Primrose this morning in the dayroom. Pt reports feeling dizzy and unsteady on her feet early this morning. Checked her vitals later in the morning, she was not orthostatic at that point. She continues to verbalize multiple somatic complaints such as nausea and later reflux. Continues to endorse difficulty sleeping. Eliminated morning dose of seroquel 12.5mg due to patient's reports of morning dizziness/lightheadedness and increased her nighttime dose to 50mg. Signed conversion to voluntary status (9.13)  6/4: Pt continues to endorse several somatic complaints such as GERD and cramping in her legs. Reports her sleep was slightly better last night. She requests to speak to a therapist, reached out to Dr. Newman who agreed to meet with pt today. Called patient's daughter in order to discuss treatment plan and explore options to improve pill management at home but she was unavailable. Left a message. Increased Seroquel to 75mg QHS.   6/7: Met with Ms. Primrose this morning in a common area. She reports feeling a little low over the weekend and fearing "Im headed back to where I started". Provided reassurance that some mood fluctuations are normal and do not necessarily mean she has lost all the progress she has made. Pt complained of headaches today which are suggestive of tension headaches by her description of the distribution. Appears less somatically preoccupied. Reports sleeping better. Will f/u with daughter regarding ways to monitor pt's pill intake and use at home.   6/8: Pr reports feeling better, reports good sleep, reports her anxiety is "better". Pt requests her seroquel to be given later in the night since she doesn't want to feel tired too early. Called daughter to discuss case but she didn't . Will try again tomorrow.   Chart reviewed, no overnight events. No PRNs needed for anxiety over the past 24 hrs. 1 dose of oxycodone PRN taken over past 24 hrs for pain. Pt has requested to be referred to a day program or partial hospital after discharge, stating she will miss the feeling of community once she goes back home. Discussed with SW. Called daughter again, left a VM.   6/10:  Pt reports increasing anxiety as time for discharge approaches. She fears being "sent home without a plan" and feeling lonely at home. Reassured pt that she would not be sent home without an aftercare plan. Pt aware team is looking into day programs/partial hospitals to refer her to. Left daughter another VM.   6/11: Patient continues to endorse feeling anxious about being discharged, going home and feeling lonely. Spoke with son Sue and patient's pain management dr (Dr. Farooq) about patient's care and ways to better control/manage her medication regimen in the community. Pt received the hearing aids her daughter brought and may use them.   6/14: Pt reports sometimes experiencing some anxiety in the afternoons around 3pm. States that at that time she usually begins to ruminate about her current situation, how bored she feels and "what am I doing". Writer validated these feelings, normalizing her emotions in the context of how long she has been in the hospital. Encouraged patient to use her coping skills. Added a morning dose of Seroquel 25mg.     Plan:   Observation status: Routine  Psych: Continue Duloxetine to 90mg QD, cont Seroquel 25mg QAM + 100mg at 10pm, cont Klonopin 0.5mg BID   Medical: Oxycodone 5mg PO Q8H PRN for pain, Plavix, Albuterol, Symbicort and Spiriva.   6/16: Patient depressed, somatic, appears to be failing this trial but still room to titrate, if not responding may need to re visit ECT. Not actively suicidal  6/17: Still depressed. AThere is lack of steady gains. Will increase Seroquel and Cymbalta, can revisit ECT if not improving further  6/18 Doing worse depressed somatic and new se nausea maybe from SSREI and Mucinex and tremor from increased Cymbalta., Will lower Cymbalta will d/c Afrin and Mucinex. Will start ECT w/u.  6/21: Less anxious, remains dysphoric, no SI/HI, no psychosis. Continue ECT w/u.  6/22: No events overnight. Pt was seen this morning in the dining area after breakfast. Reports feeling low and hoping to get ECT started soon. Pt reports having had around 4 treatments in the past and doing well afterwards. Pt went for ECHO and was seen by cardiology and cleared on their end for ECT. ECT consult placed.  6/23: Pt was cleared for ECT by cardiology and neuro. She was seen by the ECT team for her ECT consult yesterday afternoon and they suggested neurology evaluate her case as well due to hx of craniotomy and the fact she has an implanted spinal cord stimulator in the thoracolumbar region. Discussed case with Dr. Valentin and met with patient together. Discussed that there is a theoretical risk of current being transmitted through the device leads during ECT, however this risk can be mitigated by placing the stimulator on surgical mode, which can be done with a device patient has at home and her son is bringing later tonight. Patient verbalized understanding the possible risks and states her wish is to proceed with ECT. Pt states that despite experiencing a partial improvement of her depressive symptoms on Cymbalta and Seroquel she remains depressed, anxious, with low motivation and she fears going back home still feeling like this. We tried higher doses of Cymbalta, however patient developed side effects. Pt received ECT in 2014 and states that she felt "fully back to normal" after the treatment at that time and wishes to try again. Discussed case with ECT, will aim to have patient start treatment this Friday 6/25.   6/24: Pt is visible in the unit. Spinal stimulator control was delivered by her son yesterday, 2S staff will turn stimulator into surgery mode tomorrow before patient goes down for ECT and back "on" after pt returns. Pt made aware and verbalized agreement with plan.   6/25: Pt had ECT #1 today. Next ECT Monday 6/28 6/28: Pt was seen this morning in the dayroom after returning from ECT. Pt has been complaining of headaches, chills and nausea after her first ECT session last Friday. Will add Protonix before ECT for her next session this Wednesday.   6/29: Mood has improved slightly. Oriented. Scheduled for ECT #3 tomorrow.  D/C morning Seroquel and increased bedtime Seroquel to 125mg due to complaints of difficulty sleeping.  6/30: Chart reviewed, no overnight events, vitals stable. Patient seen in the dining area this morning. Pt went for ECT this morning, tolerating treatment well. Agreed to go for ECT #4 this Friday. No complaints.   7/1: Chart reviewed, no overnight events, vitals stable. She is visible in the unit, seen interacting and helping a peer sit down to use the phone. Patient seen in the a common area this morning. She is more animated on approach. Reports her mood has improved since starting ECT, does not verbalize any somatic complaints. AAOx3, denies SIIP, AH/VH/paranoid thoughts. On board with going for ECT #4 tomorrow morning. Will begin discharge planning for next week.  7/2: Patient had 4th ECT today, tolerating treatment well with very few cognitive side effects and good response. Will cross taper cymbalta with lexapro due to failed trial prior to starting ect. Next ECT on 7/6, planning for d/c next week.   7/3 Patient improved, has mild  drop in BP from baseline. Will reduce hs seroquel  from 125mg to 100mg hs

## 2021-07-04 PROCEDURE — 99231 SBSQ HOSP IP/OBS SF/LOW 25: CPT

## 2021-07-04 RX ADMIN — LIDOCAINE 2 PATCH: 4 CREAM TOPICAL at 09:17

## 2021-07-04 RX ADMIN — BUDESONIDE AND FORMOTEROL FUMARATE DIHYDRATE 2 PUFF(S): 160; 4.5 AEROSOL RESPIRATORY (INHALATION) at 21:36

## 2021-07-04 RX ADMIN — ALBUTEROL 2 PUFF(S): 90 AEROSOL, METERED ORAL at 21:37

## 2021-07-04 RX ADMIN — OXYCODONE HYDROCHLORIDE 5 MILLIGRAM(S): 5 TABLET ORAL at 13:17

## 2021-07-04 RX ADMIN — CLOPIDOGREL BISULFATE 75 MILLIGRAM(S): 75 TABLET, FILM COATED ORAL at 08:57

## 2021-07-04 RX ADMIN — Medication 0.5 MILLIGRAM(S): at 21:35

## 2021-07-04 RX ADMIN — Medication 0.5 MILLIGRAM(S): at 06:44

## 2021-07-04 RX ADMIN — Medication 400 MILLIGRAM(S): at 18:48

## 2021-07-04 RX ADMIN — Medication 75 MICROGRAM(S): at 06:44

## 2021-07-04 RX ADMIN — BUDESONIDE AND FORMOTEROL FUMARATE DIHYDRATE 2 PUFF(S): 160; 4.5 AEROSOL RESPIRATORY (INHALATION) at 09:17

## 2021-07-04 RX ADMIN — DULOXETINE HYDROCHLORIDE 20 MILLIGRAM(S): 30 CAPSULE, DELAYED RELEASE ORAL at 08:57

## 2021-07-04 RX ADMIN — Medication 81 MILLIGRAM(S): at 08:57

## 2021-07-04 RX ADMIN — QUETIAPINE FUMARATE 100 MILLIGRAM(S): 200 TABLET, FILM COATED ORAL at 21:35

## 2021-07-04 RX ADMIN — POLYETHYLENE GLYCOL 3350 17 GRAM(S): 17 POWDER, FOR SOLUTION ORAL at 08:57

## 2021-07-04 NOTE — BH INPATIENT PSYCHIATRY PROGRESS NOTE - NSBHASSESSSUMMFT_PSY_ALL_CORE
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 Pt confused to situation however reports depressive sx, no SI/HI.    Initial collateral from daughter Estela: "Estela, who reports that these periods of confusion occur on/off for the past 10 years, and they have all been due to medication noncompliance. She states that for the past month, her mother has been isolative, nonsensical, not getting out of bed; calling her She has complained that she is not sleeping, but whenever she goes over, pt is in bed. She says that her mother has been having day/night confusion, calling people at all hours. She also is showing up for doctor's appointments when she does not have any. Yesterday, her brother received a call from their neighbor saying that the patient was walking in the middle of the street looking for her cellphone. Pt also was trying to get into random people's car."    5/22: Pt seen, chart reviewed and discussed with nursing staff. Reports feeling ok, but admits feeling anxious, depressed. When asked  about circumstances before hospitalization, she reports that she is here for get support, was not eating well.  Endorses poor sleep, low appetite, low energy. Appears disorganized, confused and hard of hearing. Denies any paranoia, SI, HI, hallucination.    5/23: Pt remains disorganized, anxious and dysphoric. On exam, Pt reports not feeling well. has left lower back pain, anxious, depressed. Endorses poor sleep, low appetite, low energy and motivation. Feels her memory is not that good. Appears dysphoric, hard of hearing but less confused. Denies any jsoe c, paranoia, SI, HI, hallucination.    5/24:  No events reported over the weekend. Sleep and appetite is "better". Patient denies any current AH/VH but endorsed +AH and +VH prior to admission. Patient reported feeling very depressed since being isolated due to covid, she endorsed passive SI with no intent or plan. She also reported taking less of her medications by splitting pills in half at home. Patient is out on the unit, social with peers but is still very anxious. Patient is compliant with medications, no adverse effects reported.    5/25:  No events reported over the weekend. Sleep is "not as good" and appetite is "improved". Patient reported being in significant pain in her arm and also of a headache. Patient given PRN Oxycodone dose earlier and pain management consult ordered. Spoke with patient about starting Cymbalta for depression. She was hesitant at first but then agreed. Some paranoia noted around medications. Patient also stated that she feels like she is bothering her daughter too much and did not want us to call her frequently, but daughter called hospital looking to get updates and denied telling her mother that she needs a break.    5/26: Patient seen for follow up for depression and psychosis. Chart reviewed and case discussed with interdisciplinary staff. per staff, patient is very somatic. On encounter, patient is pleasant but believes she is not getting adequate care, reports that she used to be many more medications and thinks we are missing something, insists that I speak with her pulmonologist Dr. Ketan Morfin. Otherwise, eating and sleeping well. Patient remains compliant with medications, no adverse effects reported or in evidence.    5/27:  Per staff, patient is very somatic, requesting more meds for constipation. On encounter, patient is pleasant, happy that she had PT today, complains of constipation, requesting increase in Senna, Dulcolax suppository offered and patient agress. Otherwise, eating and sleeping well.    5/28:  No significant overnight event. On encounter, patient is calm, reports that she had a small BM, reports that she has some "sinus" issues now and requests something additional for pain relief, would also like to be evaluated for UTI. Otherwise, eating and sleeping well.     5/29: No significant overnight event. On encounter, patient reports mood as "little better", still reports generalized pain including lower abdominal pain. Patient is eating and sleeping well.     6/1: No reported overnight events. On encounter pt remains somatically preoccupied, states she needs to be on additional medications for her asthma and complains of headaches. In terms of her mood pt describes feeling "bored", but states she is doing better, she is eating well and states she has been able to gain back some of the weight she had lost.  6/2: Continues to endorse depression, anxiety and somatic complaints. Increased Cymbalta to 40mg QD.   6/3:  Met with Ms. Primrose this morning in the dayroom. Pt reports feeling dizzy and unsteady on her feet early this morning. Checked her vitals later in the morning, she was not orthostatic at that point. She continues to verbalize multiple somatic complaints such as nausea and later reflux. Continues to endorse difficulty sleeping. Eliminated morning dose of seroquel 12.5mg due to patient's reports of morning dizziness/lightheadedness and increased her nighttime dose to 50mg. Signed conversion to voluntary status (9.13)  6/4: Pt continues to endorse several somatic complaints such as GERD and cramping in her legs. Reports her sleep was slightly better last night. She requests to speak to a therapist, reached out to Dr. Newman who agreed to meet with pt today. Called patient's daughter in order to discuss treatment plan and explore options to improve pill management at home but she was unavailable. Left a message. Increased Seroquel to 75mg QHS.   6/7: Met with Ms. Primrose this morning in a common area. She reports feeling a little low over the weekend and fearing "Im headed back to where I started". Provided reassurance that some mood fluctuations are normal and do not necessarily mean she has lost all the progress she has made. Pt complained of headaches today which are suggestive of tension headaches by her description of the distribution. Appears less somatically preoccupied. Reports sleeping better. Will f/u with daughter regarding ways to monitor pt's pill intake and use at home.   6/8: Pr reports feeling better, reports good sleep, reports her anxiety is "better". Pt requests her seroquel to be given later in the night since she doesn't want to feel tired too early. Called daughter to discuss case but she didn't . Will try again tomorrow.   Chart reviewed, no overnight events. No PRNs needed for anxiety over the past 24 hrs. 1 dose of oxycodone PRN taken over past 24 hrs for pain. Pt has requested to be referred to a day program or partial hospital after discharge, stating she will miss the feeling of community once she goes back home. Discussed with SW. Called daughter again, left a VM.   6/10:  Pt reports increasing anxiety as time for discharge approaches. She fears being "sent home without a plan" and feeling lonely at home. Reassured pt that she would not be sent home without an aftercare plan. Pt aware team is looking into day programs/partial hospitals to refer her to. Left daughter another VM.   6/11: Patient continues to endorse feeling anxious about being discharged, going home and feeling lonely. Spoke with son Sue and patient's pain management dr (Dr. Farooq) about patient's care and ways to better control/manage her medication regimen in the community. Pt received the hearing aids her daughter brought and may use them.   6/14: Pt reports sometimes experiencing some anxiety in the afternoons around 3pm. States that at that time she usually begins to ruminate about her current situation, how bored she feels and "what am I doing". Writer validated these feelings, normalizing her emotions in the context of how long she has been in the hospital. Encouraged patient to use her coping skills. Added a morning dose of Seroquel 25mg.     Plan:   Observation status: Routine  Psych: Continue Duloxetine to 90mg QD, cont Seroquel 25mg QAM + 100mg at 10pm, cont Klonopin 0.5mg BID   Medical: Oxycodone 5mg PO Q8H PRN for pain, Plavix, Albuterol, Symbicort and Spiriva.   6/16: Patient depressed, somatic, appears to be failing this trial but still room to titrate, if not responding may need to re visit ECT. Not actively suicidal  6/17: Still depressed. AThere is lack of steady gains. Will increase Seroquel and Cymbalta, can revisit ECT if not improving further  6/18 Doing worse depressed somatic and new se nausea maybe from SSREI and Mucinex and tremor from increased Cymbalta., Will lower Cymbalta will d/c Afrin and Mucinex. Will start ECT w/u.  6/21: Less anxious, remains dysphoric, no SI/HI, no psychosis. Continue ECT w/u.  6/22: No events overnight. Pt was seen this morning in the dining area after breakfast. Reports feeling low and hoping to get ECT started soon. Pt reports having had around 4 treatments in the past and doing well afterwards. Pt went for ECHO and was seen by cardiology and cleared on their end for ECT. ECT consult placed.  6/23: Pt was cleared for ECT by cardiology and neuro. She was seen by the ECT team for her ECT consult yesterday afternoon and they suggested neurology evaluate her case as well due to hx of craniotomy and the fact she has an implanted spinal cord stimulator in the thoracolumbar region. Discussed case with Dr. Valentin and met with patient together. Discussed that there is a theoretical risk of current being transmitted through the device leads during ECT, however this risk can be mitigated by placing the stimulator on surgical mode, which can be done with a device patient has at home and her son is bringing later tonight. Patient verbalized understanding the possible risks and states her wish is to proceed with ECT. Pt states that despite experiencing a partial improvement of her depressive symptoms on Cymbalta and Seroquel she remains depressed, anxious, with low motivation and she fears going back home still feeling like this. We tried higher doses of Cymbalta, however patient developed side effects. Pt received ECT in 2014 and states that she felt "fully back to normal" after the treatment at that time and wishes to try again. Discussed case with ECT, will aim to have patient start treatment this Friday 6/25.   6/24: Pt is visible in the unit. Spinal stimulator control was delivered by her son yesterday, 2S staff will turn stimulator into surgery mode tomorrow before patient goes down for ECT and back "on" after pt returns. Pt made aware and verbalized agreement with plan.   6/25: Pt had ECT #1 today. Next ECT Monday 6/28 6/28: Pt was seen this morning in the dayroom after returning from ECT. Pt has been complaining of headaches, chills and nausea after her first ECT session last Friday. Will add Protonix before ECT for her next session this Wednesday.   6/29: Mood has improved slightly. Oriented. Scheduled for ECT #3 tomorrow.  D/C morning Seroquel and increased bedtime Seroquel to 125mg due to complaints of difficulty sleeping.  6/30: Chart reviewed, no overnight events, vitals stable. Patient seen in the dining area this morning. Pt went for ECT this morning, tolerating treatment well. Agreed to go for ECT #4 this Friday. No complaints.   7/1: Chart reviewed, no overnight events, vitals stable. She is visible in the unit, seen interacting and helping a peer sit down to use the phone. Patient seen in the a common area this morning. She is more animated on approach. Reports her mood has improved since starting ECT, does not verbalize any somatic complaints. AAOx3, denies SIIP, AH/VH/paranoid thoughts. On board with going for ECT #4 tomorrow morning. Will begin discharge planning for next week.  7/2: Patient had 4th ECT today, tolerating treatment well with very few cognitive side effects and good response. Will cross taper cymbalta with lexapro due to failed trial prior to starting ect. Next ECT on 7/6, planning for d/c next week.   7/3 Patient improved, has mild  drop in BP from baseline. Will reduce hs seroquel  from 125mg to 100mg hs  7/4 Patient doing well in terms of bp, bp back to normal, depression improving

## 2021-07-04 NOTE — BH INPATIENT PSYCHIATRY PROGRESS NOTE - MSE UNSTRUCTURED FT
Patient is awake and alert. Affect is bright. Speech is fluent. Reports that she feels good, is aware that bp was low and is better today. TP is coherent.  No delusions. No perceptual disturbance. No PMR. Fair insight. No suicidal ideations.

## 2021-07-04 NOTE — BH INPATIENT PSYCHIATRY PROGRESS NOTE - NSBHCHARTREVIEWVS_PSY_A_CORE FT
Vital Signs Last 24 Hrs  T(C): 36.8 (04 Jul 2021 06:41), Max: 36.8 (04 Jul 2021 06:41)  T(F): 98.2 (04 Jul 2021 06:41), Max: 98.2 (04 Jul 2021 06:41)  HR: --96  BP: --116/82  BP(mean): --  RR: --  SpO2: --

## 2021-07-05 RX ADMIN — BUDESONIDE AND FORMOTEROL FUMARATE DIHYDRATE 2 PUFF(S): 160; 4.5 AEROSOL RESPIRATORY (INHALATION) at 21:22

## 2021-07-05 RX ADMIN — Medication 75 MICROGRAM(S): at 06:55

## 2021-07-05 RX ADMIN — ONDANSETRON 4 MILLIGRAM(S): 8 TABLET, FILM COATED ORAL at 09:52

## 2021-07-05 RX ADMIN — Medication 0.5 MILLIGRAM(S): at 06:55

## 2021-07-05 RX ADMIN — OXYCODONE HYDROCHLORIDE 5 MILLIGRAM(S): 5 TABLET ORAL at 17:20

## 2021-07-05 RX ADMIN — LIDOCAINE 2 PATCH: 4 CREAM TOPICAL at 19:21

## 2021-07-05 RX ADMIN — LIDOCAINE 2 PATCH: 4 CREAM TOPICAL at 21:21

## 2021-07-05 RX ADMIN — SENNA PLUS 2 TABLET(S): 8.6 TABLET ORAL at 22:02

## 2021-07-05 RX ADMIN — DULOXETINE HYDROCHLORIDE 20 MILLIGRAM(S): 30 CAPSULE, DELAYED RELEASE ORAL at 08:19

## 2021-07-05 RX ADMIN — OXYCODONE HYDROCHLORIDE 5 MILLIGRAM(S): 5 TABLET ORAL at 23:21

## 2021-07-05 RX ADMIN — Medication 81 MILLIGRAM(S): at 08:20

## 2021-07-05 RX ADMIN — OXYCODONE HYDROCHLORIDE 5 MILLIGRAM(S): 5 TABLET ORAL at 10:49

## 2021-07-05 RX ADMIN — Medication 400 MILLIGRAM(S): at 17:27

## 2021-07-05 RX ADMIN — BUDESONIDE AND FORMOTEROL FUMARATE DIHYDRATE 2 PUFF(S): 160; 4.5 AEROSOL RESPIRATORY (INHALATION) at 08:19

## 2021-07-05 RX ADMIN — OXYCODONE HYDROCHLORIDE 5 MILLIGRAM(S): 5 TABLET ORAL at 20:04

## 2021-07-05 RX ADMIN — Medication 0.5 MILLIGRAM(S): at 22:02

## 2021-07-05 RX ADMIN — QUETIAPINE FUMARATE 100 MILLIGRAM(S): 200 TABLET, FILM COATED ORAL at 22:01

## 2021-07-05 RX ADMIN — LIDOCAINE 2 PATCH: 4 CREAM TOPICAL at 08:20

## 2021-07-05 RX ADMIN — CLOPIDOGREL BISULFATE 75 MILLIGRAM(S): 75 TABLET, FILM COATED ORAL at 08:20

## 2021-07-05 RX ADMIN — Medication 400 MILLIGRAM(S): at 08:19

## 2021-07-06 PROCEDURE — 99232 SBSQ HOSP IP/OBS MODERATE 35: CPT | Mod: 25

## 2021-07-06 PROCEDURE — 90870 ELECTROCONVULSIVE THERAPY: CPT

## 2021-07-06 PROCEDURE — 90832 PSYTX W PT 30 MINUTES: CPT

## 2021-07-06 RX ORDER — VILAZODONE HYDROCHLORIDE 20 MG/1
1 TABLET, FILM COATED ORAL
Qty: 30 | Refills: 0
Start: 2021-07-06 | End: 2021-08-04

## 2021-07-06 RX ORDER — MIDAZOLAM HYDROCHLORIDE 1 MG/ML
2 INJECTION, SOLUTION INTRAMUSCULAR; INTRAVENOUS ONCE
Refills: 0 | Status: DISCONTINUED | OUTPATIENT
Start: 2021-07-06 | End: 2021-07-06

## 2021-07-06 RX ORDER — VORTIOXETINE 5 MG/1
1 TABLET, FILM COATED ORAL
Qty: 7 | Refills: 0
Start: 2021-07-06 | End: 2021-07-12

## 2021-07-06 RX ORDER — CLONAZEPAM 1 MG
0.5 TABLET ORAL
Refills: 0 | Status: DISCONTINUED | OUTPATIENT
Start: 2021-07-06 | End: 2021-07-08

## 2021-07-06 RX ORDER — PANTOPRAZOLE SODIUM 20 MG/1
40 TABLET, DELAYED RELEASE ORAL
Refills: 0 | Status: DISCONTINUED | OUTPATIENT
Start: 2021-07-06 | End: 2021-07-08

## 2021-07-06 RX ORDER — FLUMAZENIL 0.1 MG/ML
0.2 VIAL (ML) INTRAVENOUS ONCE
Refills: 0 | Status: COMPLETED | OUTPATIENT
Start: 2021-07-06 | End: 2021-07-06

## 2021-07-06 RX ORDER — DULOXETINE HYDROCHLORIDE 30 MG/1
1 CAPSULE, DELAYED RELEASE ORAL
Qty: 30 | Refills: 0
Start: 2021-07-06 | End: 2021-08-04

## 2021-07-06 RX ORDER — PANTOPRAZOLE SODIUM 20 MG/1
40 TABLET, DELAYED RELEASE ORAL ONCE
Refills: 0 | Status: COMPLETED | OUTPATIENT
Start: 2021-07-06 | End: 2021-07-06

## 2021-07-06 RX ADMIN — Medication 0.2 MILLIGRAM(S): at 10:49

## 2021-07-06 RX ADMIN — PANTOPRAZOLE SODIUM 40 MILLIGRAM(S): 20 TABLET, DELAYED RELEASE ORAL at 09:13

## 2021-07-06 RX ADMIN — SENNA PLUS 2 TABLET(S): 8.6 TABLET ORAL at 21:28

## 2021-07-06 RX ADMIN — OXYCODONE HYDROCHLORIDE 5 MILLIGRAM(S): 5 TABLET ORAL at 14:00

## 2021-07-06 RX ADMIN — BUDESONIDE AND FORMOTEROL FUMARATE DIHYDRATE 2 PUFF(S): 160; 4.5 AEROSOL RESPIRATORY (INHALATION) at 12:09

## 2021-07-06 RX ADMIN — Medication 75 MICROGRAM(S): at 06:33

## 2021-07-06 RX ADMIN — MIDAZOLAM HYDROCHLORIDE 2 MILLIGRAM(S): 1 INJECTION, SOLUTION INTRAMUSCULAR; INTRAVENOUS at 10:55

## 2021-07-06 RX ADMIN — CLOPIDOGREL BISULFATE 75 MILLIGRAM(S): 75 TABLET, FILM COATED ORAL at 12:09

## 2021-07-06 RX ADMIN — Medication 81 MILLIGRAM(S): at 12:09

## 2021-07-06 RX ADMIN — QUETIAPINE FUMARATE 100 MILLIGRAM(S): 200 TABLET, FILM COATED ORAL at 21:29

## 2021-07-06 RX ADMIN — BUDESONIDE AND FORMOTEROL FUMARATE DIHYDRATE 2 PUFF(S): 160; 4.5 AEROSOL RESPIRATORY (INHALATION) at 21:28

## 2021-07-06 RX ADMIN — ALBUTEROL 2 PUFF(S): 90 AEROSOL, METERED ORAL at 10:16

## 2021-07-06 RX ADMIN — DULOXETINE HYDROCHLORIDE 20 MILLIGRAM(S): 30 CAPSULE, DELAYED RELEASE ORAL at 12:09

## 2021-07-06 RX ADMIN — POLYETHYLENE GLYCOL 3350 17 GRAM(S): 17 POWDER, FOR SOLUTION ORAL at 12:09

## 2021-07-06 RX ADMIN — Medication 0.5 MILLIGRAM(S): at 21:29

## 2021-07-06 RX ADMIN — Medication 0.5 MILLIGRAM(S): at 06:33

## 2021-07-06 RX ADMIN — OXYCODONE HYDROCHLORIDE 5 MILLIGRAM(S): 5 TABLET ORAL at 13:41

## 2021-07-06 NOTE — BH PSYCHOLOGY - CLINICIAN PSYCHOTHERAPY NOTE - NSTXDCOPNOPROGRES_PSY_ALL_CORE
Improving
Met - goal discontinued
Improving

## 2021-07-06 NOTE — BH PSYCHOLOGY - CLINICIAN PSYCHOTHERAPY NOTE - NSTXDCOPNOGOAL_PSY_ALL_CORE
Will agree to participate in appropriate outpatient care

## 2021-07-06 NOTE — BH INPATIENT PSYCHIATRY PROGRESS NOTE - NSBHASSESSSUMMFT_PSY_ALL_CORE
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 Pt confused to situation however reports depressive sx, no SI/HI.    Initial collateral from daughter Estela: "Estela, who reports that these periods of confusion occur on/off for the past 10 years, and they have all been due to medication noncompliance. She states that for the past month, her mother has been isolative, nonsensical, not getting out of bed; calling her She has complained that she is not sleeping, but whenever she goes over, pt is in bed. She says that her mother has been having day/night confusion, calling people at all hours. She also is showing up for doctor's appointments when she does not have any. Yesterday, her brother received a call from their neighbor saying that the patient was walking in the middle of the street looking for her cellphone. Pt also was trying to get into random people's car."    5/22: Pt seen, chart reviewed and discussed with nursing staff. Reports feeling ok, but admits feeling anxious, depressed. When asked  about circumstances before hospitalization, she reports that she is here for get support, was not eating well.  Endorses poor sleep, low appetite, low energy. Appears disorganized, confused and hard of hearing. Denies any paranoia, SI, HI, hallucination.    5/23: Pt remains disorganized, anxious and dysphoric. On exam, Pt reports not feeling well. has left lower back pain, anxious, depressed. Endorses poor sleep, low appetite, low energy and motivation. Feels her memory is not that good. Appears dysphoric, hard of hearing but less confused. Denies any jose c, paranoia, SI, HI, hallucination.    5/24:  No events reported over the weekend. Sleep and appetite is "better". Patient denies any current AH/VH but endorsed +AH and +VH prior to admission. Patient reported feeling very depressed since being isolated due to covid, she endorsed passive SI with no intent or plan. She also reported taking less of her medications by splitting pills in half at home. Patient is out on the unit, social with peers but is still very anxious. Patient is compliant with medications, no adverse effects reported.    5/25:  No events reported over the weekend. Sleep is "not as good" and appetite is "improved". Patient reported being in significant pain in her arm and also of a headache. Patient given PRN Oxycodone dose earlier and pain management consult ordered. Spoke with patient about starting Cymbalta for depression. She was hesitant at first but then agreed. Some paranoia noted around medications. Patient also stated that she feels like she is bothering her daughter too much and did not want us to call her frequently, but daughter called hospital looking to get updates and denied telling her mother that she needs a break.    5/26: Patient seen for follow up for depression and psychosis. Chart reviewed and case discussed with interdisciplinary staff. per staff, patient is very somatic. On encounter, patient is pleasant but believes she is not getting adequate care, reports that she used to be many more medications and thinks we are missing something, insists that I speak with her pulmonologist Dr. Ketan Morfin. Otherwise, eating and sleeping well. Patient remains compliant with medications, no adverse effects reported or in evidence.    5/27:  Per staff, patient is very somatic, requesting more meds for constipation. On encounter, patient is pleasant, happy that she had PT today, complains of constipation, requesting increase in Senna, Dulcolax suppository offered and patient agress. Otherwise, eating and sleeping well.    5/28:  No significant overnight event. On encounter, patient is calm, reports that she had a small BM, reports that she has some "sinus" issues now and requests something additional for pain relief, would also like to be evaluated for UTI. Otherwise, eating and sleeping well.     5/29: No significant overnight event. On encounter, patient reports mood as "little better", still reports generalized pain including lower abdominal pain. Patient is eating and sleeping well.     6/1: No reported overnight events. On encounter pt remains somatically preoccupied, states she needs to be on additional medications for her asthma and complains of headaches. In terms of her mood pt describes feeling "bored", but states she is doing better, she is eating well and states she has been able to gain back some of the weight she had lost.  6/2: Continues to endorse depression, anxiety and somatic complaints. Increased Cymbalta to 40mg QD.   6/3:  Met with Ms. Primrose this morning in the dayroom. Pt reports feeling dizzy and unsteady on her feet early this morning. Checked her vitals later in the morning, she was not orthostatic at that point. She continues to verbalize multiple somatic complaints such as nausea and later reflux. Continues to endorse difficulty sleeping. Eliminated morning dose of seroquel 12.5mg due to patient's reports of morning dizziness/lightheadedness and increased her nighttime dose to 50mg. Signed conversion to voluntary status (9.13)  6/4: Pt continues to endorse several somatic complaints such as GERD and cramping in her legs. Reports her sleep was slightly better last night. She requests to speak to a therapist, reached out to Dr. Newman who agreed to meet with pt today. Called patient's daughter in order to discuss treatment plan and explore options to improve pill management at home but she was unavailable. Left a message. Increased Seroquel to 75mg QHS.   6/7: Met with Ms. Primrose this morning in a common area. She reports feeling a little low over the weekend and fearing "Im headed back to where I started". Provided reassurance that some mood fluctuations are normal and do not necessarily mean she has lost all the progress she has made. Pt complained of headaches today which are suggestive of tension headaches by her description of the distribution. Appears less somatically preoccupied. Reports sleeping better. Will f/u with daughter regarding ways to monitor pt's pill intake and use at home.   6/8: Pr reports feeling better, reports good sleep, reports her anxiety is "better". Pt requests her seroquel to be given later in the night since she doesn't want to feel tired too early. Called daughter to discuss case but she didn't . Will try again tomorrow.   Chart reviewed, no overnight events. No PRNs needed for anxiety over the past 24 hrs. 1 dose of oxycodone PRN taken over past 24 hrs for pain. Pt has requested to be referred to a day program or partial hospital after discharge, stating she will miss the feeling of community once she goes back home. Discussed with SW. Called daughter again, left a VM.   6/10:  Pt reports increasing anxiety as time for discharge approaches. She fears being "sent home without a plan" and feeling lonely at home. Reassured pt that she would not be sent home without an aftercare plan. Pt aware team is looking into day programs/partial hospitals to refer her to. Left daughter another VM.   6/11: Patient continues to endorse feeling anxious about being discharged, going home and feeling lonely. Spoke with son Sue and patient's pain management dr (Dr. Farooq) about patient's care and ways to better control/manage her medication regimen in the community. Pt received the hearing aids her daughter brought and may use them.   6/14: Pt reports sometimes experiencing some anxiety in the afternoons around 3pm. States that at that time she usually begins to ruminate about her current situation, how bored she feels and "what am I doing". Writer validated these feelings, normalizing her emotions in the context of how long she has been in the hospital. Encouraged patient to use her coping skills. Added a morning dose of Seroquel 25mg.     Plan:   Observation status: Routine  Psych: Continue Duloxetine to 90mg QD, cont Seroquel 25mg QAM + 100mg at 10pm, cont Klonopin 0.5mg BID   Medical: Oxycodone 5mg PO Q8H PRN for pain, Plavix, Albuterol, Symbicort and Spiriva.   6/16: Patient depressed, somatic, appears to be failing this trial but still room to titrate, if not responding may need to re visit ECT. Not actively suicidal  6/17: Still depressed. AThere is lack of steady gains. Will increase Seroquel and Cymbalta, can revisit ECT if not improving further  6/18 Doing worse depressed somatic and new se nausea maybe from SSREI and Mucinex and tremor from increased Cymbalta., Will lower Cymbalta will d/c Afrin and Mucinex. Will start ECT w/u.  6/21: Less anxious, remains dysphoric, no SI/HI, no psychosis. Continue ECT w/u.  6/22: No events overnight. Pt was seen this morning in the dining area after breakfast. Reports feeling low and hoping to get ECT started soon. Pt reports having had around 4 treatments in the past and doing well afterwards. Pt went for ECHO and was seen by cardiology and cleared on their end for ECT. ECT consult placed.  6/23: Pt was cleared for ECT by cardiology and neuro. She was seen by the ECT team for her ECT consult yesterday afternoon and they suggested neurology evaluate her case as well due to hx of craniotomy and the fact she has an implanted spinal cord stimulator in the thoracolumbar region. Discussed case with Dr. Valentin and met with patient together. Discussed that there is a theoretical risk of current being transmitted through the device leads during ECT, however this risk can be mitigated by placing the stimulator on surgical mode, which can be done with a device patient has at home and her son is bringing later tonight. Patient verbalized understanding the possible risks and states her wish is to proceed with ECT. Pt states that despite experiencing a partial improvement of her depressive symptoms on Cymbalta and Seroquel she remains depressed, anxious, with low motivation and she fears going back home still feeling like this. We tried higher doses of Cymbalta, however patient developed side effects. Pt received ECT in 2014 and states that she felt "fully back to normal" after the treatment at that time and wishes to try again. Discussed case with ECT, will aim to have patient start treatment this Friday 6/25.   6/24: Pt is visible in the unit. Spinal stimulator control was delivered by her son yesterday, 2S staff will turn stimulator into surgery mode tomorrow before patient goes down for ECT and back "on" after pt returns. Pt made aware and verbalized agreement with plan.   6/25: Pt had ECT #1 today. Next ECT Monday 6/28 6/28: Pt was seen this morning in the dayroom after returning from ECT. Pt has been complaining of headaches, chills and nausea after her first ECT session last Friday. Will add Protonix before ECT for her next session this Wednesday.   6/29: Mood has improved slightly. Oriented. Scheduled for ECT #3 tomorrow.  D/C morning Seroquel and increased bedtime Seroquel to 125mg due to complaints of difficulty sleeping.  6/30: Chart reviewed, no overnight events, vitals stable. Patient seen in the dining area this morning. Pt went for ECT this morning, tolerating treatment well. Agreed to go for ECT #4 this Friday. No complaints.   7/1: Chart reviewed, no overnight events, vitals stable. She is visible in the unit, seen interacting and helping a peer sit down to use the phone. Patient seen in the a common area this morning. She is more animated on approach. Reports her mood has improved since starting ECT, does not verbalize any somatic complaints. AAOx3, denies SIIP, AH/VH/paranoid thoughts. On board with going for ECT #4 tomorrow morning. Will begin discharge planning for next week.  7/2: Patient had 4th ECT today, tolerating treatment well with very few cognitive side effects and good response. Will cross taper cymbalta with lexapro due to failed trial prior to starting ect. Next ECT on 7/6, planning for d/c next week.  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 Pt confused to situation however reports depressive sx, no SI/HI.    Initial collateral from daughter Estela: "Estela, who reports that these periods of confusion occur on/off for the past 10 years, and they have all been due to medication noncompliance. She states that for the past month, her mother has been isolative, nonsensical, not getting out of bed; calling her She has complained that she is not sleeping, but whenever she goes over, pt is in bed. She says that her mother has been having day/night confusion, calling people at all hours. She also is showing up for doctor's appointments when she does not have any. Yesterday, her brother received a call from their neighbor saying that the patient was walking in the middle of the street looking for her cellphone. Pt also was trying to get into random people's car."    5/22: Pt seen, chart reviewed and discussed with nursing staff. Reports feeling ok, but admits feeling anxious, depressed. When asked  about circumstances before hospitalization, she reports that she is here for get support, was not eating well.  Endorses poor sleep, low appetite, low energy. Appears disorganized, confused and hard of hearing. Denies any paranoia, SI, HI, hallucination.    5/23: Pt remains disorganized, anxious and dysphoric. On exam, Pt reports not feeling well. has left lower back pain, anxious, depressed. Endorses poor sleep, low appetite, low energy and motivation. Feels her memory is not that good. Appears dysphoric, hard of hearing but less confused. Denies any jose c, paranoia, SI, HI, hallucination.    5/24:  No events reported over the weekend. Sleep and appetite is "better". Patient denies any current AH/VH but endorsed +AH and +VH prior to admission. Patient reported feeling very depressed since being isolated due to covid, she endorsed passive SI with no intent or plan. She also reported taking less of her medications by splitting pills in half at home. Patient is out on the unit, social with peers but is still very anxious. Patient is compliant with medications, no adverse effects reported.    5/25:  No events reported over the weekend. Sleep is "not as good" and appetite is "improved". Patient reported being in significant pain in her arm and also of a headache. Patient given PRN Oxycodone dose earlier and pain management consult ordered. Spoke with patient about starting Cymbalta for depression. She was hesitant at first but then agreed. Some paranoia noted around medications. Patient also stated that she feels like she is bothering her daughter too much and did not want us to call her frequently, but daughter called hospital looking to get updates and denied telling her mother that she needs a break.    5/26: Patient seen for follow up for depression and psychosis. Chart reviewed and case discussed with interdisciplinary staff. per staff, patient is very somatic. On encounter, patient is pleasant but believes she is not getting adequate care, reports that she used to be many more medications and thinks we are missing something, insists that I speak with her pulmonologist Dr. Ketan Morfin. Otherwise, eating and sleeping well. Patient remains compliant with medications, no adverse effects reported or in evidence.    5/27:  Per staff, patient is very somatic, requesting more meds for constipation. On encounter, patient is pleasant, happy that she had PT today, complains of constipation, requesting increase in Senna, Dulcolax suppository offered and patient agress. Otherwise, eating and sleeping well.    5/28:  No significant overnight event. On encounter, patient is calm, reports that she had a small BM, reports that she has some "sinus" issues now and requests something additional for pain relief, would also like to be evaluated for UTI. Otherwise, eating and sleeping well.     5/29: No significant overnight event. On encounter, patient reports mood as "little better", still reports generalized pain including lower abdominal pain. Patient is eating and sleeping well.     6/1: No reported overnight events. On encounter pt remains somatically preoccupied, states she needs to be on additional medications for her asthma and complains of headaches. In terms of her mood pt describes feeling "bored", but states she is doing better, she is eating well and states she has been able to gain back some of the weight she had lost.  6/2: Continues to endorse depression, anxiety and somatic complaints. Increased Cymbalta to 40mg QD.   6/3:  Met with Ms. Primrose this morning in the dayroom. Pt reports feeling dizzy and unsteady on her feet early this morning. Checked her vitals later in the morning, she was not orthostatic at that point. She continues to verbalize multiple somatic complaints such as nausea and later reflux. Continues to endorse difficulty sleeping. Eliminated morning dose of seroquel 12.5mg due to patient's reports of morning dizziness/lightheadedness and increased her nighttime dose to 50mg. Signed conversion to voluntary status (9.13)  6/4: Pt continues to endorse several somatic complaints such as GERD and cramping in her legs. Reports her sleep was slightly better last night. She requests to speak to a therapist, reached out to Dr. Newman who agreed to meet with pt today. Called patient's daughter in order to discuss treatment plan and explore options to improve pill management at home but she was unavailable. Left a message. Increased Seroquel to 75mg QHS.   6/7: Met with Ms. Primrose this morning in a common area. She reports feeling a little low over the weekend and fearing "Im headed back to where I started". Provided reassurance that some mood fluctuations are normal and do not necessarily mean she has lost all the progress she has made. Pt complained of headaches today which are suggestive of tension headaches by her description of the distribution. Appears less somatically preoccupied. Reports sleeping better. Will f/u with daughter regarding ways to monitor pt's pill intake and use at home.   6/8: Pr reports feeling better, reports good sleep, reports her anxiety is "better". Pt requests her seroquel to be given later in the night since she doesn't want to feel tired too early. Called daughter to discuss case but she didn't . Will try again tomorrow.   Chart reviewed, no overnight events. No PRNs needed for anxiety over the past 24 hrs. 1 dose of oxycodone PRN taken over past 24 hrs for pain. Pt has requested to be referred to a day program or partial hospital after discharge, stating she will miss the feeling of community once she goes back home. Discussed with SW. Called daughter again, left a VM.   6/10:  Pt reports increasing anxiety as time for discharge approaches. She fears being "sent home without a plan" and feeling lonely at home. Reassured pt that she would not be sent home without an aftercare plan. Pt aware team is looking into day programs/partial hospitals to refer her to. Left daughter another VM.   6/11: Patient continues to endorse feeling anxious about being discharged, going home and feeling lonely. Spoke with son Sue and patient's pain management dr (Dr. Farooq) about patient's care and ways to better control/manage her medication regimen in the community. Pt received the hearing aids her daughter brought and may use them.   6/14: Pt reports sometimes experiencing some anxiety in the afternoons around 3pm. States that at that time she usually begins to ruminate about her current situation, how bored she feels and "what am I doing". Writer validated these feelings, normalizing her emotions in the context of how long she has been in the hospital. Encouraged patient to use her coping skills. Added a morning dose of Seroquel 25mg.     Plan:   Observation status: Routine  Psych: Continue Duloxetine to 90mg QD, cont Seroquel 25mg QAM + 100mg at 10pm, cont Klonopin 0.5mg BID   Medical: Oxycodone 5mg PO Q8H PRN for pain, Plavix, Albuterol, Symbicort and Spiriva.   6/16: Patient depressed, somatic, appears to be failing this trial but still room to titrate, if not responding may need to re visit ECT. Not actively suicidal  6/17: Still depressed. AThere is lack of steady gains. Will increase Seroquel and Cymbalta, can revisit ECT if not improving further  6/18 Doing worse depressed somatic and new se nausea maybe from SSREI and Mucinex and tremor from increased Cymbalta., Will lower Cymbalta will d/c Afrin and Mucinex. Will start ECT w/u.  6/21: Less anxious, remains dysphoric, no SI/HI, no psychosis. Continue ECT w/u.  6/22: No events overnight. Pt was seen this morning in the dining area after breakfast. Reports feeling low and hoping to get ECT started soon. Pt reports having had around 4 treatments in the past and doing well afterwards. Pt went for ECHO and was seen by cardiology and cleared on their end for ECT. ECT consult placed.  6/23: Pt was cleared for ECT by cardiology and neuro. She was seen by the ECT team for her ECT consult yesterday afternoon and they suggested neurology evaluate her case as well due to hx of craniotomy and the fact she has an implanted spinal cord stimulator in the thoracolumbar region. Discussed case with Dr. Valentin and met with patient together. Discussed that there is a theoretical risk of current being transmitted through the device leads during ECT, however this risk can be mitigated by placing the stimulator on surgical mode, which can be done with a device patient has at home and her son is bringing later tonight. Patient verbalized understanding the possible risks and states her wish is to proceed with ECT. Pt states that despite experiencing a partial improvement of her depressive symptoms on Cymbalta and Seroquel she remains depressed, anxious, with low motivation and she fears going back home still feeling like this. We tried higher doses of Cymbalta, however patient developed side effects. Pt received ECT in 2014 and states that she felt "fully back to normal" after the treatment at that time and wishes to try again. Discussed case with ECT, will aim to have patient start treatment this Friday 6/25.   6/24: Pt is visible in the unit. Spinal stimulator control was delivered by her son yesterday, 2S staff will turn stimulator into surgery mode tomorrow before patient goes down for ECT and back "on" after pt returns. Pt made aware and verbalized agreement with plan.   6/25: Pt had ECT #1 today. Next ECT Monday 6/28 6/28: Pt was seen this morning in the dayroom after returning from ECT. Pt has been complaining of headaches, chills and nausea after her first ECT session last Friday. Will add Protonix before ECT for her next session this Wednesday.   6/29: Mood has improved slightly. Oriented. Scheduled for ECT #3 tomorrow.  D/C morning Seroquel and increased bedtime Seroquel to 125mg due to complaints of difficulty sleeping.  6/30: Chart reviewed, no overnight events, vitals stable. Patient seen in the dining area this morning. Pt went for ECT this morning, tolerating treatment well. Agreed to go for ECT #4 this Friday. No complaints.   7/1: Chart reviewed, no overnight events, vitals stable. She is visible in the unit, seen interacting and helping a peer sit down to use the phone. Patient seen in the a common area this morning. She is more animated on approach. Reports her mood has improved since starting ECT, does not verbalize any somatic complaints. AAOx3, denies SIIP, AH/VH/paranoid thoughts. On board with going for ECT #4 tomorrow morning. Will begin discharge planning for next week.  7/2: Patient had 4th ECT today, tolerating treatment well with very few cognitive side effects and good response. Will cross taper cymbalta with lexapro due to failed trial prior to starting ect. Next ECT on 7/6, planning for d/c next week.   7/6: Had ECT#5 today. Tolerating treatment well with good response and tolerability. Planning for d/c this Thursday.

## 2021-07-06 NOTE — BH PSYCHOLOGY - CLINICIAN PSYCHOTHERAPY NOTE - NSTXCOPEDATETRGT_PSY_ALL_CORE
02-Jul-2021
17-Jun-2021
18-Jun-2021
09-Jul-2021
25-Jun-2021

## 2021-07-06 NOTE — BH PSYCHOLOGY - CLINICIAN PSYCHOTHERAPY NOTE - NSTXANXDATEEST_PSY_ALL_CORE
03-Jun-2021
02-Jul-2021
11-Jun-2021
03-Jun-2021
17-Jun-2021
11-Jun-2021
24-Jun-2021
03-Jun-2021
11-Jun-2021

## 2021-07-06 NOTE — BH PSYCHOLOGY - CLINICIAN PSYCHOTHERAPY NOTE - NSBHPTASSESSDT_PSY_A_CORE
04-Jun-2021 15:00
29-Jun-2021 14:30
14-Jun-2021 14:35
11-Jun-2021 14:15
17-Jun-2021 11:50
07-Jun-2021 11:00
22-Jun-2021 13:30
09-Jun-2021 16:25
06-Jul-2021 14:30

## 2021-07-06 NOTE — BH PSYCHOLOGY - CLINICIAN PSYCHOTHERAPY NOTE - NSBHPSYCHOLRESPONSE_PSY_A_CORE
Coping skills acquired/Insight displayed/Accepted support
Coping skills acquired/Insight displayed/Accepted support
Symptoms reduced/Coping skills acquired/Insight displayed/Accepted support
Insight displayed/Accepted support
Symptoms reduced/Coping skills acquired/Insight displayed/Accepted support
Insight displayed/Accepted support
Insight displayed/Accepted support

## 2021-07-06 NOTE — BH PSYCHOLOGY - CLINICIAN PSYCHOTHERAPY NOTE - NSBHPSYCHOLSERV_PSY_A_CORE
Individual psychotherapy

## 2021-07-06 NOTE — BH PSYCHOLOGY - CLINICIAN PSYCHOTHERAPY NOTE - NSBHPSYCHOLPROBS_PSY_ALL_CORE
Anxiety/Depression
Anxiety/Depression/Psychosis
Anxiety/Depression

## 2021-07-06 NOTE — BH PSYCHOLOGY - CLINICIAN PSYCHOTHERAPY NOTE - NSTXPROBDCOPNO_PSY_ALL_CORE
DISCHARGE ISSUE - NON-ADHERENT WITH OUTPATIENT SERVICES

## 2021-07-06 NOTE — BH INPATIENT PSYCHIATRY PROGRESS NOTE - NSBHCHARTREVIEWVS_PSY_A_CORE FT
Vital Signs Last 24 Hrs  T(C): 36.9 (06 Jul 2021 11:40), Max: 36.9 (06 Jul 2021 11:40)  T(F): 98.5 (06 Jul 2021 11:40), Max: 98.5 (06 Jul 2021 11:40)  HR: 91 (06 Jul 2021 11:40) (91 - 101)  BP: 116/69 (06 Jul 2021 11:40) (100/70 - 143/78)  BP(mean): --  RR: 18 (06 Jul 2021 12:04) (15 - 18)  SpO2: 96% (06 Jul 2021 12:04) (95% - 100%)

## 2021-07-06 NOTE — BH PSYCHOLOGY - CLINICIAN PSYCHOTHERAPY NOTE - NSBHPSYCHOLGOALS_PSY_A_CORE
Decrease symptoms/Improve level of independent functioning/Prevent relapse/Treatment compliance
Decrease symptoms/Improve level of independent functioning/Treatment compliance
Decrease symptoms/Improve level of independent functioning/Treatment compliance
Decrease symptoms/Improve level of independent functioning/Prevent relapse/Treatment compliance
Decrease symptoms/Improve level of independent functioning/Treatment compliance

## 2021-07-06 NOTE — BH PSYCHOLOGY - CLINICIAN PSYCHOTHERAPY NOTE - NSTXDCOPNODATEEST_PSY_ALL_CORE
01-Jun-2021
01-Jun-2021
22-Jun-2021
29-Jun-2021
01-Jun-2021
01-Jun-2021
09-Jun-2021
15-Robin-2021
15-Robin-2021

## 2021-07-06 NOTE — BH PSYCHOLOGY - CLINICIAN PSYCHOTHERAPY NOTE - NSTXCOPEDATEEST_PSY_ALL_CORE
02-Jul-2021
11-Jun-2021
11-Jun-2021
18-Jun-2021
03-Jun-2021
25-Jun-2021
03-Jun-2021
11-Jun-2021
03-Jun-2021

## 2021-07-06 NOTE — BH PSYCHOLOGY - CLINICIAN PSYCHOTHERAPY NOTE - NSTXANXGOAL_PSY_ALL_CORE
Be able to participate in activities despite lingering anxiety/panic
Identify and practice 3 coping skills to manage anxiety
Report a reduction in panic attacks and improving mood and confidence
Report that he/she was able to initiate conversations with peers 3 times daily despite panic attacks
Be able to perform ADLs and maintain safety despite anxiety/panic daily

## 2021-07-06 NOTE — BH PSYCHOLOGY - CLINICIAN PSYCHOTHERAPY NOTE - NSBHPSYCHOLBILLFAM_PSY_A_CORE
[id number] - 16 to 37 minutes
[id number] - 16 to 37 minutes
[id number] - 16 to 37 minutes
10196 - 16 to 37 minutes
[id number] - 16 to 37 minutes
[id number] - 38 to 52 minutes
71712 - 16 to 37 minutes
[id number] - 16 to 37 minutes
[id number] - 16 to 37 minutes

## 2021-07-06 NOTE — BH INPATIENT PSYCHIATRY PROGRESS NOTE - NSBHFUPINTERVALHXFT_PSY_A_CORE
Chart reviewed, no events over the weekend, vitals stable, compliant with PO meds. Pt was seen in the dayroom as she was going for ECT. No complaints. Planning for discharge this Thursday.

## 2021-07-06 NOTE — BH PSYCHOLOGY - CLINICIAN PSYCHOTHERAPY NOTE - NSBHPSYCHOLASSESSPROV_PSY_A_CORE
Licensed Staff Psychologist

## 2021-07-06 NOTE — BH PSYCHOLOGY - CLINICIAN PSYCHOTHERAPY NOTE - NSBHPSYCHOLPARTICIP_PSY_A_CORE
Fully engaged

## 2021-07-06 NOTE — BH PSYCHOLOGY - CLINICIAN PSYCHOTHERAPY NOTE - NSTXPROBANX_PSY_ALL_CORE
ANXIETY/PANIC/FEAR

## 2021-07-06 NOTE — BH PSYCHOLOGY - CLINICIAN PSYCHOTHERAPY NOTE - NSBHPSYCHOLNARRATIVE_PSY_A_CORE FT
The patient was seen individually today at the team's request and with her consent. Speech was of normal volume and rate with no peculiarities. Mood was “somewhat depressed” and affect was constricted. No tearfulness was noted and she smiled at times. Thought was logical and organized. No suicidality was elicited. She still complained of a headache which does not respond to Tylenol or Motrin.    Patient stated that she had an ECT session today and was still not feeling back to herself. She said that her headache and backache affected her mood as well. She was fully oriented and memory appears intact. Patient expressed some apprehension about returning home - expected this week – hoping she will feel stronger before discharge.     Patient continues to participate in group activities and has been eating and sleeping well. She discussed the plans to have more assistance at home and to re-unite with her dog and these ideas were comforting in thinking about discharge.     Patient appreciated the session and agreed to meet again.
The patient was seen individually today at the team's request and with her consent. Speech was of normal volume and rate with no peculiarities. Mood was euthymic and affect was of full range. No tearfulness was noted and she laughed appropriately at times and smiled easily. Thought was logical and organized. No suicidality was elicited. She still complained of a mild headache which does not respond to Tylenol or Motrin.    Patient has received two ECT sessions. She described significant but temporary reactions to these sessions including shivering and fatigue. She is fully oriented and memory appears intact even for intervening events. She has not noticed overt mood improvement but appeared to have a brighter mood and a positive outlook during this session. Patient is still somewhat apprehensive about returning home but feels supported by her children and by their successful efforts at hiring help for her.     Patient continues to participate in group activities and has been eating and sleeping well. Her appetite is so good that she looks forward to breakfast and is upset at having to fast before ECT. 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 She was less negative than previously about the adequacy of care for severely ill patients.     Patient understands that she will need to receive several more ECT sessions before she can be transitioned to outpatient ECT. Patient appreciated the session and agreed to meet again.
The patient was seen individually today at the team's request and with her consent. She again was eager to talk and was engaged. Speech was of normal volume and rate with no peculiarities. Mood was “better” and affect was moderately constricted. No tearfulness was noted. Thinking was logical and organized. She denied suicidality.     Patient stated that she has been feeling less sad and thinking more about returning home. She has been participating in unit activities and talking with some of the other patients. She has been trying to get used to Cymbalta and feels that it bothers her stomach, but is willing to see if she can tolerate it since it could also help with her neuropathy. Patient is encouraged by her family’s support of her plan to obtain some assistance and company a few hours a day. The idea that someone would help her with physical tasks that have become exhausting makes her more hopeful. She also hopes that she will be able to return to her senior center and to her volunteer job which are gratifying to her.     Discussion focused on her significant improvement since admission and on ways to maintain those gains through socialization, coping skills and treatment adherence. Patient appreciated the session and agreed to meet again.
The patient was seen individually today at the team's request and with her consent. She was eager to talk and was pleasant, engaged and productive. Speech was of normal volume and rate and devoid of peculiarities. Patient was hard of hearing (stating that her dog had eaten her hearing aids). Mood was "worried" and affect was constricted but she evidenced occasional tearfulness and smiling which were congruent. Thinking was logical and organized. She often paused saying she had trouble remembering words but was otherwise articulate and fluent. She denied suicidality saying that she lives for her children but acknowledged that her life has been relatively empty this past year and she has been very lonely.     Patient discussed her medical problems and pain but did not dwell on this topic. She voiced anxiety about returning home to live alone as she finds it increasingly daunting to take care of her needs alone. She has been using [employer] to get to doctor appointments and even then is exhausted and in pain; she had stopped eating because it was too hard for her to shop and prepare meals. She stated that she would like to have some help a few hours a day in the form of a paid  who could drive, help with household tasks and keep her company. She does not believe that she could afford assisted living and she understands that her grown children are not set up for her to live with them. She has two surviving brothers who live in [place] and who she has contact with. Most friends have  or relocated. She is very grateful to her children for their support and frequent visits.     Patient is participating in unit activities and finds that they help her mood by distracting her or enabling her to relax. We reviewed relaxation breathing and she was encouraged to use that when anxious. Patient's solution oriented thinking was reinforced and she was provided with support; she acknowledged that she was in much worse shape when admitted (she does not even recall all the details) and that a lot has improved since then. Some of her maladaptive thoughts were identified and challenged and - if helpful - this will be continued. Patient appreciated the session and agreed to meet again.    
The patient was seen individually today at the team's request and with her consent. Speech was of normal volume and rate with no peculiarities. Mood was “good” and affect was of full range. No tearfulness was noted and she smiled frequently. Thinking was logical and organized. No suicidality was elicited. She had somatic complaints (back pain, sinus pain) but overall was content.    Patient appreciated having received her hearing aids and the improved hearing was noticeable in session. She participates in group activities. She acknowledges feeling much better than prior to admission. She eats and sleeps well. She looks forward to returning home (anticipated discharge is by the end of this week) but expressed some apprehension about her ability to handle everything (e.g., walk her dog around the block. Patient accepted support and encouragement, recognized that she will hopefully have some help part of the day (her family is looking into hiring someone to help her) and that she will only know with certainty once she goes through the routine. Patient was also advised that the volunteer program she participated in at [facility] has reopened for fully vaccinated volunteers and she is looking forward to doing that again. Her daughter called her [facility] center last week and was told that they have not reopened in person yet; this is a fluid process and may change any week. Patient was buoyed by the idea of resuming some gratifying activities that she had lost access to over a year ago.     Patient appreciated the session and agreed to meet again.
The patient was seen individually today at the team's request and with her consent. Speech was of normal volume and rate with no peculiarities. Mood was “ok” and affect was of full range but diminished in intensity. No tearfulness was noted and she smiled easily. Thinking was logical and organized. No suicidality was elicited. She had back pain but this is chronic and she did not dwell on it.    In discussing her still not feeling emotionally well, patient reported that after her doctor mentioned ECT in passing, her children strongly suggested that she receive it stating that it had had a dramatic impact in the past. Patient recalled getting ECT about four years ago but did not recall the effect it had on her. After some consideration, and in light if the fact that she was still not feeling back to her optimal baseline, she has agreed to continue with a course of ECT pending medical clearance.     Patient participates in group activities and has been eating and sleeping well. In discussing past outpatient treatment she recalled attending programs in the [facility] in the past and remembered her therapist fondly. For some reason she was under the impression that her insurance is not accepted; upon inquiry it appeared that she was mistaken about what her insurance coverage was, mistaking a former patient’s plan (which would have prohibited the clinic) with her own. Patient was also encouraged by the knowledge that he children were interviewing companions to hire to assist her after discharge.     Patient appreciated the session and agreed to meet again.
The patient was seen individually today at the team's request and with her consent. She again was eager to talk and was engaged. Speech was of normal volume and rate with no peculiarities. Patient’s hearing impairment required loud talking. Mood was intermittently "not good" and affect was constricted. No tearfulness was noted but she described crying at times the last few days. Thinking was logical and organized. She paused a few times searching for words but not for more than a few seconds and did not need help recalling he word. She denied suicidality but said that she has been feeling worried and sad lately.     Patient again did not focus on her medical problems although she mentioned having a headache a few times. She spoke about feeling worried when thinking of returning home. This feeling was related to her remembering how nonfunctional and lonely she was when last there. She has the agreement of her children to obtain assistance of a person who could drive and cook a few hours a day. While she is very grateful to her children for their support and frequent visits, she cannot stop feeling disappointed that none of them would agree for her to live with them. This idea which was voiced more tentatively previously is apparently significantly upsetting. We reviewed her understanding of this fact and although she understands her children’s rationale (they would not be able to provide the attention she needs since they have young children), it is not convincing (e.g., she respects their privacy and would remain in her own space unless they invited out) and she feels rejected. She also acknowledged that some activities that had given her a sense of purpose (i.e., volunteering; senior center) were not available and instead her depression makes her she feel needy and dependent on her children which she feels guilty about.     Writer focused on solution-oriented discussion, validation and demonstration of Cognitive behavioral Therapy principles as seen in the thoughts and expectations that mediate her feelings. Patient understood and accepted these ideas. She continues to participate in unit activities which distract and/or relax her. We again reviewed relaxation breathing. Patient appreciated the session and agreed to meet again.
The patient was seen individually today at the team's request and with her consent. She had been lying in bed and agreed to join the writer to talk. Speech was of normal volume and rate with no peculiarities. Mood was “not good today” and affect was constricted. No tearfulness was noted. Thinking was logical and organized. She denied suicidality.     Patient reported feeling very stressed by recent events on the unit. First, after her daughter brought her inexpensive replacement hearing aids, they were taken by staff and it is not clear whether she will be permitted to wear them. She was very upset as she is extremely hearing impaired and was looking forward to using them. Secondly, she is upset by a very loud patient on the unit and retreats to her room when that patient is in the day room. This stresses her and deprives her of access to the groups.     Patient’s feelings were validated and writer indicated that her points will be addressed shortly. Other than a current headache, she has been feeling calmer and eats and sleeps well. She is thinking more about returning home which she feels she can handle. Writer provided the telephone number of her [facility] center so that she could call them to ask if they re-opened. Writer is also waiting to hear from the Volunteer Office as to whether the programs have resumed. Patient was pleased by these efforts and looks forward to access to these activities. She has been tolerating Cymbalta.     Patient appreciated the session and agreed to meet again.
The patient was seen individually today at the team's request and with her consent. Speech was of normal volume and rate with no peculiarities. Mood was “ok” and affect was of full range. No tearfulness was noted. Thinking was logical and organized. No suicidality was elicited. She complained of a headache that has lasted for a long time and which does not respond to Tylenol or Motrin but which feels better when she lies down.    Patient is awaiting the completion of her medical workup for ECT and is hoping she can start tomorrow. She discussed plans for returning home and again stated that she feels stronger. She stated that her family has arranged to hire a  to help her during the week,     Patient continues to participate in group activities and has been eating and sleeping well. She complained that she misses the patients she had befriended but who were already discharged and she finds herself trying to help other patients here but is sometimes cautioned by staff not to interfere. She discussed her frustration when she feels patients aren’t being tended to adequately and some options for handling those feelings.     Patient appreciated the session and agreed to meet again.

## 2021-07-06 NOTE — BH PSYCHOLOGY - CLINICIAN PSYCHOTHERAPY NOTE - NSTXDCOPNODATETRGT_PSY_ALL_CORE
08-Jun-2021
08-Jun-2021
23-Jun-2021
29-Jun-2021
08-Jun-2021
08-Jun-2021
16-Jun-2021
06-Jul-2021
23-Jun-2021

## 2021-07-06 NOTE — BH PSYCHOLOGY - CLINICIAN PSYCHOTHERAPY NOTE - NSBHPSYCHOLINT_PSY_A_CORE
Encourage medication compliance/Problem-solving techniques discussed/Supportive therapy/Treatment compliance encouraged
Cognitive/behavioral therapy/Encourage medication compliance/Problem-solving techniques discussed/Supportive therapy/Treatment compliance encouraged
Cognitive/behavioral therapy/Encourage medication compliance/Supportive therapy/Treatment compliance encouraged
Cognitive/behavioral therapy/Problem-solving techniques discussed/Supportive therapy/Treatment compliance encouraged
Encourage medication compliance/Supportive therapy/Treatment compliance encouraged
Cognitive/behavioral therapy/Encourage medication compliance/Problem-solving techniques discussed/Supportive therapy/Treatment compliance encouraged
Cognitive/behavioral therapy/Encourage medication compliance/Problem-solving techniques discussed/Supported coping skills/Supportive therapy/Treatment compliance encouraged
Cognitive/behavioral therapy/Encourage medication compliance/Problem-solving techniques discussed/Supported coping skills/Treatment compliance encouraged
Cognitive/behavioral therapy/Encourage medication compliance/Supportive therapy/Treatment compliance encouraged

## 2021-07-06 NOTE — BH PSYCHOLOGY - CLINICIAN PSYCHOTHERAPY NOTE - NSTXANXDATETRGT_PSY_ALL_CORE
17-Jun-2021
09-Jul-2021
01-Jul-2021
17-Jun-2021
24-Jun-2021

## 2021-07-07 PROCEDURE — 99232 SBSQ HOSP IP/OBS MODERATE 35: CPT

## 2021-07-07 RX ORDER — LEVOTHYROXINE SODIUM 125 MCG
1 TABLET ORAL
Qty: 0 | Refills: 0 | DISCHARGE

## 2021-07-07 RX ORDER — OXYCODONE HYDROCHLORIDE 5 MG/1
1 TABLET ORAL
Qty: 21 | Refills: 0
Start: 2021-07-07 | End: 2021-07-13

## 2021-07-07 RX ORDER — PANTOPRAZOLE SODIUM 20 MG/1
1 TABLET, DELAYED RELEASE ORAL
Qty: 30 | Refills: 0
Start: 2021-07-07 | End: 2021-08-05

## 2021-07-07 RX ORDER — LEVOTHYROXINE SODIUM 125 MCG
1 TABLET ORAL
Qty: 30 | Refills: 0
Start: 2021-07-07 | End: 2021-08-05

## 2021-07-07 RX ORDER — OXYCODONE HYDROCHLORIDE 5 MG/1
10 TABLET ORAL
Qty: 0 | Refills: 0 | DISCHARGE

## 2021-07-07 RX ORDER — DULOXETINE HYDROCHLORIDE 30 MG/1
1 CAPSULE, DELAYED RELEASE ORAL
Qty: 30 | Refills: 0
Start: 2021-07-07 | End: 2021-08-05

## 2021-07-07 RX ORDER — ONDANSETRON 8 MG/1
1 TABLET, FILM COATED ORAL
Qty: 28 | Refills: 0
Start: 2021-07-07 | End: 2021-07-13

## 2021-07-07 RX ORDER — SENNA PLUS 8.6 MG/1
2 TABLET ORAL
Qty: 60 | Refills: 0
Start: 2021-07-07 | End: 2021-08-05

## 2021-07-07 RX ORDER — ALBUTEROL 90 UG/1
2 AEROSOL, METERED ORAL
Qty: 0 | Refills: 0 | DISCHARGE

## 2021-07-07 RX ORDER — CLONAZEPAM 1 MG
1 TABLET ORAL
Qty: 60 | Refills: 0
Start: 2021-07-07 | End: 2021-08-05

## 2021-07-07 RX ORDER — BUDESONIDE AND FORMOTEROL FUMARATE DIHYDRATE 160; 4.5 UG/1; UG/1
2 AEROSOL RESPIRATORY (INHALATION)
Qty: 1 | Refills: 0
Start: 2021-07-07 | End: 2021-08-05

## 2021-07-07 RX ORDER — LIDOCAINE 4 G/100G
1 CREAM TOPICAL
Qty: 1 | Refills: 0
Start: 2021-07-07 | End: 2021-07-13

## 2021-07-07 RX ORDER — ALBUTEROL 90 UG/1
2 AEROSOL, METERED ORAL
Qty: 1 | Refills: 0
Start: 2021-07-07 | End: 2021-08-05

## 2021-07-07 RX ORDER — IBUPROFEN 200 MG
1 TABLET ORAL
Qty: 14 | Refills: 0
Start: 2021-07-07 | End: 2021-07-13

## 2021-07-07 RX ORDER — CLOPIDOGREL BISULFATE 75 MG/1
1 TABLET, FILM COATED ORAL
Qty: 30 | Refills: 0
Start: 2021-07-07 | End: 2021-08-05

## 2021-07-07 RX ORDER — ASPIRIN/CALCIUM CARB/MAGNESIUM 324 MG
1 TABLET ORAL
Qty: 30 | Refills: 0
Start: 2021-07-07 | End: 2021-08-05

## 2021-07-07 RX ORDER — QUETIAPINE FUMARATE 200 MG/1
1 TABLET, FILM COATED ORAL
Qty: 30 | Refills: 0
Start: 2021-07-07 | End: 2021-08-05

## 2021-07-07 RX ADMIN — Medication 0.5 MILLIGRAM(S): at 06:04

## 2021-07-07 RX ADMIN — ALBUTEROL 2 PUFF(S): 90 AEROSOL, METERED ORAL at 21:34

## 2021-07-07 RX ADMIN — OXYCODONE HYDROCHLORIDE 5 MILLIGRAM(S): 5 TABLET ORAL at 14:20

## 2021-07-07 RX ADMIN — Medication 75 MICROGRAM(S): at 06:03

## 2021-07-07 RX ADMIN — OXYCODONE HYDROCHLORIDE 5 MILLIGRAM(S): 5 TABLET ORAL at 13:18

## 2021-07-07 RX ADMIN — CLOPIDOGREL BISULFATE 75 MILLIGRAM(S): 75 TABLET, FILM COATED ORAL at 09:53

## 2021-07-07 RX ADMIN — Medication 0.5 MILLIGRAM(S): at 21:32

## 2021-07-07 RX ADMIN — SENNA PLUS 2 TABLET(S): 8.6 TABLET ORAL at 21:33

## 2021-07-07 RX ADMIN — Medication 81 MILLIGRAM(S): at 09:52

## 2021-07-07 RX ADMIN — QUETIAPINE FUMARATE 100 MILLIGRAM(S): 200 TABLET, FILM COATED ORAL at 21:32

## 2021-07-07 RX ADMIN — ONDANSETRON 4 MILLIGRAM(S): 8 TABLET, FILM COATED ORAL at 10:00

## 2021-07-07 RX ADMIN — BUDESONIDE AND FORMOTEROL FUMARATE DIHYDRATE 2 PUFF(S): 160; 4.5 AEROSOL RESPIRATORY (INHALATION) at 21:34

## 2021-07-07 RX ADMIN — POLYETHYLENE GLYCOL 3350 17 GRAM(S): 17 POWDER, FOR SOLUTION ORAL at 09:53

## 2021-07-07 RX ADMIN — BUDESONIDE AND FORMOTEROL FUMARATE DIHYDRATE 2 PUFF(S): 160; 4.5 AEROSOL RESPIRATORY (INHALATION) at 09:53

## 2021-07-07 RX ADMIN — DULOXETINE HYDROCHLORIDE 20 MILLIGRAM(S): 30 CAPSULE, DELAYED RELEASE ORAL at 09:53

## 2021-07-07 NOTE — BH INPATIENT PSYCHIATRY DISCHARGE NOTE - NSDCCPCAREPLAN_GEN_ALL_CORE_FT
PRINCIPAL DISCHARGE DIAGNOSIS  Diagnosis: Depression  Assessment and Plan of Treatment:       SECONDARY DISCHARGE DIAGNOSES  Diagnosis: AMS (altered mental status)  Assessment and Plan of Treatment:

## 2021-07-07 NOTE — BH INPATIENT PSYCHIATRY PROGRESS NOTE - NSBHCHARTREVIEWVS_PSY_A_CORE FT
Vital Signs Last 24 Hrs  T(C): 36.6 (07 Jul 2021 06:15), Max: 36.6 (07 Jul 2021 06:15)  T(F): 97.8 (07 Jul 2021 06:15), Max: 97.8 (07 Jul 2021 06:15)  HR: --  BP: 103/70 (07 Jul 2021 08:00) (103/70 - 103/70)  BP(mean): 99 (07 Jul 2021 08:00) (99 - 99)  RR: --  SpO2: --

## 2021-07-07 NOTE — BH INPATIENT PSYCHIATRY PROGRESS NOTE - NSBHFUPINTERVALHXFT_PSY_A_CORE
Chart reviewed, no events over the weekend, vitals stable, compliant with PO meds. Pt was seen this morning at bedside. She complained of headache which could be a side effect from ECT. Patient reports some anxiety related to discharge plans, however denies feeling depressed, denies SIIP. Patient has grown to like the aspect of community in the unit and is often engaging with select peers. Discussed discharge plans, patient signed outpatient ECT treatment plan.

## 2021-07-07 NOTE — BH INPATIENT PSYCHIATRY DISCHARGE NOTE - REASON FOR ADMISSION
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

## 2021-07-07 NOTE — [facility] INPATIENT PSYCHIATRY DISCHARGE NOTE - HOSPITAL COURSE
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 Pt confused to situation however reports depressive sx, no SI/HI. Initial collateral from daughter Estela: "Estela, who reports that these periods of confusion occur on/off for the past 10 years, and they have all been due to medication noncompliance. She states that for the past month, her mother has been isolative, nonsensical, not getting out of bed; calling her She has complained that she is not sleeping, but whenever she goes over, pt is in bed. She says that her mother has been having day/night confusion, calling people at all hours. She also is showing up for doctor's appointments when she does not have any. Yesterday, her brother received a call from their neighbor saying that the patient was walking in the middle of the street looking for her cellphone. Pt also was trying to get into random people's car."    5/22: Pt seen, chart reviewed and discussed with nursing staff. Reports feeling ok, but admits feeling anxious, depressed. When asked  about circumstances before hospitalization, she reports that she is here for get support, was not eating well.  Endorses poor sleep, low appetite, low energy. Appears disorganized, confused and hard of hearing. Denies any paranoia, SI, HI, hallucination.    5/23: Pt remains disorganized, anxious and dysphoric. On exam, Pt reports not feeling well. has left lower back pain, anxious, depressed. Endorses poor sleep, low appetite, low energy and motivation. Feels her memory is not that good. Appears dysphoric, hard of hearing but less confused. Denies any jose c, paranoia, SI, HI, hallucination.    5/24:  No events reported over the weekend. Sleep and appetite is "better". Patient denies any current AH/VH but endorsed +AH and +VH prior to admission. Patient reported feeling very depressed since being isolated due to covid, she endorsed passive SI with no intent or plan. She also reported taking less of her medications by splitting pills in half at home. Patient is out on the unit, social with peers but is still very anxious. Patient is compliant with medications, no adverse effects reported.    5/25:  No events reported over the weekend. Sleep is "not as good" and appetite is "improved". Patient reported being in significant pain in her arm and also of a headache. Patient given PRN Oxycodone dose earlier and pain management consult ordered. Spoke with patient about starting Cymbalta for depression. She was hesitant at first but then agreed. Some paranoia noted around medications. Patient also stated that she feels like she is bothering her daughter too much and did not want us to call her frequently, but daughter called hospital looking to get updates and denied telling her mother that she needs a break.  5/26: Patient seen for follow up for depression and psychosis. Chart reviewed and case discussed with interdisciplinary staff. per staff, patient is very somatic. On encounter, patient is pleasant but believes she is not getting adequate care, reports that she used to be many more medications and thinks we are missing something, insists that I speak with her pulmonologist Dr. Ketan Morfin. Otherwise, eating and sleeping well. Patient remains compliant with medications, no adverse effects reported or in evidence.    5/27:  Per staff, patient is very somatic, requesting more meds for constipation. On encounter, patient is pleasant, happy that she had PT today, complains of constipation, requesting increase in Senna, Dulcolax suppository offered and patient agress. Otherwise, eating and sleeping well.    5/28:  No significant overnight event. On encounter, patient is calm, reports that she had a small BM, reports that she has some "sinus" issues now and requests something additional for pain relief, would also like to be evaluated for UTI. Otherwise, eating and sleeping well.     5/29: No significant overnight event. On encounter, patient reports mood as "little better", still reports generalized pain including lower abdominal pain. Patient is eating and sleeping well.     6/1: No reported overnight events. On encounter pt remains somatically preoccupied, states she needs to be on additional medications for her asthma and complains of headaches. In terms of her mood pt describes feeling "bored", but states she is doing better, she is eating well and states she has been able to gain back some of the weight she had lost.  6/2: Continues to endorse depression, anxiety and somatic complaints. Increased Cymbalta to 40mg QD.   6/3:  Met with Ms. Primrose this morning in the dayroom. Pt reports feeling dizzy and unsteady on her feet early this morning. Checked her vitals later in the morning, she was not orthostatic at that point. She continues to verbalize multiple somatic complaints such as nausea and later reflux. Continues to endorse difficulty sleeping. Eliminated morning dose of seroquel 12.5mg due to patient's reports of morning dizziness/lightheadedness and increased her nighttime dose to 50mg. Signed conversion to voluntary status (9.13)  6/4: Pt continues to endorse several somatic complaints such as GERD and cramping in her legs. Reports her sleep was slightly better last night. She requests to speak to a therapist, reached out to Dr. Newman who agreed to meet with pt today. Called patient's daughter in order to discuss treatment plan and explore options to improve pill management at home but she was unavailable. Left a message. Increased Seroquel to 75mg QHS.   6/7: She reports feeling a little low over the weekend and fearing "Im headed back to where I started". Provided reassurance that some mood fluctuations are normal and do not necessarily mean she has lost all the progress she has made.  Pt complained of headaches today which are suggestive of tension headaches by her description of the distribution. Appears less somatically preoccupied. Reports sleeping better. Will f/u with daughter regarding ways to monitor pt's pill intake and use at home.   6/8: Pr reports feeling better, reports good sleep, reports her anxiety is "better". Pt requests her seroquel to be given later in the night since she doesn't want to feel tired too early. Called daughter to discuss case but she didn't . Will try again tomorrow.   6/10:  Pt reports increasing anxiety as time for discharge approaches. She fears being "sent home without a plan" and feeling lonely at home. Reassured pt that she would not be sent home without an aftercare plan. Pt aware team is looking into day programs/partial hospitals to refer her to. Left daughter another VM.   6/11: Patient continues to endorse feeling anxious about being discharged, going home and feeling lonely. Spoke with son Pad and patient's pain management dr (Dr. Farooq) about patient's care and ways to better control/manage her medication regimen in the community. Pt received the hearing aids her daughter brought and may use them.   6/14: Pt reports sometimes experiencing some anxiety in the afternoons around 3pm. States that at that time she usually begins to ruminate about her current situation, how bored she feels and "what am I doing". Writer validated these feelings, normalizing her emotions in the context of how long she has been in the hospital. Encouraged patient to use her coping skills. Added a morning dose of Seroquel 25mg.   /16: Patient depressed, somatic, appears to be failing this trial but still room to titrate, if not responding may need to re visit ECT. Not actively suicidal  6/17: Still depressed. AThere is lack of steady gains. Will increase Seroquel and Cymbalta, can revisit ECT if not improving further  6/21: Less anxious, remains dysphoric, no SI/HI, no psychosis. Continue ECT w/u.  6/22: No events overnight. Pt was seen this morning in the dining area after breakfast. Reports feeling low and hoping to get ECT started soon. Pt reports having had around 4 treatments in the past and doing well afterwards. Pt went for ECHO and was seen by cardiology and cleared on their end for ECT. ECT consult placed.  6/23: Pt was cleared for ECT by cardiology and neuro. Discussed case with Dr. Valentin and met with patient Discussed that there is a theoretical risk of current being transmitted through the device leads during ECT, however this risk can be mitigated by placing the stimulator on surgical mode, which can be done with a device patient has at home and her son is bringing later tonight. Patient verbalized understanding the possible risks and states her wish is to proceed with ECT. Pt states that despite experiencing a partial improvement of her depressive symptoms on Cymbalta and Seroquel she remains depressed, anxious, with low motivation and she fears going back home still feeling like this. We tried higher doses of Cymbalta, however patient developed side effects. Pt received ECT in 2014 and states that she felt "fully back to normal" after the treatment at that time and wishes to try again. Discussed case with ECT, will aim to have patient start treatment this Friday 6/25.   6/24:Spinal stimulator control was delivered by her son yesterday, 2S staff will turn stimulator into surgery mode tomorrow before patient goes down for ECT and back "on" after pt returns. Pt made aware and verbalized agreement with plan.   6/25: Pt had ECT #1 today. Next ECT Monday 6/28 6/28: Pt was seen this morning in the dayroom after returning from ECT. Pt has been complaining of headaches, chills and nausea after her first ECT session last Friday. Will add Protonix before ECT for her next session this Wednesday.   6/29: Mood has improved slightly. Oriented. Scheduled for ECT #3 tomorrow.  D/C morning Seroquel and increased bedtime Seroquel to 125mg due to complaints of difficulty sleeping.  6/30: Patient seen in the dining area this morning. Pt went for ECT this morning, tolerating treatment well. Agreed to go for ECT #4 this Friday. No complaints.   7/1:  She is visible in the unit, seen interacting and helping a peer sit down to use the phone. Patient seen in the a common area this morning. She is more animated on approach. Reports her mood has improved since starting ECT, does not verbalize any somatic complaints. AAOx3, denies SIIP, AH/VH/paranoid thoughts. On board with going for ECT #4 tomorrow morning. Will begin discharge planning for next week.  7/2: Patient had 4th ECT today, tolerating treatment well with very few cognitive side effects and good response. Will cross taper cymbalta with lexapro due to failed trial prior to starting ect. Next ECT on 7/6, planning for d/c next week.   7/6: Had ECT#5 today. Tolerating treatment well with good response and tolerability. Planning for d/c this Thursday.   7/7: Patient reports some anxiety related to discharge plans, however denies feeling depressed, denies SIIP. Discussed discharge plans, patient signed outpatient ECT treatment plan.    On the day of discharge, the patient’s mood and affect are at baseline. Patient denies depressed mood and anxiety. Denies anhedonia, feelings of guilt, hopelessness. Sleep and appetite are also normal.  Pt’s motor performance and productivity of speech are WNL. Pt denies symptoms of psychosis including AH/VH and is at baseline/not preoccupied with delusions. Patient denies suicidal or homicidal ideation, intent or plan. Over the past week she has been visible in the unit, interacting with peers and participating in groups. Patient currently not a risk to self or others. Patient has made clinically meaningful progress during this hospitalization and has clearly benefited from medications and ECT. On day of discharge, the patient has improved significantly and no longer requires inpatient treatment and care. Pt will be discharged and follow-up with outpatient care.      Medically, during this hospitalization patient was treated with macrobid for possible UTI. Was also treated with mucinex for nasal congestion, Arbuterol and Symbicort for her asthma  Suicidal and aggression risk assessments were performed on the day of discharge. The patient is at elevated chronic risk of self-harm due to hx of treatment resistant depression. Patient is not actively suicidal, making them appropriate for less restrictive treatment as an outpatient without need for continued inpatient hospitalization. Protective factors for suicide and include family support, positive therapeutic relationships, female sex and positive response to treatment. Risk factors include age >65. Patient was provided with a 30-day supply of medications, extensive psychoeducation on treatment options and motivational counseling targeting healthy lifestyle. Patient was instructed on actions for crisis situations, understood and agreed to follow instructions for handling crisis, including coming to ER or calling 911 should the patient or their family feel that they are in danger of hurting self or others. Patient also was given Suicide Prevention Lifeline number 1-932.471.4464 and provided with instructions on its use.         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 Pt confused to situation however reports depressive sx, no SI/HI. Initial collateral from daughter Estela: "Estela, who reports that these periods of confusion occur on/off for the past 10 years, and they have all been due to medication noncompliance. She states that for the past month, her mother has been isolative, nonsensical, not getting out of bed; calling her She has complained that she is not sleeping, but whenever she goes over, pt is in bed. She says that her mother has been having day/night confusion, calling people at all hours. She also is showing up for doctor's appointments when she does not have any. Yesterday, her brother received a call from their neighbor saying that the patient was walking in the middle of the street looking for her cellphone. Pt also was trying to get into random people's car."    5/22: Pt seen, chart reviewed and discussed with nursing staff. Reports feeling ok, but admits feeling anxious, depressed. When asked  about circumstances before hospitalization, she reports that she is here for get support, was not eating well.  Endorses poor sleep, low appetite, low energy. Appears disorganized, confused and hard of hearing. Denies any paranoia, SI, HI, hallucination.    5/23: Pt remains disorganized, anxious and dysphoric. On exam, Pt reports not feeling well. has left lower back pain, anxious, depressed. Endorses poor sleep, low appetite, low energy and motivation. Feels her memory is not that good. Appears dysphoric, hard of hearing but less confused. Denies any jose c, paranoia, SI, HI, hallucination.    5/24:  No events reported over the weekend. Sleep and appetite is "better". Patient denies any current AH/VH but endorsed +AH and +VH prior to admission. Patient reported feeling very depressed since being isolated due to covid, she endorsed passive SI with no intent or plan. She also reported taking less of her medications by splitting pills in half at home. Patient is out on the unit, social with peers but is still very anxious. Patient is compliant with medications, no adverse effects reported.    5/25:  Sleep is "not as good" and appetite is "improved". Patient reported being in significant pain in her arm and also of a headache. Patient given PRN Oxycodone dose earlier and pain management consult ordered. Spoke with patient about starting Cymbalta for depression. She was hesitant at first but then agreed. Some paranoia noted around medications.   5/26: per staff, patient is very somatic. On encounter, patient is pleasant but believes she is not getting adequate care, reports that she used to be many more medications and thinks we are missing something, insists that I speak with her pulmonologist Dr. Ketan Morfin. Otherwise, eating and sleeping well. Patient remains compliant with medications, no adverse effects reported or in evidence.    5/27:  Per staff, patient is very somatic, requesting more meds for constipation. On encounter, patient is pleasant, happy that she had PT today, complains of constipation, requesting increase in Senna, Dulcolax suppository offered and patient agress. Otherwise, eating and sleeping well.    5/28:  No significant overnight event. On encounter, patient is calm, reports that she had a small BM, reports that she has some "sinus" issues now and requests something additional for pain relief, would also like to be evaluated for UTI. Otherwise, eating and sleeping well.     5/29: No significant overnight event. On encounter, patient reports mood as "little better", still reports generalized pain including lower abdominal pain. Patient is eating and sleeping well.     6/1: No reported overnight events. On encounter pt remains somatically preoccupied, states she needs to be on additional medications for her asthma and complains of headaches. In terms of her mood pt describes feeling "bored", but states she is doing better, she is eating well and states she has been able to gain back some of the weight she had lost.  6/2: Continues to endorse depression, anxiety and somatic complaints. Increased Cymbalta to 40mg QD.   6/3:  Met with Ms. Primrose this morning in the dayroom. Pt reports feeling dizzy and unsteady on her feet early this morning. Checked her vitals later in the morning, she was not orthostatic at that point. She continues to verbalize multiple somatic complaints such as nausea and later reflux. Continues to endorse difficulty sleeping. Eliminated morning dose of seroquel 12.5mg due to patient's reports of morning dizziness/lightheadedness and increased her nighttime dose to 50mg. Signed conversion to voluntary status (9.13)  6/4: Pt continues to endorse several somatic complaints such as GERD and cramping in her legs. Reports her sleep was slightly better last night. She requests to speak to a therapist, reached out to Dr. Newman who agreed to meet with pt today. Called patient's daughter in order to discuss treatment plan and explore options to improve pill management at home but she was unavailable. Left a message. Increased Seroquel to 75mg QHS.   6/7: She reports feeling a little low over the weekend and fearing "Im headed back to where I started". Provided reassurance that some mood fluctuations are normal and do not necessarily mean she has lost all the progress she has made.  Pt complained of headaches today which are suggestive of tension headaches by her description of the distribution. Appears less somatically preoccupied. Reports sleeping better. Will f/u with daughter regarding ways to monitor pt's pill intake and use at home.   6/8: Pr reports feeling better, reports good sleep, reports her anxiety is "better". Pt requests her seroquel to be given later in the night since she doesn't want to feel tired too early. Called daughter to discuss case but she didn't . Will try again tomorrow.   6/10:  Pt reports increasing anxiety as time for discharge approaches. She fears being "sent home without a plan" and feeling lonely at home. Reassured pt that she would not be sent home without an aftercare plan. Pt aware team is looking into day programs/[facility] hospitals to refer her to. Left daughter another VM.   6/11: Patient continues to endorse feeling anxious about being discharged, going home and feeling lonely. Spoke with son Pad and patient's pain management dr (Dr. Farooq) about patient's care and ways to better control/manage her medication regimen in the community. Pt received the hearing aids her daughter brought and may use them.   6/14: Pt reports sometimes experiencing some anxiety in the afternoons around 3pm. States that at that time she usually begins to ruminate about her current situation, how bored she feels and "what am I doing". Writer validated these feelings, normalizing her emotions in the context of how long she has been in the hospital. Encouraged patient to use her coping skills. Added a morning dose of Seroquel 25mg.   /16: Patient depressed, somatic, appears to be failing this trial but still room to titrate, if not responding may need to re visit ECT. Not actively suicidal  6/17: Still depressed. AThere is lack of steady gains. Will increase Seroquel and Cymbalta, can revisit ECT if not improving further  6/21: Less anxious, remains dysphoric, no SI/HI, no psychosis. Continue ECT w/u.  6/22: No events overnight. Pt was seen this morning in the dining area after breakfast. Reports feeling low and hoping to get ECT started soon. Pt reports having had around 4 treatments in the past and doing well afterwards. Pt went for ECHO and was seen by cardiology and cleared on their end for ECT. ECT consult placed.  6/23: Pt was cleared for ECT by cardiology and neuro. Discussed case with Dr. Valentin and met with patient Discussed that there is a theoretical risk of current being transmitted through the device leads during ECT, however this risk can be mitigated by placing the stimulator on surgical mode, which can be done with a device patient has at home and her son is bringing later tonight. Patient verbalized understanding the possible risks and states her wish is to proceed with ECT. Pt states that despite experiencing a partial improvement of her depressive symptoms on Cymbalta and Seroquel she remains depressed, anxious, with low motivation and she fears going back home still feeling like this. We tried higher doses of Cymbalta, however patient developed side effects. Pt received ECT in 2014 and states that she felt "fully back to normal" after the treatment at that time and wishes to try again. Discussed case with ECT, will aim to have patient start treatment this Friday 6/25.   6/24:Spinal stimulator control was delivered by her son yesterday, 2S staff will turn stimulator into surgery mode tomorrow before patient goes down for ECT and back "on" after pt returns. Pt made aware and verbalized agreement with plan.   6/25: Pt had ECT #1 today. Next ECT Monday 6/28 6/28: Pt was seen this morning in the dayroom after returning from ECT. Pt has been complaining of headaches, chills and nausea after her first ECT session last Friday. Will add Protonix before ECT for her next session this Wednesday.   6/29: Mood has improved slightly. Oriented. Scheduled for ECT #3 tomorrow.  D/C morning Seroquel and increased bedtime Seroquel to 125mg due to complaints of difficulty sleeping.  6/30: Patient seen in the dining area this morning. Pt went for ECT this morning, tolerating treatment well. Agreed to go for ECT #4 this Friday. No complaints.   7/1:  She is visible in the unit, seen interacting and helping a peer sit down to use the phone. Patient seen in the a common area this morning. She is more animated on approach. Reports her mood has improved since starting ECT, does not verbalize any somatic complaints. AAOx3, denies SIIP, AH/VH/paranoid thoughts. On board with going for ECT #4 tomorrow morning. Will begin discharge planning for next week.  7/2: Patient had 4th ECT today, tolerating treatment well with very few cognitive side effects and good response. Will cross taper cymbalta with lexapro due to failed trial prior to starting ect. Next ECT on 7/6, planning for d/c next week.   7/6: Had ECT#5 today. Tolerating treatment well with good response and tolerability. Planning for d/c this Thursday.   7/7: Patient reports some anxiety related to discharge plans, however denies feeling depressed, denies SIIP. Discussed discharge plans, patient signed outpatient ECT treatment plan.    On the day of discharge, the patient’s mood and affect are at baseline. Patient denies depressed mood and anxiety. Denies anhedonia, feelings of guilt, hopelessness. Sleep and appetite are also normal.  Pt’s motor performance and productivity of speech are WNL. Pt denies symptoms of psychosis including AH/VH and is at baseline/not preoccupied with delusions. Patient denies suicidal or homicidal ideation, intent or plan. Over the past week she has been visible in the unit, interacting with peers and participating in groups. Patient currently not a risk to self or others. Patient has made clinically meaningful progress during this hospitalization and has clearly benefited from medications and ECT. On day of discharge, the patient has improved significantly and no longer requires inpatient treatment and care. Pt will be discharged and follow-up with outpatient care.    Medically, during this hospitalization patient was treated with macrobid for possible UTI. Was also treated with mucinex for nasal congestion, Albuterol and Symbicort for her asthma. She is a chronic pain patient and had been taking Oxycodone 5mg TID prescribed by her Pain management MD Dr. Farooq. Over the past few weeks pt requested her Oxycodone mostly once daily, some days she requested it twice.   Suicidal and aggression risk assessments were performed on the day of discharge. The patient is at elevated chronic risk of self-harm due to hx of treatment resistant depression. Patient is not actively suicidal, making them appropriate for less restrictive treatment as an outpatient without need for continued inpatient hospitalization. Protective factors for suicide and include family support, new addition of health aide at home, positive therapeutic relationships, female sex and positive response to treatment. Risk factors include age >65. Patient was provided with a 30-day supply of medications, extensive psychoeducation on treatment options and motivational counseling targeting healthy lifestyle. Patient was instructed on actions for crisis situations, understood and agreed to follow instructions for handling crisis, including coming to ER or calling 911 should the patient or their family feel that they are in danger of hurting self or others. Patient also was given Suicide Prevention Lifeline number 1-632.597.9655 and provided with instructions on its use. Pt has been referred to the [facility] where she will see Dr. Urrutia and for outpatient maintenance ECT.

## 2021-07-07 NOTE — BH INPATIENT PSYCHIATRY DISCHARGE NOTE - HPI (INCLUDE ILLNESS QUALITY, SEVERITY, DURATION, TIMING, CONTEXT, MODIFYING FACTORS, ASSOCIATED SIGNS AND SYMPTOMS)
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     On interview, pt is unsure why she is hospitalized. She reports she is in the hospital because she fell and hit her head. She reports feeling depressed for the past 2-3 weeks with associated poor sleep, poor appetite, and difficulty concentrating. She cites her back pain as a recent stressor in her life. She denies current SIIP/HIIP or prior SAs. Pt reports 2 prior hospitalizations for depression. She denies manic sx, AVH; however during the interview points to the floor and the walls, citing that she sees stars. Denies any command hallucinations. Pt reports anxiety when it comes to her memory but denies feelings of paranoia, however pt repeatedly looks out the door of her room. When asked what she was looking for, she does not answer. At times during the interview, the pt responds to questions with nonsensical answers, requiring additional prompting and redirecting.    Pt live alone in a house with her dog. 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 She reports having good relations with her children and feeling safe at home. Denies substance use or access to firearms.    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 Pt has no prior suicide attempts, no known substance use issues. She presents here today due to family's concern over confusion which she has been exhibiting over the last 2 weeks.     On exam, pt is in a stretcher in the main ED. Interview was constrained, due to her being deaf in one ear, and confused. In sum, she reports that she has been struggling for the past few weeks and feels depressed, with decreased appetite, passive SI.  She resides alone in her apt, with her dog, and describes her daily routine of getting up at 4am, taking her dog for a walk, then showering/eating breakfast; but she cannot say what she does for the remainder of the day. She reports functioning to the effect that she cooks/cleans for herself. She also admits to hearing AH of music for the past 2 weeks, which has never occurred in the past. Pt states that she also thinks she is having VH as she is seeing things that she later realizes are not there. She denies any active SI, or any intent/plan to harm herself. In terms of paranoia, she overtly denies, but at some point during the interview, she asks if her son "set this up." When asked what she was referring to, she did not answer.     Spoke with daughter Estela, who reports that these periods of confusion occur on/off for the past 10 years, and they have all been due to medication noncompliance. She states that for the past month, her mother has been isolative, nonsensical, not getting out of bed; calling her She has complained that she is not sleeping, but whenever she goes over, pt is in bed. She says that her mother has been having day/night confusion, calling people at all hours. She also is showing up for doctor's appointments when she does not have any. Yesterday, her brother received a call from their neighbor saying that the patient was walking in the middle of the street looking for her cellphone. Pt also was trying to get into [employer] people's car.

## 2021-07-07 NOTE — BH INPATIENT PSYCHIATRY DISCHARGE NOTE - OTHER PAST PSYCHIATRIC HISTORY (INCLUDE DETAILS REGARDING ONSET, COURSE OF ILLNESS, INPATIENT/OUTPATIENT TREATMENT)
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    72 yo female admitted with CC: I fell so they brought me here on 5/21/21.  (Per ED BH Assessment Note:  She presents here today due to family's concern over confusion which she has been exhibiting over the last 2 weeks).    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    On interview, pt reported feeling depressed for the past 2-3 weeks with associated poor sleep, poor appetite, and difficulty concentrating. She cited her back pain as a recent stressor in her life. She denied current SIIP/HIIP or prior SAs. Pt reported 2 prior hospitalizations for depression. She denied manic sx, A/VH; however during the interview on the unit she reportedly pointed to the floor and the walls, citing that she sees stars. Denied any command hallucinations. Pt reported anxiety when it comes to her memory but denied feelings of paranoia, however pt repeatedly looked out the door of her room. When asked what she was looking for, she did not answer. At times during the interview, the pt responded to questions with nonsensical answers, requiring additional prompting and redirecting.    Pt lives alone in a house with her dog. 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 She reports having good relations with her children and feeling safe at home. Denies substance use or access to firearms.    Collateral from daughter Estela to unit team, who reported that these periods of confusion occur on/off for the past 10 years, and they have all been due to medication noncompliance. She states that for the past month, her mother has been isolative, nonsensical, not getting out of bed.   She has complained that she is not sleeping, but whenever she goes over, pt is in bed. She says that her mother has been having day/night confusion, calling people at all hours. She also is showing up for doctor's appointments when she does not have any. Yesterday, her brother received a call from their neighbor saying that the patient was walking in the middle of the street looking for her cellphone. Pt also was trying to get into random people's car.    During hospital, patient treated with Cymbalta and Seroquel.   Reportedly had different somatic complaints on unit.      On exam, patient continues to feel depressed despite compliance with meds.   Is no longer confused, able to give good history of events.   Does not think she stopped meds as SA.  Pt states she has a bunch of kids and need to be there for them.

## 2021-07-07 NOTE — BH INPATIENT PSYCHIATRY DISCHARGE NOTE - NSDCMRMEDTOKEN_GEN_ALL_CORE_FT
Adult Aspirin Regimen 81 mg oral delayed release tablet: 1 tab(s) orally once a day  Albuterol (Eqv-ProAir HFA) 90 mcg/inh inhalation aerosol: 2 puff(s) inhaled every 6 hours, As Needed -wheezing - for shortness of breath and/or wheezing   DULoxetine 20 mg oral delayed release capsule: 1 cap(s) orally once a day MDD:20mg  ibuprofen 400 mg oral tablet: 1 tab(s) orally 2 times a day, As Needed -Moderate Pain (4 - 6) - for moderate pain MDD:800mg (2 tabs)  KlonoPIN 0.5 mg oral tablet: 1 tab(s) orally 2 times a day MDD:1mg  lidocaine 5% topical film: Apply topically to affected area once a day, As Needed -for moderate back  pain   ondansetron 4 mg oral tablet: 1 tab(s) orally every 6 hours, As Needed -Nausea - for nausea   oxyCODONE 5 mg oral tablet: 1 tab(s) orally 3 times a day, As Needed -Pain - for severe pain MDD:15mg  pantoprazole 40 mg oral delayed release tablet: 1 tab(s) orally once a day, As Needed -for heartburn prior to ECT  Plavix 75 mg oral tablet: 1 tab(s) orally once a day  senna oral tablet: 2 tab(s) orally once a day (at bedtime), As Needed -for constipation   SEROquel 100 mg oral tablet: 1 tab(s) orally once a day (at bedtime)   Symbicort 160 mcg-4.5 mcg/inh inhalation aerosol: 2 puff(s) inhaled 2 times a day   Synthroid 75 mcg (0.075 mg) oral tablet: 1 tab(s) orally once a day in the morning   Adult Aspirin Regimen 81 mg oral delayed release tablet: 1 tab(s) orally once a day  Albuterol (Eqv-ProAir HFA) 90 mcg/inh inhalation aerosol: 2 puff(s) inhaled every 6 hours, As Needed -wheezing - for shortness of breath and/or wheezing   DULoxetine 20 mg oral delayed release capsule: 1 cap(s) orally once a day MDD:20mg  ibuprofen 400 mg oral tablet: 1 tab(s) orally 2 times a day, As Needed -Moderate Pain (4 - 6) - for moderate pain MDD:800mg (2 tabs)  KlonoPIN 0.5 mg oral tablet: 1 tab(s) orally 2 times a day MDD:1mg  lidocaine 5% topical film: Apply topically to affected area once a day, As Needed -for moderate back  pain   ondansetron 4 mg oral tablet: 1 tab(s) orally every 6 hours, As Needed -Nausea - for nausea   pantoprazole 40 mg oral delayed release tablet: 1 tab(s) orally once a day, As Needed -for heartburn prior to ECT  Plavix 75 mg oral tablet: 1 tab(s) orally once a day  senna oral tablet: 2 tab(s) orally once a day (at bedtime), As Needed -for constipation   SEROquel 100 mg oral tablet: 1 tab(s) orally once a day (at bedtime)   Symbicort 160 mcg-4.5 mcg/inh inhalation aerosol: 2 puff(s) inhaled 2 times a day (Dispense as written)  Synthroid 75 mcg (0.075 mg) oral tablet: 1 tab(s) orally once a day in the morning

## 2021-07-07 NOTE — BH INPATIENT PSYCHIATRY DISCHARGE NOTE - NSBHMETABOLIC_PSY_ALL_CORE_FT
BMI: BMI (kg/m2): 22.1 (05-20-21 @ 18:25)  HbA1c: A1C with Estimated Average Glucose Result: 5.2 % (05-22-21 @ 11:13)    Glucose:   BP: 103/70 (07-07-21 @ 08:00) (100/70 - 143/78)  Lipid Panel: Date/Time: 05-29-21 @ 10:23  Cholesterol, Serum: 238  Direct LDL: --  HDL Cholesterol, Serum: 80  Total Cholesterol/HDL Ration Measurement: --  Triglycerides, Serum: 104

## 2021-07-07 NOTE — BH INPATIENT PSYCHIATRY DISCHARGE NOTE - NSBHDCRISKMITIGATE_PSY_ALL_CORE
Safety planning/Reduction in access to lethal methods (pills, firearms, etc)/Family/Other social support involvement/Maintenance ECT

## 2021-07-07 NOTE — BH INPATIENT PSYCHIATRY PROGRESS NOTE - NSBHASSESSSUMMFT_PSY_ALL_CORE
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 Pt confused to situation however reports depressive sx, no SI/HI.    Initial collateral from daughter Estela: "Estela, who reports that these periods of confusion occur on/off for the past 10 years, and they have all been due to medication noncompliance. She states that for the past month, her mother has been isolative, nonsensical, not getting out of bed; calling her She has complained that she is not sleeping, but whenever she goes over, pt is in bed. She says that her mother has been having day/night confusion, calling people at all hours. She also is showing up for doctor's appointments when she does not have any. Yesterday, her brother received a call from their neighbor saying that the patient was walking in the middle of the street looking for her cellphone. Pt also was trying to get into random people's car."    5/22: Pt seen, chart reviewed and discussed with nursing staff. Reports feeling ok, but admits feeling anxious, depressed. When asked  about circumstances before hospitalization, she reports that she is here for get support, was not eating well.  Endorses poor sleep, low appetite, low energy. Appears disorganized, confused and hard of hearing. Denies any paranoia, SI, HI, hallucination.    5/23: Pt remains disorganized, anxious and dysphoric. On exam, Pt reports not feeling well. has left lower back pain, anxious, depressed. Endorses poor sleep, low appetite, low energy and motivation. Feels her memory is not that good. Appears dysphoric, hard of hearing but less confused. Denies any jose c, paranoia, SI, HI, hallucination.    5/24:  No events reported over the weekend. Sleep and appetite is "better". Patient denies any current AH/VH but endorsed +AH and +VH prior to admission. Patient reported feeling very depressed since being isolated due to covid, she endorsed passive SI with no intent or plan. She also reported taking less of her medications by splitting pills in half at home. Patient is out on the unit, social with peers but is still very anxious. Patient is compliant with medications, no adverse effects reported.    5/25:  No events reported over the weekend. Sleep is "not as good" and appetite is "improved". Patient reported being in significant pain in her arm and also of a headache. Patient given PRN Oxycodone dose earlier and pain management consult ordered. Spoke with patient about starting Cymbalta for depression. She was hesitant at first but then agreed. Some paranoia noted around medications. Patient also stated that she feels like she is bothering her daughter too much and did not want us to call her frequently, but daughter called hospital looking to get updates and denied telling her mother that she needs a break.    5/26: Patient seen for follow up for depression and psychosis. Chart reviewed and case discussed with interdisciplinary staff. per staff, patient is very somatic. On encounter, patient is pleasant but believes she is not getting adequate care, reports that she used to be many more medications and thinks we are missing something, insists that I speak with her pulmonologist Dr. Ketan Morfin. Otherwise, eating and sleeping well. Patient remains compliant with medications, no adverse effects reported or in evidence.    5/27:  Per staff, patient is very somatic, requesting more meds for constipation. On encounter, patient is pleasant, happy that she had PT today, complains of constipation, requesting increase in Senna, Dulcolax suppository offered and patient agress. Otherwise, eating and sleeping well.    5/28:  No significant overnight event. On encounter, patient is calm, reports that she had a small BM, reports that she has some "sinus" issues now and requests something additional for pain relief, would also like to be evaluated for UTI. Otherwise, eating and sleeping well.     5/29: No significant overnight event. On encounter, patient reports mood as "little better", still reports generalized pain including lower abdominal pain. Patient is eating and sleeping well.     6/1: No reported overnight events. On encounter pt remains somatically preoccupied, states she needs to be on additional medications for her asthma and complains of headaches. In terms of her mood pt describes feeling "bored", but states she is doing better, she is eating well and states she has been able to gain back some of the weight she had lost.  6/2: Continues to endorse depression, anxiety and somatic complaints. Increased Cymbalta to 40mg QD.   6/3:  Met with Ms. Primrose this morning in the dayroom. Pt reports feeling dizzy and unsteady on her feet early this morning. Checked her vitals later in the morning, she was not orthostatic at that point. She continues to verbalize multiple somatic complaints such as nausea and later reflux. Continues to endorse difficulty sleeping. Eliminated morning dose of seroquel 12.5mg due to patient's reports of morning dizziness/lightheadedness and increased her nighttime dose to 50mg. Signed conversion to voluntary status (9.13)  6/4: Pt continues to endorse several somatic complaints such as GERD and cramping in her legs. Reports her sleep was slightly better last night. She requests to speak to a therapist, reached out to Dr. Newman who agreed to meet with pt today. Called patient's daughter in order to discuss treatment plan and explore options to improve pill management at home but she was unavailable. Left a message. Increased Seroquel to 75mg QHS.   6/7: Met with Ms. Primrose this morning in a common area. She reports feeling a little low over the weekend and fearing "Im headed back to where I started". Provided reassurance that some mood fluctuations are normal and do not necessarily mean she has lost all the progress she has made. Pt complained of headaches today which are suggestive of tension headaches by her description of the distribution. Appears less somatically preoccupied. Reports sleeping better. Will f/u with daughter regarding ways to monitor pt's pill intake and use at home.   6/8: Pr reports feeling better, reports good sleep, reports her anxiety is "better". Pt requests her seroquel to be given later in the night since she doesn't want to feel tired too early. Called daughter to discuss case but she didn't . Will try again tomorrow.   Chart reviewed, no overnight events. No PRNs needed for anxiety over the past 24 hrs. 1 dose of oxycodone PRN taken over past 24 hrs for pain. Pt has requested to be referred to a day program or partial hospital after discharge, stating she will miss the feeling of community once she goes back home. Discussed with SW. Called daughter again, left a VM.   6/10:  Pt reports increasing anxiety as time for discharge approaches. She fears being "sent home without a plan" and feeling lonely at home. Reassured pt that she would not be sent home without an aftercare plan. Pt aware team is looking into day programs/partial hospitals to refer her to. Left daughter another VM.   6/11: Patient continues to endorse feeling anxious about being discharged, going home and feeling lonely. Spoke with son Sue and patient's pain management dr (Dr. Farooq) about patient's care and ways to better control/manage her medication regimen in the community. Pt received the hearing aids her daughter brought and may use them.   6/14: Pt reports sometimes experiencing some anxiety in the afternoons around 3pm. States that at that time she usually begins to ruminate about her current situation, how bored she feels and "what am I doing". Writer validated these feelings, normalizing her emotions in the context of how long she has been in the hospital. Encouraged patient to use her coping skills. Added a morning dose of Seroquel 25mg.     Plan:   Observation status: Routine  Psych: Continue Duloxetine to 90mg QD, cont Seroquel 25mg QAM + 100mg at 10pm, cont Klonopin 0.5mg BID   Medical: Oxycodone 5mg PO Q8H PRN for pain, Plavix, Albuterol, Symbicort and Spiriva.   6/16: Patient depressed, somatic, appears to be failing this trial but still room to titrate, if not responding may need to re visit ECT. Not actively suicidal  6/17: Still depressed. AThere is lack of steady gains. Will increase Seroquel and Cymbalta, can revisit ECT if not improving further  6/18 Doing worse depressed somatic and new se nausea maybe from SSREI and Mucinex and tremor from increased Cymbalta., Will lower Cymbalta will d/c Afrin and Mucinex. Will start ECT w/u.  6/21: Less anxious, remains dysphoric, no SI/HI, no psychosis. Continue ECT w/u.  6/22: No events overnight. Pt was seen this morning in the dining area after breakfast. Reports feeling low and hoping to get ECT started soon. Pt reports having had around 4 treatments in the past and doing well afterwards. Pt went for ECHO and was seen by cardiology and cleared on their end for ECT. ECT consult placed.  6/23: Pt was cleared for ECT by cardiology and neuro. She was seen by the ECT team for her ECT consult yesterday afternoon and they suggested neurology evaluate her case as well due to hx of craniotomy and the fact she has an implanted spinal cord stimulator in the thoracolumbar region. Discussed case with Dr. Valentin and met with patient together. Discussed that there is a theoretical risk of current being transmitted through the device leads during ECT, however this risk can be mitigated by placing the stimulator on surgical mode, which can be done with a device patient has at home and her son is bringing later tonight. Patient verbalized understanding the possible risks and states her wish is to proceed with ECT. Pt states that despite experiencing a partial improvement of her depressive symptoms on Cymbalta and Seroquel she remains depressed, anxious, with low motivation and she fears going back home still feeling like this. We tried higher doses of Cymbalta, however patient developed side effects. Pt received ECT in 2014 and states that she felt "fully back to normal" after the treatment at that time and wishes to try again. Discussed case with ECT, will aim to have patient start treatment this Friday 6/25.   6/24: Pt is visible in the unit. Spinal stimulator control was delivered by her son yesterday, 2S staff will turn stimulator into surgery mode tomorrow before patient goes down for ECT and back "on" after pt returns. Pt made aware and verbalized agreement with plan.   6/25: Pt had ECT #1 today. Next ECT Monday 6/28 6/28: Pt was seen this morning in the dayroom after returning from ECT. Pt has been complaining of headaches, chills and nausea after her first ECT session last Friday. Will add Protonix before ECT for her next session this Wednesday.   6/29: Mood has improved slightly. Oriented. Scheduled for ECT #3 tomorrow.  D/C morning Seroquel and increased bedtime Seroquel to 125mg due to complaints of difficulty sleeping.  6/30: Chart reviewed, no overnight events, vitals stable. Patient seen in the dining area this morning. Pt went for ECT this morning, tolerating treatment well. Agreed to go for ECT #4 this Friday. No complaints.   7/1: Chart reviewed, no overnight events, vitals stable. She is visible in the unit, seen interacting and helping a peer sit down to use the phone. Patient seen in the a common area this morning. She is more animated on approach. Reports her mood has improved since starting ECT, does not verbalize any somatic complaints. AAOx3, denies SIIP, AH/VH/paranoid thoughts. On board with going for ECT #4 tomorrow morning. Will begin discharge planning for next week.  7/2: Patient had 4th ECT today, tolerating treatment well with very few cognitive side effects and good response. Will cross taper cymbalta with lexapro due to failed trial prior to starting ect. Next ECT on 7/6, planning for d/c next week.   7/6: Had ECT#5 today. Tolerating treatment well with good response and tolerability. Planning for d/c this Thursday.   7/7: Pt was seen this morning at bedside. She complained of headache which could be a side effect from ECT. Patient reports some anxiety related to discharge plans, however denies feeling depressed, denies SIIP. Discussed discharge plans, patient signed outpatient ECT treatment plan.

## 2021-07-08 VITALS
TEMPERATURE: 98 F | DIASTOLIC BLOOD PRESSURE: 88 MMHG | HEART RATE: 87 BPM | OXYGEN SATURATION: 99 % | SYSTOLIC BLOOD PRESSURE: 156 MMHG | RESPIRATION RATE: 16 BRPM

## 2021-07-08 LAB — SARS-COV-2 RNA SPEC QL NAA+PROBE: SIGNIFICANT CHANGE UP

## 2021-07-08 PROCEDURE — 90870 ELECTROCONVULSIVE THERAPY: CPT

## 2021-07-08 PROCEDURE — 99238 HOSP IP/OBS DSCHRG MGMT 30/<: CPT | Mod: 25

## 2021-07-08 RX ORDER — FLUMAZENIL 0.1 MG/ML
0.2 VIAL (ML) INTRAVENOUS ONCE
Refills: 0 | Status: COMPLETED | OUTPATIENT
Start: 2021-07-08 | End: 2021-07-08

## 2021-07-08 RX ORDER — MIDAZOLAM HYDROCHLORIDE 1 MG/ML
2 INJECTION, SOLUTION INTRAMUSCULAR; INTRAVENOUS ONCE
Refills: 0 | Status: DISCONTINUED | OUTPATIENT
Start: 2021-07-08 | End: 2021-07-08

## 2021-07-08 RX ORDER — BUDESONIDE AND FORMOTEROL FUMARATE DIHYDRATE 160; 4.5 UG/1; UG/1
2 AEROSOL RESPIRATORY (INHALATION)
Qty: 1 | Refills: 0
Start: 2021-07-08 | End: 2021-08-06

## 2021-07-08 RX ADMIN — LIDOCAINE 2 PATCH: 4 CREAM TOPICAL at 13:53

## 2021-07-08 RX ADMIN — PANTOPRAZOLE SODIUM 40 MILLIGRAM(S): 20 TABLET, DELAYED RELEASE ORAL at 06:35

## 2021-07-08 RX ADMIN — Medication 75 MICROGRAM(S): at 06:35

## 2021-07-08 RX ADMIN — POLYETHYLENE GLYCOL 3350 17 GRAM(S): 17 POWDER, FOR SOLUTION ORAL at 12:13

## 2021-07-08 RX ADMIN — DULOXETINE HYDROCHLORIDE 20 MILLIGRAM(S): 30 CAPSULE, DELAYED RELEASE ORAL at 12:11

## 2021-07-08 RX ADMIN — Medication 81 MILLIGRAM(S): at 12:11

## 2021-07-08 RX ADMIN — BUDESONIDE AND FORMOTEROL FUMARATE DIHYDRATE 2 PUFF(S): 160; 4.5 AEROSOL RESPIRATORY (INHALATION) at 09:01

## 2021-07-08 RX ADMIN — Medication 0.5 MILLIGRAM(S): at 06:35

## 2021-07-08 RX ADMIN — CLOPIDOGREL BISULFATE 75 MILLIGRAM(S): 75 TABLET, FILM COATED ORAL at 12:11

## 2021-07-08 RX ADMIN — MIDAZOLAM HYDROCHLORIDE 2 MILLIGRAM(S): 1 INJECTION, SOLUTION INTRAMUSCULAR; INTRAVENOUS at 09:53

## 2021-07-08 RX ADMIN — Medication 0.2 MILLIGRAM(S): at 09:48

## 2021-07-08 NOTE — BH INPATIENT PSYCHIATRY PROGRESS NOTE - NSTXPROBCOPE_PSY_ALL_CORE
COPING, INEFFECTIVE

## 2021-07-08 NOTE — BH INPATIENT PSYCHIATRY PROGRESS NOTE - NSBHASSESSSUMMFT_PSY_ALL_CORE
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 Pt confused to situation however reports depressive sx, no SI/HI.    Initial collateral from daughter Estela: "Estela, who reports that these periods of confusion occur on/off for the past 10 years, and they have all been due to medication noncompliance. She states that for the past month, her mother has been isolative, nonsensical, not getting out of bed; calling her She has complained that she is not sleeping, but whenever she goes over, pt is in bed. She says that her mother has been having day/night confusion, calling people at all hours. She also is showing up for doctor's appointments when she does not have any. Yesterday, her brother received a call from their neighbor saying that the patient was walking in the middle of the street looking for her cellphone. Pt also was trying to get into random people's car."    5/22: Pt seen, chart reviewed and discussed with nursing staff. Reports feeling ok, but admits feeling anxious, depressed. When asked  about circumstances before hospitalization, she reports that she is here for get support, was not eating well.  Endorses poor sleep, low appetite, low energy. Appears disorganized, confused and hard of hearing. Denies any paranoia, SI, HI, hallucination.    5/23: Pt remains disorganized, anxious and dysphoric. On exam, Pt reports not feeling well. has left lower back pain, anxious, depressed. Endorses poor sleep, low appetite, low energy and motivation. Feels her memory is not that good. Appears dysphoric, hard of hearing but less confused. Denies any jose c, paranoia, SI, HI, hallucination.    5/24:  No events reported over the weekend. Sleep and appetite is "better". Patient denies any current AH/VH but endorsed +AH and +VH prior to admission. Patient reported feeling very depressed since being isolated due to covid, she endorsed passive SI with no intent or plan. She also reported taking less of her medications by splitting pills in half at home. Patient is out on the unit, social with peers but is still very anxious. Patient is compliant with medications, no adverse effects reported.    5/25:  No events reported over the weekend. Sleep is "not as good" and appetite is "improved". Patient reported being in significant pain in her arm and also of a headache. Patient given PRN Oxycodone dose earlier and pain management consult ordered. Spoke with patient about starting Cymbalta for depression. She was hesitant at first but then agreed. Some paranoia noted around medications. Patient also stated that she feels like she is bothering her daughter too much and did not want us to call her frequently, but daughter called hospital looking to get updates and denied telling her mother that she needs a break.    5/26: Patient seen for follow up for depression and psychosis. Chart reviewed and case discussed with interdisciplinary staff. per staff, patient is very somatic. On encounter, patient is pleasant but believes she is not getting adequate care, reports that she used to be many more medications and thinks we are missing something, insists that I speak with her pulmonologist Dr. Ketan Morfin. Otherwise, eating and sleeping well. Patient remains compliant with medications, no adverse effects reported or in evidence.    5/27:  Per staff, patient is very somatic, requesting more meds for constipation. On encounter, patient is pleasant, happy that she had PT today, complains of constipation, requesting increase in Senna, Dulcolax suppository offered and patient agress. Otherwise, eating and sleeping well.    5/28:  No significant overnight event. On encounter, patient is calm, reports that she had a small BM, reports that she has some "sinus" issues now and requests something additional for pain relief, would also like to be evaluated for UTI. Otherwise, eating and sleeping well.     5/29: No significant overnight event. On encounter, patient reports mood as "little better", still reports generalized pain including lower abdominal pain. Patient is eating and sleeping well.     6/1: No reported overnight events. On encounter pt remains somatically preoccupied, states she needs to be on additional medications for her asthma and complains of headaches. In terms of her mood pt describes feeling "bored", but states she is doing better, she is eating well and states she has been able to gain back some of the weight she had lost.  6/2: Continues to endorse depression, anxiety and somatic complaints. Increased Cymbalta to 40mg QD.   6/3:  Met with Ms. Primrose this morning in the dayroom. Pt reports feeling dizzy and unsteady on her feet early this morning. Checked her vitals later in the morning, she was not orthostatic at that point. She continues to verbalize multiple somatic complaints such as nausea and later reflux. Continues to endorse difficulty sleeping. Eliminated morning dose of seroquel 12.5mg due to patient's reports of morning dizziness/lightheadedness and increased her nighttime dose to 50mg. Signed conversion to voluntary status (9.13)  6/4: Pt continues to endorse several somatic complaints such as GERD and cramping in her legs. Reports her sleep was slightly better last night. She requests to speak to a therapist, reached out to Dr. Newman who agreed to meet with pt today. Called patient's daughter in order to discuss treatment plan and explore options to improve pill management at home but she was unavailable. Left a message. Increased Seroquel to 75mg QHS.   6/7: Met with Ms. Primrose this morning in a common area. She reports feeling a little low over the weekend and fearing "Im headed back to where I started". Provided reassurance that some mood fluctuations are normal and do not necessarily mean she has lost all the progress she has made. Pt complained of headaches today which are suggestive of tension headaches by her description of the distribution. Appears less somatically preoccupied. Reports sleeping better. Will f/u with daughter regarding ways to monitor pt's pill intake and use at home.   6/8: Pr reports feeling better, reports good sleep, reports her anxiety is "better". Pt requests her seroquel to be given later in the night since she doesn't want to feel tired too early. Called daughter to discuss case but she didn't . Will try again tomorrow.   Chart reviewed, no overnight events. No PRNs needed for anxiety over the past 24 hrs. 1 dose of oxycodone PRN taken over past 24 hrs for pain. Pt has requested to be referred to a day program or partial hospital after discharge, stating she will miss the feeling of community once she goes back home. Discussed with SW. Called daughter again, left a VM.   6/10:  Pt reports increasing anxiety as time for discharge approaches. She fears being "sent home without a plan" and feeling lonely at home. Reassured pt that she would not be sent home without an aftercare plan. Pt aware team is looking into day programs/partial hospitals to refer her to. Left daughter another VM.   6/11: Patient continues to endorse feeling anxious about being discharged, going home and feeling lonely. Spoke with son Sue and patient's pain management dr (Dr. Farooq) about patient's care and ways to better control/manage her medication regimen in the community. Pt received the hearing aids her daughter brought and may use them.   6/14: Pt reports sometimes experiencing some anxiety in the afternoons around 3pm. States that at that time she usually begins to ruminate about her current situation, how bored she feels and "what am I doing". Writer validated these feelings, normalizing her emotions in the context of how long she has been in the hospital. Encouraged patient to use her coping skills. Added a morning dose of Seroquel 25mg.     Plan:   Observation status: Routine  Psych: Continue Duloxetine to 90mg QD, cont Seroquel 25mg QAM + 100mg at 10pm, cont Klonopin 0.5mg BID   Medical: Oxycodone 5mg PO Q8H PRN for pain, Plavix, Albuterol, Symbicort and Spiriva.   6/16: Patient depressed, somatic, appears to be failing this trial but still room to titrate, if not responding may need to re visit ECT. Not actively suicidal  6/17: Still depressed. AThere is lack of steady gains. Will increase Seroquel and Cymbalta, can revisit ECT if not improving further  6/18 Doing worse depressed somatic and new se nausea maybe from SSREI and Mucinex and tremor from increased Cymbalta., Will lower Cymbalta will d/c Afrin and Mucinex. Will start ECT w/u.  6/21: Less anxious, remains dysphoric, no SI/HI, no psychosis. Continue ECT w/u.  6/22: No events overnight. Pt was seen this morning in the dining area after breakfast. Reports feeling low and hoping to get ECT started soon. Pt reports having had around 4 treatments in the past and doing well afterwards. Pt went for ECHO and was seen by cardiology and cleared on their end for ECT. ECT consult placed.  6/23: Pt was cleared for ECT by cardiology and neuro. She was seen by the ECT team for her ECT consult yesterday afternoon and they suggested neurology evaluate her case as well due to hx of craniotomy and the fact she has an implanted spinal cord stimulator in the thoracolumbar region. Discussed case with Dr. Valentin and met with patient together. Discussed that there is a theoretical risk of current being transmitted through the device leads during ECT, however this risk can be mitigated by placing the stimulator on surgical mode, which can be done with a device patient has at home and her son is bringing later tonight. Patient verbalized understanding the possible risks and states her wish is to proceed with ECT. Pt states that despite experiencing a partial improvement of her depressive symptoms on Cymbalta and Seroquel she remains depressed, anxious, with low motivation and she fears going back home still feeling like this. We tried higher doses of Cymbalta, however patient developed side effects. Pt received ECT in 2014 and states that she felt "fully back to normal" after the treatment at that time and wishes to try again. Discussed case with ECT, will aim to have patient start treatment this Friday 6/25.   6/24: Pt is visible in the unit. Spinal stimulator control was delivered by her son yesterday, 2S staff will turn stimulator into surgery mode tomorrow before patient goes down for ECT and back "on" after pt returns. Pt made aware and verbalized agreement with plan.   6/25: Pt had ECT #1 today. Next ECT Monday 6/28 6/28: Pt was seen this morning in the dayroom after returning from ECT. Pt has been complaining of headaches, chills and nausea after her first ECT session last Friday. Will add Protonix before ECT for her next session this Wednesday.   6/29: Mood has improved slightly. Oriented. Scheduled for ECT #3 tomorrow.  D/C morning Seroquel and increased bedtime Seroquel to 125mg due to complaints of difficulty sleeping.  6/30: Chart reviewed, no overnight events, vitals stable. Patient seen in the dining area this morning. Pt went for ECT this morning, tolerating treatment well. Agreed to go for ECT #4 this Friday. No complaints.   7/1: Chart reviewed, no overnight events, vitals stable. She is visible in the unit, seen interacting and helping a peer sit down to use the phone. Patient seen in the a common area this morning. She is more animated on approach. Reports her mood has improved since starting ECT, does not verbalize any somatic complaints. AAOx3, denies SIIP, AH/VH/paranoid thoughts. On board with going for ECT #4 tomorrow morning. Will begin discharge planning for next week.  7/2: Patient had 4th ECT today, tolerating treatment well with very few cognitive side effects and good response. Will cross taper cymbalta with lexapro due to failed trial prior to starting ect. Next ECT on 7/6, planning for d/c next week.   7/6: Had ECT#5 today. Tolerating treatment well with good response and tolerability. Planning for d/c this Thursday.   7/7: Pt was seen this morning at bedside. She complained of headache which could be a side effect from ECT. Patient reports some anxiety related to discharge plans, however denies feeling depressed, denies SIIP. Discussed discharge plans, patient signed outpatient ECT treatment plan.   7/8: Chart reviewed, no events over the weekend, vitals stable, compliant with PO meds. Pt was seen this morning after she returned for ECT. She reports stable mood, denies symptoms of depression and/or anxiety. Denies SI/HI, no longer presenting any behavior that would make her a risk to self or others. No longer somatically preoccupied. Symptoms that led to her hospitalization are currently in remission and she would not benefit from prolongued inpatient treatment at this time.   Patient is in agreement with discharge and aftercare plans. Handoff given via email to Dr. Urrutia.

## 2021-07-08 NOTE — BH INPATIENT PSYCHIATRY PROGRESS NOTE - NSTXDCOPNODATETRGT_PSY_ALL_CORE
06-Jul-2021
01-Jun-2021
28-May-2021
29-Jun-2021
06-Jul-2021
23-Jun-2021
23-Jun-2021
06-Jul-2021
08-Jun-2021
01-Jun-2021
23-Jun-2021
06-Jul-2021
06-Jul-2021
23-Jun-2021
03-Jun-2021
03-Jun-2021
08-Jun-2021
16-Jun-2021
08-Jun-2021
29-Jun-2021
06-Jul-2021
28-May-2021
06-Jul-2021
29-Jun-2021
03-Jun-2021
23-Jun-2021
16-Jun-2021
23-Jun-2021
23-Jun-2021
29-Jun-2021
08-Jun-2021
23-Jun-2021
29-Jun-2021
08-Jun-2021
28-May-2021
08-Jun-2021
03-Jun-2021
03-Jun-2021
28-May-2021

## 2021-07-08 NOTE — BH INPATIENT PSYCHIATRY PROGRESS NOTE - NSBHCONSULTIPREASON_PSY_A_CORE
danger to self; mental illness expected to respond to inpatient care

## 2021-07-08 NOTE — ECT PRE-PROCEDURE CHECKLIST - ALLERGIES
Allergies:-  penicillin      

## 2021-07-08 NOTE — ECT TREATMENT NOTE - NSECTPOSTTXSUMMARY_PSY_ALL_CORE
The patient had a well modified grand mal seizure under general anesthesia and muscle relaxant. The patient is alert, responsive, in no acute distress.  Recovery uneventful.

## 2021-07-08 NOTE — BH INPATIENT PSYCHIATRY PROGRESS NOTE - NSTREATMENTCERTY_PSY_ALL_CORE

## 2021-07-08 NOTE — BH INPATIENT PSYCHIATRY PROGRESS NOTE - NSICDXBHSECONDARYDX_PSY_ALL_CORE
Severe episode of recurrent major depressive disorder, with psychotic features   F33.3  Anxiety   F41.9  
Anxiety   F41.9  
Anxiety   F41.9  
Severe episode of recurrent major depressive disorder, with psychotic features   F33.3  Anxiety   F41.9  
Severe episode of recurrent major depressive disorder, with psychotic features   F33.3  Anxiety   F41.9  
Anxiety   F41.9  
Severe episode of recurrent major depressive disorder, with psychotic features   F33.3  Anxiety   F41.9  
Anxiety   F41.9  
Severe episode of recurrent major depressive disorder, with psychotic features   F33.3  Anxiety   F41.9  
Severe episode of recurrent major depressive disorder, with psychotic features   F33.3  Anxiety   F41.9  
Anxiety   F41.9  
Severe episode of recurrent major depressive disorder, with psychotic features   F33.3  Anxiety   F41.9  
Anxiety   F41.9  
Severe episode of recurrent major depressive disorder, with psychotic features   F33.3  Anxiety   F41.9  
Anxiety   F41.9  

## 2021-07-08 NOTE — BH INPATIENT PSYCHIATRY PROGRESS NOTE - NSTXDCOPNOPROGRES_PSY_ALL_CORE
Improving
Met - goal discontinued
Improving
Met - goal discontinued
No Change
Improving
Met - goal discontinued
Improving
No Change
Improving
No Change
Improving
No Change
No Change
Met - goal discontinued
Met - goal discontinued
Improving
No Change
Improving
No Change
Improving
No Change
No Change
Improving

## 2021-07-08 NOTE — BH DISCHARGE NOTE NURSING/SOCIAL WORK/PSYCH REHAB - DISCHARGE INSTRUCTIONS AFTERCARE APPOINTMENTS
In order to check the location, date, or time of your aftercare appointment, please refer to your Discharge Instructions Document given to you upon leaving the hospital.  If you have lost the instructions please call [phone]

## 2021-07-08 NOTE — ECT PRE-PROCEDURE CHECKLIST - NSPROEDAREADYLEARN_GEN_A_NUR
acuteness of illness

## 2021-07-08 NOTE — BH INPATIENT PSYCHIATRY PROGRESS NOTE - NSBHCONSBHPROVCNTCTNOFT_PSY_A_CORE
family does not know provider

## 2021-07-08 NOTE — BH INPATIENT PSYCHIATRY PROGRESS NOTE - PRN MEDS
MEDICATIONS  (PRN):  acetaminophen   Tablet .. 650 milliGRAM(s) Oral every 6 hours PRN Mild Pain (1 - 3), Moderate Pain (4 - 6)  ALBUTerol    90 MICROgram(s) HFA Inhaler 2 Puff(s) Inhalation every 6 hours PRN wheezing  calcium carbonate    500 mG (Tums) Chewable 1 Tablet(s) Chew every 8 hours PRN GERD  ibuprofen  Tablet. 400 milliGRAM(s) Oral two times a day PRN Moderate Pain (4 - 6)  lidocaine   Patch 2 Patch Transdermal daily PRN Pain  OLANZapine Injectable 2.5 milliGRAM(s) IntraMuscular once PRN extreme agitation  ondansetron    Tablet 4 milliGRAM(s) Oral every 6 hours PRN Nausea  oxyCODONE    IR 5 milliGRAM(s) Oral every 8 hours PRN Pain  QUEtiapine 12.5 milliGRAM(s) Oral every 6 hours PRN agitation  
MEDICATIONS  (PRN):  acetaminophen   Tablet .. 650 milliGRAM(s) Oral every 6 hours PRN Mild Pain (1 - 3), Moderate Pain (4 - 6)  ALBUTerol    90 MICROgram(s) HFA Inhaler 2 Puff(s) Inhalation every 6 hours PRN wheezing  calcium carbonate    500 mG (Tums) Chewable 1 Tablet(s) Chew every 8 hours PRN GERD  ibuprofen  Tablet. 400 milliGRAM(s) Oral two times a day PRN Moderate Pain (4 - 6)  lidocaine   Patch 2 Patch Transdermal daily PRN Pain  OLANZapine Injectable 2.5 milliGRAM(s) IntraMuscular once PRN extreme agitation  ondansetron    Tablet 4 milliGRAM(s) Oral every 6 hours PRN Nausea  oxyCODONE    IR 5 milliGRAM(s) Oral every 8 hours PRN Pain  oxymetazoline 0.05% Nasal Spray 1 Spray(s) Both Nostrils two times a day PRN Nasal Congestion  QUEtiapine 12.5 milliGRAM(s) Oral every 6 hours PRN agitation  
MEDICATIONS  (PRN):  acetaminophen   Tablet .. 650 milliGRAM(s) Oral every 6 hours PRN Mild Pain (1 - 3), Moderate Pain (4 - 6)  ALBUTerol    90 MICROgram(s) HFA Inhaler 2 Puff(s) Inhalation every 6 hours PRN wheezing  OLANZapine Injectable 2.5 milliGRAM(s) IntraMuscular once PRN extreme agitation  ondansetron    Tablet 4 milliGRAM(s) Oral every 6 hours PRN Nausea  oxyCODONE    IR 5 milliGRAM(s) Oral every 8 hours PRN Severe Pain (7 - 10)  QUEtiapine 12.5 milliGRAM(s) Oral every 6 hours PRN agitation  
MEDICATIONS  (PRN):  acetaminophen   Tablet .. 650 milliGRAM(s) Oral every 6 hours PRN Mild Pain (1 - 3), Moderate Pain (4 - 6)  ALBUTerol    90 MICROgram(s) HFA Inhaler 2 Puff(s) Inhalation every 6 hours PRN wheezing  calcium carbonate    500 mG (Tums) Chewable 1 Tablet(s) Chew every 8 hours PRN GERD  ibuprofen  Tablet. 400 milliGRAM(s) Oral two times a day PRN Moderate Pain (4 - 6)  lidocaine   Patch 2 Patch Transdermal daily PRN Pain  OLANZapine Injectable 2.5 milliGRAM(s) IntraMuscular once PRN extreme agitation  ondansetron    Tablet 4 milliGRAM(s) Oral every 6 hours PRN Nausea  oxyCODONE    IR 5 milliGRAM(s) Oral every 8 hours PRN Pain  QUEtiapine 12.5 milliGRAM(s) Oral every 6 hours PRN agitation  
MEDICATIONS  (PRN):  acetaminophen   Tablet .. 650 milliGRAM(s) Oral every 6 hours PRN Mild Pain (1 - 3), Moderate Pain (4 - 6)  ALBUTerol    90 MICROgram(s) HFA Inhaler 2 Puff(s) Inhalation every 6 hours PRN wheezing  calcium carbonate    500 mG (Tums) Chewable 1 Tablet(s) Chew every 8 hours PRN GERD  ibuprofen  Tablet. 400 milliGRAM(s) Oral two times a day PRN Moderate Pain (4 - 6)  lidocaine   Patch 2 Patch Transdermal daily PRN Pain  OLANZapine Injectable 2.5 milliGRAM(s) IntraMuscular once PRN extreme agitation  ondansetron    Tablet 4 milliGRAM(s) Oral every 6 hours PRN Nausea  oxyCODONE    IR 5 milliGRAM(s) Oral every 8 hours PRN Pain  oxymetazoline 0.05% Nasal Spray 1 Spray(s) Both Nostrils two times a day PRN Nasal Congestion  QUEtiapine 12.5 milliGRAM(s) Oral every 6 hours PRN agitation  
MEDICATIONS  (PRN):  acetaminophen   Tablet .. 650 milliGRAM(s) Oral every 6 hours PRN Mild Pain (1 - 3), Moderate Pain (4 - 6)  ALBUTerol    90 MICROgram(s) HFA Inhaler 2 Puff(s) Inhalation every 6 hours PRN wheezing  ibuprofen  Tablet. 400 milliGRAM(s) Oral two times a day PRN Moderate Pain (4 - 6)  lidocaine   Patch 2 Patch Transdermal daily PRN Pain  OLANZapine Injectable 2.5 milliGRAM(s) IntraMuscular once PRN extreme agitation  ondansetron    Tablet 4 milliGRAM(s) Oral every 6 hours PRN Nausea  oxyCODONE    IR 5 milliGRAM(s) Oral every 8 hours PRN Severe Pain (7 - 10)  QUEtiapine 12.5 milliGRAM(s) Oral every 6 hours PRN agitation  
MEDICATIONS  (PRN):  acetaminophen   Tablet .. 650 milliGRAM(s) Oral every 6 hours PRN Mild Pain (1 - 3), Moderate Pain (4 - 6)  ALBUTerol    90 MICROgram(s) HFA Inhaler 2 Puff(s) Inhalation every 6 hours PRN wheezing  ibuprofen  Tablet. 400 milliGRAM(s) Oral two times a day PRN Moderate Pain (4 - 6)  lidocaine   Patch 2 Patch Transdermal daily PRN Pain  OLANZapine Injectable 2.5 milliGRAM(s) IntraMuscular once PRN extreme agitation  ondansetron    Tablet 4 milliGRAM(s) Oral every 6 hours PRN Nausea  oxyCODONE    IR 5 milliGRAM(s) Oral every 8 hours PRN Severe Pain (7 - 10)  QUEtiapine 12.5 milliGRAM(s) Oral every 6 hours PRN agitation  
MEDICATIONS  (PRN):  acetaminophen   Tablet .. 650 milliGRAM(s) Oral every 6 hours PRN Mild Pain (1 - 3), Moderate Pain (4 - 6)  ALBUTerol    90 MICROgram(s) HFA Inhaler 2 Puff(s) Inhalation every 6 hours PRN wheezing  OLANZapine Injectable 2.5 milliGRAM(s) IntraMuscular once PRN extreme agitation  ondansetron    Tablet 4 milliGRAM(s) Oral every 6 hours PRN Nausea  oxyCODONE    IR 5 milliGRAM(s) Oral every 8 hours PRN Severe Pain (7 - 10)  QUEtiapine 25 milliGRAM(s) Oral every 6 hours PRN agitation  
MEDICATIONS  (PRN):  acetaminophen   Tablet .. 650 milliGRAM(s) Oral every 6 hours PRN Mild Pain (1 - 3), Moderate Pain (4 - 6)  ALBUTerol    90 MICROgram(s) HFA Inhaler 2 Puff(s) Inhalation every 6 hours PRN wheezing  calcium carbonate    500 mG (Tums) Chewable 1 Tablet(s) Chew every 8 hours PRN GERD  ibuprofen  Tablet. 400 milliGRAM(s) Oral two times a day PRN Moderate Pain (4 - 6)  lidocaine   Patch 2 Patch Transdermal daily PRN Pain  OLANZapine Injectable 2.5 milliGRAM(s) IntraMuscular once PRN extreme agitation  ondansetron    Tablet 4 milliGRAM(s) Oral every 6 hours PRN Nausea  oxyCODONE    IR 5 milliGRAM(s) Oral every 8 hours PRN Pain  QUEtiapine 12.5 milliGRAM(s) Oral every 6 hours PRN agitation  
MEDICATIONS  (PRN):  acetaminophen   Tablet .. 650 milliGRAM(s) Oral every 6 hours PRN Mild Pain (1 - 3), Moderate Pain (4 - 6)  ALBUTerol    90 MICROgram(s) HFA Inhaler 2 Puff(s) Inhalation every 6 hours PRN wheezing  calcium carbonate    500 mG (Tums) Chewable 1 Tablet(s) Chew every 8 hours PRN GERD  ibuprofen  Tablet. 400 milliGRAM(s) Oral two times a day PRN Moderate Pain (4 - 6)  lidocaine   Patch 2 Patch Transdermal daily PRN Pain  OLANZapine Injectable 2.5 milliGRAM(s) IntraMuscular once PRN extreme agitation  ondansetron    Tablet 4 milliGRAM(s) Oral every 6 hours PRN Nausea  oxyCODONE    IR 5 milliGRAM(s) Oral every 8 hours PRN Pain  QUEtiapine 12.5 milliGRAM(s) Oral every 6 hours PRN agitation  
MEDICATIONS  (PRN):  acetaminophen   Tablet .. 650 milliGRAM(s) Oral every 6 hours PRN Mild Pain (1 - 3), Moderate Pain (4 - 6)  ALBUTerol    90 MICROgram(s) HFA Inhaler 2 Puff(s) Inhalation every 6 hours PRN wheezing  ibuprofen  Tablet. 400 milliGRAM(s) Oral two times a day PRN Moderate Pain (4 - 6)  lidocaine   Patch 2 Patch Transdermal daily PRN Pain  OLANZapine Injectable 2.5 milliGRAM(s) IntraMuscular once PRN extreme agitation  ondansetron    Tablet 4 milliGRAM(s) Oral every 6 hours PRN Nausea  oxyCODONE    IR 5 milliGRAM(s) Oral every 8 hours PRN Pain  QUEtiapine 12.5 milliGRAM(s) Oral every 6 hours PRN agitation  
MEDICATIONS  (PRN):  acetaminophen   Tablet .. 650 milliGRAM(s) Oral every 6 hours PRN Mild Pain (1 - 3), Moderate Pain (4 - 6)  ALBUTerol    90 MICROgram(s) HFA Inhaler 2 Puff(s) Inhalation every 6 hours PRN wheezing  calcium carbonate    500 mG (Tums) Chewable 1 Tablet(s) Chew every 8 hours PRN GERD  ibuprofen  Tablet. 400 milliGRAM(s) Oral two times a day PRN Moderate Pain (4 - 6)  lidocaine   Patch 2 Patch Transdermal daily PRN Pain  OLANZapine Injectable 2.5 milliGRAM(s) IntraMuscular once PRN extreme agitation  ondansetron    Tablet 4 milliGRAM(s) Oral every 6 hours PRN Nausea  oxyCODONE    IR 5 milliGRAM(s) Oral every 8 hours PRN Pain  QUEtiapine 12.5 milliGRAM(s) Oral every 6 hours PRN agitation  
MEDICATIONS  (PRN):  acetaminophen   Tablet .. 650 milliGRAM(s) Oral every 6 hours PRN Mild Pain (1 - 3), Moderate Pain (4 - 6)  ALBUTerol    90 MICROgram(s) HFA Inhaler 2 Puff(s) Inhalation every 6 hours PRN wheezing  calcium carbonate    500 mG (Tums) Chewable 1 Tablet(s) Chew every 8 hours PRN GERD  ibuprofen  Tablet. 400 milliGRAM(s) Oral two times a day PRN Moderate Pain (4 - 6)  lidocaine   Patch 2 Patch Transdermal daily PRN Pain  OLANZapine Injectable 2.5 milliGRAM(s) IntraMuscular once PRN extreme agitation  ondansetron    Tablet 4 milliGRAM(s) Oral every 6 hours PRN Nausea  oxyCODONE    IR 5 milliGRAM(s) Oral every 8 hours PRN Pain  QUEtiapine 12.5 milliGRAM(s) Oral every 6 hours PRN agitation  
MEDICATIONS  (PRN):  acetaminophen   Tablet .. 650 milliGRAM(s) Oral every 6 hours PRN Mild Pain (1 - 3), Moderate Pain (4 - 6)  ALBUTerol    90 MICROgram(s) HFA Inhaler 2 Puff(s) Inhalation every 6 hours PRN wheezing  OLANZapine Injectable 2.5 milliGRAM(s) IntraMuscular once PRN extreme agitation  ondansetron    Tablet 4 milliGRAM(s) Oral every 6 hours PRN Nausea  oxyCODONE    IR 5 milliGRAM(s) Oral every 8 hours PRN Severe Pain (7 - 10)  QUEtiapine 12.5 milliGRAM(s) Oral every 6 hours PRN agitation  
MEDICATIONS  (PRN):  acetaminophen   Tablet .. 650 milliGRAM(s) Oral every 6 hours PRN Mild Pain (1 - 3), Moderate Pain (4 - 6)  ALBUTerol    90 MICROgram(s) HFA Inhaler 2 Puff(s) Inhalation every 6 hours PRN wheezing  calcium carbonate    500 mG (Tums) Chewable 1 Tablet(s) Chew every 8 hours PRN GERD  ibuprofen  Tablet. 400 milliGRAM(s) Oral two times a day PRN Moderate Pain (4 - 6)  lidocaine   Patch 2 Patch Transdermal daily PRN Pain  OLANZapine Injectable 2.5 milliGRAM(s) IntraMuscular once PRN extreme agitation  ondansetron    Tablet 4 milliGRAM(s) Oral every 6 hours PRN Nausea  oxyCODONE    IR 5 milliGRAM(s) Oral every 8 hours PRN Pain  oxymetazoline 0.05% Nasal Spray 1 Spray(s) Both Nostrils two times a day PRN Nasal Congestion  QUEtiapine 12.5 milliGRAM(s) Oral every 6 hours PRN agitation  
MEDICATIONS  (PRN):  acetaminophen   Tablet .. 650 milliGRAM(s) Oral every 6 hours PRN Mild Pain (1 - 3), Moderate Pain (4 - 6)  ALBUTerol    90 MICROgram(s) HFA Inhaler 2 Puff(s) Inhalation every 6 hours PRN wheezing  OLANZapine Injectable 2.5 milliGRAM(s) IntraMuscular once PRN extreme agitation  ondansetron    Tablet 4 milliGRAM(s) Oral every 6 hours PRN Nausea  oxyCODONE    IR 5 milliGRAM(s) Oral every 8 hours PRN Severe Pain (7 - 10)  QUEtiapine 12.5 milliGRAM(s) Oral every 6 hours PRN agitation  
MEDICATIONS  (PRN):  acetaminophen   Tablet .. 650 milliGRAM(s) Oral every 6 hours PRN Mild Pain (1 - 3), Moderate Pain (4 - 6)  ALBUTerol    90 MICROgram(s) HFA Inhaler 2 Puff(s) Inhalation every 6 hours PRN wheezing  calcium carbonate    500 mG (Tums) Chewable 1 Tablet(s) Chew every 8 hours PRN GERD  ibuprofen  Tablet. 400 milliGRAM(s) Oral two times a day PRN Moderate Pain (4 - 6)  lidocaine   Patch 2 Patch Transdermal daily PRN Pain  OLANZapine Injectable 2.5 milliGRAM(s) IntraMuscular once PRN extreme agitation  ondansetron    Tablet 4 milliGRAM(s) Oral every 6 hours PRN Nausea  oxyCODONE    IR 5 milliGRAM(s) Oral every 8 hours PRN Pain  QUEtiapine 12.5 milliGRAM(s) Oral every 6 hours PRN agitation  
MEDICATIONS  (PRN):  acetaminophen   Tablet .. 650 milliGRAM(s) Oral every 6 hours PRN Mild Pain (1 - 3), Moderate Pain (4 - 6)  ALBUTerol    90 MICROgram(s) HFA Inhaler 2 Puff(s) Inhalation every 6 hours PRN wheezing  OLANZapine Injectable 2.5 milliGRAM(s) IntraMuscular once PRN extreme agitation  ondansetron    Tablet 4 milliGRAM(s) Oral every 6 hours PRN Nausea  oxyCODONE    IR 5 milliGRAM(s) Oral every 8 hours PRN Severe Pain (7 - 10)  QUEtiapine 25 milliGRAM(s) Oral every 6 hours PRN agitation  
MEDICATIONS  (PRN):  acetaminophen   Tablet .. 650 milliGRAM(s) Oral every 6 hours PRN Mild Pain (1 - 3), Moderate Pain (4 - 6)  ALBUTerol    90 MICROgram(s) HFA Inhaler 2 Puff(s) Inhalation every 6 hours PRN wheezing  calcium carbonate    500 mG (Tums) Chewable 1 Tablet(s) Chew every 8 hours PRN GERD  ibuprofen  Tablet. 400 milliGRAM(s) Oral two times a day PRN Moderate Pain (4 - 6)  lidocaine   Patch 2 Patch Transdermal daily PRN Pain  OLANZapine Injectable 2.5 milliGRAM(s) IntraMuscular once PRN extreme agitation  ondansetron    Tablet 4 milliGRAM(s) Oral every 6 hours PRN Nausea  oxyCODONE    IR 5 milliGRAM(s) Oral every 8 hours PRN Pain  QUEtiapine 12.5 milliGRAM(s) Oral every 6 hours PRN agitation  
MEDICATIONS  (PRN):  acetaminophen   Tablet .. 650 milliGRAM(s) Oral every 6 hours PRN Mild Pain (1 - 3), Moderate Pain (4 - 6)  ALBUTerol    90 MICROgram(s) HFA Inhaler 2 Puff(s) Inhalation every 6 hours PRN wheezing  calcium carbonate    500 mG (Tums) Chewable 1 Tablet(s) Chew every 8 hours PRN GERD  ibuprofen  Tablet. 400 milliGRAM(s) Oral two times a day PRN Moderate Pain (4 - 6)  lidocaine   Patch 2 Patch Transdermal daily PRN Pain  OLANZapine Injectable 2.5 milliGRAM(s) IntraMuscular once PRN extreme agitation  ondansetron    Tablet 4 milliGRAM(s) Oral every 6 hours PRN Nausea  oxyCODONE    IR 5 milliGRAM(s) Oral every 8 hours PRN Pain  oxymetazoline 0.05% Nasal Spray 1 Spray(s) Both Nostrils two times a day PRN Nasal Congestion  QUEtiapine 12.5 milliGRAM(s) Oral every 6 hours PRN agitation  
MEDICATIONS  (PRN):  acetaminophen   Tablet .. 650 milliGRAM(s) Oral every 6 hours PRN Mild Pain (1 - 3), Moderate Pain (4 - 6)  ALBUTerol    90 MICROgram(s) HFA Inhaler 2 Puff(s) Inhalation every 6 hours PRN wheezing  calcium carbonate    500 mG (Tums) Chewable 1 Tablet(s) Chew every 8 hours PRN GERD  ibuprofen  Tablet. 400 milliGRAM(s) Oral two times a day PRN Moderate Pain (4 - 6)  lidocaine   Patch 2 Patch Transdermal daily PRN Pain  OLANZapine Injectable 2.5 milliGRAM(s) IntraMuscular once PRN extreme agitation  ondansetron    Tablet 4 milliGRAM(s) Oral every 6 hours PRN Nausea  oxyCODONE    IR 5 milliGRAM(s) Oral every 8 hours PRN Pain  QUEtiapine 12.5 milliGRAM(s) Oral every 6 hours PRN agitation  
MEDICATIONS  (PRN):  acetaminophen   Tablet .. 650 milliGRAM(s) Oral every 6 hours PRN Mild Pain (1 - 3), Moderate Pain (4 - 6)  ALBUTerol    90 MICROgram(s) HFA Inhaler 2 Puff(s) Inhalation every 6 hours PRN wheezing  OLANZapine Injectable 2.5 milliGRAM(s) IntraMuscular once PRN extreme agitation  ondansetron    Tablet 4 milliGRAM(s) Oral every 6 hours PRN Nausea  oxyCODONE    IR 5 milliGRAM(s) Oral every 8 hours PRN Severe Pain (7 - 10)  QUEtiapine 12.5 milliGRAM(s) Oral every 6 hours PRN agitation  
MEDICATIONS  (PRN):  acetaminophen   Tablet .. 650 milliGRAM(s) Oral every 6 hours PRN Mild Pain (1 - 3), Moderate Pain (4 - 6)  ALBUTerol    90 MICROgram(s) HFA Inhaler 2 Puff(s) Inhalation every 6 hours PRN wheezing  calcium carbonate    500 mG (Tums) Chewable 1 Tablet(s) Chew every 8 hours PRN GERD  ibuprofen  Tablet. 400 milliGRAM(s) Oral two times a day PRN Moderate Pain (4 - 6)  lidocaine   Patch 2 Patch Transdermal daily PRN Pain  OLANZapine Injectable 2.5 milliGRAM(s) IntraMuscular once PRN extreme agitation  ondansetron    Tablet 4 milliGRAM(s) Oral every 6 hours PRN Nausea  oxyCODONE    IR 5 milliGRAM(s) Oral every 8 hours PRN Pain  QUEtiapine 12.5 milliGRAM(s) Oral every 6 hours PRN agitation  
MEDICATIONS  (PRN):  acetaminophen   Tablet .. 650 milliGRAM(s) Oral every 6 hours PRN Mild Pain (1 - 3), Moderate Pain (4 - 6)  ALBUTerol    90 MICROgram(s) HFA Inhaler 2 Puff(s) Inhalation every 6 hours PRN wheezing  OLANZapine Injectable 2.5 milliGRAM(s) IntraMuscular once PRN extreme agitation  ondansetron    Tablet 4 milliGRAM(s) Oral every 6 hours PRN Nausea  oxyCODONE    IR 5 milliGRAM(s) Oral every 8 hours PRN Severe Pain (7 - 10)  QUEtiapine 12.5 milliGRAM(s) Oral every 6 hours PRN agitation  
MEDICATIONS  (PRN):  acetaminophen   Tablet .. 650 milliGRAM(s) Oral every 6 hours PRN Mild Pain (1 - 3), Moderate Pain (4 - 6)  ALBUTerol    90 MICROgram(s) HFA Inhaler 2 Puff(s) Inhalation every 6 hours PRN wheezing  ibuprofen  Tablet. 400 milliGRAM(s) Oral two times a day PRN Moderate Pain (4 - 6)  lidocaine   Patch 2 Patch Transdermal daily PRN Pain  OLANZapine Injectable 2.5 milliGRAM(s) IntraMuscular once PRN extreme agitation  ondansetron    Tablet 4 milliGRAM(s) Oral every 6 hours PRN Nausea  oxyCODONE    IR 5 milliGRAM(s) Oral every 8 hours PRN Pain  QUEtiapine 12.5 milliGRAM(s) Oral every 6 hours PRN agitation  
MEDICATIONS  (PRN):  acetaminophen   Tablet .. 650 milliGRAM(s) Oral every 6 hours PRN Mild Pain (1 - 3), Moderate Pain (4 - 6)  ALBUTerol    90 MICROgram(s) HFA Inhaler 2 Puff(s) Inhalation every 6 hours PRN wheezing  calcium carbonate    500 mG (Tums) Chewable 1 Tablet(s) Chew every 8 hours PRN GERD  ibuprofen  Tablet. 400 milliGRAM(s) Oral two times a day PRN Moderate Pain (4 - 6)  lidocaine   Patch 2 Patch Transdermal daily PRN Pain  OLANZapine Injectable 2.5 milliGRAM(s) IntraMuscular once PRN extreme agitation  ondansetron    Tablet 4 milliGRAM(s) Oral every 6 hours PRN Nausea  oxyCODONE    IR 5 milliGRAM(s) Oral every 8 hours PRN Pain  oxymetazoline 0.05% Nasal Spray 1 Spray(s) Both Nostrils two times a day PRN Nasal Congestion  QUEtiapine 12.5 milliGRAM(s) Oral every 6 hours PRN agitation  
MEDICATIONS  (PRN):  acetaminophen   Tablet .. 650 milliGRAM(s) Oral every 6 hours PRN Mild Pain (1 - 3), Moderate Pain (4 - 6)  ALBUTerol    90 MICROgram(s) HFA Inhaler 2 Puff(s) Inhalation every 6 hours PRN wheezing  calcium carbonate    500 mG (Tums) Chewable 1 Tablet(s) Chew every 8 hours PRN GERD  ibuprofen  Tablet. 400 milliGRAM(s) Oral two times a day PRN Moderate Pain (4 - 6)  lidocaine   Patch 2 Patch Transdermal daily PRN Pain  OLANZapine Injectable 2.5 milliGRAM(s) IntraMuscular once PRN extreme agitation  ondansetron    Tablet 4 milliGRAM(s) Oral every 6 hours PRN Nausea  oxyCODONE    IR 5 milliGRAM(s) Oral every 8 hours PRN Pain  QUEtiapine 12.5 milliGRAM(s) Oral every 6 hours PRN agitation

## 2021-07-08 NOTE — ECT PRE-PROCEDURE CHECKLIST - NSPROPTRIGHTSUPPORTPERSON_GEN_A_NUR
same name as above

## 2021-07-08 NOTE — BH INPATIENT PSYCHIATRY PROGRESS NOTE - NSBHLEGALSTATUSDATE_PSY_ALL_CORE
03-Jun-2021 14:35

## 2021-07-08 NOTE — ECT PRE-PROCEDURE CHECKLIST - INV PERIPH IV LOCATION
Right:/cephalic vein
Right:/metacarpal vein
Right:/metacarpal vein
Left:/metacarpal vein
Left:/metacarpal vein

## 2021-07-08 NOTE — ECT TREATMENT NOTE - NSECTIMPPLAN_PSY_ALL_CORE
Assessment today offers no contraindications to continue plan of treatment with ECT.

## 2021-07-08 NOTE — BH INPATIENT PSYCHIATRY PROGRESS NOTE - NSBHCHARTREVIEWVS_PSY_A_CORE FT
Vital Signs Last 24 Hrs  T(C): 36.4 (08 Jul 2021 10:40), Max: 36.8 (08 Jul 2021 04:33)  T(F): 97.6 (08 Jul 2021 10:40), Max: 98.3 (08 Jul 2021 04:33)  HR: 87 (08 Jul 2021 10:40) (87 - 100)  BP: 156/88 (08 Jul 2021 10:40) (102/63 - 156/88)  BP(mean): --  RR: 16 (08 Jul 2021 10:40) (16 - 28)  SpO2: 99% (08 Jul 2021 10:40) (95% - 99%)

## 2021-07-08 NOTE — BH INPATIENT PSYCHIATRY PROGRESS NOTE - NSBHFUPINTERVALHXFT_PSY_A_CORE
Chart reviewed, no events over the weekend, vitals stable, compliant with PO meds. Pt was seen this morning after she returned for ECT. She reports stable mood, denies symptoms of depression and/or anxiety. Denies SI/HI, no longer presenting any behavior that would make her a risk to self or others. No longer somatically preoccupied. Symptoms that led to her hospitalization are currently in remission and she would not benefit from prolongued inpatient treatment at this time.   Patient is in agreement with discharge and aftercare plans.

## 2021-07-08 NOTE — BH INPATIENT PSYCHIATRY PROGRESS NOTE - NSTXANXINTERMD_PSY_ALL_CORE
psychopharmacologic treatment

## 2021-07-08 NOTE — BH INPATIENT PSYCHIATRY PROGRESS NOTE - NSTXANXDATETRGT_PSY_ALL_CORE
17-Jun-2021
01-Jul-2021
03-Jun-2021
28-May-2021
17-Jun-2021
24-Jun-2021
28-May-2021
01-Jul-2021
17-Jun-2021
01-Jul-2021
01-Jul-2021
03-Jun-2021
09-Jul-2021
17-Jun-2021
01-Jul-2021
09-Jul-2021
28-May-2021
24-Jun-2021
03-Jun-2021
17-Jun-2021
01-Jul-2021
09-Jul-2021
01-Jul-2021
24-Jun-2021
17-Jun-2021
17-Jun-2021
28-May-2021
24-Jun-2021
17-Jun-2021
17-Jun-2021
24-Jun-2021
09-Jul-2021
28-May-2021
28-May-2021
17-Jun-2021

## 2021-07-08 NOTE — ECT PRE-PROCEDURE CHECKLIST - TO WHOM
procedure room rn 

## 2021-07-08 NOTE — ECT TREATMENT NOTE - NSECTREVSYS_PSY_ALL_CORE
Cardiovascular/Neurological/Pulmonary

## 2021-07-08 NOTE — BH INPATIENT PSYCHIATRY PROGRESS NOTE - NSBHLEGALSTATUSCHANGE_PSY_ALL_CORE
Yes...
No
No
Yes...
No
Yes...
No
No
Yes...
No
Yes...
No
No
Yes...
No
No
Yes...

## 2021-07-08 NOTE — BH DISCHARGE NOTE NURSING/SOCIAL WORK/PSYCH REHAB - NSDCPEEDUCATION_PSY_ALL_CORE
Coronavirus/COVID19/Diabetes/Fall Risk/Healthy Living/Safe and Effective Use of Medications/Relapse Prevention

## 2021-07-08 NOTE — BH DISCHARGE NOTE NURSING/SOCIAL WORK/PSYCH REHAB - NSCDUDCCRISIS_PSY_A_CORE
[facility]  1 (822) [facility]-WELL ([phone])  Text "WELL" to [phone]  Website: www.4moms/.[employer] [facility] 1 [phone]) 091-NBLS (2837) Website: www.safehorizon.org/.National Suicide Prevention Lifeline 8 (170) 339-1912/.  [facility]  1 (055) [employer] (491-5108)/.  [facility]  [phone] [place] [phone]   Hours:  Monday through Friday from 9 AM to 3 PM/.  [employer]  [facility] Crisis Line  [phone], Option 1

## 2021-07-08 NOTE — BH INPATIENT PSYCHIATRY PROGRESS NOTE - NSDCCRITERIA_PSY_ALL_CORE
CGI less than or equal to 3

## 2021-07-08 NOTE — BH INPATIENT PSYCHIATRY PROGRESS NOTE - NSBHINPTBILLING_PSY_ALL_CORE
17157 - Inpatient Moderate Complexity
89077 - Inpatient Moderate Complexity
79336 - Inpatient Low Complexity
43850 - Inpatient Moderate Complexity
75593 - Inpatient Moderate Complexity
65266 - Inpatient Moderate Complexity
48532 - Inpatient Moderate Complexity
76145 - Inpatient Moderate Complexity
73935 - Hospital Discharge Day Management; 30 min or less
79368 - Inpatient Moderate Complexity
19579 - Inpatient Moderate Complexity
38909 - Inpatient Moderate Complexity
24025 - Inpatient Moderate Complexity
67870 - Inpatient Moderate Complexity
50617 - Inpatient Moderate Complexity
51149 - Inpatient Moderate Complexity
18644 - Inpatient Moderate Complexity
35759 - Inpatient Moderate Complexity
50138 - Inpatient Moderate Complexity
66680 - Inpatient Moderate Complexity
08618 - Inpatient Moderate Complexity
39828 - Inpatient Moderate Complexity
97513 - Inpatient Moderate Complexity
99728 - Inpatient Moderate Complexity
65087 - Inpatient Moderate Complexity
26513 - Inpatient Moderate Complexity
06201 - Inpatient Moderate Complexity
72465 - Inpatient Moderate Complexity
84029 - Inpatient Moderate Complexity
78034 - Inpatient Moderate Complexity
39976 - Inpatient Moderate Complexity
91294 - Inpatient Moderate Complexity
73242 - Inpatient Moderate Complexity
15712 - Inpatient Moderate Complexity
67566 - Inpatient Moderate Complexity
84520 - Inpatient Moderate Complexity
47769 - Inpatient Moderate Complexity
62251 - Inpatient Moderate Complexity
90745 - Inpatient Moderate Complexity

## 2021-07-08 NOTE — ECT TREATMENT NOTE - NSECTCPTCODE_PSY_ALL_CORE
84438 (ECT-Electroconvulsive Therapy)
95513 (ECT-Electroconvulsive Therapy)
12290 (ECT-Electroconvulsive Therapy)
75257 (ECT-Electroconvulsive Therapy)
28709 (ECT-Electroconvulsive Therapy)
10388 (ECT-Electroconvulsive Therapy)

## 2021-07-08 NOTE — BH INPATIENT PSYCHIATRY PROGRESS NOTE - NSTXCOPEDATEEST_PSY_ALL_CORE
25-Jun-2021
03-Jun-2021
11-Jun-2021
18-Jun-2021
25-Jun-2021
02-Jul-2021
03-Jun-2021
25-Jun-2021
18-Jun-2021
28-May-2021
28-May-2021
25-Jun-2021
11-Jun-2021
28-May-2021
02-Jul-2021
25-Jun-2021
02-Jul-2021
11-Jun-2021
18-Jun-2021
25-Jun-2021
02-Jul-2021
18-Jun-2021
03-Jun-2021
02-Jul-2021
02-Jul-2021
28-May-2021
18-Jun-2021
25-Jun-2021
11-Jun-2021
28-May-2021
11-Jun-2021

## 2021-07-08 NOTE — BH INPATIENT PSYCHIATRY PROGRESS NOTE - NSTXPROBDCOPNO_PSY_ALL_CORE
DISCHARGE ISSUE - NON-ADHERENT WITH OUTPATIENT SERVICES

## 2021-07-08 NOTE — ECT PRE-PROCEDURE CHECKLIST - CAREGIVER RELATION TO PATIENT
Adult daughter

## 2021-07-08 NOTE — BH INPATIENT PSYCHIATRY PROGRESS NOTE - NSBHCONTPROVIDER_PSY_ALL_CORE
No...

## 2021-07-08 NOTE — BH INPATIENT PSYCHIATRY PROGRESS NOTE - NSTXANXDATEEST_PSY_ALL_CORE
24-Jun-2021
24-Jun-2021
02-Jul-2021
21-May-2021
03-Jun-2021
02-Jul-2021
11-Jun-2021
21-May-2021
27-May-2021
27-May-2021
21-May-2021
24-Jun-2021
17-Jun-2021
03-Jun-2021
27-May-2021
24-Jun-2021
11-Jun-2021
24-Jun-2021
27-May-2021
11-Jun-2021
21-May-2021
02-Jul-2021
24-Jun-2021
03-Jun-2021
17-Jun-2021
24-Jun-2021
17-Jun-2021
02-Jul-2021
11-Jun-2021
21-May-2021
17-Jun-2021
27-May-2021
02-Jul-2021
17-Jun-2021
02-Jul-2021
03-Jun-2021
11-Jun-2021
21-May-2021
03-Jun-2021

## 2021-07-08 NOTE — BH INPATIENT PSYCHIATRY PROGRESS NOTE - NSTXCOPEPROGRES_PSY_ALL_CORE
Improving
Met - goal discontinued
Improving

## 2021-07-08 NOTE — ECT TREATMENT NOTE - NSECTFOCPECOMPLET_PSY_ALL_CORE
Focused Physical Exam Completed

## 2021-07-08 NOTE — BH DISCHARGE NOTE NURSING/SOCIAL WORK/PSYCH REHAB - NSDCPRRECOMMEND_PSY_ALL_CORE
Patient will begin outpatient treatment for ongoing medication management, support, and psychotherapy.

## 2021-07-08 NOTE — ECT PRE-PROCEDURE CHECKLIST - NSMEDSDOSETAKEN_PSY_ALL_CORE
clonopin 0.5mg  and synthroid 75mcg po at 6.26AM.
klonopin 0.5mg and synthroid 75mcg po at 6.33Am.
Synthroid 75mcg   6:48am
Synthroid 75mcg   6:48am
see emar 
protonix synthroid and clonopin at 0600

## 2021-07-08 NOTE — BH INPATIENT PSYCHIATRY PROGRESS NOTE - NSBHMETABOLICLABS_PSY_ALL_CORE
Labs within last 12 months

## 2021-07-08 NOTE — BH INPATIENT PSYCHIATRY PROGRESS NOTE - NSCGISEVERILLNESS_PSY_ALL_CORE
4 = Moderately ill – overt symptoms causing noticeable, but modest, functional impairment or distress; symptom level may warrant medication
5 = Markedly ill - intrusive symptoms that distinctly impair social/occupational function or cause intrusive levels of distress
5 = Markedly ill - intrusive symptoms that distinctly impair social/occupational function or cause intrusive levels of distress
4 = Moderately ill – overt symptoms causing noticeable, but modest, functional impairment or distress; symptom level may warrant medication
5 = Markedly ill - intrusive symptoms that distinctly impair social/occupational function or cause intrusive levels of distress
6 = Severely ill - disruptive pathology, behavior and function are frequently influenced by symptoms, may require assistance from others

## 2021-07-08 NOTE — BH INPATIENT PSYCHIATRY PROGRESS NOTE - NSTXDCOPNOGOAL_PSY_ALL_CORE
Will agree to participate in appropriate outpatient care
Other...
Will agree to participate in appropriate outpatient care
Other...
Will agree to participate in appropriate outpatient care

## 2021-07-08 NOTE — ECT TREATMENT NOTE - NSECTNEUROCOMMENT_PSY_ALL_CORE
s/p spinal cord stimulator

## 2021-07-08 NOTE — BH INPATIENT PSYCHIATRY PROGRESS NOTE - NSTXPROBANX_PSY_ALL_CORE
ANXIETY/PANIC/FEAR

## 2021-07-08 NOTE — BH INPATIENT PSYCHIATRY PROGRESS NOTE - NSTXANXPROGRES_PSY_ALL_CORE
Improving
No Change
Improving
No Change
Improving
No Change
Improving
Improving
No Change
Improving
Improving
No Change
Improving
No Change
Improving
No Change
Improving
No Change
No Change
Improving
Improving
No Change
No Change

## 2021-07-08 NOTE — BH INPATIENT PSYCHIATRY PROGRESS NOTE - NSTXANXGOAL_PSY_ALL_CORE
Be able to participate in activities despite lingering anxiety/panic
Identify and practice 3 coping skills to manage anxiety
Report a reduction in panic attacks and improving mood and confidence
Identify and practice 3 coping skills to manage anxiety
Identify and practice 3 coping skills to manage anxiety
Be able to perform ADLs and maintain safety despite anxiety/panic daily
Be able to participate in activities despite lingering anxiety/panic
Be able to participate in activities despite lingering anxiety/panic
Identify and practice 3 coping skills to manage anxiety
Be able to perform ADLs and maintain safety despite anxiety/panic daily
Be able to participate in activities despite lingering anxiety/panic
Report that he/she was able to initiate conversations with peers 3 times daily despite panic attacks
Be able to perform ADLs and maintain safety despite anxiety/panic daily
Report a reduction in panic attacks and improving mood and confidence
Identify and practice 3 coping skills to manage anxiety
Report a reduction in panic attacks and improving mood and confidence
Report that he/she was able to initiate conversations with peers 3 times daily despite panic attacks
Report a reduction in panic attacks and improving mood and confidence
Report that he/she was able to initiate conversations with peers 3 times daily despite panic attacks
Be able to perform ADLs and maintain safety despite anxiety/panic daily
Be able to participate in activities despite lingering anxiety/panic
Identify and practice 3 coping skills to manage anxiety
Report that he/she was able to initiate conversations with peers 3 times daily despite panic attacks
Be able to perform ADLs and maintain safety despite anxiety/panic daily
Be able to perform ADLs and maintain safety despite anxiety/panic daily
Identify and practice 3 coping skills to manage anxiety
Identify and practice 3 coping skills to manage anxiety
Be able to perform ADLs and maintain safety despite anxiety/panic daily
Identify and practice 3 coping skills to manage anxiety
Be able to perform ADLs and maintain safety despite anxiety/panic daily

## 2021-07-08 NOTE — ECT TREATMENT NOTE - NSECTTXPERFDATETIME_PSY_ALL_CORE
02-Jul-2021 10:06
08-Jul-2021 09:29
06-Jul-2021 10:36
30-Jun-2021 08:58
28-Jun-2021 10:50
25-Jun-2021 11:17

## 2021-07-08 NOTE — BH INPATIENT PSYCHIATRY PROGRESS NOTE - TELEPSYCHIATRY?
No

## 2021-07-08 NOTE — BH DISCHARGE NOTE NURSING/SOCIAL WORK/PSYCH REHAB - PATIENT PORTAL LINK FT
You can access the FollowMyHealth Patient Portal offered by [facility] by registering at the following website: [email or web address]. By joining [facility]’s FollowMyHealth portal, you will also be able to view your health information using other applications (apps) compatible with our system.

## 2021-07-08 NOTE — BH INPATIENT PSYCHIATRY PROGRESS NOTE - NSBHMETABOLIC_PSY_ALL_CORE_FT
BMI: BMI (kg/m2): 22.1 (05-20-21 @ 18:25)  HbA1c: A1C with Estimated Average Glucose Result: 5.2 % (05-22-21 @ 11:13)    Glucose:   BP: 135/84 (06-02-21 @ 07:53) (135/84 - 135/84)  Lipid Panel: Date/Time: 05-29-21 @ 10:23  Cholesterol, Serum: 238  Direct LDL: --  HDL Cholesterol, Serum: 80  Total Cholesterol/HDL Ration Measurement: --  Triglycerides, Serum: 104  
BMI: BMI (kg/m2): 22.1 (05-20-21 @ 18:25)  HbA1c: A1C with Estimated Average Glucose Result: 5.2 % (05-22-21 @ 11:13)  Lipid Panel: Date/Time: 05-22-21 @ 11:13  Cholesterol, Serum: 254  Direct LDL: --  HDL Cholesterol, Serum: 105  Total Cholesterol/HDL Ration Measurement: --  Triglycerides, Serum: 81  
BMI: BMI (kg/m2): 22.1 (05-20-21 @ 18:25)  HbA1c: A1C with Estimated Average Glucose Result: 5.2 % (05-22-21 @ 11:13)    Glucose:   BP: 114/81 (06-25-21 @ 11:45) (114/81 - 147/104)  Lipid Panel: Date/Time: 05-29-21 @ 10:23  Cholesterol, Serum: 238  Direct LDL: --  HDL Cholesterol, Serum: 80  Total Cholesterol/HDL Ration Measurement: --  Triglycerides, Serum: 104  
BMI: BMI (kg/m2): 22.1 (05-20-21 @ 18:25)  HbA1c: A1C with Estimated Average Glucose Result: 5.2 % (05-22-21 @ 11:13)    Glucose:   BP: 120/90 (05-31-21 @ 08:05) (120/90 - 120/90)  Lipid Panel: Date/Time: 05-29-21 @ 10:23  Cholesterol, Serum: 238  Direct LDL: --  HDL Cholesterol, Serum: 80  Total Cholesterol/HDL Ration Measurement: --  Triglycerides, Serum: 104  
BMI: BMI (kg/m2): 22.1 (05-20-21 @ 18:25)  HbA1c: A1C with Estimated Average Glucose Result: 5.2 % (05-22-21 @ 11:13)    Glucose:   BP: 117/83 (06-18-21 @ 08:00) (117/83 - 119/82)  Lipid Panel: Date/Time: 05-29-21 @ 10:23  Cholesterol, Serum: 238  Direct LDL: --  HDL Cholesterol, Serum: 80  Total Cholesterol/HDL Ration Measurement: --  Triglycerides, Serum: 104  
BMI: BMI (kg/m2): 22.1 (05-20-21 @ 18:25)  HbA1c: A1C with Estimated Average Glucose Result: 5.2 % (05-22-21 @ 11:13)    Glucose:   BP: --  Lipid Panel: Date/Time: 05-29-21 @ 10:23  Cholesterol, Serum: 238  Direct LDL: --  HDL Cholesterol, Serum: 80  Total Cholesterol/HDL Ration Measurement: --  Triglycerides, Serum: 104  
BMI: BMI (kg/m2): 22.1 (05-20-21 @ 18:25)  HbA1c: A1C with Estimated Average Glucose Result: 5.2 % (05-22-21 @ 11:13)    Glucose:   BP: 135/84 (06-02-21 @ 07:53) (120/90 - 135/84)  Lipid Panel: Date/Time: 05-29-21 @ 10:23  Cholesterol, Serum: 238  Direct LDL: --  HDL Cholesterol, Serum: 80  Total Cholesterol/HDL Ration Measurement: --  Triglycerides, Serum: 104  
BMI: BMI (kg/m2): 22.1 (05-20-21 @ 18:25)  HbA1c: A1C with Estimated Average Glucose Result: 5.2 % (05-22-21 @ 11:13)    Glucose:   BP: 116/69 (07-06-21 @ 11:40) (100/70 - 143/78)  Lipid Panel: Date/Time: 05-29-21 @ 10:23  Cholesterol, Serum: 238  Direct LDL: --  HDL Cholesterol, Serum: 80  Total Cholesterol/HDL Ration Measurement: --  Triglycerides, Serum: 104  
BMI: BMI (kg/m2): 22.1 (05-20-21 @ 18:25)  HbA1c: A1C with Estimated Average Glucose Result: 5.2 % (05-22-21 @ 11:13)    Glucose:   BP: 128/77 (05-23-21 @ 08:08) (128/77 - 128/77)  Lipid Panel: Date/Time: 05-22-21 @ 11:13  Cholesterol, Serum: 254  Direct LDL: --  HDL Cholesterol, Serum: 105  Total Cholesterol/HDL Ration Measurement: --  Triglycerides, Serum: 81  
BMI: BMI (kg/m2): 22.1 (05-20-21 @ 18:25)  HbA1c: A1C with Estimated Average Glucose Result: 5.2 % (05-22-21 @ 11:13)    Glucose:   BP: 108/66 (06-14-21 @ 08:04) (108/66 - 108/66)  Lipid Panel: Date/Time: 05-29-21 @ 10:23  Cholesterol, Serum: 238  Direct LDL: --  HDL Cholesterol, Serum: 80  Total Cholesterol/HDL Ration Measurement: --  Triglycerides, Serum: 104  
BMI: BMI (kg/m2): 22.1 (05-20-21 @ 18:25)  HbA1c: A1C with Estimated Average Glucose Result: 5.2 % (05-22-21 @ 11:13)    Glucose:   BP: 120/77 (06-30-21 @ 09:40) (102/66 - 165/98)  Lipid Panel: Date/Time: 05-29-21 @ 10:23  Cholesterol, Serum: 238  Direct LDL: --  HDL Cholesterol, Serum: 80  Total Cholesterol/HDL Ration Measurement: --  Triglycerides, Serum: 104  
BMI: BMI (kg/m2): 22.1 (05-20-21 @ 18:25)  HbA1c: A1C with Estimated Average Glucose Result: 5.2 % (05-22-21 @ 11:13)    Glucose:   BP: 136/67 (07-02-21 @ 10:45) (102/66 - 167/82)  Lipid Panel: Date/Time: 05-29-21 @ 10:23  Cholesterol, Serum: 238  Direct LDL: --  HDL Cholesterol, Serum: 80  Total Cholesterol/HDL Ration Measurement: --  Triglycerides, Serum: 104  
BMI: BMI (kg/m2): 22.1 (05-20-21 @ 18:25)  HbA1c: A1C with Estimated Average Glucose Result: 5.2 % (05-22-21 @ 11:13)  BP: 116/72 (06-27-21 @ 08:03) (113/69 - 147/104)  Lipid Panel: Date/Time: 05-29-21 @ 10:23  Cholesterol, Serum: 238  HDL Cholesterol, Serum: 80  Triglycerides, Serum: 104  
BMI: BMI (kg/m2): 22.1 (05-20-21 @ 18:25)  HbA1c: A1C with Estimated Average Glucose Result: 5.2 % (05-22-21 @ 11:13)    Glucose:   BP: 123/84 (06-10-21 @ 07:45) (123/84 - 123/84)  Lipid Panel: Date/Time: 05-29-21 @ 10:23  Cholesterol, Serum: 238  Direct LDL: --  HDL Cholesterol, Serum: 80  Total Cholesterol/HDL Ration Measurement: --  Triglycerides, Serum: 104  
BMI: BMI (kg/m2): 22.1 (05-20-21 @ 18:25)  HbA1c: A1C with Estimated Average Glucose Result: 5.2 % (05-22-21 @ 11:13)    Glucose:   BP: 113/69 (06-25-21 @ 12:30) (113/69 - 147/104)  Lipid Panel: Date/Time: 05-29-21 @ 10:23  Cholesterol, Serum: 238  Direct LDL: --  HDL Cholesterol, Serum: 80  Total Cholesterol/HDL Ration Measurement: --  Triglycerides, Serum: 104  
BMI: BMI (kg/m2): 22.1 (05-20-21 @ 18:25)  HbA1c: A1C with Estimated Average Glucose Result: 5.2 % (05-22-21 @ 11:13)    Glucose:   BP: 119/82 (06-16-21 @ 08:05) (104/78 - 119/82)  Lipid Panel: Date/Time: 05-29-21 @ 10:23  Cholesterol, Serum: 238  Direct LDL: --  HDL Cholesterol, Serum: 80  Total Cholesterol/HDL Ration Measurement: --  Triglycerides, Serum: 104  
BMI: BMI (kg/m2): 22.1 (05-20-21 @ 18:25)  HbA1c: A1C with Estimated Average Glucose Result: 5.2 % (05-22-21 @ 11:13)    Glucose:   BP: 125/99 (06-07-21 @ 07:59) (118/72 - 125/99)  Lipid Panel: Date/Time: 05-29-21 @ 10:23  Cholesterol, Serum: 238  Direct LDL: --  HDL Cholesterol, Serum: 80  Total Cholesterol/HDL Ration Measurement: --  Triglycerides, Serum: 104  
BMI: BMI (kg/m2): 22.1 (05-20-21 @ 18:25)  HbA1c: A1C with Estimated Average Glucose Result: 5.2 % (05-22-21 @ 11:13)    Glucose:   BP: 120/77 (06-30-21 @ 09:40) (102/66 - 165/98)  Lipid Panel: Date/Time: 05-29-21 @ 10:23  Cholesterol, Serum: 238  Direct LDL: --  HDL Cholesterol, Serum: 80  Total Cholesterol/HDL Ration Measurement: --  Triglycerides, Serum: 104  
BMI: BMI (kg/m2): 22.1 (05-20-21 @ 18:25)  HbA1c: A1C with Estimated Average Glucose Result: 5.2 % (05-22-21 @ 11:13)    Glucose:   BP: 123/82 (06-22-21 @ 08:01) (123/82 - 123/82)  Lipid Panel: Date/Time: 05-29-21 @ 10:23  Cholesterol, Serum: 238  Direct LDL: --  HDL Cholesterol, Serum: 80  Total Cholesterol/HDL Ration Measurement: --  Triglycerides, Serum: 104  
BMI: BMI (kg/m2): 22.1 (05-20-21 @ 18:25)  HbA1c: A1C with Estimated Average Glucose Result: 5.2 % (05-22-21 @ 11:13)    Glucose:   BP: 119/82 (06-16-21 @ 08:05) (104/78 - 119/82)  Lipid Panel: Date/Time: 05-29-21 @ 10:23  Cholesterol, Serum: 238  Direct LDL: --  HDL Cholesterol, Serum: 80  Total Cholesterol/HDL Ration Measurement: --  Triglycerides, Serum: 104  
BMI: BMI (kg/m2): 22.1 (05-20-21 @ 18:25)  HbA1c: A1C with Estimated Average Glucose Result: 5.2 % (05-22-21 @ 11:13)    Glucose:   BP: 125/99 (06-07-21 @ 07:59) (118/72 - 125/99)  Lipid Panel: Date/Time: 05-29-21 @ 10:23  Cholesterol, Serum: 238  Direct LDL: --  HDL Cholesterol, Serum: 80  Total Cholesterol/HDL Ration Measurement: --  Triglycerides, Serum: 104  
BMI: BMI (kg/m2): 22.1 (05-20-21 @ 18:25)  HbA1c: A1C with Estimated Average Glucose Result: 5.2 % (05-22-21 @ 11:13)    Glucose:   BP: 130/79 (05-21-21 @ 06:48) (103/62 - 150/95)  Lipid Panel: Date/Time: 05-22-21 @ 11:13  Cholesterol, Serum: 254  Direct LDL: --  HDL Cholesterol, Serum: 105  Total Cholesterol/HDL Ration Measurement: --  Triglycerides, Serum: 81  
BMI: BMI (kg/m2): 22.1 (05-20-21 @ 18:25)  HbA1c: A1C with Estimated Average Glucose Result: 5.2 % (05-22-21 @ 11:13)    Glucose:   BP: 123/82 (06-22-21 @ 08:01) (123/82 - 123/82)  Lipid Panel: Date/Time: 05-29-21 @ 10:23  Cholesterol, Serum: 238  Direct LDL: --  HDL Cholesterol, Serum: 80  Total Cholesterol/HDL Ration Measurement: --  Triglycerides, Serum: 104  
BMI: BMI (kg/m2): 22.1 (05-20-21 @ 18:25)  HbA1c: A1C with Estimated Average Glucose Result: 5.2 % (05-22-21 @ 11:13)    Glucose:   BP: 156/88 (07-08-21 @ 10:40) (100/70 - 156/88)  Lipid Panel: Date/Time: 05-29-21 @ 10:23  Cholesterol, Serum: 238  Direct LDL: --  HDL Cholesterol, Serum: 80  Total Cholesterol/HDL Ration Measurement: --  Triglycerides, Serum: 104  
BMI: BMI (kg/m2): 22.1 (05-20-21 @ 18:25)  HbA1c: A1C with Estimated Average Glucose Result: 5.2 % (05-22-21 @ 11:13)    Glucose:   BP: 128/77 (05-23-21 @ 08:08) (103/62 - 150/95)  Lipid Panel: Date/Time: 05-22-21 @ 11:13  Cholesterol, Serum: 254  Direct LDL: --  HDL Cholesterol, Serum: 105  Total Cholesterol/HDL Ration Measurement: --  Triglycerides, Serum: 81  
BMI: BMI (kg/m2): 22.1 (05-20-21 @ 18:25)  HbA1c: A1C with Estimated Average Glucose Result: 5.2 % (05-22-21 @ 11:13)    Glucose:   BP: 125/99 (06-07-21 @ 07:59) (125/99 - 125/99)  Lipid Panel: Date/Time: 05-29-21 @ 10:23  Cholesterol, Serum: 238  Direct LDL: --  HDL Cholesterol, Serum: 80  Total Cholesterol/HDL Ration Measurement: --  Triglycerides, Serum: 104  
BMI: BMI (kg/m2): 22.1 (05-20-21 @ 18:25)  HbA1c: A1C with Estimated Average Glucose Result: 5.2 % (05-22-21 @ 11:13)    Glucose:   BP: 113/70 (06-11-21 @ 07:46) (113/70 - 123/84)  Lipid Panel: Date/Time: 05-29-21 @ 10:23  Cholesterol, Serum: 238  Direct LDL: --  HDL Cholesterol, Serum: 80  Total Cholesterol/HDL Ration Measurement: --  Triglycerides, Serum: 104  
BMI: BMI (kg/m2): 22.1 (05-20-21 @ 18:25)  HbA1c: A1C with Estimated Average Glucose Result: 5.2 % (05-22-21 @ 11:13)    Glucose:   BP: 135/84 (06-02-21 @ 07:53) (135/84 - 135/84)  Lipid Panel: Date/Time: 05-29-21 @ 10:23  Cholesterol, Serum: 238  Direct LDL: --  HDL Cholesterol, Serum: 80  Total Cholesterol/HDL Ration Measurement: --  Triglycerides, Serum: 104  
BMI: BMI (kg/m2): 22.1 (05-20-21 @ 18:25)  HbA1c: A1C with Estimated Average Glucose Result: 5.2 % (05-22-21 @ 11:13)  Lipid Panel: Date/Time: 05-22-21 @ 11:13  Cholesterol, Serum: 254  HDL Cholesterol, Serum: 105  Triglycerides, Serum: 81  
BMI: BMI (kg/m2): 22.1 (05-20-21 @ 18:25)  HbA1c: A1C with Estimated Average Glucose Result: 5.2 % (05-22-21 @ 11:13)    Glucose:   BP: 136/67 (07-02-21 @ 10:45) (118/77 - 167/82)  Lipid Panel: Date/Time: 05-29-21 @ 10:23  Cholesterol, Serum: 238  Direct LDL: --  HDL Cholesterol, Serum: 80  Total Cholesterol/HDL Ration Measurement: --  Triglycerides, Serum: 104  
BMI: BMI (kg/m2): 22.1 (05-20-21 @ 18:25)  HbA1c: A1C with Estimated Average Glucose Result: 5.2 % (05-22-21 @ 11:13)    Glucose:   BP: 103/70 (07-07-21 @ 08:00) (100/70 - 143/78)  Lipid Panel: Date/Time: 05-29-21 @ 10:23  Cholesterol, Serum: 238  Direct LDL: --  HDL Cholesterol, Serum: 80  Total Cholesterol/HDL Ration Measurement: --  Triglycerides, Serum: 104  
BMI: BMI (kg/m2): 22.1 (05-20-21 @ 18:25)  HbA1c: A1C with Estimated Average Glucose Result: 5.2 % (05-22-21 @ 11:13)    Glucose:   BP: 125/84 (06-28-21 @ 11:20) (116/72 - 161/79)  Lipid Panel: Date/Time: 05-29-21 @ 10:23  Cholesterol, Serum: 238  Direct LDL: --  HDL Cholesterol, Serum: 80  Total Cholesterol/HDL Ration Measurement: --  Triglycerides, Serum: 104  
BMI: BMI (kg/m2): 22.1 (05-20-21 @ 18:25)  HbA1c: A1C with Estimated Average Glucose Result: 5.2 % (05-22-21 @ 11:13)    Glucose:   BP: 128/77 (05-23-21 @ 08:08) (128/77 - 128/77)  Lipid Panel: Date/Time: 05-22-21 @ 11:13  Cholesterol, Serum: 254  Direct LDL: --  HDL Cholesterol, Serum: 105  Total Cholesterol/HDL Ration Measurement: --  Triglycerides, Serum: 81  
BMI: BMI (kg/m2): 22.1 (05-20-21 @ 18:25)  HbA1c: A1C with Estimated Average Glucose Result: 5.2 % (05-22-21 @ 11:13)    Glucose:   BP: 104/78 (06-15-21 @ 08:11) (104/78 - 108/66)  Lipid Panel: Date/Time: 05-29-21 @ 10:23  Cholesterol, Serum: 238  Direct LDL: --  HDL Cholesterol, Serum: 80  Total Cholesterol/HDL Ration Measurement: --  Triglycerides, Serum: 104  
BMI: BMI (kg/m2): 22.1 (05-20-21 @ 18:25)  HbA1c: A1C with Estimated Average Glucose Result: 5.2 % (05-22-21 @ 11:13)    Glucose:   BP: 125/84 (06-28-21 @ 11:20) (116/72 - 161/79)  Lipid Panel: Date/Time: 05-29-21 @ 10:23  Cholesterol, Serum: 238  Direct LDL: --  HDL Cholesterol, Serum: 80  Total Cholesterol/HDL Ration Measurement: --  Triglycerides, Serum: 104  
BMI: BMI (kg/m2): 22.1 (05-20-21 @ 18:25)  HbA1c: A1C with Estimated Average Glucose Result: 5.2 % (05-22-21 @ 11:13)    Glucose:   BP: 123/82 (06-22-21 @ 08:01) (123/82 - 123/82)  Lipid Panel: Date/Time: 05-29-21 @ 10:23  Cholesterol, Serum: 238  Direct LDL: --  HDL Cholesterol, Serum: 80  Total Cholesterol/HDL Ration Measurement: --  Triglycerides, Serum: 104  
BMI: BMI (kg/m2): 22.1 (05-20-21 @ 18:25)  HbA1c: A1C with Estimated Average Glucose Result: 5.2 % (05-22-21 @ 11:13)    Glucose:   BP: 136/67 (07-02-21 @ 10:45) (118/77 - 167/82)  Lipid Panel: Date/Time: 05-29-21 @ 10:23  Cholesterol, Serum: 238  Direct LDL: --  HDL Cholesterol, Serum: 80  Total Cholesterol/HDL Ration Measurement: --  Triglycerides, Serum: 104  
BMI: BMI (kg/m2): 22.1 (05-20-21 @ 18:25)  HbA1c: A1C with Estimated Average Glucose Result: 5.2 % (05-22-21 @ 11:13)   BP: 135/81 (05-29-21 @ 07:53) (135/81 - 135/81)  Lipid Panel: Date/Time: 05-29-21 @ 10:23  Cholesterol, Serum: 238  Direct LDL: --  HDL Cholesterol, Serum: 80  Total Cholesterol/HDL Ration Measurement: --  Triglycerides, Serum: 104  
BMI: BMI (kg/m2): 22.1 (05-20-21 @ 18:25)  HbA1c: A1C with Estimated Average Glucose Result: 5.2 % (05-22-21 @ 11:13)    Glucose:   BP: --  Lipid Panel: Date/Time: 05-22-21 @ 11:13  Cholesterol, Serum: 254  Direct LDL: --  HDL Cholesterol, Serum: 105  Total Cholesterol/HDL Ration Measurement: --  Triglycerides, Serum: 81

## 2021-07-08 NOTE — BH INPATIENT PSYCHIATRY PROGRESS NOTE - NSTXDCOPNODATEEST_PSY_ALL_CORE
29-Jun-2021
15-Robin-2021
25-May-2021
01-Jun-2021
27-May-2021
29-Jun-2021
15-Robin-2021
15-Robin-2021
29-Jun-2021
29-Jun-2021
21-May-2021
01-Jun-2021
29-Jun-2021
27-May-2021
22-Jun-2021
29-Jun-2021
27-May-2021
21-May-2021
09-Jun-2021
22-Jun-2021
15-Robin-2021
15-Robin-2021
29-Jun-2021
15-Robin-2021
15-Robin-2021
22-Jun-2021
01-Jun-2021
15-Robin-2021
21-May-2021
01-Jun-2021
01-Jun-2021
21-May-2021
25-May-2021
09-Jun-2021
27-May-2021
27-May-2021
01-Jun-2021

## 2021-07-08 NOTE — [facility] INPATIENT PSYCHIATRY PROGRESS NOTE - CURRENT MEDICATION
MEDICATIONS  (STANDING):  aspirin enteric coated 81 milliGRAM(s) Oral daily  budesonide 160 MICROgram(s)/formoterol 4.5 MICROgram(s) Inhaler 2 Puff(s) Inhalation two times a day  clonazePAM  Tablet 0.5 milliGRAM(s) Oral every 12 hours  clopidogrel Tablet 75 milliGRAM(s) Oral daily  DULoxetine 60 milliGRAM(s) Oral daily  levothyroxine 75 MICROGram(s) Oral <User Schedule>  pantoprazole    Tablet 40 milliGRAM(s) Oral <User Schedule>  polyethylene glycol 3350 17 Gram(s) Oral daily  QUEtiapine 100 milliGRAM(s) Oral <User Schedule>  QUEtiapine 50 milliGRAM(s) Oral daily  senna 2 Tablet(s) Oral at bedtime    MEDICATIONS  (PRN):  acetaminophen   Tablet .. 650 milliGRAM(s) Oral every 6 hours PRN Mild Pain (1 - 3), Moderate Pain (4 - 6)  ALBUTerol    90 MICROgram(s) HFA Inhaler 2 Puff(s) Inhalation every 6 hours PRN wheezing  calcium carbonate    500 mG (Tums) Chewable 1 Tablet(s) Chew every 8 hours PRN GERD  ibuprofen  Tablet. 400 milliGRAM(s) Oral two times a day PRN Moderate Pain (4 - 6)  lidocaine   Patch 2 Patch Transdermal daily PRN Pain  OLANZapine Injectable 2.5 milliGRAM(s) IntraMuscular once PRN extreme agitation  ondansetron    Tablet 4 milliGRAM(s) Oral every 6 hours PRN Nausea  oxyCODONE    IR 5 milliGRAM(s) Oral every 8 hours PRN Pain  QUEtiapine 12.5 milliGRAM(s) Oral every 6 hours PRN agitation  
MEDICATIONS  (STANDING):  aspirin enteric coated 81 milliGRAM(s) Oral daily  budesonide 160 MICROgram(s)/formoterol 4.5 MICROgram(s) Inhaler 2 Puff(s) Inhalation two times a day  clonazePAM  Tablet 0.5 milliGRAM(s) Oral two times a day  clopidogrel Tablet 75 milliGRAM(s) Oral daily  DULoxetine 40 milliGRAM(s) Oral daily  levothyroxine 75 MICROGram(s) Oral daily  nitrofurantoin monohydrate/macrocrystals (MACROBID) 100 milliGRAM(s) Oral two times a day  polyethylene glycol 3350 17 Gram(s) Oral daily  QUEtiapine 12.5 milliGRAM(s) Oral daily  QUEtiapine 25 milliGRAM(s) Oral at bedtime  senna 2 Tablet(s) Oral at bedtime    MEDICATIONS  (PRN):  acetaminophen   Tablet .. 650 milliGRAM(s) Oral every 6 hours PRN Mild Pain (1 - 3), Moderate Pain (4 - 6)  ALBUTerol    90 MICROgram(s) HFA Inhaler 2 Puff(s) Inhalation every 6 hours PRN wheezing  ibuprofen  Tablet. 400 milliGRAM(s) Oral two times a day PRN Moderate Pain (4 - 6)  lidocaine   Patch 2 Patch Transdermal daily PRN Pain  OLANZapine Injectable 2.5 milliGRAM(s) IntraMuscular once PRN extreme agitation  ondansetron    Tablet 4 milliGRAM(s) Oral every 6 hours PRN Nausea  oxyCODONE    IR 5 milliGRAM(s) Oral every 8 hours PRN Pain  QUEtiapine 12.5 milliGRAM(s) Oral every 6 hours PRN agitation  
MEDICATIONS  (STANDING):  aspirin enteric coated 81 milliGRAM(s) Oral daily  budesonide 160 MICROgram(s)/formoterol 4.5 MICROgram(s) Inhaler 2 Puff(s) Inhalation two times a day  clonazePAM  Tablet 0.5 milliGRAM(s) Oral two times a day  clopidogrel Tablet 75 milliGRAM(s) Oral daily  DULoxetine 60 milliGRAM(s) Oral daily  levothyroxine 75 MICROGram(s) Oral <User Schedule>  polyethylene glycol 3350 17 Gram(s) Oral daily  QUEtiapine 50 milliGRAM(s) Oral daily  QUEtiapine 25 milliGRAM(s) Oral once  QUEtiapine 100 milliGRAM(s) Oral <User Schedule>  senna 2 Tablet(s) Oral at bedtime    MEDICATIONS  (PRN):  acetaminophen   Tablet .. 650 milliGRAM(s) Oral every 6 hours PRN Mild Pain (1 - 3), Moderate Pain (4 - 6)  ALBUTerol    90 MICROgram(s) HFA Inhaler 2 Puff(s) Inhalation every 6 hours PRN wheezing  calcium carbonate    500 mG (Tums) Chewable 1 Tablet(s) Chew every 8 hours PRN GERD  ibuprofen  Tablet. 400 milliGRAM(s) Oral two times a day PRN Moderate Pain (4 - 6)  lidocaine   Patch 2 Patch Transdermal daily PRN Pain  OLANZapine Injectable 2.5 milliGRAM(s) IntraMuscular once PRN extreme agitation  ondansetron    Tablet 4 milliGRAM(s) Oral every 6 hours PRN Nausea  oxyCODONE    IR 5 milliGRAM(s) Oral every 8 hours PRN Pain  QUEtiapine 12.5 milliGRAM(s) Oral every 6 hours PRN agitation  
MEDICATIONS  (STANDING):  aspirin enteric coated 81 milliGRAM(s) Oral daily  clonazePAM  Tablet 0.5 milliGRAM(s) Oral two times a day  clopidogrel Tablet 75 milliGRAM(s) Oral daily  DULoxetine 20 milliGRAM(s) Oral daily  levothyroxine 75 MICROGram(s) Oral daily  QUEtiapine 12.5 milliGRAM(s) Oral daily  QUEtiapine 25 milliGRAM(s) Oral at bedtime  senna 2 Tablet(s) Oral at bedtime    MEDICATIONS  (PRN):  acetaminophen   Tablet .. 650 milliGRAM(s) Oral every 6 hours PRN Mild Pain (1 - 3), Moderate Pain (4 - 6)  ALBUTerol    90 MICROgram(s) HFA Inhaler 2 Puff(s) Inhalation every 6 hours PRN wheezing  OLANZapine Injectable 2.5 milliGRAM(s) IntraMuscular once PRN extreme agitation  ondansetron    Tablet 4 milliGRAM(s) Oral every 6 hours PRN Nausea  oxyCODONE    IR 5 milliGRAM(s) Oral every 8 hours PRN Severe Pain (7 - 10)  QUEtiapine 12.5 milliGRAM(s) Oral every 6 hours PRN agitation  
MEDICATIONS  (STANDING):  aspirin enteric coated 81 milliGRAM(s) Oral daily  clonazePAM  Tablet 0.5 milliGRAM(s) Oral two times a day  clopidogrel Tablet 75 milliGRAM(s) Oral daily  DULoxetine 20 milliGRAM(s) Oral daily  levothyroxine 75 MICROGram(s) Oral daily  nitrofurantoin monohydrate/macrocrystals (MACROBID) 100 milliGRAM(s) Oral two times a day  polyethylene glycol 3350 17 Gram(s) Oral daily  QUEtiapine 12.5 milliGRAM(s) Oral daily  QUEtiapine 25 milliGRAM(s) Oral at bedtime  senna 2 Tablet(s) Oral at bedtime    MEDICATIONS  (PRN):  acetaminophen   Tablet .. 650 milliGRAM(s) Oral every 6 hours PRN Mild Pain (1 - 3), Moderate Pain (4 - 6)  ALBUTerol    90 MICROgram(s) HFA Inhaler 2 Puff(s) Inhalation every 6 hours PRN wheezing  ibuprofen  Tablet. 400 milliGRAM(s) Oral two times a day PRN Moderate Pain (4 - 6)  lidocaine   Patch 2 Patch Transdermal daily PRN Pain  OLANZapine Injectable 2.5 milliGRAM(s) IntraMuscular once PRN extreme agitation  ondansetron    Tablet 4 milliGRAM(s) Oral every 6 hours PRN Nausea  oxyCODONE    IR 5 milliGRAM(s) Oral every 8 hours PRN Severe Pain (7 - 10)  QUEtiapine 12.5 milliGRAM(s) Oral every 6 hours PRN agitation  
MEDICATIONS  (STANDING):  aspirin enteric coated 81 milliGRAM(s) Oral daily  budesonide 160 MICROgram(s)/formoterol 4.5 MICROgram(s) Inhaler 2 Puff(s) Inhalation two times a day  clonazePAM  Tablet 0.5 milliGRAM(s) Oral two times a day  clopidogrel Tablet 75 milliGRAM(s) Oral daily  DULoxetine 40 milliGRAM(s) Oral daily  levothyroxine 75 MICROGram(s) Oral daily  polyethylene glycol 3350 17 Gram(s) Oral daily  QUEtiapine 12.5 milliGRAM(s) Oral daily  QUEtiapine 25 milliGRAM(s) Oral at bedtime  senna 2 Tablet(s) Oral at bedtime    MEDICATIONS  (PRN):  acetaminophen   Tablet .. 650 milliGRAM(s) Oral every 6 hours PRN Mild Pain (1 - 3), Moderate Pain (4 - 6)  ALBUTerol    90 MICROgram(s) HFA Inhaler 2 Puff(s) Inhalation every 6 hours PRN wheezing  ibuprofen  Tablet. 400 milliGRAM(s) Oral two times a day PRN Moderate Pain (4 - 6)  lidocaine   Patch 2 Patch Transdermal daily PRN Pain  OLANZapine Injectable 2.5 milliGRAM(s) IntraMuscular once PRN extreme agitation  ondansetron    Tablet 4 milliGRAM(s) Oral every 6 hours PRN Nausea  oxyCODONE    IR 5 milliGRAM(s) Oral every 8 hours PRN Pain  QUEtiapine 12.5 milliGRAM(s) Oral every 6 hours PRN agitation  
MEDICATIONS  (STANDING):  aspirin enteric coated 81 milliGRAM(s) Oral daily  budesonide 160 MICROgram(s)/formoterol 4.5 MICROgram(s) Inhaler 2 Puff(s) Inhalation two times a day  clonazePAM  Tablet 0.5 milliGRAM(s) Oral two times a day  clopidogrel Tablet 75 milliGRAM(s) Oral daily  DULoxetine 90 milliGRAM(s) Oral daily  guaiFENesin  milliGRAM(s) Oral every 12 hours  levothyroxine 75 MICROGram(s) Oral <User Schedule>  polyethylene glycol 3350 17 Gram(s) Oral daily  QUEtiapine 50 milliGRAM(s) Oral daily  QUEtiapine 25 milliGRAM(s) Oral once  QUEtiapine 100 milliGRAM(s) Oral <User Schedule>  senna 2 Tablet(s) Oral at bedtime    MEDICATIONS  (PRN):  acetaminophen   Tablet .. 650 milliGRAM(s) Oral every 6 hours PRN Mild Pain (1 - 3), Moderate Pain (4 - 6)  ALBUTerol    90 MICROgram(s) HFA Inhaler 2 Puff(s) Inhalation every 6 hours PRN wheezing  calcium carbonate    500 mG (Tums) Chewable 1 Tablet(s) Chew every 8 hours PRN GERD  ibuprofen  Tablet. 400 milliGRAM(s) Oral two times a day PRN Moderate Pain (4 - 6)  lidocaine   Patch 2 Patch Transdermal daily PRN Pain  OLANZapine Injectable 2.5 milliGRAM(s) IntraMuscular once PRN extreme agitation  ondansetron    Tablet 4 milliGRAM(s) Oral every 6 hours PRN Nausea  oxyCODONE    IR 5 milliGRAM(s) Oral every 8 hours PRN Pain  oxymetazoline 0.05% Nasal Spray 1 Spray(s) Both Nostrils two times a day PRN Nasal Congestion  QUEtiapine 12.5 milliGRAM(s) Oral every 6 hours PRN agitation  
MEDICATIONS  (STANDING):  aspirin enteric coated 81 milliGRAM(s) Oral daily  budesonide 160 MICROgram(s)/formoterol 4.5 MICROgram(s) Inhaler 2 Puff(s) Inhalation two times a day  clonazePAM  Tablet 0.5 milliGRAM(s) Oral two times a day  clopidogrel Tablet 75 milliGRAM(s) Oral daily  DULoxetine 90 milliGRAM(s) Oral daily  guaiFENesin  milliGRAM(s) Oral every 12 hours  levothyroxine 75 MICROGram(s) Oral <User Schedule>  loratadine 10 milliGRAM(s) Oral daily  polyethylene glycol 3350 17 Gram(s) Oral daily  QUEtiapine 100 milliGRAM(s) Oral <User Schedule>  senna 2 Tablet(s) Oral at bedtime    MEDICATIONS  (PRN):  acetaminophen   Tablet .. 650 milliGRAM(s) Oral every 6 hours PRN Mild Pain (1 - 3), Moderate Pain (4 - 6)  ALBUTerol    90 MICROgram(s) HFA Inhaler 2 Puff(s) Inhalation every 6 hours PRN wheezing  calcium carbonate    500 mG (Tums) Chewable 1 Tablet(s) Chew every 8 hours PRN GERD  ibuprofen  Tablet. 400 milliGRAM(s) Oral two times a day PRN Moderate Pain (4 - 6)  lidocaine   Patch 2 Patch Transdermal daily PRN Pain  OLANZapine Injectable 2.5 milliGRAM(s) IntraMuscular once PRN extreme agitation  ondansetron    Tablet 4 milliGRAM(s) Oral every 6 hours PRN Nausea  oxyCODONE    IR 5 milliGRAM(s) Oral every 8 hours PRN Pain  oxymetazoline 0.05% Nasal Spray 1 Spray(s) Both Nostrils two times a day PRN Nasal Congestion  QUEtiapine 12.5 milliGRAM(s) Oral every 6 hours PRN agitation  
MEDICATIONS  (STANDING):  aspirin enteric coated 81 milliGRAM(s) Oral daily  budesonide 160 MICROgram(s)/formoterol 4.5 MICROgram(s) Inhaler 2 Puff(s) Inhalation two times a day  clonazePAM  Tablet 0.5 milliGRAM(s) Oral two times a day  clopidogrel Tablet 75 milliGRAM(s) Oral daily  DULoxetine 60 milliGRAM(s) Oral daily  levothyroxine 75 MICROGram(s) Oral <User Schedule>  polyethylene glycol 3350 17 Gram(s) Oral daily  QUEtiapine 75 milliGRAM(s) Oral at bedtime  senna 2 Tablet(s) Oral at bedtime    MEDICATIONS  (PRN):  acetaminophen   Tablet .. 650 milliGRAM(s) Oral every 6 hours PRN Mild Pain (1 - 3), Moderate Pain (4 - 6)  ALBUTerol    90 MICROgram(s) HFA Inhaler 2 Puff(s) Inhalation every 6 hours PRN wheezing  calcium carbonate    500 mG (Tums) Chewable 1 Tablet(s) Chew every 8 hours PRN GERD  ibuprofen  Tablet. 400 milliGRAM(s) Oral two times a day PRN Moderate Pain (4 - 6)  lidocaine   Patch 2 Patch Transdermal daily PRN Pain  OLANZapine Injectable 2.5 milliGRAM(s) IntraMuscular once PRN extreme agitation  ondansetron    Tablet 4 milliGRAM(s) Oral every 6 hours PRN Nausea  oxyCODONE    IR 5 milliGRAM(s) Oral every 8 hours PRN Pain  QUEtiapine 12.5 milliGRAM(s) Oral every 6 hours PRN agitation  
MEDICATIONS  (STANDING):  aspirin enteric coated 81 milliGRAM(s) Oral daily  budesonide 160 MICROgram(s)/formoterol 4.5 MICROgram(s) Inhaler 2 Puff(s) Inhalation two times a day  clonazePAM  Tablet 0.5 milliGRAM(s) Oral every 12 hours  clopidogrel Tablet 75 milliGRAM(s) Oral daily  DULoxetine 60 milliGRAM(s) Oral daily  levothyroxine 75 MICROGram(s) Oral <User Schedule>  polyethylene glycol 3350 17 Gram(s) Oral daily  QUEtiapine 100 milliGRAM(s) Oral <User Schedule>  QUEtiapine 50 milliGRAM(s) Oral daily  senna 2 Tablet(s) Oral at bedtime    MEDICATIONS  (PRN):  acetaminophen   Tablet .. 650 milliGRAM(s) Oral every 6 hours PRN Mild Pain (1 - 3), Moderate Pain (4 - 6)  ALBUTerol    90 MICROgram(s) HFA Inhaler 2 Puff(s) Inhalation every 6 hours PRN wheezing  calcium carbonate    500 mG (Tums) Chewable 1 Tablet(s) Chew every 8 hours PRN GERD  ibuprofen  Tablet. 400 milliGRAM(s) Oral two times a day PRN Moderate Pain (4 - 6)  lidocaine   Patch 2 Patch Transdermal daily PRN Pain  OLANZapine Injectable 2.5 milliGRAM(s) IntraMuscular once PRN extreme agitation  ondansetron    Tablet 4 milliGRAM(s) Oral every 6 hours PRN Nausea  oxyCODONE    IR 5 milliGRAM(s) Oral every 8 hours PRN Pain  QUEtiapine 12.5 milliGRAM(s) Oral every 6 hours PRN agitation  
MEDICATIONS  (STANDING):  aspirin enteric coated 81 milliGRAM(s) Oral daily  budesonide 160 MICROgram(s)/formoterol 4.5 MICROgram(s) Inhaler 2 Puff(s) Inhalation two times a day  clonazePAM  Tablet 0.5 milliGRAM(s) Oral two times a day  clopidogrel Tablet 75 milliGRAM(s) Oral daily  DULoxetine 60 milliGRAM(s) Oral daily  levothyroxine 75 MICROGram(s) Oral <User Schedule>  polyethylene glycol 3350 17 Gram(s) Oral daily  QUEtiapine 50 milliGRAM(s) Oral daily  QUEtiapine 25 milliGRAM(s) Oral once  QUEtiapine 100 milliGRAM(s) Oral <User Schedule>  senna 2 Tablet(s) Oral at bedtime    MEDICATIONS  (PRN):  acetaminophen   Tablet .. 650 milliGRAM(s) Oral every 6 hours PRN Mild Pain (1 - 3), Moderate Pain (4 - 6)  ALBUTerol    90 MICROgram(s) HFA Inhaler 2 Puff(s) Inhalation every 6 hours PRN wheezing  calcium carbonate    500 mG (Tums) Chewable 1 Tablet(s) Chew every 8 hours PRN GERD  ibuprofen  Tablet. 400 milliGRAM(s) Oral two times a day PRN Moderate Pain (4 - 6)  lidocaine   Patch 2 Patch Transdermal daily PRN Pain  OLANZapine Injectable 2.5 milliGRAM(s) IntraMuscular once PRN extreme agitation  ondansetron    Tablet 4 milliGRAM(s) Oral every 6 hours PRN Nausea  oxyCODONE    IR 5 milliGRAM(s) Oral every 8 hours PRN Pain  QUEtiapine 12.5 milliGRAM(s) Oral every 6 hours PRN agitation  
MEDICATIONS  (STANDING):  aspirin enteric coated 81 milliGRAM(s) Oral daily  budesonide 160 MICROgram(s)/formoterol 4.5 MICROgram(s) Inhaler 2 Puff(s) Inhalation two times a day  clonazePAM  Tablet 0.5 milliGRAM(s) Oral <User Schedule>  clopidogrel Tablet 75 milliGRAM(s) Oral daily  DULoxetine 60 milliGRAM(s) Oral daily  levothyroxine 75 MICROGram(s) Oral <User Schedule>  polyethylene glycol 3350 17 Gram(s) Oral daily  QUEtiapine 100 milliGRAM(s) Oral <User Schedule>  QUEtiapine 50 milliGRAM(s) Oral daily  senna 2 Tablet(s) Oral at bedtime    MEDICATIONS  (PRN):  acetaminophen   Tablet .. 650 milliGRAM(s) Oral every 6 hours PRN Mild Pain (1 - 3), Moderate Pain (4 - 6)  ALBUTerol    90 MICROgram(s) HFA Inhaler 2 Puff(s) Inhalation every 6 hours PRN wheezing  calcium carbonate    500 mG (Tums) Chewable 1 Tablet(s) Chew every 8 hours PRN GERD  ibuprofen  Tablet. 400 milliGRAM(s) Oral two times a day PRN Moderate Pain (4 - 6)  lidocaine   Patch 2 Patch Transdermal daily PRN Pain  OLANZapine Injectable 2.5 milliGRAM(s) IntraMuscular once PRN extreme agitation  ondansetron    Tablet 4 milliGRAM(s) Oral every 6 hours PRN Nausea  oxyCODONE    IR 5 milliGRAM(s) Oral every 8 hours PRN Pain  QUEtiapine 12.5 milliGRAM(s) Oral every 6 hours PRN agitation  
MEDICATIONS  (STANDING):  aspirin enteric coated 81 milliGRAM(s) Oral daily  budesonide 160 MICROgram(s)/formoterol 4.5 MICROgram(s) Inhaler 2 Puff(s) Inhalation two times a day  clonazePAM  Tablet 0.5 milliGRAM(s) Oral every 12 hours  clopidogrel Tablet 75 milliGRAM(s) Oral daily  DULoxetine 60 milliGRAM(s) Oral daily  levothyroxine 75 MICROGram(s) Oral <User Schedule>  polyethylene glycol 3350 17 Gram(s) Oral daily  QUEtiapine 100 milliGRAM(s) Oral <User Schedule>  QUEtiapine 50 milliGRAM(s) Oral daily  senna 2 Tablet(s) Oral at bedtime    MEDICATIONS  (PRN):  acetaminophen   Tablet .. 650 milliGRAM(s) Oral every 6 hours PRN Mild Pain (1 - 3), Moderate Pain (4 - 6)  ALBUTerol    90 MICROgram(s) HFA Inhaler 2 Puff(s) Inhalation every 6 hours PRN wheezing  calcium carbonate    500 mG (Tums) Chewable 1 Tablet(s) Chew every 8 hours PRN GERD  ibuprofen  Tablet. 400 milliGRAM(s) Oral two times a day PRN Moderate Pain (4 - 6)  lidocaine   Patch 2 Patch Transdermal daily PRN Pain  OLANZapine Injectable 2.5 milliGRAM(s) IntraMuscular once PRN extreme agitation  ondansetron    Tablet 4 milliGRAM(s) Oral every 6 hours PRN Nausea  oxyCODONE    IR 5 milliGRAM(s) Oral every 8 hours PRN Pain  QUEtiapine 12.5 milliGRAM(s) Oral every 6 hours PRN agitation  
MEDICATIONS  (STANDING):  aspirin enteric coated 81 milliGRAM(s) Oral daily  clonazePAM  Tablet 0.5 milliGRAM(s) Oral two times a day  clopidogrel Tablet 75 milliGRAM(s) Oral daily  levothyroxine 75 MICROGram(s) Oral daily  mirtazapine 7.5 milliGRAM(s) Oral at bedtime  QUEtiapine 12.5 milliGRAM(s) Oral two times a day  senna 2 Tablet(s) Oral at bedtime    MEDICATIONS  (PRN):  acetaminophen   Tablet .. 650 milliGRAM(s) Oral every 6 hours PRN Mild Pain (1 - 3), Moderate Pain (4 - 6)  ALBUTerol    90 MICROgram(s) HFA Inhaler 2 Puff(s) Inhalation every 6 hours PRN wheezing  OLANZapine Injectable 2.5 milliGRAM(s) IntraMuscular once PRN extreme agitation  ondansetron    Tablet 4 milliGRAM(s) Oral every 6 hours PRN Nausea  oxyCODONE    IR 5 milliGRAM(s) Oral every 8 hours PRN Severe Pain (7 - 10)  QUEtiapine 12.5 milliGRAM(s) Oral every 6 hours PRN agitation  
MEDICATIONS  (STANDING):  aspirin enteric coated 81 milliGRAM(s) Oral daily  budesonide 160 MICROgram(s)/formoterol 4.5 MICROgram(s) Inhaler 2 Puff(s) Inhalation two times a day  clonazePAM  Tablet 0.5 milliGRAM(s) Oral two times a day  clopidogrel Tablet 75 milliGRAM(s) Oral daily  DULoxetine 60 milliGRAM(s) Oral daily  levothyroxine 75 MICROGram(s) Oral <User Schedule>  polyethylene glycol 3350 17 Gram(s) Oral daily  QUEtiapine 75 milliGRAM(s) Oral <User Schedule>  QUEtiapine 75 milliGRAM(s) Oral at bedtime  senna 2 Tablet(s) Oral at bedtime    MEDICATIONS  (PRN):  acetaminophen   Tablet .. 650 milliGRAM(s) Oral every 6 hours PRN Mild Pain (1 - 3), Moderate Pain (4 - 6)  ALBUTerol    90 MICROgram(s) HFA Inhaler 2 Puff(s) Inhalation every 6 hours PRN wheezing  calcium carbonate    500 mG (Tums) Chewable 1 Tablet(s) Chew every 8 hours PRN GERD  ibuprofen  Tablet. 400 milliGRAM(s) Oral two times a day PRN Moderate Pain (4 - 6)  lidocaine   Patch 2 Patch Transdermal daily PRN Pain  OLANZapine Injectable 2.5 milliGRAM(s) IntraMuscular once PRN extreme agitation  ondansetron    Tablet 4 milliGRAM(s) Oral every 6 hours PRN Nausea  oxyCODONE    IR 5 milliGRAM(s) Oral every 8 hours PRN Pain  QUEtiapine 12.5 milliGRAM(s) Oral every 6 hours PRN agitation  
MEDICATIONS  (STANDING):  aspirin enteric coated 81 milliGRAM(s) Oral daily  clonazePAM  Tablet 0.5 milliGRAM(s) Oral two times a day  clopidogrel Tablet 75 milliGRAM(s) Oral daily  levothyroxine 75 MICROGram(s) Oral daily  mirtazapine 7.5 milliGRAM(s) Oral at bedtime  QUEtiapine 12.5 milliGRAM(s) Oral two times a day    MEDICATIONS  (PRN):  acetaminophen   Tablet .. 650 milliGRAM(s) Oral every 6 hours PRN Mild Pain (1 - 3), Moderate Pain (4 - 6)  ALBUTerol    90 MICROgram(s) HFA Inhaler 2 Puff(s) Inhalation every 6 hours PRN wheezing  OLANZapine Injectable 2.5 milliGRAM(s) IntraMuscular once PRN extreme agitation  ondansetron    Tablet 4 milliGRAM(s) Oral every 6 hours PRN Nausea  oxyCODONE    IR 5 milliGRAM(s) Oral every 8 hours PRN Severe Pain (7 - 10)  QUEtiapine 25 milliGRAM(s) Oral every 6 hours PRN agitation  
MEDICATIONS  (STANDING):  aspirin enteric coated 81 milliGRAM(s) Oral daily  budesonide 160 MICROgram(s)/formoterol 4.5 MICROgram(s) Inhaler 2 Puff(s) Inhalation two times a day  clonazePAM  Tablet 0.5 milliGRAM(s) Oral every 12 hours  clopidogrel Tablet 75 milliGRAM(s) Oral daily  DULoxetine 60 milliGRAM(s) Oral daily  levothyroxine 75 MICROGram(s) Oral <User Schedule>  polyethylene glycol 3350 17 Gram(s) Oral daily  QUEtiapine 100 milliGRAM(s) Oral <User Schedule>  QUEtiapine 50 milliGRAM(s) Oral daily  senna 2 Tablet(s) Oral at bedtime    MEDICATIONS  (PRN):  acetaminophen   Tablet .. 650 milliGRAM(s) Oral every 6 hours PRN Mild Pain (1 - 3), Moderate Pain (4 - 6)  ALBUTerol    90 MICROgram(s) HFA Inhaler 2 Puff(s) Inhalation every 6 hours PRN wheezing  calcium carbonate    500 mG (Tums) Chewable 1 Tablet(s) Chew every 8 hours PRN GERD  ibuprofen  Tablet. 400 milliGRAM(s) Oral two times a day PRN Moderate Pain (4 - 6)  lidocaine   Patch 2 Patch Transdermal daily PRN Pain  OLANZapine Injectable 2.5 milliGRAM(s) IntraMuscular once PRN extreme agitation  ondansetron    Tablet 4 milliGRAM(s) Oral every 6 hours PRN Nausea  oxyCODONE    IR 5 milliGRAM(s) Oral every 8 hours PRN Pain  QUEtiapine 12.5 milliGRAM(s) Oral every 6 hours PRN agitation  
MEDICATIONS  (STANDING):  aspirin enteric coated 81 milliGRAM(s) Oral daily  budesonide 160 MICROgram(s)/formoterol 4.5 MICROgram(s) Inhaler 2 Puff(s) Inhalation two times a day  clonazePAM  Tablet 0.5 milliGRAM(s) Oral two times a day  clopidogrel Tablet 75 milliGRAM(s) Oral daily  DULoxetine 90 milliGRAM(s) Oral daily  levothyroxine 75 MICROGram(s) Oral <User Schedule>  polyethylene glycol 3350 17 Gram(s) Oral daily  QUEtiapine 100 milliGRAM(s) Oral <User Schedule>  senna 2 Tablet(s) Oral at bedtime    MEDICATIONS  (PRN):  acetaminophen   Tablet .. 650 milliGRAM(s) Oral every 6 hours PRN Mild Pain (1 - 3), Moderate Pain (4 - 6)  ALBUTerol    90 MICROgram(s) HFA Inhaler 2 Puff(s) Inhalation every 6 hours PRN wheezing  calcium carbonate    500 mG (Tums) Chewable 1 Tablet(s) Chew every 8 hours PRN GERD  ibuprofen  Tablet. 400 milliGRAM(s) Oral two times a day PRN Moderate Pain (4 - 6)  lidocaine   Patch 2 Patch Transdermal daily PRN Pain  OLANZapine Injectable 2.5 milliGRAM(s) IntraMuscular once PRN extreme agitation  ondansetron    Tablet 4 milliGRAM(s) Oral every 6 hours PRN Nausea  oxyCODONE    IR 5 milliGRAM(s) Oral every 8 hours PRN Pain  QUEtiapine 12.5 milliGRAM(s) Oral every 6 hours PRN agitation  
MEDICATIONS  (STANDING):  aspirin enteric coated 81 milliGRAM(s) Oral daily  budesonide 160 MICROgram(s)/formoterol 4.5 MICROgram(s) Inhaler 2 Puff(s) Inhalation two times a day  clonazePAM  Tablet 0.5 milliGRAM(s) Oral <User Schedule>  clopidogrel Tablet 75 milliGRAM(s) Oral daily  DULoxetine 20 milliGRAM(s) Oral daily  levothyroxine 75 MICROGram(s) Oral <User Schedule>  pantoprazole    Tablet 40 milliGRAM(s) Oral <User Schedule>  polyethylene glycol 3350 17 Gram(s) Oral daily  QUEtiapine 100 milliGRAM(s) Oral <User Schedule>  senna 2 Tablet(s) Oral at bedtime    MEDICATIONS  (PRN):  acetaminophen   Tablet .. 650 milliGRAM(s) Oral every 6 hours PRN Mild Pain (1 - 3), Moderate Pain (4 - 6)  ALBUTerol    90 MICROgram(s) HFA Inhaler 2 Puff(s) Inhalation every 6 hours PRN wheezing  calcium carbonate    500 mG (Tums) Chewable 1 Tablet(s) Chew every 8 hours PRN GERD  ibuprofen  Tablet. 400 milliGRAM(s) Oral two times a day PRN Moderate Pain (4 - 6)  lidocaine   Patch 2 Patch Transdermal daily PRN Pain  OLANZapine Injectable 2.5 milliGRAM(s) IntraMuscular once PRN extreme agitation  ondansetron    Tablet 4 milliGRAM(s) Oral every 6 hours PRN Nausea  oxyCODONE    IR 5 milliGRAM(s) Oral every 8 hours PRN Pain  QUEtiapine 12.5 milliGRAM(s) Oral every 6 hours PRN agitation  
MEDICATIONS  (STANDING):  aspirin enteric coated 81 milliGRAM(s) Oral daily  budesonide 160 MICROgram(s)/formoterol 4.5 MICROgram(s) Inhaler 2 Puff(s) Inhalation two times a day  clonazePAM  Tablet 0.5 milliGRAM(s) Oral <User Schedule>  clopidogrel Tablet 75 milliGRAM(s) Oral daily  DULoxetine 60 milliGRAM(s) Oral daily  levothyroxine 75 MICROGram(s) Oral <User Schedule>  pantoprazole    Tablet 40 milliGRAM(s) Oral <User Schedule>  polyethylene glycol 3350 17 Gram(s) Oral daily  QUEtiapine 125 milliGRAM(s) Oral <User Schedule>  senna 2 Tablet(s) Oral at bedtime    MEDICATIONS  (PRN):  acetaminophen   Tablet .. 650 milliGRAM(s) Oral every 6 hours PRN Mild Pain (1 - 3), Moderate Pain (4 - 6)  ALBUTerol    90 MICROgram(s) HFA Inhaler 2 Puff(s) Inhalation every 6 hours PRN wheezing  calcium carbonate    500 mG (Tums) Chewable 1 Tablet(s) Chew every 8 hours PRN GERD  ibuprofen  Tablet. 400 milliGRAM(s) Oral two times a day PRN Moderate Pain (4 - 6)  lidocaine   Patch 2 Patch Transdermal daily PRN Pain  OLANZapine Injectable 2.5 milliGRAM(s) IntraMuscular once PRN extreme agitation  ondansetron    Tablet 4 milliGRAM(s) Oral every 6 hours PRN Nausea  oxyCODONE    IR 5 milliGRAM(s) Oral every 8 hours PRN Pain  QUEtiapine 12.5 milliGRAM(s) Oral every 6 hours PRN agitation  
MEDICATIONS  (STANDING):  aspirin enteric coated 81 milliGRAM(s) Oral daily  clonazePAM  Tablet 0.5 milliGRAM(s) Oral two times a day  clopidogrel Tablet 75 milliGRAM(s) Oral daily  levothyroxine 75 MICROGram(s) Oral daily  mirtazapine 7.5 milliGRAM(s) Oral at bedtime  QUEtiapine 12.5 milliGRAM(s) Oral two times a day    MEDICATIONS  (PRN):  acetaminophen   Tablet .. 650 milliGRAM(s) Oral every 6 hours PRN Mild Pain (1 - 3), Moderate Pain (4 - 6)  ALBUTerol    90 MICROgram(s) HFA Inhaler 2 Puff(s) Inhalation every 6 hours PRN wheezing  OLANZapine Injectable 2.5 milliGRAM(s) IntraMuscular once PRN extreme agitation  ondansetron    Tablet 4 milliGRAM(s) Oral every 6 hours PRN Nausea  oxyCODONE    IR 5 milliGRAM(s) Oral every 8 hours PRN Severe Pain (7 - 10)  QUEtiapine 12.5 milliGRAM(s) Oral every 6 hours PRN agitation  
MEDICATIONS  (STANDING):  aspirin enteric coated 81 milliGRAM(s) Oral daily  budesonide 160 MICROgram(s)/formoterol 4.5 MICROgram(s) Inhaler 2 Puff(s) Inhalation two times a day  clonazePAM  Tablet 0.5 milliGRAM(s) Oral <User Schedule>  clopidogrel Tablet 75 milliGRAM(s) Oral daily  DULoxetine 60 milliGRAM(s) Oral daily  levothyroxine 75 MICROGram(s) Oral <User Schedule>  pantoprazole    Tablet 40 milliGRAM(s) Oral <User Schedule>  polyethylene glycol 3350 17 Gram(s) Oral daily  QUEtiapine 125 milliGRAM(s) Oral <User Schedule>  senna 2 Tablet(s) Oral at bedtime    MEDICATIONS  (PRN):  acetaminophen   Tablet .. 650 milliGRAM(s) Oral every 6 hours PRN Mild Pain (1 - 3), Moderate Pain (4 - 6)  ALBUTerol    90 MICROgram(s) HFA Inhaler 2 Puff(s) Inhalation every 6 hours PRN wheezing  calcium carbonate    500 mG (Tums) Chewable 1 Tablet(s) Chew every 8 hours PRN GERD  ibuprofen  Tablet. 400 milliGRAM(s) Oral two times a day PRN Moderate Pain (4 - 6)  lidocaine   Patch 2 Patch Transdermal daily PRN Pain  OLANZapine Injectable 2.5 milliGRAM(s) IntraMuscular once PRN extreme agitation  ondansetron    Tablet 4 milliGRAM(s) Oral every 6 hours PRN Nausea  oxyCODONE    IR 5 milliGRAM(s) Oral every 8 hours PRN Pain  QUEtiapine 12.5 milliGRAM(s) Oral every 6 hours PRN agitation  
MEDICATIONS  (STANDING):  aspirin enteric coated 81 milliGRAM(s) Oral daily  budesonide 160 MICROgram(s)/formoterol 4.5 MICROgram(s) Inhaler 2 Puff(s) Inhalation two times a day  clonazePAM  Tablet 0.5 milliGRAM(s) Oral <User Schedule>  clopidogrel Tablet 75 milliGRAM(s) Oral daily  DULoxetine 20 milliGRAM(s) Oral daily  levothyroxine 75 MICROGram(s) Oral <User Schedule>  pantoprazole    Tablet 40 milliGRAM(s) Oral <User Schedule>  polyethylene glycol 3350 17 Gram(s) Oral daily  QUEtiapine 100 milliGRAM(s) Oral <User Schedule>  senna 2 Tablet(s) Oral at bedtime    MEDICATIONS  (PRN):  acetaminophen   Tablet .. 650 milliGRAM(s) Oral every 6 hours PRN Mild Pain (1 - 3), Moderate Pain (4 - 6)  ALBUTerol    90 MICROgram(s) HFA Inhaler 2 Puff(s) Inhalation every 6 hours PRN wheezing  calcium carbonate    500 mG (Tums) Chewable 1 Tablet(s) Chew every 8 hours PRN GERD  ibuprofen  Tablet. 400 milliGRAM(s) Oral two times a day PRN Moderate Pain (4 - 6)  lidocaine   Patch 2 Patch Transdermal daily PRN Pain  OLANZapine Injectable 2.5 milliGRAM(s) IntraMuscular once PRN extreme agitation  ondansetron    Tablet 4 milliGRAM(s) Oral every 6 hours PRN Nausea  oxyCODONE    IR 5 milliGRAM(s) Oral every 8 hours PRN Pain  QUEtiapine 12.5 milliGRAM(s) Oral every 6 hours PRN agitation  
MEDICATIONS  (STANDING):  aspirin enteric coated 81 milliGRAM(s) Oral daily  budesonide 160 MICROgram(s)/formoterol 4.5 MICROgram(s) Inhaler 2 Puff(s) Inhalation two times a day  clonazePAM  Tablet 0.5 milliGRAM(s) Oral two times a day  clopidogrel Tablet 75 milliGRAM(s) Oral daily  DULoxetine 40 milliGRAM(s) Oral daily  levothyroxine 75 MICROGram(s) Oral daily  polyethylene glycol 3350 17 Gram(s) Oral daily  QUEtiapine 75 milliGRAM(s) Oral at bedtime  senna 2 Tablet(s) Oral at bedtime    MEDICATIONS  (PRN):  acetaminophen   Tablet .. 650 milliGRAM(s) Oral every 6 hours PRN Mild Pain (1 - 3), Moderate Pain (4 - 6)  ALBUTerol    90 MICROgram(s) HFA Inhaler 2 Puff(s) Inhalation every 6 hours PRN wheezing  calcium carbonate    500 mG (Tums) Chewable 1 Tablet(s) Chew every 8 hours PRN GERD  ibuprofen  Tablet. 400 milliGRAM(s) Oral two times a day PRN Moderate Pain (4 - 6)  lidocaine   Patch 2 Patch Transdermal daily PRN Pain  OLANZapine Injectable 2.5 milliGRAM(s) IntraMuscular once PRN extreme agitation  ondansetron    Tablet 4 milliGRAM(s) Oral every 6 hours PRN Nausea  oxyCODONE    IR 5 milliGRAM(s) Oral every 8 hours PRN Pain  QUEtiapine 12.5 milliGRAM(s) Oral every 6 hours PRN agitation  
MEDICATIONS  (STANDING):  aspirin enteric coated 81 milliGRAM(s) Oral daily  budesonide 160 MICROgram(s)/formoterol 4.5 MICROgram(s) Inhaler 2 Puff(s) Inhalation two times a day  clonazePAM  Tablet 0.5 milliGRAM(s) Oral <User Schedule>  clopidogrel Tablet 75 milliGRAM(s) Oral daily  DULoxetine 20 milliGRAM(s) Oral daily  levothyroxine 75 MICROGram(s) Oral <User Schedule>  pantoprazole    Tablet 40 milliGRAM(s) Oral <User Schedule>  polyethylene glycol 3350 17 Gram(s) Oral daily  QUEtiapine 100 milliGRAM(s) Oral <User Schedule>  senna 2 Tablet(s) Oral at bedtime    MEDICATIONS  (PRN):  acetaminophen   Tablet .. 650 milliGRAM(s) Oral every 6 hours PRN Mild Pain (1 - 3), Moderate Pain (4 - 6)  ALBUTerol    90 MICROgram(s) HFA Inhaler 2 Puff(s) Inhalation every 6 hours PRN wheezing  calcium carbonate    500 mG (Tums) Chewable 1 Tablet(s) Chew every 8 hours PRN GERD  ibuprofen  Tablet. 400 milliGRAM(s) Oral two times a day PRN Moderate Pain (4 - 6)  lidocaine   Patch 2 Patch Transdermal daily PRN Pain  OLANZapine Injectable 2.5 milliGRAM(s) IntraMuscular once PRN extreme agitation  ondansetron    Tablet 4 milliGRAM(s) Oral every 6 hours PRN Nausea  oxyCODONE    IR 5 milliGRAM(s) Oral every 8 hours PRN Pain  QUEtiapine 12.5 milliGRAM(s) Oral every 6 hours PRN agitation  
MEDICATIONS  (STANDING):  aspirin enteric coated 81 milliGRAM(s) Oral daily  budesonide 160 MICROgram(s)/formoterol 4.5 MICROgram(s) Inhaler 2 Puff(s) Inhalation two times a day  clonazePAM  Tablet 0.5 milliGRAM(s) Oral two times a day  clopidogrel Tablet 75 milliGRAM(s) Oral daily  DULoxetine 60 milliGRAM(s) Oral daily  levothyroxine 75 MICROGram(s) Oral <User Schedule>  polyethylene glycol 3350 17 Gram(s) Oral daily  QUEtiapine 50 milliGRAM(s) Oral daily  QUEtiapine 100 milliGRAM(s) Oral <User Schedule>  senna 2 Tablet(s) Oral at bedtime    MEDICATIONS  (PRN):  acetaminophen   Tablet .. 650 milliGRAM(s) Oral every 6 hours PRN Mild Pain (1 - 3), Moderate Pain (4 - 6)  ALBUTerol    90 MICROgram(s) HFA Inhaler 2 Puff(s) Inhalation every 6 hours PRN wheezing  calcium carbonate    500 mG (Tums) Chewable 1 Tablet(s) Chew every 8 hours PRN GERD  ibuprofen  Tablet. 400 milliGRAM(s) Oral two times a day PRN Moderate Pain (4 - 6)  lidocaine   Patch 2 Patch Transdermal daily PRN Pain  OLANZapine Injectable 2.5 milliGRAM(s) IntraMuscular once PRN extreme agitation  ondansetron    Tablet 4 milliGRAM(s) Oral every 6 hours PRN Nausea  oxyCODONE    IR 5 milliGRAM(s) Oral every 8 hours PRN Pain  QUEtiapine 12.5 milliGRAM(s) Oral every 6 hours PRN agitation  
MEDICATIONS  (STANDING):  aspirin enteric coated 81 milliGRAM(s) Oral daily  budesonide 160 MICROgram(s)/formoterol 4.5 MICROgram(s) Inhaler 2 Puff(s) Inhalation two times a day  clonazePAM  Tablet 0.5 milliGRAM(s) Oral <User Schedule>  clopidogrel Tablet 75 milliGRAM(s) Oral daily  DULoxetine 60 milliGRAM(s) Oral daily  levothyroxine 75 MICROGram(s) Oral <User Schedule>  pantoprazole    Tablet 40 milliGRAM(s) Oral <User Schedule>  polyethylene glycol 3350 17 Gram(s) Oral daily  QUEtiapine 125 milliGRAM(s) Oral <User Schedule>  senna 2 Tablet(s) Oral at bedtime    MEDICATIONS  (PRN):  acetaminophen   Tablet .. 650 milliGRAM(s) Oral every 6 hours PRN Mild Pain (1 - 3), Moderate Pain (4 - 6)  ALBUTerol    90 MICROgram(s) HFA Inhaler 2 Puff(s) Inhalation every 6 hours PRN wheezing  calcium carbonate    500 mG (Tums) Chewable 1 Tablet(s) Chew every 8 hours PRN GERD  ibuprofen  Tablet. 400 milliGRAM(s) Oral two times a day PRN Moderate Pain (4 - 6)  lidocaine   Patch 2 Patch Transdermal daily PRN Pain  OLANZapine Injectable 2.5 milliGRAM(s) IntraMuscular once PRN extreme agitation  ondansetron    Tablet 4 milliGRAM(s) Oral every 6 hours PRN Nausea  oxyCODONE    IR 5 milliGRAM(s) Oral every 8 hours PRN Pain  QUEtiapine 12.5 milliGRAM(s) Oral every 6 hours PRN agitation  
MEDICATIONS  (STANDING):  aspirin enteric coated 81 milliGRAM(s) Oral daily  budesonide 160 MICROgram(s)/formoterol 4.5 MICROgram(s) Inhaler 2 Puff(s) Inhalation two times a day  clonazePAM  Tablet 0.5 milliGRAM(s) Oral <User Schedule>  clopidogrel Tablet 75 milliGRAM(s) Oral daily  DULoxetine 60 milliGRAM(s) Oral daily  levothyroxine 75 MICROGram(s) Oral <User Schedule>  pantoprazole    Tablet 40 milliGRAM(s) Oral <User Schedule>  polyethylene glycol 3350 17 Gram(s) Oral daily  QUEtiapine 125 milliGRAM(s) Oral <User Schedule>  senna 2 Tablet(s) Oral at bedtime    MEDICATIONS  (PRN):  acetaminophen   Tablet .. 650 milliGRAM(s) Oral every 6 hours PRN Mild Pain (1 - 3), Moderate Pain (4 - 6)  ALBUTerol    90 MICROgram(s) HFA Inhaler 2 Puff(s) Inhalation every 6 hours PRN wheezing  calcium carbonate    500 mG (Tums) Chewable 1 Tablet(s) Chew every 8 hours PRN GERD  ibuprofen  Tablet. 400 milliGRAM(s) Oral two times a day PRN Moderate Pain (4 - 6)  lidocaine   Patch 2 Patch Transdermal daily PRN Pain  OLANZapine Injectable 2.5 milliGRAM(s) IntraMuscular once PRN extreme agitation  ondansetron    Tablet 4 milliGRAM(s) Oral every 6 hours PRN Nausea  oxyCODONE    IR 5 milliGRAM(s) Oral every 8 hours PRN Pain  QUEtiapine 12.5 milliGRAM(s) Oral every 6 hours PRN agitation  
MEDICATIONS  (STANDING):  aspirin enteric coated 81 milliGRAM(s) Oral daily  budesonide 160 MICROgram(s)/formoterol 4.5 MICROgram(s) Inhaler 2 Puff(s) Inhalation two times a day  clonazePAM  Tablet 0.5 milliGRAM(s) Oral two times a day  clopidogrel Tablet 75 milliGRAM(s) Oral daily  DULoxetine 120 milliGRAM(s) Oral daily  guaiFENesin  milliGRAM(s) Oral every 12 hours  levothyroxine 75 MICROGram(s) Oral <User Schedule>  polyethylene glycol 3350 17 Gram(s) Oral daily  QUEtiapine 50 milliGRAM(s) Oral daily  QUEtiapine 25 milliGRAM(s) Oral once  QUEtiapine 100 milliGRAM(s) Oral <User Schedule>  senna 2 Tablet(s) Oral at bedtime    MEDICATIONS  (PRN):  acetaminophen   Tablet .. 650 milliGRAM(s) Oral every 6 hours PRN Mild Pain (1 - 3), Moderate Pain (4 - 6)  ALBUTerol    90 MICROgram(s) HFA Inhaler 2 Puff(s) Inhalation every 6 hours PRN wheezing  calcium carbonate    500 mG (Tums) Chewable 1 Tablet(s) Chew every 8 hours PRN GERD  ibuprofen  Tablet. 400 milliGRAM(s) Oral two times a day PRN Moderate Pain (4 - 6)  lidocaine   Patch 2 Patch Transdermal daily PRN Pain  OLANZapine Injectable 2.5 milliGRAM(s) IntraMuscular once PRN extreme agitation  ondansetron    Tablet 4 milliGRAM(s) Oral every 6 hours PRN Nausea  oxyCODONE    IR 5 milliGRAM(s) Oral every 8 hours PRN Pain  oxymetazoline 0.05% Nasal Spray 1 Spray(s) Both Nostrils two times a day PRN Nasal Congestion  QUEtiapine 12.5 milliGRAM(s) Oral every 6 hours PRN agitation  
MEDICATIONS  (STANDING):  aspirin enteric coated 81 milliGRAM(s) Oral daily  clonazePAM  Tablet 0.5 milliGRAM(s) Oral two times a day  clopidogrel Tablet 75 milliGRAM(s) Oral daily  DULoxetine 20 milliGRAM(s) Oral daily  levothyroxine 75 MICROGram(s) Oral daily  nitrofurantoin monohydrate/macrocrystals (MACROBID) 100 milliGRAM(s) Oral two times a day  polyethylene glycol 3350 17 Gram(s) Oral daily  QUEtiapine 12.5 milliGRAM(s) Oral daily  QUEtiapine 25 milliGRAM(s) Oral at bedtime  senna 2 Tablet(s) Oral at bedtime    MEDICATIONS  (PRN):  acetaminophen   Tablet .. 650 milliGRAM(s) Oral every 6 hours PRN Mild Pain (1 - 3), Moderate Pain (4 - 6)  ALBUTerol    90 MICROgram(s) HFA Inhaler 2 Puff(s) Inhalation every 6 hours PRN wheezing  ibuprofen  Tablet. 400 milliGRAM(s) Oral two times a day PRN Moderate Pain (4 - 6)  lidocaine   Patch 2 Patch Transdermal daily PRN Pain  OLANZapine Injectable 2.5 milliGRAM(s) IntraMuscular once PRN extreme agitation  ondansetron    Tablet 4 milliGRAM(s) Oral every 6 hours PRN Nausea  oxyCODONE    IR 5 milliGRAM(s) Oral every 8 hours PRN Severe Pain (7 - 10)  QUEtiapine 12.5 milliGRAM(s) Oral every 6 hours PRN agitation  
MEDICATIONS  (STANDING):  aspirin enteric coated 81 milliGRAM(s) Oral daily  budesonide 160 MICROgram(s)/formoterol 4.5 MICROgram(s) Inhaler 2 Puff(s) Inhalation two times a day  clonazePAM  Tablet 0.5 milliGRAM(s) Oral two times a day  clopidogrel Tablet 75 milliGRAM(s) Oral daily  DULoxetine 60 milliGRAM(s) Oral daily  levothyroxine 75 MICROGram(s) Oral <User Schedule>  polyethylene glycol 3350 17 Gram(s) Oral daily  QUEtiapine 50 milliGRAM(s) Oral daily  QUEtiapine 25 milliGRAM(s) Oral once  QUEtiapine 100 milliGRAM(s) Oral <User Schedule>  senna 2 Tablet(s) Oral at bedtime    MEDICATIONS  (PRN):  acetaminophen   Tablet .. 650 milliGRAM(s) Oral every 6 hours PRN Mild Pain (1 - 3), Moderate Pain (4 - 6)  ALBUTerol    90 MICROgram(s) HFA Inhaler 2 Puff(s) Inhalation every 6 hours PRN wheezing  calcium carbonate    500 mG (Tums) Chewable 1 Tablet(s) Chew every 8 hours PRN GERD  ibuprofen  Tablet. 400 milliGRAM(s) Oral two times a day PRN Moderate Pain (4 - 6)  lidocaine   Patch 2 Patch Transdermal daily PRN Pain  OLANZapine Injectable 2.5 milliGRAM(s) IntraMuscular once PRN extreme agitation  ondansetron    Tablet 4 milliGRAM(s) Oral every 6 hours PRN Nausea  oxyCODONE    IR 5 milliGRAM(s) Oral every 8 hours PRN Pain  QUEtiapine 12.5 milliGRAM(s) Oral every 6 hours PRN agitation  
MEDICATIONS  (STANDING):  aspirin enteric coated 81 milliGRAM(s) Oral daily  budesonide 160 MICROgram(s)/formoterol 4.5 MICROgram(s) Inhaler 2 Puff(s) Inhalation two times a day  clonazePAM  Tablet 0.5 milliGRAM(s) Oral <User Schedule>  clopidogrel Tablet 75 milliGRAM(s) Oral daily  DULoxetine 20 milliGRAM(s) Oral daily  levothyroxine 75 MICROGram(s) Oral <User Schedule>  pantoprazole    Tablet 40 milliGRAM(s) Oral <User Schedule>  polyethylene glycol 3350 17 Gram(s) Oral daily  QUEtiapine 100 milliGRAM(s) Oral <User Schedule>  senna 2 Tablet(s) Oral at bedtime    MEDICATIONS  (PRN):  acetaminophen   Tablet .. 650 milliGRAM(s) Oral every 6 hours PRN Mild Pain (1 - 3), Moderate Pain (4 - 6)  ALBUTerol    90 MICROgram(s) HFA Inhaler 2 Puff(s) Inhalation every 6 hours PRN wheezing  calcium carbonate    500 mG (Tums) Chewable 1 Tablet(s) Chew every 8 hours PRN GERD  ibuprofen  Tablet. 400 milliGRAM(s) Oral two times a day PRN Moderate Pain (4 - 6)  lidocaine   Patch 2 Patch Transdermal daily PRN Pain  OLANZapine Injectable 2.5 milliGRAM(s) IntraMuscular once PRN extreme agitation  ondansetron    Tablet 4 milliGRAM(s) Oral every 6 hours PRN Nausea  oxyCODONE    IR 5 milliGRAM(s) Oral every 8 hours PRN Pain  QUEtiapine 12.5 milliGRAM(s) Oral every 6 hours PRN agitation  
MEDICATIONS  (STANDING):  aspirin enteric coated 81 milliGRAM(s) Oral daily  budesonide 160 MICROgram(s)/formoterol 4.5 MICROgram(s) Inhaler 2 Puff(s) Inhalation two times a day  clonazePAM  Tablet 0.5 milliGRAM(s) Oral <User Schedule>  clopidogrel Tablet 75 milliGRAM(s) Oral daily  DULoxetine 20 milliGRAM(s) Oral daily  levothyroxine 75 MICROGram(s) Oral <User Schedule>  pantoprazole    Tablet 40 milliGRAM(s) Oral <User Schedule>  polyethylene glycol 3350 17 Gram(s) Oral daily  QUEtiapine 100 milliGRAM(s) Oral <User Schedule>  senna 2 Tablet(s) Oral at bedtime    MEDICATIONS  (PRN):  acetaminophen   Tablet .. 650 milliGRAM(s) Oral every 6 hours PRN Mild Pain (1 - 3), Moderate Pain (4 - 6)  ALBUTerol    90 MICROgram(s) HFA Inhaler 2 Puff(s) Inhalation every 6 hours PRN wheezing  calcium carbonate    500 mG (Tums) Chewable 1 Tablet(s) Chew every 8 hours PRN GERD  ibuprofen  Tablet. 400 milliGRAM(s) Oral two times a day PRN Moderate Pain (4 - 6)  lidocaine   Patch 2 Patch Transdermal daily PRN Pain  OLANZapine Injectable 2.5 milliGRAM(s) IntraMuscular once PRN extreme agitation  ondansetron    Tablet 4 milliGRAM(s) Oral every 6 hours PRN Nausea  oxyCODONE    IR 5 milliGRAM(s) Oral every 8 hours PRN Pain  QUEtiapine 12.5 milliGRAM(s) Oral every 6 hours PRN agitation  
MEDICATIONS  (STANDING):  aspirin enteric coated 81 milliGRAM(s) Oral daily  clonazePAM  Tablet 0.5 milliGRAM(s) Oral two times a day  clopidogrel Tablet 75 milliGRAM(s) Oral daily  DULoxetine 20 milliGRAM(s) Oral daily  levothyroxine 75 MICROGram(s) Oral daily  QUEtiapine 12.5 milliGRAM(s) Oral daily  QUEtiapine 25 milliGRAM(s) Oral at bedtime  senna 2 Tablet(s) Oral at bedtime    MEDICATIONS  (PRN):  acetaminophen   Tablet .. 650 milliGRAM(s) Oral every 6 hours PRN Mild Pain (1 - 3), Moderate Pain (4 - 6)  ALBUTerol    90 MICROgram(s) HFA Inhaler 2 Puff(s) Inhalation every 6 hours PRN wheezing  OLANZapine Injectable 2.5 milliGRAM(s) IntraMuscular once PRN extreme agitation  ondansetron    Tablet 4 milliGRAM(s) Oral every 6 hours PRN Nausea  oxyCODONE    IR 5 milliGRAM(s) Oral every 8 hours PRN Severe Pain (7 - 10)  QUEtiapine 12.5 milliGRAM(s) Oral every 6 hours PRN agitation  
MEDICATIONS  (STANDING):  aspirin enteric coated 81 milliGRAM(s) Oral daily  clonazePAM  Tablet 0.5 milliGRAM(s) Oral two times a day  clopidogrel Tablet 75 milliGRAM(s) Oral daily  levothyroxine 75 MICROGram(s) Oral daily  mirtazapine 7.5 milliGRAM(s) Oral at bedtime  QUEtiapine 12.5 milliGRAM(s) Oral two times a day    MEDICATIONS  (PRN):  acetaminophen   Tablet .. 650 milliGRAM(s) Oral every 6 hours PRN Mild Pain (1 - 3), Moderate Pain (4 - 6)  ALBUTerol    90 MICROgram(s) HFA Inhaler 2 Puff(s) Inhalation every 6 hours PRN wheezing  OLANZapine Injectable 2.5 milliGRAM(s) IntraMuscular once PRN extreme agitation  ondansetron    Tablet 4 milliGRAM(s) Oral every 6 hours PRN Nausea  oxyCODONE    IR 5 milliGRAM(s) Oral every 8 hours PRN Severe Pain (7 - 10)  QUEtiapine 25 milliGRAM(s) Oral every 6 hours PRN agitation  
MEDICATIONS  (STANDING):  aspirin enteric coated 81 milliGRAM(s) Oral daily  budesonide 160 MICROgram(s)/formoterol 4.5 MICROgram(s) Inhaler 2 Puff(s) Inhalation two times a day  clonazePAM  Tablet 0.5 milliGRAM(s) Oral two times a day  clopidogrel Tablet 75 milliGRAM(s) Oral daily  DULoxetine 60 milliGRAM(s) Oral daily  levothyroxine 75 MICROGram(s) Oral <User Schedule>  polyethylene glycol 3350 17 Gram(s) Oral daily  QUEtiapine 75 milliGRAM(s) Oral <User Schedule>  QUEtiapine 75 milliGRAM(s) Oral at bedtime  senna 2 Tablet(s) Oral at bedtime    MEDICATIONS  (PRN):  acetaminophen   Tablet .. 650 milliGRAM(s) Oral every 6 hours PRN Mild Pain (1 - 3), Moderate Pain (4 - 6)  ALBUTerol    90 MICROgram(s) HFA Inhaler 2 Puff(s) Inhalation every 6 hours PRN wheezing  calcium carbonate    500 mG (Tums) Chewable 1 Tablet(s) Chew every 8 hours PRN GERD  ibuprofen  Tablet. 400 milliGRAM(s) Oral two times a day PRN Moderate Pain (4 - 6)  lidocaine   Patch 2 Patch Transdermal daily PRN Pain  OLANZapine Injectable 2.5 milliGRAM(s) IntraMuscular once PRN extreme agitation  ondansetron    Tablet 4 milliGRAM(s) Oral every 6 hours PRN Nausea  oxyCODONE    IR 5 milliGRAM(s) Oral every 8 hours PRN Pain  QUEtiapine 12.5 milliGRAM(s) Oral every 6 hours PRN agitation  
MEDICATIONS  (STANDING):  aspirin enteric coated 81 milliGRAM(s) Oral daily  budesonide 160 MICROgram(s)/formoterol 4.5 MICROgram(s) Inhaler 2 Puff(s) Inhalation two times a day  clonazePAM  Tablet 0.5 milliGRAM(s) Oral two times a day  clopidogrel Tablet 75 milliGRAM(s) Oral daily  DULoxetine 90 milliGRAM(s) Oral daily  guaiFENesin  milliGRAM(s) Oral every 12 hours  levothyroxine 75 MICROGram(s) Oral <User Schedule>  polyethylene glycol 3350 17 Gram(s) Oral daily  QUEtiapine 25 milliGRAM(s) Oral daily  QUEtiapine 100 milliGRAM(s) Oral <User Schedule>  senna 2 Tablet(s) Oral at bedtime    MEDICATIONS  (PRN):  acetaminophen   Tablet .. 650 milliGRAM(s) Oral every 6 hours PRN Mild Pain (1 - 3), Moderate Pain (4 - 6)  ALBUTerol    90 MICROgram(s) HFA Inhaler 2 Puff(s) Inhalation every 6 hours PRN wheezing  calcium carbonate    500 mG (Tums) Chewable 1 Tablet(s) Chew every 8 hours PRN GERD  ibuprofen  Tablet. 400 milliGRAM(s) Oral two times a day PRN Moderate Pain (4 - 6)  lidocaine   Patch 2 Patch Transdermal daily PRN Pain  OLANZapine Injectable 2.5 milliGRAM(s) IntraMuscular once PRN extreme agitation  ondansetron    Tablet 4 milliGRAM(s) Oral every 6 hours PRN Nausea  oxyCODONE    IR 5 milliGRAM(s) Oral every 8 hours PRN Pain  oxymetazoline 0.05% Nasal Spray 1 Spray(s) Both Nostrils two times a day PRN Nasal Congestion  QUEtiapine 12.5 milliGRAM(s) Oral every 6 hours PRN agitation  
MEDICATIONS  (STANDING):  aspirin enteric coated 81 milliGRAM(s) Oral daily  clonazePAM  Tablet 0.5 milliGRAM(s) Oral two times a day  clopidogrel Tablet 75 milliGRAM(s) Oral daily  DULoxetine 20 milliGRAM(s) Oral daily  levothyroxine 75 MICROGram(s) Oral daily  QUEtiapine 12.5 milliGRAM(s) Oral daily  QUEtiapine 25 milliGRAM(s) Oral at bedtime  senna 2 Tablet(s) Oral at bedtime    MEDICATIONS  (PRN):  acetaminophen   Tablet .. 650 milliGRAM(s) Oral every 6 hours PRN Mild Pain (1 - 3), Moderate Pain (4 - 6)  ALBUTerol    90 MICROgram(s) HFA Inhaler 2 Puff(s) Inhalation every 6 hours PRN wheezing  OLANZapine Injectable 2.5 milliGRAM(s) IntraMuscular once PRN extreme agitation  ondansetron    Tablet 4 milliGRAM(s) Oral every 6 hours PRN Nausea  oxyCODONE    IR 5 milliGRAM(s) Oral every 8 hours PRN Severe Pain (7 - 10)  QUEtiapine 12.5 milliGRAM(s) Oral every 6 hours PRN agitation  
MEDICATIONS  (STANDING):  aspirin enteric coated 81 milliGRAM(s) Oral daily  budesonide 160 MICROgram(s)/formoterol 4.5 MICROgram(s) Inhaler 2 Puff(s) Inhalation two times a day  clonazePAM  Tablet 0.5 milliGRAM(s) Oral two times a day  clopidogrel Tablet 75 milliGRAM(s) Oral daily  DULoxetine 90 milliGRAM(s) Oral daily  guaiFENesin  milliGRAM(s) Oral every 12 hours  levothyroxine 75 MICROGram(s) Oral <User Schedule>  polyethylene glycol 3350 17 Gram(s) Oral daily  QUEtiapine 25 milliGRAM(s) Oral daily  QUEtiapine 100 milliGRAM(s) Oral <User Schedule>  senna 2 Tablet(s) Oral at bedtime    MEDICATIONS  (PRN):  acetaminophen   Tablet .. 650 milliGRAM(s) Oral every 6 hours PRN Mild Pain (1 - 3), Moderate Pain (4 - 6)  ALBUTerol    90 MICROgram(s) HFA Inhaler 2 Puff(s) Inhalation every 6 hours PRN wheezing  calcium carbonate    500 mG (Tums) Chewable 1 Tablet(s) Chew every 8 hours PRN GERD  ibuprofen  Tablet. 400 milliGRAM(s) Oral two times a day PRN Moderate Pain (4 - 6)  lidocaine   Patch 2 Patch Transdermal daily PRN Pain  OLANZapine Injectable 2.5 milliGRAM(s) IntraMuscular once PRN extreme agitation  ondansetron    Tablet 4 milliGRAM(s) Oral every 6 hours PRN Nausea  oxyCODONE    IR 5 milliGRAM(s) Oral every 8 hours PRN Pain  oxymetazoline 0.05% Nasal Spray 1 Spray(s) Both Nostrils two times a day PRN Nasal Congestion  QUEtiapine 12.5 milliGRAM(s) Oral every 6 hours PRN agitation

## 2021-07-08 NOTE — ECT PRE-PROCEDURE CHECKLIST - [id number]_A_NUR
verbal instruction

## 2021-07-08 NOTE — BH DISCHARGE NOTE NURSING/SOCIAL WORK/PSYCH REHAB - NSDCPRGOAL_PSY_ALL_CORE
Writer met with patient in order to review progress toward rehabilitation goals, assess current functioning, engage in safety planning, and provide patient with a copy of the unit schedule. Patient was hospitalized due to worsening depression with psychotic features. Patient was compliant with her medication regimen on the unit, and received ECT treatments which have improved patient's symptoms. Patient reported feeling less anxious and depressed, though she did endorse some anxiety in regards to leaving hospital. Patient was able to identify her early warning signs of relapse such as hallucinations, and low mood. Patient identified coping skills to manage symptoms such as walking her dog , and spending time with her  and family.  Patient attended groups and was visible in the community. Patient was pleasant and cooperative and was not a behavioral management issue. Patient verbally agreed to continue to take medication as prescribed and continue to utilize coping skills for relapse prevention.

## 2021-07-08 NOTE — ECT TREATMENT NOTE - NSECTTXELECPLACE_PSY_ALL_CORE
Right Unilateral

## 2021-07-08 NOTE — BH INPATIENT PSYCHIATRY PROGRESS NOTE - NSTXCOPEGOAL_PSY_ALL_CORE
Identify and utilize 2 coping skills that meet their needs

## 2021-07-08 NOTE — ECT TREATMENT NOTE - NSICDXBHPRIMARYDX_PSY_ALL_CORE
Severe episode of recurrent major depressive disorder, with psychotic features   F33.3  

## 2021-07-08 NOTE — BH INPATIENT PSYCHIATRY PROGRESS NOTE - MSE UNSTRUCTURED FT
Patient is awake and alert. No PM agitation/retardation. Speech is fluent. Reports that she feels good, reports stable mood. Affect is bright. Denies SI/HI. TP is coherent. No delusions. No perceptual disturbance. Insight and Judgment good.

## 2021-07-08 NOTE — BH INPATIENT PSYCHIATRY PROGRESS NOTE - MSE OPTIONS
Structured MSE
Unstructured MSE
Structured MSE
Unstructured MSE
Structured MSE
Unstructured MSE
Structured MSE
Unstructured MSE
Structured MSE
Structured MSE
Unstructured MSE
Structured MSE
Unstructured MSE
Structured MSE
Unstructured MSE
Unstructured MSE
Structured MSE

## 2021-07-08 NOTE — BH INPATIENT PSYCHIATRY PROGRESS NOTE - NSTXCOPEDATETRGT_PSY_ALL_CORE
02-Jul-2021
02-Jul-2021
17-Jun-2021
25-Jun-2021
17-Jun-2021
02-Jul-2021
04-Jun-2021
09-Jul-2021
02-Jul-2021
25-Jun-2021
04-Jun-2021
17-Jun-2021
02-Jul-2021
17-Jun-2021
25-Jun-2021
04-Jun-2021
04-Jun-2021
02-Jul-2021
09-Jul-2021
25-Jun-2021
25-Jun-2021
02-Jul-2021
08-Jul-2021
18-Jun-2021
18-Jun-2021
04-Jun-2021
09-Jul-2021
18-Jun-2021
17-Jun-2021
18-Jun-2021
18-Jun-2021

## 2021-07-08 NOTE — BH INPATIENT PSYCHIATRY PROGRESS NOTE - NSBHFUPINTERVALCCFT_PSY_A_CORE
"I'm going home today"
Patient seen for follow up for depression and psychosis.
"I'm ok"
"I have not had a BM in 4 days"
"I used to be on more meds"
"I'm ok"
"I feel dizzy"
"I'm a little nauseous"
Follow up major depression recurrent with psychosis
"I'm ok"
"I had a very small BM"
"I'm ok"
"I'm bored"
"I have not slept as well the last couple of days"
"hi"
"I have a headache"
"I feel good"
"I'm ok"
Patient seen for follow up for depression and psychosis.
"I am ok"
"I'm ok"
"I feel dizzy"
"I'm ok"
"hi"
"I'm a little nauseous"
"I'm ok"
"I'm ok"
"I'm a little better today"
Patient denies dizziness, reports some MORENA asked for prn inhaler
"I'm ok"
"I'm worried about going home and feeling lonely"
"I need something for my hearing"
"I felt a little low over the weekend"
"I felt a little low over the weekend"
Follow up major depression recurrent with psychosis
"I'm think I need more seroquel"
"I'm worried about going home and feeling lonely"
"I felt a little low over the weekend"
"I'm ok"

## 2021-07-08 NOTE — BH INPATIENT PSYCHIATRY PROGRESS NOTE - NSCGIIMPROVESX_PSY_ALL_CORE

## 2021-07-08 NOTE — BH INPATIENT PSYCHIATRY PROGRESS NOTE - NSICDXBHPRIMARYDX_PSY_ALL_CORE
Severe episode of recurrent major depressive disorder, with psychotic features   F33.3  

## 2021-07-08 NOTE — ECT PRE-PROCEDURE CHECKLIST - CAREGIVER PHONE NUMBER
[phone]
[phone]
[phone]
[phone]
[phone]
[phone]
[phone]
[phone]
[phone]
[phone]
[phone]

## 2021-07-09 ENCOUNTER — OUTPATIENT (OUTPATIENT)
Dept: OUTPATIENT SERVICES | Facility: HOSPITAL | Age: 71
LOS: 1 days | Discharge: HOME | End: 2021-07-09
Payer: MEDICARE

## 2021-07-09 DIAGNOSIS — Z98.890 OTHER SPECIFIED POSTPROCEDURAL STATES: Chronic | ICD-10-CM

## 2021-07-09 DIAGNOSIS — Z90.49 ACQUIRED ABSENCE OF OTHER SPECIFIED PARTS OF DIGESTIVE TRACT: Chronic | ICD-10-CM

## 2021-07-09 DIAGNOSIS — Z90.79 ACQUIRED ABSENCE OF OTHER GENITAL ORGAN(S): Chronic | ICD-10-CM

## 2021-07-15 VITALS
RESPIRATION RATE: 19 BRPM | OXYGEN SATURATION: 98 % | DIASTOLIC BLOOD PRESSURE: 89 MMHG | TEMPERATURE: 98 F | SYSTOLIC BLOOD PRESSURE: 135 MMHG | HEART RATE: 102 BPM

## 2021-07-15 DIAGNOSIS — F41.9 ANXIETY DISORDER, UNSPECIFIED: ICD-10-CM

## 2021-07-15 LAB — SARS-COV-2 RNA SPEC QL NAA+PROBE: SIGNIFICANT CHANGE UP

## 2021-07-15 PROCEDURE — 90870 ELECTROCONVULSIVE THERAPY: CPT

## 2021-07-15 RX ORDER — MIDAZOLAM HYDROCHLORIDE 1 MG/ML
2 INJECTION, SOLUTION INTRAMUSCULAR; INTRAVENOUS ONCE
Refills: 0 | Status: DISCONTINUED | OUTPATIENT
Start: 2021-07-15 | End: 2021-07-15

## 2021-07-15 RX ORDER — FLUMAZENIL 0.1 MG/ML
0.2 VIAL (ML) INTRAVENOUS ONCE
Refills: 0 | Status: COMPLETED | OUTPATIENT
Start: 2021-07-15 | End: 2021-07-15

## 2021-07-15 RX ADMIN — MIDAZOLAM HYDROCHLORIDE 2 MILLIGRAM(S): 1 INJECTION, SOLUTION INTRAMUSCULAR; INTRAVENOUS at 13:04

## 2021-07-15 RX ADMIN — Medication 0.2 MILLIGRAM(S): at 13:01

## 2021-07-15 NOTE — ECT TREATMENT NOTE - NSICDXBHTERTIARYDX_PSY_ALL_CORE
R/O Delirium   R41.0  R/O Cognitive disorder   F09  R/O Delirium due to general medical condition   F05  
R/O Delirium   R41.0  R/O Cognitive disorder   F09  R/O Delirium due to general medical condition   F05

## 2021-07-15 NOTE — ECT TREATMENT NOTE - NSECTCOMMENTS_PSY_ALL_CORE
States that she is doing well, denies any clear mood sx's, denies insomnia. stable functioning. No SEs. Will cont weekly treatment

## 2021-07-22 LAB — SARS-COV-2 RNA SPEC QL NAA+PROBE: SIGNIFICANT CHANGE UP

## 2021-07-22 PROCEDURE — 90870 ELECTROCONVULSIVE THERAPY: CPT

## 2021-07-22 RX ORDER — FLUMAZENIL 0.1 MG/ML
0.2 VIAL (ML) INTRAVENOUS ONCE
Refills: 0 | Status: COMPLETED | OUTPATIENT
Start: 2021-07-22 | End: 2021-07-22

## 2021-07-22 RX ORDER — MIDAZOLAM HYDROCHLORIDE 1 MG/ML
2 INJECTION, SOLUTION INTRAMUSCULAR; INTRAVENOUS ONCE
Refills: 0 | Status: DISCONTINUED | OUTPATIENT
Start: 2021-07-22 | End: 2021-07-22

## 2021-07-22 RX ADMIN — Medication 0.2 MILLIGRAM(S): at 09:56

## 2021-07-22 RX ADMIN — MIDAZOLAM HYDROCHLORIDE 2 MILLIGRAM(S): 1 INJECTION, SOLUTION INTRAMUSCULAR; INTRAVENOUS at 10:00

## 2021-07-22 NOTE — ECT TREATMENT NOTE - NSECTCOMMENTS_PSY_ALL_CORE
Today was the second weekly treatment. Patient reports doing well. Sleep: normal, appetite: good. Mood: euthymic, Affect: appropriate. Energy normal, but physical mobility limited by her back pain. Denies any death wish/suicidal ideation/intent/plan.     Maintenance consent obtained. Risk vs benefits discussed at length.

## 2021-07-29 PROCEDURE — 90870 ELECTROCONVULSIVE THERAPY: CPT

## 2021-07-29 RX ORDER — FLUMAZENIL 0.1 MG/ML
0.2 VIAL (ML) INTRAVENOUS ONCE
Refills: 0 | Status: COMPLETED | OUTPATIENT
Start: 2021-07-29 | End: 2021-07-29

## 2021-07-29 RX ORDER — MIDAZOLAM HYDROCHLORIDE 1 MG/ML
2 INJECTION, SOLUTION INTRAMUSCULAR; INTRAVENOUS ONCE
Refills: 0 | Status: DISCONTINUED | OUTPATIENT
Start: 2021-07-29 | End: 2021-07-29

## 2021-07-29 RX ADMIN — MIDAZOLAM HYDROCHLORIDE 2 MILLIGRAM(S): 1 INJECTION, SOLUTION INTRAMUSCULAR; INTRAVENOUS at 08:31

## 2021-07-29 RX ADMIN — Medication 0.2 MILLIGRAM(S): at 08:26

## 2021-07-29 NOTE — ECT TREATMENT NOTE - NSECTCOMMENTS_PSY_ALL_CORE
Patient states she has been stable over last few weeks and asks if she can taper to q2 weeks.   Discussed report that today is 3rd weekly and we can go to 4th before but she feels she is ready now.   Pt aware with any taper, there is some risk of relapse and will call us if any symptoms develop.   Denies feeling depressed, sleep and appetite good.  Will taper to q2 weeks. Patient states she has been stable over last few weeks and asks if she can taper to q2 weeks.   Discussed report that today is 3rd weekly and we can go to 4th before but she feels she is ready now.   Pt aware with any taper, there is some risk of relapse and will call us if any symptoms develop.   Denies feeling depressed, sleep and appetite good.  Will taper to q2 weeks.    Consider further inc in energy at next visit and hyperventilation.

## 2021-08-11 LAB — SARS-COV-2 RNA SPEC QL NAA+PROBE: SIGNIFICANT CHANGE UP

## 2021-08-12 PROCEDURE — 90870 ELECTROCONVULSIVE THERAPY: CPT

## 2021-08-12 RX ORDER — MIDAZOLAM HYDROCHLORIDE 1 MG/ML
2 INJECTION, SOLUTION INTRAMUSCULAR; INTRAVENOUS ONCE
Refills: 0 | Status: DISCONTINUED | OUTPATIENT
Start: 2021-08-12 | End: 2021-08-12

## 2021-08-12 RX ORDER — FLUMAZENIL 0.1 MG/ML
0.2 VIAL (ML) INTRAVENOUS ONCE
Refills: 0 | Status: COMPLETED | OUTPATIENT
Start: 2021-08-12 | End: 2021-08-12

## 2021-08-12 RX ADMIN — Medication 0.2 MILLIGRAM(S): at 09:04

## 2021-08-12 RX ADMIN — MIDAZOLAM HYDROCHLORIDE 2 MILLIGRAM(S): 1 INJECTION, SOLUTION INTRAMUSCULAR; INTRAVENOUS at 09:11

## 2021-08-12 NOTE — ECT TREATMENT NOTE - NSECTCOMMENTS_PSY_ALL_CORE
Patient states she is doing well.  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  Denies relapse of any depression symptoms.   Will have pt return in 2 weeks.  If stable, consider further taper.    Patient treated with robust hyperventilation.

## 2021-08-16 NOTE — ED PROVIDER NOTE - CARDIAC, MLM
Normal rate, regular rhythm.  Heart sounds S1, S2.  No murmurs, rubs or gallops. no leg swelling, + b lines on echo
Truman

## 2021-08-25 LAB — SARS-COV-2 RNA SPEC QL NAA+PROBE: SIGNIFICANT CHANGE UP

## 2021-08-26 PROCEDURE — 90870 ELECTROCONVULSIVE THERAPY: CPT

## 2021-08-26 RX ORDER — FLUMAZENIL 0.1 MG/ML
0.2 VIAL (ML) INTRAVENOUS ONCE
Refills: 0 | Status: COMPLETED | OUTPATIENT
Start: 2021-08-26 | End: 2021-08-26

## 2021-08-26 RX ORDER — MIDAZOLAM HYDROCHLORIDE 1 MG/ML
2 INJECTION, SOLUTION INTRAMUSCULAR; INTRAVENOUS ONCE
Refills: 0 | Status: DISCONTINUED | OUTPATIENT
Start: 2021-08-26 | End: 2021-08-26

## 2021-08-26 RX ADMIN — Medication 0.2 MILLIGRAM(S): at 08:56

## 2021-08-26 RX ADMIN — MIDAZOLAM HYDROCHLORIDE 2 MILLIGRAM(S): 1 INJECTION, SOLUTION INTRAMUSCULAR; INTRAVENOUS at 08:59

## 2021-08-26 NOTE — ECT PRE-PROCEDURE CHECKLIST - NSMEDIMPLDEVICE_PSY_ALL_CORE
spinal stimulator off/Implantable Device/Other (please specify) spinal stimulator off 08/26/21/Implantable Device/Other (please specify)

## 2021-08-26 NOTE — ECT TREATMENT NOTE - NSECTCOMMENTS_PSY_ALL_CORE
This is the second biweekly treatment. Patient reports she is doing well and she did not experience any depressive symptoms. Disappointed when I recommended to continue with maintenance ECT as she thought that this would be the one before her last treatment. Advised to discuss further treatment plan with Dr. Urrutia

## 2021-08-27 DIAGNOSIS — F33.3 MAJOR DEPRESSIVE DISORDER, RECURRENT, SEVERE WITH PSYCHOTIC SYMPTOMS: ICD-10-CM

## 2021-09-13 LAB — SARS-COV-2 RNA SPEC QL NAA+PROBE: SIGNIFICANT CHANGE UP

## 2021-09-16 PROCEDURE — 90870 ELECTROCONVULSIVE THERAPY: CPT

## 2021-09-16 RX ORDER — MIDAZOLAM HYDROCHLORIDE 1 MG/ML
2 INJECTION, SOLUTION INTRAMUSCULAR; INTRAVENOUS ONCE
Refills: 0 | Status: DISCONTINUED | OUTPATIENT
Start: 2021-09-16 | End: 2021-09-16

## 2021-09-16 RX ORDER — FLUMAZENIL 0.1 MG/ML
0.2 VIAL (ML) INTRAVENOUS ONCE
Refills: 0 | Status: COMPLETED | OUTPATIENT
Start: 2021-09-16 | End: 2021-09-16

## 2021-09-16 RX ADMIN — MIDAZOLAM HYDROCHLORIDE 2 MILLIGRAM(S): 1 INJECTION, SOLUTION INTRAMUSCULAR; INTRAVENOUS at 10:50

## 2021-09-16 RX ADMIN — Medication 0.2 MILLIGRAM(S): at 10:21

## 2021-09-16 NOTE — ECT TREATMENT NOTE - NSECTCOMMENTS_PSY_ALL_CORE
Pt cancelled last week's appointment and was treated today 3 weeks after her last treatment. She reports doing mostly OK  except that she is anxious about before the treatment. However Dr. Urrutia's note of 9/8 indicates that pt has been more anxious in general.   We'll continue with treatments q 2 weeks as per Dr Urrutia's treatment plan

## 2021-09-29 LAB — SARS-COV-2 RNA SPEC QL NAA+PROBE: SIGNIFICANT CHANGE UP

## 2021-10-01 PROCEDURE — 90870 ELECTROCONVULSIVE THERAPY: CPT

## 2021-10-01 RX ORDER — MIDAZOLAM HYDROCHLORIDE 1 MG/ML
2 INJECTION, SOLUTION INTRAMUSCULAR; INTRAVENOUS ONCE
Refills: 0 | Status: DISCONTINUED | OUTPATIENT
Start: 2021-10-01 | End: 2021-10-01

## 2021-10-01 RX ORDER — FLUMAZENIL 0.1 MG/ML
0.2 VIAL (ML) INTRAVENOUS ONCE
Refills: 0 | Status: COMPLETED | OUTPATIENT
Start: 2021-10-01 | End: 2021-10-01

## 2021-10-01 RX ADMIN — MIDAZOLAM HYDROCHLORIDE 2 MILLIGRAM(S): 1 INJECTION, SOLUTION INTRAMUSCULAR; INTRAVENOUS at 09:11

## 2021-10-01 RX ADMIN — Medication 0.2 MILLIGRAM(S): at 09:05

## 2021-10-01 NOTE — ECT TREATMENT NOTE - NSECTCOMMENTS_PSY_ALL_CORE
She reports doing mostly OK , denies anxiety, except about before the treatment itself. She wants to reduce frequency. Will decrease frequency, will alert Dr Urrutia.

## 2021-10-20 ENCOUNTER — APPOINTMENT (OUTPATIENT)
Dept: PULMONOLOGY | Facility: CLINIC | Age: 71
End: 2021-10-20
Payer: MEDICARE

## 2021-10-20 VITALS
HEIGHT: 55 IN | RESPIRATION RATE: 16 BRPM | WEIGHT: 98 LBS | TEMPERATURE: 97.5 F | OXYGEN SATURATION: 95 % | BODY MASS INDEX: 22.68 KG/M2 | SYSTOLIC BLOOD PRESSURE: 122 MMHG | HEART RATE: 104 BPM | DIASTOLIC BLOOD PRESSURE: 78 MMHG

## 2021-10-20 LAB — SARS-COV-2 RNA SPEC QL NAA+PROBE: SIGNIFICANT CHANGE UP

## 2021-10-20 PROCEDURE — 99214 OFFICE O/P EST MOD 30 MIN: CPT | Mod: 25

## 2021-10-20 PROCEDURE — 94618 PULMONARY STRESS TESTING: CPT

## 2021-10-20 NOTE — ASSESSMENT
[FreeTextEntry1] : Ms. Primrose is a 71 year old female with history of COPD / moderate-severe persistent asthma / allergy / paralyzed hemidiaphragm / cardiac disease / CHF/ eosinophilic asthma. She presents to the office for - steroid dependent asthma/ eosiniphilic asthma \par \par Her shortness of breath is multifactorial due to:\par -cardiac/ mild congestive heart failure\par -severe persistent asthma\par -allergic rhinitis/post nasal drip syndrome\par -paralyzed hemidiaphragm s/p plication\par -poor breathing mechanics and anxiety \par \par problem 1: COPD/ asthma (seer persistent_\par -continue Xopenex 0.63 by the nebulizer up to QID \par -continue to use Advair 500 at 1 inhalation BID\par -continue Spiriva Respimat 2 inhalations QD; change to Tudorza at 1 inhalation BID\par -continue Singulair 10 mg before bed\par -continue to use Proventil PRN\par -continue Acapella device to be used multiple times daily \par -Asthma is believed to be caused by inherited (genetic) and environmental factor, but its exact cause is unknown. Asthma may be triggered by allergens, lung infections, or irritants in the air. Asthma triggers are different for each person \par -Inhaler technique reviewed as well as oral hygiene techniques reviewed with patient. Avoidance of cold air, extremes of temperature, rescue inhaler should be used before exercise. Order of medication reviewed with patient. Recommended use of a cool mist humidifier in the bedroom\par \par Problem 1A Steroid dependent asthma\par -set up Dupixent \par Dupixent is a prescription medicine used with other asthma medicines for the maintenance treatment of moderate-to-severe asthma in people aged 12 years and older whose asthma is not controlled with their current asthma medicines. Dupixent helps prevent severe asthma attacks (exacerbations) and can improve your breathing. Dupixent may also help reduce the amount of oral corticosteroids you need while preventing severe asthma attacks and improving your breathing. Dupixent is not used to treat sudden breathing problems. Risks and side effect of Dupixent were discussed and reviewed with patient. \par \par problem 2: eosinophilic asthma\par -s/p receiving Fasenra and has had 8 injections( 11/2020)\par -The safety and efficacy of Nucala was established in three double-blind, randomized, placebo-controlled trials in patients with severe asthma. Compared to a placebo, patients with severe asthma receiving Nucala had fewer exacerbation requiring hospitalization and/or emergency department visits, and a longer time to first exacerbation. In addition, patients with severe asthma receiving Nucala or Fasenra experienced greater reductions in their daily maintenance oral corticosteroid dose, while maintaining asthma control compared with patients receiving placebo. Treatment with Nucala did not result in a significant improvement in lung function, as measured by the volume of air exhaled by patients in one second. The most common side effects include: headache, injection site reactions, back pain, weakness, and fatigue; hypersensitivity reactions can occur within hours or days including swelling of the face, mouth, and tongue, fainting, dizziness, hives, breathing problems, and rash; herpes zoster infections have occurred. The drug is a monoclonal antibody that inhibits interleukin-5 which helps regular eosinophils, a type of white blood cell that contributes to asthma. The over-production of eosinophils can cause inflammation in the lungs, increasing the frequency of asthma attacks. Patients must also take other medications, including high dose inhaled corticosteroids and at least one additional asthma drug\par \par problem 3: allergic rhinitis/post nasal drip syndrome \par -recommended Navage sinus rinse\par -continue Qnasl 1 sniff each nostril BID \par -continue Olopatadine 0.6% 1 sniff each nostril BID \par -continue claritin-d 24hr AM\par -continue Xyzal 5 mg before bed \par s/p Blood work: eosinophil level, IgE level, and immunocap testing \par -Environmental measures for allergies were encouraged including mattress and pillow cover, air purifier, and environmental controls.\par \par problem 4: GERD\par -continue to use good diet and good timing of meals\par -Rule of 2s: avoid eating too much, eating too late, eating too spicy, eating two hours before bed\par -Things to avoid including overeating, spicy foods, tight clothing, eating within three hours of bed, this list is not all inclusive. \par -For treatment of reflux, possible options discussed including diet control, H2 blockers, PPIs, as well as coating motility agents discussed as treatment options. Timing of meals and proximity of last meal to sleep were discussed. If symptoms persist, a formal gastrointestinal evaluation is needed. \par \par problem 5: ALIS\par -add trazadone 50 mg qhs \par -she has refused any treatment or diagnosing\par -she is being recommended to use Oxy-Aid by [facility]\par -Discussed the risks/associations with coronary artery disease, atrial fibrillation, arrhythmia, memory loss, issues with concentration, stroke risk, hypertension, nocturia, chronic reflux/Kimbrough’s esophagus some but not all inclusive. Treatment options discussed including CPAP/BiPAP machine, oral appliance, ProVent therapy, Oxy-Aid by [facility], new technologies, or positional sleep.\par \par problem 6: cardiac component \par -she is being recommended to have a consultation with Dr. Reese ([facility]) ([facility]) (3/22 pending) Dr Carlson \par \par Problem 7: Anxiety\par - Continue Klonopin 0.5 mg prn \par \par problem 8: health maintenance \par -s/p COVID 19 vaccine x 2\par -recommended orthopedic evaluation with (Dr. Perez)\par -recommended Evaluation with Neurology (Dr. Baker)\par -recommended to have medical marijuana evaluation with Dr. Alma Matias\par -s/p 2021 yearly flu shot \par -recommended strep pneumonia vaccines: Prevnar-13 vaccine, followed by Pneumo vaccine 23 one year following\par -recommended early intervention for URIs\par -recommended regular osteoporosis evaluations\par -recommended early dermatological evaluations\par -recommended after the age of 50 to the age of 70, colonoscopy every 5 years \par  \par \par F/U in 2 months\par She is encouraged to call with any changes,concerns, or questions.

## 2021-10-20 NOTE — PHYSICAL EXAM
[No Acute Distress] : no acute distress [Normal Oropharynx] : normal oropharynx [Normal Appearance] : normal appearance [No Neck Mass] : no neck mass [Normal Rate/Rhythm] : normal rate/rhythm [Normal S1, S2] : normal s1, s2 [No Murmurs] : no murmurs [No Resp Distress] : no resp distress [Clear to Auscultation Bilaterally] : clear to auscultation bilaterally [No Abnormalities] : no abnormalities [Benign] : benign [Normal Gait] : normal gait [No Clubbing] : no clubbing [No Cyanosis] : no cyanosis [No Edema] : no edema [FROM] : FROM [Normal Color/ Pigmentation] : normal color/ pigmentation [No Focal Deficits] : no focal deficits [Oriented x3] : oriented x3 [Normal Affect] : normal affect [III] : Mallampati Class: III [Murmur ___ / 6] : murmur [unfilled] / 6 [TextBox_54] : 2/6 systolic murmur  [TextBox_68] : I:E 1:3, clear

## 2021-10-20 NOTE — PROCEDURE
[FreeTextEntry1] : 6 minute walk test reveals a low saturation of 98% with moderate dyspnea or fatigue; walked 130 meters. Patient stopped before 6 minutes due to feeling dizzy, SOB, and chest tightness

## 2021-10-22 PROCEDURE — 90870 ELECTROCONVULSIVE THERAPY: CPT

## 2021-10-22 RX ORDER — FLUMAZENIL 0.1 MG/ML
0.2 VIAL (ML) INTRAVENOUS ONCE
Refills: 0 | Status: COMPLETED | OUTPATIENT
Start: 2021-10-22 | End: 2021-10-22

## 2021-10-22 RX ORDER — MIDAZOLAM HYDROCHLORIDE 1 MG/ML
2 INJECTION, SOLUTION INTRAMUSCULAR; INTRAVENOUS ONCE
Refills: 0 | Status: DISCONTINUED | OUTPATIENT
Start: 2021-10-22 | End: 2021-10-22

## 2021-10-22 RX ADMIN — MIDAZOLAM HYDROCHLORIDE 2 MILLIGRAM(S): 1 INJECTION, SOLUTION INTRAMUSCULAR; INTRAVENOUS at 09:14

## 2021-10-22 RX ADMIN — Medication 0.2 MILLIGRAM(S): at 09:04

## 2021-10-22 NOTE — ECT TREATMENT NOTE - NSCGIIMPROVESX_PSY_ALL_CORE
4 = No change - symptoms remain essentially unchanged
7 = Very much worse - severe exacerbation of symptoms and loss of functioning
2 = Much improved - notably better with signficant reduction of symptoms; increase in the level of functioning but some symptoms remain

## 2021-10-22 NOTE — ECT TREATMENT NOTE - NSECTCOMMENTS_PSY_ALL_CORE
Patient stable through the entire treatment interval without recurrence of presenting symptoms. Mood, energy, sleep, and appetite were within normal limits. Bright affect.

## 2021-10-22 NOTE — ECT TREATMENT NOTE - NSCGISEVERILLNESS_PSY_ALL_CORE
1 = Normal – not at all ill, symptoms of disorder not present past seven days
1 = Normal – not at all ill, symptoms of disorder not present past seven days
2 = Borderline mentally ill – subtle or suspected pathology

## 2021-10-26 ENCOUNTER — APPOINTMENT (OUTPATIENT)
Dept: CARDIOLOGY | Facility: CLINIC | Age: 71
End: 2021-10-26

## 2021-10-28 DIAGNOSIS — F41.9 ANXIETY DISORDER, UNSPECIFIED: ICD-10-CM

## 2021-11-16 ENCOUNTER — NON-APPOINTMENT (OUTPATIENT)
Age: 71
End: 2021-11-16

## 2021-11-17 LAB — SARS-COV-2 RNA SPEC QL NAA+PROBE: SIGNIFICANT CHANGE UP

## 2021-11-19 PROCEDURE — 90870 ELECTROCONVULSIVE THERAPY: CPT

## 2021-11-19 RX ORDER — FLUMAZENIL 0.1 MG/ML
0.2 VIAL (ML) INTRAVENOUS ONCE
Refills: 0 | Status: COMPLETED | OUTPATIENT
Start: 2021-11-19 | End: 2021-11-19

## 2021-11-19 RX ORDER — MIDAZOLAM HYDROCHLORIDE 1 MG/ML
2 INJECTION, SOLUTION INTRAMUSCULAR; INTRAVENOUS ONCE
Refills: 0 | Status: DISCONTINUED | OUTPATIENT
Start: 2021-11-19 | End: 2021-11-19

## 2021-11-19 RX ADMIN — Medication 0.2 MILLIGRAM(S): at 09:14

## 2021-11-19 RX ADMIN — MIDAZOLAM HYDROCHLORIDE 2 MILLIGRAM(S): 1 INJECTION, SOLUTION INTRAMUSCULAR; INTRAVENOUS at 09:17

## 2021-12-14 LAB — SARS-COV-2 RNA SPEC QL NAA+PROBE: SIGNIFICANT CHANGE UP

## 2021-12-16 PROCEDURE — 90870 ELECTROCONVULSIVE THERAPY: CPT

## 2021-12-16 RX ORDER — FLUMAZENIL 0.1 MG/ML
0.2 VIAL (ML) INTRAVENOUS ONCE
Refills: 0 | Status: COMPLETED | OUTPATIENT
Start: 2021-12-16 | End: 2021-12-16

## 2021-12-16 RX ORDER — MIDAZOLAM HYDROCHLORIDE 1 MG/ML
2 INJECTION, SOLUTION INTRAMUSCULAR; INTRAVENOUS ONCE
Refills: 0 | Status: DISCONTINUED | OUTPATIENT
Start: 2021-12-16 | End: 2021-12-16

## 2021-12-16 RX ADMIN — Medication 0.2 MILLIGRAM(S): at 09:14

## 2021-12-16 RX ADMIN — MIDAZOLAM HYDROCHLORIDE 2 MILLIGRAM(S): 1 INJECTION, SOLUTION INTRAMUSCULAR; INTRAVENOUS at 09:17

## 2021-12-16 NOTE — ECT TREATMENT NOTE - NSECTCOMMENTS_PSY_ALL_CORE
States mood is stable. Denies feeling depressed but states coming for ECT makes her anxious (including some nightmares).   Reviewed with pt that when she transitioned to outpt she was on weekly ECT (July 2021).  Pt aware we rec 6-12 mon of stability before considering stopping ECT.  She will review course with outpt provider to come to agreement on when she might consider stopping ECT.

## 2021-12-23 ENCOUNTER — APPOINTMENT (OUTPATIENT)
Dept: CARDIOLOGY | Facility: CLINIC | Age: 71
End: 2021-12-23

## 2022-01-01 NOTE — ECT TREATMENT NOTE - NSICDXBHTERTIARYDX_PSY_ALL_CORE
R/O Delirium   R41.0  R/O Cognitive disorder   F09  R/O Delirium due to general medical condition   F05  
immune

## 2022-01-11 LAB — SARS-COV-2 RNA SPEC QL NAA+PROBE: SIGNIFICANT CHANGE UP

## 2022-01-13 VITALS
RESPIRATION RATE: 19 BRPM | SYSTOLIC BLOOD PRESSURE: 113 MMHG | DIASTOLIC BLOOD PRESSURE: 75 MMHG | TEMPERATURE: 98 F | OXYGEN SATURATION: 100 % | HEART RATE: 102 BPM

## 2022-01-13 PROCEDURE — 90870 ELECTROCONVULSIVE THERAPY: CPT

## 2022-01-13 RX ORDER — FLUMAZENIL 0.1 MG/ML
0.2 VIAL (ML) INTRAVENOUS ONCE
Refills: 0 | Status: COMPLETED | OUTPATIENT
Start: 2022-01-13 | End: 2022-01-13

## 2022-01-13 RX ORDER — MIDAZOLAM HYDROCHLORIDE 1 MG/ML
2 INJECTION, SOLUTION INTRAMUSCULAR; INTRAVENOUS ONCE
Refills: 0 | Status: DISCONTINUED | OUTPATIENT
Start: 2022-01-13 | End: 2022-01-13

## 2022-01-13 RX ADMIN — Medication 0.2 MILLIGRAM(S): at 08:52

## 2022-01-13 RX ADMIN — MIDAZOLAM HYDROCHLORIDE 2 MILLIGRAM(S): 1 INJECTION, SOLUTION INTRAMUSCULAR; INTRAVENOUS at 08:59

## 2022-01-13 NOTE — ECT PRE-PROCEDURE CHECKLIST - LAST TOOK
solids
clears
meds with sips of water/clears
solids
meds with sips of water at 4 30 am/clears
solids
clears
meds with sips of water/clears
clears

## 2022-01-13 NOTE — ECT AMBULATORY DISCHARGE PLAN - MODE OF TRANSPORTATION
Wheelchair/Stroller
Ambulatory
Wheelchair/Stroller

## 2022-01-13 NOTE — ECT AMBULATORY DISCHARGE PLAN - NURSING SECTION COMPLETE
Patient/Caregiver provided printed discharge information.

## 2022-01-13 NOTE — ECT AMBULATORY DISCHARGE PLAN - NS TRANSFER DISPOSITION PATIENT BELONGINGS
not applicable
with patient
given to family
not applicable
with patient
with patient

## 2022-01-13 NOTE — ECT PRE-PROCEDURE CHECKLIST - NSPROEXTENSOFSELF_PSY_ALL_CORE
eyeglasses
eyeglasses
none
eyeglasses
neurostimulator turned off as per patient/eyeglasses
eyeglasses
eyeglasses
10/22/21 neuro stimulator turned off/eyeglasses
eyeglasses
none
eyeglasses

## 2022-01-13 NOTE — ECT TREATMENT NOTE - NSECTCOMMENTS_PSY_ALL_CORE
Pt continues to say she is doing well but anxious around times of ECT treatments.  Tells MD "I think this is it, this is my last treatment".  Admits she met with Dr. Urrutia recently for f/u but didn't discuss her desire to stop ECT.    I rereviewed with her risk of relapse when stopping ECT and general rec to continue treatment for at least 6 if not 12 months to lower risk.  Pt understands.   It appears she was tapered to q2 week schedule in last week of July, 2021.  She's aware therefore I would rec she continue maintenance ECT up through Feb-Aug.  She agrees to return in Feb and will discuss with Dr. Urrutia future treatment plans.

## 2022-01-13 NOTE — ECT PRE-PROCEDURE CHECKLIST - NSECTCONSENT_PSY_ALL_CORE
ECT RUL maintenance  2/22/21-22   Anesthesia valid until 4/13/22/yes
ect RUL 6/22/21-6/22/22  anesth.~ 1/1/22/yes
RUL maintenance obtained 7/22/21   anesthesia valid until 1/1/22/yes
ect RUL 6/22/21-6/22/22  anesth.~ 1/1/22/yes
ect RUL 6/22/21-6/22/22  anesth.~ 1/1/22/yes
ect RUL 6/22/21-6/22/22  anesth. exp 9/25/21/yes
ect RUL 6/22/21  anesth. exp 9/25/21/yes
ect RUL 6/22/21  anesth. exp 9/25/21/yes
ect RUL 6/22/21-6/22/22  anesth. exp 9/25/21/yes

## 2022-01-13 NOTE — ECT PRE-PROCEDURE CHECKLIST - ALLERGIES
Allergies:-  penicillin      

## 2022-01-13 NOTE — ECT TREATMENT NOTE - NSECTTXPERFDATETIME_PSY_ALL_CORE
22-Oct-2021 09:12
15-Jul-2021 12:44
19-Nov-2021 09:19
12-Aug-2021 09:13
15-Jul-2021 13:07
13-Jan-2022 08:59
26-Aug-2021 09:17
16-Sep-2021 10:38
01-Oct-2021 09:13
22-Jul-2021 10:04
16-Dec-2021 09:26
29-Jul-2021 08:39

## 2022-01-13 NOTE — ECT AMBULATORY DISCHARGE PLAN - NSPOSTECTRESTRIC_PSY_ALL_CORE
Drive a car, operate power tools or machinery./Drink alcohol, beer, or wine./Make important personal and business decisions./If you have had any type of sedation, you may experience lightheadedness, dizziness, or sleepiness following your procedure.  A responsible adult should stay with you for at least 24 hours following your procedure.

## 2022-01-13 NOTE — ECT AMBULATORY DISCHARGE PLAN - NSPOSTECTCALLZHH_PSY_ALL_CORE
Call the [facility] ECT suite at [phone]
Call the [facility] ECT suite at [phone]
Call the [facility] ECT suite at [phone]
Call the [facility] ECT suite at [phone]
Call the [facility] ECT suite at [phone]
Call the [facility] ECT suite at [phone]
Call the [facility] ECT suite at [phone]
Call the Health system ECT suite at [phone]
Call the [facility] ECT suite at [phone]
Call the [facility] ECT suite at [phone]
Call the [facility] ECT suite at [phone]

## 2022-01-13 NOTE — ECT PRE-PROCEDURE CHECKLIST - NSBENZOLAST24HRS_PSY_ALL_CORE
yes (notify psychiatrist)

## 2022-01-13 NOTE — ECT PRE-PROCEDURE CHECKLIST - NSPTSENTTO_PSY_ALL_CORE
procedural room

## 2022-01-13 NOTE — ECT PRE-PROCEDURE CHECKLIST - CAREGIVER PHONE NUMBER
[phone]
[phone]
[phone]
[phone]
[phone]
[phone]
[phone]
[phone]
[phone]
[phone]
[phone]

## 2022-01-13 NOTE — ECT PRE-PROCEDURE CHECKLIST - NS PREOP CHK TEST_COVID_DT_GEN_ALL_CORE
08-Jul-2021
10-Aug-2021
01-Jan-2022
14-Dec-2021
20-Oct-2021
15-Jul-2021
24-Aug-2021
17-Nov-2021
29-Sep-2021
13-Sep-2021

## 2022-01-13 NOTE — ECT AMBULATORY DISCHARGE PLAN - NSDCPEFALRISK_GEN_ALL_CORE
Patient information on fall and injury prevention
Patient information on fall and injury prevention
For information on Fall & Injury Prevention, visit: https://www.[facility]/news/fall-prevention-protects-and-maintains-health-and-mobility OR  https://www.[facility]/news/fall-prevention-tips-[email or web address]-injury OR  https://www.cdc.gov/steadi/patient.html
For information on Fall & injury Prevention, visit https://www.[facility]/news/fall-prevention-tips-to-avoid-injury
For information on Fall & injury Prevention, visit https://www.[facility]/news/fall-prevention-tips-to-avoid-injury
For information on Fall & injury Prevention, visit https://www.[facility]/news/fall-prevention-tips-to-avoid-injury
For information on Fall & injury Prevention, visit https://www.[facility]/news/fall-prevention-tips-to-avoid-injury
For information on Fall & Injury Prevention, visit: https://www.[facility]/news/fall-prevention-protects-and-maintains-health-and-mobility OR  https://www.[facility]/news/fall-prevention-tips-to-avoid-injury OR  https://www.cdc.gov/steadi/patient.html
Patient information on fall and injury prevention
Patient information on fall and injury prevention
For information on Fall & injury Prevention, visit https://www.[facility]/news/fall-prevention-tips-to-avoid-injury

## 2022-01-13 NOTE — ECT PRE-PROCEDURE CHECKLIST - NSMEDSDOSETAKEN_PSY_ALL_CORE
klonopin 0.5mg/synthroid 0.75mcg at 6:30 am
Paxil and Klonopin @ 9724
klonopin 0.5mg/synthroid 0.75mcg at 6:30 am
aspirin 81mg/klonopin 0.5mg/plavix 75mg/synthroid 0.75mcg
klonopin 0.5mg/synthroid 0.75mcg at 6:30 am
Paxil and Klonopin @ 8875
klonopin 0.5mg/synthroid 0.75mcg
klonopin 0.5mg/synthroid 0.75mcg
klonopin 0.5mg/synthroid 0.75mcg at 6:30 am

## 2022-01-13 NOTE — ECT AMBULATORY DISCHARGE PLAN - NSECTPROCEDUREDATE_PSY_ALL_CORE
29-Jul-2021
19-Nov-2021
26-Aug-2021
22-Oct-2021
01-Oct-2021
15-Jul-2021
12-Aug-2021
16-Dec-2021
16-Sep-2021
13-Jan-2022
22-Jul-2021

## 2022-01-13 NOTE — ECT AMBULATORY DISCHARGE PLAN - NSPOSTECTDIET_PSY_ALL_CORE
Gradually resume your regular diet/Increase fluids

## 2022-01-13 NOTE — ECT TREATMENT NOTE - NSECTFLUMAZ_PSY_ALL_CORE
0.2 mg IV Push

## 2022-01-13 NOTE — ECT AMBULATORY DISCHARGE PLAN - NSPOSTECTWORSEPSYCHSX_PSY_ALL_CORE
If you are experiencing heightened or worsening psychological symptoms please contact your private psychiatrist.

## 2022-01-13 NOTE — ECT TREATMENT NOTE - NSECTMACHPARA1ST_PSY_ALL_CORE
Thymatron

## 2022-01-13 NOTE — ECT PRE-PROCEDURE CHECKLIST - NSADULTACCOMPNAME_PSY_ALL_CORE
Gilda Brown
Patirck Primrose
Gilda Brown
Patirck Primrose
George Primrose
Patirck Primrose
George Primrose
George Primrose
Patirck Primrose

## 2022-01-13 NOTE — ECT TREATMENT NOTE - NSECTNEUROCOMMENT_PSY_ALL_CORE
s/p spinal cord stimulator

## 2022-01-13 NOTE — ECT TREATMENT NOTE - NSECTTXELECPLACE_PSY_ALL_CORE
Right Unilateral

## 2022-01-13 NOTE — ECT TREATMENT NOTE - NSECTREVSYS_PSY_ALL_CORE
Cardiovascular/Neurological/Pulmonary

## 2022-01-13 NOTE — ECT AMBULATORY DISCHARGE PLAN - NSPOSTECTNEXTSCHTXDT_PSY_ALL_CORE
16-Dec-2021 08:30
19-Nov-2021 08:30
22-Oct-2021 08:30
26-Aug-2021 08:30
22-Jul-2021 11:15
12-Aug-2021 08:30
10-Sep-2021 10:30
13-Jan-2022 08:30
10-Feb-2022 08:30
29-Jul-2021 07:30
30-Sep-2021 13:30

## 2022-01-13 NOTE — ECT AMBULATORY DISCHARGE PLAN - NSPOSTECTFUPHCALL_PSY_ALL_CORE
You will receive a follow-up phone call from a nurse by the next business day after your treatment to ask how you are feeling.

## 2022-01-13 NOTE — ECT PRE-PROCEDURE CHECKLIST - AS BP NONINV SITE
right upper arm
left upper arm
right upper arm

## 2022-01-13 NOTE — ECT TREATMENT NOTE - NSECTIMPPLAN_PSY_ALL_CORE
Assessment today offers no contraindications to continue plan of treatment with ECT.

## 2022-01-13 NOTE — ECT AMBULATORY DISCHARGE PLAN - NSPOSTECTCALLBEFORE_PSY_ALL_CORE
[facility] ([facility]) scheduling office at [phone]
[facility] ([facility]) scheduling office at [phone]
[facility] ([facility]) scheduling office at [phone]
[facility] ([facility]) scheduling office at [phone]
[facility] ([facility]) scheduling office at [phone]
[facility] ([facility]) scheduling office at [phone]
[facility] ([facility]) scheduling office at [phone]
[facility] ([facility]) scheduling office at [phone]
[facility] ([facility]) scheduling office at [phone]
 ([facility]) scheduling office at [phone]
[facility] ([facility]) scheduling office at [phone]

## 2022-01-13 NOTE — ECT TREATMENT NOTE - NSICDXBHPRIMARYDX_PSY_ALL_CORE
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  

## 2022-01-13 NOTE — ECT AMBULATORY DISCHARGE PLAN - NSPOSTECTADDSCHEDAPPTS_PSY_ALL_CORE
COVID TESTING 10/20/2021 BETWEEN 4-6 PM
COVID TESTING 12/14/2021 BETWEEN 4-6 PM
Covid test 9/7/21 between 4-6 pm
COVID TESTING 1/11/2022 BETWEEN 4-6 PM
COVID TEST 8/10/21 BETWEEN 4-6 PM
Covid test 8/24/21 between 4-6 pm
Please come for COVID testing 11/17/21 between 4-6 pm
covid test to be done on 2/8/22 between 4-6 pm

## 2022-01-13 NOTE — ECT PRE-PROCEDURE CHECKLIST - NSPROEDALEARNPREFOTH_GEN_A_NUR
group instruction/individual instruction/pictorial/verbal instruction/written material
group instruction/individual instruction/pictorial
group instruction/individual instruction/pictorial/verbal instruction/written material
group instruction/individual instruction/pictorial

## 2022-01-13 NOTE — ECT AMBULATORY DISCHARGE PLAN - NSPOSTECTSMOKING_PSY_ALL_CORE
If you are a smoker, it is important for your health to stop smoking.  Please be aware that second hand smoke is also harmful.

## 2022-01-13 NOTE — ECT AMBULATORY DISCHARGE PLAN - NSPOSTECTPOSSCOMP_PSY_ALL_CORE
Headache, nausea, general body aches are common experiences after this treatment.  These may be treated with over-the-counter pain medication.

## 2022-01-13 NOTE — ECT TREATMENT NOTE - NSECTRECTXSCHED_PSY_ALL_CORE
Weekly
Every 2 weeks
Monthly
Every 2 weeks
Every 2 weeks
Monthly
Monthly
Weekly
Every 3 weeks
Every 2 weeks
Monthly

## 2022-01-13 NOTE — ECT TREATMENT NOTE - NSECTROSNEGAT_PSY_ALL_CORE
Review of Systems negative/unchanged from previous exam except as noted below

## 2022-01-13 NOTE — ECT PRE-PROCEDURE CHECKLIST - NSDISPOFBELONG_PSY_ALL_CORE
not applicable
remains with patient
given to procedural RN
not applicable
03-Mar-2020 18:39
given to procedural RN
given to procedural RN
remains with patient
not applicable
remains with patient

## 2022-01-13 NOTE — ECT PRE-PROCEDURE CHECKLIST - NSECTNPODT_PSY_ALL_CORE
15-Sep-2021 21:00
12-Aug-2021 12:00
14-Jul-2021 21:00
16-Dec-2021 04:00
28-Jul-2021 21:00
21-Oct-2021 23:00
21-Jul-2021 20:00
13-Jan-2022
18-Nov-2021
30-Sep-2021 22:00
25-Aug-2021 11:55

## 2022-01-13 NOTE — ECT PRE-PROCEDURE CHECKLIST - TO WHOM
procedure room rn 

## 2022-01-13 NOTE — ECT TREATMENT NOTE - NSECTFOCPECOMPLET_PSY_ALL_CORE
Focused Physical Exam Completed

## 2022-01-13 NOTE — ECT PRE-PROCEDURE CHECKLIST - SELECT TESTS ORDERED
COVID-19
fully vaccinated/Results in MD note
COVID-19

## 2022-01-13 NOTE — ECT AMBULATORY DISCHARGE PLAN - NSPOSTECTPROVEDUCFT_PSY_ALL_CORE
COVID teaching sheet
Covid Teaching
covid precautions 
Covid Education
COVID TEACHING
covid teaching and D/C instructions
COVID TEACHING
covid educational material, post ECT discharge instructions
covid teaching
COVID TEACHING
Covid Education

## 2022-01-13 NOTE — ECT TREATMENT NOTE - NSECTCARDIOCOMMENT_PSY_ALL_CORE
s/p STENT

## 2022-01-13 NOTE — ECT AMBULATORY DISCHARGE PLAN - NSPROEXTENSOFSELF_PSY_ALL_CORE
eyeglasses
none
none
neurostimulator turned off as per patient/eyeglasses
eyeglasses
10/22/21 neuro stimulator turned off/eyeglasses
eyeglasses

## 2022-01-13 NOTE — ECT AMBULATORY DISCHARGE PLAN - NSPOSTECTSYMPTOMS_PSY_ALL_CORE
Excessive Diarrhea/Fever/Inability to tolerate liquids or foods/Increased irritability or sluggishness/Nausea and vomiting that does not stop/Pain not relieved by medications/Unable to urinate

## 2022-01-13 NOTE — ECT PRE-PROCEDURE CHECKLIST - NSPTSENTVIA_PSY_ALL_CORE
stretcher

## 2022-01-13 NOTE — ECT TREATMENT NOTE - NSECTPOSTTXMEDS_PSY_ALL_CORE
2 mg IV Push

## 2022-01-13 NOTE — ECT AMBULATORY DISCHARGE PLAN - NSPOSTECTCASEEMER_PSY_ALL_CORE
In case of any emergency, please go to the nearest emergency room

## 2022-01-13 NOTE — ECT PRE-PROCEDURE CHECKLIST - PATIENT PROBLEMS/NEEDS
Patient expressed no known problems or needs

## 2022-01-13 NOTE — ECT PRE-PROCEDURE CHECKLIST - NSPTPHYSICALIMPAIR_PSY_ALL_CORE
yes (specify)

## 2022-01-13 NOTE — ECT AMBULATORY DISCHARGE PLAN - NSPROCPERF_PSY_ALL_CORE
Electroconvulsive Therapy (ECT)

## 2022-01-13 NOTE — ECT TREATMENT NOTE - NSECTCHANGEFREQ_PSY_ALL_CORE
No change
Decrease
Decrease
No change
No change
Decrease
No change

## 2022-01-13 NOTE — ECT TREATMENT NOTE - NSECTPTEVAL_PSY_ALL_CORE
Patient evaluated and History and Physical reviewed prior to ECT. There are no significant changes to the patient's condition unless specified.

## 2022-01-13 NOTE — ECT AMBULATORY DISCHARGE PLAN - NSPOSTECTCONTPROV_PSY_ALL_CORE
[facility] ([facility]) ECT Suite at [phone]
[facility] ([facility]) ECT Suite at [phone]
[facility] ([facility]) ECT Suite at [phone]
[facility] ([facility]) ECT Suite at [phone]
[facility] ([facility]) ECT Suite at [phone]
[facility] ([facility]) ECT Suite at [phone]
[facility] ([facility]) ECT Suite at [phone]
[facility] ([facility]) ECT Suite at [phone]
[facility] ([facility]) ECT Suite at [phone]
[facility] ([facility]) ECT Suite at [phone]
[facility] ([facility]) ECT Suite at [phone]

## 2022-01-13 NOTE — ECT TREATMENT NOTE - NSSUICPROTFACT_PSY_ALL_CORE
Responsibility to children, family, or others/Identifies reasons for living/Supportive social network of family or friends
Positive therapeutic relationships
Responsibility to children, family, or others/Identifies reasons for living/Supportive social network of family or friends
Responsibility to children, family, or others/Identifies reasons for living/Supportive social network of family or friends
Identifies reasons for living/Supportive social network of family or friends
Positive therapeutic relationships
Positive therapeutic relationships
Responsibility to children, family, or others/Identifies reasons for living/Supportive social network of family or friends/Fear of death or the actual act of killing self/Cultural, spiritual and/or moral attitudes against suicide
Responsibility to children, family, or others/Identifies reasons for living/Supportive social network of family or friends

## 2022-02-09 NOTE — H&P ADULT. - HISTORY OF PRESENT ILLNESS
This is a 66yoF w/ pMHx CAD (s/p stents in 2010, 2011, 2 stents in pLAD and mLAD on 02/8/2017), depression, GERD, hypothyroidism, diverticulitis, paralyzed hemidiaphragm, asthma p/w with 3 days of chest pain a/w SOB, dizziness, nausea and vomiting.  Patient describes 6/10 L midsternal sternal chest pain with radiation to the right shoulder at rest that worsens with exertion and some relief with sitting up.  Patient states she has been compliant with her cardiac medications since her last discharge but may have thrown up her most recent dose of ASA and plavix.  Patient was recently admitted 2/08 for chest pain a/w nausea/vomiting/diarrhea found to have stent thrombosis in pLAD s/p stent removal, thrombolysis requiring intra-aortic balloon pump and managed by CCU. IV discontinued, cath removed intact

## 2022-02-15 LAB — SARS-COV-2 N GENE NPH QL NAA+PROBE: NOT DETECTED

## 2022-02-17 ENCOUNTER — APPOINTMENT (OUTPATIENT)
Dept: PULMONOLOGY | Facility: CLINIC | Age: 72
End: 2022-02-17
Payer: MEDICARE

## 2022-02-17 VITALS
WEIGHT: 100 LBS | TEMPERATURE: 97.3 F | HEART RATE: 113 BPM | HEIGHT: 55 IN | DIASTOLIC BLOOD PRESSURE: 66 MMHG | OXYGEN SATURATION: 96 % | RESPIRATION RATE: 16 BRPM | BODY MASS INDEX: 23.14 KG/M2 | SYSTOLIC BLOOD PRESSURE: 104 MMHG

## 2022-02-17 PROCEDURE — 94727 GAS DIL/WSHOT DETER LNG VOL: CPT

## 2022-02-17 PROCEDURE — 95012 NITRIC OXIDE EXP GAS DETER: CPT

## 2022-02-17 PROCEDURE — 94729 DIFFUSING CAPACITY: CPT

## 2022-02-17 PROCEDURE — 99214 OFFICE O/P EST MOD 30 MIN: CPT | Mod: 25

## 2022-02-17 PROCEDURE — 94010 BREATHING CAPACITY TEST: CPT

## 2022-02-17 NOTE — PHYSICAL EXAM
[No Acute Distress] : no acute distress [Normal Oropharynx] : normal oropharynx [III] : Mallampati Class: III [Normal Appearance] : normal appearance [No Neck Mass] : no neck mass [Normal Rate/Rhythm] : normal rate/rhythm [Murmur ___ / 6] : murmur [unfilled] / 6 [Normal S1, S2] : normal s1, s2 [No Murmurs] : no murmurs [No Resp Distress] : no resp distress [Clear to Auscultation Bilaterally] : clear to auscultation bilaterally [No Abnormalities] : no abnormalities [Benign] : benign [Normal Gait] : normal gait [No Clubbing] : no clubbing [No Cyanosis] : no cyanosis [No Edema] : no edema [FROM] : FROM [Normal Color/ Pigmentation] : normal color/ pigmentation [No Focal Deficits] : no focal deficits [Oriented x3] : oriented x3 [Normal Affect] : normal affect [Kyphosis] : kyphosis [TextBox_54] : 2/6 systolic murmur  [TextBox_68] : I:E 1:3, decreased breath sounds at left base

## 2022-02-17 NOTE — ADDENDUM
[FreeTextEntry1] : Documented by Ivet Rubio acting as a scribe for Dr. Ketan Morfin on 02/17/2022 \par \par All medical record entries made by the Scribe were at my, Dr. Ketan Morfin's, direction and personally dictated by me on 02/17/2022 . I have reviewed the chart and agree that the record accurately reflects my personal performance of the history, physical exam, assessment and plan. I have also personally directed, reviewed, and agree with the discharge instructions

## 2022-02-17 NOTE — HISTORY OF PRESENT ILLNESS
[FreeTextEntry1] : Ms. Primrose is a 71 year old female presenting to the office for a sick visit. She has PMHx of allergic rhinitis, eosinophilic/moderate persistent asthma, ALIS, GERD, and shortness of breath. Her chief complaint is\par \par -she notes generally feeling bad onset 4 months\par -she notes on prednisone taper \par -she notes productive cough that brings up green phlegm onset 1 month \par -she notes chest pressure\par -She notes that bowels are regular with Rx \par -she notes SOB on stairs \par -she notes sinus issues \par -she notes dysphonia has resolved \par -she notes use of Ventolin for Restrictive dysfunction \par -she notes unable to get full breath \par -she notes use of nebulizer 4 times a day \par -she notes awaiting appointment with cardiologist \par -she notes irregular use of Nucala shot\par \par -denies any visual issues, headaches, nausea, vomiting, fever, chills, sweats, chest pain, diarrhea, constipation, dysphagia, dizziness, leg swelling, leg pain, itchy eyes, itchy ears, heartburn, reflux, or sour taste in the mouth.

## 2022-02-17 NOTE — REASON FOR VISIT
[Follow-Up] : a follow-up visit [Asthma] : asthma [Cough] : cough [Shortness of Breath] : shortness of Breath [FreeTextEntry1] : pre-op for spinal stimulation, allergic rhinitis, eosinophilic/moderate persistent asthma, ALIS, paralyzed diaphragm, GERD, and shortness of breath [FreeTextEntry2] : sinus symptoms

## 2022-02-17 NOTE — PROCEDURE
[FreeTextEntry1] : FENO was 13; a normal value being less than 25\par Fractional exhaled nitric oxide (FENO) is regarded as a simple, noninvasive method for assessing eosinophilic airway inflammation. Produced by a variety of cells within the lung, nitric oxide (NO) concentrations are generally low in healthy individuals. However, high concentrations of NO appear to be involved in nonspecific host defense mechanisms and chronic inflammatory diseases such as asthma. The American Thoracic Society (ATS) therefore has recommended using FENO to aid in the diagnosis and monitoring of eosinophilic airway inflammation and asthma, and for identifying steroid responsive individuals whose chronic respiratory symptoms may be caused by airway inflammation. \par \par Full PFT revealed mild restrictive moderate obstructive dysfunction , with a FEV1 of 0.70L,which is 45% of predicted,  low normal lung volumes, and normal diffusion of  14.7, which is 119% of predicted with a normal flow volume loop

## 2022-02-17 NOTE — ASSESSMENT
[FreeTextEntry1] : Ms. Primrose is a 71 year old female with history of COPD / moderate-severe persistent asthma / allergy / paralyzed hemidiaphragm / cardiac disease / CHF/ eosinophilic asthma. She presents to the office for - steroid dependent asthma/ eosinophilic asthma- NC with Nucala \par \par Her shortness of breath is multifactorial due to:\par -cardiac/ mild congestive heart failure\par -severe persistent asthma\par -allergic rhinitis/post nasal drip syndrome\par -paralyzed hemidiaphragm s/p plication\par -poor breathing mechanics and anxiety \par \par problem 1: COPD/ asthma (severe persistent)- NC\par -continue Xopenex 0.63 by the nebulizer up to QID \par -continue to use Advair 500 at 1 inhalation BID\par -continue Tudorza at 1 inhalation BID\par -continue Singulair 10 mg before bed\par -continue to use Proventil PRN\par -continue Acapella device to be used multiple times daily \par -Asthma is believed to be caused by inherited (genetic) and environmental factor, but its exact cause is unknown. Asthma may be triggered by allergens, lung infections, or irritants in the air. Asthma triggers are different for each person \par -Inhaler technique reviewed as well as oral hygiene techniques reviewed with patient. Avoidance of cold air, extremes of temperature, rescue inhaler should be used before exercise. Order of medication reviewed with patient. Recommended use of a cool mist humidifier in the bedroom\par \par Problem 1A Steroid dependent asthma\par -set up Dupixent \par Dupixent is a prescription medicine used with other asthma medicines for the maintenance treatment of moderate-to-severe asthma in people aged 12 years and older whose asthma is not controlled with their current asthma medicines. Dupixent helps prevent severe asthma attacks (exacerbations) and can improve your breathing. Dupixent may also help reduce the amount of oral corticosteroids you need while preventing severe asthma attacks and improving your breathing. Dupixent is not used to treat sudden breathing problems. Risks and side effect of Dupixent were discussed and reviewed with patient. \par \par problem 2: eosinophilic asthma; s/p Nucala- NC\par -s/p receiving Fasenra and has had 8 injections( 11/2020)\par -The safety and efficacy of Nucala was established in three double-blind, randomized, placebo-controlled trials in patients with severe asthma. Compared to a placebo, patients with severe asthma receiving Nucala had fewer exacerbation requiring hospitalization and/or emergency department visits, and a longer time to first exacerbation. In addition, patients with severe asthma receiving Nucala or Fasenra experienced greater reductions in their daily maintenance oral corticosteroid dose, while maintaining asthma control compared with patients receiving placebo. Treatment with Nucala did not result in a significant improvement in lung function, as measured by the volume of air exhaled by patients in one second. The most common side effects include: headache, injection site reactions, back pain, weakness, and fatigue; hypersensitivity reactions can occur within hours or days including swelling of the face, mouth, and tongue, fainting, dizziness, hives, breathing problems, and rash; herpes zoster infections have occurred. The drug is a monoclonal antibody that inhibits interleukin-5 which helps regular eosinophils, a type of white blood cell that contributes to asthma. The over-production of eosinophils can cause inflammation in the lungs, increasing the frequency of asthma attacks. Patients must also take other medications, including high dose inhaled corticosteroids and at least one additional asthma drug\par \par problem 3: allergic rhinitis/post nasal drip syndrome \par -recommended Navage sinus rinse\par -continue Qnasl 1 sniff each nostril BID \par -continue Olopatadine 0.6% 1 sniff each nostril BID \par -continue claritin-d 24hr AM\par -continue Xyzal 5 mg before bed \par s/p Blood work: eosinophil level, IgE level, and immunocap testing \par -Environmental measures for allergies were encouraged including mattress and pillow cover, air purifier, and environmental controls.\par \par problem 4: GERD\par -continue to use good diet and good timing of meals\par -Rule of 2s: avoid eating too much, eating too late, eating too spicy, eating two hours before bed\par -Things to avoid including overeating, spicy foods, tight clothing, eating within three hours of bed, this list is not all inclusive. \par -For treatment of reflux, possible options discussed including diet control, H2 blockers, PPIs, as well as coating motility agents discussed as treatment options. Timing of meals and proximity of last meal to sleep were discussed. If symptoms persist, a formal gastrointestinal evaluation is needed. \par \par problem 5: ALIS\par -add trazadone 50 mg qhs \par -she has refused any treatment or diagnosing\par -she is being recommended to use Oxy-Aid by [facility]\par -Discussed the risks/associations with coronary artery disease, atrial fibrillation, arrhythmia, memory loss, issues with concentration, stroke risk, hypertension, nocturia, chronic reflux/Kimbrough’s esophagus some but not all inclusive. Treatment options discussed including CPAP/BiPAP machine, oral appliance, ProVent therapy, Oxy-Aid by [facility], new technologies, or positional sleep.\par \par problem 6: cardiac component \par -she is being recommended to have a consultation with Dr. Reese ([facility]) (AICD) (3/22 pending) Dr Carlson \par \par Problem 7: Anxiety\par - Continue Klonopin 0.5 mg prn \par \par problem 8: health maintenance \par -s/p COVID 19 vaccine x 2 (booster pending) \par -recommended orthopedic evaluation with (Dr. Perez)\par -recommended Evaluation with Neurology (Dr. Baker)\par -recommended to have medical marijuana evaluation with Dr. Alma Matias\par -s/p 2021 yearly flu shot \par -recommended strep pneumonia vaccines: Prevnar-13 vaccine, followed by Pneumo vaccine 23 one year following\par -recommended early intervention for URIs\par -recommended regular osteoporosis evaluations\par -recommended early dermatological evaluations\par -recommended after the age of 50 to the age of 70, colonoscopy every 5 years \par  \par \par F/U in 2 months\par She is encouraged to call with any changes,concerns, or questions.

## 2022-03-02 ENCOUNTER — LABORATORY RESULT (OUTPATIENT)
Age: 72
End: 2022-03-02

## 2022-03-02 ENCOUNTER — APPOINTMENT (OUTPATIENT)
Dept: CARDIOLOGY | Facility: CLINIC | Age: 72
End: 2022-03-02
Payer: MEDICARE

## 2022-03-02 ENCOUNTER — NON-APPOINTMENT (OUTPATIENT)
Age: 72
End: 2022-03-02

## 2022-03-02 VITALS
RESPIRATION RATE: 16 BRPM | TEMPERATURE: 98.3 F | BODY MASS INDEX: 24.53 KG/M2 | OXYGEN SATURATION: 93 % | DIASTOLIC BLOOD PRESSURE: 79 MMHG | WEIGHT: 106 LBS | SYSTOLIC BLOOD PRESSURE: 126 MMHG | HEIGHT: 55 IN | HEART RATE: 100 BPM

## 2022-03-02 DIAGNOSIS — F32.A DEPRESSION, UNSPECIFIED: ICD-10-CM

## 2022-03-02 PROCEDURE — 99214 OFFICE O/P EST MOD 30 MIN: CPT

## 2022-03-02 PROCEDURE — 93000 ELECTROCARDIOGRAM COMPLETE: CPT

## 2022-03-02 PROCEDURE — 93922 UPR/L XTREMITY ART 2 LEVELS: CPT

## 2022-03-02 RX ORDER — SPIRONOLACTONE 25 MG/1
25 TABLET ORAL
Qty: 25 | Refills: 0 | Status: COMPLETED | COMMUNITY
Start: 2017-11-28 | End: 2022-03-02

## 2022-03-02 RX ORDER — LINACLOTIDE 290 UG/1
290 CAPSULE, GELATIN COATED ORAL DAILY
Refills: 0 | Status: COMPLETED | COMMUNITY
Start: 2019-11-13 | End: 2022-03-02

## 2022-03-02 RX ORDER — OXYCODONE 5 MG/1
5 TABLET ORAL
Qty: 30 | Refills: 0 | Status: COMPLETED | COMMUNITY
Start: 2020-05-05 | End: 2022-03-02

## 2022-03-02 RX ORDER — MECLIZINE HYDROCHLORIDE 12.5 MG/1
12.5 TABLET ORAL 3 TIMES DAILY
Refills: 0 | Status: COMPLETED | COMMUNITY
Start: 2019-10-31 | End: 2022-03-02

## 2022-03-02 RX ORDER — PREDNISONE 10 MG/1
10 TABLET ORAL
Qty: 21 | Refills: 0 | Status: COMPLETED | COMMUNITY
Start: 2021-10-28 | End: 2022-03-02

## 2022-03-02 RX ORDER — ZOLPIDEM TARTRATE 6.25 MG/1
6.25 TABLET, EXTENDED RELEASE ORAL
Qty: 14 | Refills: 0 | Status: COMPLETED | COMMUNITY
Start: 2020-09-03 | End: 2022-03-02

## 2022-03-02 RX ORDER — ADHESIVE TAPE 3"X 2.3 YD
50 MCG TAPE, NON-MEDICATED TOPICAL
Qty: 30 | Refills: 11 | Status: COMPLETED | OUTPATIENT
Start: 2018-11-29 | End: 2022-03-02

## 2022-03-02 RX ORDER — FLUTICASONE PROPIONATE AND SALMETEROL 250; 50 UG/1; UG/1
250-50 POWDER RESPIRATORY (INHALATION)
Qty: 1 | Refills: 3 | Status: COMPLETED | COMMUNITY
Start: 2019-12-03 | End: 2022-03-02

## 2022-03-02 NOTE — DISCUSSION/SUMMARY
[FreeTextEntry1] : This is a 71-year-old female with past medical history significant for coronary artery disease, status post myocardial infarction received 6-8 stents by her report, history of significant asthma, status post sigmoid resection for diverticulitis, occasional leg cramping, status post implantation of a back stimulator for chronic pain, status post surgery for rectocele, status post surgery for cystocele, sciatica, diaphragmatic surgery to improve her breathing, comes in for cardiac follow-up evaluation.\par Electrocardiogram done March 2, 2022 demonstrated sinus tachycardia rate of 100 bpm is otherwise remarkable for poor R wave progression, left axis deviation.\par Echo Doppler examination done June 16, 2020 demonstrated mild mitral valve regurgitation, moderate to severe tricuspid valve regurgitation, mild pulmonary systolic hypertension, mild left ventricular enlargement, severe left ventricular systolic dysfunction with hypokinesis on the anterior wall, apex and interventricular septum.\par Ankle-brachial index done March 2, 2022 was within normal limits.\par Cardiac catheterization done March 15, 2019 demonstrated severe stenosis of the distal circumflex artery and small vessel which was not amenable to intervention, 20% lesion in the mid LAD, at the site of the prior stent, 30% lesion the first diagonal branch at the site of a prior stent, distal circumflex lesion 80% small vessel, proximal ramus branch 30%, and mid right coronary artery 60%.\par The patient will follow up in 1 months and see if the addition of Lasix improves her clinical status.\par \par She reports that she is living in assisted living and retaining fluid, with bilateral pedal edema and some abdominal bloating.  She is also complaining of dyspnea on exertion.\par She would like to try Lasix to improve her swelling in her symptoms.\par She will start on Lasix 20 mg only Monday Wednesday and Friday.  She will have new blood work done today for low electrolytes and lipid panel.  I once again discussed the use of statin therapy, but the patient does not wish to go on statins at all or any type of lipid-lowering therapy.  She understands the risk of restenosis of her stents.\par Given her symptoms of dyspnea, I recommended she undergo a cardiac catheterization to define her coronary artery anatomy.  She is willing to do a basic stress test to start.  She will make an appointment for this examination.\par She is instructed to elevate her feet, limit her salt intake, and wear compression stockings.\par Ankle-brachial index done March 2, 2022 was within normal limits.\par \par PMH:\par The patient reports that she had a myocardial infarction followed by coronary intervention approximately 5 to 6 years ago [facility].  Details of this hospitalization are not available for my review.  At one point she was offered an AICD but refused wanting to live her life out.  She also reports that at one point she was in a coma related to her asthma.\par Mother and 3 sisters had severe asthma.  She has no history of rheumatic fever.  She does not drink excessive caffeine or alcohol.\par She reports that she was tried on statin therapy in the past but it did not agree with her.  She does not remember which statin she was on. \par Electrocardiogram done May 13, 2020 demonstrated normal sinus rhythm rate of 98 bpm is otherwise remarkable for left atrial abnormality, left axis deviation, and nonspecific ST wave flattening.\par Echo Doppler examination February 21, 2017 which demonstrated severe left ventricular dysfunction with an estimated ejection fraction of 25 to 30%.  This was an echocardiogram without commentary on Doppler flow.  A pulmonary systolic pressure was noted to be 39 mmHg.  She had a nuclear stress test done September 22, 2014 which demonstrated normal perfusion with an estimated ejection fraction 62%.\par The patient had a recent cardiac catheterization done at [facility] in March 2020.  Results are still pending at this time. She should be on lipid-lowering therapy. \par She will follow-up with her pulmonologist.\par She is currently hemodynamically stable from a cardiac standpoint.

## 2022-03-02 NOTE — REASON FOR VISIT
[Follow-Up - Clinic] : a clinic follow-up of [Dyspnea] : dyspnea [Hypertension] : hypertension [Mitral Regurgitation] : mitral regurgitation [CV Risk Factors and Non-Cardiac Disease] : CV risk factors and non-cardiac disease [Hyperlipidemia] : hyperlipidemia [Coronary Artery Disease] : coronary artery disease [FreeTextEntry1] : s/p MI, asthma, LV dysfunction

## 2022-03-02 NOTE — PHYSICAL EXAM
[General Appearance - Well Developed] : well developed [Normal Appearance] : normal appearance [Well Groomed] : well groomed [General Appearance - Well Nourished] : well nourished [No Deformities] : no deformities [General Appearance - In No Acute Distress] : no acute distress [Normal Conjunctiva] : the conjunctiva exhibited no abnormalities [Eyelids - No Xanthelasma] : the eyelids demonstrated no xanthelasmas [Normal Oral Mucosa] : normal oral mucosa [Normal Jugular Venous A Waves Present] : normal jugular venous A waves present [Normal Jugular Venous V Waves Present] : normal jugular venous V waves present [No Jugular Venous Santo A Waves] : no jugular venous santo A waves [Respiration, Rhythm And Depth] : normal respiratory rhythm and effort [Exaggerated Use Of Accessory Muscles For Inspiration] : no accessory muscle use [Auscultation Breath Sounds / Voice Sounds] : lungs were clear to auscultation bilaterally [Bowel Sounds] : normal bowel sounds [Abdomen Soft] : soft [Abnormal Walk] : normal gait [Nail Clubbing] : no clubbing of the fingernails [Cyanosis, Localized] : no localized cyanosis [Petechial Hemorrhages (___cm)] : no petechial hemorrhages [Skin Turgor] : normal skin turgor [Skin Color & Pigmentation] : normal skin color and pigmentation [] : no rash [No Xanthoma] : no  xanthoma was observed [Oriented To Time, Place, And Person] : oriented to person, place, and time [Impaired Insight] : insight and judgment were intact [Affect] : the affect was normal [Mood] : the mood was normal [No Anxiety] : not feeling anxious [5th Left ICS - MCL] : palpated at the 5th LICS in the midclavicular line [Normal] : normal [No Precordial Heave] : no precordial heave was noted [Normal Rate] : normal [Rhythm Regular] : regular [Normal S1] : normal S1 [Normal S2] : normal S2 [No Gallop] : no gallop heard [II] : a grade 2 [2+] : left 2+ [No Abnormalities] : the abdominal aorta was not enlarged and no bruit was heard [___ +] : bilateral [unfilled]U+ pitting edema to the ankles [S3] : no S3 [S4] : no S4 [Right Carotid Bruit] : no bruit heard over the right carotid [Left Carotid Bruit] : no bruit heard over the left carotid

## 2022-03-02 NOTE — ASSESSMENT
[FreeTextEntry1] : Prior note nurse practitioner Vignesh March 17, 2021:\par \par This is a 71 year year old female here today for follow up cardiac followup evaluation. \par She has a past medical history significant for  coronary artery disease, status post myocardial infarction received 6-8 stents by her report, history of significant asthma, status post sigmoid resection for diverticulitis, status post implantation of a back stimulator for chronic pain, status post surgery for rectocele, status post surgery for cystocele, sciatica, diaphragmatic surgery to improve her breathing.\par \par -Pt is stable from a cardiac standpoint and does not have any complaints at this time. \par -BP is well controlled in today's visit and patient is on spironolactone 25mg PO daily.\par \par -EKG done in March 17 2021 which demonstrated regular sinus rhythm with nonspecific ST-T wave changes BPM of 88.\par -Echocardiogram done 6/16/2020 demonstrated mild mitral regurgitation, thickened aortic valve, minimal aortic regurgitation, mild LVH, severe segmental LV dysfunction with hypokinesis of the anterior wall, ape, and interventricular septum, moderate-severe tricuspid regurgitation with mild pulmonary pressures.\par \par -I explained that she needs to follow up with Dr. Bull to help manage her HF.\par \par -Recent lab work done in May 13 2020 which demonstrated Total cholesterol 212 . I advised to start Crestor 20mg PO Daily however she refused statin therapy at this time. She states "I have tried these medications before and I don’t tolerate it well. 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 \par -New blood work done today to repeat lipid profile. \par \par -The patient will schedule an Echo Doppler examination to evaluate murmur, left ventricular function, chamber size, and rule out hypertrophy. \par -She will follow up in 3 months \par -She will follow up with Dr. Bull.\par \par The patient understands that aerobic exercises must be increased to minutes 4 times/week and a detailed discussion of lifestyle modification was done today. \par The patient has a good understanding of the diagnosis, treatment plan and lifestyle modification. She will contact me at the office for any questions with their care or any changes in their health status.\par \par The plan of care was discussed with supervising physician, Dr. Carlson while present in the office at the time of the visit. \par \mary Leon NP

## 2022-03-03 RX ORDER — DULOXETINE HYDROCHLORIDE 60 MG/1
60 CAPSULE, DELAYED RELEASE PELLETS ORAL
Qty: 30 | Refills: 0 | Status: ACTIVE | COMMUNITY
Start: 2022-03-03

## 2022-03-03 RX ORDER — DUPILUMAB 300 MG/2ML
300 INJECTION, SOLUTION SUBCUTANEOUS
Qty: 1 | Refills: 0 | Status: DISCONTINUED | COMMUNITY
Start: 2022-02-23 | End: 2022-03-03

## 2022-03-06 NOTE — ED PROVIDER NOTE - CRITICAL CARE PROVIDED
direct patient care (not related to procedure)/interpretation of diagnostic studies/additional history taking/documentation
Alert and oriented to person, place and time

## 2022-03-16 ENCOUNTER — NON-APPOINTMENT (OUTPATIENT)
Age: 72
End: 2022-03-16

## 2022-04-19 NOTE — ECT OUTPATIENT PROGRAM DISCHARGE SUMMARY - NSECTTREATMENTSUMMARY_PSY_ALL_CORE
Pt referred for ECT that was started during inpatient admission to [facility].  Pt had a total of 18 treatments.  At last visit, pt was on monthly schedule, continued to have anxiety but had improvement in depression symptoms.  No cognitive complaints or SI.   Pt decided to stop ECT and continue with outpt medication management with provider at [facility].   At last appt, pt was stable.

## 2022-04-27 NOTE — ED ADULT NURSE NOTE - ATTEMPT TO OOB
no Bcc Pigmented Histology Text: There were nests and cords of atypical hyperchromatic pigmented basaloid cells present with peripheral palisading and retraction within fibromyxoid stroma.  The area of positive cancer is as marked on the Mohs map.

## 2022-05-13 ENCOUNTER — APPOINTMENT (OUTPATIENT)
Dept: CARDIOLOGY | Facility: CLINIC | Age: 72
End: 2022-05-13

## 2022-05-18 ENCOUNTER — APPOINTMENT (OUTPATIENT)
Dept: PULMONOLOGY | Facility: CLINIC | Age: 72
End: 2022-05-18

## 2022-05-20 ENCOUNTER — APPOINTMENT (OUTPATIENT)
Dept: CARDIOLOGY | Facility: CLINIC | Age: 72
End: 2022-05-20
Payer: MEDICARE

## 2022-05-20 VITALS
BODY MASS INDEX: 24.07 KG/M2 | TEMPERATURE: 98 F | HEIGHT: 55 IN | DIASTOLIC BLOOD PRESSURE: 70 MMHG | HEART RATE: 99 BPM | WEIGHT: 104 LBS | OXYGEN SATURATION: 94 % | SYSTOLIC BLOOD PRESSURE: 105 MMHG | RESPIRATION RATE: 16 BRPM

## 2022-05-20 PROCEDURE — 99214 OFFICE O/P EST MOD 30 MIN: CPT | Mod: 25

## 2022-05-20 NOTE — DISCUSSION/SUMMARY
[FreeTextEntry1] : Dr. Carlson-(PRIOR VISIT and PMH WITH Dr. Carlson): \par This is a 71-year-old female with past medical history significant for coronary artery disease, status post myocardial infarction received 6-8 stents by her report, history of significant asthma, status post sigmoid resection for diverticulitis, occasional leg cramping, status post implantation of a back stimulator for chronic pain, status post surgery for rectocele, status post surgery for cystocele, sciatica, diaphragmatic surgery to improve her breathing, comes in for cardiac follow-up evaluation.\par \par Electrocardiogram done March 2, 2022 demonstrated sinus tachycardia rate of 100 bpm is otherwise remarkable for poor R wave progression, left axis deviation.\par \par Echo Doppler examination done June 16, 2020 demonstrated mild mitral valve regurgitation, moderate to severe tricuspid valve regurgitation, mild pulmonary systolic hypertension, mild left ventricular enlargement, severe left ventricular systolic dysfunction with hypokinesis on the anterior wall, apex and interventricular septum.\par \par Ankle-brachial index done March 2, 2022 was within normal limits.\par \par Cardiac catheterization done March 15, 2019 demonstrated severe stenosis of the distal circumflex artery and small vessel which was not amenable to intervention, 20% lesion in the mid LAD, at the site of the prior stent, 30% lesion the first diagonal branch at the site of a prior stent, distal circumflex lesion 80% small vessel, proximal ramus branch 30%, and mid right coronary artery 60%.\par \par The patient will follow up in 1 months and see if the addition of Lasix improves her clinical status.\par \par She reports that she is living in assisted living and retaining fluid, with bilateral pedal edema and some abdominal bloating.  She is also complaining of dyspnea on exertion.\par \par She would like to try Lasix to improve her swelling in her symptoms.\par She will start on Lasix 20 mg only Monday Wednesday and Friday.  \par \par She will have new blood work done today for low electrolytes and lipid panel.  I once again discussed the use of statin therapy, but the patient does not wish to go on statins at all or any type of lipid-lowering therapy.  She understands the risk of restenosis of her stents.\par \par Given her symptoms of dyspnea, I recommended she undergo a cardiac catheterization to define her coronary artery anatomy.  She is willing to do a basic stress test to start.  She will make an appointment for this examination.\par \par She is instructed to elevate her feet, limit her salt intake, and wear compression stockings.\par \par PMH:\par The patient reports that she had a myocardial infarction followed by coronary intervention approximately 5 to 6 years ago [facility].  Details of this hospitalization are not available for my review.  At one point she was offered an AICD but refused wanting to live her life out.  She also reports that at one point she was in a coma related to her asthma.\par \par Mother and 3 sisters had severe asthma.  She has no history of rheumatic fever.  She does not drink excessive caffeine or alcohol.\par \par She reports that she was tried on statin therapy in the past but it did not agree with her.  She does not remember which statin she was on. \par \par Electrocardiogram done May 13, 2020 demonstrated normal sinus rhythm rate of 98 bpm is otherwise remarkable for left atrial abnormality, left axis deviation, and nonspecific ST wave flattening.\par \par Echo Doppler examination February 21, 2017 which demonstrated severe left ventricular dysfunction with an estimated ejection fraction of 25 to 30%.  This was an echocardiogram without commentary on Doppler flow.  A pulmonary systolic pressure was noted to be 39 mmHg.  \par \par She had a nuclear stress test done September 22, 2014 which demonstrated normal perfusion with an estimated ejection fraction 62%.\par \par The patient had a recent cardiac catheterization done at [facility] in March 2020.  \par \par Results are still pending at this time. She should be on lipid-lowering therapy. \par She will follow-up with her pulmonologist.\par She is currently hemodynamically stable from a cardiac standpoint.

## 2022-05-20 NOTE — REASON FOR VISIT
[CV Risk Factors and Non-Cardiac Disease] : CV risk factors and non-cardiac disease [Follow-Up - Clinic] : a clinic follow-up of [Coronary Artery Disease] : coronary artery disease [Dyspnea] : dyspnea [Hyperlipidemia] : hyperlipidemia [Hypertension] : hypertension [Mitral Regurgitation] : mitral regurgitation [FreeTextEntry1] : s/p MI, asthma, LV dysfunction

## 2022-05-20 NOTE — ASSESSMENT
[FreeTextEntry1] : This is a 72 year year old female here today for follow up cardiac followup evaluation. \par She has a past medical history significant for  coronary artery disease, status post myocardial infarction received 6-8 stents by her report, history of significant asthma, status post sigmoid resection for diverticulitis, status post implantation of a back stimulator for chronic pain, status post surgery for rectocele, status post surgery for cystocele, sciatica, diaphragmatic surgery to improve her breathing.\par \par -She is a retired nurse.\par \par CHIEF COMPLAINT:\par Today she is feeling generally well and is here today for assessment of her ankle swelling. She is currently on ASA 81mg PO DAILY, Plavix 75mg PO DAILY and on Furosemide 20mg PO DAILY. She states that since starting her water pill her edema has improved. She is currently not on lipid lowering therapy and has refused statins in the past. \par \par BLOOD PRESSURE:\par -BP is well controlled in today's visit.\par \par BLOOD WORK:\par -New blood work was done 3/7/22 which demonstrated Cholesterol of 218 and LDL of 119. K level 4.8.\par \par -We once again discussed the use of statin therapy, but the patient does not wish to go on statins at all or any type of lipid-lowering therapy.  She understands the risk of restenosis of her stents.\par \par -Recent lab work done in May 13 2020 which demonstrated Total cholesterol 212 . I advised to start Crestor 20mg PO Daily however she refused statin therapy at this time. She states "I have tried these medications before and I don’t tolerate it well. 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 \par \par CHOLESTEROL CONTROL:\par -Patient will continue the advised  TLC diet and to continue follow-up for treatment of hyperlipidemia and repeat blood testing with diet and exercise. I have discussed different exercises and the importance of maintenance of optimal body weight. The importance of staying within guidelines and recommendations was stressed to the patient today and they acknowledged that they understand this to me verbally.\par \par TESTING/REPORTS:\par -Electrocardiogram done March 2, 2022 demonstrated sinus tachycardia rate of 100 bpm is otherwise remarkable for poor R wave progression, left axis deviation.\par \par -EKG done in March 17 2021 which demonstrated regular sinus rhythm with nonspecific ST-T wave changes BPM of 88.\par \par -Echo Doppler examination done June 16, 2020 demonstrated mild mitral valve regurgitation, moderate to severe tricuspid valve regurgitation, mild pulmonary systolic hypertension, mild left ventricular enlargement, severe left ventricular systolic dysfunction with hypokinesis on the anterior wall, apex and interventricular septum.\par \par -Ankle-brachial index done March 2, 2022 was within normal limits.\par \par -Cardiac catheterization done March 15, 2019 demonstrated severe stenosis of the distal circumflex artery and small vessel which was not amenable to intervention, 20% lesion in the mid LAD, at the site of the prior stent, 30% lesion the first diagonal branch at the site of a prior stent, distal circumflex lesion 80% small vessel, proximal ramus branch 30%, and mid right coronary artery 60%.\par \par EDEMA:\par Patient was instructed to elevate the legs and to use compression socks or wraps when out of bed.  We'll monitor blood work for BUN/creatinine levels  and adjust diuretics accordingly. Patient to advised to follow-up if any redness or signs of infection develop on their legs.\par Patient had an educational review of the treatment of leg edema. Patient was advised to keep their legs elevated and take medication prescribed today and avoid sodium containing foods. The patient was advised to call prescribe medications and to notify the office of his condition worsens or they developed shortness of breath. \par \par PLAN:\par -She will continue with her usual medications and will contact the office if she is having any complaints between now and their next follow up appointment.\par -She will continue Furosemide for now. \par -The patient has a scheduled Exercise Stress Test rule out significant coronary artery disease and will schedule \par an Echo Doppler examination to evaluate murmur, left ventricular function, chamber size, and rule out hypertrophy in 1 month.\par \par I have discussed the plan of care with Ms. TERESA PRIMROSE. She is compliant with all of her medications.\par \par The patient understands that aerobic exercises must be increased to minutes 4 times/week and a detailed discussion of lifestyle modification was done today. \par The patient has a good understanding of the diagnosis, treatment plan and lifestyle modification. \par She will contact me at the office for any questions with their care or any changes in their health status.\par \par \par Carolyn CASTLE

## 2022-05-20 NOTE — PHYSICAL EXAM
[Well Developed] : well developed [Well Nourished] : well nourished [No Acute Distress] : no acute distress [Normal Venous Pressure] : normal venous pressure [No Carotid Bruit] : no carotid bruit [Normal S1, S2] : normal S1, S2 [No Murmur] : no murmur [No Rub] : no rub [Clear Lung Fields] : clear lung fields [Good Air Entry] : good air entry [No Respiratory Distress] : no respiratory distress  [Soft] : abdomen soft [Non Tender] : non-tender [No Masses/organomegaly] : no masses/organomegaly [Normal Bowel Sounds] : normal bowel sounds [Normal Gait] : normal gait [No Edema] : no edema [No Cyanosis] : no cyanosis [No Clubbing] : no clubbing [No Varicosities] : no varicosities [No Rash] : no rash [No Skin Lesions] : no skin lesions [Moves all extremities] : moves all extremities [No Focal Deficits] : no focal deficits [Normal Speech] : normal speech [Alert and Oriented] : alert and oriented [Normal memory] : normal memory [General Appearance - Well Developed] : well developed [Normal Appearance] : normal appearance [Well Groomed] : well groomed [General Appearance - Well Nourished] : well nourished [No Deformities] : no deformities [General Appearance - In No Acute Distress] : no acute distress [Normal Conjunctiva] : the conjunctiva exhibited no abnormalities [Eyelids - No Xanthelasma] : the eyelids demonstrated no xanthelasmas [Normal Oral Mucosa] : normal oral mucosa [Normal Jugular Venous A Waves Present] : normal jugular venous A waves present [Normal Jugular Venous V Waves Present] : normal jugular venous V waves present [No Jugular Venous Santo A Waves] : no jugular venous santo A waves [Respiration, Rhythm And Depth] : normal respiratory rhythm and effort [Exaggerated Use Of Accessory Muscles For Inspiration] : no accessory muscle use [Auscultation Breath Sounds / Voice Sounds] : lungs were clear to auscultation bilaterally [Bowel Sounds] : normal bowel sounds [Abdomen Soft] : soft [Abnormal Walk] : normal gait [Nail Clubbing] : no clubbing of the fingernails [Cyanosis, Localized] : no localized cyanosis [Petechial Hemorrhages (___cm)] : no petechial hemorrhages [Skin Color & Pigmentation] : normal skin color and pigmentation [Skin Turgor] : normal skin turgor [] : no rash [No Xanthoma] : no  xanthoma was observed [Oriented To Time, Place, And Person] : oriented to person, place, and time [Impaired Insight] : insight and judgment were intact [Affect] : the affect was normal [Mood] : the mood was normal [No Anxiety] : not feeling anxious [5th Left ICS - MCL] : palpated at the 5th LICS in the midclavicular line [Normal] : normal [No Precordial Heave] : no precordial heave was noted [Normal Rate] : normal [Rhythm Regular] : regular [Normal S1] : normal S1 [Normal S2] : normal S2 [No Gallop] : no gallop heard [II] : a grade 2 [2+] : left 2+ [No Abnormalities] : the abdominal aorta was not enlarged and no bruit was heard [___ +] : bilateral [unfilled]U+ pitting edema to the ankles [S3] : no S3 [S4] : no S4 [Right Carotid Bruit] : no bruit heard over the right carotid [Left Carotid Bruit] : no bruit heard over the left carotid

## 2022-05-24 RX ORDER — PRAMIPEXOLE DIHYDROCHLORIDE 0.25 MG/1
0.25 TABLET ORAL
Refills: 0 | Status: COMPLETED | COMMUNITY
Start: 2022-03-03 | End: 2022-05-24

## 2022-06-21 ENCOUNTER — RX RENEWAL (OUTPATIENT)
Age: 72
End: 2022-06-21

## 2022-06-28 ENCOUNTER — NON-APPOINTMENT (OUTPATIENT)
Age: 72
End: 2022-06-28

## 2022-07-01 ENCOUNTER — APPOINTMENT (OUTPATIENT)
Dept: CARDIOLOGY | Facility: CLINIC | Age: 72
End: 2022-07-01

## 2022-08-17 ENCOUNTER — NON-APPOINTMENT (OUTPATIENT)
Age: 72
End: 2022-08-17

## 2022-08-17 ENCOUNTER — APPOINTMENT (OUTPATIENT)
Dept: CARDIOLOGY | Facility: CLINIC | Age: 72
End: 2022-08-17

## 2022-08-17 VITALS
TEMPERATURE: 98 F | HEIGHT: 55 IN | SYSTOLIC BLOOD PRESSURE: 116 MMHG | WEIGHT: 102 LBS | DIASTOLIC BLOOD PRESSURE: 78 MMHG | HEART RATE: 91 BPM | OXYGEN SATURATION: 99 % | BODY MASS INDEX: 23.61 KG/M2 | RESPIRATION RATE: 16 BRPM

## 2022-08-17 PROCEDURE — 99215 OFFICE O/P EST HI 40 MIN: CPT

## 2022-08-17 PROCEDURE — 93000 ELECTROCARDIOGRAM COMPLETE: CPT

## 2022-08-17 PROCEDURE — 93306 TTE W/DOPPLER COMPLETE: CPT

## 2022-08-17 NOTE — PHYSICAL EXAM
[Well Developed] : well developed [Well Nourished] : well nourished [No Acute Distress] : no acute distress [Normal Venous Pressure] : normal venous pressure [No Carotid Bruit] : no carotid bruit [Normal S1, S2] : normal S1, S2 [No Rub] : no rub [Clear Lung Fields] : clear lung fields [Good Air Entry] : good air entry [No Respiratory Distress] : no respiratory distress  [Soft] : abdomen soft [Non Tender] : non-tender [No Masses/organomegaly] : no masses/organomegaly [Normal Bowel Sounds] : normal bowel sounds [Normal Gait] : normal gait [No Edema] : no edema [No Cyanosis] : no cyanosis [No Clubbing] : no clubbing [No Varicosities] : no varicosities [No Rash] : no rash [No Skin Lesions] : no skin lesions [Moves all extremities] : moves all extremities [No Focal Deficits] : no focal deficits [Normal Speech] : normal speech [Alert and Oriented] : alert and oriented [Normal memory] : normal memory [General Appearance - Well Developed] : well developed [Normal Appearance] : normal appearance [Well Groomed] : well groomed [General Appearance - Well Nourished] : well nourished [No Deformities] : no deformities [General Appearance - In No Acute Distress] : no acute distress [Normal Conjunctiva] : the conjunctiva exhibited no abnormalities [Eyelids - No Xanthelasma] : the eyelids demonstrated no xanthelasmas [Normal Oral Mucosa] : normal oral mucosa [Normal Jugular Venous A Waves Present] : normal jugular venous A waves present [Normal Jugular Venous V Waves Present] : normal jugular venous V waves present [No Jugular Venous Santo A Waves] : no jugular venous santo A waves [Respiration, Rhythm And Depth] : normal respiratory rhythm and effort [Exaggerated Use Of Accessory Muscles For Inspiration] : no accessory muscle use [Auscultation Breath Sounds / Voice Sounds] : lungs were clear to auscultation bilaterally [Bowel Sounds] : normal bowel sounds [Abdomen Soft] : soft [Abnormal Walk] : normal gait [Nail Clubbing] : no clubbing of the fingernails [Cyanosis, Localized] : no localized cyanosis [Petechial Hemorrhages (___cm)] : no petechial hemorrhages [Skin Color & Pigmentation] : normal skin color and pigmentation [Skin Turgor] : normal skin turgor [] : no rash [No Xanthoma] : no  xanthoma was observed [Oriented To Time, Place, And Person] : oriented to person, place, and time [Impaired Insight] : insight and judgment were intact [Affect] : the affect was normal [Mood] : the mood was normal [No Anxiety] : not feeling anxious [5th Left ICS - MCL] : palpated at the 5th LICS in the midclavicular line [Normal] : normal [No Precordial Heave] : no precordial heave was noted [Normal Rate] : normal [Rhythm Regular] : regular [Normal S1] : normal S1 [Normal S2] : normal S2 [No Gallop] : no gallop heard [II] : a grade 2 [___ +] : bilateral [unfilled]U+ pitting edema to the ankles [2+] : left 2+ [No Abnormalities] : the abdominal aorta was not enlarged and no bruit was heard [S3] : no S3 [S4] : no S4 [Right Carotid Bruit] : no bruit heard over the right carotid [Left Carotid Bruit] : no bruit heard over the left carotid

## 2022-08-17 NOTE — ASSESSMENT
[FreeTextEntry1] : Prior note nurse practitioner Vignesh May 20, 2022::\par \par This is a 72 year year old female here today for follow up cardiac followup evaluation. \par She has a past medical history significant for  coronary artery disease, status post myocardial infarction received 6-8 stents by her report, history of significant asthma, status post sigmoid resection for diverticulitis, status post implantation of a back stimulator for chronic pain, status post surgery for rectocele, status post surgery for cystocele, sciatica, diaphragmatic surgery to improve her breathing.\par \par -She is a retired nurse.\par \par CHIEF COMPLAINT:\par Today she is feeling generally well and is here today for assessment of her ankle swelling. She is currently on ASA 81mg PO DAILY, Plavix 75mg PO DAILY and on Furosemide 20mg PO DAILY. She states that since starting her water pill her edema has improved. She is currently not on lipid lowering therapy and has refused statins in the past. \par \par BLOOD PRESSURE:\par -BP is well controlled in today's visit.\par \par BLOOD WORK:\par -New blood work was done 3/7/22 which demonstrated Cholesterol of 218 and LDL of 119. K level 4.8.\par \par -We once again discussed the use of statin therapy, but the patient does not wish to go on statins at all or any type of lipid-lowering therapy.  She understands the risk of restenosis of her stents.\par \par -Recent lab work done in May 13 2020 which demonstrated Total cholesterol 212 . I advised to start Crestor 20mg PO Daily however she refused statin therapy at this time. She states "I have tried these medications before and I don’t tolerate it well. 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 \par \par CHOLESTEROL CONTROL:\par -Patient will continue the advised  TLC diet and to continue follow-up for treatment of hyperlipidemia and repeat blood testing with diet and exercise. I have discussed different exercises and the importance of maintenance of optimal body weight. The importance of staying within guidelines and recommendations was stressed to the patient today and they acknowledged that they understand this to me verbally.\par \par TESTING/REPORTS:\par -Electrocardiogram done March 2, 2022 demonstrated sinus tachycardia rate of 100 bpm is otherwise remarkable for poor R wave progression, left axis deviation.\par \par -EKG done in March 17 2021 which demonstrated regular sinus rhythm with nonspecific ST-T wave changes BPM of 88.\par \par -Echo Doppler examination done June 16, 2020 demonstrated mild mitral valve regurgitation, moderate to severe tricuspid valve regurgitation, mild pulmonary systolic hypertension, mild left ventricular enlargement, severe left ventricular systolic dysfunction with hypokinesis on the anterior wall, apex and interventricular septum.\par \par -Ankle-brachial index done March 2, 2022 was within normal limits.\par \par -Cardiac catheterization done March 15, 2019 demonstrated severe stenosis of the distal circumflex artery and small vessel which was not amenable to intervention, 20% lesion in the mid LAD, at the site of the prior stent, 30% lesion the first diagonal branch at the site of a prior stent, distal circumflex lesion 80% small vessel, proximal ramus branch 30%, and mid right coronary artery 60%.\par \par EDEMA:\par Patient was instructed to elevate the legs and to use compression socks or wraps when out of bed.  We'll monitor blood work for BUN/creatinine levels  and adjust diuretics accordingly. Patient to advised to follow-up if any redness or signs of infection develop on their legs.\par Patient had an educational review of the treatment of leg edema. Patient was advised to keep their legs elevated and take medication prescribed today and avoid sodium containing foods. The patient was advised to call prescribe medications and to notify the office of his condition worsens or they developed shortness of breath. \par \par PLAN:\par -She will continue with her usual medications and will contact the office if she is having any complaints between now and their next follow up appointment.\par -She will continue Furosemide for now. \par -The patient has a scheduled Exercise Stress Test rule out significant coronary artery disease and will schedule \par an Echo Doppler examination to evaluate murmur, left ventricular function, chamber size, and rule out hypertrophy in 1 month.\par \par I have discussed the plan of care with Ms. TERESA PRIMROSE. She is compliant with all of her medications.\par \par The patient understands that aerobic exercises must be increased to minutes 4 times/week and a detailed discussion of lifestyle modification was done today. \par The patient has a good understanding of the diagnosis, treatment plan and lifestyle modification. \par She will contact me at the office for any questions with their care or any changes in their health status.\par \par \mary Leon NP

## 2022-08-17 NOTE — DISCUSSION/SUMMARY
[FreeTextEntry1] : This is a 73-year-old female with past medical history significant for coronary artery disease, statin intolerant, status post myocardial infarction received 6-8 stents by her report, left ventricular dysfunction, history of significant asthma, status post sigmoid resection for diverticulitis, occasional leg cramping, status post implantation of a back stimulator for chronic pain, status post surgery for rectocele, status post surgery for cystocele, sciatica, diaphragmatic surgery to improve her breathing, comes in for cardiac follow-up evaluation.\par Electrocardiogram done August 17, 2022 demonstrated normal sinus rhythm at a rate of 90 bpm is otherwise remarkable for poor R wave progression, left axis deviation, and poor recording baseline.\par The patient still complains of some shortness of breath which she attributes to her pulmonary disease.  Blood work done August 3, 2022 demonstrated total cholesterol 238, LDL of 128 mg/dL, triglycerides 127 mg/dL, HDL 85 mg/dL.\par This patient's LDL target is less than 70 mg/dL.\par The patient is statin intolerant and she is complaining of bloating and GI distress on Zetia therapy.\par Electrocardiogram done March 2, 2022 demonstrated sinus tachycardia rate of 100 bpm is otherwise remarkable for poor R wave progression, left axis deviation.\par At this point in time, the patient should be on PCSK9 injectable therapy with Repatha to lower LDL cholesterol to target.  She will discontinue Zetia.  A new prescription will be placed for the Repatha therapy.\par \par She will follow-up with me after she sees the heart failure team.\par \par Echo Doppler examination done June 16, 2020 demonstrated mild mitral valve regurgitation, moderate to severe tricuspid valve regurgitation, mild pulmonary systolic hypertension, mild left ventricular enlargement, severe left ventricular systolic dysfunction with hypokinesis on the anterior wall, apex and interventricular septum.\par Ankle-brachial index done March 2, 2022 was within normal limits.\par \par Cardiac catheterization done March 15, 2019 demonstrated severe stenosis of the distal circumflex artery and small vessel which was not amenable to intervention, 20% lesion in the mid LAD, at the site of the prior stent, 30% lesion the first diagonal branch at the site of a prior stent, distal circumflex lesion 80% small vessel, proximal ramus branch 30%, and mid right coronary artery 60%.\par I once again discussed the use of statin therapy, but the patient does not wish to go on statins at all or any type of lipid-lowering therapy.  She understands the risk of restenosis of her stents\par \par She is instructed to elevate her feet, limit her salt intake, and wear compression stockings.\par \par PMH:\par The patient reports that she had a myocardial infarction followed by coronary intervention approximately 5 to 6 years ago [facility].  Details of this hospitalization are not available for my review.  At one point she was offered an AICD but refused wanting to live her life out.  She also reports that at one point she was in a coma related to her asthma.\par \par Mother and 3 sisters had severe asthma.  She has no history of rheumatic fever.  She does not drink excessive caffeine or alcohol.\par \par She reports that she was tried on statin therapy in the past but it did not agree with her.  She does not remember which statin she was on. \par \par Electrocardiogram done May 13, 2020 demonstrated normal sinus rhythm rate of 98 bpm is otherwise remarkable for left atrial abnormality, left axis deviation, and nonspecific ST wave flattening.\par \par Echo Doppler examination February 21, 2017 which demonstrated severe left ventricular dysfunction with an estimated ejection fraction of 25 to 30%.  This was an echocardiogram without commentary on Doppler flow.  A pulmonary systolic pressure was noted to be 39 mmHg.  \par \par She had a nuclear stress test done September 22, 2014 which demonstrated normal perfusion with an estimated ejection fraction 62%.\par \par The patient had a recent cardiac catheterization done at [facility] in March 2020.  \par She is currently hemodynamically stable from a cardiac standpoint.

## 2022-08-19 ENCOUNTER — APPOINTMENT (OUTPATIENT)
Dept: HEART FAILURE | Facility: CLINIC | Age: 72
End: 2022-08-19

## 2022-08-19 VITALS
SYSTOLIC BLOOD PRESSURE: 113 MMHG | DIASTOLIC BLOOD PRESSURE: 81 MMHG | BODY MASS INDEX: 24.53 KG/M2 | OXYGEN SATURATION: 92 % | HEIGHT: 55 IN | WEIGHT: 106 LBS | TEMPERATURE: 97.5 F | HEART RATE: 104 BPM

## 2022-08-19 DIAGNOSIS — I25.5 ISCHEMIC CARDIOMYOPATHY: ICD-10-CM

## 2022-08-19 PROCEDURE — 99204 OFFICE O/P NEW MOD 45 MIN: CPT

## 2022-08-22 ENCOUNTER — NON-APPOINTMENT (OUTPATIENT)
Age: 72
End: 2022-08-22

## 2022-08-22 LAB
ALBUMIN SERPL ELPH-MCNC: 4.4 G/DL
ALP BLD-CCNC: 103 U/L
ALT SERPL-CCNC: 12 U/L
ANION GAP SERPL CALC-SCNC: 21 MMOL/L
AST SERPL-CCNC: 31 U/L
BILIRUB SERPL-MCNC: <0.2 MG/DL
BUN SERPL-MCNC: 15 MG/DL
CALCIUM SERPL-MCNC: 9.9 MG/DL
CHLORIDE SERPL-SCNC: 101 MMOL/L
CO2 SERPL-SCNC: 19 MMOL/L
CREAT SERPL-MCNC: 0.8 MG/DL
EGFR: 79 ML/MIN/1.73M2
GLUCOSE SERPL-MCNC: 80 MG/DL
MAGNESIUM SERPL-MCNC: 2.2 MG/DL
NT-PROBNP SERPL-MCNC: 1349 PG/ML
POTASSIUM SERPL-SCNC: 4.9 MMOL/L
PROT SERPL-MCNC: 6.7 G/DL
SODIUM SERPL-SCNC: 142 MMOL/L

## 2022-08-22 NOTE — HISTORY OF PRESENT ILLNESS
[FreeTextEntry1] : Ms. Primrose is a 72 year old woman with HFrEF (LVIDd 4.7 cm, LVEF 23%), CAD c/b MI s/p multiple PCIs (last in 2017), severe reactive airway disease, diverticulitis s/p sigmoid resection, scoliosis, chronic back pain s/p implanted back stimulator, anxiety and major depressive disorder requiring inpatient care (last in July 2021) who presents as an initial consultation at the referral of Dr. Carlson\par \par She was diagnosed with asthma in her teens and reports chronic SOB. Approx 10 years ago underwent multiple PCIs but was told there was no issue with cardiac function until most recent PCI in 2017, where LVEF was found to be 25-30%. More recently, in the past 2 year has noted worsened JEAN. Of note, she endorses a strong family history of reactive airway disease with her sisters passing in their mid 60s. \par \par Currently, she reports SOB with minimal activity as well as conversational dyspnea, if prolonged. She only notes mild relief with nebulizer/inhalers. Endorses chronic 3 pillow orthopnea and occasional PND. She takes Lasix 20 mg QD with stable weight between 104-106 lbs and infrequently will take an additional 10 mg when she notes LE swelling with good effect. She resides in assisted living where he medications as managed for her. Appetite is good but notes early satiety. \par

## 2022-08-22 NOTE — CARDIOLOGY SUMMARY
[de-identified] : \par 8/17/22 TTE: LVIDd 6 cm, LVEF 18-22%, normal RV size and function, normal biatria, mild MR, mild TR, est RVSP 29 mmHg\par \par 06/22/21 TTE: LVIDd 4.7 cm, LVEF 23% (global), normal RV size and function, normal biatria, no valvulopathy, no estimation of RVSP\par \par 06/16/20 TTE: LVIDd 5.7 cm, LVEF 30-35% (segmental), normal RV size and function, normal biatria, mild MR, mod-severe TR, est RVSP 44 mmHg\par \par 03/1/17 TTE: LVIDd 4.3 cm, LVEF 25-30% (segmental), mild DD, normal RV size and function, normal biatria, mild TR, est RVSP 39 mmHg\par  [de-identified] : \par 03/15/19 LHC: mild atherosclerosis of LM, 10% stenosis at site of prior stent of pLAD, 20% stenosis at site of prior stent of mLAD, 30% stenosis at site of prior stent of D1, 30% stenosis Samanta, 60% stenosis of mRCA, 80% stenosis of dCx not amendable to PCI. LVEDP 15, /58 (77)\par

## 2022-08-22 NOTE — DISCUSSION/SUMMARY
[FreeTextEntry1] : 72 year old woman with ICM (LVIDd 6 cm, LVEF 18-22%), CAD c/b MI s/p multiple PCIs, severe reactive airway disease, diverticulitis s/p sigmoid resection, scoliosis, chronic back pain s/p implanted back stimulator, anxiety and major depressive disorder who presents as an initial consultation at the referral of Dr. Carlson. She is ACC/AHA Stage C, endorsing NYHA Class III-IV symptoms and while JVP difficult to visualize, suspect she is mildly hypervolemic. I have recommended the following:\par \par # Chronic systolic congestive HF\par - Etiology: likely ischemic. LVEF is severely depressed but this is in the setting of no medical therapies\par - GDMT: start Entresto 24-26 mg BID. Discussed introduction of BB with Dr. Morfin (given her RAD) who is in agreement. In 3 days, to start Bisoprolol 2.5 mg QD. Eventual SGLT2-i and MRA. \par - Diuretics: continue Lasix 20 mg QD\par - Device: will reassess LVEF once on max GDMT and if </= 35% will consider primary prevention ICD\par - Labs: today with K 4.9 (mod hemolysis), Mg 2.2, Cr 0.8, normal hepatic chemistries and pro-BNP of 1349 (no prior for comparison). Will arrange for labs via [facility] in one week. \par \par # CAD\par - reports several AKI, most recently in 2017\par - Last [facility] 2019 notable for 80% stenosis of dCx but not amendable to PCI\par - no signs of ischemia\par - continue ASA, Plavix, Repatha (reports statin allergy) and now BB and ARB (in [facility])\par - followed by Dr. Carlson\par \par # Severe reactive airway disease\par - close monitoring of respiratory status with trial of Bisoprolol\par - followed by Dr. Morfin, on multidrug reigmen\par \par Follow-up with the NP via telehealth in 3-4 weeks and Dr. Vieyra in 3-4 months. Discussed with Dr. Morfin and Dr. Carlson.\par

## 2022-08-22 NOTE — PHYSICAL EXAM
[Well Developed] : well developed [No Acute Distress] : no acute distress [No Xanthelasma] : no xanthelasma [Clear Lung Fields] : clear lung fields [Edema ___] : edema [unfilled] [Normal] : alert and oriented, normal memory [de-identified] : Thin appearing [de-identified] : DANIEL - wearing facemask [de-identified] : JVP difficult to visualize due to respiratory retractions  [de-identified] : R [de-identified] : tachypneic  [de-identified] : Warm peripherally

## 2022-08-23 ENCOUNTER — NON-APPOINTMENT (OUTPATIENT)
Age: 72
End: 2022-08-23

## 2022-08-26 ENCOUNTER — NON-APPOINTMENT (OUTPATIENT)
Age: 72
End: 2022-08-26

## 2022-08-27 ENCOUNTER — TRANSCRIPTION ENCOUNTER (OUTPATIENT)
Age: 72
End: 2022-08-27

## 2022-09-08 ENCOUNTER — APPOINTMENT (OUTPATIENT)
Dept: HEART FAILURE | Facility: CLINIC | Age: 72
End: 2022-09-08

## 2022-09-08 ENCOUNTER — NON-APPOINTMENT (OUTPATIENT)
Age: 72
End: 2022-09-08

## 2022-09-09 ENCOUNTER — NON-APPOINTMENT (OUTPATIENT)
Age: 72
End: 2022-09-09

## 2022-09-09 ENCOUNTER — APPOINTMENT (OUTPATIENT)
Dept: PULMONOLOGY | Facility: CLINIC | Age: 72
End: 2022-09-09

## 2022-09-09 VITALS
OXYGEN SATURATION: 93 % | DIASTOLIC BLOOD PRESSURE: 84 MMHG | HEART RATE: 90 BPM | RESPIRATION RATE: 16 BRPM | SYSTOLIC BLOOD PRESSURE: 122 MMHG | TEMPERATURE: 98.2 F | HEIGHT: 55 IN | BODY MASS INDEX: 23.14 KG/M2 | WEIGHT: 100 LBS

## 2022-09-09 PROCEDURE — 94010 BREATHING CAPACITY TEST: CPT

## 2022-09-09 PROCEDURE — 99214 OFFICE O/P EST MOD 30 MIN: CPT | Mod: 25

## 2022-09-09 NOTE — ASSESSMENT
[FreeTextEntry1] : Ms. Primrose is a 72 year old female with history of COPD / moderate-severe persistent asthma / allergy / paralyzed hemidiaphragm / cardiac disease / CHF/ eosinophilic asthma. She presents to the office for - steroid dependent asthma/ eosinophilic asthma- now dealing with progressive SOB (CHF EF 10%)\par \par Her shortness of breath is multifactorial due to:\par -cardiac/ mild congestive heart failure\par -severe persistent asthma\par -allergic rhinitis/post nasal drip syndrome\par -paralyzed hemidiaphragm s/p plication\par -poor breathing mechanics and anxiety \par \par problem 1: COPD/ asthma (severe persistent)- NC\par -continue Xopenex 0.63 by the nebulizer up to QID \par -continue to use Advair 500 at 1 inhalation BID\par -continue Tudorza at 1 inhalation BID\par -continue Singulair 10 mg before bed\par -continue to use Proventil PRN\par -continue Acapella device to be used multiple times daily \par -Asthma is believed to be caused by inherited (genetic) and environmental factor, but its exact cause is unknown. Asthma may be triggered by allergens, lung infections, or irritants in the air. Asthma triggers are different for each person \par -Inhaler technique reviewed as well as oral hygiene techniques reviewed with patient. Avoidance of cold air, extremes of temperature, rescue inhaler should be used before exercise. Order of medication reviewed with patient. Recommended use of a cool mist humidifier in the bedroom\par \par Problem 1A Steroid dependent asthma\par -on Dupixent since 2/2022\par Dupixent is a prescription medicine used with other asthma medicines for the maintenance treatment of moderate-to-severe asthma in people aged 12 years and older whose asthma is not controlled with their current asthma medicines. Dupixent helps prevent severe asthma attacks (exacerbations) and can improve your breathing. Dupixent may also help reduce the amount of oral corticosteroids you need while preventing severe asthma attacks and improving your breathing. Dupixent is not used to treat sudden breathing problems. Risks and side effect of Dupixent were discussed and reviewed with patient. \par \par problem 2: eosinophilic asthma; s/p Nucala- NC\par -s/p receiving Fasenra and had 8 injections( 11/2020)\par -The safety and efficacy of Nucala was established in three double-blind, randomized, placebo-controlled trials in patients with severe asthma. Compared to a placebo, patients with severe asthma receiving Nucala had fewer exacerbation requiring hospitalization and/or emergency department visits, and a longer time to first exacerbation. In addition, patients with severe asthma receiving Nucala or Fasenra experienced greater reductions in their daily maintenance oral corticosteroid dose, while maintaining asthma control compared with patients receiving placebo. Treatment with Nucala did not result in a significant improvement in lung function, as measured by the volume of air exhaled by patients in one second. The most common side effects include: headache, injection site reactions, back pain, weakness, and fatigue; hypersensitivity reactions can occur within hours or days including swelling of the face, mouth, and tongue, fainting, dizziness, hives, breathing problems, and rash; herpes zoster infections have occurred. The drug is a monoclonal antibody that inhibits interleukin-5 which helps regular eosinophils, a type of white blood cell that contributes to asthma. The over-production of eosinophils can cause inflammation in the lungs, increasing the frequency of asthma attacks. Patients must also take other medications, including high dose inhaled corticosteroids and at least one additional asthma drug\par \par problem 3: allergic rhinitis/post nasal drip syndrome \par -recommended Navage sinus rinse\par -continue Qnasl 1 sniff each nostril BID \par -continue Olopatadine 0.6% 1 sniff each nostril BID \par -continue claritin-d 24hr AM\par -continue Xyzal 5 mg before bed \par s/p Blood work: eosinophil level, IgE level, and immunocap testing \par -Environmental measures for allergies were encouraged including mattress and pillow cover, air purifier, and environmental controls.\par \par problem 4: GERD\par -continue to use good diet and good timing of meals\par -Rule of 2s: avoid eating too much, eating too late, eating too spicy, eating two hours before bed\par -Things to avoid including overeating, spicy foods, tight clothing, eating within three hours of bed, this list is not all inclusive. \par -For treatment of reflux, possible options discussed including diet control, H2 blockers, PPIs, as well as coating motility agents discussed as treatment options. Timing of meals and proximity of last meal to sleep were discussed. If symptoms persist, a formal gastrointestinal evaluation is needed. \par \par problem 5: ALIS\par -add trazadone 50 mg qhs \par -she has refused any treatment or diagnosing\par -she is being recommended to use Oxy-Aid by [facility]\par -Discussed the risks/associations with coronary artery disease, atrial fibrillation, arrhythmia, memory loss, issues with concentration, stroke risk, hypertension, nocturia, chronic reflux/Kimbrough’s esophagus some but not all inclusive. Treatment options discussed including CPAP/BiPAP machine, oral appliance, ProVent therapy, Oxy-Aid by [facility], new technologies, or positional sleep.\par \par problem 6: cardiac component \par -she is being recommended to have a consultation with Dr. Vita Vieyra/ Dr Carlson - ?AICD\par \par Problem 7: Anxiety\par - Continue Klonopin 0.5 mg prn \par \par problem 8: health maintenance \par -s/p COVID 19 vaccine x 2 (booster pending) \par -recommended orthopedic evaluation with (Dr. Perez)\par -recommended Evaluation with Neurology (Dr. Baker)\par -recommended to have medical marijuana evaluation with Dr. Alma Matias\par -s/p 2021 yearly flu shot \par -recommended strep pneumonia vaccines: Prevnar-13 vaccine, followed by Pneumo vaccine 23 one year following\par -recommended early intervention for URIs\par -recommended regular osteoporosis evaluations\par -recommended early dermatological evaluations\par -recommended after the age of 50 to the age of 70, colonoscopy every 5 years \par  \par \par F/U in 2 months\par She is encouraged to call with any changes,concerns, or questions.

## 2022-09-09 NOTE — PROCEDURE
[FreeTextEntry1] : PFT reveals moderate restrictive and moderate obstructive dysfunction , with an FEV1 of  0.67L, which is 49% of predicted, with a normal flow volume loop. \par \par FENO was refused; a normal value being less than 25\par Fractional exhaled nitric oxide (FENO) is regarded as a simple, noninvasive method for assessing eosinophilic airway inflammation. Produced by a variety of cells within the lung, nitric oxide (NO) concentrations are generally low in healthy individuals. However, high concentrations of NO appear to be involved in nonspecific host defense mechanisms and chronic inflammatory diseases such as asthma. The American Thoracic Society (ATS) therefore has recommended using FENO to aid in the diagnosis and monitoring of eosinophilic airway inflammation and asthma, and for identifying steroid responsive individuals whose chronic respiratory symptoms may be caused by airway inflammation.

## 2022-09-09 NOTE — ADDENDUM
Patient draped and sterile. Suspending neuro checks until procedure complete   [FreeTextEntry1] : Documented by TAMERA Griffith acting as a scribe for Dr. Ketan Morfin on 09/09/2022 .\par All medical record entries made by the Scribe were at my, Dr. Ketan Morfin's, direction and personally dictated by me on 09/09/2022. I have reviewed the chart and agree that the record accurately reflects my personal performance of the history, physical exam, assessment and plan. I have also personally directed, reviewed, and agree with the discharge instructions.

## 2022-09-09 NOTE — HISTORY OF PRESENT ILLNESS
[FreeTextEntry1] : Ms. Primrose is a 72 year old female presenting to the office for a follow up evaluation. She has PMHx of allergic rhinitis, eosinophilic/moderate persistent asthma, ALIS, GERD, and shortness of breath. Her chief complaint is\par \par -s/p hospitalization x2 for CHF and sepsis\par -she notes increased fatigue\par -she notes persistent constipation\par -she notes vision is stable \par -she denies dysphonia \par -she notes dry cough in supine position\par -she notes chest tightness\par -she notes compliant with Rx\par \par \par -she denies any arthralgias, chest pain, chills, diarrhea, dizziness, dysphagia, fever, headaches, heartburn, reflux, itchy eyes, itchy ears, leg swelling, leg pain, myalgias, nausea, palpitations, sweats, vomiting, or sour taste in the mouth.

## 2022-09-09 NOTE — PHYSICAL EXAM
[No Acute Distress] : no acute distress [Normal Oropharynx] : normal oropharynx [III] : Mallampati Class: III [Normal Appearance] : normal appearance [No Neck Mass] : no neck mass [Normal Rate/Rhythm] : normal rate/rhythm [Murmur ___ / 6] : murmur [unfilled] / 6 [Normal S1, S2] : normal s1, s2 [No Murmurs] : no murmurs [No Resp Distress] : no resp distress [Clear to Auscultation Bilaterally] : clear to auscultation bilaterally [No Abnormalities] : no abnormalities [Kyphosis] : kyphosis [Benign] : benign [Normal Gait] : normal gait [No Clubbing] : no clubbing [No Cyanosis] : no cyanosis [No Edema] : no edema [FROM] : FROM [Normal Color/ Pigmentation] : normal color/ pigmentation [No Focal Deficits] : no focal deficits [Oriented x3] : oriented x3 [Normal Affect] : normal affect [TextBox_54] : 2/6 systolic murmur  [TextBox_68] : I:E 1:3, decreased breath sounds at left base

## 2022-09-13 ENCOUNTER — NON-APPOINTMENT (OUTPATIENT)
Age: 72
End: 2022-09-13

## 2022-09-14 RX ORDER — EVOLOCUMAB 140 MG/ML
140 INJECTION, SOLUTION SUBCUTANEOUS
Qty: 6 | Refills: 1 | Status: DISCONTINUED | COMMUNITY
Start: 2022-08-17 | End: 2022-09-14

## 2022-09-19 ENCOUNTER — NON-APPOINTMENT (OUTPATIENT)
Age: 72
End: 2022-09-19

## 2022-10-03 ENCOUNTER — APPOINTMENT (OUTPATIENT)
Dept: CARDIOLOGY | Facility: CLINIC | Age: 72
End: 2022-10-03

## 2022-10-03 ENCOUNTER — NON-APPOINTMENT (OUTPATIENT)
Age: 72
End: 2022-10-03

## 2022-10-03 VITALS
HEART RATE: 98 BPM | OXYGEN SATURATION: 92 % | TEMPERATURE: 98.4 F | SYSTOLIC BLOOD PRESSURE: 90 MMHG | RESPIRATION RATE: 16 BRPM | WEIGHT: 104 LBS | BODY MASS INDEX: 24.07 KG/M2 | DIASTOLIC BLOOD PRESSURE: 70 MMHG | HEIGHT: 55 IN

## 2022-10-03 VITALS — SYSTOLIC BLOOD PRESSURE: 90 MMHG | DIASTOLIC BLOOD PRESSURE: 68 MMHG

## 2022-10-03 DIAGNOSIS — R06.09 OTHER FORMS OF DYSPNEA: ICD-10-CM

## 2022-10-03 DIAGNOSIS — M25.472 EFFUSION, RIGHT ANKLE: ICD-10-CM

## 2022-10-03 DIAGNOSIS — M25.471 EFFUSION, RIGHT ANKLE: ICD-10-CM

## 2022-10-03 PROCEDURE — 93000 ELECTROCARDIOGRAM COMPLETE: CPT

## 2022-10-03 PROCEDURE — 99215 OFFICE O/P EST HI 40 MIN: CPT | Mod: 25

## 2022-10-03 RX ORDER — FUROSEMIDE 20 MG/1
20 TABLET ORAL DAILY
Qty: 90 | Refills: 0 | Status: DISCONTINUED | COMMUNITY
Start: 2022-03-02 | End: 2022-10-03

## 2022-10-03 RX ORDER — SACUBITRIL AND VALSARTAN 24; 26 MG/1; MG/1
24-26 TABLET, FILM COATED ORAL TWICE DAILY
Qty: 60 | Refills: 5 | Status: DISCONTINUED | COMMUNITY
Start: 2022-08-19 | End: 2022-10-03

## 2022-10-03 NOTE — DISCUSSION/SUMMARY
[FreeTextEntry1] : Dr. Carlson-(PRIOR VISIT and PMH WITH Dr. Carlson): \par This is a 73-year-old female with past medical history significant for coronary artery disease, statin intolerant, status post myocardial infarction received 6-8 stents by her report, left ventricular dysfunction, history of significant asthma, status post sigmoid resection for diverticulitis, occasional leg cramping, status post implantation of a back stimulator for chronic pain, status post surgery for rectocele, status post surgery for cystocele, sciatica, diaphragmatic surgery to improve her breathing, comes in for cardiac follow-up evaluation.\par \par Electrocardiogram done August 17, 2022 demonstrated normal sinus rhythm at a rate of 90 bpm is otherwise remarkable for poor R wave progression, left axis deviation, and poor recording baseline.\par \par The patient still complains of some shortness of breath which she attributes to her pulmonary disease.  Blood work done August 3, 2022 demonstrated total cholesterol 238, LDL of 128 mg/dL, triglycerides 127 mg/dL, HDL 85 mg/dL.\par This patient's LDL target is less than 70 mg/dL.\par The patient is statin intolerant and she is complaining of bloating and GI distress on Zetia therapy.\par Electrocardiogram done March 2, 2022 demonstrated sinus tachycardia rate of 100 bpm is otherwise remarkable for poor R wave progression, left axis deviation.\par At this point in time, the patient should be on PCSK9 injectable therapy with Repatha to lower LDL cholesterol to target.  She will discontinue Zetia.  A new prescription will be placed for the Repatha therapy.\par \par She will follow-up with me after she sees the heart failure team.\par \par Echo Doppler examination done June 16, 2020 demonstrated mild mitral valve regurgitation, moderate to severe tricuspid valve regurgitation, mild pulmonary systolic hypertension, mild left ventricular enlargement, severe left ventricular systolic dysfunction with hypokinesis on the anterior wall, apex and interventricular septum.\par \par Ankle-brachial index done March 2, 2022 was within normal limits.\par \par Cardiac catheterization done March 15, 2019 demonstrated severe stenosis of the distal circumflex artery and small vessel which was not amenable to intervention, 20% lesion in the mid LAD, at the site of the prior stent, 30% lesion the first diagonal branch at the site of a prior stent, distal circumflex lesion 80% small vessel, proximal ramus branch 30%, and mid right coronary artery 60%.\par \par I once again discussed the use of statin therapy, but the patient does not wish to go on statins at all or any type of lipid-lowering therapy.  She understands the risk of restenosis of her stents\par \par She is instructed to elevate her feet, limit her salt intake, and wear compression stockings.\par \par PMH:\par The patient reports that she had a myocardial infarction followed by coronary intervention approximately 5 to 6 years ago [facility].  Details of this hospitalization are not available for my review.  At one point she was offered an AICD but refused wanting to live her life out.  She also reports that at one point she was in a coma related to her asthma.\par \par Mother and 3 sisters had severe asthma.  She has no history of rheumatic fever.  She does not drink excessive caffeine or alcohol.\par \par She reports that she was tried on statin therapy in the past but it did not agree with her.  She does not remember which statin she was on. \par \par Electrocardiogram done May 13, 2020 demonstrated normal sinus rhythm rate of 98 bpm is otherwise remarkable for left atrial abnormality, left axis deviation, and nonspecific ST wave flattening.\par \par Echo Doppler examination February 21, 2017 which demonstrated severe left ventricular dysfunction with an estimated ejection fraction of 25 to 30%.  This was an echocardiogram without commentary on Doppler flow.  A pulmonary systolic pressure was noted to be 39 mmHg.  \par \par She had a nuclear stress test done September 22, 2014 which demonstrated normal perfusion with an estimated ejection fraction 62%.\par \par The patient had a recent cardiac catheterization done at [facility] in March 2020.  \par She is currently hemodynamically stable from a cardiac standpoint.

## 2022-10-03 NOTE — ASSESSMENT
[FreeTextEntry1] : This is a 72-year-old female with past medical history significant for coronary artery disease, status post myocardial infarction received 6-8 stents by her report, history of significant asthma, status post sigmoid resection for diverticulitis, occasional leg cramping, status post implantation of a back stimulator for chronic pain, status post surgery for rectocele, status post surgery for cystocele, sciatica, diaphragmatic surgery to improve her breathing, comes in for cardiac follow-up evaluation.\par \par She is here today status post hospitalization at [facility] and the details of this hospitalization are unclear.  She does not have any discharge paperwork with her but states that she was admitted for 1 week early September for low blood pressure which occurred after starting Entresto.  She states that she was feeling dizzy lethargic and was transferred to [facility].  She states that she apparently was supposed to get a defibrillator for an ejection fraction of less than 10%…?  Details again are unclear and there is no discharge paperwork for us to review.\par \par She is feeling well today and is currently on aspirin 81 mg daily, bisoprolol 5 mg which she takes half a tablet daily, Plavix 75 mg? ,and  Praluent 75 mg/mL injectable which she states she has not been taking but needs a refill.  She states she is seeing heart failure doctor next week Dr. Cohen from [place].\par \par She states that she still has occasional dyspnea on exertion and follows up with her pulmonologist Dr. Morfin who she was told thinks that this may be all cardiac related.\par \par ****This patient is a poor historian and does not know details about her health care or the next plan/what medication she is on.  I told her that she must bring all her medications to her next follow-up appointment with more information about her recent hospital visitation at [facility]*****\par \par -She is a retired nurse.\par \par BLOOD PRESSURE:\par -BP is controlled in today's visit.\par \par BLOOD WORK:\par -The patient had blood work done September 21, 2022 which demonstrated total cholesterol 172, HDL 78 and direct LDL of 85.  Details of this blood test while on Praluent are unclear.  Praluent was renewed on today's visit.\par -New blood work was done 3/7/22 which demonstrated Cholesterol of 218 and LDL of 119. K level 4.8.\par \par -We once again discussed the use of statin therapy, but the patient does not wish to go on statins at all or any type of lipid-lowering therapy.  She understands the risk of restenosis of her stents.\par \par -Recent lab work done in May 13 2020 which demonstrated Total cholesterol 212 . I advised to start Crestor 20mg PO Daily however she refused statin therapy at this time. She states "I have tried these medications before and I don’t tolerate it well. 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 \par \par CHOLESTEROL CONTROL:\par -Patient will continue the advised  TLC diet and to continue follow-up for treatment of hyperlipidemia and repeat blood testing with diet and exercise. I have discussed different exercises and the importance of maintenance of optimal body weight. The importance of staying within guidelines and recommendations was stressed to the patient today and they acknowledged that they understand this to me verbally.\par \par TESTING/REPORTS:\par -EKG done 10/03/2022 which demonstrated regular sinus rhythm with nonspecific ST-T wave changes BPM of 89.  She states that she has an apparent stimulator?  She is unclear whether this stimulator is for her back or her chest or her legs?  EKG appears abnormal due to the stimulator.\par \par -Electrocardiogram done March 2, 2022 demonstrated sinus tachycardia rate of 100 bpm is otherwise remarkable for poor R wave progression, left axis deviation.\par \par -EKG done in March 17 2021 which demonstrated regular sinus rhythm with nonspecific ST-T wave changes BPM of 88.\par \par -Echo Doppler examination done June 16, 2020 demonstrated mild mitral valve regurgitation, moderate to severe tricuspid valve regurgitation, mild pulmonary systolic hypertension, mild left ventricular enlargement, severe left ventricular systolic dysfunction with hypokinesis on the anterior wall, apex and interventricular septum.\par \par -Ankle-brachial index done March 2, 2022 was within normal limits.\par \par -Cardiac catheterization done March 15, 2019 demonstrated severe stenosis of the distal circumflex artery and small vessel which was not amenable to intervention, 20% lesion in the mid LAD, at the site of the prior stent, 30% lesion the first diagonal branch at the site of a prior stent, distal circumflex lesion 80% small vessel, proximal ramus branch 30%, and mid right coronary artery 60%.\par \par EDEMA:\par Patient was instructed to elevate the legs and to use compression socks or wraps when out of bed.  We'll monitor blood work for BUN/creatinine levels  and adjust diuretics accordingly. Patient to advised to follow-up if any redness or signs of infection develop on their legs.\par Patient had an educational review of the treatment of leg edema. Patient was advised to keep their legs elevated and take medication prescribed today and avoid sodium containing foods. The patient was advised to call prescribe medications and to notify the office of his condition worsens or they developed shortness of breath. \par \par PLAN:\par -She will continue with her usual medications and will contact the office if she is having any complaints between now and their next follow up appointment.\par -She will follow up with her CHF team and I asked that she have them fax over a copy of their visit/give Dr. Carlson a direct phone call as per his request.\par -She will follow-up with our office in 1 month.  I asked that she bring all of her medications to her next follow-up appoint.\par \par I have discussed the plan of care with Ms. TERESA PRIMROSE. She is compliant with all of her medications.\par \par The patient understands that aerobic exercises must be increased to minutes 4 times/week and a detailed discussion of lifestyle modification was done today. \par The patient has a good understanding of the diagnosis, treatment plan and lifestyle modification. \par She will contact me at the office for any questions with their care or any changes in their health status.\par \par \par Carolyn CASTLE

## 2022-10-03 NOTE — PHYSICAL EXAM
[Well Developed] : well developed [Well Nourished] : well nourished [No Acute Distress] : no acute distress [Normal Venous Pressure] : normal venous pressure [No Carotid Bruit] : no carotid bruit [Normal S1, S2] : normal S1, S2 [No Rub] : no rub [Clear Lung Fields] : clear lung fields [Good Air Entry] : good air entry [No Respiratory Distress] : no respiratory distress  [Soft] : abdomen soft [Non Tender] : non-tender [No Masses/organomegaly] : no masses/organomegaly [Normal Bowel Sounds] : normal bowel sounds [Normal Gait] : normal gait [No Edema] : no edema [No Cyanosis] : no cyanosis [No Clubbing] : no clubbing [No Varicosities] : no varicosities [No Rash] : no rash [No Skin Lesions] : no skin lesions [Moves all extremities] : moves all extremities [No Focal Deficits] : no focal deficits [Normal Speech] : normal speech [Alert and Oriented] : alert and oriented [Normal memory] : normal memory [General Appearance - Well Developed] : well developed [Normal Appearance] : normal appearance [Well Groomed] : well groomed [General Appearance - Well Nourished] : well nourished [No Deformities] : no deformities [General Appearance - In No Acute Distress] : no acute distress [Normal Conjunctiva] : the conjunctiva exhibited no abnormalities [Eyelids - No Xanthelasma] : the eyelids demonstrated no xanthelasmas [Normal Oral Mucosa] : normal oral mucosa [Normal Jugular Venous A Waves Present] : normal jugular venous A waves present [Normal Jugular Venous V Waves Present] : normal jugular venous V waves present [No Jugular Venous Santo A Waves] : no jugular venous santo A waves [Respiration, Rhythm And Depth] : normal respiratory rhythm and effort [Exaggerated Use Of Accessory Muscles For Inspiration] : no accessory muscle use [Auscultation Breath Sounds / Voice Sounds] : lungs were clear to auscultation bilaterally [Bowel Sounds] : normal bowel sounds [Abdomen Soft] : soft [Abnormal Walk] : normal gait [Nail Clubbing] : no clubbing of the fingernails [Cyanosis, Localized] : no localized cyanosis [Petechial Hemorrhages (___cm)] : no petechial hemorrhages [Skin Color & Pigmentation] : normal skin color and pigmentation [Skin Turgor] : normal skin turgor [] : no rash [No Xanthoma] : no  xanthoma was observed [Oriented To Time, Place, And Person] : oriented to person, place, and time [Impaired Insight] : insight and judgment were intact [Affect] : the affect was normal [Mood] : the mood was normal [No Anxiety] : not feeling anxious [5th Left ICS - MCL] : palpated at the 5th LICS in the midclavicular line [Normal] : normal [No Precordial Heave] : no precordial heave was noted [Normal Rate] : normal [Rhythm Regular] : regular [Normal S1] : normal S1 [Normal S2] : normal S2 [No Gallop] : no gallop heard [II] : a grade 2 [2+] : left 2+ [No Abnormalities] : the abdominal aorta was not enlarged and no bruit was heard [___ +] : bilateral [unfilled]U+ pitting edema to the ankles [S3] : no S3 [S4] : no S4 [Right Carotid Bruit] : no bruit heard over the right carotid [Left Carotid Bruit] : no bruit heard over the left carotid

## 2022-10-04 RX ORDER — GLUCOSAMINE HCL/CHONDROITIN SU 500-400 MG
3 CAPSULE ORAL
Refills: 0 | Status: DISCONTINUED | COMMUNITY
Start: 2022-08-22 | End: 2022-10-04

## 2022-10-04 RX ORDER — SERTRALINE 25 MG/1
25 TABLET, FILM COATED ORAL
Qty: 30 | Refills: 0 | Status: DISCONTINUED | COMMUNITY
Start: 2020-05-13 | End: 2022-10-04

## 2022-10-04 RX ORDER — SUMATRIPTAN 50 MG/1
50 TABLET, FILM COATED ORAL
Refills: 0 | Status: DISCONTINUED | COMMUNITY
Start: 2022-08-22 | End: 2022-10-04

## 2022-10-10 ENCOUNTER — RX RENEWAL (OUTPATIENT)
Age: 72
End: 2022-10-10

## 2022-11-05 NOTE — ED PROVIDER NOTE - NSTIMEPROVIDERCAREINITIATE_GEN_ER
A BiPAP of  16/8 @ 45% was applied to the pt via the mask for unresponsiveness.   Pt is tolerating it well. Will continue to monitor and assess the pt's current respiratory status and needs.   19-Dec-2017 10:57

## 2022-11-17 NOTE — ED ADULT NURSE NOTE - SUICIDE SCREENING DEPRESSION
[FreeTextEntry1] : 74-yo female with h/o HTN, hyperlipidemia, obesity.\par No evidence of ischemia.\par Benign positional vertigo improved\par \par Plan:\par Diet, weight loss discussed with patient again.\par Continue treatment.\par F/u in 6 months.\par \par David Braswell MD\par  Negative

## 2022-12-21 NOTE — CARDIOLOGY SUMMARY
[de-identified] : \par 8/29/22 TTE: LVIDd 5.2 cm, LVEF 10%, normal RV size with mild RV dysfunction (TAPSE 1.4), trace MR/TR, no PH.\par \par 8/17/22 TTE: LVIDd 6 cm, LVEF 18-22%, normal RV size and function, normal biatria, mild MR, mild TR, est RVSP 29 mmHg\par \par 06/22/21 TTE: LVIDd 4.7 cm, LVEF 23% (global), normal RV size and function, normal biatria, no valvulopathy, no estimation of RVSP\par \par 06/16/20 TTE: LVIDd 5.7 cm, LVEF 30-35% (segmental), normal RV size and function, normal biatria, mild MR, mod-severe TR, est RVSP 44 mmHg\par \par 03/1/17 TTE: LVIDd 4.3 cm, LVEF 25-30% (segmental), mild DD, normal RV size and function, normal biatria, mild TR, est RVSP 39 mmHg\par  [de-identified] : \par 8/31/22 RHC: RA 4, RV 20/4, PA 22/8/13, PCWP 5, PA sat 62%, AO sat 92%, CO/CI (F) 3/2.3, CI (thermo) 1.9, PVR 2.7 COLLINS, /64\par \par 03/15/19 LHC: mild atherosclerosis of LM, 10% stenosis at site of prior stent of pLAD, 20% stenosis at site of prior stent of mLAD, 30% stenosis at site of prior stent of D1, 30% stenosis Samanta, 60% stenosis of mRCA, 80% stenosis of dCx not amendable to PCI. LVEDP 15, /58 (77)\par

## 2022-12-21 NOTE — PHYSICAL EXAM
[Well Developed] : well developed [No Acute Distress] : no acute distress [No Xanthelasma] : no xanthelasma [Clear Lung Fields] : clear lung fields [Edema ___] : edema [unfilled] [Normal] : alert and oriented, normal memory [de-identified] : Thin appearing [de-identified] : DANIEL - wearing facemask [de-identified] : JVP difficult to visualize due to respiratory retractions  [de-identified] : tachypneic  [de-identified] : Warm peripherally

## 2022-12-21 NOTE — HISTORY OF PRESENT ILLNESS
[FreeTextEntry1] : Ms. Primrose is a 72 year old woman with ICM/HFrEF (LVIDd 5.2 cm, LVEF 10%), CAD c/b MI s/p multiple PCIs (last in 2017), severe reactive airway disease, diverticulitis s/p sigmoid resection, scoliosis, chronic back pain s/p implanted back stimulator, anxiety and major depressive disorder requiring inpatient care (last in July 2021) who presents today for follow-up of her HF, referred by Dr. Carlson.\par \par She was diagnosed with asthma in her teens and reports chronic SOB. Approx 10 years ago underwent multiple PCIs but was told there was no issue with cardiac function until most recent PCI in 2017, where LVEF was found to be 25-30%. More recently, in the past 2 year has noted worsened JEAN. Of note, she endorses a strong family history of reactive airway disease with her sisters passing in their mid 60s. \par \par Since she was last seen in clinic on 8/19/22, she was admitted to [facility] from 8/27-9/3/22. The hospital records are not available to us, but per Ms. Primrose, she was hypotensive after starting Entresto. TTE during that admission showed an LVEF of 10% and mild RV dysfunction. She also had a RHC which showed low filling pressures and borderline CO/CI (3/2.3).

## 2022-12-21 NOTE — DISCUSSION/SUMMARY
[FreeTextEntry1] : 72 year old woman with ICM/HFrEF (LVIDd 5.2 cm, LVEF 10%), CAD c/b MI s/p multiple PCIs (last in 2017), severe reactive airway disease, diverticulitis s/p sigmoid resection, scoliosis, chronic back pain s/p implanted back stimulator, anxiety and major depressive disorder requiring inpatient care (last in July 2021) who presents today for follow-up of her HF. She is ACC/AHA Stage C, endorsing NYHA Class III-IV symptoms and while JVP difficult to visualize, suspect she is mildly hypervolemic. I have recommended the following:\par \par # Chronic systolic congestive HF\par - Etiology: Likely ischemic. LVEF is severely depressed but this is in the setting of no medical therapies\par - GDMT: Did not tolerate Entresto due to hypotension. Continue Bisoprolol 2.5 mg QD. Eventual SGLT2-i and MRA. \par - Diuretics: Currently not on loop diuretic\par - Device: Will reassess LVEF once on max GDMT and if </= 35% will consider primary prevention ICD\par - Labs: \par \par # CAD\par - reports several AKI, most recently in 2017\par - Last [facility] 2019 notable for 80% stenosis of dCx but not amendable to PCI\par - no signs of active ischemia\par - continue ASA, Plavix, Repatha (reports statin allergy) and now BB and ARB (in [facility])\par - followed by Dr. Carlson\par \par # Severe reactive airway disease\par - close monitoring of respiratory status with Bisoprolol\par - followed by Dr. Morfin, on multidrug regimen\par \par RTC

## 2022-12-22 ENCOUNTER — APPOINTMENT (OUTPATIENT)
Dept: HEART FAILURE | Facility: CLINIC | Age: 72
End: 2022-12-22

## 2022-12-22 NOTE — ECT TREATMENT NOTE - NSECTCPTCODE_PSY_ALL_CORE
38139 (ECT-Electroconvulsive Therapy)
61167 (ECT-Electroconvulsive Therapy)
16563 (ECT-Electroconvulsive Therapy)
No
03125 (ECT-Electroconvulsive Therapy)
24100 (ECT-Electroconvulsive Therapy)
31773 (ECT-Electroconvulsive Therapy)
97246 (ECT-Electroconvulsive Therapy)
11801 (ECT-Electroconvulsive Therapy)
51762 (ECT-Electroconvulsive Therapy)
88982 (ECT-Electroconvulsive Therapy)
99325 (ECT-Electroconvulsive Therapy)
18131 (ECT-Electroconvulsive Therapy)

## 2022-12-23 ENCOUNTER — APPOINTMENT (OUTPATIENT)
Dept: CARDIOLOGY | Facility: CLINIC | Age: 72
End: 2022-12-23

## 2023-01-09 ENCOUNTER — APPOINTMENT (OUTPATIENT)
Dept: PULMONOLOGY | Facility: CLINIC | Age: 73
End: 2023-01-09
Payer: MEDICARE

## 2023-01-09 VITALS
RESPIRATION RATE: 16 BRPM | WEIGHT: 105 LBS | OXYGEN SATURATION: 96 % | BODY MASS INDEX: 24.3 KG/M2 | DIASTOLIC BLOOD PRESSURE: 70 MMHG | SYSTOLIC BLOOD PRESSURE: 92 MMHG | TEMPERATURE: 97.6 F | HEIGHT: 55 IN | HEART RATE: 103 BPM

## 2023-01-09 PROCEDURE — 95012 NITRIC OXIDE EXP GAS DETER: CPT

## 2023-01-09 PROCEDURE — 94727 GAS DIL/WSHOT DETER LNG VOL: CPT

## 2023-01-09 PROCEDURE — 94010 BREATHING CAPACITY TEST: CPT

## 2023-01-09 PROCEDURE — 94729 DIFFUSING CAPACITY: CPT

## 2023-01-09 PROCEDURE — 99214 OFFICE O/P EST MOD 30 MIN: CPT | Mod: 25

## 2023-01-11 RX ORDER — LEVALBUTEROL HYDROCHLORIDE 0.63 MG/3ML
0.63 SOLUTION RESPIRATORY (INHALATION)
Qty: 120 | Refills: 3 | Status: ACTIVE | COMMUNITY
Start: 2019-03-20 | End: 1900-01-01

## 2023-01-22 NOTE — HISTORY OF PRESENT ILLNESS
[TextBox_4] : Ms. Primrose is a 72 year old female with PMHx of allergic rhinitis, eosinophilic/moderate persistent asthma, ALIS, GERD, and shortness of breath who now presents to the office for follow up. \par \par Her chief complaint is follow up. \par \par Patient reports she has defibrillator placed 11/29/2022. She reports 2 week ago she had URI  and PCP prescribed her Levaquin and Medrol. Patient states since completing both medications cough has improved. She reports she is compliant with levalbuterol nebulizer and Advair inhaler. Patient states breathing has improved and has occasional productive cough with yellow phlegm.\par \par Patient denies fever, chills, SOb @ rest, wheezing, nasal congestion, runny nose or heart burn/reflux.\par

## 2023-01-22 NOTE — REVIEW OF SYSTEMS
[Cough] : cough [Sputum] : sputum [SOB on Exertion] : sob on exertion [GERD] : gerd [Negative] : Psychiatric [Hemoptysis] : no hemoptysis [Chest Tightness] : no chest tightness [Frequent URIs] : no frequent URIs [Pleuritic Pain] : no pleuritic pain [Dyspnea] : no dyspnea [Wheezing] : no wheezing [A.M. Dry Mouth] : no a.m. dry mouth [Abdominal Pain] : no abdominal pain [Nausea] : no nausea [Vomiting] : no vomiting [Diarrhea] : no diarrhea [Constipation] : no constipation [Dysphagia] : no dysphagia [Bleeding] : no bleeding [Food Intolerance] : no food intolerance [Hepatic Disease] : no hepatic disease

## 2023-01-22 NOTE — ASSESSMENT
[FreeTextEntry1] : Ms. Primrose is a 72 year old female with PMHx of allergic rhinitis, eosinophilic/moderate persistent asthma, ALIS, GERD, and shortness of breath who now presents to the office for follow up. \par \par Her chief complaint is follow up. \par \par 1.  COPD/ asthma\par -continue Xopenex 0.63 by the nebulizer up to QID \par -continue to use Advair 500 at 1 inhalation BID\par -continue Tudorza at 1 inhalation BID\par -continue Singulair 10 mg before bed\par -continue to use Proventil PRN\par -continue Acapella device to be used multiple times daily \par \par 2. Allergic rhinitis/post nasal drip syndrome \par -recommended Navage sinus rinse\par -continue Flonase nasal spray\par \par 3. GERD\par -continue Pepcid 0 mg PO QHS\par \par Patient to follow up with Dr. Morfin 4-6 months.\par Patient to call with further questions and concerns.\par Patient verbalizes understanding of care plan and is agreeable.\par

## 2023-01-22 NOTE — PROCEDURE
[FreeTextEntry1] : FENO: 6 ppb\par \par PFT'S performed in office show severe obstructive lung defect. \par FEV: 40\par FEV1/FVC Ratio:50\par HMK86-61%: 11\par DLCO: 97\par \par \par

## 2023-02-16 PROCEDURE — 98967 PH1 ASSMT&MGMT NQHP 11-20: CPT | Mod: CR

## 2023-02-16 NOTE — H&P ADULT - DOES THIS PATIENT HAVE A HISTORY OF OR HAS BEEN DX WITH HEART FAILURE?
[facility]   Progress Note                   Date:   2/16/2023  Patient name: Taylor Cr  Date of admission:  2/11/2023  7:52 PM  MRN:   [id number]  YOB: 1957  PCP: AFSANEH Pierre CNP    Reason for Admission:      Subjective:       Clinical Changes / Abnormalities: Patient's status post cath details as below had a PCI done  From cardiac point point of view denies any chest pain pressure  Patient has a lot of dry cough congestion according to patient she is on home O2 patient is scheduled to get sleep study done in the coming week  Patient denies that she follow any pulmonary      Medications:   Scheduled Meds:   bumetanide  1 mg Oral BID    ticagrelor  90 mg Oral BID    atorvastatin  80 mg Oral Nightly    dextromethorphan  60 mg Oral 2 times per day    aspirin  81 mg Oral Daily    docusate sodium  100 mg Oral BID    linaclotide  145 mcg Oral QAM AC    pantoprazole  40 mg Oral QAM AC    tamsulosin  0.4 mg Oral Daily    allopurinol  100 mg Oral Daily    sodium chloride flush  5-40 mL IntraVENous 2 times per day    heparin (porcine)  5,000 Units SubCUTAneous 3 times per day    gabapentin  300 mg Oral Daily    carvedilol  6.25 mg Oral BID    amLODIPine  5 mg Oral Daily    insulin glargine  45 Units SubCUTAneous BID    insulin lispro  0-16 Units SubCUTAneous TID WC    insulin lispro  0-4 Units SubCUTAneous Nightly     Continuous Infusions:   sodium chloride      dextrose       CBC:   Recent Labs     02/14/23  0544 02/15/23  0724 02/16/23  0602   WBC 5.1 6.2 5.7   HGB 11.3* 11.7* 11.1*    190 173     BMP:    Recent Labs     02/14/23  0544 02/15/23  0724 02/16/23  0602    141 143   K 3.8 3.7 3.8   CL 98 100 101   CO2 31 31 30   BUN 70* 60* 59*   CREATININE 3.01* 2.62* 2.74*   GLUCOSE 269* 262* 139*     Hepatic: No results for input(s): AST, ALT, ALB, BILITOT, ALKPHOS in the last 72 hours. Troponin: No results for input(s): TROPONINI in the last 72 hours.   BNP: No results for input(s): BNP in the last 72 hours. Lipids: No results for input(s): CHOL, HDL in the last 72 hours. Invalid input(s): LDLCALCU  INR: No results for input(s): INR in the last 72 hours. Objective:   Vitals: BP (!) 116/55   Pulse 58   Temp 98.1 °F (36.7 °C)   Resp 22   Wt 248 lb (112.5 kg)   SpO2 93%   BMI 41.27 kg/m²   No intake/output data recorded. No intake/output data recorded.   Scheduled Meds:   bumetanide  1 mg Oral BID    ticagrelor  90 mg Oral BID    atorvastatin  80 mg Oral Nightly    dextromethorphan  60 mg Oral 2 times per day    aspirin  81 mg Oral Daily    docusate sodium  100 mg Oral BID    linaclotide  145 mcg Oral QAM AC    pantoprazole  40 mg Oral QAM AC    tamsulosin  0.4 mg Oral Daily    allopurinol  100 mg Oral Daily    sodium chloride flush  5-40 mL IntraVENous 2 times per day    heparin (porcine)  5,000 Units SubCUTAneous 3 times per day    gabapentin  300 mg Oral Daily    carvedilol  6.25 mg Oral BID    amLODIPine  5 mg Oral Daily    insulin glargine  45 Units SubCUTAneous BID    insulin lispro  0-16 Units SubCUTAneous TID WC    insulin lispro  0-4 Units SubCUTAneous Nightly     Continuous Infusions:   sodium chloride      dextrose       PRN Meds:.melatonin, sodium chloride flush, sodium chloride flush, sodium chloride, polyethylene glycol, acetaminophen **OR** acetaminophen, ondansetron **OR** ondansetron, glucose, dextrose bolus **OR** dextrose bolus, glucagon (rDNA), dextrose, benzonatate    CONSTITUTIONAL: AOx4, no apparent distress, appears stated age   HEAD: normocephalic, atraumatic   EYES: PERRLA, EOMI   ENT: moist mucous membranes, uvula midline   NECK: symmetric, no midline tenderness to palpation   LUNGS: clear to auscultation bilaterally   CARDIOVASCULAR: regular rate and rhythm, no murmurs, rubs or gallops   ABDOMEN: Soft, non-tender, non-distended with normal active bowel sounds   SKIN: no rash       EKG: NSR non-specific T changes QTc 495 ms      ECHO: 1/27/23  Summary  Global left ventricular systolic function is normal.  Estimated LVEF 50-55%. Mild concentric left ventricular hypertrophy. Normal right ventricular size and function. No significant valvular regurgitation or stenosis seen. No pericardial effusion seen. Stress Test: 2/13/23  Impression       Large infarct in the lateral wall extending into the cardiac apex with some   stalin-infarct ischemia in anterolateral wall. LVEF normal.       Risk stratification: High risk patient has no prior history of evidence of   MI. Low risk if there is known prior history. Cardiac Angiography: 2/14/23  Findings:  Cardiac Arteries and Lesion Findings  LMCA: has 80% diffuse disease. LAD: has proximal 100% occlusion. LIMA-LAD is patent. SVG-D is patent. LCx: has proximal 100% occlusion. SVG-OM 1 is patent with severe diffuse disease post anastomosis of  SVG-OM is known and not amenable to PCI. RCA: has proximal patent stent with mid 85% stenosis. RPDA has diffuse 80% stenosis but is a small  vessel. RPL is normal.  Lesion on Mid RCA: Mid subsection. 85% stenosis 20 mm length reduced to 0%. Pre procedure  ABEL III flow was noted. Post Procedure ABEL III flow was present. Good runoff was present. The lesion  was diagnosed as High Risk (C). The lesion was discrete and eccentric. The lesion showed with  irregular contour, mild angulation and mild tortuosity. Devices used  Capital One Flex 180 cm. Number of passes: 1.   [place] Balloon 2.5mm x 15mm. 3 inflation(s) to a max pressure of: 20 jayne.  [place] 2.75x22. 1 inflation(s) to a max pressure of: 20 jayne.  [place] Euphora Balloon 3.0mm x 20mm. 2 inflation(s) to a max pressure of: 20 jayne     Dominance: Right     Conclusions:     Multivessel coronary artery disease. Patent LIMA-LAD, SVG-D and SVG-OM1. Severe diffuse disease post anastomosis of SVG-OM is known  and not amenable to PCI.   Patent proximal RCA stent with new mid severe stenosis. RPDA has diffuse severe stenosis but small for  PCI. Successful PTCA/FADIA of mid RCA. Total contrast used 70ml.                Assessment / Acute Cardiac Problems:       Patient Active Problem List:     Atherosclerosis of coronary artery bypass graft of native heart without angina pectoris     Acute kidney injury superimposed on CKD ([address]     Acute on chronic diastolic heart failure ([facility])     Diabetic polyneuropathy associated with type 2 diabetes mellitus ([address]     History of coronary artery bypass graft     Iron deficiency anemia     Spinal stenosis of lumbar region with neurogenic claudication     Mixed hyperlipidemia     CKD (chronic kidney disease) stage 4, GFR 15-29 ml/min ([facility])     Type 2 diabetes mellitus with chronic kidney disease on chronic dialysis, with long-term current use of insulin ([facility])     Obesity, Class II, BMI 35-39.9     Thyroid nodule greater than or equal to 1 cm in diameter incidentally noted on imaging study     Hypertension, essential     Chronic ischemic heart disease     Ischemic stroke of frontal lobe ([facility])     Morbid obesity with BMI of 40.0-44.9, adult ([facility])     Disequilibrium syndrome     Anxiety     Chronic midline low back pain with bilateral sciatica     COVID     Cerebral hypoperfusion     Immature arteriovenous fistula ([facility])     History of fusion of cervical spine     Depression with anxiety     Vancomycin resistant Enterococcus UTI     Dialysis disequilibrium syndrome     Acute cystitis without hematuria     VRE infection (vancomycin resistant Enterococcus)     Infection due to ESBL-producing Klebsiella pneumoniae     Class 2 severe obesity due to excess calories with serious comorbidity and body mass index (BMI) of 35.0 to 35.9 in adult [facility])     Type 2 diabetes mellitus with hyperglycemia, with long-term current use of insulin ([facility])     Right hemiparesis ([facility])     Ulcer of left foot, limited to breakdown of skin ([address]     Secondary hyperparathyroidism ([address] Hypertensive urgency     Hypoxia     Congestive heart failure (HCC)     Nephrotic range proteinuria     Acute respiratory failure with hypoxia (HCC)     Syncope and collapse     Subacute cough      Plan of Treatment:   CAD CABG status post abnormal stress test as above  Patient had a cath done underwent PCI details as above  Hypertension fairly controlled on current medications  COPD cough dry congested tight plan to do a pulmonary consult so that patient can follow as as an outpatient to  From cardiology point patient can be discharged home and follow-up in 2 to 3 weeks in the office    Trenia Aschoff, MD, [facility]  [phone] yes

## 2023-04-12 NOTE — ED ADULT NURSE NOTE - ALCOHOL PRE SCREEN (AUDIT - C)
2/9/2023-variable episodes of allergic conjunctivitis.  We will re-rx Crolom and refresh  4/12/2023 -    Statement Selected

## 2023-04-13 ENCOUNTER — RX RENEWAL (OUTPATIENT)
Age: 73
End: 2023-04-13

## 2023-05-09 ENCOUNTER — NON-APPOINTMENT (OUTPATIENT)
Age: 73
End: 2023-05-09

## 2023-05-09 ENCOUNTER — APPOINTMENT (OUTPATIENT)
Dept: PULMONOLOGY | Facility: CLINIC | Age: 73
End: 2023-05-09
Payer: MEDICARE

## 2023-05-09 VITALS
OXYGEN SATURATION: 93 % | HEART RATE: 99 BPM | SYSTOLIC BLOOD PRESSURE: 106 MMHG | WEIGHT: 95 LBS | BODY MASS INDEX: 21.98 KG/M2 | TEMPERATURE: 96.7 F | DIASTOLIC BLOOD PRESSURE: 60 MMHG | RESPIRATION RATE: 16 BRPM | HEIGHT: 55 IN

## 2023-05-09 DIAGNOSIS — J98.6 DISORDERS OF DIAPHRAGM: ICD-10-CM

## 2023-05-09 PROCEDURE — 95012 NITRIC OXIDE EXP GAS DETER: CPT

## 2023-05-09 PROCEDURE — 99214 OFFICE O/P EST MOD 30 MIN: CPT | Mod: 25

## 2023-05-09 PROCEDURE — 94010 BREATHING CAPACITY TEST: CPT

## 2023-05-09 NOTE — ASSESSMENT
[FreeTextEntry1] : Ms. Primrose is a 73 year old female with history of COPD / moderate-severe persistent asthma / allergy / paralyzed hemidiaphragm / cardiac disease / CHF/ eosinophilic asthma. She presents to the office for - steroid dependent asthma/ eosinophilic asthma- SOB (CHF EF 10%)- #1 issue is allergy Sx\par \par Her shortness of breath is multifactorial due to:\par -cardiac/ mild congestive heart failure\par -severe persistent asthma\par -allergic rhinitis/post nasal drip syndrome\par -paralyzed hemidiaphragm s/p plication\par -poor breathing mechanics and anxiety \par \par problem 1: COPD/ asthma (severe persistent)- NC\par -continue Xopenex 0.63 by the nebulizer up to QID \par -continue to use Advair 500 at 1 inhalation BID\par -continue Tudorza at 1 inhalation BID\par -continue Singulair 10 mg before bed\par -continue to use Proventil PRN\par -continue Acapella device to be used multiple times daily \par -Asthma is believed to be caused by inherited (genetic) and environmental factor, but its exact cause is unknown. Asthma may be triggered by allergens, lung infections, or irritants in the air. Asthma triggers are different for each person \par -Inhaler technique reviewed as well as oral hygiene techniques reviewed with patient. Avoidance of cold air, extremes of temperature, rescue inhaler should be used before exercise. Order of medication reviewed with patient. Recommended use of a cool mist humidifier in the bedroom\par \par Problem 1A Steroid dependent asthma\par -on Dupixent since 2/2022\par Dupixent is a prescription medicine used with other asthma medicines for the maintenance treatment of moderate-to-severe asthma in people aged 12 years and older whose asthma is not controlled with their current asthma medicines. Dupixent helps prevent severe asthma attacks (exacerbations) and can improve your breathing. Dupixent may also help reduce the amount of oral corticosteroids you need while preventing severe asthma attacks and improving your breathing. Dupixent is not used to treat sudden breathing problems. Risks and side effect of Dupixent were discussed and reviewed with patient. \par \par problem 2: eosinophilic asthma; s/p Nucala- NC\par -s/p receiving Fasenra and had 8 injections( 11/2020)\par -The safety and efficacy of Nucala was established in three double-blind, randomized, placebo-controlled trials in patients with severe asthma. Compared to a placebo, patients with severe asthma receiving Nucala had fewer exacerbation requiring hospitalization and/or emergency department visits, and a longer time to first exacerbation. In addition, patients with severe asthma receiving Nucala or Fasenra experienced greater reductions in their daily maintenance oral corticosteroid dose, while maintaining asthma control compared with patients receiving placebo. Treatment with Nucala did not result in a significant improvement in lung function, as measured by the volume of air exhaled by patients in one second. The most common side effects include: headache, injection site reactions, back pain, weakness, and fatigue; hypersensitivity reactions can occur within hours or days including swelling of the face, mouth, and tongue, fainting, dizziness, hives, breathing problems, and rash; herpes zoster infections have occurred. The drug is a monoclonal antibody that inhibits interleukin-5 which helps regular eosinophils, a type of white blood cell that contributes to asthma. The over-production of eosinophils can cause inflammation in the lungs, increasing the frequency of asthma attacks. Patients must also take other medications, including high dose inhaled corticosteroids and at least one additional asthma drug\par \par problem 3: allergic rhinitis/post nasal drip syndrome (active) - NC\par -recommended Navage sinus rinse\par -continue Qnasl 1 sniff each nostril BID \par -continue Olopatadine 0.6% 1 sniff each nostril BID \par -continue claritin-d 24hr AM\par -continue Xyzal 5 mg before bed \par s/p Blood work: eosinophil level, IgE level, and immunocap testing \par -Environmental measures for allergies were encouraged including mattress and pillow cover, air purifier, and environmental controls.\par \par problem 4: GERD\par -continue to use good diet and good timing of meals\par -Rule of 2s: avoid eating too much, eating too late, eating too spicy, eating two hours before bed\par -Things to avoid including overeating, spicy foods, tight clothing, eating within three hours of bed, this list is not all inclusive. \par -For treatment of reflux, possible options discussed including diet control, H2 blockers, PPIs, as well as coating motility agents discussed as treatment options. Timing of meals and proximity of last meal to sleep were discussed. If symptoms persist, a formal gastrointestinal evaluation is needed. \par \par problem 5: ALIS\par -add trazadone 50 mg qhs \par -she has refused any treatment or diagnosing\par -she is being recommended to use Oxy-Aid by [employer]\par -Discussed the risks/associations with coronary artery disease, atrial fibrillation, arrhythmia, memory loss, issues with concentration, stroke risk, hypertension, nocturia, chronic reflux/Kimbrough’s esophagus some but not all inclusive. Treatment options discussed including CPAP/BiPAP machine, oral appliance, ProVent therapy, Oxy-Aid by [employer], new technologies, or positional sleep.\par \par problem 6: cardiac component \par -she is being recommended to have a consultation with Dr. Vita Vieyra/ [facility]/  Dr Carlson - ?AICD\par \par Problem 7: Anxiety\par - Continue Klonopin 0.5 mg prn \par \par problem 8: health maintenance \par -s/p COVID 19 vaccine x 2 (booster pending) \par -recommended orthopedic evaluation with (Dr. Perez)\par -recommended Evaluation with Neurology (Dr. Baker)\par -recommended to have medical marijuana evaluation with Dr. Alma Matias\par -s/p 2021 yearly flu shot \par -recommended strep pneumonia vaccines: Prevnar-13 vaccine, followed by Pneumo vaccine 23 one year following\par -recommended early intervention for URIs\par -recommended regular osteoporosis evaluations\par -recommended early dermatological evaluations\par -recommended after the age of 50 to the age of 70, colonoscopy every 5 years \par  \par \par F/U in 2 months\par She is encouraged to call with any changes,concerns, or questions.

## 2023-05-09 NOTE — ADDENDUM
[FreeTextEntry1] : Documented by Gama Echeverria acting as a scribe for Dr. Ketan Morfin on 05/09/2023.\par \par All medical record entries made by the Scribe were at my, Dr. Ketan Morfin's, direction and personally dictated by me on 05/09/2023. I have reviewed the chart and agree that the record accurately reflects my personal performance of the history, physical exam, assessment and plan. I have also personally directed, reviewed, and agree with the discharge instructions.

## 2023-05-09 NOTE — PROCEDURE
[FreeTextEntry1] : PFT revealed moderate obstructive and moderate obstructive dysfunction, with a FEV1 of 0.59L, which is 44% of predicted, with a normal flow volume loop. -PFTs performed today for evaluation of asthma and SOB (05/09/2023) \par \par Feno was 8; a normal value being less than 25. Fractional exhaled nitric oxide (FENO) is regarded as a simple, noninvasive method for assessing eosinophilic airway inflammation. Produced by a variety of cells within the lung, nitric oxide (NO) concentrations are generally low in healthy individuals. However, high concentrations of NO appear to be involved in nonspecific host defense mechanisms and chronic inflammatory  diseases such as asthma. The American Thoracic Society (ATS) therefore recommended using FENO to aid in the diagnosis and monitoring of eosinophilic airway inflammation and asthma, and for identifying steroid responsive individuals whose chronic respiratory symptoms may be caused by airway inflammation

## 2023-05-09 NOTE — HISTORY OF PRESENT ILLNESS
[FreeTextEntry1] : Ms. Primrose is a 73 year old female presenting to the office for a follow up evaluation. She has PMHx of allergic rhinitis, eosinophilic/moderate persistent asthma, ALIS, GERD, and shortness of breath. Her chief complaint is\par \par -she notes her energy levels are poor \par -she notes fatigue\par -she notes stomach bloating\par -she notes SOB at all times\par -she notes chest pressure\par -she notes diarrhea \par -she notes constipation \par -she notes exercising \par -she notes itchy eyes and pressure in her eyes\par -she notes migraines\par -she notes her sinuses are problematic at the moment  \par \par -patient denies any nausea, vomiting, fever, chills, sweats, palpitations, leg swelling, leg pain, heartburn, reflux or sour taste in the mouth

## 2023-05-09 NOTE — PHYSICAL EXAM
[No Acute Distress] : no acute distress [Normal Oropharynx] : normal oropharynx [III] : Mallampati Class: III [Normal Appearance] : normal appearance [No Neck Mass] : no neck mass [Normal Rate/Rhythm] : normal rate/rhythm [Murmur ___ / 6] : murmur [unfilled] / 6 [Normal S1, S2] : normal s1, s2 [No Murmurs] : no murmurs [No Resp Distress] : no resp distress [Clear to Auscultation Bilaterally] : clear to auscultation bilaterally [No Abnormalities] : no abnormalities [Kyphosis] : kyphosis [Benign] : benign [Normal Gait] : normal gait [No Clubbing] : no clubbing [No Cyanosis] : no cyanosis [No Edema] : no edema [FROM] : FROM [Normal Color/ Pigmentation] : normal color/ pigmentation [No Focal Deficits] : no focal deficits [Oriented x3] : oriented x3 [Normal Affect] : normal affect [TextBox_68] : I:E 1:3, mildly decreased breath sounds at left base

## 2023-05-11 RX ORDER — CICLESONIDE 50 UG/1
50 SPRAY NASAL
Qty: 37.5 | Refills: 1 | Status: DISCONTINUED | COMMUNITY
Start: 2023-05-11 | End: 2023-05-11

## 2023-05-11 RX ORDER — BECLOMETHASONE DIPROPIONATE 80 UG/1
80 AEROSOL, METERED NASAL
Qty: 3 | Refills: 1 | Status: DISCONTINUED | COMMUNITY
Start: 2023-05-09 | End: 2023-05-11

## 2023-05-11 RX ORDER — OLOPATADINE HYDROCHLORIDE 665 UG/1
0.6 SPRAY, METERED NASAL
Qty: 3 | Refills: 1 | Status: DISCONTINUED | COMMUNITY
Start: 2023-05-09 | End: 2023-05-11

## 2023-05-11 RX ORDER — MOMETASONE 50 UG/1
50 SPRAY, METERED NASAL
Qty: 3 | Refills: 1 | Status: ACTIVE | COMMUNITY
Start: 2023-05-11 | End: 1900-01-01

## 2023-07-10 NOTE — DISCHARGE NOTE ADULT - ADDITIONAL INSTRUCTIONS
Subjective:  Anastacio is a pleasant 27 year old male who is seen today for chief complaint of left chest pain.  He states he was doing a lot of heavy lifting and he felt pain in the anterior lateral aspect of the left chest.  It started near the manubrium and extended towards the pectoralis tendon.  He noticed there was a lot of bruising and swelling in this region.  Because of the chest pain he was seen by cardiology and had monitors and workup for cardiac related chest discomfort and was found to be cleared from that.  He is worried about the continued pain he has when trying to lift since this was many months ago.  He denies any rapid progression of pain, numbness, tingling or weakness.  He has had pain which has led to weakness when attempting to continue lifting weights.    Objective:  Anastacio is a pleasant 27-year-old male who is alert and oriented x3.  Exam left shoulder reveals negative sulcus sign.  There is no anterior-posterior instability.  He is 180° forward flexion and abduction.  He has 5 x 5 strength in external rotation internal rotation abduction adduction and empty cans.  He is negative Speed test, negative Yergason test negative Early's test.  Both the right and left pectoralis tendon are palpable and felt to be intact.  There is no pain at the pectoralis tendon insertion in the upper extremity on right or left.  There is minor swelling on the anterior chest wall near the origin of the pectoralis muscle.  There is no palpable step-off defect.  There is minor pain to palpation of the costochondral junction along the left manubrium.  There is no palpable step-off or subluxation or dislocation of the ribs at the joint on the manubrium.  He has good movement, sensation circulation left upper extremity.    I have reviewed the patient's medications and allergies, past medical, surgical, social and family history, updating these as appropriate. See Histories section of the EMR for a display of this  information.    Imaging:  Prior chest x-rays reviewed which shows no significant bony abnormalities in the left shoulder or chest wall.    Assessment:  Pectoralis muscle injury    Plan:  The plan is then take meloxicam 15 mg 1 p.o. q.day.  NSAID precautions are discussed with him.  He will try this every day for 7-14 days and see how he is doing.  He will also undergo physical therapy to work on any modalities as needed such as ultrasound and progress to weight-bearing.  He is agreeable to give this a try.  If he is not shown improvement over the next month he will return to clinic for repeat evaluation.  He understands this and is agreeable to this plan.    Cheng Colby MD serves as my supervising physician and was available for questions regarding formulation of diagnosis and plan and review of patient care. If Dr. Colby is unavailable, an alternate supervising physician will cover his responsibilities based on established yearly written agreement and daily availability of said physicians.     Follow-up with your Private Medical Doctor within 1 week. Please call and make an appointment. Follow-up with your Private Medical Doctor within 1 week. Please call and make an appointment.  Follow-up with Endocrinologist within 1 week. Please call and make an appointment. Follow-up with your Private Medical Doctor within 1 week. Please call and make an appointment.  Follow-up with Endocrinologist  within 1 week. Please call and make an appointment. Follow-up with your Private Medical Doctor within 1 week. Please call and make an appointment.  Follow-up with Endocrinologist Dr. Carrera within 1 week. Please call and make an appointment.

## 2023-07-18 NOTE — BH INPATIENT PSYCHIATRY ASSESSMENT NOTE - NSBHCONSULTIPREASON_PSY_A_CORE
Linda Matute stop in to drop off his BP results. I place them in the nurse folder. danger to self; mental illness expected to respond to inpatient care

## 2023-08-03 ENCOUNTER — APPOINTMENT (OUTPATIENT)
Dept: CARDIOLOGY | Facility: CLINIC | Age: 73
End: 2023-08-03

## 2023-08-17 NOTE — PROCEDURE
MD notified of pt lactic 2.7  
[FreeTextEntry1] : CXR in office; Read by Dr. Morfin. \par Impression: Normal appearing heart. No infiltrates, effusion or opacities noted. Mild left sided hemidiaphragm noted, but as per Dr. Morfin that is patient's baseline.  \par

## 2023-08-21 ENCOUNTER — APPOINTMENT (OUTPATIENT)
Dept: PULMONOLOGY | Facility: CLINIC | Age: 73
End: 2023-08-21
Payer: MEDICARE

## 2023-08-21 VITALS
DIASTOLIC BLOOD PRESSURE: 65 MMHG | RESPIRATION RATE: 16 BRPM | BODY MASS INDEX: 21.52 KG/M2 | SYSTOLIC BLOOD PRESSURE: 115 MMHG | HEIGHT: 55 IN | HEART RATE: 75 BPM | WEIGHT: 93 LBS | OXYGEN SATURATION: 94 % | TEMPERATURE: 97 F

## 2023-08-21 DIAGNOSIS — G47.33 OBSTRUCTIVE SLEEP APNEA (ADULT) (PEDIATRIC): ICD-10-CM

## 2023-08-21 DIAGNOSIS — R06.02 SHORTNESS OF BREATH: ICD-10-CM

## 2023-08-21 DIAGNOSIS — E55.9 VITAMIN D DEFICIENCY, UNSPECIFIED: ICD-10-CM

## 2023-08-21 DIAGNOSIS — J45.909 UNSPECIFIED ASTHMA, UNCOMPLICATED: ICD-10-CM

## 2023-08-21 DIAGNOSIS — J82.83 EOSINOPHILIC ASTHMA: ICD-10-CM

## 2023-08-21 DIAGNOSIS — J45.50 SEVERE PERSISTENT ASTHMA, UNCOMPLICATED: ICD-10-CM

## 2023-08-21 PROCEDURE — 99214 OFFICE O/P EST MOD 30 MIN: CPT

## 2023-08-21 NOTE — PHYSICAL EXAM
[No Acute Distress] : no acute distress [Normal Oropharynx] : normal oropharynx [III] : Mallampati Class: III [Normal Appearance] : normal appearance [No Neck Mass] : no neck mass [Normal Rate/Rhythm] : normal rate/rhythm [Murmur ___ / 6] : murmur [unfilled] / 6 [Normal S1, S2] : normal s1, s2 [No Murmurs] : no murmurs [No Resp Distress] : no resp distress [Clear to Auscultation Bilaterally] : clear to auscultation bilaterally [No Abnormalities] : no abnormalities [Kyphosis] : kyphosis [Benign] : benign [Normal Gait] : normal gait [No Clubbing] : no clubbing [No Cyanosis] : no cyanosis [No Edema] : no edema [FROM] : FROM [Normal Color/ Pigmentation] : normal color/ pigmentation [No Focal Deficits] : no focal deficits [Oriented x3] : oriented x3 [Normal Affect] : normal affect [TextBox_68] : I:E 1:3, expiratory wheezes b/l and decreased breath sounds at the left base

## 2023-08-21 NOTE — ASSESSMENT
[FreeTextEntry1] : Ms. Primrose is a 73 year old female with history of COPD / moderate-severe persistent asthma / allergy / paralyzed hemidiaphragm / cardiac disease / CHF/ eosinophilic asthma. She presents to the office for - steroid dependent asthma/ eosinophilic asthma- SOB (CHF EF 10%)- #1 issue is URI / Active Asthma   Her shortness of breath is multifactorial due to: -cardiac/ mild congestive heart failure -severe persistent asthma -allergic rhinitis/post nasal drip syndrome -paralyzed hemidiaphragm s/p plication -poor breathing mechanics and anxiety   problem 1: COPD/ asthma (severe persistent)- Active -continue Xopenex 0.63 by the nebulizer up to QID  -continue to use Advair 500 at 1 inhalation BID -continue Tudorza at 1 inhalation BID -continue Singulair 10 mg before bed -continue to use Proventil PRN -continue Acapella device to be used multiple times daily  -add Prednisone 30 mg x 5 days, 20 mg x 5 days, 10 mg x 5 days Information sheet given to the patient to be reviewed, this medication is never to be used without consulting the prescribing physician. Proper dietary restraint is necessary specifically salt containing foods, if any reaction may occur should be reported. -Asthma is believed to be caused by inherited (genetic) and environmental factor, but its exact cause is unknown. Asthma may be triggered by allergens, lung infections, or irritants in the air. Asthma triggers are different for each person  -Inhaler technique reviewed as well as oral hygiene techniques reviewed with patient. Avoidance of cold air, extremes of temperature, rescue inhaler should be used before exercise. Order of medication reviewed with patient. Recommended use of a cool mist humidifier in the bedroom  Problem 1A Steroid dependent asthma -on Dupixent since 2/2022 Dupixent is a prescription medicine used with other asthma medicines for the maintenance treatment of moderate-to-severe asthma in people aged 12 years and older whose asthma is not controlled with their current asthma medicines. Dupixent helps prevent severe asthma attacks (exacerbations) and can improve your breathing. Dupixent may also help reduce the amount of oral corticosteroids you need while preventing severe asthma attacks and improving your breathing. Dupixent is not used to treat sudden breathing problems. Risks and side effect of Dupixent were discussed and reviewed with patient.   problem 2: eosinophilic asthma; s/p Nucala- NC -s/p receiving Fasenra and had 8 injections( 11/2020) -The safety and efficacy of Nucala was established in three double-blind, randomized, placebo-controlled trials in patients with severe asthma. Compared to a placebo, patients with severe asthma receiving Nucala had fewer exacerbation requiring hospitalization and/or emergency department visits, and a longer time to first exacerbation. In addition, patients with severe asthma receiving Nucala or Fasenra experienced greater reductions in their daily maintenance oral corticosteroid dose, while maintaining asthma control compared with patients receiving placebo. Treatment with Nucala did not result in a significant improvement in lung function, as measured by the volume of air exhaled by patients in one second. The most common side effects include: headache, injection site reactions, back pain, weakness, and fatigue; hypersensitivity reactions can occur within hours or days including swelling of the face, mouth, and tongue, fainting, dizziness, hives, breathing problems, and rash; herpes zoster infections have occurred. The drug is a monoclonal antibody that inhibits interleukin-5 which helps regular eosinophils, a type of white blood cell that contributes to asthma. The over-production of eosinophils can cause inflammation in the lungs, increasing the frequency of asthma attacks. Patients must also take other medications, including high dose inhaled corticosteroids and at least one additional asthma drug  problem 3: allergic rhinitis/post nasal drip syndrome (active) - NC -recommended Navage sinus rinse -continue Qnasl 1 sniff each nostril BID  -continue Olopatadine 0.6% 1 sniff each nostril BID  -continue claritin-d 24hr AM -continue Xyzal 5 mg before bed  s/p Blood work: eosinophil level, IgE level, and immunocap testing  -Environmental measures for allergies were encouraged including mattress and pillow cover, air purifier, and environmental controls.  problem 4: GERD -continue to use good diet and good timing of meals -Rule of 2s: avoid eating too much, eating too late, eating too spicy, eating two hours before bed -Things to avoid including overeating, spicy foods, tight clothing, eating within three hours of bed, this list is not all inclusive.  -For treatment of reflux, possible options discussed including diet control, H2 blockers, PPIs, as well as coating motility agents discussed as treatment options. Timing of meals and proximity of last meal to sleep were discussed. If symptoms persist, a formal gastrointestinal evaluation is needed.   problem 5: ALIS -add trazadone 50 mg qhs  -she has refused any treatment or diagnosing -she is being recommended to use Oxy-Aid by [facility] -Discussed the risks/associations with coronary artery disease, atrial fibrillation, arrhythmia, memory loss, issues with concentration, stroke risk, hypertension, nocturia, chronic reflux/Kimbrough's esophagus some but not all inclusive. Treatment options discussed including CPAP/BiPAP machine, oral appliance, ProVent therapy, Oxy-Aid by [facility], new technologies, or positional sleep.  problem 6: cardiac component  -she is being recommended to have a consultation with Dr. Vita Vieyra/ [facility]/  Dr Carlson - ?AICD  Problem 7: Anxiety - Continue Klonopin 0.5 mg prn   problem 8: health maintenance  -recommended RSV vaccine in the Fall for anyone over the age of 60 -s/p COVID 19 vaccine x 2 (booster pending)  -recommended orthopedic evaluation with (Dr. Perez) -recommended Evaluation with Neurology (Dr. Baker) -recommended to have medical marijuana evaluation with Dr. Alma Matias -s/p 2021 yearly flu shot  -recommended strep pneumonia vaccines: Prevnar-13 vaccine, followed by Pneumo vaccine 23 one year following -recommended early intervention for URIs -recommended regular osteoporosis evaluations -recommended early dermatological evaluations -recommended after the age of 50 to the age of 70, colonoscopy every 5 years     F/U in 2 months She is encouraged to call with any changes,concerns, or questions.

## 2023-08-21 NOTE — HISTORY OF PRESENT ILLNESS
[FreeTextEntry1] : Ms. Primrose is a 73 year old female presenting to the office for a follow up evaluation. She has PMHx of allergic rhinitis, eosinophilic/moderate persistent asthma, ALIS, GERD, and shortness of breath. Her chief complaint is  - she notes getting sick about 3 weeks ago - she notes she had coughing - she notes she was given Levaquin and steroids (Medrol dose pack 7,6,5,4,3,2,1) - she notes she is on her second Medrol dose pack because she was still SOB and coughing  - she notes she was producing a green and yellow phlegm - she notes her phlegm now is white  - she notes she cant take a deep breath - she notes using Levalbuterol and Advair - she notes also having a sinus infection and went to her ENT  - she notes left side discomfort -she denies any headaches, nausea, emesis, fever, chills, sweats, chest pain, chest pressure, wheezing, palpitations, constipation, diarrhea, vertigo, dysphagia, heartburn, reflux, itchy eyes, itchy ears, leg swelling, leg pain, arthralgias, or sour taste in the mouth.

## 2023-08-21 NOTE — ADDENDUM
[FreeTextEntry1] : Documented by Kiko Hoff acting as a scribe for Dr. Ketan Morfin on 08/21/2023.   All medical record entries made by the Scribe were at my, Dr. Ketan Morfin's, direction and personally dictated by me on 08/21/2023. I have reviewed the chart and agree that the record accurately reflects my personal performance of the history, physical exam, assessment and plan. I have also personally directed, reviewed, and agree with the discharge instructions.

## 2023-08-21 NOTE — PROCEDURE
[FreeTextEntry1] : CXR 08/16/2023 reveals a normal sized heart; no evidence of infiltrate or effusion -- AICD on the left.

## 2023-08-21 NOTE — REASON FOR VISIT
[Follow-Up] : a follow-up visit [FreeTextEntry1] : pre-op for spinal stimulation, allergic rhinitis, eosinophilic/moderate persistent asthma, ALIS, paralyzed diaphragm, GERD, and shortness of breath

## 2023-08-22 NOTE — ED ADULT TRIAGE NOTE - SPO2 (%)
Patient called stating that he is finished with his medication and is still having a lot of swelling. Please advise. 99

## 2023-08-28 ENCOUNTER — RX RENEWAL (OUTPATIENT)
Age: 73
End: 2023-08-28

## 2023-09-12 ENCOUNTER — APPOINTMENT (OUTPATIENT)
Dept: PULMONOLOGY | Facility: CLINIC | Age: 73
End: 2023-09-12

## 2023-09-18 NOTE — PHYSICAL THERAPY INITIAL EVALUATION ADULT - GAIT PATTERN USED, PT EVAL
51 yo M w/ PMHx of HTN, GERD, no PSHx presents ot the ED w/ LLQ abdominal pain x6 day radiating to the groin. CT A/P shows focal wall thickening of the sigmoid colon and adjacent extraluminal fluid and a few foci of extraluminal gas, consistent with microperforated colonic diverticulitis without any drainable fluid. Colorectal surgery consulted for evaluation of complicated diverticulitis, first episode.    Plan:  - Abx: Zosyn  - Diet: CLD  - Pain control with Tylenol only  - IVF lock if patient tolerates CLD this am, currently on LR 100mL/Hr  - DVT ppx: Lovenox 40mg QD    [facility] Surgery, Red Team  [id number] 53 yo M w/ PMHx of HTN, GERD, no PSHx presents ot the ED w/ LLQ abdominal pain x6 day radiating to the groin. CT A/P shows focal wall thickening of the sigmoid colon and adjacent extraluminal fluid and a few foci of extraluminal gas, consistent with microperforated colonic diverticulitis without any drainable fluid. Colorectal surgery consulted for evaluation of complicated diverticulitis, first episode.    Plan:  - Abx: Zosyn  - Diet: CLD -> advanced to LRD 9/18  - Pain control with Tylenol only  - DVT ppx: Lovenox 40mg QD  - cont Protonix  - Plan pending for possible D/C today or tomorrow   [facility] Surgery, Red Team  [id number] 2-point gait

## 2023-10-13 ENCOUNTER — RX RENEWAL (OUTPATIENT)
Age: 73
End: 2023-10-13

## 2023-10-16 ENCOUNTER — RESULT CHARGE (OUTPATIENT)
Age: 73
End: 2023-10-16

## 2023-10-17 ENCOUNTER — LABORATORY RESULT (OUTPATIENT)
Age: 73
End: 2023-10-17

## 2023-10-17 ENCOUNTER — APPOINTMENT (OUTPATIENT)
Dept: CARDIOLOGY | Facility: CLINIC | Age: 73
End: 2023-10-17
Payer: MEDICARE

## 2023-10-17 ENCOUNTER — APPOINTMENT (OUTPATIENT)
Dept: PULMONOLOGY | Facility: CLINIC | Age: 73
End: 2023-10-17
Payer: MEDICARE

## 2023-10-17 ENCOUNTER — NON-APPOINTMENT (OUTPATIENT)
Age: 73
End: 2023-10-17

## 2023-10-17 VITALS
WEIGHT: 95 LBS | DIASTOLIC BLOOD PRESSURE: 70 MMHG | TEMPERATURE: 97.1 F | HEART RATE: 69 BPM | HEIGHT: 55 IN | SYSTOLIC BLOOD PRESSURE: 112 MMHG | OXYGEN SATURATION: 95 % | RESPIRATION RATE: 16 BRPM | BODY MASS INDEX: 21.98 KG/M2

## 2023-10-17 VITALS
HEART RATE: 67 BPM | RESPIRATION RATE: 16 BRPM | TEMPERATURE: 97.3 F | DIASTOLIC BLOOD PRESSURE: 70 MMHG | SYSTOLIC BLOOD PRESSURE: 108 MMHG | HEIGHT: 55 IN | OXYGEN SATURATION: 96 % | BODY MASS INDEX: 21.98 KG/M2 | WEIGHT: 95 LBS

## 2023-10-17 DIAGNOSIS — J01.90 ACUTE SINUSITIS, UNSPECIFIED: ICD-10-CM

## 2023-10-17 DIAGNOSIS — R09.81 NASAL CONGESTION: ICD-10-CM

## 2023-10-17 DIAGNOSIS — J45.909 UNSPECIFIED ASTHMA, UNCOMPLICATED: ICD-10-CM

## 2023-10-17 DIAGNOSIS — R09.82 POSTNASAL DRIP: ICD-10-CM

## 2023-10-17 DIAGNOSIS — K21.9 GASTRO-ESOPHAGEAL REFLUX DISEASE W/OUT ESOPHAGITIS: ICD-10-CM

## 2023-10-17 DIAGNOSIS — J30.9 ALLERGIC RHINITIS, UNSPECIFIED: ICD-10-CM

## 2023-10-17 PROCEDURE — 99215 OFFICE O/P EST HI 40 MIN: CPT

## 2023-10-17 PROCEDURE — 99214 OFFICE O/P EST MOD 30 MIN: CPT

## 2023-10-17 PROCEDURE — 93000 ELECTROCARDIOGRAM COMPLETE: CPT

## 2023-10-17 RX ORDER — FLUTICASONE PROPIONATE 50 UG/1
50 SPRAY, METERED NASAL TWICE DAILY
Qty: 1 | Refills: 1 | Status: ACTIVE | COMMUNITY
Start: 2023-10-17 | End: 1900-01-01

## 2023-11-09 RX ORDER — IBUPROFEN 600 MG/1
600 TABLET, FILM COATED ORAL
Refills: 0 | Status: COMPLETED | COMMUNITY
End: 2023-11-09

## 2023-11-09 RX ORDER — ALBUTEROL SULFATE 90 UG/1
108 (90 BASE) AEROSOL, METERED RESPIRATORY (INHALATION)
Qty: 3 | Refills: 1 | Status: COMPLETED | COMMUNITY
Start: 2018-08-15 | End: 2023-11-09

## 2023-11-09 RX ORDER — ALBUTEROL SULFATE 90 UG/1
108 (90 BASE) INHALANT RESPIRATORY (INHALATION)
Qty: 3 | Refills: 1 | Status: COMPLETED | COMMUNITY
Start: 2018-05-17 | End: 2023-11-09

## 2023-11-09 RX ORDER — PREDNISONE 10 MG/1
10 TABLET ORAL
Qty: 50 | Refills: 0 | Status: COMPLETED | COMMUNITY
Start: 2023-08-21 | End: 2023-11-09

## 2023-11-09 RX ORDER — ALIROCUMAB 75 MG/ML
75 INJECTION, SOLUTION SUBCUTANEOUS
Qty: 6 | Refills: 1 | Status: COMPLETED | COMMUNITY
Start: 2022-09-14 | End: 2023-11-09

## 2023-11-09 RX ORDER — DUPILUMAB 300 MG/2ML
300 INJECTION, SOLUTION SUBCUTANEOUS
Qty: 4 | Refills: 4 | Status: COMPLETED | COMMUNITY
Start: 2022-02-23 | End: 2023-11-09

## 2023-11-09 RX ORDER — QUETIAPINE FUMARATE 150 MG/1
150 TABLET, FILM COATED ORAL DAILY
Refills: 0 | Status: COMPLETED | COMMUNITY
Start: 2022-08-22 | End: 2023-11-09

## 2023-11-09 RX ORDER — ACLIDINIUM BROMIDE 400 UG/1
400 POWDER, METERED RESPIRATORY (INHALATION) TWICE DAILY
Qty: 3 | Refills: 1 | Status: COMPLETED | COMMUNITY
Start: 2021-10-20 | End: 2023-11-09

## 2023-11-09 RX ORDER — FLUTICASONE PROPIONATE AND SALMETEROL 500; 50 UG/1; UG/1
500-50 POWDER RESPIRATORY (INHALATION)
Qty: 3 | Refills: 1 | Status: COMPLETED | COMMUNITY
Start: 2020-01-21 | End: 2023-11-09

## 2023-11-09 RX ORDER — BISOPROLOL FUMARATE 5 MG/1
5 TABLET, FILM COATED ORAL DAILY
Qty: 15 | Refills: 3 | Status: COMPLETED | COMMUNITY
Start: 2022-08-22 | End: 2023-11-09

## 2023-12-05 NOTE — BH INPATIENT PSYCHIATRY PROGRESS NOTE - NSBHCHARTREVIEWVS_PSY_A_CORE FT
Airway    Performed by: Tico Hoff CRNA  Authorized by: Tico Hoff CRNA    Final Airway Type:  Endotracheal airway  Final Endotracheal Airway*:  ETT  ETT Size (mm)*:  7.0  Cuff*:  Regular  Technique Used for Successful ETT Placement:  Direct laryngoscopy  Devices/Methods Used in Placement*:  Mask and Bag Valve  Intubation Procedure*:  Preoxygenation, ETCO2, Atraumatic, Dentition Unchanged and Pharynx Clear  Insertion Site:  Oral  Blade Type*:  Lam  Blade Size*:  2  Placement Verified by: auscultation and capnometry    Glottic View*:  1 - full view of glottis  Attempts*:  1   Patient Identified, Procedure confirmed, Emergency equipment available and Safety protocols followed  Location:  OR  Urgency:  Elective  Difficult Airway: No    Indications for Airway Management:  Anesthesia  Sedation Level:  Anesthetized  Mask Difficulty Assessment:  1 - vent by mask  Start Time: 12/4/2023 9:23 PM       Vital Signs Last 24 Hrs  T(C): 36.7 (28 Jun 2021 11:20), Max: 37.2 (28 Jun 2021 07:08)  T(F): 98.1 (28 Jun 2021 11:20), Max: 98.9 (28 Jun 2021 07:08)  HR: 89 (28 Jun 2021 11:20) (87 - 108)  BP: 125/84 (28 Jun 2021 11:20) (120/77 - 161/79)  BP(mean): --  RR: 18 (28 Jun 2021 11:20) (17 - 18)  SpO2: 97% (28 Jun 2021 11:20) (92% - 100%)

## 2023-12-20 ENCOUNTER — RX RENEWAL (OUTPATIENT)
Age: 73
End: 2023-12-20

## 2023-12-20 RX ORDER — DUPILUMAB 300 MG/2ML
300 INJECTION, SOLUTION SUBCUTANEOUS
Qty: 4 | Refills: 1 | Status: ACTIVE | COMMUNITY
Start: 2022-03-16 | End: 1900-01-01

## 2024-01-03 PROCEDURE — 99214 OFFICE O/P EST MOD 30 MIN: CPT

## 2024-03-26 ENCOUNTER — APPOINTMENT (OUTPATIENT)
Dept: CARDIOLOGY | Facility: CLINIC | Age: 74
End: 2024-03-26
Payer: MEDICARE

## 2024-03-26 VITALS
BODY MASS INDEX: 21.52 KG/M2 | HEIGHT: 55 IN | OXYGEN SATURATION: 97 % | RESPIRATION RATE: 16 BRPM | WEIGHT: 93 LBS | DIASTOLIC BLOOD PRESSURE: 80 MMHG | TEMPERATURE: 97.6 F | HEART RATE: 106 BPM | SYSTOLIC BLOOD PRESSURE: 110 MMHG

## 2024-03-26 DIAGNOSIS — R09.89 OTHER SPECIFIED SYMPTOMS AND SIGNS INVOLVING THE CIRCULATORY AND RESPIRATORY SYSTEMS: ICD-10-CM

## 2024-03-26 DIAGNOSIS — I73.9 PERIPHERAL VASCULAR DISEASE, UNSPECIFIED: ICD-10-CM

## 2024-03-26 PROCEDURE — G2211 COMPLEX E/M VISIT ADD ON: CPT

## 2024-03-26 PROCEDURE — 99215 OFFICE O/P EST HI 40 MIN: CPT

## 2024-03-26 RX ORDER — METOPROLOL TARTRATE 25 MG/1
25 TABLET, FILM COATED ORAL
Qty: 90 | Refills: 1 | Status: ACTIVE | COMMUNITY
Start: 2024-03-26 | End: 1900-01-01

## 2024-03-26 NOTE — PHYSICAL EXAM
[Well Developed] : well developed [Well Nourished] : well nourished [No Acute Distress] : no acute distress [Normal Venous Pressure] : normal venous pressure [No Carotid Bruit] : no carotid bruit [Normal S1, S2] : normal S1, S2 [Clear Lung Fields] : clear lung fields [Good Air Entry] : good air entry [No Respiratory Distress] : no respiratory distress  [Soft] : abdomen soft [Non Tender] : non-tender [No Masses/organomegaly] : no masses/organomegaly [Normal Bowel Sounds] : normal bowel sounds [Normal Gait] : normal gait [No Edema] : no edema [No Cyanosis] : no cyanosis [No Clubbing] : no clubbing [No Varicosities] : no varicosities [No Rash] : no rash [No Skin Lesions] : no skin lesions [Moves all extremities] : moves all extremities [No Focal Deficits] : no focal deficits [Normal Speech] : normal speech [Alert and Oriented] : alert and oriented [Normal memory] : normal memory [General Appearance - Well Developed] : well developed [Normal Appearance] : normal appearance [Well Groomed] : well groomed [General Appearance - Well Nourished] : well nourished [No Deformities] : no deformities [General Appearance - In No Acute Distress] : no acute distress [Normal Conjunctiva] : the conjunctiva exhibited no abnormalities [Eyelids - No Xanthelasma] : the eyelids demonstrated no xanthelasmas [Normal Oral Mucosa] : normal oral mucosa [Normal Jugular Venous A Waves Present] : normal jugular venous A waves present [Normal Jugular Venous V Waves Present] : normal jugular venous V waves present [No Jugular Venous Santo A Waves] : no jugular venous santo A waves [Respiration, Rhythm And Depth] : normal respiratory rhythm and effort [Exaggerated Use Of Accessory Muscles For Inspiration] : no accessory muscle use [Auscultation Breath Sounds / Voice Sounds] : lungs were clear to auscultation bilaterally [Bowel Sounds] : normal bowel sounds [Abdomen Soft] : soft [Abnormal Walk] : normal gait [Nail Clubbing] : no clubbing of the fingernails [Cyanosis, Localized] : no localized cyanosis [Skin Color & Pigmentation] : normal skin color and pigmentation [Petechial Hemorrhages (___cm)] : no petechial hemorrhages [Skin Turgor] : normal skin turgor [] : no rash [No Xanthoma] : no  xanthoma was observed [Oriented To Time, Place, And Person] : oriented to person, place, and time [Impaired Insight] : insight and judgment were intact [Affect] : the affect was normal [Mood] : the mood was normal [No Anxiety] : not feeling anxious [5th Left ICS - MCL] : palpated at the 5th LICS in the midclavicular line [Normal] : normal [No Precordial Heave] : no precordial heave was noted [Normal Rate] : normal [Rhythm Regular] : regular [Normal S1] : normal S1 [Normal S2] : normal S2 [No Gallop] : no gallop heard [II] : a grade 2 [2+] : left 2+ [No Abnormalities] : the abdominal aorta was not enlarged and no bruit was heard [___ +] : bilateral [unfilled]U+ pitting edema to the ankles [S3] : no S3 [S4] : no S4 [Right Carotid Bruit] : no bruit heard over the right carotid [Left Carotid Bruit] : no bruit heard over the left carotid

## 2024-03-26 NOTE — REVIEW OF SYSTEMS
[Dyspnea on exertion] : dyspnea during exertion [Negative] : Heme/Lymph [SOB] : no shortness of breath [Chest Discomfort] : no chest discomfort [Lower Ext Edema] : no extremity edema [Leg Claudication] : no intermittent leg claudication [Palpitations] : no palpitations [PND] : no PND [Orthopnea] : no orthopnea [Syncope] : no syncope

## 2024-03-26 NOTE — REASON FOR VISIT
[CV Risk Factors and Non-Cardiac Disease] : CV risk factors and non-cardiac disease [Follow-Up - Clinic] : a clinic follow-up of [Coronary Artery Disease] : coronary artery disease [Dyspnea] : dyspnea [Hyperlipidemia] : hyperlipidemia [Hypertension] : hypertension [Mitral Regurgitation] : mitral regurgitation [FreeTextEntry3] : Dr. Fredric Cogan [FreeTextEntry1] : s/p MI, asthma, LV dysfunction

## 2024-03-26 NOTE — DISCUSSION/SUMMARY
[FreeTextEntry1] : This is a 74-year-old female with past medical history significant for coronary artery disease, statin intolerant, status post myocardial infarction received 6-8 stents by her report, left ventricular dysfunction, status post AICD placement, history of significant asthma, status post sigmoid resection for diverticulitis, occasional leg cramping, status post implantation of a back stimulator for chronic pain, status post surgery for rectocele, status post surgery for cystocele, sciatica, diaphragmatic surgery to improve her breathing, comes in for cardiac follow-up evaluation. The patient is followed by Dr. Hanley of electrophysiology from [facility] who monitors her AICD.  She thinks she sees the heart failure doctor but is not sure.  I recommended she make an appointment see Dr. Samuel of heart failure from [facility]. She reports that she may be having an allergy to her Toprol-XL 12.5 mg in the evening. This may not be a reaction to Toprol per se but may be a reaction to fillers or dye. She will switch to metoprolol to tartrate 25 mg in the evening (once per day). She will have new blood work done with her primary care physician. She remains on Praluent 150 mg biweekly.  She will have new blood work done for lipid panel prior to the next visit.  She reports that she had recent blood work done with her primary care physician and she will get me a copy of the results for my review. Echo Doppler examination done August 29, 2022 at [facility] demonstrated an ejection fraction of 10% with severe hypokinesis globally of the left ventricle.  There was trace mitral valve regurgitation, trace tricuspid valve regurgitation. There is no evidence for fluid overload on today's examination. She will follow-up with me after her visit with her medical doctor, and heart failure team. Other medications that she is currently taking include digoxin 0.125 mg daily, aspirin 81 mg, Praluent 150 mg biweekly, Zetia 10 mg daily, losartan 12.5 mg daily. She is encouraged to limit her salt intake.  She will follow-up with you regarding her overall medical care. Lipid panel done October 17, 2023 demonstrated cholesterol 194, HDL 64, triglycerides 148, LDL calculated 104, non-HDL cholesterol 130, hemoglobin A1c of 5.2. Electrocardiogram done October 17, 2023 demonstrated normal sinus rhythm rate 67 bpm and is otherwise remarkable for left anterior hemiblock, left axis deviation and poor R wave progression. The patient is now living in assisted living and has had her medications adjusted by her primary care physician.  She was taken off bisoprolol and placed on metoprolol succinate half a tablet per day (12.5 mg effective dose), Her LDL cholesterol target remains less than 70 mg/dL. I have asked her to follow-up with the heart failure team as well as her electrophysiology team for her AICD. Electrocardiogram done August 17, 2022 demonstrated normal sinus rhythm at a rate of 90 bpm is otherwise remarkable for poor R wave progression, left axis deviation, and poor recording baseline. Blood work done August 3, 2022 demonstrated total cholesterol 238, LDL of 128 mg/dL, triglycerides 127 mg/dL, HDL 85 mg/dL.  The patient is statin intolerant and she is complaining of bloating and GI distress on Zetia therapy. Electrocardiogram done March 2, 2022 demonstrated sinus tachycardia rate of 100 bpm is otherwise remarkable for poor R wave progression, left axis deviation.  Echo Doppler examination done June 16, 2020 demonstrated mild mitral valve regurgitation, moderate to severe tricuspid valve regurgitation, mild pulmonary systolic hypertension, mild left ventricular enlargement, severe left ventricular systolic dysfunction with hypokinesis on the anterior wall, apex and interventricular septum.  Ankle-brachial index done March 2, 2022 was within normal limits.  Cardiac catheterization done March 15, 2019 demonstrated severe stenosis of the distal circumflex artery and small vessel which was not amenable to intervention, 20% lesion in the mid LAD, at the site of the prior stent, 30% lesion the first diagonal branch at the site of a prior stent, distal circumflex lesion 80% small vessel, proximal ramus branch 30%, and mid right coronary artery 60%.  I once again discussed the use of statin therapy, but the patient does not wish to go on statins at all or any type of lipid-lowering therapy.  She understands the risk of restenosis of her stents  She is instructed to elevate her feet, limit her salt intake, and wear compression stockings.  PMH: The patient reports that she had a myocardial infarction followed by coronary intervention approximately 5 to 6 years ago [facility].  Details of this hospitalization are not available for my review.  At one point she was offered an AICD but refused wanting to live her life out.  She also reports that at one point she was in a coma related to her asthma.  Mother and 3 sisters had severe asthma.  She has no history of rheumatic fever.  She does not drink excessive caffeine or alcohol.  She reports that she was tried on statin therapy in the past but it did not agree with her.  She does not remember which statin she was on.   Electrocardiogram done May 13, 2020 demonstrated normal sinus rhythm rate of 98 bpm is otherwise remarkable for left atrial abnormality, left axis deviation, and nonspecific ST wave flattening.  Echo Doppler examination February 21, 2017 which demonstrated severe left ventricular dysfunction with an estimated ejection fraction of 25 to 30%.  This was an echocardiogram without commentary on Doppler flow.  A pulmonary systolic pressure was noted to be 39 mmHg.    She had a nuclear stress test done September 22, 2014 which demonstrated normal perfusion with an estimated ejection fraction 62%.  The patient had a recent cardiac catheterization done at [facility] in March 2020.   She is currently hemodynamically stable from a cardiac standpoint.

## 2024-03-26 NOTE — ASSESSMENT
[FreeTextEntry1] : Prior note nurse practitioner Vignesh October 3, 2022::  This is a 72-year-old female with past medical history significant for coronary artery disease, status post myocardial infarction received 6-8 stents by her report, history of significant asthma, status post sigmoid resection for diverticulitis, occasional leg cramping, status post implantation of a back stimulator for chronic pain, status post surgery for rectocele, status post surgery for cystocele, sciatica, diaphragmatic surgery to improve her breathing, comes in for cardiac follow-up evaluation.  She is here today status post hospitalization at [facility] and the details of this hospitalization are unclear. She does not have any discharge paperwork with her but states that she was admitted for 1 week early September for low blood pressure which occurred after starting Entresto. She states that she was feeling dizzy lethargic and was transferred to [facility]. She states that she apparently was supposed to get a defibrillator for an ejection fraction of less than 10%A? Details again are unclear and there is no discharge paperwork for us to review.    She is feeling well today and is currently on aspirin 81 mg daily, bisoprolol 5 mg which she takes half a tablet daily, Plavix 75 mg? ,and Praluent 75 mg/mL injectable which she states she has not been taking but needs a refill. She states she is seeing heart failure doctor next week Dr. Cohen from [place].    She states that she still has occasional dyspnea on exertion and follows up with her pulmonologist Dr. Morfin who she was told thinks that this may be all cardiac related.    ****This patient is a poor historian and does not know details about her health care or the next plan/what medication she is on. I told her that she must bring all her medications to her next follow-up appointment with more information about her recent hospital visitation at [facility]*****    -She is a retired nurse.    BLOOD PRESSURE:  -BP is controlled in today's visit.    BLOOD WORK:  -The patient had blood work done September 21, 2022 which demonstrated total cholesterol 172, HDL 78 and direct LDL of 85. Details of this blood test while on Praluent are unclear. Praluent was renewed on today's visit.  -New blood work was done 3/7/22 which demonstrated Cholesterol of 218 and LDL of 119. K level 4.8.    -We once again discussed the use of statin therapy, but the patient does not wish to go on statins at all or any type of lipid-lowering therapy. She understands the risk of restenosis of her stents.    -Recent lab work done in May 13 2020 which demonstrated Total cholesterol 212 . I advised to start Crestor 20mg PO Daily however she refused statin therapy at this time. She states "I have tried these medications before and I don't tolerate it well. 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    CHOLESTEROL CONTROL:  -Patient will continue the advised TLC diet and to continue follow-up for treatment of hyperlipidemia and repeat blood testing with diet and exercise. I have discussed different exercises and the importance of maintenance of optimal body weight. The importance of staying within guidelines and recommendations was stressed to the patient today and they acknowledged that they understand this to me verbally.    TESTING/REPORTS:  -EKG done 10/03/2022 which demonstrated regular sinus rhythm with nonspecific ST-T wave changes BPM of 89. She states that she has an apparent stimulator? She is unclear whether this stimulator is for her back or her chest or her legs? EKG appears abnormal due to the stimulator.    -Electrocardiogram done March 2, 2022 demonstrated sinus tachycardia rate of 100 bpm is otherwise remarkable for poor R wave progression, left axis deviation.    -EKG done in March 17 2021 which demonstrated regular sinus rhythm with nonspecific ST-T wave changes BPM of 88.    -Echo Doppler examination done June 16, 2020 demonstrated mild mitral valve regurgitation, moderate to severe tricuspid valve regurgitation, mild pulmonary systolic hypertension, mild left ventricular enlargement, severe left ventricular systolic dysfunction with hypokinesis on the anterior wall, apex and interventricular septum.    -Ankle-brachial index done March 2, 2022 was within normal limits.    -Cardiac catheterization done March 15, 2019 demonstrated severe stenosis of the distal circumflex artery and small vessel which was not amenable to intervention, 20% lesion in the mid LAD, at the site of the prior stent, 30% lesion the first diagonal branch at the site of a prior stent, distal circumflex lesion 80% small vessel, proximal ramus branch 30%, and mid right coronary artery 60%.    EDEMA:  Patient was instructed to elevate the legs and to use compression socks or wraps when out of bed. We'll monitor blood work for BUN/creatinine levels and adjust diuretics accordingly. Patient to advised to follow-up if any redness or signs of infection develop on their legs.  Patient had an educational review of the treatment of leg edema. Patient was advised to keep their legs elevated and take medication prescribed today and avoid sodium containing foods. The patient was advised to call prescribe medications and to notify the office of his condition worsens or they developed shortness of breath.    PLAN:  -She will continue with her usual medications and will contact the office if she is having any complaints between now and their next follow up appointment.  -She will follow up with her CHF team and I asked that she have them fax over a copy of their visit/give Dr. Carlson a direct phone call as per his request.  -She will follow-up with our office in 1 month. I asked that she bring all of her medications to her next follow-up appoint.    I have discussed the plan of care with Ms. TERESA PRIMROSE. She is compliant with all of her medications.    The patient understands that aerobic exercises must be increased to minutes 4 times/week and a detailed discussion of lifestyle modification was done today.  The patient has a good understanding of the diagnosis, treatment plan and lifestyle modification.  She will contact me at the office for any questions with their care or any changes in their health status.      Carolyn CASTLE.

## 2024-03-28 RX ORDER — CLONAZEPAM 0.5 MG/1
0.5 TABLET ORAL
Refills: 0 | Status: ACTIVE | COMMUNITY

## 2024-03-28 RX ORDER — LIDOCAINE 5% 700 MG/1
5 PATCH TOPICAL
Refills: 0 | Status: ACTIVE | COMMUNITY

## 2024-03-28 RX ORDER — SENNOSIDES 8.6 MG/1
8.6 TABLET ORAL
Refills: 0 | Status: ACTIVE | COMMUNITY

## 2024-03-28 RX ORDER — TRAMADOL HYDROCHLORIDE AND ACETAMINOPHEN 37.5; 325 MG/1; MG/1
37.5-325 TABLET, FILM COATED ORAL
Refills: 0 | Status: ACTIVE | COMMUNITY

## 2024-03-28 RX ORDER — ADHESIVE TAPE 3"X 2.3 YD
50 MCG TAPE, NON-MEDICATED TOPICAL
Refills: 0 | Status: ACTIVE | COMMUNITY

## 2024-03-28 RX ORDER — DOXYCYCLINE 100 MG/1
100 CAPSULE ORAL
Qty: 20 | Refills: 0 | Status: DISCONTINUED | COMMUNITY
Start: 2023-10-17 | End: 2024-03-28

## 2024-03-28 RX ORDER — MECLIZINE HYDROCHLORIDE 25 MG/1
25 TABLET ORAL
Refills: 0 | Status: ACTIVE | COMMUNITY

## 2024-03-28 RX ORDER — FLUTICASONE PROPION/SALMETEROL 500-50 MCG
BLISTER, WITH INHALATION DEVICE INHALATION
Refills: 0 | Status: ACTIVE | COMMUNITY

## 2024-03-28 RX ORDER — ONDANSETRON 4 MG/1
4 TABLET, ORALLY DISINTEGRATING ORAL
Refills: 0 | Status: ACTIVE | COMMUNITY

## 2024-03-28 RX ORDER — ASPIRIN 81 MG/1
81 TABLET ORAL
Refills: 0 | Status: ACTIVE | COMMUNITY

## 2024-04-09 NOTE — ECT AMBULATORY DISCHARGE PLAN - PATIENT PORTAL LINK FT
You can access the FollowMyHealth Patient Portal offered by [facility] by registering at the following website: [email or web address]. By joining [employer]’s FollowMyHealth portal, you will also be able to view your health information using other applications (apps) compatible with our system.
left mgs using . appmt for Apr 9, needs to be reschedule as PT is not available. Dco to a diff time/therapist.   
You can access the FollowMyHealth Patient Portal offered by [facility] by registering at the following website: [email or web address]. By joining [employer]’s FollowMyHealth portal, you will also be able to view your health information using other applications (apps) compatible with our system.
You can access the FollowMyHealth Patient Portal offered by [facility] by registering at the following website: [email or web address]. By joining [employer]’s FollowMyHealth portal, you will also be able to view your health information using other applications (apps) compatible with our system.
You can access the FollowMyHealth Patient Portal offered by [facility] by registering at the following website: [email or web address]. By joining [employer]’s FollowMyHealth portal, you will also be able to view your health information using other applications (apps) compatible with our system.
You can access the FollowMyHealth Patient Portal offered by [facility] by registering at the following website: [email or web address]. By joining [employer]’s FollowMyHealth portal, you will also be able to view your health information using other applications (apps) compatible with our system.
You can access the FollowMyHealth Patient Portal offered by [facility] by registering at the following website: [email or web address]. By joining [employer]’s FollowMyHealth portal, you will also be able to view your health information using other applications (apps) compatible with our system.
You can access the FollowMyHealth Patient Portal offered by [facility] by registering at the following website: [email or web address]. By joining [employer]’s FollowMyHealth portal, you will also be able to view your health information using other applications (apps) compatible with our system.
You can access the FollowMyHealth Patient Portal offered by [facility] by registering at the following website: [email or web address]. By joining [employer]’s FollowMyHealth portal, you will also be able to view your health information using other applications (apps) compatible with our system.
You can access the FollowMyHealth Patient Portal offered by [facility] by registering at the following website: [email or web address]. By joining [employer]’s FollowMyHealth portal, you will also be able to view your health information using other applications (apps) compatible with our system.
You can access the FollowMyHealth Patient Portal offered by [facility] by registering at the following website: [email or web address]. By joining [email or web address]’s FollowMyHealth portal, you will also be able to view your health information using other applications (apps) compatible with our system.
You can access the FollowMyHealth Patient Portal offered by [facility] by registering at the following website: [email or web address]. By joining [employer]’s FollowMyHealth portal, you will also be able to view your health information using other applications (apps) compatible with our system.

## 2024-04-23 NOTE — BH SOCIAL WORK INITIAL PSYCHOSOCIAL EVALUATION - NSBHLEGALRESTRAINOTHR_PSY_ALL_CORE
Continue the same medication.    Ordered studies today:  Laboratory, EKG    Recommend low-salt diet.  Recommend low-fat diet.  Stay active.Activity goal: regularly and safely 4 to 5 times a week or 150 minutes a week.  Recommend regular dental exam.  Recommend regular eye exam.  Please protect your skin from excessive sun exposure, use sunscreen, wear protective clothing.  Recommended to keep up with your vaccinations.      Return to clinic:  3 months        No

## 2024-05-07 ENCOUNTER — NON-APPOINTMENT (OUTPATIENT)
Age: 74
End: 2024-05-07

## 2024-05-31 ENCOUNTER — LABORATORY RESULT (OUTPATIENT)
Age: 74
End: 2024-05-31

## 2024-05-31 ENCOUNTER — NON-APPOINTMENT (OUTPATIENT)
Age: 74
End: 2024-05-31

## 2024-05-31 ENCOUNTER — APPOINTMENT (OUTPATIENT)
Dept: CARDIOLOGY | Facility: CLINIC | Age: 74
End: 2024-05-31
Payer: MEDICARE

## 2024-05-31 VITALS
BODY MASS INDEX: 21.06 KG/M2 | HEIGHT: 55 IN | WEIGHT: 91 LBS | TEMPERATURE: 97.6 F | HEART RATE: 98 BPM | SYSTOLIC BLOOD PRESSURE: 107 MMHG | RESPIRATION RATE: 16 BRPM | OXYGEN SATURATION: 97 % | DIASTOLIC BLOOD PRESSURE: 74 MMHG

## 2024-05-31 DIAGNOSIS — I25.2 OLD MYOCARDIAL INFARCTION: ICD-10-CM

## 2024-05-31 DIAGNOSIS — Z95.5 PRESENCE OF CORONARY ANGIOPLASTY IMPLANT AND GRAFT: ICD-10-CM

## 2024-05-31 DIAGNOSIS — I50.9 HEART FAILURE, UNSPECIFIED: ICD-10-CM

## 2024-05-31 DIAGNOSIS — I25.10 ATHEROSCLEROTIC HEART DISEASE OF NATIVE CORONARY ARTERY W/OUT ANGINA PECTORIS: ICD-10-CM

## 2024-05-31 DIAGNOSIS — Z78.9 OTHER SPECIFIED HEALTH STATUS: ICD-10-CM

## 2024-05-31 DIAGNOSIS — E78.5 HYPERLIPIDEMIA, UNSPECIFIED: ICD-10-CM

## 2024-05-31 DIAGNOSIS — R01.1 CARDIAC MURMUR, UNSPECIFIED: ICD-10-CM

## 2024-05-31 DIAGNOSIS — I51.9 HEART DISEASE, UNSPECIFIED: ICD-10-CM

## 2024-05-31 PROCEDURE — 99214 OFFICE O/P EST MOD 30 MIN: CPT

## 2024-05-31 PROCEDURE — G2211 COMPLEX E/M VISIT ADD ON: CPT

## 2024-05-31 PROCEDURE — 93000 ELECTROCARDIOGRAM COMPLETE: CPT

## 2024-05-31 RX ORDER — CLOPIDOGREL BISULFATE 75 MG/1
75 TABLET, FILM COATED ORAL
Qty: 60 | Refills: 0 | Status: COMPLETED | COMMUNITY
Start: 2017-07-05 | End: 2024-05-31

## 2024-05-31 RX ORDER — LORATADINE 5 MG/5 ML
10-240 SOLUTION, ORAL ORAL DAILY
Qty: 1 | Refills: 0 | Status: COMPLETED | COMMUNITY
Start: 2023-10-17 | End: 2024-05-31

## 2024-05-31 RX ORDER — AZELASTINE HYDROCHLORIDE 137 UG/1
0.1 SPRAY, METERED NASAL TWICE DAILY
Qty: 1 | Refills: 3 | Status: COMPLETED | COMMUNITY
Start: 2023-10-17 | End: 2024-05-31

## 2024-05-31 RX ORDER — CLONAZEPAM 0.5 MG/1
0.5 TABLET ORAL TWICE DAILY
Refills: 0 | Status: COMPLETED | COMMUNITY
Start: 2022-03-03 | End: 2024-05-31

## 2024-05-31 RX ORDER — ALIROCUMAB 150 MG/ML
150 INJECTION, SOLUTION SUBCUTANEOUS
Qty: 6 | Refills: 1 | Status: ACTIVE | COMMUNITY
Start: 2023-10-17 | End: 1900-01-01

## 2024-05-31 RX ORDER — CYCLOBENZAPRINE HYDROCHLORIDE 10 MG/1
10 TABLET, FILM COATED ORAL
Qty: 10 | Refills: 1 | Status: COMPLETED | COMMUNITY
Start: 2022-08-22 | End: 2024-05-31

## 2024-05-31 RX ORDER — ASPIRIN 81 MG/1
81 TABLET ORAL
Qty: 90 | Refills: 1 | Status: COMPLETED | COMMUNITY
Start: 2020-05-13 | End: 2024-05-31

## 2024-05-31 NOTE — DISCUSSION/SUMMARY
[FreeTextEntry1] : Dr. Carlson-(PRIOR VISIT and PMH WITH Dr. Carlson): This is a 74-year-old female with past medical history significant for coronary artery disease, statin intolerant, status post myocardial infarction received 6-8 stents by her report, left ventricular dysfunction, status post AICD placement, history of significant asthma, status post sigmoid resection for diverticulitis, occasional leg cramping, status post implantation of a back stimulator for chronic pain, status post surgery for rectocele, status post surgery for cystocele, sciatica, diaphragmatic surgery to improve her breathing, comes in for cardiac follow-up evaluation. The patient is followed by Dr. Hanley of electrophysiology from [facility] who monitors her AICD.  She thinks she sees the heart failure doctor but is not sure.  I recommended she make an appointment see Dr. Samuel of heart failure from [facility]. She reports that she may be having an allergy to her Toprol-XL 12.5 mg in the evening. This may not be a reaction to Toprol per se but may be a reaction to fillers or dye. She will switch to metoprolol to tartrate 25 mg in the evening (once per day). She will have new blood work done with her primary care physician. She remains on Praluent 150 mg biweekly.  She will have new blood work done for lipid panel prior to the next visit.  She reports that she had recent blood work done with her primary care physician and she will get me a copy of the results for my review. Echo Doppler examination done August 29, 2022 at [facility] demonstrated an ejection fraction of 10% with severe hypokinesis globally of the left ventricle.  There was trace mitral valve regurgitation, trace tricuspid valve regurgitation. There is no evidence for fluid overload on today's examination. She will follow-up with me after her visit with her medical doctor, and heart failure team. Other medications that she is currently taking include digoxin 0.125 mg daily, aspirin 81 mg, Praluent 150 mg biweekly, Zetia 10 mg daily, losartan 12.5 mg daily. She is encouraged to limit her salt intake.  She will follow-up with you regarding her overall medical care. Lipid panel done October 17, 2023 demonstrated cholesterol 194, HDL 64, triglycerides 148, LDL calculated 104, non-HDL cholesterol 130, hemoglobin A1c of 5.2. Electrocardiogram done October 17, 2023 demonstrated normal sinus rhythm rate 67 bpm and is otherwise remarkable for left anterior hemiblock, left axis deviation and poor R wave progression. The patient is now living in assisted living and has had her medications adjusted by her primary care physician.  She was taken off bisoprolol and placed on metoprolol succinate half a tablet per day (12.5 mg effective dose), Her LDL cholesterol target remains less than 70 mg/dL. I have asked her to follow-up with the heart failure team as well as her electrophysiology team for her AICD. Electrocardiogram done August 17, 2022 demonstrated normal sinus rhythm at a rate of 90 bpm is otherwise remarkable for poor R wave progression, left axis deviation, and poor recording baseline. Blood work done August 3, 2022 demonstrated total cholesterol 238, LDL of 128 mg/dL, triglycerides 127 mg/dL, HDL 85 mg/dL.  The patient is statin intolerant and she is complaining of bloating and GI distress on Zetia therapy. Electrocardiogram done March 2, 2022 demonstrated sinus tachycardia rate of 100 bpm is otherwise remarkable for poor R wave progression, left axis deviation.  Echo Doppler examination done June 16, 2020 demonstrated mild mitral valve regurgitation, moderate to severe tricuspid valve regurgitation, mild pulmonary systolic hypertension, mild left ventricular enlargement, severe left ventricular systolic dysfunction with hypokinesis on the anterior wall, apex and interventricular septum.  Ankle-brachial index done March 2, 2022 was within normal limits.  Cardiac catheterization done March 15, 2019 demonstrated severe stenosis of the distal circumflex artery and small vessel which was not amenable to intervention, 20% lesion in the mid LAD, at the site of the prior stent, 30% lesion the first diagonal branch at the site of a prior stent, distal circumflex lesion 80% small vessel, proximal ramus branch 30%, and mid right coronary artery 60%.  I once again discussed the use of statin therapy, but the patient does not wish to go on statins at all or any type of lipid-lowering therapy.  She understands the risk of restenosis of her stents  She is instructed to elevate her feet, limit her salt intake, and wear compression stockings.  PMH: The patient reports that she had a myocardial infarction followed by coronary intervention approximately 5 to 6 years ago [facility].  Details of this hospitalization are not available for my review.  At one point she was offered an AICD but refused wanting to live her life out.  She also reports that at one point she was in a coma related to her asthma.  Mother and 3 sisters had severe asthma.  She has no history of rheumatic fever.  She does not drink excessive caffeine or alcohol.  She reports that she was tried on statin therapy in the past but it did not agree with her.  She does not remember which statin she was on.   Electrocardiogram done May 13, 2020 demonstrated normal sinus rhythm rate of 98 bpm is otherwise remarkable for left atrial abnormality, left axis deviation, and nonspecific ST wave flattening.  Echo Doppler examination February 21, 2017 which demonstrated severe left ventricular dysfunction with an estimated ejection fraction of 25 to 30%.  This was an echocardiogram without commentary on Doppler flow.  A pulmonary systolic pressure was noted to be 39 mmHg.    She had a nuclear stress test done September 22, 2014 which demonstrated normal perfusion with an estimated ejection fraction 62%.  The patient had a recent cardiac catheterization done at [facility] in March 2020.   She is currently hemodynamically stable from a cardiac standpoint.

## 2024-05-31 NOTE — ASSESSMENT
[FreeTextEntry1] : This is a 74-year-old female with past medical history significant for coronary artery disease, status post myocardial infarction received 6-8 stents by her report, history of significant asthma, status post sigmoid resection for diverticulitis, occasional leg cramping, status post implantation of a back stimulator for chronic pain, status post surgery for rectocele, status post surgery for cystocele, sciatica, diaphragmatic surgery to improve her breathing, comes in for cardiac follow-up evaluation.  HPI: She is feeling generally well today and denies chest pain, dizziness, heart palpitations, recent episodes of syncope or falls, SOB, or dyspnea at this time.  Current Medications: Aspirin 81 mg daily, Praluent 150 mg injection biweekly, and Metoprolol tartrate 12.5 mg daily,   The patient is followed by Dr. Hanley of electrophysiology from [facility] who monitors her AICD.  She has an appointment with Dr. Samuel of heart failure from [facility] in July 2024.   She states that she still has occasional dyspnea on exertion and follows up with her pulmonologist Dr. Morfin.   She is a retired nurse.   BLOOD PRESSURE: -BP is controlled in today's visit.   BLOOD WORK:  *LDL target goal < 70* -New blood work was done 05/31/2024 to evaluate lipid profile, CBC, BMP, hepatic function, A1C and TSH. -Lipid panel done October 17, 2023 demonstrated cholesterol 194, HDL 64, triglycerides 148, LDL calculated 104, non-HDL cholesterol 130, hemoglobin A1c of 5.2. -The patient had blood work done September 21, 2022 which demonstrated total cholesterol 172, HDL 78 and direct LDL of 85. -Blood work done August 3, 2022 demonstrated total cholesterol 238, LDL of 128 mg/dL, triglycerides 127 mg/dL, HDL 85 mg/dL. -New blood work was done 3/7/22 which demonstrated Cholesterol of 218 and LDL of 119. K level 4.8.   TESTING/REPORTS: -EKG done 05/31/2024 demonstrated regular sinus rhythm with nonspecific T wave changes, poor R wave progression, and left axis anterior fascicular block heart rate 89 BPM.   -EKG done 10/17/2023 which demonstrated regular sinus rhythm with nonspecific T wave changes and left axis anterior fascicular block heart rate 67 BPM.   -Echo Doppler examination done August 29, 2022 at [facility] demonstrated an ejection fraction of 10% with severe hypokinesis globally of the left ventricle.  There was trace mitral valve regurgitation, trace tricuspid valve regurgitation.  -EKG done 10/03/2022 which demonstrated regular sinus rhythm with nonspecific ST-T wave changes BPM of 89. She states that she has an apparent stimulator? She is unclear whether this stimulator is for her back or her chest or her legs? EKG appears abnormal due to the stimulator.  -Electrocardiogram done March 2, 2022 demonstrated sinus tachycardia rate of 100 bpm is otherwise remarkable for poor R wave progression, left axis deviation.  -EKG done in March 17 2021 which demonstrated regular sinus rhythm with nonspecific ST-T wave changes BPM of 88.  -Echo Doppler examination done June 16, 2020 demonstrated mild mitral valve regurgitation, moderate to severe tricuspid valve regurgitation, mild pulmonary systolic hypertension, mild left ventricular enlargement, severe left ventricular systolic dysfunction with hypokinesis on the anterior wall, apex and interventricular septum.  -Ankle-brachial index done March 2, 2022 was within normal limits.  -Cardiac catheterization done March 15, 2019 demonstrated severe stenosis of the distal circumflex artery and small vessel which was not amenable to intervention, 20% lesion in the mid LAD, at the site of the prior stent, 30% lesion the first diagonal branch at the site of a prior stent, distal circumflex lesion 80% small vessel, proximal ramus branch 30%, and mid right coronary artery 60%.   PLAN: -She will continue with her usual medications and will contact the office if she is having any complaints between now and their next follow up appointment. -Patient will schedule echocardiogram doppler examination to evaluate murmur, left ventricular function, chamber size, and rule out hypertrophy. -She will see Dr. Samuel in July 2024 for initial patient appointment.    I have discussed the plan of care with TERESA PRIMROSE and she will follow up in 3 months. They are compliant with all of their medications.   The patient understands that aerobic exercises must be increased to 40 minutes 4 times/week and a detailed discussion of lifestyle modification was done today.   The patient has a good understanding of the diagnosis, treatment plan and lifestyle modification.   They will contact me at the office for any questions with their care or any changes in their health status.   The patient was discussed with supervision physician Dr. Jasper Carlson at the time of the visit and the plan of care will be carried out as noted above.     ANDREA Sepulveda NP

## 2024-05-31 NOTE — PHYSICAL EXAM
[Well Developed] : well developed [Well Nourished] : well nourished [No Acute Distress] : no acute distress [Normal Venous Pressure] : normal venous pressure [No Carotid Bruit] : no carotid bruit [Normal S1, S2] : normal S1, S2 [Clear Lung Fields] : clear lung fields [Good Air Entry] : good air entry [No Respiratory Distress] : no respiratory distress  [Soft] : abdomen soft [Non Tender] : non-tender [No Masses/organomegaly] : no masses/organomegaly [Normal Bowel Sounds] : normal bowel sounds [Normal Gait] : normal gait [No Edema] : no edema [No Cyanosis] : no cyanosis [No Clubbing] : no clubbing [No Varicosities] : no varicosities [No Rash] : no rash [No Skin Lesions] : no skin lesions [Moves all extremities] : moves all extremities [No Focal Deficits] : no focal deficits [Normal Speech] : normal speech [Alert and Oriented] : alert and oriented [Normal memory] : normal memory [General Appearance - Well Developed] : well developed [Normal Appearance] : normal appearance [Well Groomed] : well groomed [General Appearance - Well Nourished] : well nourished [No Deformities] : no deformities [General Appearance - In No Acute Distress] : no acute distress [Normal Conjunctiva] : the conjunctiva exhibited no abnormalities [Eyelids - No Xanthelasma] : the eyelids demonstrated no xanthelasmas [Normal Oral Mucosa] : normal oral mucosa [Normal Jugular Venous A Waves Present] : normal jugular venous A waves present [Normal Jugular Venous V Waves Present] : normal jugular venous V waves present [No Jugular Venous Santo A Waves] : no jugular venous santo A waves [Respiration, Rhythm And Depth] : normal respiratory rhythm and effort [Exaggerated Use Of Accessory Muscles For Inspiration] : no accessory muscle use [Auscultation Breath Sounds / Voice Sounds] : lungs were clear to auscultation bilaterally [Bowel Sounds] : normal bowel sounds [Abdomen Soft] : soft [Abnormal Walk] : normal gait [Nail Clubbing] : no clubbing of the fingernails [Cyanosis, Localized] : no localized cyanosis [Petechial Hemorrhages (___cm)] : no petechial hemorrhages [Skin Color & Pigmentation] : normal skin color and pigmentation [Skin Turgor] : normal skin turgor [] : no rash [No Xanthoma] : no  xanthoma was observed [Oriented To Time, Place, And Person] : oriented to person, place, and time [Impaired Insight] : insight and judgment were intact [Affect] : the affect was normal [Mood] : the mood was normal [No Anxiety] : not feeling anxious [5th Left ICS - MCL] : palpated at the 5th LICS in the midclavicular line [Normal] : normal [No Precordial Heave] : no precordial heave was noted [Normal Rate] : normal [Rhythm Regular] : regular [Normal S1] : normal S1 [Normal S2] : normal S2 [No Gallop] : no gallop heard [II] : a grade 2 [2+] : left 2+ [No Abnormalities] : the abdominal aorta was not enlarged and no bruit was heard [___ +] : bilateral [unfilled]U+ pitting edema to the ankles [S3] : no S3 [S4] : no S4 [Right Carotid Bruit] : no bruit heard over the right carotid [Left Carotid Bruit] : no bruit heard over the left carotid

## 2024-07-17 ENCOUNTER — RX RENEWAL (OUTPATIENT)
Age: 74
End: 2024-07-17

## 2024-07-23 ENCOUNTER — APPOINTMENT (OUTPATIENT)
Dept: PULMONOLOGY | Facility: CLINIC | Age: 74
End: 2024-07-23
Payer: MEDICARE

## 2024-07-23 VITALS
WEIGHT: 95 LBS | BODY MASS INDEX: 21.98 KG/M2 | HEART RATE: 99 BPM | DIASTOLIC BLOOD PRESSURE: 62 MMHG | SYSTOLIC BLOOD PRESSURE: 100 MMHG | TEMPERATURE: 97.6 F | RESPIRATION RATE: 16 BRPM | OXYGEN SATURATION: 96 % | HEIGHT: 55 IN

## 2024-07-23 DIAGNOSIS — Z87.09 PERSONAL HISTORY OF OTHER DISEASES OF THE RESPIRATORY SYSTEM: ICD-10-CM

## 2024-07-23 DIAGNOSIS — J45.909 UNSPECIFIED ASTHMA, UNCOMPLICATED: ICD-10-CM

## 2024-07-23 DIAGNOSIS — E55.9 VITAMIN D DEFICIENCY, UNSPECIFIED: ICD-10-CM

## 2024-07-23 DIAGNOSIS — K21.9 GASTRO-ESOPHAGEAL REFLUX DISEASE W/OUT ESOPHAGITIS: ICD-10-CM

## 2024-07-23 DIAGNOSIS — J44.9 CHRONIC OBSTRUCTIVE PULMONARY DISEASE, UNSPECIFIED: ICD-10-CM

## 2024-07-23 DIAGNOSIS — J45.50 SEVERE PERSISTENT ASTHMA, UNCOMPLICATED: ICD-10-CM

## 2024-07-23 DIAGNOSIS — J30.9 ALLERGIC RHINITIS, UNSPECIFIED: ICD-10-CM

## 2024-07-23 DIAGNOSIS — R06.02 SHORTNESS OF BREATH: ICD-10-CM

## 2024-07-23 DIAGNOSIS — J82.83 EOSINOPHILIC ASTHMA: ICD-10-CM

## 2024-07-23 DIAGNOSIS — G47.33 OBSTRUCTIVE SLEEP APNEA (ADULT) (PEDIATRIC): ICD-10-CM

## 2024-07-23 PROCEDURE — ZZZZZ: CPT

## 2024-07-23 PROCEDURE — 94010 BREATHING CAPACITY TEST: CPT

## 2024-07-23 PROCEDURE — 95012 NITRIC OXIDE EXP GAS DETER: CPT

## 2024-07-23 PROCEDURE — 94729 DIFFUSING CAPACITY: CPT

## 2024-07-23 PROCEDURE — 94727 GAS DIL/WSHOT DETER LNG VOL: CPT

## 2024-07-23 PROCEDURE — 99214 OFFICE O/P EST MOD 30 MIN: CPT | Mod: 25

## 2024-07-23 RX ORDER — FLUTICASONE FUROATE, UMECLIDINIUM BROMIDE AND VILANTEROL TRIFENATATE 200; 62.5; 25 UG/1; UG/1; UG/1
200-62.5-25 POWDER RESPIRATORY (INHALATION)
Qty: 3 | Refills: 1 | Status: ACTIVE | COMMUNITY
Start: 2024-07-23 | End: 1900-01-01

## 2024-07-23 NOTE — HISTORY OF PRESENT ILLNESS
[FreeTextEntry1] : Ms. Primrose is a 74 year old female presenting to the office for a follow up evaluation. She has PMHx of allergic rhinitis, eosinophilic/moderate persistent asthma, ALIS, GERD, and shortness of breath. Her chief complaint is  -she notes fatigue and lot of headaches  she notes that her appetite is good, but her stomach is pushed up and its hard for her to breath -she notes she had cataract surgery recently  She notes exercising. -she notes feeling bloated at times -she notes irregular bowels -she notes decreased sense of taste -she notes she had dry cough for a month -she notes her sinuses are active with a lot of congestion -she notes her sleep patterns are poor at 4 hours a night due to headache    -Patient denies any nausea, vomiting, fever, chills, sweats, chest pain, chest pressure, palpitations, wheezing, diarrhea, constipation, dysphagia, arthralgias, myalgias, dizziness, leg swelling, leg pain, itchy eyes, itchy ears, dysphonia, heartburn, reflux or sour taste in mouth.

## 2024-07-23 NOTE — PHYSICAL EXAM
[No Acute Distress] : no acute distress [Normal Oropharynx] : normal oropharynx [III] : Mallampati Class: III [Normal Appearance] : normal appearance [No Neck Mass] : no neck mass [Normal Rate/Rhythm] : normal rate/rhythm [Murmur ___ / 6] : murmur [unfilled] / 6 [Normal S1, S2] : normal s1, s2 [No Murmurs] : no murmurs [No Resp Distress] : no resp distress [No Abnormalities] : no abnormalities [Kyphosis] : kyphosis [Benign] : benign [Normal Gait] : normal gait [No Clubbing] : no clubbing [No Cyanosis] : no cyanosis [FROM] : FROM [Normal Color/ Pigmentation] : normal color/ pigmentation [No Focal Deficits] : no focal deficits [Oriented x3] : oriented x3 [Normal Affect] : normal affect [TextBox_68] : I:E 1:3,   decreased breath sounds at the left base, 1/2 the way up [TextBox_105] : trace edema in LE

## 2024-07-23 NOTE — PROCEDURE
[FreeTextEntry1] :  Full PFT reveals mild  restrictive , severe  obstructive dysfunction; FEV1 was 0.74 L which is 43 % of predicted, normal lung volumes, normal diffusions, at 10.8 L which is 93% predicted, normal flow volume loop. PFT's for performed to evaluate for SOB.     FENO was 9; a normal value being less than 25. Fractional exhaled nitric oxide (FENO) is regarded as a simple, noninvasive method for assessing eosinophilic airway inflammation. Produced by a variety of cells within the lung, nitric oxide (NO) concentrations are generally low in healthy individuals. However, high concentrations of NO appear to be involved in nonspecific host defense mechanisms and chronic inflammatory diseases such as asthma. The American Thoracic Society (ATS) therefore has strongly recommended using FENO to aid in the assessment, management, and long-term monitoring of eosinophilic airway inflammation and asthma, and for identifying steroid responsive individuals whose chronic respiratory symptoms may be caused by airway inflammation. In their 2011 clinical practice guideline, the ATS emphasizes the importance of using FENO.

## 2024-07-23 NOTE — REASON FOR VISIT
[Follow-Up] : a follow-up visit [FreeTextEntry1] :  allergic rhinitis, eosinophilic/moderate persistent asthma, ALIS, paralyzed diaphragm, GERD, and shortness of breath

## 2024-07-23 NOTE — ADDENDUM
[FreeTextEntry1] :  Documented by Sy Carrillo acting as a scribe for Dr. Ketan Morfin on 07/23/2024 .   All medical record entries made by the Scribe were at my, Dr. Ketan Morfin's direction and personally dictated by me on 07/23/2024 . I have reviewed the chart and agree that the record accurately reflects my personal performance of the history, Physical exam, assessment, and plan. I have also personally directed, reviewed, and agree with the discharge instructions.

## 2024-07-25 RX ORDER — DUPILUMAB 300 MG/2ML
300 INJECTION, SOLUTION SUBCUTANEOUS
Qty: 2 | Refills: 5 | Status: ACTIVE | COMMUNITY
Start: 2024-07-23 | End: 1900-01-01

## 2024-08-02 RX ORDER — ENSIFENTRINE 3 MG/2.5ML
3 SUSPENSION RESPIRATORY (INHALATION)
Qty: 3 | Refills: 1 | Status: ACTIVE | COMMUNITY
Start: 2024-07-23 | End: 1900-01-01

## 2024-08-14 NOTE — DISCHARGE NOTE ADULT - THE PATIENT HAS
Neck Surgery  [facility]  [address], [address]  [place] [address]  [phone]    
no difficulties

## 2024-08-23 ENCOUNTER — RX RENEWAL (OUTPATIENT)
Age: 74
End: 2024-08-23

## 2024-08-25 NOTE — PHYSICAL THERAPY INITIAL EVALUATION ADULT - PERTINENT HX OF CURRENT PROBLEM, REHAB EVAL
70 year old female with history of CAD, Stented coronary artery - 8 stents, MI ( 2017) - in Plavix & aspirin, Left ear deafness, Severe asthma, Left hemidiaphragm s/p plication, moderate thoracic scoliosis , Osteoporosis, diverticulitis s/p colon resection and hysterectyomy presents for left lower quadrant pain for the past week. 4 = No assist / stand by assistance

## 2024-08-26 NOTE — PATIENT PROFILE ADULT. - NS PRO LAST MENSTRUAL DATE
Called and spoke to pharmacy, they say that medication needs a PA. Can we work on this please.    1987 total hysterectomy/not applicable

## 2024-09-05 NOTE — PATIENT PROFILE ADULT - FUNCTIONAL SCREEN CURRENT LEVEL: EATING, MLM
----- Message from Shania Serrato sent at 9/5/2024  2:23 PM CDT -----  - 2:09-pro specility is calling about the prior auth on his medication   [phone] rx # [id number] ref# [id number]   0 = independent

## 2024-09-06 ENCOUNTER — APPOINTMENT (OUTPATIENT)
Dept: CARDIOLOGY | Facility: CLINIC | Age: 74
End: 2024-09-06

## 2024-09-06 PROCEDURE — 93306 TTE W/DOPPLER COMPLETE: CPT

## 2024-09-19 ENCOUNTER — APPOINTMENT (OUTPATIENT)
Dept: CARDIOLOGY | Facility: CLINIC | Age: 74
End: 2024-09-19
Payer: MEDICARE

## 2024-09-19 VITALS
SYSTOLIC BLOOD PRESSURE: 100 MMHG | HEART RATE: 69 BPM | OXYGEN SATURATION: 94 % | BODY MASS INDEX: 21.52 KG/M2 | HEIGHT: 55 IN | RESPIRATION RATE: 16 BRPM | TEMPERATURE: 97.7 F | DIASTOLIC BLOOD PRESSURE: 66 MMHG | WEIGHT: 93 LBS

## 2024-09-19 DIAGNOSIS — I50.9 HEART FAILURE, UNSPECIFIED: ICD-10-CM

## 2024-09-19 DIAGNOSIS — Z78.9 OTHER SPECIFIED HEALTH STATUS: ICD-10-CM

## 2024-09-19 DIAGNOSIS — I51.9 HEART DISEASE, UNSPECIFIED: ICD-10-CM

## 2024-09-19 DIAGNOSIS — I25.10 ATHEROSCLEROTIC HEART DISEASE OF NATIVE CORONARY ARTERY W/OUT ANGINA PECTORIS: ICD-10-CM

## 2024-09-19 DIAGNOSIS — E78.5 HYPERLIPIDEMIA, UNSPECIFIED: ICD-10-CM

## 2024-09-19 DIAGNOSIS — Z95.5 PRESENCE OF CORONARY ANGIOPLASTY IMPLANT AND GRAFT: ICD-10-CM

## 2024-09-19 DIAGNOSIS — I25.2 OLD MYOCARDIAL INFARCTION: ICD-10-CM

## 2024-09-19 PROCEDURE — G2211 COMPLEX E/M VISIT ADD ON: CPT

## 2024-09-19 PROCEDURE — 99214 OFFICE O/P EST MOD 30 MIN: CPT

## 2024-09-19 NOTE — DISCUSSION/SUMMARY
[FreeTextEntry1] : Dr. Carlson-(PRIOR VISIT and PMH WITH Dr. Carlson): This is a 74-year-old female with past medical history significant for coronary artery disease, statin intolerant, status post myocardial infarction received 6-8 stents by her report, left ventricular dysfunction, status post AICD placement, history of significant asthma, status post sigmoid resection for diverticulitis, occasional leg cramping, status post implantation of a back stimulator for chronic pain, status post surgery for rectocele, status post surgery for cystocele, sciatica, diaphragmatic surgery to improve her breathing, comes in for cardiac follow-up evaluation. The patient is followed by Dr. Hanley of electrophysiology from [facility] who monitors her AICD.  She thinks she sees the heart failure doctor but is not sure.  I recommended she make an appointment see Dr. Samuel of heart failure from [facility]. She reports that she may be having an allergy to her Toprol-XL 12.5 mg in the evening. This may not be a reaction to Toprol per se but may be a reaction to fillers or dye. She will switch to metoprolol to tartrate 25 mg in the evening (once per day). She will have new blood work done with her primary care physician. She remains on Praluent 150 mg biweekly.  She will have new blood work done for lipid panel prior to the next visit.  She reports that she had recent blood work done with her primary care physician and she will get me a copy of the results for my review. Echo Doppler examination done August 29, 2022 at [facility] demonstrated an ejection fraction of 10% with severe hypokinesis globally of the left ventricle.  There was trace mitral valve regurgitation, trace tricuspid valve regurgitation. There is no evidence for fluid overload on today's examination. She will follow-up with me after her visit with her medical doctor, and heart failure team. Other medications that she is currently taking include digoxin 0.125 mg daily, aspirin 81 mg, Praluent 150 mg biweekly, Zetia 10 mg daily, losartan 12.5 mg daily. She is encouraged to limit her salt intake.  She will follow-up with you regarding her overall medical care. Lipid panel done October 17, 2023 demonstrated cholesterol 194, HDL 64, triglycerides 148, LDL calculated 104, non-HDL cholesterol 130, hemoglobin A1c of 5.2. Electrocardiogram done October 17, 2023 demonstrated normal sinus rhythm rate 67 bpm and is otherwise remarkable for left anterior hemiblock, left axis deviation and poor R wave progression. The patient is now living in assisted living and has had her medications adjusted by her primary care physician.  She was taken off bisoprolol and placed on metoprolol succinate half a tablet per day (12.5 mg effective dose), Her LDL cholesterol target remains less than 70 mg/dL. I have asked her to follow-up with the heart failure team as well as her electrophysiology team for her AICD. Electrocardiogram done August 17, 2022 demonstrated normal sinus rhythm at a rate of 90 bpm is otherwise remarkable for poor R wave progression, left axis deviation, and poor recording baseline. Blood work done August 3, 2022 demonstrated total cholesterol 238, LDL of 128 mg/dL, triglycerides 127 mg/dL, HDL 85 mg/dL.  The patient is statin intolerant and she is complaining of bloating and GI distress on Zetia therapy. Electrocardiogram done March 2, 2022 demonstrated sinus tachycardia rate of 100 bpm is otherwise remarkable for poor R wave progression, left axis deviation.  Echo Doppler examination done June 16, 2020 demonstrated mild mitral valve regurgitation, moderate to severe tricuspid valve regurgitation, mild pulmonary systolic hypertension, mild left ventricular enlargement, severe left ventricular systolic dysfunction with hypokinesis on the anterior wall, apex and interventricular septum.  Ankle-brachial index done March 2, 2022 was within normal limits.  Cardiac catheterization done March 15, 2019 demonstrated severe stenosis of the distal circumflex artery and small vessel which was not amenable to intervention, 20% lesion in the mid LAD, at the site of the prior stent, 30% lesion the first diagonal branch at the site of a prior stent, distal circumflex lesion 80% small vessel, proximal ramus branch 30%, and mid right coronary artery 60%.  I once again discussed the use of statin therapy, but the patient does not wish to go on statins at all or any type of lipid-lowering therapy.  She understands the risk of restenosis of her stents  She is instructed to elevate her feet, limit her salt intake, and wear compression stockings.  PMH: The patient reports that she had a myocardial infarction followed by coronary intervention approximately 5 to 6 years ago [facility].  Details of this hospitalization are not available for my review.  At one point she was offered an AICD but refused wanting to live her life out.  She also reports that at one point she was in a coma related to her asthma.  Mother and 3 sisters had severe asthma.  She has no history of rheumatic fever.  She does not drink excessive caffeine or alcohol.  She reports that she was tried on statin therapy in the past but it did not agree with her.  She does not remember which statin she was on.   Electrocardiogram done May 13, 2020 demonstrated normal sinus rhythm rate of 98 bpm is otherwise remarkable for left atrial abnormality, left axis deviation, and nonspecific ST wave flattening.  Echo Doppler examination February 21, 2017 which demonstrated severe left ventricular dysfunction with an estimated ejection fraction of 25 to 30%.  This was an echocardiogram without commentary on Doppler flow.  A pulmonary systolic pressure was noted to be 39 mmHg.    She had a nuclear stress test done September 22, 2014 which demonstrated normal perfusion with an estimated ejection fraction 62%.  The patient had a recent cardiac catheterization done at [facility] in March 2020.   She is currently hemodynamically stable from a cardiac standpoint.

## 2024-09-19 NOTE — ASSESSMENT
[FreeTextEntry1] : This is a 74-year-old female with past medical history significant for coronary artery disease, status post myocardial infarction received 6-8 stents by her report, history of significant asthma, status post sigmoid resection for diverticulitis, occasional leg cramping, status post implantation of a back stimulator for chronic pain, status post surgery for rectocele, status post surgery for cystocele, sciatica, diaphragmatic surgery to improve her breathing, comes in for cardiac follow-up evaluation.  HPI: She is feeling generally well today and denies chest pain, dizziness, heart palpitations, recent episodes of syncope or falls, SOB, or dyspnea at this time.  Current Medications: Aspirin 81 mg daily, Praluent 150 mg injection biweekly, and Metoprolol tartrate 12.5 mg daily,   The patient is followed by Dr. Hanley of electrophysiology from [facility] who monitors her AICD.    She is followed by Dr. Samuel of heart failure from [facility]. I've recommended she continue follow-up with her.   She states that she still has occasional dyspnea on exertion and follows up with her pulmonologist Dr. Morfin.   She is a retired nurse.   BLOOD PRESSURE: -BP is controlled in today's visit.   BLOOD WORK:  *LDL target goal < 70* -Lipid panel done May 2024 demonstrated triglycerides 199, cholesterol 143, HDL 79, LDL 33, non-HDL 64, LDL direct 40. -Lipid panel done October 17, 2023 demonstrated cholesterol 194, HDL 64, triglycerides 148, LDL calculated 104, non-HDL cholesterol 130, hemoglobin A1c of 5.2. -The patient had blood work done September 21, 2022 which demonstrated total cholesterol 172, HDL 78 and direct LDL of 85. -Blood work done August 3, 2022 demonstrated total cholesterol 238, LDL of 128 mg/dL, triglycerides 127 mg/dL, HDL 85 mg/dL. -New blood work was done 3/7/22 which demonstrated Cholesterol of 218 and LDL of 119. K level 4.8.   TESTING/REPORTS: -Echocardiogram done September 2024 demonstrated moderately decreased left ventricular systolic function EF 37%, minimal mitral regurgitation, mild tricuspid regurgitation.   -EKG done 05/31/2024 demonstrated regular sinus rhythm with nonspecific T wave changes, poor R wave progression, and left axis anterior fascicular block heart rate 89 BPM.   -EKG done 10/17/2023 which demonstrated regular sinus rhythm with nonspecific T wave changes and left axis anterior fascicular block heart rate 67 BPM.   -Echo Doppler examination done August 29, 2022 at [facility] demonstrated an ejection fraction of 10% with severe hypokinesis globally of the left ventricle.  There was trace mitral valve regurgitation, trace tricuspid valve regurgitation.  -EKG done 10/03/2022 which demonstrated regular sinus rhythm with nonspecific ST-T wave changes BPM of 89. She states that she has an apparent stimulator? She is unclear whether this stimulator is for her back or her chest or her legs? EKG appears abnormal due to the stimulator.  -Electrocardiogram done March 2, 2022 demonstrated sinus tachycardia rate of 100 bpm is otherwise remarkable for poor R wave progression, left axis deviation.  -EKG done in March 17 2021 which demonstrated regular sinus rhythm with nonspecific ST-T wave changes BPM of 88.  -Echo Doppler examination done June 16, 2020 demonstrated mild mitral valve regurgitation, moderate to severe tricuspid valve regurgitation, mild pulmonary systolic hypertension, mild left ventricular enlargement, severe left ventricular systolic dysfunction with hypokinesis on the anterior wall, apex and interventricular septum.  -Ankle-brachial index done March 2, 2022 was within normal limits.  -Cardiac catheterization done March 15, 2019 demonstrated severe stenosis of the distal circumflex artery and small vessel which was not amenable to intervention, 20% lesion in the mid LAD, at the site of the prior stent, 30% lesion the first diagonal branch at the site of a prior stent, distal circumflex lesion 80% small vessel, proximal ramus branch 30%, and mid right coronary artery 60%.   PLAN: -She will continue with her usual medications and will contact the office if she is having any complaints between now and their next follow up appointment. -She will follow-up with Dr. Hanley and Dr. Samuel for EPS and heart failure management.    I have discussed the plan of care with TERESA PRIMROSE and she will follow up in 3 months. They are compliant with all of their medications.   The patient understands that aerobic exercises must be increased to 40 minutes 4 times/week and a detailed discussion of lifestyle modification was done today.   The patient has a good understanding of the diagnosis, treatment plan and lifestyle modification.   They will contact me at the office for any questions with their care or any changes in their health status.   The patient was discussed with supervision physician Dr. Jasper Carlson at the time of the visit and the plan of care will be carried out as noted above.     ANDREA Sepulveda NP

## 2024-10-23 ENCOUNTER — INPATIENT (INPATIENT)
Facility: HOSPITAL | Age: 74
LOS: 1 days | Discharge: HOME | End: 2024-10-25
Attending: INTERNAL MEDICINE | Admitting: INTERNAL MEDICINE
Payer: MEDICARE

## 2024-10-23 VITALS
DIASTOLIC BLOOD PRESSURE: 62 MMHG | WEIGHT: 87.96 LBS | HEIGHT: 55 IN | HEART RATE: 99 BPM | OXYGEN SATURATION: 93 % | RESPIRATION RATE: 18 BRPM | SYSTOLIC BLOOD PRESSURE: 131 MMHG | TEMPERATURE: 98 F

## 2024-10-23 DIAGNOSIS — Z98.890 OTHER SPECIFIED POSTPROCEDURAL STATES: Chronic | ICD-10-CM

## 2024-10-23 DIAGNOSIS — E87.6 HYPOKALEMIA: ICD-10-CM

## 2024-10-23 DIAGNOSIS — Z90.49 ACQUIRED ABSENCE OF OTHER SPECIFIED PARTS OF DIGESTIVE TRACT: Chronic | ICD-10-CM

## 2024-10-23 DIAGNOSIS — Z90.79 ACQUIRED ABSENCE OF OTHER GENITAL ORGAN(S): Chronic | ICD-10-CM

## 2024-10-23 LAB
ALBUMIN SERPL ELPH-MCNC: 4.3 G/DL — SIGNIFICANT CHANGE UP (ref 3.3–5)
ALP SERPL-CCNC: 63 U/L — SIGNIFICANT CHANGE UP (ref 40–120)
ALT FLD-CCNC: 14 U/L — SIGNIFICANT CHANGE UP (ref 4–33)
ANION GAP SERPL CALC-SCNC: 17 MMOL/L — HIGH (ref 7–14)
ANION GAP SERPL CALC-SCNC: 18 MMOL/L — HIGH (ref 7–14)
AST SERPL-CCNC: 36 U/L — HIGH (ref 4–32)
BASOPHILS # BLD AUTO: 0.05 K/UL — SIGNIFICANT CHANGE UP (ref 0–0.2)
BASOPHILS NFR BLD AUTO: 0.4 % — SIGNIFICANT CHANGE UP (ref 0–2)
BILIRUB SERPL-MCNC: 0.5 MG/DL — SIGNIFICANT CHANGE UP (ref 0.2–1.2)
BLOOD GAS VENOUS COMPREHENSIVE RESULT: SIGNIFICANT CHANGE UP
BUN SERPL-MCNC: 26 MG/DL — HIGH (ref 7–23)
BUN SERPL-MCNC: 29 MG/DL — HIGH (ref 7–23)
CALCIUM SERPL-MCNC: 11 MG/DL — HIGH (ref 8.4–10.5)
CALCIUM SERPL-MCNC: 11.4 MG/DL — HIGH (ref 8.4–10.5)
CHLORIDE SERPL-SCNC: 82 MMOL/L — LOW (ref 98–107)
CHLORIDE SERPL-SCNC: 83 MMOL/L — LOW (ref 98–107)
CO2 SERPL-SCNC: 31 MMOL/L — SIGNIFICANT CHANGE UP (ref 22–31)
CO2 SERPL-SCNC: 32 MMOL/L — HIGH (ref 22–31)
CREAT SERPL-MCNC: 1.22 MG/DL — SIGNIFICANT CHANGE UP (ref 0.5–1.3)
CREAT SERPL-MCNC: 1.38 MG/DL — HIGH (ref 0.5–1.3)
EGFR: 40 ML/MIN/1.73M2 — LOW
EGFR: 47 ML/MIN/1.73M2 — LOW
EOSINOPHIL # BLD AUTO: 0.12 K/UL — SIGNIFICANT CHANGE UP (ref 0–0.5)
EOSINOPHIL NFR BLD AUTO: 0.9 % — SIGNIFICANT CHANGE UP (ref 0–6)
GLUCOSE SERPL-MCNC: 111 MG/DL — HIGH (ref 70–99)
GLUCOSE SERPL-MCNC: 118 MG/DL — HIGH (ref 70–99)
HCT VFR BLD CALC: 43.2 % — SIGNIFICANT CHANGE UP (ref 34.5–45)
HGB BLD-MCNC: 14.8 G/DL — SIGNIFICANT CHANGE UP (ref 11.5–15.5)
IANC: 8.91 K/UL — HIGH (ref 1.8–7.4)
IMM GRANULOCYTES NFR BLD AUTO: 0.6 % — SIGNIFICANT CHANGE UP (ref 0–0.9)
LIDOCAIN IGE QN: 28 U/L — SIGNIFICANT CHANGE UP (ref 7–60)
LYMPHOCYTES # BLD AUTO: 22 % — SIGNIFICANT CHANGE UP (ref 13–44)
LYMPHOCYTES # BLD AUTO: 3.02 K/UL — SIGNIFICANT CHANGE UP (ref 1–3.3)
MAGNESIUM SERPL-MCNC: 1.7 MG/DL — SIGNIFICANT CHANGE UP (ref 1.6–2.6)
MAGNESIUM SERPL-MCNC: 1.7 MG/DL — SIGNIFICANT CHANGE UP (ref 1.6–2.6)
MCHC RBC-ENTMCNC: 32.7 PG — SIGNIFICANT CHANGE UP (ref 27–34)
MCHC RBC-ENTMCNC: 34.3 GM/DL — SIGNIFICANT CHANGE UP (ref 32–36)
MCV RBC AUTO: 95.4 FL — SIGNIFICANT CHANGE UP (ref 80–100)
MONOCYTES # BLD AUTO: 1.53 K/UL — HIGH (ref 0–0.9)
MONOCYTES NFR BLD AUTO: 11.2 % — SIGNIFICANT CHANGE UP (ref 2–14)
NEUTROPHILS # BLD AUTO: 8.91 K/UL — HIGH (ref 1.8–7.4)
NEUTROPHILS NFR BLD AUTO: 64.9 % — SIGNIFICANT CHANGE UP (ref 43–77)
NRBC # BLD: 0 /100 WBCS — SIGNIFICANT CHANGE UP (ref 0–0)
NRBC # FLD: 0 K/UL — SIGNIFICANT CHANGE UP (ref 0–0)
PHOSPHATE SERPL-MCNC: 2.9 MG/DL — SIGNIFICANT CHANGE UP (ref 2.5–4.5)
PHOSPHATE SERPL-MCNC: 3 MG/DL — SIGNIFICANT CHANGE UP (ref 2.5–4.5)
PLATELET # BLD AUTO: 360 K/UL — SIGNIFICANT CHANGE UP (ref 150–400)
POTASSIUM SERPL-MCNC: 2.8 MMOL/L — CRITICAL LOW (ref 3.5–5.3)
POTASSIUM SERPL-MCNC: 3.6 MMOL/L — SIGNIFICANT CHANGE UP (ref 3.5–5.3)
POTASSIUM SERPL-SCNC: 2.8 MMOL/L — CRITICAL LOW (ref 3.5–5.3)
POTASSIUM SERPL-SCNC: 3.6 MMOL/L — SIGNIFICANT CHANGE UP (ref 3.5–5.3)
PROT SERPL-MCNC: 7.1 G/DL — SIGNIFICANT CHANGE UP (ref 6–8.3)
RBC # BLD: 4.53 M/UL — SIGNIFICANT CHANGE UP (ref 3.8–5.2)
RBC # FLD: 12.9 % — SIGNIFICANT CHANGE UP (ref 10.3–14.5)
SODIUM SERPL-SCNC: 131 MMOL/L — LOW (ref 135–145)
SODIUM SERPL-SCNC: 132 MMOL/L — LOW (ref 135–145)
TROPONIN T, HIGH SENSITIVITY RESULT: 39 NG/L — SIGNIFICANT CHANGE UP
WBC # BLD: 13.71 K/UL — HIGH (ref 3.8–10.5)
WBC # FLD AUTO: 13.71 K/UL — HIGH (ref 3.8–10.5)

## 2024-10-23 PROCEDURE — 99285 EMERGENCY DEPT VISIT HI MDM: CPT | Mod: FS

## 2024-10-23 PROCEDURE — 99223 1ST HOSP IP/OBS HIGH 75: CPT

## 2024-10-23 PROCEDURE — 72125 CT NECK SPINE W/O DYE: CPT | Mod: 26,MC

## 2024-10-23 PROCEDURE — 71046 X-RAY EXAM CHEST 2 VIEWS: CPT | Mod: 26

## 2024-10-23 PROCEDURE — 74177 CT ABD & PELVIS W/CONTRAST: CPT | Mod: 26,MC

## 2024-10-23 PROCEDURE — 70450 CT HEAD/BRAIN W/O DYE: CPT | Mod: 26,MC

## 2024-10-23 RX ORDER — MAGNESIUM SULFATE IN 0.9% NACL 2 G/50 ML
2 INTRAVENOUS SOLUTION, PIGGYBACK (ML) INTRAVENOUS ONCE
Refills: 0 | Status: COMPLETED | OUTPATIENT
Start: 2024-10-23 | End: 2024-10-23

## 2024-10-23 RX ORDER — POTASSIUM CHLORIDE 10 MEQ
10 TABLET, EXTENDED RELEASE ORAL
Refills: 0 | Status: COMPLETED | OUTPATIENT
Start: 2024-10-23 | End: 2024-10-24

## 2024-10-23 RX ORDER — CLONAZEPAM 1 MG
0.5 TABLET ORAL ONCE
Refills: 0 | Status: DISCONTINUED | OUTPATIENT
Start: 2024-10-23 | End: 2024-10-23

## 2024-10-23 RX ORDER — ONDANSETRON HYDROCHLORIDE 2 MG/ML
4 INJECTION, SOLUTION INTRAMUSCULAR; INTRAVENOUS ONCE
Refills: 0 | Status: COMPLETED | OUTPATIENT
Start: 2024-10-23 | End: 2024-10-23

## 2024-10-23 RX ADMIN — Medication 0.5 MILLIGRAM(S): at 20:36

## 2024-10-23 RX ADMIN — ONDANSETRON HYDROCHLORIDE 4 MILLIGRAM(S): 2 INJECTION, SOLUTION INTRAMUSCULAR; INTRAVENOUS at 18:02

## 2024-10-23 NOTE — ED ADULT NURSE REASSESSMENT NOTE - NS ED NURSE REASSESS COMMENT FT1
Pt A&Ox4, resting on stretcher. Respirations even and unlabored, normal sinus on cardiac monitor, sating 100% on room air. pt denies any chest pain, sob, dizziness or discomfort at this time. Pending CT results. Bed in lowest, safety maintained. family at bedside.

## 2024-10-23 NOTE — ED ADULT NURSE NOTE - NSFALLRISKINTERV_ED_ALL_ED

## 2024-10-23 NOTE — ED PROVIDER NOTE - CLINICAL SUMMARY MEDICAL DECISION MAKING FREE TEXT BOX
73 y/o F with CAD, Stented coronary artery - 8 stents, MI ( 2017) - in Plavix, AICD,  Left ear deafness, asthma (hx of intubation in past,) Left hemidiaphragm s/p plication, moderate thoracic scoliosis , Osteoporosis, diverticulitis s/p colon resection and hysterectomy, has neuromodulators for pain mgmt came to ED as her psychiatrist's request due to abnormal potassium level. Patient states overall she has been feeling more fatigued and discussed this with her psychiatrist therefore she obtain labs at the nursing home and was told that her potassium level is severely low and her blood count is concerning and to go to the emergency room immediately.  Patient states she has been experiencing left-sided lower abdominal pain associated with nausea, NBNB emesis, and constipation for the past 1 week, also endorses some chest tightness and shortness of breath consistent with her asthma in the past.  Patient endorses passing gas however no bowel movement.  Patient's son is at bedside who states patient appears to be a little bit more slow to respond today however does not appear to be confused or altered.  Denies any known falls or injuries.    Abdominal exam without peritoneal signs. No evidence of acute abdomen at this time. Well appearing. Low suspicion for acute hepatobiliary disease (including acute cholecystitis), acute pancreatitis, PUD (including perforation), acute infectious processes (pneumonia, hepatitis, pyelonephritis), acute appendicitis, vascular catastrophe, bowel obstruction or viscus perforation. Presentation not consistent with other acute, emergent causes of abdominal pain at this time. Pt also with chest tightness but EKG with no acute ischemic changes will obtain troponin and XR r/o ACS. As there is a slight change in baseline behavior per son will also obtain CTH and c-spine    Plan: labs, XR, EKG, CT, UA, pain control prn, cardiac monitor, and replete electrolytes accordingly.

## 2024-10-23 NOTE — ED ADULT NURSE NOTE - OBJECTIVE STATEMENT
74 yof presents A&Ox4, PMH AICD, asthma, diverticulitis, CAD (stents x7), depression (pt denies any SI/HI). Pt states she was sent in by her psychiatrist today for elevated potassium and low RBC. Pt c/o fatigue,  chest pressure, nausea, vomiting, diarrhea and lower abdominal pain x2 weeks. Respirations even and unlabored, abdomen soft non tender x4 quadrants, no swelling noted on extremities. Pt denies any sob, dizziness, recent travels, fevers or chills. Pt placed on cardiac monitor. 20g IV placed in R hand. Safety maintained. Call bell within reach.  Family at bedside. No acute distress noted upon leaving room.

## 2024-10-23 NOTE — ED ADULT NURSE REASSESSMENT NOTE - NS ED NURSE REASSESS COMMENT FT1
Pt A&Ox4, resting on stretcher. Pt currently receiving CBI as per urology. Castro intact. Tubing patent with normal saline irrigating bladder. Pt denies any chest pain, sob, dizziness, N/V/D. IV abx administered per order. IV site patent - no redness or swelling noted. Safety maintained. bed in lowest position, side rails up, call bell in hand.

## 2024-10-23 NOTE — ED PROVIDER NOTE - OBJECTIVE STATEMENT
73 y/o F with CAD, Stented coronary artery - 8 stents, MI ( 2017) - in Plavix, AICD, Left ear deafness, asthma (hx of intubation in past,) Left hemidiaphragm s/p plication, moderate thoracic scoliosis , Osteoporosis, diverticulitis s/p colon resection and hysterectomy, has neuromodulators for pain mgmt came to ED as her psychiatrist's request due to abnormal potassium level. Patient states overall she has been feeling more fatigued and discussed this with her psychiatrist therefore she obtain labs at the nursing home and was told that her potassium level is severely low and her blood count is concerning and to go to the emergency room immediately.  Patient states she has been experiencing left-sided lower abdominal pain associated with nausea, NBNB emesis, and constipation for the past 1 week, also endorses some chest tightness and shortness of breath consistent with her asthma in the past.  Patient endorses passing gas however no bowel movement.  Patient's son is at bedside who states patient appears to be a little bit more slow to respond today however does not appear to be confused or altered.  Denies any known falls or injuries.    Denies fevers, chills, sore throat, cough, lower leg swelling or pain, dysuria, hematuria, urine or bowel incontinence, saddle anesthesia, double vision, weakness, paresthesias, rash

## 2024-10-23 NOTE — ED PROVIDER NOTE - ATTENDING APP SHARED VISIT CONTRIBUTION OF CARE
Gong: I have seen and examined the patient face to face, have reviewed and addended the HPI, PE and a/p as necessary.     75 y/o F with CAD, Stented coronary artery - 8 stents, MI ( 2017) - in Plavix, AICD, Left ear deafness, asthma (hx of intubation in past,) Left hemidiaphragm s/p plication, moderate thoracic scoliosis , Osteoporosis, diverticulitis s/p colon resection and hysterectomy, has neuromodulators for pain mgmt a/w hypokalemia and hypercalcemia.  Pt reports having diarrhea 1 week ago followed by constipation.  reports episodes of nausea and vomiting with LLQ abdominal pain.  Pt reports not having bowel movements.  Son is at bedside states patient appears to be a little bit more slow to respond today however does not appear to be confused or altered.  Denies any known falls or injuries.    Denies fevers, chills, sore throat, cough, lower leg swelling or pain, dysuria, hematuria, urine or bowel incontinence, saddle anesthesia, double vision, weakness, paresthesias, rash    GEN - NAD; well appearing; A+O x3; non-toxic appearing  CARD -s1s2, RRR, no M,G,R;   PULM - CTA b/l, symmetric breath sounds;   ABD -  +BS, TTP in LLQ, soft, no guarding, no rebound, no masses;   BACK - no CVA tenderness, Normal  spine;   EXT - symmetric pulses, 2+ dp, capillary refill < 2 seconds, no cyanosis, no edema;   NEURO - no focal neuro deficits, no slurred speech    Concern for possible diverticulitis vs obstruction vs electrolyte related with elevated calcium.  Will recheck labs, check ct abd /pelvis to further assess for pathology.  EKG given severe hypokalemia.    - EKG with prolonged qtc.    - reassess  - ivf resuscitation.

## 2024-10-23 NOTE — ED ADULT TRIAGE NOTE - CHIEF COMPLAINT QUOTE
pt sent in by psychiatrist for abnormal labs "blood count and potassium". pt c/o nausea, vomiting, chest tightness, palpitations, shortness of breath, left sided abd pain, headache, weakness x 1 wk. hx: asthma, AICD, MI 2016, CHF, GERD, diverticulitis, deafness, hypothyroidism, CAD

## 2024-10-23 NOTE — ED ADULT NURSE REASSESSMENT NOTE - NS ED NURSE REASSESS COMMENT FT1
Report received from day RN: pt resting comfortably in stretcher, breathing even and unlabored. Offers no complaints at this time. Instructed to call for assistance. Stretcher in lowest position, wheels locked, appropriate side rails in place, call bell in reach. Pending lab results.

## 2024-10-24 ENCOUNTER — RESULT REVIEW (OUTPATIENT)
Age: 74
End: 2024-10-24

## 2024-10-24 DIAGNOSIS — E43 UNSPECIFIED SEVERE PROTEIN-CALORIE MALNUTRITION: ICD-10-CM

## 2024-10-24 DIAGNOSIS — Z29.9 ENCOUNTER FOR PROPHYLACTIC MEASURES, UNSPECIFIED: ICD-10-CM

## 2024-10-24 DIAGNOSIS — Z95.5 PRESENCE OF CORONARY ANGIOPLASTY IMPLANT AND GRAFT: ICD-10-CM

## 2024-10-24 DIAGNOSIS — E86.0 DEHYDRATION: ICD-10-CM

## 2024-10-24 DIAGNOSIS — G43.909 MIGRAINE, UNSPECIFIED, NOT INTRACTABLE, WITHOUT STATUS MIGRAINOSUS: ICD-10-CM

## 2024-10-24 DIAGNOSIS — K21.9 GASTRO-ESOPHAGEAL REFLUX DISEASE WITHOUT ESOPHAGITIS: ICD-10-CM

## 2024-10-24 DIAGNOSIS — K59.02 OUTLET DYSFUNCTION CONSTIPATION: ICD-10-CM

## 2024-10-24 DIAGNOSIS — E87.6 HYPOKALEMIA: ICD-10-CM

## 2024-10-24 DIAGNOSIS — E83.52 HYPERCALCEMIA: ICD-10-CM

## 2024-10-24 DIAGNOSIS — Z79.899 OTHER LONG TERM (CURRENT) DRUG THERAPY: ICD-10-CM

## 2024-10-24 DIAGNOSIS — R94.31 ABNORMAL ELECTROCARDIOGRAM [ECG] [EKG]: ICD-10-CM

## 2024-10-24 DIAGNOSIS — J45.909 UNSPECIFIED ASTHMA, UNCOMPLICATED: ICD-10-CM

## 2024-10-24 DIAGNOSIS — Z98.41 CATARACT EXTRACTION STATUS, RIGHT EYE: Chronic | ICD-10-CM

## 2024-10-24 LAB
24R-OH-CALCIDIOL SERPL-MCNC: 104 NG/ML — HIGH (ref 30–80)
A1C WITH ESTIMATED AVERAGE GLUCOSE RESULT: 5.4 % — SIGNIFICANT CHANGE UP (ref 4–5.6)
ALBUMIN SERPL ELPH-MCNC: 4.4 G/DL — SIGNIFICANT CHANGE UP (ref 3.3–5)
ALP SERPL-CCNC: 62 U/L — SIGNIFICANT CHANGE UP (ref 40–120)
ALT FLD-CCNC: 14 U/L — SIGNIFICANT CHANGE UP (ref 4–33)
ANION GAP SERPL CALC-SCNC: 11 MMOL/L — SIGNIFICANT CHANGE UP (ref 7–14)
ANION GAP SERPL CALC-SCNC: 13 MMOL/L — SIGNIFICANT CHANGE UP (ref 7–14)
APPEARANCE UR: CLEAR — SIGNIFICANT CHANGE UP
AST SERPL-CCNC: 36 U/L — HIGH (ref 4–32)
BASOPHILS # BLD AUTO: 0.06 K/UL — SIGNIFICANT CHANGE UP (ref 0–0.2)
BASOPHILS NFR BLD AUTO: 0.5 % — SIGNIFICANT CHANGE UP (ref 0–2)
BILIRUB SERPL-MCNC: 0.7 MG/DL — SIGNIFICANT CHANGE UP (ref 0.2–1.2)
BILIRUB UR-MCNC: NEGATIVE — SIGNIFICANT CHANGE UP
BUN SERPL-MCNC: 18 MG/DL — SIGNIFICANT CHANGE UP (ref 7–23)
BUN SERPL-MCNC: 18 MG/DL — SIGNIFICANT CHANGE UP (ref 7–23)
CALCIUM SERPL-MCNC: 10.5 MG/DL — SIGNIFICANT CHANGE UP (ref 8.4–10.5)
CALCIUM SERPL-MCNC: 9.3 MG/DL — SIGNIFICANT CHANGE UP (ref 8.4–10.5)
CHLORIDE SERPL-SCNC: 89 MMOL/L — LOW (ref 98–107)
CHLORIDE SERPL-SCNC: 96 MMOL/L — LOW (ref 98–107)
CO2 SERPL-SCNC: 33 MMOL/L — HIGH (ref 22–31)
CO2 SERPL-SCNC: 34 MMOL/L — HIGH (ref 22–31)
COLOR SPEC: YELLOW — SIGNIFICANT CHANGE UP
CREAT SERPL-MCNC: 0.85 MG/DL — SIGNIFICANT CHANGE UP (ref 0.5–1.3)
CREAT SERPL-MCNC: 1.09 MG/DL — SIGNIFICANT CHANGE UP (ref 0.5–1.3)
DIFF PNL FLD: NEGATIVE — SIGNIFICANT CHANGE UP
DIGOXIN SERPL-MCNC: 2 NG/ML — SIGNIFICANT CHANGE UP (ref 0.8–2)
EGFR: 54 ML/MIN/1.73M2 — LOW
EGFR: 72 ML/MIN/1.73M2 — SIGNIFICANT CHANGE UP
EOSINOPHIL # BLD AUTO: 0.06 K/UL — SIGNIFICANT CHANGE UP (ref 0–0.5)
EOSINOPHIL NFR BLD AUTO: 0.5 % — SIGNIFICANT CHANGE UP (ref 0–6)
ESTIMATED AVERAGE GLUCOSE: 108 — SIGNIFICANT CHANGE UP
FOLATE SERPL-MCNC: 19.5 NG/ML — HIGH (ref 3.1–17.5)
GAS PNL BLDV: SIGNIFICANT CHANGE UP
GLUCOSE SERPL-MCNC: 103 MG/DL — HIGH (ref 70–99)
GLUCOSE SERPL-MCNC: 130 MG/DL — HIGH (ref 70–99)
GLUCOSE UR QL: NEGATIVE MG/DL — SIGNIFICANT CHANGE UP
HCT VFR BLD CALC: 42.4 % — SIGNIFICANT CHANGE UP (ref 34.5–45)
HGB BLD-MCNC: 14.2 G/DL — SIGNIFICANT CHANGE UP (ref 11.5–15.5)
IANC: 7.67 K/UL — HIGH (ref 1.8–7.4)
IMM GRANULOCYTES NFR BLD AUTO: 0.5 % — SIGNIFICANT CHANGE UP (ref 0–0.9)
KETONES UR-MCNC: NEGATIVE MG/DL — SIGNIFICANT CHANGE UP
LEUKOCYTE ESTERASE UR-ACNC: ABNORMAL
LYMPHOCYTES # BLD AUTO: 19.1 % — SIGNIFICANT CHANGE UP (ref 13–44)
LYMPHOCYTES # BLD AUTO: 2.14 K/UL — SIGNIFICANT CHANGE UP (ref 1–3.3)
MAGNESIUM SERPL-MCNC: 2 MG/DL — SIGNIFICANT CHANGE UP (ref 1.6–2.6)
MAGNESIUM SERPL-MCNC: 2.8 MG/DL — HIGH (ref 1.6–2.6)
MCHC RBC-ENTMCNC: 32.4 PG — SIGNIFICANT CHANGE UP (ref 27–34)
MCHC RBC-ENTMCNC: 33.5 GM/DL — SIGNIFICANT CHANGE UP (ref 32–36)
MCV RBC AUTO: 96.8 FL — SIGNIFICANT CHANGE UP (ref 80–100)
MONOCYTES # BLD AUTO: 1.22 K/UL — HIGH (ref 0–0.9)
MONOCYTES NFR BLD AUTO: 10.9 % — SIGNIFICANT CHANGE UP (ref 2–14)
NEUTROPHILS # BLD AUTO: 7.67 K/UL — HIGH (ref 1.8–7.4)
NEUTROPHILS NFR BLD AUTO: 68.5 % — SIGNIFICANT CHANGE UP (ref 43–77)
NITRITE UR-MCNC: NEGATIVE — SIGNIFICANT CHANGE UP
NRBC # BLD: 0 /100 WBCS — SIGNIFICANT CHANGE UP (ref 0–0)
NRBC # FLD: 0 K/UL — SIGNIFICANT CHANGE UP (ref 0–0)
NT-PROBNP SERPL-SCNC: 2810 PG/ML — HIGH
PH UR: 6.5 — SIGNIFICANT CHANGE UP (ref 5–8)
PHOSPHATE SERPL-MCNC: 2.2 MG/DL — LOW (ref 2.5–4.5)
PHOSPHATE SERPL-MCNC: 2.9 MG/DL — SIGNIFICANT CHANGE UP (ref 2.5–4.5)
PLATELET # BLD AUTO: 439 K/UL — HIGH (ref 150–400)
POTASSIUM SERPL-MCNC: 3.3 MMOL/L — LOW (ref 3.5–5.3)
POTASSIUM SERPL-MCNC: 3.3 MMOL/L — LOW (ref 3.5–5.3)
POTASSIUM SERPL-SCNC: 3.3 MMOL/L — LOW (ref 3.5–5.3)
POTASSIUM SERPL-SCNC: 3.3 MMOL/L — LOW (ref 3.5–5.3)
PROT SERPL-MCNC: 6.9 G/DL — SIGNIFICANT CHANGE UP (ref 6–8.3)
PROT UR-MCNC: NEGATIVE MG/DL — SIGNIFICANT CHANGE UP
RBC # BLD: 4.38 M/UL — SIGNIFICANT CHANGE UP (ref 3.8–5.2)
RBC # FLD: 13.2 % — SIGNIFICANT CHANGE UP (ref 10.3–14.5)
SODIUM SERPL-SCNC: 136 MMOL/L — SIGNIFICANT CHANGE UP (ref 135–145)
SODIUM SERPL-SCNC: 140 MMOL/L — SIGNIFICANT CHANGE UP (ref 135–145)
SP GR SPEC: 1.02 — SIGNIFICANT CHANGE UP (ref 1–1.03)
TROPONIN T, HIGH SENSITIVITY RESULT: 43 NG/L — SIGNIFICANT CHANGE UP
TSH SERPL-MCNC: 8.17 UIU/ML — HIGH (ref 0.27–4.2)
UROBILINOGEN FLD QL: 0.2 MG/DL — SIGNIFICANT CHANGE UP (ref 0.2–1)
VIT B12 SERPL-MCNC: 460 PG/ML — SIGNIFICANT CHANGE UP (ref 200–900)
WBC # BLD: 11.21 K/UL — HIGH (ref 3.8–10.5)
WBC # FLD AUTO: 11.21 K/UL — HIGH (ref 3.8–10.5)

## 2024-10-24 PROCEDURE — 93306 TTE W/DOPPLER COMPLETE: CPT | Mod: 26

## 2024-10-24 PROCEDURE — 99232 SBSQ HOSP IP/OBS MODERATE 35: CPT

## 2024-10-24 RX ORDER — LEVOTHYROXINE SODIUM 88 MCG
88 TABLET ORAL DAILY
Refills: 0 | Status: DISCONTINUED | OUTPATIENT
Start: 2024-10-24 | End: 2024-10-25

## 2024-10-24 RX ORDER — LOSARTAN POTASSIUM 25 MG/1
0.5 TABLET ORAL
Refills: 0 | DISCHARGE

## 2024-10-24 RX ORDER — PANTOPRAZOLE SODIUM 40 MG/1
40 TABLET, DELAYED RELEASE ORAL
Refills: 0 | Status: DISCONTINUED | OUTPATIENT
Start: 2024-10-24 | End: 2024-10-25

## 2024-10-24 RX ORDER — MECLIZINE HCL 25 MG
25 TABLET ORAL
Refills: 0 | Status: DISCONTINUED | OUTPATIENT
Start: 2024-10-24 | End: 2024-10-25

## 2024-10-24 RX ORDER — SODIUM CHLORIDE 9 MG/ML
500 INJECTION, SOLUTION INTRAMUSCULAR; INTRAVENOUS; SUBCUTANEOUS ONCE
Refills: 0 | Status: COMPLETED | OUTPATIENT
Start: 2024-10-24 | End: 2024-10-24

## 2024-10-24 RX ORDER — TRAMADOL HYDROCHLORIDE AND ACETAMINOPHEN 325; 37.5 MG/1; MG/1
1 TABLET, FILM COATED ORAL
Refills: 0 | DISCHARGE

## 2024-10-24 RX ORDER — TRAMADOL HYDROCHLORIDE 50 MG/1
50 TABLET, COATED ORAL EVERY 12 HOURS
Refills: 0 | Status: DISCONTINUED | OUTPATIENT
Start: 2024-10-24 | End: 2024-10-24

## 2024-10-24 RX ORDER — DIGOXIN 250 MCG
1 TABLET ORAL
Refills: 0 | DISCHARGE

## 2024-10-24 RX ORDER — DULOXETINE HYDROCHLORIDE 30 MG/1
1 CAPSULE, DELAYED RELEASE ORAL
Refills: 0 | DISCHARGE

## 2024-10-24 RX ORDER — DULOXETINE HYDROCHLORIDE 30 MG/1
60 CAPSULE, DELAYED RELEASE ORAL DAILY
Refills: 0 | Status: DISCONTINUED | OUTPATIENT
Start: 2024-10-24 | End: 2024-10-25

## 2024-10-24 RX ORDER — CHOLECALCIFEROL (VITAMIN D3) 625 MCG
4 CAPSULE ORAL
Refills: 0 | DISCHARGE

## 2024-10-24 RX ORDER — CLONAZEPAM 1 MG
0.5 TABLET ORAL EVERY 12 HOURS
Refills: 0 | Status: DISCONTINUED | OUTPATIENT
Start: 2024-10-24 | End: 2024-10-25

## 2024-10-24 RX ORDER — ACETAMINOPHEN 500 MG
1 TABLET ORAL
Refills: 0 | DISCHARGE

## 2024-10-24 RX ORDER — ACETAMINOPHEN 500 MG
325 TABLET ORAL EVERY 4 HOURS
Refills: 0 | Status: DISCONTINUED | OUTPATIENT
Start: 2024-10-24 | End: 2024-10-24

## 2024-10-24 RX ORDER — POTASSIUM PHOSPHATE 236; 224 MG/ML; MG/ML
15 INJECTION, SOLUTION INTRAVENOUS ONCE
Refills: 0 | Status: COMPLETED | OUTPATIENT
Start: 2024-10-24 | End: 2024-10-24

## 2024-10-24 RX ORDER — LEVALBUTEROL HYDROCHLORIDE 0.63 MG/3ML
3 SOLUTION RESPIRATORY (INHALATION)
Refills: 0 | DISCHARGE

## 2024-10-24 RX ORDER — LEVOTHYROXINE SODIUM 88 MCG
75 TABLET ORAL DAILY
Refills: 0 | Status: DISCONTINUED | OUTPATIENT
Start: 2024-10-24 | End: 2024-10-24

## 2024-10-24 RX ORDER — ONDANSETRON HYDROCHLORIDE 2 MG/ML
4 INJECTION, SOLUTION INTRAMUSCULAR; INTRAVENOUS EVERY 6 HOURS
Refills: 0 | Status: DISCONTINUED | OUTPATIENT
Start: 2024-10-24 | End: 2024-10-25

## 2024-10-24 RX ORDER — POLYETHYLENE GLYCOL 3350 17 G/17G
17 POWDER, FOR SOLUTION ORAL DAILY
Refills: 0 | Status: DISCONTINUED | OUTPATIENT
Start: 2024-10-24 | End: 2024-10-25

## 2024-10-24 RX ORDER — MELATONIN 5 MG
3 TABLET ORAL AT BEDTIME
Refills: 0 | Status: DISCONTINUED | OUTPATIENT
Start: 2024-10-24 | End: 2024-10-25

## 2024-10-24 RX ORDER — CLONAZEPAM 1 MG
0.5 TABLET ORAL EVERY 12 HOURS
Refills: 0 | Status: DISCONTINUED | OUTPATIENT
Start: 2024-10-24 | End: 2024-10-24

## 2024-10-24 RX ORDER — ASPIRIN/MAG CARB/ALUMINUM AMIN 325 MG
81 TABLET ORAL DAILY
Refills: 0 | Status: DISCONTINUED | OUTPATIENT
Start: 2024-10-24 | End: 2024-10-25

## 2024-10-24 RX ORDER — ACETAMINOPHEN 500 MG
325 TABLET ORAL EVERY 4 HOURS
Refills: 0 | Status: DISCONTINUED | OUTPATIENT
Start: 2024-10-24 | End: 2024-10-25

## 2024-10-24 RX ORDER — SENNA 187 MG
2 TABLET ORAL AT BEDTIME
Refills: 0 | Status: DISCONTINUED | OUTPATIENT
Start: 2024-10-24 | End: 2024-10-25

## 2024-10-24 RX ORDER — TRAMADOL HYDROCHLORIDE 50 MG/1
50 TABLET, COATED ORAL EVERY 12 HOURS
Refills: 0 | Status: DISCONTINUED | OUTPATIENT
Start: 2024-10-24 | End: 2024-10-25

## 2024-10-24 RX ORDER — ACETAMINOPHEN 500 MG
650 TABLET ORAL EVERY 4 HOURS
Refills: 0 | Status: DISCONTINUED | OUTPATIENT
Start: 2024-10-24 | End: 2024-10-25

## 2024-10-24 RX ORDER — EZETIMIBE 10 MG/1
10 TABLET ORAL AT BEDTIME
Refills: 0 | Status: DISCONTINUED | OUTPATIENT
Start: 2024-10-24 | End: 2024-10-25

## 2024-10-24 RX ORDER — CYCLOBENZAPRINE HYDROCHLORIDE 30 MG/1
1 CAPSULE, EXTENDED RELEASE ORAL
Refills: 0 | DISCHARGE

## 2024-10-24 RX ORDER — OLANZAPINE 20 MG/1
2.5 TABLET ORAL AT BEDTIME
Refills: 0 | Status: DISCONTINUED | OUTPATIENT
Start: 2024-10-24 | End: 2024-10-25

## 2024-10-24 RX ORDER — METOPROLOL TARTRATE 50 MG
1 TABLET ORAL
Refills: 0 | DISCHARGE

## 2024-10-24 RX ORDER — CYCLOBENZAPRINE HYDROCHLORIDE 30 MG/1
5 CAPSULE, EXTENDED RELEASE ORAL AT BEDTIME
Refills: 0 | Status: DISCONTINUED | OUTPATIENT
Start: 2024-10-24 | End: 2024-10-25

## 2024-10-24 RX ORDER — LIDOCAINE HYDROCHLORIDE 40 MG/ML
1 SOLUTION TOPICAL EVERY 24 HOURS
Refills: 0 | Status: DISCONTINUED | OUTPATIENT
Start: 2024-10-24 | End: 2024-10-25

## 2024-10-24 RX ORDER — LEVALBUTEROL HYDROCHLORIDE 0.63 MG/3ML
0.63 SOLUTION RESPIRATORY (INHALATION) EVERY 6 HOURS
Refills: 0 | Status: DISCONTINUED | OUTPATIENT
Start: 2024-10-24 | End: 2024-10-25

## 2024-10-24 RX ORDER — ALBUTEROL 90 MCG
2 AEROSOL (GRAM) INHALATION EVERY 6 HOURS
Refills: 0 | Status: DISCONTINUED | OUTPATIENT
Start: 2024-10-24 | End: 2024-10-25

## 2024-10-24 RX ORDER — MAGNESIUM, ALUMINUM HYDROXIDE 200-200 MG
30 TABLET,CHEWABLE ORAL EVERY 4 HOURS
Refills: 0 | Status: DISCONTINUED | OUTPATIENT
Start: 2024-10-24 | End: 2024-10-25

## 2024-10-24 RX ORDER — TRAMADOL HYDROCHLORIDE 50 MG/1
37.5 TABLET, COATED ORAL
Refills: 0 | Status: DISCONTINUED | OUTPATIENT
Start: 2024-10-24 | End: 2024-10-24

## 2024-10-24 RX ORDER — EZETIMIBE 10 MG/1
1 TABLET ORAL
Refills: 0 | DISCHARGE

## 2024-10-24 RX ORDER — SENNOSIDES AND DOCUSATE SODIUM 8.6; 5 MG/1; MG/1
2 TABLET ORAL
Refills: 0 | DISCHARGE

## 2024-10-24 RX ORDER — PANTOPRAZOLE SODIUM 40 MG/1
40 TABLET, DELAYED RELEASE ORAL
Refills: 0 | Status: CANCELLED | OUTPATIENT
Start: 2024-11-01 | End: 2024-10-25

## 2024-10-24 RX ORDER — PETROLATUM 987.89 MG/G
1 OINTMENT TOPICAL
Refills: 0 | Status: DISCONTINUED | OUTPATIENT
Start: 2024-10-24 | End: 2024-10-25

## 2024-10-24 RX ORDER — HEPARIN SODIUM 10000 [USP'U]/ML
5000 INJECTION INTRAVENOUS; SUBCUTANEOUS EVERY 12 HOURS
Refills: 0 | Status: DISCONTINUED | OUTPATIENT
Start: 2024-10-24 | End: 2024-10-25

## 2024-10-24 RX ORDER — POTASSIUM CHLORIDE 10 MEQ
40 TABLET, EXTENDED RELEASE ORAL ONCE
Refills: 0 | Status: DISCONTINUED | OUTPATIENT
Start: 2024-10-24 | End: 2024-10-24

## 2024-10-24 RX ORDER — AMMONIUM LACTATE 12 %
1 LOTION (ML) TOPICAL
Refills: 0 | Status: DISCONTINUED | OUTPATIENT
Start: 2024-10-24 | End: 2024-10-25

## 2024-10-24 RX ORDER — PANTOPRAZOLE SODIUM 40 MG/1
40 TABLET, DELAYED RELEASE ORAL ONCE
Refills: 0 | Status: COMPLETED | OUTPATIENT
Start: 2024-10-24 | End: 2024-10-24

## 2024-10-24 RX ORDER — MECLIZINE HCL 25 MG
1 TABLET ORAL
Refills: 0 | DISCHARGE

## 2024-10-24 RX ORDER — FLUTICASONE PROPIONATE AND SALMETEROL XINAFOATE 230; 21 UG/1; UG/1
1 AEROSOL, METERED RESPIRATORY (INHALATION)
Refills: 0 | DISCHARGE

## 2024-10-24 RX ORDER — B-COMPLEX WITH VITAMIN C
1 VIAL (ML) INJECTION DAILY
Refills: 0 | Status: DISCONTINUED | OUTPATIENT
Start: 2024-10-24 | End: 2024-10-25

## 2024-10-24 RX ORDER — CHOLECALCIFEROL (VITAMIN D3) 625 MCG
8000 CAPSULE ORAL DAILY
Refills: 0 | Status: DISCONTINUED | OUTPATIENT
Start: 2024-10-24 | End: 2024-10-25

## 2024-10-24 RX ORDER — DUPILUMAB 300 MG/2ML
300 INJECTION, SOLUTION SUBCUTANEOUS
Refills: 0 | DISCHARGE

## 2024-10-24 RX ORDER — OLANZAPINE 20 MG/1
1 TABLET ORAL
Refills: 0 | DISCHARGE

## 2024-10-24 RX ADMIN — Medication 75 MICROGRAM(S): at 05:39

## 2024-10-24 RX ADMIN — SODIUM CHLORIDE 500 MILLILITER(S): 9 INJECTION, SOLUTION INTRAMUSCULAR; INTRAVENOUS; SUBCUTANEOUS at 01:29

## 2024-10-24 RX ADMIN — Medication 0.5 MILLIGRAM(S): at 05:39

## 2024-10-24 RX ADMIN — EZETIMIBE 10 MILLIGRAM(S): 10 TABLET ORAL at 22:24

## 2024-10-24 RX ADMIN — POTASSIUM PHOSPHATE 62.5 MILLIMOLE(S): 236; 224 INJECTION, SOLUTION INTRAVENOUS at 10:31

## 2024-10-24 RX ADMIN — Medication 100 MILLIEQUIVALENT(S): at 04:01

## 2024-10-24 RX ADMIN — Medication 25 GRAM(S): at 02:43

## 2024-10-24 RX ADMIN — SODIUM CHLORIDE 500 MILLILITER(S): 9 INJECTION, SOLUTION INTRAMUSCULAR; INTRAVENOUS; SUBCUTANEOUS at 04:01

## 2024-10-24 RX ADMIN — Medication 25 MILLIGRAM(S): at 05:39

## 2024-10-24 RX ADMIN — Medication 1 APPLICATION(S): at 17:29

## 2024-10-24 RX ADMIN — PETROLATUM 1 APPLICATION(S): 987.89 OINTMENT TOPICAL at 11:04

## 2024-10-24 RX ADMIN — Medication 81 MILLIGRAM(S): at 11:04

## 2024-10-24 RX ADMIN — HEPARIN SODIUM 5000 UNIT(S): 10000 INJECTION INTRAVENOUS; SUBCUTANEOUS at 05:39

## 2024-10-24 RX ADMIN — Medication 0.5 MILLIGRAM(S): at 17:28

## 2024-10-24 RX ADMIN — Medication 100 MILLIEQUIVALENT(S): at 02:42

## 2024-10-24 RX ADMIN — PETROLATUM 1 APPLICATION(S): 987.89 OINTMENT TOPICAL at 17:19

## 2024-10-24 RX ADMIN — PANTOPRAZOLE SODIUM 40 MILLIGRAM(S): 40 TABLET, DELAYED RELEASE ORAL at 17:21

## 2024-10-24 RX ADMIN — Medication 8000 UNIT(S): at 11:38

## 2024-10-24 RX ADMIN — PANTOPRAZOLE SODIUM 40 MILLIGRAM(S): 40 TABLET, DELAYED RELEASE ORAL at 05:39

## 2024-10-24 RX ADMIN — Medication 25 MILLIGRAM(S): at 17:19

## 2024-10-24 RX ADMIN — DULOXETINE HYDROCHLORIDE 60 MILLIGRAM(S): 30 CAPSULE, DELAYED RELEASE ORAL at 11:06

## 2024-10-24 RX ADMIN — Medication 100 MILLIEQUIVALENT(S): at 01:26

## 2024-10-24 RX ADMIN — POLYETHYLENE GLYCOL 3350 17 GRAM(S): 17 POWDER, FOR SOLUTION ORAL at 11:04

## 2024-10-24 RX ADMIN — OLANZAPINE 2.5 MILLIGRAM(S): 20 TABLET ORAL at 22:23

## 2024-10-24 RX ADMIN — Medication 1 TABLET(S): at 11:05

## 2024-10-24 RX ADMIN — HEPARIN SODIUM 5000 UNIT(S): 10000 INJECTION INTRAVENOUS; SUBCUTANEOUS at 17:20

## 2024-10-24 NOTE — PROGRESS NOTE ADULT - PROBLEM SELECTOR PLAN 4
Reports history of rectocele and cystocele, which have been interfering with bowel movements  Occasionally self disimpacts, per patient; recent disimpaction was ineffective, per patient  CT scan = "Sigmoid anastomosis. Mild colonic diverticulosis. No bowel obstruction. "  No acute pathology in the abdomen.  Shows stools in the colon  - Likely that current constipation is of multifactorial etiology; dehydration (impacted by torsemide BID), hypokalemia.  Possibly also hypothyroidism  - f/u TSH level  - continuing on PTA senna 2 tabs  - adding Miralax daily (hold if diarrhea); evaluate for additional bowel regimen  - f/u stool count  - if no improvement, may need GI consult (has OP GI consult scheduled 10/25/2024)

## 2024-10-24 NOTE — H&P ADULT - REASON FOR ADMISSION
Hypokalemia Acute hypokalemia, Outlet dysfunction constipation, Protein-calorie malnutrition, Severe dehydration, Acid reflux

## 2024-10-24 NOTE — PROGRESS NOTE ADULT - PROBLEM SELECTOR PLAN 8
No cardiac complaints presently  Has AICD in place  Suspect that patient has CHF since on torsemide 20 mg BID PTA  - ECG as above  - in the context of borderline low BP (91/67), holding antihypertensive meds  - in the context of  hypokalemia, holding cardiotonic digoxin  - continuing anti-lipid meds  - f/u TTE

## 2024-10-24 NOTE — PROGRESS NOTE ADULT - PROBLEM SELECTOR PLAN 11
- digoxin held, in the setting of hypokalemia (re-eval for restart)  - antihypertensive meds held (in the setting of hypotension)  - need to ascertain next dosing for Dupixent (administered every 14 days)  - Tramadol-acetaminophen 37.5 - 325 BID for osteoarthritic pain not possible via Pharmacy; prescribing Tramadol 50 mg (Pharmacy does not have 25 mg) BID.  Tramadol increased also since patient had neuromodulator removed and likely needs increased analgesic support  - Synthroid increased from 75 mcg to 88 mcg since TSH elevated at 8.17 on the AM lab-work

## 2024-10-24 NOTE — H&P ADULT - PROBLEM SELECTOR PLAN 1
Potassium down to 2.8 - in the setting of patient with diarrhea x 2 weeks recently (now constipated), as well as only having clear liquid diet intake  Complaining of muscle cramps  ECG with QTc prolongation at 503, and appears also to have some PVCs (personally reviewed)  Supplementation prescribed in the ED with 30 meq IV, and also given magnesium 2 grams in the ED  - prescribing KCL 40 meq x one  - holding digoxin, in the setting of hypokalemia; risk of digoxin toxicity  - f/u repeat lab-work in the AM - potassium, magnesium, digoxin  - f/u repeat ECG in the AM  - f/u for restart of Digoxin (prescribed for Cardiotonics, per chart review from home facility)  - f/u Registered Dietitian Consult

## 2024-10-24 NOTE — H&P ADULT - NSICDXPASTSURGICALHX_GEN_ALL_CORE_FT
PAST SURGICAL HISTORY:  S/P carpal tunnel release right    S/P inguinal hernia repair left    S/P laparoscopic-assisted sigmoidectomy     S/P BLEEN-BSO (total abdominal hysterectomy and bilateral salpingo-oophorectomy)     Status post craniectomy with mesh - left side for migraines    Status post plication of diaphragm left     PAST SURGICAL HISTORY:  H/O bilateral cataract extraction     S/P carpal tunnel release right    S/P inguinal hernia repair left    S/P laparoscopic-assisted sigmoidectomy     S/P BELEN-BSO (total abdominal hysterectomy and bilateral salpingo-oophorectomy)     Status post craniectomy with mesh - left side for migraines    Status post plication of diaphragm left

## 2024-10-24 NOTE — PROGRESS NOTE ADULT - PROBLEM SELECTOR PLAN 6
Describes sour/acid-like taste in the mouth, along with yellowish phlegm/secretions  Chart review from 2021 indicates patient was on pantoprazole; not reflected in current list submitted  - prescribing pantoprazole IV x one, then BID x 2 weeks, then daily (re-evaluate, as necessary)  - HOB elevation  - aspiration precautions  - anti-GERD type diet  - if no improvement, may need GI consult (has OP GI consult scheduled 10/25/2024)

## 2024-10-24 NOTE — PATIENT PROFILE ADULT - FALL HARM RISK - HARM RISK INTERVENTIONS

## 2024-10-24 NOTE — H&P ADULT - PROBLEM SELECTOR PLAN 2
Dry skin, poor skin turgor, dry mouth - in the setting of inadequate oral hydration, as well as fluid deficits from impact of torsemide 20 mg BID.  Hypotensive to BP of  91/67  BUN/Cr = 29/1.38; presuming MAMTA (no prior values for comparison).  Calculated serum osmolality = 279  - IVF hydration - prescribed 500 mL of NaCl x 2, followed by LR at 125 mL/Hr x 16 hrs  - rescheduling torsemide 20 mg BID to 10//26/2024 (please re-evaluate, as necessary)  - encourage oral hydration, as tolerated  - f/u electrolytes, renal function for improvement/resolution Dry skin, poor skin turgor, dry mouth - in the setting of inadequate oral hydration, as well as fluid deficits from impact of torsemide 20 mg BID.  Hypotensive to BP of  91/67  Hypercalcemia of 11.4 is in the setting of dehydration  BUN/Cr = 29/1.38; presuming MAMTA (no prior values for comparison).  Calculated serum osmolality = 279  - IVF hydration - prescribed 500 mL of NaCl x 2, followed by LR at 125 mL/Hr x 16 hrs  - rescheduling torsemide 20 mg BID to 10//26/2024 (please re-evaluate, as necessary)  - encourage oral hydration, as tolerated  - f/u electrolytes, renal function for improvement/resolution

## 2024-10-24 NOTE — PROGRESS NOTE ADULT - PROBLEM SELECTOR PLAN 5
Calcium = 11.4, repeat improving 10.5  Lab-work hemoconcentrated.  No suspicion for malignancy  - hydration in progress

## 2024-10-24 NOTE — PROGRESS NOTE ADULT - SUBJECTIVE AND OBJECTIVE BOX
Patient is a 74y old  Female who presents with a chief complaint of Acute hypokalemia, Outlet dysfunction constipation, Protein-calorie malnutrition, Severe dehydration, Acid reflux (24 Oct 2024 01:09)    SUBJECTIVE / OVERNIGHT EVENTS: No acute events. Comfortable, no current abdominal pain (experienced earlier but now improved). No current lightheadedness (reported experiencing earlier as well). Son at bedside providing collateral.    MEDICATIONS  (STANDING):  ammonium lactate 12% Lotion 1 Application(s) Topical two times a day  aspirin enteric coated 81 milliGRAM(s) Oral daily  cholecalciferol 8000 Unit(s) Oral daily  clonazePAM  Tablet 0.5 milliGRAM(s) Oral every 12 hours  DULoxetine 60 milliGRAM(s) Oral daily  ezetimibe 10 milliGRAM(s) Oral at bedtime  heparin   Injectable 5000 Unit(s) SubCutaneous every 12 hours  lactated ringers. 1000 milliLiter(s) (75 mL/Hr) IV Continuous <Continuous>  levothyroxine 88 MICROGram(s) Oral daily  meclizine 25 milliGRAM(s) Oral two times a day  multivitamin 1 Tablet(s) Oral daily  OLANZapine 2.5 milliGRAM(s) Oral at bedtime  pantoprazole    Tablet 40 milliGRAM(s) Oral two times a day  petrolatum white Ointment 1 Application(s) Topical four times a day  polyethylene glycol 3350 17 Gram(s) Oral daily  potassium chloride    Tablet ER 40 milliEquivalent(s) Oral once  senna 2 Tablet(s) Oral at bedtime    MEDICATIONS  (PRN):  acetaminophen     Tablet .. 325 milliGRAM(s) Oral every 4 hours PRN Moderate Pain (4 - 6), Severe Pain (7 - 10)  acetaminophen     Tablet .. 650 milliGRAM(s) Oral every 4 hours PRN Mild Pain (1 - 3)  albuterol    90 MICROgram(s) HFA Inhaler 2 Puff(s) Inhalation every 6 hours PRN wheezing  aluminum hydroxide/magnesium hydroxide/simethicone Suspension 30 milliLiter(s) Oral every 4 hours PRN Dyspepsia  cyclobenzaprine 5 milliGRAM(s) Oral at bedtime PRN for muscle spasm  levalbuterol Inhalation 0.63 milliGRAM(s) Inhalation every 6 hours PRN shortness of breath/wheezing  lidocaine   4% Patch 1 Patch Transdermal every 24 hours PRN Pain  melatonin 3 milliGRAM(s) Oral at bedtime PRN Insomnia  ondansetron    Tablet 4 milliGRAM(s) Oral every 6 hours PRN Nausea  traMADol 50 milliGRAM(s) Oral every 12 hours PRN Moderate Osteoarthritic Pain (4 - 6) or Severe Osteoarthritic Pain (7 - 10)      CAPILLARY BLOOD GLUCOSE        I&O's Summary      PHYSICAL EXAM:  Vital Signs Last 24 Hrs  T(C): 36.9 (24 Oct 2024 10:55), Max: 36.9 (23 Oct 2024 20:07)  T(F): 98.5 (24 Oct 2024 10:55), Max: 98.5 (23 Oct 2024 20:07)  HR: 89 (24 Oct 2024 10:55) (89 - 98)  BP: 110/65 (24 Oct 2024 10:55) (91/67 - 132/78)  BP(mean): --  RR: 16 (24 Oct 2024 10:55) (16 - 18)  SpO2: 97% (24 Oct 2024 10:55) (93% - 99%)    Parameters below as of 24 Oct 2024 10:55  Patient On (Oxygen Delivery Method): room air    CONSTITUTIONAL: NAD, laying in bed  EYES: conjunctiva and sclera clear  ENMT: Moist oral mucosa  RESPIRATORY: Normal respiratory effort; lungs are clear to auscultation bilaterally  CARDIOVASCULAR: Regular rate and rhythm, normal S1 and S2, No lower extremity edema  ABDOMEN: Nontender to palpation, normoactive bowel sounds, no rebound/guarding  PSYCH: calm    LABS:                        14.2   11.21 )-----------( 439      ( 24 Oct 2024 07:34 )             42.4     10-24    136  |  89[L]  |  18  ----------------------------<  130[H]  3.3[L]   |  34[H]  |  1.09    Ca    10.5      24 Oct 2024 07:34  Phos  2.2     10-24  Mg     2.80     10-24    TPro  6.9  /  Alb  4.4  /  TBili  0.7  /  DBili  x   /  AST  36[H]  /  ALT  14  /  AlkPhos  62  10-24      Urinalysis Basic - ( 24 Oct 2024 09:17 )    Color: Yellow / Appearance: Clear / S.021 / pH: x  Gluc: x / Ketone: Negative mg/dL  / Bili: Negative / Urobili: 0.2 mg/dL   Blood: x / Protein: Negative mg/dL / Nitrite: Negative   Leuk Esterase: Trace / RBC: 1 /HPF / WBC 2 /HPF   Sq Epi: x / Non Sq Epi: 1 /HPF / Bacteria: Negative /HPF      RADIOLOGY & ADDITIONAL TESTS: Reviewed    COORDINATION OF CARE:  Care Discussed with Consultants/Other Providers [Y- medicine ACP, RN, CM, SW]

## 2024-10-24 NOTE — PROGRESS NOTE ADULT - PROBLEM SELECTOR PLAN 7
QT/QTc = 426/503 - in the setting of dehydration, hypokalemia and possibly other medication s/e  - hypokalemia being treated  - IVF hydration in progress  - f/u electrolytes  - f/u repeat ECG   - f/u TTE

## 2024-10-24 NOTE — H&P ADULT - PROBLEM SELECTOR PLAN 4
Reports history of rectocele and cystocele, which have been interfering with bowel movements  Occasionally self disimpacts, per patient; recent disimpaction was ineffective, per patient  CT scan = "Sigmoid anastomosis. Mild colonic diverticulosis. No bowel obstruction. "  No acute pathology in the abdomen.  Shows stools in the colon (personally reviewed)  - Likely that current constipation is of multifactorial etiology; dehydration (impacted by torsemide BID), hypokalemia.  Possibly also hypothyroidism  - f/u TSH level  - IVF hydration - prescribed 500 mL of NaCl x 2, followed by LR at 125 mL/Hr x 16 hrs (torsemide rescheduled)  - continuing on PTA senna 2 tabs  - adding Miralax daily (hold if diarrhea); evaluate for additional bowel regimen  - f/u stool count  - if no improvement, may need GI consult (has OP GI consult scheduled 10/25/2024)

## 2024-10-24 NOTE — H&P ADULT - NSHPREVIEWOFSYSTEMS_GEN_ALL_CORE
REVIEW OF SYSTEMS:    CONSTITUTIONAL: Some weakness.  No fever, chills or sweating  EYES/ENT: No visual changes.  Mild hearing difficulty.  No dysphagia, but has significant dryness and sour taste of the mouth and yellowish phlegm production  NECK: Longstanding pain of the neck.  No swelling  RESPIRATORY: No cough or hemoptysis.  No shortness of breath  CARDIOVASCULAR: No chest pain or palpitations.  No lower extremity edema  GASTROINTESTINAL: Chronic left sided abdominal pain.  Constipation x 7 days; prior to then, nausea, vomiting, diarrhea x 2 weeks.  Suffers with alternating constipation and diarrhea.  No melena or hematochezia.  GENITOURINARY: No dysuria, frequency or hematuria  MUSCULOSKELETAL: LLE is approximately 0.5 inch shorter than the right.  No joint pain, swelling, decreased ROM, erythema, warmth  NEUROLOGICAL: No numbness or weakness  PSYCHIATRY: Suffers with depression, but is coping presently  SKIN: No itching, burning, rashes, or lesions   All other review of systems is negative unless indicated above. REVIEW OF SYSTEMS:    CONSTITUTIONAL: Some weakness.  No fever, chills or sweating  EYES/ENT: No visual changes.  Mild hearing difficulty.  No dysphagia, but has significant dryness and sour taste of the mouth and yellowish phlegm production  NECK: Longstanding pain of the neck.  No swelling  RESPIRATORY: No cough or hemoptysis.  No shortness of breath  CARDIOVASCULAR: No chest pain or palpitations.  No lower extremity edema  GASTROINTESTINAL: Chronic left sided abdominal pain.  Constipation x 7 days; prior to then, nausea, vomiting, diarrhea x 2 weeks.  Suffers with alternating constipation and diarrhea.  No melena or hematochezia.  GENITOURINARY: No dysuria, frequency or hematuria  MUSCULOSKELETAL: LLE is approximately 0.5 inch shorter than the right.  No joint pain, swelling, decreased ROM, erythema, warmth  NEUROLOGICAL: No numbness or weakness  PSYCHIATRY: Suffers with depression, but is coping presently.  Insomnia due to recurring thoughts  SKIN: No itching, burning, rashes, or lesions   All other review of systems is negative unless indicated above.

## 2024-10-24 NOTE — PROGRESS NOTE ADULT - PROBLEM SELECTOR PLAN 1
Potassium down to 2.8 - in the setting of patient with diarrhea x 2 weeks recently (now constipated), as well as only having clear liquid diet intake  Complaining of muscle cramps  ECG with QTc prolongation at 503, and appears also to have some PVCs  - K improving, 3.3 most recently, c/w supplementation and follow up repeat  - Dig on hold given level of 2.0 and elevated Cr (now improving after IVF)  - f/u repeat dig level  - f/u Registered Dietitian Consult

## 2024-10-24 NOTE — PROGRESS NOTE ADULT - PROBLEM SELECTOR PLAN 10
Suffers with same, but not experiencing same currently  Describes band-like pressure with tightening around the head when occurs  Likely impacted by dehydration, insomnia and current constitutional state  - being hydrated  - melatonin PRN  - f/u electrolytes  - f/u Registered Dietitian consult

## 2024-10-24 NOTE — H&P ADULT - HISTORY OF PRESENT ILLNESS
74-year-old female, with past history significant for CAD - s/p Stents (8), Myocardial infarction, AICD, L-ear deafness, Asthma, L-Hemidiaphragm - s/p Plication, Thoracic scoliosis, Osteoporosis, Diverticulitis, Colon resection. Hysterectomy and Neuromodulators, presented to the ED due to report of abnormal potassium level.  Seen and evaluated at bedside;    Vital signs upon ED presentation as follows: BP = 131/62, HR = 99, RR = 18, T = 36.8 C (98.3 F), O2 Sat = 93% on RA.  Diagnosed with Hypokalemia and prescribed clonazepam 0.5 mg PO, magnesium 2 grams IV, Zofran 4 mg IV, KCl 10 meq IV x 3 in the ED. 74-year-old female, with past history significant for CAD - s/p Stents (8), Myocardial infarction, AICD, L-ear deafness, Asthma, L-Hemidiaphragm - s/p Plication, Thoracic scoliosis, Osteoporosis, Diverticulitis, Colon resection. Hysterectomy and Neuromodulators, presented to the ED due to report of abnormal potassium level.  Seen and evaluated at bedside; dehydrated appearing, but in NAD.  Patient is currently constipated - not having a bowel movement for the past 7 days.  Prior to then, had nausea, vomiting and diarrhea for 2 weeks.  Suffers with alternating diarrhea and constipation.  Has tried self disimpaction (done occasionally in the past due to having a rectocele and cystocele), but has not experienced any relief in constipation.  However, patient has been on a clear liquid diet during the time of constipation (7 days) also.  Has chronic left sided abdominal pain, attributed to history of diverticulitis with partial colectomy.  Pending appointment with GI on 10/25/2024.  Comments on significant weight loss due to dietary restrictions; has not been able to eat adequately due to alternating constipation and diarrhea, as well as diverticulitis, GI sensitivity and abdominal pain.  Complains of acid-like taste with yellowish phlegm in the mouth as well as sensation tongue enlargement.  Has had cramping sensation of the muscles.  Tries to keep hydrated, but feels dehydrated presently.  No report of fever, chills, diaphoresis, chest pain, palpitations, or any other related signs/symptoms.    Vital signs upon ED presentation as follows: BP = 131/62, HR = 99, RR = 18, T = 36.8 C (98.3 F), O2 Sat = 93% on RA.  Diagnosed with Hypokalemia and prescribed clonazepam 0.5 mg PO, magnesium 2 grams IV, Zofran 4 mg IV, KCl 10 meq IV x 3 in the ED. 74-year-old female, with past history significant for CAD - s/p Stents (8), Myocardial infarction, AICD, L-ear deafness, Asthma, L-Hemidiaphragm paralysis - s/p Plication, Thoracic scoliosis, Osteoporosis, Diverticulitis, Colon resection. Hysterectomy and Neuromodulators, presented to the ED due to report of abnormal potassium level.  Seen and evaluated at bedside; dehydrated appearing, but in NAD.  Patient is currently constipated - not having a bowel movement for the past 7 days.  Prior to then, had nausea, vomiting and diarrhea for 2 weeks.  Suffers with alternating diarrhea and constipation.  Has tried self disimpaction (done occasionally in the past due to having a rectocele and cystocele), but has not experienced any relief in constipation.  However, patient has been on a clear liquid diet during the time of constipation (7 days) also.  Has chronic left sided abdominal pain, attributed to history of diverticulitis with partial colectomy.  Pending appointment with GI on 10/25/2024.  Comments on significant weight loss due to dietary restrictions; has not been able to eat adequately due to alternating constipation and diarrhea, as well as diverticulitis, GI sensitivity and abdominal pain.  Complains of acid-like taste with yellowish phlegm in the mouth as well as sensation tongue enlargement.  Has had cramping sensation of the muscles.  Tries to keep hydrated, but feels dehydrated presently.  No report of fever, chills, diaphoresis, chest pain, palpitations, or any other related signs/symptoms.    Vital signs upon ED presentation as follows: BP = 131/62, HR = 99, RR = 18, T = 36.8 C (98.3 F), O2 Sat = 93% on RA.  Diagnosed with Hypokalemia and prescribed clonazepam 0.5 mg PO, magnesium 2 grams IV, Zofran 4 mg IV, KCl 10 meq IV x 3 in the ED.

## 2024-10-24 NOTE — H&P ADULT - PROBLEM SELECTOR PLAN 7
No issues presently  - continuing PTA Xopenex, albuterol, Advair  - HOB elevation  - follow pulse oximetry No cardiac complaints presently  Has AICD in place  - in the context of borderline low BP (91/67), holding antihypertensive meds  - in the context of  hypokalemia, holding cardiotonic digoxin  - continuing anti-lipid meds No cardiac complaints presently  Has AICD in place  Suspect that patient has CHF since on torsemide 20 mg BID PTA  - ECG as above  - in the context of borderline low BP (91/67), holding antihypertensive meds  - in the context of  hypokalemia, holding cardiotonic digoxin  - continuing anti-lipid meds  - f/u TTE (ordered) QT/QTc = 426/503 - in the setting of dehydration, hypokalemia and possibly other medication s/e  - hypokalemia being treated  - IVF hydration in progress  - f/u electrolytes  - f/u repeat ECG in the AM (ordered)  - f/u TTE (ordered)

## 2024-10-24 NOTE — H&P ADULT - PROBLEM SELECTOR PLAN 5
Describes sour/acid-like taste in the mouth, along with yellowish phlegm/secretions  Chart review from 2021 indicates patient was on pantoprazole; not reflected in current list submitted  - prescribing pantoprazole IV x one, then BID x 2 weeks, then daily (re-evaluate, as necessary)  - HOB elevation  - aspiration precautions  - anti-GERD type diet  - if no improvement, may need GI consult (has OP GI consult scheduled 10/25/2024) Calcium = 11.4  Lab-work hemoconcentrated.  No suspicion for malignancy  - hydration in progress  - f/u for resolution

## 2024-10-24 NOTE — H&P ADULT - PROBLEM SELECTOR PLAN 6
No cardiac complaints presently  Has AICD in place  - in the context of borderline low BP (91/67), holding antihypertensive meds  - in the context of  hypokalemia, holding cardiotonic digoxin  - continuing anti-lipid meds QT/QTc = 426/503 - in the setting of dehydration, hypokalemia and possibly other medication s/e  - hypokalemia being treated  - IVF hydration in progress  - f/u repeat ECG in the AM (ordered) QT/QTc = 426/503 - in the setting of dehydration, hypokalemia and possibly other medication s/e  - hypokalemia being treated  - IVF hydration in progress  - f/u electrolytes  - f/u repeat ECG in the AM (ordered)  - f/u TTE (ordered) Describes sour/acid-like taste in the mouth, along with yellowish phlegm/secretions  Chart review from 2021 indicates patient was on pantoprazole; not reflected in current list submitted  - prescribing pantoprazole IV x one, then BID x 2 weeks, then daily (re-evaluate, as necessary)  - HOB elevation  - aspiration precautions  - anti-GERD type diet  - if no improvement, may need GI consult (has OP GI consult scheduled 10/25/2024)

## 2024-10-24 NOTE — PROGRESS NOTE ADULT - PROBLEM SELECTOR PLAN 2
Dry skin, poor skin turgor, dry mouth - in the setting of inadequate oral hydration, as well as fluid deficits from impact of torsemide 20 mg BID.  Hypotensive to BP of  91/67  Hypercalcemia of 11.4 is in the setting of dehydration  BUN/Cr = 29/1.38; presuming MAMTA (no prior values for comparison).  Calculated serum osmolality = 279  - IVF hydration - prescribed 500 mL of NaCl x 2, followed by LR at 125 mL/Hr x 16 hrs  - holding torsemide, resume tomorrow based on volume status  - encourage oral hydration, as tolerated  - f/u electrolytes, renal function for improvement/resolution

## 2024-10-24 NOTE — H&P ADULT - NSHPSOCIALHISTORY_GEN_ALL_CORE
SOCIAL HISTORY:    Marital Status:  (  )    (  ) Single        (  )        (  )        (  )   Lives with:          (  ) Alone      (  ) Spouse     (  ) Children         (  ) Parents             (  ) Other    No history of smoking  No history of alcohol abuse  No history of illegal drug use    Occupation: SOCIAL HISTORY:    Marital Status:  (  )    (  ) Single        ( x )        (  )        (  )   Lives with:          (  ) Alone      (  ) Spouse     (  ) Children         (  ) Parents             ( x ) Other/Co[facility]residents at [facility]    No history of smoking  No history of alcohol abuse  No history of illegal drug use    Occupation: Retired Registered Nurse

## 2024-10-24 NOTE — H&P ADULT - PROBLEM SELECTOR PLAN 11
Heparin SQ  Pantoprazole (see above)  HOB elevation  Fall precautions - digoxin held, in the setting of hypokalemia (re-eval for restart)  - antihypertensive meds held (in the setting of hypotension)  - need to ascertain next dosing for Dupixent (administered every 14 days)  - Tramadol-acetaminophen 37.5 - 325 BID for osteoarthritic pain not possible via Pharmacy; prescribing Tramadol 50 mg (Pharmacy does not have 25 mg) BID.  Tramadol increased also since patient had neuromodulator removed and likely needs increased analgesic support  - Synthroid increased from 75 mcg to 88 mcg since TSH elevated at 8.17 on the AM lab-work

## 2024-10-24 NOTE — PROGRESS NOTE ADULT - PROBLEM SELECTOR PROBLEM 8
[Stented coronary artery

## 2024-10-24 NOTE — H&P ADULT - PROBLEM SELECTOR PLAN 3
Significant weight loss, some weakness, lack of nutritional intake (on clear liquid diet for the past 7 days)  Suffers with alternating constipation and diarrhea (no BM x 7 days now); had diarrhea prior to then  Albumin = 4.3, but in a significantly hemolyzed sample.  Patient currently very dehydrated  - Registered Dietitian Consult ordered (please f/u)  - Diet, along with Ensure 1 can TID, MVI prescribed  - IVF in progress; eval for any sign/s of fluid overload (pt  was on torsemide 20 mg BID; rescheduled to 10/26/2024)  - f/u AM lab-work (including vitamin B12 level since patient complains of sensation of tongue being swollen)

## 2024-10-24 NOTE — H&P ADULT - PROBLEM SELECTOR PLAN 8
- digoxin held, in the setting of hypokalemia (re-eval for restart)  - antihypertensive meds held (in the setting of hypotension)  - need to ascertain next dosing for Dupixent (administered every 14 days)  - Tramadol-acetaminophen 37.5 - 325 BID for osteoarthritic pain not possible via Pharmacy; prescribing Tramadol 50 mg (Pharmacy does not have 25 mg) BID.  Tramadol increased also since patient had neuromodulator removed and likely needs increased analgesic support No issues presently  - continuing PTA Xopenex, albuterol, Advair  - HOB elevation  - follow pulse oximetry No cardiac complaints presently  Has AICD in place  Suspect that patient has CHF since on torsemide 20 mg BID PTA  - ECG as above  - in the context of borderline low BP (91/67), holding antihypertensive meds  - in the context of  hypokalemia, holding cardiotonic digoxin  - continuing anti-lipid meds  - f/u TTE (ordered)

## 2024-10-24 NOTE — H&P ADULT - ASSESSMENT
[ x ]  Lab studies personally reviewed  [ x ]  Radiology personally reviewed  [ x ]  Old records personally reviewed    74-year-old female, with past history significant for CAD - s/p Stents (8), Myocardial infarction, AICD, L-ear deafness, Asthma, L-Hemidiaphragm - s/p Plication, Thoracic scoliosis, Osteoporosis, Diverticulitis, Colon resection. Hysterectomy and Neuromodulators, presented to the ED due to report of abnormal potassium level.  Diagnosed with Hypokalemia in the ED. [ x ]  Lab studies personally reviewed  [ x ]  Radiology personally reviewed  [ x ]  Old records personally reviewed    74-year-old female, with past history significant for CAD - s/p Stents (8), Myocardial infarction, AICD, L-ear deafness, Asthma, L-Hemidiaphragm paralysis - s/p Plication, Thoracic scoliosis, Osteoporosis, Diverticulitis, Colon resection. Hysterectomy and Neuromodulators, presented to the ED due to report of abnormal potassium level.  Diagnosed with Hypokalemia in the ED.

## 2024-10-24 NOTE — H&P ADULT - NSHPLABSRESULTS_GEN_ALL_CORE
LAB-WORK/STUDIES:                          14.8   13.71 )-----------( 360      ( 23 Oct 2024 16:21 )             43.2     132[L]  |  83[L]  |  26[H]  ----------------------------<  118[H]     10-23  2.8[LL]   |  32[H]  |  1.22    Ca    11.0[H]      23 Oct 2024 20:50  Phos  2.9     10-23  Mg     1.70     10-23    TPro  7.1  /  Alb  4.3  /  TBili  0.5  /  DBili  x   /  AST  36[H]  /  ALT  14  /  AlkPhos  63  10-23    16:21 - VBG - pH: 7.51  | pCO2: 55    | pO2: 32    | Lactate: 2.8      hs Troponin, T - 39 ng/L (10-23-24 @ 16:21)    ========================================================================        ========================================================================  . LAB-WORK/STUDIES:                          14.8   13.71 )-----------( 360      ( 23 Oct 2024 16:21 )             43.2     132[L]  |  83[L]  |  26[H]  ----------------------------<  118[H]     10-23  2.8[LL]   |  32[H]  |  1.22    Ca    11.0[H]      23 Oct 2024 20:50  Phos  2.9     10-23  Mg     1.70     10-23    TPro  7.1  /  Alb  4.3  /  TBili  0.5  /  DBili  x   /  AST  36[H]  /  ALT  14  /  AlkPhos  63  10-23    16:21 - VBG - pH: 7.51  | pCO2: 55    | pO2: 32    | Lactate: 2.8      hs Troponin, T - 39 ng/L (10-23-24 @ 16:21)    ========================================================================    ECG = sinus at 84 bpm, LAD, Q-wave in III, aVF, V2, V3, V4, V5, V6  QTc = 503.  ?PVCs    ========================================================================  . LAB-WORK/STUDIES:                          14.8   13.71 )-----------( 360      ( 23 Oct 2024 16:21 )             43.2     132[L]  |  83[L]  |  26[H]  ----------------------------<  118[H]     10-23  2.8[LL]   |  32[H]  |  1.22    Ca    11.0[H]      23 Oct 2024 20:50  Phos  2.9     10-23  Mg     1.70     10-23    TPro  7.1  /  Alb  4.3  /  TBili  0.5  /  DBili  x   /  AST  36[H]  /  ALT  14  /  AlkPhos  63  10-23    16:21 - VBG - pH: 7.51  | pCO2: 55    | pO2: 32    | Lactate: 2.8      hs Troponin, T - 39 ng/L (10-23-24 @ 16:21)    ================================================================    ECG = sinus at 84 bpm, LAD, Q-wave in III, aVF, V2, V3, V4, V5, V6  QTc = 503.  ?PVCs    ================================================================  .

## 2024-10-24 NOTE — PROGRESS NOTE ADULT - PROBLEM SELECTOR PLAN 9
No issues presently  - continuing PTA Xopenex, albuterol, Advair  - HOB elevation  - follow pulse oximetry

## 2024-10-24 NOTE — PROGRESS NOTE ADULT - REASON FOR ADMISSION
Acute hypokalemia, Outlet dysfunction constipation, Protein-calorie malnutrition, Severe dehydration, Acid reflux

## 2024-10-24 NOTE — PROGRESS NOTE ADULT - PROBLEM SELECTOR PLAN 3
Significant weight loss, some weakness, lack of nutritional intake (on clear liquid diet for the past 7 days)  Suffers with alternating constipation and diarrhea (no BM x 7 days now); had diarrhea prior to then  Albumin = 4.3, but in a significantly hemolyzed sample.  Patient currently very dehydrated  - Registered Dietitian Consult ordered   - Diet, along with Ensure 1 can TID, MVI prescribed  b12/folate not low

## 2024-10-24 NOTE — H&P ADULT - PROBLEM SELECTOR PLAN 9
Heparin SQ  Pantoprazole (see above)  HOB elevation  Fall precautions - digoxin held, in the setting of hypokalemia (re-eval for restart)  - antihypertensive meds held (in the setting of hypotension)  - need to ascertain next dosing for Dupixent (administered every 14 days)  - Tramadol-acetaminophen 37.5 - 325 BID for osteoarthritic pain not possible via Pharmacy; prescribing Tramadol 50 mg (Pharmacy does not have 25 mg) BID.  Tramadol increased also since patient had neuromodulator removed and likely needs increased analgesic support Suffers with same, but not experiencing same currently  Describes band-like pressure with tightening around the head when occurs  Likely impacted by dehydration, insomnia and current constitutional state  - being hydrated  - melatonin PRN  - f/u electrolytes  - f/u Registered Dietitian consult No issues presently  - continuing PTA Xopenex, albuterol, Advair  - HOB elevation  - follow pulse oximetry

## 2024-10-24 NOTE — H&P ADULT - PROBLEM SELECTOR PLAN 10
Heparin SQ  Pantoprazole (see above)  HOB elevation  Fall precautions - digoxin held, in the setting of hypokalemia (re-eval for restart)  - antihypertensive meds held (in the setting of hypotension)  - need to ascertain next dosing for Dupixent (administered every 14 days)  - Tramadol-acetaminophen 37.5 - 325 BID for osteoarthritic pain not possible via Pharmacy; prescribing Tramadol 50 mg (Pharmacy does not have 25 mg) BID.  Tramadol increased also since patient had neuromodulator removed and likely needs increased analgesic support - digoxin held, in the setting of hypokalemia (re-eval for restart)  - antihypertensive meds held (in the setting of hypotension)  - need to ascertain next dosing for Dupixent (administered every 14 days)  - Tramadol-acetaminophen 37.5 - 325 BID for osteoarthritic pain not possible via Pharmacy; prescribing Tramadol 50 mg (Pharmacy does not have 25 mg) BID.  Tramadol increased also since patient had neuromodulator removed and likely needs increased analgesic support  - Synthroid increased from 75 mcg to 88 mcg since TSH elevated at 8.17 on the AM lab-work Suffers with same, but not experiencing same currently  Describes band-like pressure with tightening around the head when occurs  Likely impacted by dehydration, insomnia and current constitutional state  - being hydrated  - melatonin PRN  - f/u electrolytes  - f/u Registered Dietitian consult

## 2024-10-24 NOTE — H&P ADULT - NSHPPHYSICALEXAM_GEN_ALL_CORE
Vital Signs Last 24 Hrs  T(C): 36.9 (24 Oct 2024 00:03), Max: 36.9 (23 Oct 2024 20:07)  T(F): 98.5 (24 Oct 2024 00:03), Max: 98.5 (23 Oct 2024 20:07)  HR: 98 (24 Oct 2024 00:03) (94 - 99)  BP: 91/67 (24 Oct 2024 00:03) (91/67 - 131/62)  BP(mean): 81 (23 Oct 2024 15:30) (81 - 81)  RR: 18 (24 Oct 2024 00:03) (16 - 18)  SpO2: 93% (24 Oct 2024 00:03) (93% - 99%)    Parameters below as of 24 Oct 2024 00:03  Patient On (Oxygen Delivery Method): room air    ================================================= Vital Signs Last 24 Hrs  T(C): 36.9 (24 Oct 2024 00:03), Max: 36.9 (23 Oct 2024 20:07)  T(F): 98.5 (24 Oct 2024 00:03), Max: 98.5 (23 Oct 2024 20:07)  HR: 98 (24 Oct 2024 00:03) (94 - 99)  BP: 91/67 (24 Oct 2024 00:03) (91/67 - 131/62)  BP(mean): 81 (23 Oct 2024 15:30) (81 - 81)  RR: 18 (24 Oct 2024 00:03) (16 - 18)  SpO2: 93% (24 Oct 2024 00:03) (93% - 99%)    Parameters below as of 24 Oct 2024 00:03  Patient On (Oxygen Delivery Method): room air    =================================================    PHYSICAL EXAMINATION:    APPEARANCE: Adequately groomed, thin, somewhat frail and dehydrated female, lying left decubitus in bed in NAD  HEENT: Peelings lips with very dry oral mucosa.  Fair dentition.  Unable to examine eyes due to photophobia.  EOMI  LYMPHATIC: No lymphadenopathy appreciated  CARDIOVASCULAR: (+) S1 S2.  No JVD.  No murmurs.  No edema  RESPIRATORY: No wheezing, rhonchi, crackles appreciated  GASTROINTESTINAL: Soft.  Non-tender to mild palpation  (+) BS - mild/moderate  GENITOURINARY: No suprapubic tenderness.  No CVA tenderness B/L  EXTREMITIES: Normal range of motion.  No clubbing, cyanosis or edema  MUSCULOSKELETAL: No atrophy.  No asymmetry.  Good ROM  SKIN: No rashes. No ecchymoses.  No cyanosis  PSYCHIATRIC: A&O x 3.  Pleasant.  Mood & affect appropriate to situation  NEUROLOGICAL: Non-focal, MANTILLA x 4 against gravity  VASCULAR: Peripheral pulses palpable

## 2024-10-24 NOTE — PROGRESS NOTE ADULT - ASSESSMENT
74-year-old female, with past history significant for CAD - s/p Stents (8), Myocardial infarction, AICD, L-ear deafness, Asthma, L-Hemidiaphragm paralysis - s/p Plication, Thoracic scoliosis, Osteoporosis, Diverticulitis, Colon resection. Hysterectomy and Neuromodulators, presented to the ED due to report of abnormal potassium level.  Diagnosed with Hypokalemia in the ED.

## 2024-10-25 ENCOUNTER — TRANSCRIPTION ENCOUNTER (OUTPATIENT)
Age: 74
End: 2024-10-25

## 2024-10-25 VITALS — WEIGHT: 96.78 LBS

## 2024-10-25 LAB
ANION GAP SERPL CALC-SCNC: 7 MMOL/L — SIGNIFICANT CHANGE UP (ref 7–14)
BUN SERPL-MCNC: 15 MG/DL — SIGNIFICANT CHANGE UP (ref 7–23)
CALCIUM SERPL-MCNC: 9.5 MG/DL — SIGNIFICANT CHANGE UP (ref 8.4–10.5)
CHLORIDE SERPL-SCNC: 98 MMOL/L — SIGNIFICANT CHANGE UP (ref 98–107)
CO2 SERPL-SCNC: 37 MMOL/L — HIGH (ref 22–31)
CREAT SERPL-MCNC: 0.71 MG/DL — SIGNIFICANT CHANGE UP (ref 0.5–1.3)
DIGOXIN SERPL-MCNC: 1.5 NG/ML — SIGNIFICANT CHANGE UP (ref 0.8–2)
EGFR: 90 ML/MIN/1.73M2 — SIGNIFICANT CHANGE UP
GLUCOSE SERPL-MCNC: 98 MG/DL — SIGNIFICANT CHANGE UP (ref 70–99)
HCT VFR BLD CALC: 32.6 % — LOW (ref 34.5–45)
HGB BLD-MCNC: 11.2 G/DL — LOW (ref 11.5–15.5)
MAGNESIUM SERPL-MCNC: 2 MG/DL — SIGNIFICANT CHANGE UP (ref 1.6–2.6)
MCHC RBC-ENTMCNC: 33.3 PG — SIGNIFICANT CHANGE UP (ref 27–34)
MCHC RBC-ENTMCNC: 34.4 GM/DL — SIGNIFICANT CHANGE UP (ref 32–36)
MCV RBC AUTO: 97 FL — SIGNIFICANT CHANGE UP (ref 80–100)
NRBC # BLD: 0 /100 WBCS — SIGNIFICANT CHANGE UP (ref 0–0)
NRBC # FLD: 0 K/UL — SIGNIFICANT CHANGE UP (ref 0–0)
PHOSPHATE SERPL-MCNC: 2.4 MG/DL — LOW (ref 2.5–4.5)
PLATELET # BLD AUTO: 276 K/UL — SIGNIFICANT CHANGE UP (ref 150–400)
POTASSIUM SERPL-MCNC: 3.4 MMOL/L — LOW (ref 3.5–5.3)
POTASSIUM SERPL-SCNC: 3.4 MMOL/L — LOW (ref 3.5–5.3)
RBC # BLD: 3.36 M/UL — LOW (ref 3.8–5.2)
RBC # FLD: 13.2 % — SIGNIFICANT CHANGE UP (ref 10.3–14.5)
SODIUM SERPL-SCNC: 142 MMOL/L — SIGNIFICANT CHANGE UP (ref 135–145)
WBC # BLD: 6.67 K/UL — SIGNIFICANT CHANGE UP (ref 3.8–10.5)
WBC # FLD AUTO: 6.67 K/UL — SIGNIFICANT CHANGE UP (ref 3.8–10.5)

## 2024-10-25 PROCEDURE — 99239 HOSP IP/OBS DSCHRG MGMT >30: CPT

## 2024-10-25 RX ORDER — DIGOXIN 250 MCG
125 TABLET ORAL DAILY
Refills: 0 | Status: DISCONTINUED | OUTPATIENT
Start: 2024-10-25 | End: 2024-10-25

## 2024-10-25 RX ORDER — MAGNESIUM HYDROXIDE 1200 MG/15ML
30 SUSPENSION ORAL DAILY
Refills: 0 | Status: DISCONTINUED | OUTPATIENT
Start: 2024-10-25 | End: 2024-10-25

## 2024-10-25 RX ORDER — LEVOTHYROXINE SODIUM 88 MCG
1 TABLET ORAL
Qty: 30 | Refills: 0
Start: 2024-10-25 | End: 2024-11-23

## 2024-10-25 RX ORDER — B-COMPLEX WITH VITAMIN C
1 VIAL (ML) INJECTION
Qty: 0 | Refills: 0 | DISCHARGE
Start: 2024-10-25

## 2024-10-25 RX ORDER — TORSEMIDE 100 MG/1
1 TABLET ORAL
Refills: 0 | DISCHARGE

## 2024-10-25 RX ORDER — CLINDAMYCIN PHOSPHATE 150 MG/ML
1 VIAL (ML) INJECTION
Refills: 0 | DISCHARGE

## 2024-10-25 RX ORDER — POTASSIUM CHLORIDE 10 MEQ
40 TABLET, EXTENDED RELEASE ORAL ONCE
Refills: 0 | Status: COMPLETED | OUTPATIENT
Start: 2024-10-25 | End: 2024-10-25

## 2024-10-25 RX ORDER — PANTOPRAZOLE SODIUM 40 MG/1
1 TABLET, DELAYED RELEASE ORAL
Qty: 0 | Refills: 0 | DISCHARGE
Start: 2024-10-25

## 2024-10-25 RX ADMIN — PANTOPRAZOLE SODIUM 40 MILLIGRAM(S): 40 TABLET, DELAYED RELEASE ORAL at 06:51

## 2024-10-25 RX ADMIN — Medication 0.5 MILLIGRAM(S): at 06:50

## 2024-10-25 RX ADMIN — Medication 40 MILLIEQUIVALENT(S): at 06:51

## 2024-10-25 RX ADMIN — Medication 25 MILLIGRAM(S): at 06:51

## 2024-10-25 RX ADMIN — Medication 2 PUFF(S): at 10:25

## 2024-10-25 RX ADMIN — Medication 1 APPLICATION(S): at 06:52

## 2024-10-25 RX ADMIN — Medication 88 MICROGRAM(S): at 06:51

## 2024-10-25 RX ADMIN — PETROLATUM 1 APPLICATION(S): 987.89 OINTMENT TOPICAL at 00:24

## 2024-10-25 RX ADMIN — HEPARIN SODIUM 5000 UNIT(S): 10000 INJECTION INTRAVENOUS; SUBCUTANEOUS at 06:51

## 2024-10-25 RX ADMIN — PETROLATUM 1 APPLICATION(S): 987.89 OINTMENT TOPICAL at 06:52

## 2024-10-25 NOTE — PROVIDER CONTACT NOTE (OTHER) - SITUATION
Patient had a change in her mental status, she started acting paranoid. She refused her VSS and 12pm medications.

## 2024-10-25 NOTE — PROVIDER CONTACT NOTE (OTHER) - RECOMMENDATIONS
Provider aware. Patient children thought it was best for their mom to leave the hospital as she get paranoid when in a new environment.

## 2024-10-25 NOTE — DIETITIAN INITIAL EVALUATION ADULT - PROBLEM SELECTOR PLAN 8
No cardiac complaints presently  Has AICD in place  Suspect that patient has CHF since on torsemide 20 mg BID PTA  - ECG as above  - in the context of borderline low BP (91/67), holding antihypertensive meds  - in the context of  hypokalemia, holding cardiotonic digoxin  - continuing anti-lipid meds  - f/u TTE (ordered)

## 2024-10-25 NOTE — DISCHARGE NOTE PROVIDER - NSDCCPCAREPLAN_GEN_ALL_CORE_FT
PRINCIPAL DISCHARGE DIAGNOSIS  Diagnosis: Hypokalemia  Assessment and Plan of Treatment: Your potassium levels were low likely from dehyration and diarrhea. You were given IV fluids and potassium supplementation. Your TORSEMIDE was stopped for now to prevent further dehyration and electrolyte abnormalities. Follow up with your doctor for repeat blood work in one week to determine when to resume this medication.      SECONDARY DISCHARGE DIAGNOSES  Diagnosis: Outlet dysfunction constipation  Assessment and Plan of Treatment: Please continue your home bowel regimen and disimpaction as needed. You can try further bowel regimen such as miralax or a suppository.    Diagnosis: Stented coronary artery  Assessment and Plan of Treatment: Please continue to take all medications as ordered and follow up with your cardiologist.    Diagnosis: Severe protein-calorie malnutrition  Assessment and Plan of Treatment: Ensure at least three meals per day and adequate hydration. Add ensure supplements to support calorie intake. Take a multi vitamin daily.    Diagnosis: Hypothyroidism in adult  Assessment and Plan of Treatment: Your SYNTHROID was increased based on your thyroid hormone levels. Please take new dose as ordered and follow up for repeat blood work in 4-6 weeks to check your thyroid levels.     PRINCIPAL DISCHARGE DIAGNOSIS  Diagnosis: Hypokalemia  Assessment and Plan of Treatment: Your potassium levels were low likely from dehyration and diarrhea. You were given IV fluids and potassium supplementation. Your TORSEMIDE was stopped for now to prevent further dehyration and electrolyte abnormalities. Follow up with your doctor for repeat blood work in one week to determine when to resume this medication.      SECONDARY DISCHARGE DIAGNOSES  Diagnosis: Severe protein-calorie malnutrition  Assessment and Plan of Treatment: Ensure at least three meals per day and adequate hydration. Add ensure supplements to support calorie intake. Take a multi vitamin daily.    Diagnosis: Outlet dysfunction constipation  Assessment and Plan of Treatment: Please continue your home bowel regimen and disimpaction as needed. You can try further bowel regimen such as miralax or a suppository.    Diagnosis: Stented coronary artery  Assessment and Plan of Treatment: Please continue to take all medications as ordered and follow up with your cardiologist.    Diagnosis: Hypothyroidism in adult  Assessment and Plan of Treatment: Your SYNTHROID was increased based on your thyroid hormone levels. Please take new dose as ordered and follow up for repeat blood work in 4-6 weeks to check your thyroid levels.

## 2024-10-25 NOTE — DISCHARGE NOTE PROVIDER - NSDCMRMEDTOKEN_GEN_ALL_CORE_FT
acetaminophen 500 mg oral tablet: 1 tab(s) orally every 4 hours as needed for  pain  Adult Aspirin Regimen 81 mg oral delayed release tablet: 1 tab(s) orally once a day  Advair Diskus 500 mcg-50 mcg inhalation powder: 1 pump(s) inhaled 2 times a day  Albuterol (Eqv-ProAir HFA) 90 mcg/inh inhalation aerosol: 2 puff(s) inhaled every 6 hours, As Needed -wheezing - for shortness of breath and/or wheezing   cholecalciferol 50 mcg (2000 intl units) oral capsule: 4 cap(s) orally once a day  cyclobenzaprine 5 mg oral tablet: 1 tab(s) orally once a day (at bedtime) as needed for  muscle spasm  digoxin 125 mcg (0.125 mg) oral tablet: 1 tab(s) orally once a day  DULoxetine 60 mg oral delayed release capsule: 1 cap(s) orally once a day  Dupixent Pre-filled Syringe 300 mg/2 mL subcutaneous solution: 300 milligram(s) subcutaneously every 2 weeks ~ every 14 days  ezetimibe 10 mg oral tablet: 1 tab(s) orally once a day (at bedtime)  KlonoPIN 0.5 mg oral tablet: 1 tab(s) orally 2 times a day MDD:1mg  levalbuterol 0.63 mg/3 mL inhalation solution: 3 milliliter(s) by nebulizer 4 times a day as needed for  shortness of breath and/or wheezing  levothyroxine 88 mcg (0.088 mg) oral tablet: 1 tab(s) orally once a day  lidocaine 5% topical film: Apply topically to affected area once a day, As Needed -for moderate back  pain   losartan 25 mg oral tablet: 0.5 tab(s) orally once a day (in the evening)  meclizine 25 mg oral tablet: 1 tab(s) orally 2 times a day  metoprolol tartrate 25 mg oral tablet: 1 tab(s) orally once a day  Multiple Vitamins oral tablet: 1 tab(s) orally once a day  OLANZapine 2.5 mg oral tablet: 1 tab(s) orally once a day (at bedtime)  ondansetron 4 mg oral tablet: 1 tab(s) orally every 6 hours, As Needed -Nausea - for nausea   Praluent Pen 150 mg/mL subcutaneous solution: 150 milligram(s) subcutaneously every 2 weeks ~ on Friday.  Next dose due 11/01/2024  Sennalax-S 50 mg-8.6 mg oral tablet: 2 tab(s) orally once a day (at bedtime)  tramadol-acetaminophen 37.5 mg-325 mg oral tablet: 1 tab(s) orally 2 times a day as needed for  osteoarthritic pain   acetaminophen 500 mg oral tablet: 1 tab(s) orally every 4 hours as needed for  pain  Adult Aspirin Regimen 81 mg oral delayed release tablet: 1 tab(s) orally once a day  Advair Diskus 500 mcg-50 mcg inhalation powder: 1 pump(s) inhaled 2 times a day  Albuterol (Eqv-ProAir HFA) 90 mcg/inh inhalation aerosol: 2 puff(s) inhaled every 6 hours, As Needed -wheezing - for shortness of breath and/or wheezing   cholecalciferol 50 mcg (2000 intl units) oral capsule: 4 cap(s) orally once a day  cyclobenzaprine 5 mg oral tablet: 1 tab(s) orally once a day (at bedtime) as needed for  muscle spasm  digoxin 125 mcg (0.125 mg) oral tablet: 1 tab(s) orally once a day  DULoxetine 60 mg oral delayed release capsule: 1 cap(s) orally once a day  Dupixent Pre-filled Syringe 300 mg/2 mL subcutaneous solution: 300 milligram(s) subcutaneously every 2 weeks ~ every 14 days  ezetimibe 10 mg oral tablet: 1 tab(s) orally once a day (at bedtime)  KlonoPIN 0.5 mg oral tablet: 1 tab(s) orally 2 times a day MDD:1mg  levalbuterol 0.63 mg/3 mL inhalation solution: 3 milliliter(s) by nebulizer 4 times a day as needed for  shortness of breath and/or wheezing  levothyroxine 88 mcg (0.088 mg) oral tablet: 1 tab(s) orally once a day  lidocaine 5% topical film: Apply topically to affected area once a day, As Needed -for moderate back  pain   losartan 25 mg oral tablet: 0.5 tab(s) orally once a day (in the evening)  meclizine 25 mg oral tablet: 1 tab(s) orally 2 times a day  metoprolol tartrate 25 mg oral tablet: 1 tab(s) orally once a day  Multiple Vitamins oral tablet: 1 tab(s) orally once a day  OLANZapine 2.5 mg oral tablet: 1 tab(s) orally once a day (at bedtime)  ondansetron 4 mg oral tablet: 1 tab(s) orally every 6 hours, As Needed -Nausea - for nausea   pantoprazole 40 mg oral delayed release tablet: 1 tab(s) orally once a day (before a meal)  Praluent Pen 150 mg/mL subcutaneous solution: 150 milligram(s) subcutaneously every 2 weeks ~ on Friday.  Next dose due 11/01/2024  Sennalax-S 50 mg-8.6 mg oral tablet: 2 tab(s) orally once a day (at bedtime)  tramadol-acetaminophen 37.5 mg-325 mg oral tablet: 1 tab(s) orally 2 times a day as needed for  osteoarthritic pain

## 2024-10-25 NOTE — DIETITIAN INITIAL EVALUATION ADULT - PERTINENT MEDS FT
MEDICATIONS  (STANDING):  ammonium lactate 12% Lotion 1 Application(s) Topical two times a day  aspirin enteric coated 81 milliGRAM(s) Oral daily  cholecalciferol 8000 Unit(s) Oral daily  clonazePAM  Tablet 0.5 milliGRAM(s) Oral every 12 hours  digoxin     Tablet 125 MICROGram(s) Oral daily  DULoxetine 60 milliGRAM(s) Oral daily  ezetimibe 10 milliGRAM(s) Oral at bedtime  heparin   Injectable 5000 Unit(s) SubCutaneous every 12 hours  lactated ringers. 1000 milliLiter(s) (75 mL/Hr) IV Continuous <Continuous>  levothyroxine 88 MICROGram(s) Oral daily  meclizine 25 milliGRAM(s) Oral two times a day  multivitamin 1 Tablet(s) Oral daily  OLANZapine 2.5 milliGRAM(s) Oral at bedtime  pantoprazole    Tablet 40 milliGRAM(s) Oral two times a day  petrolatum white Ointment 1 Application(s) Topical four times a day  polyethylene glycol 3350 17 Gram(s) Oral daily  senna 2 Tablet(s) Oral at bedtime    MEDICATIONS  (PRN):  acetaminophen     Tablet .. 325 milliGRAM(s) Oral every 4 hours PRN Moderate Pain (4 - 6), Severe Pain (7 - 10)  acetaminophen     Tablet .. 650 milliGRAM(s) Oral every 4 hours PRN Mild Pain (1 - 3)  albuterol    90 MICROgram(s) HFA Inhaler 2 Puff(s) Inhalation every 6 hours PRN wheezing  aluminum hydroxide/magnesium hydroxide/simethicone Suspension 30 milliLiter(s) Oral every 4 hours PRN Dyspepsia  cyclobenzaprine 5 milliGRAM(s) Oral at bedtime PRN for muscle spasm  levalbuterol Inhalation 0.63 milliGRAM(s) Inhalation every 6 hours PRN shortness of breath/wheezing  lidocaine   4% Patch 1 Patch Transdermal every 24 hours PRN Pain  magnesium hydroxide Suspension 30 milliLiter(s) Oral daily PRN Constipation  melatonin 3 milliGRAM(s) Oral at bedtime PRN Insomnia  ondansetron    Tablet 4 milliGRAM(s) Oral every 6 hours PRN Nausea  traMADol 50 milliGRAM(s) Oral every 12 hours PRN Moderate Osteoarthritic Pain (4 - 6) or Severe Osteoarthritic Pain (7 - 10)

## 2024-10-25 NOTE — DISCHARGE NOTE PROVIDER - HOSPITAL COURSE
74-year-old female, with past history significant for CAD - s/p Stents (8), Myocardial infarction, AICD, L-ear deafness, Asthma, L-Hemidiaphragm paralysis - s/p Plication, Thoracic scoliosis, Osteoporosis, Diverticulitis, Colon resection. Hysterectomy and Neuromodulators, presented to the ED due to report of abnormal potassium level.     Acute hypokalemia  - Potassium down to 2.8 - in the setting of patient with diarrhea x 2 weeks recently (now constipated), as well as only having clear liquid diet intake  - Complaining of muscle cramps  - ECG with QTc prolongation at 503, and appears also to have some PVCs  - Digoxin initially held for elevated level and now resumed   - S/p K repletions     Severe dehydration  - Noted to have elevated BUN/Cr. Resolved with IVF. Patient consuming her meals and drinking   - Torsemide held with recommendation for repeat labs in one week and follow up with PCP     Severe protein-calorie malnutrition  - Appreciate dietician assessment   - Diet, along with Ensure 1 can TID, MVI prescribed    Outlet dysfunction constipation  - Reports history of rectocele and cystocele, which have been interfering with bowel movements  - Occasionally self disimpacts, per patient; recent disimpaction was ineffective, per patient  CT scan = "Sigmoid anastomosis. Mild colonic diverticulosis. No bowel obstruction. "  No acute pathology in the abdomen.  Shows stools in the colon  - Likely that current constipation is of multifactorial etiology; dehydration (impacted by torsemide BID), hypokalemia.  Possibly also hypothyroidism  - TSH abnormal. Synthroid increased   - continuing on PTA senna 2 tabs  - Had outpatient GI appt scheduled for 10/25/24 --> should reschedule as able     Hypercalcemia  - Lab-work hemoconcentrated.  No suspicion for malignancy. Resolved post hydration     Prolonged QT interval  - TTE with EF 40-45% with hypokinesis of the apex, distal septum, and distal anterior wall. No ischemic symptoms  - Sees [facility] cardiology outpatient. As per outpatient note Sept. 2024--> "Echo Doppler examination done June 16, 2020 demonstrated mild mitral valve regurgitation, moderate to severe tricuspid valve regurgitation, mild pulmonary systolic hypertension, mild left ventricular enlargement, severe left ventricular systolic dysfunction with hypokinesis on the anterior wall, apex and interventricular septum  - Outpatient cardiology follow up     Case discussed with Dr. Swenson on 10/25/24. The patient is medically stable for discharge and has appropriate follow up.            74-year-old female, with past history significant for CAD - s/p Stents (8), Myocardial infarction, AICD, L-ear deafness, Asthma, L-Hemidiaphragm paralysis - s/p Plication, Thoracic scoliosis, Osteoporosis, Diverticulitis, Colon resection. Hysterectomy and Neuromodulators, presented to the ED due to report of abnormal potassium level.     Acute hypokalemia  - Potassium down to 2.8 - in the setting of patient with diarrhea x 2 weeks recently (now constipated), as well as only having clear liquid diet intake  - Complaining of muscle cramps  - ECG with QTc prolongation at 503, and appears also to have some PVCs  - Digoxin initially held for elevated level and now resumed   - S/p K repletions. K 3.4 most recently, and supplemented further after that value.     Severe dehydration  - Noted to have elevated BUN/Cr. Resolved with IVF. Patient consuming her meals and drinking   - Torsemide held with recommendation for repeat labs in one week and follow up with PCP. Not appearing volume overloaded at this time.     Severe protein-calorie malnutrition  - Appreciate dietician assessment   - Diet, along with Ensure 1 can TID, MVI prescribed    Outlet dysfunction constipation  - Reports history of rectocele and cystocele, which have been interfering with bowel movements  - Occasionally self disimpacts, per patient; recent disimpaction was ineffective, per patient  CT scan = "Sigmoid anastomosis. Mild colonic diverticulosis. No bowel obstruction. "  No acute pathology in the abdomen.  Shows stools in the colon  - Likely that current constipation is of multifactorial etiology; dehydration (impacted by torsemide BID), hypokalemia.  Possibly also hypothyroidism  - TSH abnormal. Synthroid increased. Repeat TSH in 6 weeks as outpatient.   - continuing on PTA senna 2 tabs  - Had outpatient GI appt scheduled for 10/25/24 --> should reschedule as able     Hypercalcemia  - Lab-work hemoconcentrated.  No suspicion for malignancy. Resolved post hydration     Prolonged QT interval  - TTE with EF 40-45% with hypokinesis of the apex, distal septum, and distal anterior wall. No ischemic symptoms  - Sees [facility] cardiology outpatient. As per outpatient note Sept. 2024--> "Echo Doppler examination done June 16, 2020 demonstrated mild mitral valve regurgitation, moderate to severe tricuspid valve regurgitation, mild pulmonary systolic hypertension, mild left ventricular enlargement, severe left ventricular systolic dysfunction with hypokinesis on the anterior wall, apex and interventricular septum  - Outpatient cardiology follow up     Case discussed with Dr. Swenson on 10/25/24. The patient is medically stable for discharge and has appropriate follow up.     Attending addendum:  Improving bloodwork. Patient becoming increasingly delirious/paranoid. Son at bedside providing collateral that this has happened frequently during prior hospitalizations and resolved once returned to her normal environment. Risks and benefits of continued hospitalization reviewed and discussed with son. At this time, it was felt that patient was stable for discharge with plan for close follow up for repeat bloodwork and PCP/Cardiology follow up, and concern that with continued hospitalization, delirium would worsen and benefits of further hospitalization and monitoring would not exceed the risks of worsened mental status.

## 2024-10-25 NOTE — DISCHARGE NOTE PROVIDER - PROVIDER TOKENS
PROVIDER:[TOKEN:[[id number]],FOLLOWUP:[2 weeks]],PROVIDER:[TOKEN:[[id number]],FOLLOWUP:[1 week]]

## 2024-10-25 NOTE — DISCHARGE NOTE PROVIDER - NSDCCPTREATMENT_GEN_ALL_CORE_FT
PRINCIPAL PROCEDURE  Procedure: Complete transthoracic echocardiography (TTE)  Findings and Treatment: 1. The left ventricular cavity is normal in size. Left ventricular wall thickness is normal. Left ventricular systolic function is mildly to moderately decreased with an ejection fraction visually estimated at 40 to 45%. There are regional wall motion abnormalities present. Hypokinesis of the apex, distal septum, and distal anterior wall.   2. The right ventricular cavity is normal in size and right ventricular systolic function is normal. Tricuspid annular plane systolic excursion (TAPSE) is 1.7 cm (normal >=1.7 cm). A device lead is visualized in the right heart.   3. Structurally normal mitral valve with normal leaflet excursion. There is calcification of the mitral valve annulus. There is trace mitral regurgitation.  This echocardiogram appears similar to prior cardiac studies you previously have had completed. Please follow up with your cardiologist closely after discharge to further review these echo findings and to determine if your cardiologist has any other workup or management recommendations.

## 2024-10-25 NOTE — DISCHARGE NOTE PROVIDER - NSDCFUADDAPPT_GEN_ALL_CORE_FT
Please have your bloodwork (including renal function, potassium level and digoxin level) repeated in 1 week to ensure stability, and to discuss if you should resume the medication torsemide.    Please repeat your thyroid studies in about 6 weeks as your thyroid dose was increased due to concern for hypothyroidism with elevated TSH level.

## 2024-10-25 NOTE — DISCHARGE NOTE NURSING/CASE MANAGEMENT/SOCIAL WORK - PATIENT PORTAL LINK FT
You can access the FollowMyHealth Patient Portal offered by [facility] by registering at the following website: [email or web address]. By joining [facility]’s FollowMyHealth portal, you will also be able to view your health information using other applications (apps) compatible with our system.

## 2024-10-25 NOTE — DIETITIAN INITIAL EVALUATION ADULT - NSICDXPASTSURGICALHX_GEN_ALL_CORE_FT
PAST SURGICAL HISTORY:  H/O bilateral cataract extraction     S/P carpal tunnel release right    S/P inguinal hernia repair left    S/P laparoscopic-assisted sigmoidectomy     S/P BELEN-BSO (total abdominal hysterectomy and bilateral salpingo-oophorectomy)     Status post craniectomy with mesh - left side for migraines    Status post plication of diaphragm left

## 2024-10-25 NOTE — DISCHARGE NOTE PROVIDER - ATTENDING DISCHARGE PHYSICAL EXAMINATION:
T 98, HR 89, /64, RR 17, 93% on RA  CONSTITUTIONAL: NAD, laying in bed  EYES: conjunctiva and sclera clear  ENMT: Moist oral mucosa  RESPIRATORY: Normal respiratory effort; lungs are clear to auscultation bilaterally  CARDIOVASCULAR: Regular rate and rhythm, normal S1 and S2, No lower extremity edema  ABDOMEN: Nontender to palpation, normoactive bowel sounds, no rebound/guarding  PSYCH: calm    Son updated at bedside 10/24/24, 10/25/24

## 2024-10-25 NOTE — DIETITIAN INITIAL EVALUATION ADULT - PROBLEM SELECTOR PLAN 5
Calcium = 11.4  Lab-work hemoconcentrated.  No suspicion for malignancy  - hydration in progress  - f/u for resolution

## 2024-10-25 NOTE — DISCHARGE NOTE NURSING/CASE MANAGEMENT/SOCIAL WORK - FINANCIAL ASSISTANCE
[facility] provides services at a reduced cost to those who are determined to be eligible through [facility]’s financial assistance program. Information regarding [facility]’s financial assistance program can be found by going to [email or web address] or by calling [phone].

## 2024-10-25 NOTE — DISCHARGE NOTE PROVIDER - CARE PROVIDER_API CALL
Jasper Carlson  Cardiovascular Disease  [address]  [place] [address]  Phone: [phone]  Fax: [phone]  Follow Up Time: 2 weeks    COGAN, FREDRIC  [address] [place] [address]  Phone: [phone]  Fax: [phone]  Follow Up Time: 1 week

## 2024-10-25 NOTE — DIETITIAN INITIAL EVALUATION ADULT - OTHER INFO
74-year-old female, with past history significant for CAD - s/p Stents (8), Myocardial infarction, AICD, L-ear deafness, Asthma, L-Hemidiaphragm paralysis - s/p Plication, Thoracic scoliosis, Osteoporosis, Diverticulitis, Colon resection. Hysterectomy and Neuromodulators, presented to the ED due to report of abnormal potassium level.  Diagnosed with Hypokalemia in the ED.    Pt seen and reports 75% intake of meals & supplement but c/o nausea and pain.  Noted skin intact, no edema per nursing flow sheet. Current wt- 96.7# (10/25/24) via bed scale. Will add Magic cup x2/day for additional (580 kcal/18 gm protein). Continue with MVI, Vit. D supplement as ordered.

## 2024-10-25 NOTE — DISCHARGE NOTE PROVIDER - NSDCFUSCHEDAPPT_GEN_ALL_CORE_FT
Ketan Morfin  [facility]  [facility] [address] [place] [address]  Scheduled Appointment: 12/10/2024

## 2024-10-25 NOTE — DIETITIAN INITIAL EVALUATION ADULT - PROBLEM SELECTOR PLAN 7
QT/QTc = 426/503 - in the setting of dehydration, hypokalemia and possibly other medication s/e  - hypokalemia being treated  - IVF hydration in progress  - f/u electrolytes  - f/u repeat ECG in the AM (ordered)  - f/u TTE (ordered)

## 2024-10-25 NOTE — DIETITIAN INITIAL EVALUATION ADULT - PROBLEM SELECTOR PLAN 2
Dry skin, poor skin turgor, dry mouth - in the setting of inadequate oral hydration, as well as fluid deficits from impact of torsemide 20 mg BID.  Hypotensive to BP of  91/67  Hypercalcemia of 11.4 is in the setting of dehydration  BUN/Cr = 29/1.38; presuming MAMTA (no prior values for comparison).  Calculated serum osmolality = 279  - IVF hydration - prescribed 500 mL of NaCl x 2, followed by LR at 125 mL/Hr x 16 hrs  - rescheduling torsemide 20 mg BID to 10//26/2024 (please re-evaluate, as necessary)  - encourage oral hydration, as tolerated  - f/u electrolytes, renal function for improvement/resolution

## 2024-10-25 NOTE — DIETITIAN INITIAL EVALUATION ADULT - PERTINENT LABORATORY DATA
10-25    142  |  98  |  15  ----------------------------<  98  3.4[L]   |  37[H]  |  0.71    Ca    9.5      25 Oct 2024 06:10  Phos  2.4     10-25  Mg     2.00     10-25    TPro  6.9  /  Alb  4.4  /  TBili  0.7  /  DBili  x   /  AST  36[H]  /  ALT  14  /  AlkPhos  62  10-24  A1C with Estimated Average Glucose Result: 5.4 % (10-24-24 @ 07:34)

## 2024-10-25 NOTE — DIETITIAN INITIAL EVALUATION ADULT - NS FNS DIET ORDER
Diet, Regular:   Gastroesophageal Reflux Disease (GERD)  Supplement Feeding Modality:  Oral  Ensure Enlive Cans or Servings Per Day:  1       Frequency:  Three Times a day (10-24-24 @ 04:39) [Active]

## 2024-11-01 RX ORDER — ALIROCUMAB 150 MG/ML
150 INJECTION, SOLUTION SUBCUTANEOUS
Refills: 0 | DISCHARGE
Start: 2024-11-01

## 2024-11-06 RX ORDER — DUPILUMAB 300 MG/2ML
300 INJECTION, SOLUTION SUBCUTANEOUS
Qty: 4 | Refills: 5 | Status: ACTIVE | COMMUNITY
Start: 2024-11-06 | End: 1900-01-01

## 2024-12-02 ENCOUNTER — RX RENEWAL (OUTPATIENT)
Age: 74
End: 2024-12-02

## 2024-12-10 ENCOUNTER — APPOINTMENT (OUTPATIENT)
Dept: PULMONOLOGY | Facility: CLINIC | Age: 74
End: 2024-12-10
Payer: MEDICARE

## 2024-12-10 VITALS
HEART RATE: 79 BPM | BODY MASS INDEX: 19.44 KG/M2 | WEIGHT: 84 LBS | RESPIRATION RATE: 16 BRPM | HEIGHT: 55 IN | TEMPERATURE: 97.7 F | OXYGEN SATURATION: 96 % | SYSTOLIC BLOOD PRESSURE: 110 MMHG | DIASTOLIC BLOOD PRESSURE: 78 MMHG

## 2024-12-10 DIAGNOSIS — J82.83 EOSINOPHILIC ASTHMA: ICD-10-CM

## 2024-12-10 DIAGNOSIS — K21.9 GASTRO-ESOPHAGEAL REFLUX DISEASE W/OUT ESOPHAGITIS: ICD-10-CM

## 2024-12-10 DIAGNOSIS — J45.50 SEVERE PERSISTENT ASTHMA, UNCOMPLICATED: ICD-10-CM

## 2024-12-10 DIAGNOSIS — J44.9 CHRONIC OBSTRUCTIVE PULMONARY DISEASE, UNSPECIFIED: ICD-10-CM

## 2024-12-10 DIAGNOSIS — R06.02 SHORTNESS OF BREATH: ICD-10-CM

## 2024-12-10 DIAGNOSIS — J98.6 DISORDERS OF DIAPHRAGM: ICD-10-CM

## 2024-12-10 DIAGNOSIS — J45.909 UNSPECIFIED ASTHMA, UNCOMPLICATED: ICD-10-CM

## 2024-12-10 DIAGNOSIS — J30.9 ALLERGIC RHINITIS, UNSPECIFIED: ICD-10-CM

## 2024-12-10 DIAGNOSIS — G47.33 OBSTRUCTIVE SLEEP APNEA (ADULT) (PEDIATRIC): ICD-10-CM

## 2024-12-10 DIAGNOSIS — E55.9 VITAMIN D DEFICIENCY, UNSPECIFIED: ICD-10-CM

## 2024-12-10 PROCEDURE — 95012 NITRIC OXIDE EXP GAS DETER: CPT

## 2024-12-10 PROCEDURE — 94010 BREATHING CAPACITY TEST: CPT

## 2024-12-10 PROCEDURE — 99214 OFFICE O/P EST MOD 30 MIN: CPT | Mod: 25

## 2024-12-10 PROCEDURE — 94799 UNLISTED PULMONARY SVC/PX: CPT

## 2024-12-10 RX ORDER — AZELASTINE HYDROCHLORIDE AND FLUTICASONE PROPIONATE 137; 50 UG/1; UG/1
137-50 SPRAY, METERED NASAL
Qty: 3 | Refills: 1 | Status: ACTIVE | COMMUNITY
Start: 2024-12-10 | End: 1900-01-01

## 2024-12-26 NOTE — H&P PST ADULT - GASTROINTESTINAL COMMENTS
Alert-The patient is alert, awake and responds to voice. The patient is oriented to time, place, and person. The triage nurse is able to obtain subjective information.
LEFT SIDED

## 2025-01-06 DIAGNOSIS — R09.3 ABNORMAL SPUTUM: ICD-10-CM

## 2025-01-06 RX ORDER — SULFAMETHOXAZOLE AND TRIMETHOPRIM 800; 160 MG/1; MG/1
800-160 TABLET ORAL TWICE DAILY
Qty: 20 | Refills: 0 | Status: ACTIVE | COMMUNITY
Start: 2025-01-06 | End: 1900-01-01

## 2025-01-20 NOTE — BH DISCHARGE NOTE NURSING/SOCIAL WORK/PSYCH REHAB - NSBHDCADDR2FT_A_CORE
No
[phone] [place] [address]  [facility] level

## 2025-02-26 ENCOUNTER — NON-APPOINTMENT (OUTPATIENT)
Age: 75
End: 2025-02-26

## 2025-03-17 RX ORDER — EVOLOCUMAB 140 MG/ML
140 INJECTION, SOLUTION SUBCUTANEOUS
Qty: 3 | Refills: 0 | Status: ACTIVE | COMMUNITY
Start: 2025-03-17 | End: 1900-01-01

## 2025-03-18 ENCOUNTER — NON-APPOINTMENT (OUTPATIENT)
Age: 75
End: 2025-03-18

## 2025-03-21 ENCOUNTER — NON-APPOINTMENT (OUTPATIENT)
Age: 75
End: 2025-03-21

## 2025-04-25 ENCOUNTER — RX RENEWAL (OUTPATIENT)
Age: 75
End: 2025-04-25

## 2025-05-07 PROCEDURE — 99214 OFFICE O/P EST MOD 30 MIN: CPT

## 2025-05-07 PROCEDURE — 90833 PSYTX W PT W E/M 30 MIN: CPT

## 2025-05-12 ENCOUNTER — RX RENEWAL (OUTPATIENT)
Age: 75
End: 2025-05-12

## 2025-05-19 ENCOUNTER — RX RENEWAL (OUTPATIENT)
Age: 75
End: 2025-05-19

## 2025-05-27 ENCOUNTER — APPOINTMENT (OUTPATIENT)
Dept: PULMONOLOGY | Facility: CLINIC | Age: 75
End: 2025-05-27
Payer: MEDICARE

## 2025-05-27 ENCOUNTER — APPOINTMENT (OUTPATIENT)
Dept: PULMONOLOGY | Facility: CLINIC | Age: 75
End: 2025-05-27

## 2025-05-27 VITALS
RESPIRATION RATE: 16 BRPM | WEIGHT: 88 LBS | TEMPERATURE: 97.2 F | OXYGEN SATURATION: 94 % | HEART RATE: 88 BPM | HEIGHT: 55 IN | SYSTOLIC BLOOD PRESSURE: 132 MMHG | BODY MASS INDEX: 20.37 KG/M2 | DIASTOLIC BLOOD PRESSURE: 88 MMHG

## 2025-05-27 DIAGNOSIS — J82.83 EOSINOPHILIC ASTHMA: ICD-10-CM

## 2025-05-27 DIAGNOSIS — R06.02 SHORTNESS OF BREATH: ICD-10-CM

## 2025-05-27 DIAGNOSIS — J44.9 CHRONIC OBSTRUCTIVE PULMONARY DISEASE, UNSPECIFIED: ICD-10-CM

## 2025-05-27 DIAGNOSIS — J30.9 ALLERGIC RHINITIS, UNSPECIFIED: ICD-10-CM

## 2025-05-27 DIAGNOSIS — K21.9 GASTRO-ESOPHAGEAL REFLUX DISEASE W/OUT ESOPHAGITIS: ICD-10-CM

## 2025-05-27 DIAGNOSIS — J45.50 SEVERE PERSISTENT ASTHMA, UNCOMPLICATED: ICD-10-CM

## 2025-05-27 DIAGNOSIS — J45.909 UNSPECIFIED ASTHMA, UNCOMPLICATED: ICD-10-CM

## 2025-05-27 DIAGNOSIS — G47.33 OBSTRUCTIVE SLEEP APNEA (ADULT) (PEDIATRIC): ICD-10-CM

## 2025-05-27 DIAGNOSIS — E55.9 VITAMIN D DEFICIENCY, UNSPECIFIED: ICD-10-CM

## 2025-05-27 PROCEDURE — 94729 DIFFUSING CAPACITY: CPT

## 2025-05-27 PROCEDURE — 94010 BREATHING CAPACITY TEST: CPT

## 2025-05-27 PROCEDURE — 95012 NITRIC OXIDE EXP GAS DETER: CPT

## 2025-05-27 PROCEDURE — 99214 OFFICE O/P EST MOD 30 MIN: CPT | Mod: 25

## 2025-05-27 PROCEDURE — ZZZZZ: CPT

## 2025-05-27 PROCEDURE — 94799 UNLISTED PULMONARY SVC/PX: CPT

## 2025-05-27 RX ORDER — ALBUTEROL SULFATE 90 UG/1
108 (90 BASE) INHALANT RESPIRATORY (INHALATION)
Qty: 1 | Refills: 2 | Status: ACTIVE | COMMUNITY
Start: 2025-05-27 | End: 1900-01-01

## 2025-05-28 RX ORDER — UMECLIDINIUM 62.5 UG/1
62.5 AEROSOL, POWDER ORAL
Qty: 3 | Refills: 1 | Status: ACTIVE | COMMUNITY
Start: 2025-05-28 | End: 1900-01-01

## 2025-05-28 RX ORDER — TIOTROPIUM BROMIDE INHALATION SPRAY 3.12 UG/1
2.5 SPRAY, METERED RESPIRATORY (INHALATION) DAILY
Qty: 3 | Refills: 1 | Status: DISCONTINUED | COMMUNITY
Start: 2025-05-27 | End: 2025-05-28

## 2025-06-16 ENCOUNTER — RX RENEWAL (OUTPATIENT)
Age: 75
End: 2025-06-16

## 2025-06-19 ENCOUNTER — APPOINTMENT (OUTPATIENT)
Dept: CARDIOLOGY | Facility: CLINIC | Age: 75
End: 2025-06-19

## 2025-08-04 ENCOUNTER — RX RENEWAL (OUTPATIENT)
Age: 75
End: 2025-08-04